# Patient Record
Sex: MALE | Race: WHITE | NOT HISPANIC OR LATINO | Employment: OTHER | URBAN - METROPOLITAN AREA
[De-identification: names, ages, dates, MRNs, and addresses within clinical notes are randomized per-mention and may not be internally consistent; named-entity substitution may affect disease eponyms.]

---

## 2017-02-06 PROBLEM — G89.4 CHRONIC PAIN SYNDROME: Status: ACTIVE | Noted: 2017-02-06

## 2017-05-02 ENCOUNTER — OFFICE VISIT (OUTPATIENT)
Dept: FAMILY MEDICINE | Facility: CLINIC | Age: 77
End: 2017-05-02
Payer: COMMERCIAL

## 2017-05-02 VITALS
HEIGHT: 63 IN | TEMPERATURE: 98.5 F | WEIGHT: 198 LBS | BODY MASS INDEX: 35.08 KG/M2 | SYSTOLIC BLOOD PRESSURE: 136 MMHG | DIASTOLIC BLOOD PRESSURE: 76 MMHG

## 2017-05-02 DIAGNOSIS — I10 BENIGN ESSENTIAL HYPERTENSION: ICD-10-CM

## 2017-05-02 DIAGNOSIS — G89.29 CHRONIC LEFT-SIDED LOW BACK PAIN WITH LEFT-SIDED SCIATICA: ICD-10-CM

## 2017-05-02 DIAGNOSIS — M54.42 CHRONIC LEFT-SIDED LOW BACK PAIN WITH LEFT-SIDED SCIATICA: ICD-10-CM

## 2017-05-02 DIAGNOSIS — N40.1 BENIGN NON-NODULAR PROSTATIC HYPERPLASIA WITH LOWER URINARY TRACT SYMPTOMS: ICD-10-CM

## 2017-05-02 PROCEDURE — 99214 OFFICE O/P EST MOD 30 MIN: CPT | Performed by: NURSE PRACTITIONER

## 2017-05-02 RX ORDER — OXYCODONE HYDROCHLORIDE 5 MG/1
5 TABLET ORAL 2 TIMES DAILY PRN
Qty: 60 TABLET | Refills: 0 | Status: SHIPPED | OUTPATIENT
Start: 2017-06-01 | End: 2017-05-02

## 2017-05-02 RX ORDER — OXYCODONE HYDROCHLORIDE 5 MG/1
5 TABLET ORAL 2 TIMES DAILY PRN
Qty: 60 TABLET | Refills: 0 | Status: SHIPPED | OUTPATIENT
Start: 2017-05-02 | End: 2017-05-02

## 2017-05-02 RX ORDER — TERAZOSIN 5 MG/1
5 CAPSULE ORAL AT BEDTIME
Qty: 90 CAPSULE | Refills: 3 | Status: SHIPPED | OUTPATIENT
Start: 2017-05-02 | End: 2018-02-20

## 2017-05-02 RX ORDER — OXYCODONE HYDROCHLORIDE 5 MG/1
5 TABLET ORAL 2 TIMES DAILY PRN
Qty: 60 TABLET | Refills: 0 | Status: SHIPPED | OUTPATIENT
Start: 2017-07-01 | End: 2017-07-20

## 2017-05-02 RX ORDER — TRIAMTERENE AND HYDROCHLOROTHIAZIDE 75; 50 MG/1; MG/1
0.5 TABLET ORAL DAILY
Qty: 45 TABLET | Refills: 3 | Status: SHIPPED | OUTPATIENT
Start: 2017-05-02 | End: 2018-02-20

## 2017-05-02 RX ORDER — OXYCODONE HYDROCHLORIDE 5 MG/1
5 TABLET ORAL 2 TIMES DAILY PRN
Qty: 60 TABLET | Refills: 0 | Status: CANCELLED | OUTPATIENT
Start: 2017-05-02

## 2017-05-02 ASSESSMENT — PAIN SCALES - GENERAL: PAINLEVEL: MILD PAIN (3)

## 2017-05-02 NOTE — PATIENT INSTRUCTIONS
Thank you for choosing [facility].  You may be receiving a survey in the mail from Edith Linares regarding your visit today.  Please take a few minutes to complete and return the survey to let us know how we are doing.      If you have questions or concerns, please contact us via [employer] or you can contact your care team at [phone].    Our Clinic hours are:  Monday 6:40 am  to 7:00 pm  Tuesday -Friday 6:40 am to 5:00 pm    The [place] outpatient lab hours are:  Monday - Friday 6:10 am to 4:45 pm  Saturdays 7:00 am to 11:00 am  Appointments are required, call [phone]    If you have clinical questions after hours or would like to schedule an appointment,  call the clinic at [phone].

## 2017-05-02 NOTE — PROGRESS NOTES
"  SUBJECTIVE:                                                    Mann Escobar is a 76 year old male who presents to clinic today for the following health issues:  Chief Complaint   Patient presents with     Pain     Pain Med Follow Up/ Renewal     Hypertension     follow up , renew meds           Hypertension Follow-up      Outpatient blood pressures are not being checked.    Low Salt Diet: not monitoring salt         Back Pain Follow Up       Description:   Location of pain:  bilateral  Character of pain: constant achiness  Pain radiation: into Left Leg and Foot   Since last visit, pain is:  unchanged  New numbness or weakness in legs, not attributed to pain:  no     Intensity: Currently 3/10    History:   Pain interferes with job: Not applicable  Therapies tried without relief: all treatments   Therapies tried with relief: pain meds  and cold        Accompanying Signs & Symptoms:  Risk of Fracture:  None  Risk of Cauda Equina:  None  Risk of Infection:  None  Risk of Cancer:  None           Amount of exercise or physical activity: None    Problems taking medications regularly: No    Medication side effects: none    Diet: regular (no restrictions)          Problem list and histories reviewed & adjusted, as indicated.  Additional history: as documented      ROS:  Constitutional, HEENT, cardiovascular, pulmonary, gi and gu systems are negative, except as otherwise noted.    OBJECTIVE:                                                    /76  Temp 98.5  F (36.9  C) (Tympanic)  Ht 5' 2.5\" (1.588 m)  Wt 198 lb (89.8 kg)  BMI 35.64 kg/m2  Body mass index is 35.64 kg/(m^2).  GENERAL: healthy, alert and no distress  NECK: no adenopathy, no asymmetry, masses, or scars and thyroid normal to palpation  RESP: lungs clear to auscultation - no rales, rhonchi or wheezes  CV: regular rate and rhythm, normal S1 S2, no S3 or S4, no murmur, click or rub, no peripheral edema and peripheral pulses strong     "     ASSESSMENT/PLAN:                                                        ICD-10-CM    1. Chronic left-sided low back pain with left-sided sciatica M54.42 oxyCODONE (ROXICODONE) 5 MG IR tablet    G89.29 DISCONTINUED: oxyCODONE (ROXICODONE) 5 MG IR tablet     DISCONTINUED: oxyCODONE (ROXICODONE) 5 MG IR tablet   2. Benign non-nodular prostatic hyperplasia with lower urinary tract symptoms N40.1 terazosin (HYTRIN) 5 MG capsule   3. Benign essential hypertension I10 triamterene-hydrochlorothiazide (MAXZIDE) 75-50 MG per tablet         The risks, benefits and treatment options of prescribed medications or other treatments have been discussed with the patient. The patient verbalized their understanding and should call or follow up if no improvement or if they develop further problems.    GUNJAN Vinson [facility]

## 2017-05-02 NOTE — LETTER
[facility]    05/02/17    Patient: Mann Escobar  YOB: 1940  Medical Record Number: [id number]                                                                  Controlled Substance Agreement  I understand that my care provider has prescribed controlled substances (narcotics, tranquilizers, and/or stimulants) to help manage my condition(s).  I am taking this medicine to help me function or work.  I know that this is strong medicine.  It could have serious side effects and even cause a dependency on the drug.  If I stop these medicines suddenly, I could have severe withdrawal symptoms.    The risks, benefits, and side effects of these medication(s) were explained to me.  I agree that:  1. I will take part in other treatments as advised by my provider.  This may be psychiatry or counseling, physical therapy, behavioral therapy, group treatment, or a referral to a pain clinic.  I will reduce or stop my medicine when my provider tells me to do so.   2. I will take my medicines as prescribed.  I will not change the dose or schedule unless my provider tells me to.  There will be no refills if I  run out early.   I may be contacted at any time without warning and asked to complete a drug test or pill count.   3. I will keep all my appointments at the clinic.  If I miss appointments or fail to follow instructions, my provider may stop my medicine.  4. I will not ask other providers to prescribe controlled substances. And I will not accept controlled substances from other people. If I need another prescribed controlled substance for a new reason, I will notify my provider within one business day.  5. If I enroll in the [place] Medical Marijuana program, I will tell my provider.  I will also sign an agreement to share my medical records with my provider.  6. I will use one pharmacy to fill all of my controlled substance prescriptions.  If my prescription is mailed to  my pharmacy, it may take 5 to 7 days for my medicine to be ready.  7. I understand that my provider, clinic care team, and pharmacy can track controlled substance prescriptions from other providers through a central database (prescription monitoring program).  8. I will bring in my list of medications (or my medicine bottles) each time I come to the clinic.  REV-  04/2016                                                                                                                                            Page 1 of 2      [facility]    05/02/17    Patient: Mann Escobar  YOB: 1940  Medical Record Number: [id number]    9. Refills of controlled substances will be made only during office hours.  It is up to me to make sure that I do not run out of my medicines on weekends or holidays.    10. I am responsible for my prescriptions.  If the medicine is lost or stolen, it will not be replaced.   I also agree not to share these medicines with anyone.  11. I agree to not use ANY illegal or recreational drugs.  This includes marijuana, cocaine, bath salts or other drugs.  I agree not to use alcohol unless my provider says I may.  I agree to give urine samples whenever asked.  If I fail to give a urine sample, the provider may stop my medicine.     12. I will tell my nurse or provider right away if I become pregnant or have a new medical problem treated outside of [facility].  13. I understand that this medicine can affect my thinking and judgment.  It may be unsafe for me to drive, use machinery and do dangerous tasks.  I will not do any of these things until I know how the medicine affects me.  If my dose changes, I will wait to see how it affects me.  I will contact my provider if I have concerns about medicine side effects.  I understand that if I do not follow any of the conditions above, my prescriptions or treatment may be stopped.    I agree that my  provider, clinic care team, and pharmacy may work with any city, state or federal law enforcement agency that investigates the misuse, sale, or other diversion of my controlled medicine. I will allow my provider to discuss my care with or share a copy of this agreement with any other treating provider, pharmacy or emergency room where I receive care.  I agree to give up (waive) any right of privacy or confidentiality with respect to these authorizations.   I have read this agreement and have asked questions about anything I did not understand.   ___________________________________    ___________________________  Patient Signature                                                           Date and Time  ___________________________________     ____________________________  Witness                                                                            Date and Time  ___________________________________  GUNJAN Vinson CNP  REV-  04/2016                                                                                                                                                                 Page 2 of 2  Opioid Pain Medicines (also known as Narcotics)  What You Need to Know      What are opioids?   Opioids are pain medicines that must be prescribed by a doctor. Examples are:     morphine (MS Contin, Liz)    oxycodone (Oxycontin)    oxycodone and acetaminophen (Percocet)    hydrocodone and acetaminophen (Vicodin, Norco)     fentanyl patch (Duragesic)     hydromorphone (Dilaudid)     methadone     What do opioids do well?   Opioids are best for short-term pain after a surgery or injury. They also work well for cancer pain. Unlike other pain medicines, they do not cause liver or kidney failure or ulcers. They may help some people with long-lasting (chronic) pain.     What do opioids NOT do well?   Opioids never get rid of pain entirely, and they do not work well for most patients with chronic pain.  Opioids do not reduce swelling, one of the causes of pain. They also don t work well for nerve pain.     Side effects  Talk to your doctor before you start or decide to keep taking one of these medicines. Side effects include:    Lowers your breathing rate enough that it could cause death    Death due to taking more than the prescribed dose    Serious lifelong opioid use      Dependence is not the same as addiction. Addiction is when people keep using a substance that harms their body, their mind or their relations with others. If you have a history of drug or alcohol abuse, taking opioids can cause a relapse.  Over time, opioids don t work as well. Most people will need higher and higher doses. The higher the dose, the more serious the side effects. We don t know the long-term effects of opioids.   People who have used opioids for a long time have a lower quality of life, worse depression, higher levels of pain and more visits to doctors.  Overdose from prescription drugs is the second leading cause of death in the [place] The risk of overdose rises when opioids are taken with other drugs such as:    Medicines used for anxiety and panic attacks (such as lorazepam, alprazolam, clonazepam    Other sedatives    Alcohol    Illegal drugs such as heroin  Never share your opioids with others. Be sure to store opioids in a secure place, locked if possible.Young children can easily swallow them and overdose.     Are there other ways to manage pain?  Ways to help reduce pain:    Exercise every day.    Treat health problems that may be causing pain.    Treat mental health problems like depression and anxiety.     Worse depression symptoms; Less pleasure in things you usually enjoy    Feeling tired or sluggish    Slower thoughts or cloudy thinking    Being more sensitive to pain over time; Pain is harder to control.    Trouble sleeping or restless sleep    Changes in hormone levels (for example, less testosterone).     Changes in  sex drive or ability to have sex    Long lasting nausea and constipation    Trouble breathing while asleep; This is worse with lung problems like COPD or sleep apnea.    Unsafe driving    Getting sick more often    Itching    Feeling dizzy    Dry mouth    Sweating    Trouble emptying the bladder (peeing). This is worse if you have an enlarged prostate or get urinary tract infections (UTIs).    What else should I know about opioids?  When someone takes opioids for too long or too often, they become dependent. This means that if you stop or reduce the medicine too quickly, you will have withdrawal symptoms.          Practice good sleep habits.  Try to go to bed and get up at the same time every day.    Stop smoking.  Tobacco use can make pain worse.    Do things that you enjoy.    Find a way to work through pain without drugs.  Try deep breathing, meditation, visual imagery and aromatherapy.    Ask your doctor to help you create a plan to manage your pain.

## 2017-05-02 NOTE — NURSING NOTE
"Chief Complaint   Patient presents with     Pain     Pain Med Follow Up/ Renewal     Hypertension     follow up , renew meds       Initial /78 (BP Location: Left arm, Patient Position: Chair, Cuff Size: Adult Large)  Temp 98.5  F (36.9  C) (Tympanic)  Ht 5' 2.5\" (1.588 m)  Wt 198 lb (89.8 kg)  BMI 35.64 kg/m2 Estimated body mass index is 35.64 kg/(m^2) as calculated from the following:    Height as of this encounter: 5' 2.5\" (1.588 m).    Weight as of this encounter: 198 lb (89.8 kg).  Medication Reconciliation: complete    "

## 2017-05-02 NOTE — MR AVS SNAPSHOT
After Visit Summary   5/2/2017    Mann Escobar    MRN: [id number]           Patient Information     Date Of Birth          1940        Visit Information        Provider Department      5/2/2017 9:00 AM Shayy Ramírez APRN [facility]        Today's Diagnoses     Chronic left-sided low back pain with left-sided sciatica        Benign non-nodular prostatic hyperplasia with lower urinary tract symptoms        Benign essential hypertension          Care Instructions          Thank you for choosing [facility].  You may be receiving a survey in the mail from Edith Linares regarding your visit today.  Please take a few minutes to complete and return the survey to let us know how we are doing.      If you have questions or concerns, please contact us via [employer] or you can contact your care team at [phone].    Our Clinic hours are:  Monday 6:40 am  to 7:00 pm  Tuesday -Friday 6:40 am to 5:00 pm    The [place] outpatient lab hours are:  Monday - Friday 6:10 am to 4:45 pm  Saturdays 7:00 am to 11:00 am  Appointments are required, call [phone]    If you have clinical questions after hours or would like to schedule an appointment,  call the clinic at [phone].          Follow-ups after your visit        Who to contact     If you have questions or need follow up information about today's clinic visit or your schedule please contact [facility] directly at [phone].  Normal or non-critical lab and imaging results will be communicated to you by [employer], letter or phone within 4 business days after the clinic has received the results. If you do not hear from us within 7 days, please contact the clinic through [employer] or phone. If you have a critical or abnormal lab result, we will notify you by phone as soon as possible.  Submit refill requests through [employer] or call your pharmacy and they will forward the refill request to us.  "Please allow 3 business days for your refill to be completed.          Additional Information About Your Visit        [employer] Information     [employer] lets you send messages to your doctor, view your test results, renew your prescriptions, schedule appointments and more. To sign up, go to [email or web address] . Click on \"Log in\" on the left side of the screen, which will take you to the Welcome page. Then click on \"Sign up Now\" on the right side of the page.     You will be asked to enter the access code listed below, as well as some personal information. Please follow the directions to create your username and password.     Your access code is: [email or web address]  Expires: 2017  9:34 AM     Your access code will  in 90 days. If you need help or a new code, please call your [place] clinic or [phone].        Care EveryWhere ID     This is your Care EveryWhere ID. This could be used by other organizations to access your [place] medical records  [id number]        Your Vitals Were     Temperature Height BMI (Body Mass Index)             98.5  F (36.9  C) (Tympanic) 5' 2.5\" (1.588 m) 35.64 kg/m2          Blood Pressure from Last 3 Encounters:   17 142/78   16 138/66   16 158/81    Weight from Last 3 Encounters:   17 198 lb (89.8 kg)   16 198 lb 4.8 oz (89.9 kg)   16 189 lb 6.4 oz (85.9 kg)              Today, you had the following     No orders found for display         Today's Medication Changes          These changes are accurate as of: 17  9:34 AM.  If you have any questions, ask your nurse or doctor.               Start taking these medicines.        Dose/Directions    oxyCODONE 5 MG IR tablet   Commonly known as:  ROXICODONE   Used for:  Chronic left-sided low back pain with left-sided sciatica        Dose:  5 mg   Start taking on:  2017   Take 1 tablet (5 mg) by mouth 2 times daily as needed for pain   Quantity:  60 tablet   Refills:  0         These " medicines have changed or have updated prescriptions.        Dose/Directions    senna-docusate 8.6-50 MG per tablet   Commonly known as:  SENOKOT-S;PERICOLACE   This may have changed:    - how much to take  - when to take this   Used for:  Spondylolisthesis of lumbar region        Dose:  1-2 tablet   Take 1-2 tablets by mouth 2 times daily as needed for constipation.   Quantity:  60 tablet   Refills:  0            Where to get your medicines      Some of these will need a paper prescription and others can be bought over the counter.  Ask your nurse if you have questions.     Bring a paper prescription for each of these medications     oxyCODONE 5 MG IR tablet    terazosin 5 MG capsule    triamterene-hydrochlorothiazide 75-50 MG per tablet                Primary Care Provider Office Phone # Fax #    Shayy GUNJAN Armendariz [phone]       [facility] [address] [address]        Thank you!     Thank you for choosing [facility]  for your care. Our goal is always to provide you with excellent care. Hearing back from our patients is one way we can continue to improve our services. Please take a few minutes to complete the written survey that you may receive in the mail after your visit with us. Thank you!             Your Updated Medication List - Protect others around you: Learn how to safely use, store and throw away your medicines at www.disposemymeds.org.          This list is accurate as of: 5/2/17  9:34 AM.  Always use your most recent med list.                   Brand Name Dispense Instructions for use    aspirin 81 MG EC tablet     90 tablet    Take 1 tablet (81 mg) by mouth daily       HCA VITAMIN B12 500 MCG Tabs   Generic drug:  cyanocobalamin      2 tablets daily       MULTIVITAMIN PO      one daily       oxyCODONE 5 MG IR tablet   Start taking on:  7/1/2017    ROXICODONE    60 tablet    Take 1 tablet (5 mg) by mouth 2 times daily as  needed for pain       senna-docusate 8.6-50 MG per tablet    SENOKOT-S;PERICOLACE    60 tablet    Take 1-2 tablets by mouth 2 times daily as needed for constipation.       terazosin 5 MG capsule    HYTRIN    90 capsule    Take 1 capsule (5 mg) by mouth At Bedtime       triamterene-hydrochlorothiazide 75-50 MG per tablet    MAXZIDE    45 tablet    Take 0.5 tablets by mouth daily       VIACTIV PO      With D 1000mg calcium

## 2017-07-20 ENCOUNTER — OFFICE VISIT (OUTPATIENT)
Dept: FAMILY MEDICINE | Facility: CLINIC | Age: 77
End: 2017-07-20
Payer: COMMERCIAL

## 2017-07-20 VITALS
HEART RATE: 73 BPM | SYSTOLIC BLOOD PRESSURE: 160 MMHG | TEMPERATURE: 99.1 F | WEIGHT: 200 LBS | HEIGHT: 63 IN | BODY MASS INDEX: 35.44 KG/M2 | DIASTOLIC BLOOD PRESSURE: 80 MMHG

## 2017-07-20 DIAGNOSIS — M54.42 CHRONIC LEFT-SIDED LOW BACK PAIN WITH LEFT-SIDED SCIATICA: Primary | ICD-10-CM

## 2017-07-20 DIAGNOSIS — R60.0 PERIPHERAL EDEMA: ICD-10-CM

## 2017-07-20 DIAGNOSIS — G89.29 CHRONIC LEFT-SIDED LOW BACK PAIN WITH LEFT-SIDED SCIATICA: Primary | ICD-10-CM

## 2017-07-20 PROCEDURE — 99213 OFFICE O/P EST LOW 20 MIN: CPT | Performed by: NURSE PRACTITIONER

## 2017-07-20 RX ORDER — OXYCODONE HYDROCHLORIDE 5 MG/1
5 TABLET ORAL 2 TIMES DAILY PRN
Qty: 60 TABLET | Refills: 0 | Status: SHIPPED | OUTPATIENT
Start: 2017-08-31 | End: 2017-07-20

## 2017-07-20 RX ORDER — TERAZOSIN 5 MG/1
5 CAPSULE ORAL AT BEDTIME
Qty: 90 CAPSULE | Refills: 3 | Status: CANCELLED | OUTPATIENT
Start: 2017-07-20

## 2017-07-20 RX ORDER — OXYCODONE HYDROCHLORIDE 5 MG/1
5 TABLET ORAL 2 TIMES DAILY PRN
Qty: 60 TABLET | Refills: 0 | Status: SHIPPED | OUTPATIENT
Start: 2017-08-01 | End: 2017-07-20

## 2017-07-20 RX ORDER — FUROSEMIDE 20 MG
20 TABLET ORAL DAILY
Qty: 30 TABLET | Refills: 1 | Status: SHIPPED | OUTPATIENT
Start: 2017-07-20 | End: 2017-09-22

## 2017-07-20 RX ORDER — OXYCODONE HYDROCHLORIDE 5 MG/1
5 TABLET ORAL 2 TIMES DAILY PRN
Qty: 60 TABLET | Refills: 0 | Status: SHIPPED | OUTPATIENT
Start: 2017-09-30 | End: 2017-09-22

## 2017-07-20 NOTE — PROGRESS NOTES
"  SUBJECTIVE:                                                    Mann Escobar is a 76 year old male who presents to clinic today for the following health issues:    Back Pain Follow Up      Description:   Location of pain:  Low Back Pain   Character of pain: sharp and dull ache  Pain radiation: lower left leg.   Since last visit, pain is:  unchanged  New numbness or weakness in legs, not attributed to pain:  YES    Intensity: moderate    History:   Pain interferes with job: No  Therapies tried without relief: cold, heat, massage  Therapies tried with relief: Oxycodone and sitting.           Accompanying Signs & Symptoms:  Risk of Fracture:  None  Risk of Cauda Equina:  None  Risk of Infection:  None  Risk of Cancer:  None        Amount of exercise or physical activity: None    Problems taking medications regularly: No    Medication side effects: none    Diet: regular (no restrictions)        Left leg - always has problems with swelling since knee replacement.  Worse in the summer  Doesn't think the HCTZ is helping at all.  SANDY hose don't help.          Problem list and histories reviewed & adjusted, as indicated.  Additional history: as documented      Reviewed and updated as needed this visit by clinical staffTobacco  Allergies  Med Hx  Surg Hx  Fam Hx  Soc Hx      Reviewed and updated as needed this visit by Provider         ROS:  Constitutional, HEENT, cardiovascular, pulmonary, gi and gu systems are negative, except as otherwise noted.      OBJECTIVE:   /80  Pulse 73  Temp 99.1  F (37.3  C) (Tympanic)  Ht 5' 2.5\" (1.588 m)  Wt 200 lb (90.7 kg)  BMI 36 kg/m2  Body mass index is 36 kg/(m^2).  GENERAL: healthy, alert and no distress  MS: +2 pitting edema ankle to knee, left leg.        ASSESSMENT/PLAN:       ICD-10-CM    1. Chronic left-sided low back pain with left-sided sciatica M54.42 oxyCODONE (ROXICODONE) 5 MG IR tablet    G89.29 DISCONTINUED: oxyCODONE (ROXICODONE) 5 MG IR " tablet     DISCONTINUED: oxyCODONE (ROXICODONE) 5 MG IR tablet   2. Peripheral edema R60.9 furosemide (LASIX) 20 MG tablet daily  Follow up in one month for recheck.       The risks, benefits and treatment options of prescribed medications or other treatments have been discussed with the patient. The patient verbalized their understanding and should call or follow up if no improvement or if they develop further problems.    GUNJAN Vinson [facility]

## 2017-07-20 NOTE — NURSING NOTE
"Initial /85  Pulse 73  Temp 99.1  F (37.3  C) (Tympanic)  Ht 5' 2.5\" (1.588 m)  Wt 200 lb (90.7 kg)  BMI 36 kg/m2 Estimated body mass index is 36 kg/(m^2) as calculated from the following:    Height as of this encounter: 5' 2.5\" (1.588 m).    Weight as of this encounter: 200 lb (90.7 kg). .    Melody Goodman CMA ([facility])  "

## 2017-07-20 NOTE — MR AVS SNAPSHOT
"              After Visit Summary   2017    Mann Escobar    MRN: [id number]           Patient Information     Date Of Birth          1940        Visit Information        Provider Department      2017 1:40 PM Shayy Ramírez APRN CNP [facility]        Today's Diagnoses     Chronic left-sided low back pain with left-sided sciatica    -  1    Peripheral edema           Follow-ups after your visit        Who to contact     If you have questions or need follow up information about today's clinic visit or your schedule please contact [facility] directly at [phone].  Normal or non-critical lab and imaging results will be communicated to you by Trly Uniqhart, letter or phone within 4 business days after the clinic has received the results. If you do not hear from us within 7 days, please contact the clinic through Trly Uniqhart or phone. If you have a critical or abnormal lab result, we will notify you by phone as soon as possible.  Submit refill requests through B2Brev or call your pharmacy and they will forward the refill request to us. Please allow 3 business days for your refill to be completed.          Additional Information About Your Visit        MyChart Information     B2Brev lets you send messages to your doctor, view your test results, renew your prescriptions, schedule appointments and more. To sign up, go to [email or web address] . Click on \"Log in\" on the left side of the screen, which will take you to the Welcome page. Then click on \"Sign up Now\" on the right side of the page.     You will be asked to enter the access code listed below, as well as some personal information. Please follow the directions to create your username and password.     Your access code is: [email or web address]  Expires: 2017  9:34 AM     Your access code will  in 90 days. If you need help or a new code, please call your [facility] or [phone].      " "  Care EveryWhere ID     This is your Care EveryWhere ID. This could be used by other organizations to access your [facility] medical records  [id number]        Your Vitals Were     Pulse Temperature Height BMI (Body Mass Index)          73 99.1  F (37.3  C) (Tympanic) 5' 2.5\" (1.588 m) 36 kg/m2         Blood Pressure from Last 3 Encounters:   07/20/17 160/80   05/02/17 136/76   09/27/16 138/66    Weight from Last 3 Encounters:   07/20/17 200 lb (90.7 kg)   05/02/17 198 lb (89.8 kg)   09/27/16 198 lb 4.8 oz (89.9 kg)              Today, you had the following     No orders found for display         Today's Medication Changes          These changes are accurate as of: 7/20/17  2:08 PM.  If you have any questions, ask your nurse or doctor.               Start taking these medicines.        Dose/Directions    furosemide 20 MG tablet   Commonly known as:  LASIX   Used for:  Peripheral edema   Started by:  Shayy Ramírez APRN CNP        Dose:  20 mg   Take 1 tablet (20 mg) by mouth daily   Quantity:  30 tablet   Refills:  1       oxyCODONE 5 MG IR tablet   Commonly known as:  ROXICODONE   Used for:  Chronic left-sided low back pain with left-sided sciatica   Started by:  Shayy Ramírez APRN CNP        Dose:  5 mg   Start taking on:  9/30/2017   Take 1 tablet (5 mg) by mouth 2 times daily as needed for pain   Quantity:  60 tablet   Refills:  0         These medicines have changed or have updated prescriptions.        Dose/Directions    senna-docusate 8.6-50 MG per tablet   Commonly known as:  SENOKOT-S;PERICOLACE   This may have changed:    - how much to take  - when to take this   Used for:  Spondylolisthesis of lumbar region        Dose:  1-2 tablet   Take 1-2 tablets by mouth 2 times daily as needed for constipation.   Quantity:  60 tablet   Refills:  0            Where to get your medicines      These medications were sent to [facility] [place], [place] - [address]  [address] [place] [address]     Phone:  [phone]     furosemide 20 MG tablet         Some of these will need a paper prescription and others can be bought over the counter.  Ask your nurse if you have questions.     Bring a paper prescription for each of these medications     oxyCODONE 5 MG IR tablet                Primary Care Provider Office Phone # Fax #    Shayy Thomasmar Ramírez, APRN [facility] [phone]       [facility] [address] [facility] [address]        Equal Access to Services     CATRACHO MCBRIDE : Hadii petar ku hadasho Soomaali, waaxda luqadaha, qaybta kaalmada adeegyada, waxyessy morris . So [facility] [phone].    ATENCIÓN: Si habla español, tiene a hewitt disposición servicios gratuitos de asistencia lingüística. Llame al [phone].    We comply with applicable federal civil rights laws and [place] laws. We do not discriminate on the basis of race, color, national origin, age, disability sex, sexual orientation or gender identity.            Thank you!     Thank you for choosing [facility]  for your care. Our goal is always to provide you with excellent care. Hearing back from our patients is one way we can continue to improve our services. Please take a few minutes to complete the written survey that you may receive in the mail after your visit with us. Thank you!             Your Updated Medication List - Protect others around you: Learn how to safely use, store and throw away your medicines at www.disposemymeds.org.          This list is accurate as of: 7/20/17  2:08 PM.  Always use your most recent med list.                   Brand Name Dispense Instructions for use Diagnosis    aspirin 81 MG EC tablet     90 tablet    Take 1 tablet (81 mg) by mouth daily        furosemide 20 MG tablet    LASIX    30 tablet    Take 1 tablet (20 mg) by mouth daily    Peripheral edema       HCA VITAMIN B12 500 MCG Tabs    Generic drug:  cyanocobalamin      2 tablets daily        MULTIVITAMIN PO      one daily        oxyCODONE 5 MG IR tablet   Start taking on:  9/30/2017    ROXICODONE    60 tablet    Take 1 tablet (5 mg) by mouth 2 times daily as needed for pain    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    Spondylolisthesis of lumbar region       terazosin 5 MG capsule    HYTRIN    90 capsule    Take 1 capsule (5 mg) by mouth At Bedtime    Benign non-nodular prostatic hyperplasia with lower urinary tract symptoms       triamterene-hydrochlorothiazide 75-50 MG per tablet    MAXZIDE    45 tablet    Take 0.5 tablets by mouth daily    Benign essential hypertension       VIACTIV PO      With D 1000mg calcium

## 2017-09-22 ENCOUNTER — OFFICE VISIT (OUTPATIENT)
Dept: FAMILY MEDICINE | Facility: CLINIC | Age: 77
End: 2017-09-22
Payer: COMMERCIAL

## 2017-09-22 VITALS
OXYGEN SATURATION: 97 % | SYSTOLIC BLOOD PRESSURE: 138 MMHG | HEART RATE: 67 BPM | RESPIRATION RATE: 20 BRPM | DIASTOLIC BLOOD PRESSURE: 82 MMHG | WEIGHT: 194 LBS | TEMPERATURE: 97.5 F | BODY MASS INDEX: 34.92 KG/M2

## 2017-09-22 DIAGNOSIS — G89.29 CHRONIC LEFT-SIDED LOW BACK PAIN WITH LEFT-SIDED SCIATICA: ICD-10-CM

## 2017-09-22 DIAGNOSIS — M54.42 CHRONIC LEFT-SIDED LOW BACK PAIN WITH LEFT-SIDED SCIATICA: ICD-10-CM

## 2017-09-22 DIAGNOSIS — Z23 NEED FOR PROPHYLACTIC VACCINATION AND INOCULATION AGAINST INFLUENZA: ICD-10-CM

## 2017-09-22 DIAGNOSIS — Z00.00 ROUTINE GENERAL MEDICAL EXAMINATION AT A HEALTH CARE FACILITY: Primary | ICD-10-CM

## 2017-09-22 DIAGNOSIS — Z12.5 SCREENING FOR PROSTATE CANCER: ICD-10-CM

## 2017-09-22 PROCEDURE — G0008 ADMIN INFLUENZA VIRUS VAC: HCPCS | Performed by: NURSE PRACTITIONER

## 2017-09-22 PROCEDURE — 99397 PER PM REEVAL EST PAT 65+ YR: CPT | Mod: 25 | Performed by: NURSE PRACTITIONER

## 2017-09-22 PROCEDURE — 90662 IIV NO PRSV INCREASED AG IM: CPT | Performed by: NURSE PRACTITIONER

## 2017-09-22 RX ORDER — OXYCODONE HYDROCHLORIDE 5 MG/1
5 TABLET ORAL 2 TIMES DAILY PRN
Qty: 60 TABLET | Refills: 0 | Status: SHIPPED | OUTPATIENT
Start: 2017-10-30 | End: 2017-09-22

## 2017-09-22 RX ORDER — OXYCODONE HYDROCHLORIDE 5 MG/1
5 TABLET ORAL 2 TIMES DAILY PRN
Qty: 60 TABLET | Refills: 0 | Status: SHIPPED | OUTPATIENT
Start: 2017-11-29 | End: 2017-09-22

## 2017-09-22 RX ORDER — OXYCODONE HYDROCHLORIDE 5 MG/1
5 TABLET ORAL 2 TIMES DAILY PRN
Qty: 60 TABLET | Refills: 0 | Status: SHIPPED | OUTPATIENT
Start: 2017-12-29 | End: 2017-12-05

## 2017-09-22 NOTE — MR AVS SNAPSHOT
After Visit Summary   9/22/2017    Mann Escobar    MRN: [id number]           Patient Information     Date Of Birth          1940        Visit Information        Provider Department      9/22/2017 11:00 AM Shayy Ramírez APRN [facility]        Today's Diagnoses     Routine general medical examination at a health care facility    -  1    Chronic left-sided low back pain with left-sided sciatica        Need for prophylactic vaccination and inoculation against influenza        Screening for prostate cancer          Care Instructions      Preventive Health Recommendations:   Male Ages 65 and over    Yearly exam:             See your health care provider every year in order to  o   Review health changes.   o   Discuss preventive care.    o   Review your medicines if your doctor has prescribed any.    Talk with your health care provider about whether you should have a test to screen for prostate cancer (PSA).    Every 3 years, have a diabetes test (fasting glucose). If you are at risk for diabetes, you should have this test more often.    Every 5 years, have a cholesterol test. Have this test more often if you are at risk for high cholesterol or heart disease.     Every 10 years, have a colonoscopy. Or, have a yearly FIT test (stool test). These exams will check for colon cancer.    Talk to with your health care provider about screening for Abdominal Aortic Aneurysm if you have a family history of AAA or have a history of smoking.    Shots:     Get a flu shot each year.     Get a tetanus shot every 10 years.     Talk to your doctor about your pneumonia vaccines. There are now two you should receive - Pneumovax (PPSV 23) and Prevnar (PCV 13).     Talk to your doctor about a shingles vaccine.     Talk to your doctor about the hepatitis B vaccine.  Nutrition:     Eat at least 5 servings of fruits and vegetables each day.     Eat whole-grain bread, whole-wheat  "pasta and brown rice instead of white grains and rice.     Talk to your provider about Calcium and Vitamin D.   Lifestyle    Exercise for at least 150 minutes a week (30 minutes a day, 5 days a week). This will help you control your weight and prevent disease.     Limit alcohol to one drink per day.     No smoking.     Wear sunscreen to prevent skin cancer.     See your dentist every six months for an exam and cleaning.     See your eye doctor every 1 to 2 years to screen for conditions such as glaucoma, macular degeneration, cataracts, etc           Follow-ups after your visit        Future tests that were ordered for you today     Open Future Orders        Priority Expected Expires Ordered    **Lipid panel reflex to direct LDL FUTURE anytime Routine 9/22/2017 9/22/2018 9/22/2017    **Basic metabolic panel FUTURE anytime Routine 9/22/2017 9/22/2018 9/22/2017    PSA, screen Routine  9/22/2018 9/22/2017            Who to contact     If you have questions or need follow up information about today's clinic visit or your schedule please contact [facility] directly at [phone].  Normal or non-critical lab and imaging results will be communicated to you by Envie de Fraiseshart, letter or phone within 4 business days after the clinic has received the results. If you do not hear from us within 7 days, please contact the clinic through [employer] or phone. If you have a critical or abnormal lab result, we will notify you by phone as soon as possible.  Submit refill requests through [employer] or call your pharmacy and they will forward the refill request to us. Please allow 3 business days for your refill to be completed.          Additional Information About Your Visit        Envie de FraisesharWebRadar Information     [employer] lets you send messages to your doctor, view your test results, renew your prescriptions, schedule appointments and more. To sign up, go to [email or web address] . Click on \"Log in\" on the left side of the screen, which " "will take you to the Welcome page. Then click on \"Sign up Now\" on the right side of the page.     You will be asked to enter the access code listed below, as well as some personal information. Please follow the directions to create your username and password.     Your access code is: [email or web address]  Expires: 2017 11:24 AM     Your access code will  in 90 days. If you need help or a new code, please call your [facility] clinic or [phone].        Care EveryWhere ID     This is your Care EveryWhere ID. This could be used by other organizations to access your [facility] medical records  [id number]        Your Vitals Were     Pulse Temperature Respirations Pulse Oximetry BMI (Body Mass Index)       67 97.5  F (36.4  C) (Tympanic) 20 97% 34.92 kg/m2        Blood Pressure from Last 3 Encounters:   17 162/83   17 160/80   17 136/76    Weight from Last 3 Encounters:   17 194 lb (88 kg)   17 200 lb (90.7 kg)   17 198 lb (89.8 kg)              We Performed the Following     ADMIN INFLUENZA (For MEDICARE Patients ONLY) []     FLU VACCINE, INCREASED ANTIGEN, PRESV FREE, AGE 65+ [85143]          Today's Medication Changes          These changes are accurate as of: 17 11:24 AM.  If you have any questions, ask your nurse or doctor.               Start taking these medicines.        Dose/Directions    oxyCODONE 5 MG IR tablet   Commonly known as:  ROXICODONE   Used for:  Chronic left-sided low back pain with left-sided sciatica   Started by:  Shayy Ramírez APRN CNP        Dose:  5 mg   Start taking on:  2017   Take 1 tablet (5 mg) by mouth 2 times daily as needed for pain   Quantity:  60 tablet   Refills:  0         These medicines have changed or have updated prescriptions.        Dose/Directions    senna-docusate 8.6-50 MG per tablet   Commonly known as:  SENOKOT-S;PERICOLACE   This may have changed:    - how much to take  - when to take this   Used " for:  Spondylolisthesis of lumbar region        Dose:  1-2 tablet   Take 1-2 tablets by mouth 2 times daily as needed for constipation.   Quantity:  60 tablet   Refills:  0         Stop taking these medicines if you haven't already. Please contact your care team if you have questions.     furosemide 20 MG tablet   Commonly known as:  LASIX   Stopped by:  Shayy Ramírez APRN CNP                Where to get your medicines      Some of these will need a paper prescription and others can be bought over the counter.  Ask your nurse if you have questions.     Bring a paper prescription for each of these medications     oxyCODONE 5 MG IR tablet                Primary Care Provider Office Phone # Fax #    GUNJAN Villarreal [phone] [address] [facility] [address]        Equal Access to Services     CATRACHO MCBRIDE : Hadii aad ku hadasho Soleesa, waaxda luqadaha, qaybta kaalmada adeegyada, deny morris . So [facility] [phone].    ATENCIÓN: Si habla español, tiene a hewitt disposición servicios gratuitos de asistencia lingüística. Ursula al [phone].    We comply with applicable federal civil rights laws and [place] laws. We do not discriminate on the basis of race, color, national origin, age, disability sex, sexual orientation or gender identity.            Thank you!     Thank you for choosing [facility]  for your care. Our goal is always to provide you with excellent care. Hearing back from our patients is one way we can continue to improve our services. Please take a few minutes to complete the written survey that you may receive in the mail after your visit with us. Thank you!             Your Updated Medication List - Protect others around you: Learn how to safely use, store and throw away your medicines at www.disposemymeds.org.          This list is accurate as of: 9/22/17 11:24 AM.  Always use your most recent med  list.                   Brand Name Dispense Instructions for use Diagnosis    aspirin 81 MG EC tablet     90 tablet    Take 1 tablet (81 mg) by mouth daily        HCA VITAMIN B12 500 MCG Tabs   Generic drug:  cyanocobalamin      2 tablets daily        MULTIVITAMIN PO      one daily        oxyCODONE 5 MG IR tablet   Start taking on:  12/29/2017    ROXICODONE    60 tablet    Take 1 tablet (5 mg) by mouth 2 times daily as needed for pain    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    Spondylolisthesis of lumbar region       terazosin 5 MG capsule    HYTRIN    90 capsule    Take 1 capsule (5 mg) by mouth At Bedtime    Benign non-nodular prostatic hyperplasia with lower urinary tract symptoms       triamterene-hydrochlorothiazide 75-50 MG per tablet    MAXZIDE    45 tablet    Take 0.5 tablets by mouth daily    Benign essential hypertension       VIACTIV PO      With D 1000mg calcium

## 2017-09-22 NOTE — PROGRESS NOTES
SUBJECTIVE:   Mann Escobar is a 76 year old male who presents for Preventive Visit.    Are you in the first 12 months of your Medicare Part B coverage?  No    Healthy Habits:    Do you get at least three servings of calcium containing foods daily (dairy, green leafy vegetables, etc.)? no, taking calcium and/or vitamin D supplement: yes -     Amount of exercise or daily activities, outside of work: 0 day(s) per week    Problems taking medications regularly No    Medication side effects: No    Have you had an eye exam in the past two years? no    Do you see a dentist twice per year? no    Do you have sleep apnea, excessive snoring or daytime drowsiness?no    COGNITIVE SCREEN  1) Repeat 3 items (Banana, Sunrise, Chair)    2) Clock draw: NORMAL  3) 3 item recall: Recalls 3 objects  Results: 3 items recalled: COGNITIVE IMPAIRMENT LESS LIKELY    Mini-CogTM Copyright S Ivonne. Licensed by the author for use in [facility]; reprinted with permission (zi@[email or web address]). All rights reserved.              Reviewed and updated as needed this visit by clinical staffTobacco  Allergies  Med Hx  Surg Hx  Fam Hx  Soc Hx        Reviewed and updated as needed this visit by Provider        Social History   Substance Use Topics     Smoking status: Former Smoker     Packs/day: 0.50     Years: 7.00     Types: Cigarettes     Quit date: 1/1/1962     Smokeless tobacco: Never Used     Alcohol use Yes      Comment: rarely       The patient does not drink >3 drinks per day nor >7 drinks per week.    Today's PHQ-2 Score:   PHQ-2 ( 1999 Pfizer) 9/22/2017 7/20/2017   Q1: Little interest or pleasure in doing things 0 0   Q2: Feeling down, depressed or hopeless 0 0   PHQ-2 Score 0 0       Do you feel safe in your environment - Yes    Do you have a Health Care Directive?: No: Advance care planning reviewed with patient; information given to patient to review.      Current providers sharing in care for this patient  "include: Patient Care Team:  Shayy Ramírez APRN CNP as PCP - General (Family Practice)      Hearing impairment: Yes, per wife    Ability to successfully perform activities of daily living: Yes, no assistance needed     Fall risk:  Fallen 2 or more times in the past year?: Yes  Any fall with injury in the past year?: No  Timed Up and Go Test (>13.5 is fall risk; contact physician) : 10      Home safety:  none identified      The following health maintenance items are reviewed in Epic and correct as of today:  Health Maintenance   Topic Date Due     URINE DRUG SCREEN Q1 YR  10/28/1955     SARAH QUESTIONNAIRE 1 YEAR  10/28/1958     PHQ-9 Q1YR  10/28/1958     INFLUENZA VACCINE (SYSTEM ASSIGNED)  09/01/2017     FALL RISK ASSESSMENT  09/27/2017     ADVANCE DIRECTIVE PLANNING Q5 YRS  10/08/2017     LIPID SCREEN Q5 YR MALE (SYSTEM ASSIGNED)  10/16/2018     COLONOSCOPY Q5 YR  08/21/2021     TETANUS IMMUNIZATION (SYSTEM ASSIGNED)  08/23/2021     PNEUMOCOCCAL  Completed           ROS:  Constitutional, HEENT, cardiovascular, pulmonary, gi and gu systems are negative, except as otherwise noted.      OBJECTIVE:   /82  Pulse 67  Temp 97.5  F (36.4  C) (Tympanic)  Resp 20  Wt 194 lb (88 kg)  SpO2 97%  BMI 34.92 kg/m2 Estimated body mass index is 34.92 kg/(m^2) as calculated from the following:    Height as of 7/20/17: 5' 2.5\" (1.588 m).    Weight as of this encounter: 194 lb (88 kg).  EXAM:   GENERAL: healthy, alert and no distress  EYES: Eyes grossly normal to inspection, PERRL and conjunctivae and sclerae normal  HENT: ear canals and TM's normal, nose and mouth without ulcers or lesions  NECK: no adenopathy, no asymmetry, masses, or scars and thyroid normal to palpation  RESP: lungs clear to auscultation - no rales, rhonchi or wheezes  CV: regular rate and rhythm, normal S1 S2, no S3 or S4, no murmur, click or rub, no peripheral edema and peripheral pulses strong  ABDOMEN: soft, nontender, no " "hepatosplenomegaly, no masses and bowel sounds normal  MS: no gross musculoskeletal defects noted, no edema  SKIN: no suspicious lesions or rashes  NEURO: Normal strength and tone, mentation intact and speech normal  PSYCH: mentation appears normal, affect normal/bright    ASSESSMENT / PLAN:       ICD-10-CM    1. Routine general medical examination at a health care facility Z00.00 **Lipid panel reflex to direct LDL FUTURE anytime     **Basic metabolic panel FUTURE anytime   2. Chronic left-sided low back pain with left-sided sciatica M54.42 oxyCODONE (ROXICODONE) 5 MG IR tablet    G89.29 DISCONTINUED: oxyCODONE (ROXICODONE) 5 MG IR tablet     DISCONTINUED: oxyCODONE (ROXICODONE) 5 MG IR tablet   3. Need for prophylactic vaccination and inoculation against influenza Z23 FLU VACCINE, INCREASED ANTIGEN, PRESV FREE, AGE 65+ [45001]     ADMIN INFLUENZA (For MEDICARE Patients ONLY) []   4. Screening for prostate cancer Z12.5 PSA, screen       End of Life Planning:  Patient currently has an advanced directive: No.  I have verified the patient's ablity to prepare an advanced directive/make health care decisions.  Literature was provided to assist patient in preparing an advanced directive.    COUNSELING:  Reviewed preventive health counseling, as reflected in patient instructions        Estimated body mass index is 34.92 kg/(m^2) as calculated from the following:    Height as of 7/20/17: 5' 2.5\" (1.588 m).    Weight as of this encounter: 194 lb (88 kg).  Weight management plan: Discussed healthy diet and exercise guidelines and patient will follow up in 12 months in clinic to re-evaluate.   reports that he quit smoking about 55 years ago. His smoking use included Cigarettes. He has a 3.50 pack-year smoking history. He has never used smokeless tobacco.        Appropriate preventive services were discussed with this patient, including applicable screening as appropriate for cardiovascular disease, diabetes, " osteopenia/osteoporosis, and glaucoma.  As appropriate for age/gender, discussed screening for colorectal cancer, prostate cancer, breast cancer, and cervical cancer. Checklist reviewing preventive services available has been given to the patient.    Reviewed patients plan of care and provided an AVS. The Basic Care Plan (routine screening as documented in Health Maintenance) for Mann meets the Care Plan requirement. This Care Plan has been established and reviewed with the Patient.  The risks, benefits and treatment options of prescribed medications or other treatments have been discussed with the patient. The patient verbalized their understanding and should call or follow up if no improvement or if they develop further problems.    GUNJAN [facility]      Injectable Influenza Immunization Documentation    1.  Is the person to be vaccinated sick today?   No    2. Does the person to be vaccinated have an allergy to a component   of the vaccine?   No    3. Has the person to be vaccinated ever had a serious reaction   to influenza vaccine in the past?   No    4. Has the person to be vaccinated ever had Guillain-Barré syndrome?   No    Form completed by aleks

## 2017-09-22 NOTE — NURSING NOTE
"Chief Complaint   Patient presents with     Physical     Refill Request     chronic pain medication.       Initial /83 (BP Location: Right arm)  Pulse 67  Temp 97.5  F (36.4  C) (Tympanic)  Resp 20  Wt 194 lb (88 kg)  SpO2 97%  BMI 34.92 kg/m2 Estimated body mass index is 34.92 kg/(m^2) as calculated from the following:    Height as of 7/20/17: 5' 2.5\" (1.588 m).    Weight as of this encounter: 194 lb (88 kg).  Medication Reconciliation: complete    Health Maintenance that is potentially due pending provider review:  NONE    n/a    Is there anyone who you would like to be able to receive your results? No  If yes have patient fill out COLE    "

## 2017-10-27 ENCOUNTER — TRANSFERRED RECORDS (OUTPATIENT)
Dept: HEALTH INFORMATION MANAGEMENT | Facility: CLINIC | Age: 77
End: 2017-10-27

## 2017-10-28 ENCOUNTER — APPOINTMENT (OUTPATIENT)
Dept: CT IMAGING | Facility: CLINIC | Age: 77
End: 2017-10-28
Attending: FAMILY MEDICINE
Payer: MEDICARE

## 2017-10-28 ENCOUNTER — HOSPITAL ENCOUNTER (EMERGENCY)
Facility: CLINIC | Age: 77
Discharge: HOME | End: 2017-10-28
Attending: FAMILY MEDICINE | Admitting: FAMILY MEDICINE
Payer: MEDICARE

## 2017-10-28 VITALS
SYSTOLIC BLOOD PRESSURE: 124 MMHG | RESPIRATION RATE: 17 BRPM | OXYGEN SATURATION: 99 % | DIASTOLIC BLOOD PRESSURE: 84 MMHG | TEMPERATURE: 98 F

## 2017-10-28 DIAGNOSIS — S22.41XA CLOSED FRACTURE OF MULTIPLE RIBS OF RIGHT SIDE, INITIAL ENCOUNTER: ICD-10-CM

## 2017-10-28 DIAGNOSIS — H81.10 BENIGN PAROXYSMAL POSITIONAL VERTIGO, UNSPECIFIED LATERALITY: ICD-10-CM

## 2017-10-28 DIAGNOSIS — D56.8 OTHER THALASSEMIA (H): ICD-10-CM

## 2017-10-28 DIAGNOSIS — D56.3 THALASSEMIA MINOR: ICD-10-CM
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
## 2017-10-28 LAB
ANION GAP SERPL CALCULATED.3IONS-SCNC: 6 MMOL/L (ref 3–14)
BASOPHILS # BLD AUTO: 0 10E9/L (ref 0–0.2)
BASOPHILS NFR BLD AUTO: 0.1 %
BUN SERPL-MCNC: 32 MG/DL (ref 7–30)
CALCIUM SERPL-MCNC: 8.9 MG/DL (ref 8.5–10.1)
CHLORIDE SERPL-SCNC: 106 MMOL/L (ref 94–109)
CO2 SERPL-SCNC: 27 MMOL/L (ref 20–32)
CREAT SERPL-MCNC: 1.25 MG/DL (ref 0.66–1.25)
DIFFERENTIAL METHOD BLD: ABNORMAL
EOSINOPHIL # BLD AUTO: 0 10E9/L (ref 0–0.7)
EOSINOPHIL NFR BLD AUTO: 0 %
ERYTHROCYTE [DISTWIDTH] IN BLOOD BY AUTOMATED COUNT: 21.7 % (ref 10–15)
GFR SERPL CREATININE-BSD FRML MDRD: 56 ML/MIN/1.7M2
GLUCOSE SERPL-MCNC: 92 MG/DL (ref 70–99)
HCT VFR BLD AUTO: 25.2 % (ref 40–53)
HGB BLD-MCNC: 7.9 G/DL (ref 13.3–17.7)
IMM GRANULOCYTES # BLD: 0 10E9/L (ref 0–0.4)
IMM GRANULOCYTES NFR BLD: 0.2 %
LYMPHOCYTES # BLD AUTO: 0.3 10E9/L (ref 0.8–5.3)
LYMPHOCYTES NFR BLD AUTO: 2.1 %
MCH RBC QN AUTO: 15.5 PG (ref 26.5–33)
MCHC RBC AUTO-ENTMCNC: 31.3 G/DL (ref 31.5–36.5)
MCV RBC AUTO: 50 FL (ref 78–100)
MONOCYTES # BLD AUTO: 1.3 10E9/L (ref 0–1.3)
MONOCYTES NFR BLD AUTO: 8.1 %
NEUTROPHILS # BLD AUTO: 14.5 10E9/L (ref 1.6–8.3)
NEUTROPHILS NFR BLD AUTO: 89.5 %
PLATELET # BLD AUTO: 276 10E9/L (ref 150–450)
POTASSIUM SERPL-SCNC: 3.4 MMOL/L (ref 3.4–5.3)
RBC # BLD AUTO: 5.09 10E12/L (ref 4.4–5.9)
SODIUM SERPL-SCNC: 139 MMOL/L (ref 133–144)
TROPONIN I SERPL-MCNC: 0.02 UG/L (ref 0–0.04)
WBC # BLD AUTO: 16.2 10E9/L (ref 4–11)

## 2017-10-28 PROCEDURE — 99285 EMERGENCY DEPT VISIT HI MDM: CPT | Mod: 25 | Performed by: FAMILY MEDICINE

## 2017-10-28 PROCEDURE — 80048 BASIC METABOLIC PNL TOTAL CA: CPT | Performed by: FAMILY MEDICINE

## 2017-10-28 PROCEDURE — 93005 ELECTROCARDIOGRAM TRACING: CPT | Performed by: FAMILY MEDICINE

## 2017-10-28 PROCEDURE — 99285 EMERGENCY DEPT VISIT HI MDM: CPT | Mod: Z6 | Performed by: FAMILY MEDICINE

## 2017-10-28 PROCEDURE — 71250 CT THORAX DX C-: CPT

## 2017-10-28 PROCEDURE — 84484 ASSAY OF TROPONIN QUANT: CPT | Performed by: FAMILY MEDICINE

## 2017-10-28 PROCEDURE — 85025 COMPLETE CBC W/AUTO DIFF WBC: CPT | Performed by: FAMILY MEDICINE

## 2017-10-28 RX ORDER — OXYCODONE HYDROCHLORIDE 5 MG/1
5 TABLET ORAL 4 TIMES DAILY PRN
Qty: 30 TABLET | Refills: 0 | Status: SHIPPED | OUTPATIENT
Start: 2017-10-28 | End: 2017-11-10

## 2017-10-28 ASSESSMENT — ENCOUNTER SYMPTOMS
ABDOMINAL PAIN: 0
CHILLS: 0
WEAKNESS: 0
PSYCHIATRIC NEGATIVE: 1
SHORTNESS OF BREATH: 0
DIZZINESS: 1
NECK PAIN: 0
SPEECH DIFFICULTY: 0
LIGHT-HEADEDNESS: 0
FEVER: 0

## 2017-10-28 NOTE — ED AVS SNAPSHOT
[facility] Emergency Department    [address] [address]    Phone:  [phone]    Fax:  [phone]                                       Mann Escobar   MRN: [id number]    Department:  [facility] Emergency Department   Date of Visit:  10/28/2017           Patient Information     Date Of Birth          1940        Your diagnoses for this visit were:     Closed fracture of multiple ribs of right side, initial encounter     Benign paroxysmal positional vertigo, unspecified laterality     Thalassemia minor        You were seen by Frankie Vigil MD.        Discharge Instructions       Take prescribed pain medication as directed.    Use your walker and use care getting around.    See your Provider in 2-3 days for follow up.    Consider: check iron level, possibly see Hematologist.    Discuss dizziness/vertigo spells.        24 Hour Appointment Hotline       To make an appointment at any [facility], call [phone] ([phone]). If you don't have a family doctor or clinic, we will help you find one. [facility] clinics are conveniently located to serve the needs of you and your family.             Review of your medicines      Our records show that you are taking the medicines listed below. If these are incorrect, please call your family doctor or clinic.        Dose / Directions Last dose taken    aspirin 81 MG EC tablet   Dose:  81 mg   Quantity:  90 tablet        Take 1 tablet (81 mg) by mouth daily   Refills:  3        HCA VITAMIN B12 500 MCG Tabs   Generic drug:  cyanocobalamin        2 tablets daily   Refills:  0        MULTIVITAMIN PO        one daily   Refills:  0        senna-docusate 8.6-50 MG per tablet   Commonly known as:  SENOKOT-S;PERICOLACE   Dose:  1-2 tablet   Quantity:  60 tablet        Take 1-2 tablets by mouth 2 times daily as needed for constipation.   Refills:  0        terazosin 5 MG capsule   Commonly known as:  HYTRIN   Dose:  5  mg   Quantity:  90 capsule        Take 1 capsule (5 mg) by mouth At Bedtime   Refills:  3        triamterene-hydrochlorothiazide 75-50 MG per tablet   Commonly known as:  MAXZIDE   Dose:  0.5 tablet   Quantity:  45 tablet        Take 0.5 tablets by mouth daily   Refills:  3        VIACTIV PO        With D 1000mg calcium   Refills:  0          ASK your doctor about these medications        Dose / Directions Last dose taken    * oxyCODONE 5 MG IR tablet   Commonly known as:  ROXICODONE   Dose:  5 mg   What changed:  You were already taking a medication with the same name, and this prescription was added. Make sure you understand how and when to take each.   Quantity:  30 tablet   Ask about: Which instructions should I use?        Take 1 tablet (5 mg) by mouth 4 times daily as needed for pain   Refills:  0        * oxyCODONE 5 MG IR tablet   Commonly known as:  ROXICODONE   Dose:  5 mg   What changed:  Another medication with the same name was added. Make sure you understand how and when to take each.   Quantity:  60 tablet   Start taking on:  12/29/2017   Ask about: Which instructions should I use?        Take 1 tablet (5 mg) by mouth 2 times daily as needed for pain   Refills:  0        * Notice:  This list has 2 medication(s) that are the same as other medications prescribed for you. Read the directions carefully, and ask your doctor or other care provider to review them with you.            Prescriptions were sent or printed at these locations (1 Prescription)                   Other Prescriptions                Printed at Department/Unit printer (1 of 1)         oxyCODONE (ROXICODONE) 5 MG IR tablet                Procedures and tests performed during your visit     Basic metabolic panel    CBC with platelets, differential    Chest CT w/o contrast    EKG 12-lead, tracing only    Troponin I      Orders Needing Specimen Collection     None      Pending Results     Date and Time Order Name Status Description     10/28/2017 1259 Chest CT w/o contrast Preliminary             Pending Culture Results     No orders found from 10/26/2017 to 10/29/2017.            Pending Results Instructions     If you had any lab results that were not finalized at the time of your Discharge, you can call the ED Lab Result RN at [phone]. You will be contacted by this team for any positive Lab results or changes in treatment. The nurses are available 7 days a week from 10A to 6:30P.  You can leave a message 24 hours per day and they will return your call.        Test Results From Your Hospital Stay        10/28/2017  1:28 PM      Component Results     Component Value Ref Range & Units Status    WBC 16.2 (H) 4.0 - 11.0 10e9/L Final    RBC Count 5.09 4.4 - 5.9 10e12/L Final    Hemoglobin 7.9 (L) 13.3 - 17.7 g/dL Final    Hematocrit 25.2 (L) 40.0 - 53.0 % Final    MCV 50 (L) 78 - 100 fl Final    MCH 15.5 (L) 26.5 - 33.0 pg Final    MCHC 31.3 (L) 31.5 - 36.5 g/dL Final    RDW 21.7 (H) 10.0 - 15.0 % Final    Platelet Count 276 150 - 450 10e9/L Final    Diff Method Automated Method  Final    % Neutrophils 89.5 % Final    % Lymphocytes 2.1 % Final    % Monocytes 8.1 % Final    % Eosinophils 0.0 % Final    % Basophils 0.1 % Final    % Immature Granulocytes 0.2 % Final    Absolute Neutrophil 14.5 (H) 1.6 - 8.3 10e9/L Final    Absolute Lymphocytes 0.3 (L) 0.8 - 5.3 10e9/L Final    Absolute Monocytes 1.3 0.0 - 1.3 10e9/L Final    Absolute Eosinophils 0.0 0.0 - 0.7 10e9/L Final    Absolute Basophils 0.0 0.0 - 0.2 10e9/L Final    Abs Immature Granulocytes 0.0 0 - 0.4 10e9/L Final         10/28/2017  1:41 PM      Component Results     Component Value Ref Range & Units Status    Sodium 139 133 - 144 mmol/L Final    Potassium 3.4 3.4 - 5.3 mmol/L Final    Chloride 106 94 - 109 mmol/L Final    Carbon Dioxide 27 20 - 32 mmol/L Final    Anion Gap 6 3 - 14 mmol/L Final    Glucose 92 70 - 99 mg/dL Final    Urea Nitrogen 32 (H) 7 - 30 mg/dL Final    Creatinine  1.25 0.66 - 1.25 mg/dL Final    GFR Estimate 56 (L) >60 mL/min/1.7m2 Final    Non  GFR Calc    GFR Estimate If Black 68 >60 mL/min/1.7m2 Final    African American GFR Calc    Calcium 8.9 8.5 - 10.1 mg/dL Final         10/28/2017  1:41 PM      Component Results     Component Value Ref Range & Units Status    Troponin I ES 0.017 0.000 - 0.045 ug/L Final    The 99th percentile for upper reference range is 0.045 ug/L.  Troponin values   in the range of 0.045 - 0.120 ug/L may be associated with risks of adverse   clinical events.           10/28/2017  2:36 PM      Narrative     CT CHEST WITHOUT CONTRAST 10/28/2017 2:09 PM     COMPARISON: None.    HISTORY: Trauma to ribs, anterior chest and right rib pain. Hard to  take in a breath, dizziness.    TECHNIQUE: Volumetric helical acquisition of CT images of the chest  from the clavicles to the kidneys were acquired without IV contrast.  Images were displayed in 5 mm reconstructions. Radiation dose for this  scan was reduced using automated exposure control, adjustment of the  mA and/or kV according to patient size, or iterative reconstruction  technique.    FINDINGS: No evidence of pneumothorax. There are no pulmonary  infiltrates. Several tiny pulmonary nodules bilaterally most of which  are calcified. None of these measures greater than 4 mm. There are  calcified hilar lymph nodes consistent with previous granulomatous  disease. There is a subcentimeter right thyroid nodule. There is no  pleural or pericardial effusion. There are minimally displaced  fractures of the right third through fifth ribs anterolaterally.  Imaging through the upper abdomen demonstrates multiple  low-attenuation lesions in the liver which are likely cysts. There are  postoperative changes in the stomach. Small hiatal hernia. There are  bilateral renal cysts. 1.7 cm low-attenuation right adrenal nodule.  This is likely an adenoma.        Impression     IMPRESSION:   1. Minimally  "displaced fractures of the right third through fifth ribs  anterolaterally.  2. No evidence of pneumothorax.  3. Evidence of old granulomatous disease.   4. Probable right adrenal adenoma.                 Thank you for choosing [facility]       Thank you for choosing [facility] for your care. Our goal is always to provide you with excellent care. Hearing back from our patients is one way we can continue to improve our services. Please take a few minutes to complete the written survey that you may receive in the mail after you visit with us. Thank you!        [employer] Information     [employer] lets you send messages to your doctor, view your test results, renew your prescriptions, schedule appointments and more. To sign up, go to [email or web address] . Click on \"Log in\" on the left side of the screen, which will take you to the Welcome page. Then click on \"Sign up Now\" on the right side of the page.     You will be asked to enter the access code listed below, as well as some personal information. Please follow the directions to create your username and password.     Your access code is: [email or web address]  Expires: 2017 11:24 AM     Your access code will  in 90 days. If you need help or a new code, please call your [facility] clinic or [phone].        Care EveryWhere ID     This is your Care EveryWhere ID. This could be used by other organizations to access your [facility] medical records  [id number]        Equal Access to Services     CATRACHO MCBRIDE : Hadii petar Samuels, waaxda luqadaha, qaybta kaalmada francis, deny morris . So [facility] [phone].    ATENCIÓN: Si habla español, tiene a hewitt disposición servicios gratuitos de asistencia lingüística. Ursula al [phone].    We comply with applicable federal civil rights laws and [place] laws. We do not discriminate on the basis of race, color, national origin, age, disability, sex, sexual orientation, or gender " identity.            After Visit Summary       This is your record. Keep this with you and show to your community pharmacist(s) and doctor(s) at your next visit.

## 2017-10-28 NOTE — DISCHARGE INSTRUCTIONS
Take prescribed pain medication as directed.    Use your walker and use care getting around.    See your Provider in 2-3 days for follow up.    Consider: check iron level, possibly see Hematologist.    Discuss dizziness/vertigo spells.

## 2017-10-28 NOTE — ED NOTES
"Pt fell at home this am, pt states \" I don't know I tripped and hit my chest on the vanity \" pain with deep breathing, no LOC, pt is alert and oriented, no bruising seen on chest area  "

## 2017-10-28 NOTE — ED NOTES
Pt here for fall, was walking into bathroom about 730 am, doesn't know what happened, fell into the vanity striking his chest. Complains of chest pain.

## 2017-10-28 NOTE — ED AVS SNAPSHOT
[facility] Emergency Department    [address] [address]    Phone:  [phone]    Fax:  [phone]                                       Mann Escobar   MRN: [id number]    Department:  [facility] Emergency Department   Date of Visit:  10/28/2017           After Visit Summary Signature Page     I have received my discharge instructions, and my questions have been answered. I have discussed any challenges I see with this plan with the nurse or doctor.    ..........................................................................................................................................  Patient/Patient Representative Signature      ..........................................................................................................................................  Patient Representative Print Name and Relationship to Patient    ..................................................               ................................................  Date                                            Time    ..........................................................................................................................................  Reviewed by Signature/Title    ...................................................              ..............................................  Date                                                            Time

## 2017-10-28 NOTE — ED PROVIDER NOTES
"  History     Chief Complaint   Patient presents with     Fall     this am at 0730     Chest Pain     possible syncopal episode     HPI  Mann Escobar is a 77 year old male who presents to the ED with chest pain form a fall. Patient reports he fell and hit the vanity this morning upon walking in the doorway of the bathroom at his house. 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 Patient reports he was walking with a cane in R hand. He reports after hitting the vanity he fell on the floor to his knees and when he got up felt dizzy for a few minutes. He reports everything got \"darker for a second\" after crashing into the vanity. Patient reports pain when taking a breath and tender over anterior ribs on both sides. Patient denies dizziness before fall and no vision changes. He denies palpitations. Patient denies injury to neck, head, legs, and knees. Patient is on maxzide 75-50 MG and Terazosin 5 MG.  He has h/o thalassemia  And runs a low Hgb.   He later in this ER stay mentions episodic vertigo which is sometimes positional.  Therefore some factors in balance would be: back and leg ortho problems, low Hgb, possible BPPV. He does have a walker and a scooter at home. Lives with wife who had recent knee surgery.      Patient Active Problem List   Diagnosis      HX NEPHROLITHIASIS [V13.01]     Thalassemia minor     Esophageal reflux     Family history of prostate cancer     Leg edema     CARDIOVASCULAR SCREENING; LDL GOAL LESS THAN 130     Internal hemorrhoids     Iron deficiency anemia     First degree AV block     Family history of diabetes mellitus     Scoliosis     Hypopotassemia     Advanced directives, counseling/discussion     Benign non-nodular prostatic hyperplasia with lower urinary tract symptoms     Chronic low back pain     S/P knee replacement     S/P ankle fusion     Abnormal antinuclear antibody titer     Osteoarthritis "     Hypertension     BPH (benign prostatic hyperplasia)     Pseudogout     Chronic pain syndrome     Current Outpatient Prescriptions   Medication Sig Dispense Refill     oxyCODONE (ROXICODONE) 5 MG IR tablet Take 1 tablet (5 mg) by mouth 4 times daily as needed for pain 30 tablet 0     [START ON 12/29/2017] oxyCODONE (ROXICODONE) 5 MG IR tablet Take 1 tablet (5 mg) by mouth 2 times daily as needed for pain 60 tablet 0     terazosin (HYTRIN) 5 MG capsule Take 1 capsule (5 mg) by mouth At Bedtime 90 capsule 3     triamterene-hydrochlorothiazide (MAXZIDE) 75-50 MG per tablet Take 0.5 tablets by mouth daily 45 tablet 3     aspirin 81 MG EC tablet Take 1 tablet (81 mg) by mouth daily 90 tablet 3     senna-docusate (SENOKOT-S;PERICOLACE) 8.6-50 MG per tablet Take 1-2 tablets by mouth 2 times daily as needed for constipation. (Patient taking differently: Take 1 tablet by mouth daily as needed for constipation ) 60 tablet 0     VIACTIV OR With D 1000mg calcium       HCA VITAMIN B12 500 MCG OR TABS 2 tablets daily       MULTIVITAMIN OR one daily       Allergies   Allergen Reactions     Nkda [No Known Drug Allergies]        Problem List:    Patient Active Problem List    Diagnosis Date Noted     Chronic low back pain 10/08/2012     Priority: High     Had back surgery in 5/2012         Leg edema 08/18/2010     Priority: High     Chronic pain syndrome 02/06/2017     Priority: Medium     Patient is followed by GUNJAN Vinson CNP for ongoing prescription of pain medication.  All refills should only be approved by this provider, or covering partner.    Medication(s): oxycodone.   Maximum quantity per month: 60  Clinic visit frequency required: Q 3 months     Controlled substance agreement:  Encounter-Level CSA:     There are no encounter-level csa.        Pain Clinic evaluation in the past:     Last [facility] website verification:            Pseudogout 09/26/2016     Priority: Medium     BPH (benign prostatic hyperplasia)  06/11/2015     Priority: Medium     Hypertension      Priority: Medium     Abnormal antinuclear antibody titer 05/09/2014     Priority: Medium     Osteoarthritis 05/09/2014     Priority: Medium     Advanced directives, counseling/discussion 10/08/2012     Priority: Medium     Discussed advance care planning with patient; however, patient declined at this time. 10/8/2012          Benign non-nodular prostatic hyperplasia with lower urinary tract symptoms 10/08/2012     Priority: Medium     S/P knee replacement 10/08/2012     Priority: Medium     Both sides         S/P ankle fusion 10/08/2012     Priority: Medium     Left side         First degree AV block 04/25/2012     Priority: Medium     Family history of diabetes mellitus 04/25/2012     Priority: Medium     Scoliosis 04/25/2012     Priority: Medium     Hypopotassemia 04/25/2012     Priority: Medium     Internal hemorrhoids 08/23/2011     Priority: Medium     Iron deficiency anemia 08/23/2011     Priority: Medium     CARDIOVASCULAR SCREENING; LDL GOAL LESS THAN 130 10/31/2010     Priority: Medium     Family history of prostate cancer 09/02/2009     Priority: Medium     FATHER       Esophageal reflux 05/25/2006     Priority: Medium     Thalassemia minor      Priority: Medium     thalassemia minor (chronic low hgb)        HX NEPHROLITHIASIS [V13.01] 09/12/2005     Priority: Medium        Past Medical History:    Past Medical History:   Diagnosis Date     Back pain      Gastro-oesophageal reflux disease      Hypertension      Hypopotassemia      Internal hemorrhoids      Iron deficiency anaemia      Leg edema      Morbid obesity 9/12/2005     Morbid obesity (H)      Osteoarthrosis      Scoliosis      Thalassemia minor        Past Surgical History:    Past Surgical History:   Procedure Laterality Date     ABDOMEN SURGERY  2005    gastric bypass     ANKLE SURGERY  2005 2005 fusion for arthritis      CATARACT IOL, RT/LT  2008/    Cataract IOL RT/LT     COLONOSCOPY   2009/07    polyps removed repeat 5 yrs, tubular adenoma     COLONOSCOPY  9/29/2011    Procedure:COLONOSCOPY; Colonoscopy with hemorrhoid banding; Surgeon:JEREMY GARCIA; Location:[facility]     DECOMPRESSION LUMBAR MINIMALLY INVASIVE TWO LEVELS  5/2/2012    Procedure:DECOMPRESSION LUMBAR MINIMALLY INVASIVE TWO LEVELS; Surgeon:LOTTIE MITCHELL; Location:[facility]     FUSION SPINE POSTERIOR MINIMALLY INVASIVE ONE LEVEL  5/2/2012    Procedure:FUSION SPINE POSTERIOR MINIMALLY INVASIVE ONE LEVEL; Minimal Access Spinal Technology Transformaninal Lumbar Interbody Fusion L4-5, Decompression L3-5; Surgeon:LOTTIE MITCHELL; Location:[facility]     HC REMOVAL OF HYDROCELE,TUNICA,UNILAT  2006    bilateral     ORTHOPEDIC SURGERY       ORTHOPEDIC SURGERY  2000    Lf knee replacement     ORTHOPEDIC SURGERY  2002, 2010    Rt replacement     ORTHOPEDIC SURGERY  2005    Left ankle fused     SURGICAL HISTORY OF -       Cysto     SURGICAL HISTORY OF -   2000    (L) Total knee     SURGICAL HISTORY OF -   2002    Percutaneous kidney stone removal     SURGICAL HISTORY OF -   2002    (R) Total knee     SURGICAL HISTORY OF -       left ankle fusion      SURGICAL HISTORY OF -       ureteral stent removal     SURGICAL HISTORY OF -   2005    bariatric surgery-Favian-en-Y     SURGICAL HISTORY OF -   2008    carpal tunnel surgery rt wrist       Family History:    Family History   Problem Relation Age of Onset     DIABETES Brother      Obesity Brother      CEREBROVASCULAR DISEASE Paternal Grandfather      Prostate Cancer Father      CANCER Father      bone     DIABETES Father      HEART DISEASE Mother      CHF     CEREBROVASCULAR DISEASE Mother      Hypertension Mother      CANCER Mother      tumor in stomach     Cancer - colorectal Mother      HEART DISEASE Maternal Grandmother      CHF     DIABETES Paternal Grandmother      Breast Cancer Sister      Genitourinary Problems Son      Kidney stones     CANCER Brother      Cancer - colorectal Brother       DIABETES Brother        Social History:  Marital Status:   [2]  Social History   Substance Use Topics     Smoking status: Former Smoker     Packs/day: 0.50     Years: 7.00     Types: Cigarettes     Quit date: 1/1/1962     Smokeless tobacco: Never Used     Alcohol use Yes      Comment: rarely        Medications:      oxyCODONE (ROXICODONE) 5 MG IR tablet   [START ON 12/29/2017] oxyCODONE (ROXICODONE) 5 MG IR tablet   terazosin (HYTRIN) 5 MG capsule   triamterene-hydrochlorothiazide (MAXZIDE) 75-50 MG per tablet   aspirin 81 MG EC tablet   senna-docusate (SENOKOT-S;PERICOLACE) 8.6-50 MG per tablet   VIACTIV OR   HCA VITAMIN B12 500 MCG OR TABS   MULTIVITAMIN OR         Review of Systems   Constitutional: Negative for chills and fever.   HENT: Negative for congestion.    Eyes: Negative for visual disturbance.   Respiratory: Negative for shortness of breath.         Pain in ribs with resp.   Cardiovascular: Positive for chest pain and leg swelling.   Gastrointestinal: Negative for abdominal pain.   Genitourinary: Negative.    Musculoskeletal: Negative for neck pain.   Skin: Negative.    Neurological: Positive for dizziness. Negative for speech difficulty, weakness and light-headedness.   Psychiatric/Behavioral: Negative.    Unremarkable except as noted above    Physical Exam   BP: 174/62  Heart Rate: 64  Temp: 98  F (36.7  C)  Resp: 30  SpO2: 99 %      Physical Exam   Constitutional: He is oriented to person, place, and time. He appears well-developed. No distress.   HENT:   Head: Normocephalic and atraumatic.   Mouth/Throat: Oropharynx is clear and moist.   Eyes: EOM are normal. Pupils are equal, round, and reactive to light. No scleral icterus.   Neck: Neck supple. No thyromegaly present.   Non tender.   Cardiovascular: Regular rhythm.  Exam reveals no friction rub.    No murmur heard.  Pulses:       Femoral pulses are 2+ on the right side, and 3+ on the left side.  Pulmonary/Chest: Breath sounds normal. No  respiratory distress. He exhibits tenderness (tender over anterior rib on both sides).   Abdominal: He exhibits no distension. There is no tenderness.   Musculoskeletal:        Right shoulder: He exhibits swelling (Leg swelling).   Edema: 3+ left, 2+ right.   Lymphadenopathy:     He has no cervical adenopathy.   Neurological: He is oriented to person, place, and time. No cranial nerve deficit. Coordination normal.   Skin:   No ecchymosis.   Psychiatric: He has a normal mood and affect.       ED Course     ED Course     Procedures               Critical Care time:  none     EKG: RSR, rate 65, axis normal, ST segments no acute changes, T waves WNL, no ectopy.            Labs Ordered and Resulted from Time of ED Arrival Up to the Time of Departure from the ED   CBC WITH PLATELETS DIFFERENTIAL - Abnormal; Notable for the following:        Result Value    WBC 16.2 (*)     Hemoglobin 7.9 (*)     Hematocrit 25.2 (*)     MCV 50 (*)     MCH 15.5 (*)     MCHC 31.3 (*)     RDW 21.7 (*)     Absolute Neutrophil 14.5 (*)     Absolute Lymphocytes 0.3 (*)     All other components within normal limits   BASIC METABOLIC PANEL - Abnormal; Notable for the following:     Urea Nitrogen 32 (*)     GFR Estimate 56 (*)     All other components within normal limits   TROPONIN I       Results for orders placed or performed during the hospital encounter of 10/28/17 (from the past 24 hour(s))   CBC with platelets, differential   Result Value Ref Range    WBC 16.2 (H) 4.0 - 11.0 10e9/L    RBC Count 5.09 4.4 - 5.9 10e12/L    Hemoglobin 7.9 (L) 13.3 - 17.7 g/dL    Hematocrit 25.2 (L) 40.0 - 53.0 %    MCV 50 (L) 78 - 100 fl    MCH 15.5 (L) 26.5 - 33.0 pg    MCHC 31.3 (L) 31.5 - 36.5 g/dL    RDW 21.7 (H) 10.0 - 15.0 %    Platelet Count 276 150 - 450 10e9/L    Diff Method Automated Method     % Neutrophils 89.5 %    % Lymphocytes 2.1 %    % Monocytes 8.1 %    % Eosinophils 0.0 %    % Basophils 0.1 %    % Immature Granulocytes 0.2 %    Absolute  Neutrophil 14.5 (H) 1.6 - 8.3 10e9/L    Absolute Lymphocytes 0.3 (L) 0.8 - 5.3 10e9/L    Absolute Monocytes 1.3 0.0 - 1.3 10e9/L    Absolute Eosinophils 0.0 0.0 - 0.7 10e9/L    Absolute Basophils 0.0 0.0 - 0.2 10e9/L    Abs Immature Granulocytes 0.0 0 - 0.4 10e9/L   Basic metabolic panel   Result Value Ref Range    Sodium 139 133 - 144 mmol/L    Potassium 3.4 3.4 - 5.3 mmol/L    Chloride 106 94 - 109 mmol/L    Carbon Dioxide 27 20 - 32 mmol/L    Anion Gap 6 3 - 14 mmol/L    Glucose 92 70 - 99 mg/dL    Urea Nitrogen 32 (H) 7 - 30 mg/dL    Creatinine 1.25 0.66 - 1.25 mg/dL    GFR Estimate 56 (L) >60 mL/min/1.7m2    GFR Estimate If Black 68 >60 mL/min/1.7m2    Calcium 8.9 8.5 - 10.1 mg/dL   Troponin I   Result Value Ref Range    Troponin I ES 0.017 0.000 - 0.045 ug/L   Chest CT w/o contrast    Narrative    CT CHEST WITHOUT CONTRAST 10/28/2017 2:09 PM     COMPARISON: None.    HISTORY: Trauma to ribs, anterior chest and right rib pain. Hard to  take in a breath, dizziness.    TECHNIQUE: Volumetric helical acquisition of CT images of the chest  from the clavicles to the kidneys were acquired without IV contrast.  Images were displayed in 5 mm reconstructions. Radiation dose for this  scan was reduced using automated exposure control, adjustment of the  mA and/or kV according to patient size, or iterative reconstruction  technique.    FINDINGS: No evidence of pneumothorax. There are no pulmonary  infiltrates. Several tiny pulmonary nodules bilaterally most of which  are calcified. None of these measures greater than 4 mm. There are  calcified hilar lymph nodes consistent with previous granulomatous  disease. There is a subcentimeter right thyroid nodule. There is no  pleural or pericardial effusion. There are minimally displaced  fractures of the right third through fifth ribs anterolaterally.  Imaging through the upper abdomen demonstrates multiple  low-attenuation lesions in the liver which are likely cysts. There  are  postoperative changes in the stomach. Small hiatal hernia. There are  bilateral renal cysts. 1.7 cm low-attenuation right adrenal nodule.  This is likely an adenoma.      Impression    IMPRESSION:   1. Minimally displaced fractures of the right third through fifth ribs  anterolaterally.  2. No evidence of pneumothorax.  3. Evidence of old granulomatous disease.   4. Probable right adrenal adenoma.     KASIA TRAN MD       Medications - No data to display    12:46 PM Patient assessed.     Assessments & Plan (with Medical Decision Making)     Mann had a fall onto his chest sustaining 3 FX ribs. He had stable VS and normal O2 sats on room air. Cardiac rhythm was stable. He usually takes Oxycodone 5 mg bid for musculoskeletal pain. I suggested he increase to 5 mg qid for his rib pain. He has low Hgb and h/o Thalassemia and I suggested F/U with primary and perhaps a Heme consult. He has some SX suggestive of BPPV. He is advised to use his walker. He and son voice understanding of recommendations.        I have reviewed the nursing notes.    I have reviewed the findings, diagnosis, plan and need for follow up with the patient.       Discharge Medication List as of 10/28/2017  3:38 PM      START taking these medications    Details   !! oxyCODONE (ROXICODONE) 5 MG IR tablet Take 1 tablet (5 mg) by mouth 4 times daily as needed for pain, Disp-30 tablet, R-0, Local Print       !! - Potential duplicate medications found. Please discuss with provider.          Final diagnoses:   Closed fracture of multiple ribs of right side, initial encounter   Benign paroxysmal positional vertigo, unspecified laterality   Thalassemia minor     This document serves as a record of the services and decisions personally performed and made by Frankie Vigil MD. It was created on HIS/HER behalf by   Vandana Lawson, a trained medical scribe. The creation of this document is based the provider's statements to the medical  erinn.  Vandana Lawson 12:46 PM 10/28/2017    Provider:   The information in this document, created by the medical scribe for me, accurately reflects the services I personally performed and the decisions made by me. I have reviewed and approved this document for accuracy prior to leaving the patient care area.  Frankie Vigil MD 12:46 PM 10/28/2017      10/28/2017   [facility] EMERGENCY DEPARTMENT     Frankie Vigil MD  10/28/17 2210

## 2017-11-06 ENCOUNTER — TELEPHONE (OUTPATIENT)
Dept: FAMILY MEDICINE | Facility: CLINIC | Age: 77
End: 2017-11-06

## 2017-11-06 DIAGNOSIS — Z12.5 SCREENING FOR PROSTATE CANCER: ICD-10-CM

## 2017-11-06 DIAGNOSIS — Z00.00 ROUTINE GENERAL MEDICAL EXAMINATION AT A HEALTH CARE FACILITY: ICD-10-CM

## 2017-11-06 DIAGNOSIS — D64.9 ANEMIA, UNSPECIFIED TYPE: ICD-10-CM

## 2017-11-06 DIAGNOSIS — D64.9 ANEMIA, UNSPECIFIED TYPE: Primary | ICD-10-CM

## 2017-11-06 LAB
ANION GAP SERPL CALCULATED.3IONS-SCNC: 6 MMOL/L (ref 3–14)
BUN SERPL-MCNC: 23 MG/DL (ref 7–30)
CALCIUM SERPL-MCNC: 8.7 MG/DL (ref 8.5–10.1)
CHLORIDE SERPL-SCNC: 103 MMOL/L (ref 94–109)
CHOLEST SERPL-MCNC: 134 MG/DL
CO2 SERPL-SCNC: 29 MMOL/L (ref 20–32)
CREAT SERPL-MCNC: 1.34 MG/DL (ref 0.66–1.25)
ERYTHROCYTE [DISTWIDTH] IN BLOOD BY AUTOMATED COUNT: 22.9 % (ref 10–15)
GFR SERPL CREATININE-BSD FRML MDRD: 52 ML/MIN/1.7M2
GLUCOSE SERPL-MCNC: 84 MG/DL (ref 70–99)
HCT VFR BLD AUTO: 26.4 % (ref 40–53)
HDLC SERPL-MCNC: 82 MG/DL
HGB BLD-MCNC: 8.2 G/DL (ref 13.3–17.7)
LDLC SERPL CALC-MCNC: 42 MG/DL
MCH RBC QN AUTO: 15.5 PG (ref 26.5–33)
MCHC RBC AUTO-ENTMCNC: 31.1 G/DL (ref 31.5–36.5)
MCV RBC AUTO: 50 FL (ref 78–100)
NONHDLC SERPL-MCNC: 52 MG/DL
PLATELET # BLD AUTO: 288 10E9/L (ref 150–450)
POTASSIUM SERPL-SCNC: 3.4 MMOL/L (ref 3.4–5.3)
PSA SERPL-ACNC: 2.49 UG/L (ref 0–4)
RBC # BLD AUTO: 5.29 10E12/L (ref 4.4–5.9)
SODIUM SERPL-SCNC: 138 MMOL/L (ref 133–144)
TRIGL SERPL-MCNC: 50 MG/DL
WBC # BLD AUTO: 6.9 10E9/L (ref 4–11)

## 2017-11-06 PROCEDURE — 80048 BASIC METABOLIC PNL TOTAL CA: CPT | Performed by: NURSE PRACTITIONER

## 2017-11-06 PROCEDURE — 85027 COMPLETE CBC AUTOMATED: CPT | Performed by: NURSE PRACTITIONER

## 2017-11-06 PROCEDURE — G0103 PSA SCREENING: HCPCS | Performed by: NURSE PRACTITIONER

## 2017-11-06 PROCEDURE — 80061 LIPID PANEL: CPT | Performed by: NURSE PRACTITIONER

## 2017-11-06 PROCEDURE — 36415 COLL VENOUS BLD VENIPUNCTURE: CPT | Performed by: NURSE PRACTITIONER

## 2017-11-06 NOTE — TELEPHONE ENCOUNTER
Mann is wondering if you could check his HGB today with his labwork. It has been running very low.  Thanks,  Lab

## 2017-11-07 DIAGNOSIS — D56.8 OTHER THALASSEMIA (H): Primary | ICD-10-CM

## 2017-11-07 DIAGNOSIS — D64.9 LOW HEMOGLOBIN: ICD-10-CM

## 2017-11-10 ENCOUNTER — ONCOLOGY VISIT (OUTPATIENT)
Dept: ONCOLOGY | Facility: CLINIC | Age: 77
End: 2017-11-10
Attending: INTERNAL MEDICINE
Payer: COMMERCIAL

## 2017-11-10 ENCOUNTER — HOSPITAL ENCOUNTER (OUTPATIENT)
Dept: LAB | Facility: CLINIC | Age: 77
Discharge: HOME | End: 2017-11-10
Attending: INTERNAL MEDICINE | Admitting: INTERNAL MEDICINE
Payer: MEDICARE

## 2017-11-10 VITALS
HEIGHT: 64 IN | OXYGEN SATURATION: 100 % | WEIGHT: 180 LBS | HEART RATE: 62 BPM | TEMPERATURE: 98.6 F | DIASTOLIC BLOOD PRESSURE: 74 MMHG | RESPIRATION RATE: 18 BRPM | BODY MASS INDEX: 30.73 KG/M2 | SYSTOLIC BLOOD PRESSURE: 164 MMHG

## 2017-11-10 DIAGNOSIS — Z13.29 SCREENING FOR HYPOTHYROIDISM: ICD-10-CM

## 2017-11-10 DIAGNOSIS — D50.9 MICROCYTIC ANEMIA: Primary | ICD-10-CM

## 2017-11-10 LAB
ALBUMIN SERPL-MCNC: 3.3 G/DL (ref 3.4–5)
ALP SERPL-CCNC: 110 U/L (ref 40–150)
ALT SERPL W P-5'-P-CCNC: 20 U/L (ref 0–70)
ANION GAP SERPL CALCULATED.3IONS-SCNC: 7 MMOL/L (ref 3–14)
AST SERPL W P-5'-P-CCNC: 13 U/L (ref 0–45)
BASOPHILS # BLD AUTO: 0 10E9/L (ref 0–0.2)
BASOPHILS NFR BLD AUTO: 0 %
BILIRUB SERPL-MCNC: 0.5 MG/DL (ref 0.2–1.3)
BUN SERPL-MCNC: 24 MG/DL (ref 7–30)
CALCIUM SERPL-MCNC: 8.6 MG/DL (ref 8.5–10.1)
CHLORIDE SERPL-SCNC: 108 MMOL/L (ref 94–109)
CO2 SERPL-SCNC: 29 MMOL/L (ref 20–32)
CREAT SERPL-MCNC: 1.35 MG/DL (ref 0.66–1.25)
DAT IGG-SP REAG RBC-IMP: NORMAL
DIFFERENTIAL METHOD BLD: ABNORMAL
EOSINOPHIL # BLD AUTO: 0 10E9/L (ref 0–0.7)
EOSINOPHIL NFR BLD AUTO: 0 %
ERYTHROCYTE [DISTWIDTH] IN BLOOD BY AUTOMATED COUNT: 22.6 % (ref 10–15)
FERRITIN SERPL-MCNC: 7 NG/ML (ref 26–388)
FOLATE SERPL-MCNC: 69.2 NG/ML
GFR SERPL CREATININE-BSD FRML MDRD: 51 ML/MIN/1.7M2
GLUCOSE SERPL-MCNC: 83 MG/DL (ref 70–99)
HCT VFR BLD AUTO: 26 % (ref 40–53)
HGB BLD-MCNC: 8 G/DL (ref 13.3–17.7)
IRON SATN MFR SERPL: 4 % (ref 15–46)
IRON SERPL-MCNC: 15 UG/DL (ref 35–180)
LDH SERPL L TO P-CCNC: 142 U/L (ref 85–227)
LYMPHOCYTES # BLD AUTO: 1.1 10E9/L (ref 0.8–5.3)
LYMPHOCYTES NFR BLD AUTO: 15 %
MCH RBC QN AUTO: 15.5 PG (ref 26.5–33)
MCHC RBC AUTO-ENTMCNC: 30.8 G/DL (ref 31.5–36.5)
MCV RBC AUTO: 51 FL (ref 78–100)
MONOCYTES # BLD AUTO: 0.2 10E9/L (ref 0–1.3)
MONOCYTES NFR BLD AUTO: 3 %
NEUTROPHILS # BLD AUTO: 5.7 10E9/L (ref 1.6–8.3)
NEUTROPHILS NFR BLD AUTO: 82 %
PLATELET # BLD AUTO: 264 10E9/L (ref 150–450)
POTASSIUM SERPL-SCNC: 3.5 MMOL/L (ref 3.4–5.3)
PROT SERPL-MCNC: 6.6 G/DL (ref 6.8–8.8)
RBC # BLD AUTO: 5.15 10E12/L (ref 4.4–5.9)
RETICS # AUTO: 20.1 10E9/L (ref 25–95)
RETICS/RBC NFR AUTO: 0.4 % (ref 0.5–2)
SODIUM SERPL-SCNC: 144 MMOL/L (ref 133–144)
TIBC SERPL-MCNC: 368 UG/DL (ref 240–430)
TRANSFERRIN SERPL-MCNC: 293 MG/DL (ref 210–360)
TSH SERPL DL<=0.005 MIU/L-ACNC: 0.47 MU/L (ref 0.4–4)
VIT B12 SERPL-MCNC: 1997 PG/ML (ref 193–986)
WBC # BLD AUTO: 7 10E9/L (ref 4–11)

## 2017-11-10 PROCEDURE — 36415 COLL VENOUS BLD VENIPUNCTURE: CPT | Performed by: INTERNAL MEDICINE

## 2017-11-10 PROCEDURE — 83540 ASSAY OF IRON: CPT | Performed by: INTERNAL MEDICINE

## 2017-11-10 PROCEDURE — 84165 PROTEIN E-PHORESIS SERUM: CPT | Performed by: INTERNAL MEDICINE

## 2017-11-10 PROCEDURE — 84466 ASSAY OF TRANSFERRIN: CPT | Performed by: INTERNAL MEDICINE

## 2017-11-10 PROCEDURE — 82746 ASSAY OF FOLIC ACID SERUM: CPT | Performed by: INTERNAL MEDICINE

## 2017-11-10 PROCEDURE — 80053 COMPREHEN METABOLIC PANEL: CPT | Performed by: INTERNAL MEDICINE

## 2017-11-10 PROCEDURE — 84443 ASSAY THYROID STIM HORMONE: CPT | Performed by: INTERNAL MEDICINE

## 2017-11-10 PROCEDURE — 84238 ASSAY NONENDOCRINE RECEPTOR: CPT | Performed by: INTERNAL MEDICINE

## 2017-11-10 PROCEDURE — 85025 COMPLETE CBC W/AUTO DIFF WBC: CPT | Performed by: INTERNAL MEDICINE

## 2017-11-10 PROCEDURE — 82607 VITAMIN B-12: CPT | Performed by: INTERNAL MEDICINE

## 2017-11-10 PROCEDURE — 99203 OFFICE O/P NEW LOW 30 MIN: CPT | Performed by: INTERNAL MEDICINE

## 2017-11-10 PROCEDURE — 85060 BLOOD SMEAR INTERPRETATION: CPT | Performed by: INTERNAL MEDICINE

## 2017-11-10 PROCEDURE — 83010 ASSAY OF HAPTOGLOBIN QUANT: CPT | Performed by: INTERNAL MEDICINE

## 2017-11-10 PROCEDURE — 83615 LACTATE (LD) (LDH) ENZYME: CPT | Performed by: INTERNAL MEDICINE

## 2017-11-10 PROCEDURE — 85045 AUTOMATED RETICULOCYTE COUNT: CPT | Performed by: INTERNAL MEDICINE

## 2017-11-10 PROCEDURE — 82668 ASSAY OF ERYTHROPOIETIN: CPT | Performed by: INTERNAL MEDICINE

## 2017-11-10 PROCEDURE — 82728 ASSAY OF FERRITIN: CPT | Performed by: INTERNAL MEDICINE

## 2017-11-10 PROCEDURE — 99211 OFF/OP EST MAY X REQ PHY/QHP: CPT

## 2017-11-10 PROCEDURE — 40000847 ZZHCL STATISTIC MORPHOLOGY W/INTERP HISTOLOGY TC 85060: Performed by: INTERNAL MEDICINE

## 2017-11-10 PROCEDURE — 86880 COOMBS TEST DIRECT: CPT | Performed by: INTERNAL MEDICINE

## 2017-11-10 PROCEDURE — 00000402 ZZHCL STATISTIC TOTAL PROTEIN: Performed by: INTERNAL MEDICINE

## 2017-11-10 PROCEDURE — 83021 HEMOGLOBIN CHROMOTOGRAPHY: CPT | Performed by: INTERNAL MEDICINE

## 2017-11-10 PROCEDURE — 83550 IRON BINDING TEST: CPT | Performed by: INTERNAL MEDICINE

## 2017-11-10 ASSESSMENT — PAIN SCALES - GENERAL: PAINLEVEL: EXTREME PAIN (8)

## 2017-11-10 NOTE — NURSING NOTE
"Oncology Rooming Note    November 10, 2017 1:09 PM   Mann Escobar is a 77 year old male who presents for:    Chief Complaint   Patient presents with     Hematology     New patient low HGB, Thalassemia.      Initial Vitals: /71 (BP Location: Right arm, Patient Position: Sitting, Cuff Size: Adult Large)  Pulse 62  Temp 98.6  F (37  C) (Tympanic)  Resp 18  Ht 1.626 m (5' 4\")  Wt 81.6 kg (180 lb)  SpO2 100%  BMI 30.9 kg/m2 Estimated body mass index is 30.9 kg/(m^2) as calculated from the following:    Height as of this encounter: 1.626 m (5' 4\").    Weight as of this encounter: 81.6 kg (180 lb). Body surface area is 1.92 meters squared.  Extreme Pain (8) Comment: also ribs left side.    No LMP for male patient.  Allergies reviewed: Yes  Medications reviewed: Yes    Medications: Medication refills not needed today.  Pharm[place] into [facility]:    [facility] [place], [place] - [address] [place] [address] MAIL ORDER  [facility]  [facility] - [place], [place] - [address]    Clinical concerns: New patient, Low HGB, Thalassemia.  10/28/17 was walking into his bathroom about 7:30am developed syncope and hit vanity, bruised and cracked three ribs per patient. History of vertigo explains of several episodes over this past year.     7 minutes for nursing intake (face to face time)     Mirella Armstrong CMA            "

## 2017-11-10 NOTE — PATIENT INSTRUCTIONS
We would like you to have labs today and to have a abdominal Ultrasound. We would like to see you back in 2 for a follow up appointment. When you are in need of a refill, please call your pharmacy and they will send us a request.  Copy of appointments, and after visit summary (AVS) given to patient.  If you have any questions please call Jud Cadena RN, BSN Oncology Hematology  [facility] [phone]. For questions after business hours, or on holidays/weekends, please call our after hours Nurse Triage line [phone]. Thank you.               1- Labs today  2- Schedule an US abdomen  3- RTC 2 week to see Dr. Andrew or Dr. Moreland

## 2017-11-10 NOTE — PROGRESS NOTES
[facility] PHYSICIANS  HEMATOLOGY ONCOLOGY    HEMATOLOGY CONSULTATION        DATE OF SERVICE:  11/10/2017      REASON FOR CONSULTATION:  Worsening anemia in a patient with thalassemia minor.      REFERRING PROVIDER:  Shayy Ramírez NP      HISTORY OF PRESENT ILLNESS:  Mann Escobar is a 77-year-old gentleman who has a history of thalassemia minor and is aware of being anemic since his younger years.  He states that since the last several months he has been feeling fatigued.  He recently had a fall and syncope and was evaluated in the Emergency Room.  He was found to have a hemoglobin of 7.9 and was noted to have significant microcytosis.  He also had leukocytosis at that time, normal platelet count at that time.  The patient also reports that he had an upper GI bleed in 2016 when an EGD and colonoscopy was performed, and there was nothing serious found.  He apparently was using lots of NSAIDs at that time.      Review of his labs indicates that his hemoglobin was close to 11 until 2010, and this was likely his baseline hemoglobin.  Since 2011, his hemoglobin has declined, and at this point his new baseline appears to be between 8-10 gm.  His last hemoglobin was 8.2 gm.      The patient does not have issues with any obvious bleeding, bruising.  He is not seeing any black-colored stools.      PAST MEDICAL HISTORY:  Chronic back pain.      MEDICATIONS:  Reviewed.       ALLERGIES:  Reviewed.      SOCIAL HISTORY:  Retired currently.  He used to work at [employer]. Nonsmoker, occasional alcohol intake.  He is .      FAMILY HISTORY:  Mother had colon cancer in her 80s.  Sister had breast cancer in her 60s.  Brother had colon cancer in his 40s.  Father had prostate and bone cancer in his 70s.  No other family members with breast cancer.      REVIEW OF SYSTEMS:  A complete review of systems was performed and found to be negative other than pertinent positives mentioned in the  history of present illness.      PHYSICAL EXAMINATION:   VITAL SIGNS:  Blood pressure was 187/71, pulse 52, respirations 18, temperature 98.6.  CONSTITUTIONAL: Sitting comfortably.   HEENT: Pupils are equal. Oropharynx is clear.   NECK: No cervical or supraclavicular lymphadenopathy.   RESPIRATORY: Clear bilaterally.   CARD/VASC: S1, S2, regular.   GI: Soft, nontender, nondistended, no hepatosplenomegaly.   MUSKULOSKELETAL: Warm, well perfused.   NEUROLOGIC: Alert, awake.   INTEGUMENT: No rash.   LYMPHATICS: No edema.   PSYCH: Mood and affect was normal.     LABORATORY DATA AND IMAGING REVIEWED DURING THIS VISIT:  Recent Labs   Lab Test  11/10/17   1401  11/06/17   0925   NA  144  138   POTASSIUM  3.5  3.4   CHLORIDE  108  103   CO2  29  29   ANIONGAP  7  6   BUN  24  23   CR  1.35*  1.34*   GLC  83  84   RUTH  8.6  8.7     Recent Labs   Lab Test  11/10/17   1401  11/06/17   0925  10/28/17   1305  09/07/16   0910   WBC  7.0  6.9  16.2*  8.3   HGB  8.0*  8.2*  7.9*  8.2*   PLT  264  288  276  202   MCV  51*  50*  50*  60*   NEUTROPHIL  82.0   --   89.5  84.7     Recent Labs   Lab Test  11/10/17   1401  06/11/15   1306  06/14/13   1226   BILITOTAL  0.5  0.6  0.9   ALKPHOS  110  100  86   ALT  20  25  23   AST  13  14  19   ALBUMIN  3.3*  3.9  3.9   LDH  142   --    --          Results for orders placed or performed during the hospital encounter of 10/28/17   Chest CT w/o contrast    Narrative    CT CHEST WITHOUT CONTRAST 10/28/2017 2:09 PM     COMPARISON: None.    HISTORY: Trauma to ribs, anterior chest and right rib pain. Hard to  take in a breath, dizziness.    TECHNIQUE: Volumetric helical acquisition of CT images of the chest  from the clavicles to the kidneys were acquired without IV contrast.  Images were displayed in 5 mm reconstructions. Radiation dose for this  scan was reduced using automated exposure control, adjustment of the  mA and/or kV according to patient size, or iterative  reconstruction  technique.    FINDINGS: No evidence of pneumothorax. There are no pulmonary  infiltrates. Several tiny pulmonary nodules bilaterally most of which  are calcified. None of these measures greater than 4 mm. There are  calcified hilar lymph nodes consistent with previous granulomatous  disease. There is a subcentimeter right thyroid nodule. There is no  pleural or pericardial effusion. There are minimally displaced  fractures of the right third through fifth ribs anterolaterally.  Imaging through the upper abdomen demonstrates multiple  low-attenuation lesions in the liver which are likely cysts. There are  postoperative changes in the stomach. Small hiatal hernia. There are  bilateral renal cysts. 1.7 cm low-attenuation right adrenal nodule.  This is likely an adenoma.      Impression    IMPRESSION:   1. Minimally displaced fractures of the right third through fifth ribs  anterolaterally.  2. No evidence of pneumothorax.  3. Evidence of old granulomatous disease.   4. Probable right adrenal adenoma.     KASIA TRAN MD          ASSESSMENT AND RECOMMENDATIONS:  This is a 77-year-old gentleman with thalassemia minor who has moderate microcytic anemia.  It appears that over the course of the last few years he has a consistent decline in his hemoglobin.  He has a history of GI bleeding and a colonoscopy and EGD performed in 2016 was unremarkable.      I discussed that it would be reasonable to assess for any additional etiologies for his anemia as well.  I will proceed with detailed testing today which will include iron studies, serum protein electrophoresis, LDH, TSH, peripheral blood morphology, hemoglobin electrophoresis, testing for hemolysis, erythropoietin level, B12, folate level.  I will also have him get an abdominal ultrasound to assess for hepatosplenomegaly.      I will have him see one of my colleague hematologists, Dr. Andrew of Dr. Atkins in the next 2 weeks after the results of the workup  are available.      I appreciate the opportunity to participate in this patient's care.         MIGUEL HODGE MD             D: 11/10/2017 14:24   T: 11/10/2017 16:59   MT:       Name:     MERE VELASQUEZ   MRN:      [id number]        Account:      [id number]   :      1940           Visit Date:   11/10/2017      Document: [id number]       cc: Shayy Ramírez NP

## 2017-11-10 NOTE — MR AVS SNAPSHOT
After Visit Summary   11/10/2017    Mann Escobar    MRN: [id number]           Patient Information     Date Of Birth          1940        Visit Information        Provider Department      11/10/2017 1:00 PM Roshni Ortega MD [facility]        Today's Diagnoses     Microcytic anemia    -  1    Screening for hypothyroidism          Care Instructions    We would like you to have labs today and to have a abdominal Ultrasound. We would like to see you back in 2 for a follow up appointment. When you are in need of a refill, please call your pharmacy and they will send us a request.  Copy of appointments, and after visit summary (AVS) given to patient.  If you have any questions please call Jud Cadena RN, BSN Oncology Hematology  [facility] [phone]. For questions after business hours, or on holidays/weekends, please call our after hours Nurse Triage line [phone]. Thank you.               1- Labs today  2- Schedule an US abdomen  3- RTC 2 week to see Dr. Andrew or Dr. Moreland          Follow-ups after your visit        Your next 10 appointments already scheduled     Nov 17, 2017  8:00 AM CST   US ABDOMEN COMPLETE with [id number]   [facility] ([facility])    [address] [address] [phone]           Please bring a list of your medicines (including vitamins, minerals and over-the-counter drugs). Also, tell your doctor about any allergies you may have. Wear comfortable clothes and leave your valuables at home.  Adults: No eating or drinking for 8 hours before the exam. You may take medicine with a small sip of water.  Children: - Children 6+ years: No food or drink for 6 hours before exam. - Children 1-5 years: No food or drink for 4 hours before exam. - Infants, breast-fed: may have breast milk up to 2 hours before exam. - Infants, formula: may have bottle until 4 hours before exam.  " Please call the Imaging Department at your exam site with any questions.            2017  3:00 PM CST   Return Visit with Anne Marie Andrew MD   [facility] ([facility])    [facility]  [address]  [place] [address]   [phone]              Future tests that were ordered for you today     Open Future Orders        Priority Expected Expires Ordered    US Abdomen Complete Routine  11/10/2018 11/10/2017            Who to contact     If you have questions or need follow up information about today's clinic visit or your schedule please contact [facility] directly at [phone].  Normal or non-critical lab and imaging results will be communicated to you by MyChart, letter or phone within 4 business days after the clinic has received the results. If you do not hear from us within 7 days, please contact the clinic through ENOVIXhart or phone. If you have a critical or abnormal lab result, we will notify you by phone as soon as possible.  Submit refill requests through Ch[employer]Mon[employer] or call your pharmacy and they will forward the refill request to us. Please allow 3 business days for your refill to be completed.          Additional Information About Your Visit        ENOVIXharFusion-io Information     [employer] lets you send messages to your doctor, view your test results, renew your prescriptions, schedule appointments and more. To sign up, go to [email or web address] . Click on \"Log in\" on the left side of the screen, which will take you to the Welcome page. Then click on \"Sign up Now\" on the right side of the page.     You will be asked to enter the access code listed below, as well as some personal information. Please follow the directions to create your username and password.     Your access code is: [email or web address]  Expires: 2017 10:24 AM     Your access code will  in 90 days. If you need help or a new code, please call your [place] clinic or " "[phone].        Care EveryWhere ID     This is your Care EveryWhere ID. This could be used by other organizations to access your [facility] medical records  [id number]        Your Vitals Were     Pulse Temperature Respirations Height Pulse Oximetry BMI (Body Mass Index)    62 98.6  F (37  C) (Tympanic) 18 1.626 m (5' 4\") 100% 30.9 kg/m2       Blood Pressure from Last 3 Encounters:   11/10/17 164/74   10/28/17 124/84   09/22/17 138/82    Weight from Last 3 Encounters:   11/10/17 81.6 kg (180 lb)   09/22/17 88 kg (194 lb)   07/20/17 90.7 kg (200 lb)              We Performed the Following     Blood Morphology Pathologist Review     CBC with platelets differential     Comprehensive metabolic panel     Direct antiglobulin test     Erythropoietin     Ferritin     Folate     Haptoglobin     HGB Eval Reflex to ELP or RBC Solubility     Iron and iron binding capacity     Lactate Dehydrogenase     Protein electrophoresis     Reticulocyte Count     Soluble transferrin receptor     Transferrin     TSH     Vitamin B12        Primary Care Provider Office Phone # Fax #    Shayy Wei Ramírez, APRN [facility] [phone] [address] [address]        Equal Access to Services     CATRACHO MCBRIDE : Hadii petar armendarizo Somellali, waaxda luqadaha, qaybta kaalmada adeegyada, deny benjamin. So [facility] [phone].    ATENCIÓN: Si habla español, tiene a hewitt disposición servicios gratuitos de asistencia lingüística. Ursula al [phone].    We comply with applicable federal civil rights laws and [place] laws. We do not discriminate on the basis of race, color, national origin, age, disability, sex, sexual orientation, or gender identity.            Thank you!     Thank you for choosing [facility]  for your care. Our goal is always to provide you with excellent care. Hearing back from our patients is one way we can continue to improve our services. Please take a few " minutes to complete the written survey that you may receive in the mail after your visit with us. Thank you!             Your Updated Medication List - Protect others around you: Learn how to safely use, store and throw away your medicines at www.disposemymeds.org.          This list is accurate as of: 11/10/17  1:54 PM.  Always use your most recent med list.                   Brand Name Dispense Instructions for use Diagnosis    aspirin 81 MG EC tablet     90 tablet    Take 1 tablet (81 mg) by mouth daily        HCA VITAMIN B12 500 MCG Tabs   Generic drug:  cyanocobalamin      2 tablets daily        MULTIVITAMIN PO      one daily        oxyCODONE IR 5 MG tablet   Start taking on:  12/29/2017    ROXICODONE    60 tablet    Take 1 tablet (5 mg) by mouth 2 times daily as needed for pain    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    Spondylolisthesis of lumbar region       terazosin 5 MG capsule    HYTRIN    90 capsule    Take 1 capsule (5 mg) by mouth At Bedtime    Benign non-nodular prostatic hyperplasia with lower urinary tract symptoms       triamterene-hydrochlorothiazide 75-50 MG per tablet    MAXZIDE    45 tablet    Take 0.5 tablets by mouth daily    Benign essential hypertension       VIACTIV PO      With D 1000mg calcium

## 2017-11-10 NOTE — LETTER
11/10/2017         RE: Mann Escobar  [address]  [place] [address]        Dear Colleague,    Thank you for referring your patient, Mann Escobar, to the [facility]. Please see a copy of my visit note below.    This clinic visit note has been dictated.  [id number]              [facility] PHYSICIANS  HEMATOLOGY ONCOLOGY    HEMATOLOGY CONSULTATION        DATE OF SERVICE:  11/10/2017      REASON FOR CONSULTATION:  Worsening anemia in a patient with thalassemia minor.      REFERRING PROVIDER:  Shayy Ramírez NP      HISTORY OF PRESENT ILLNESS:  Mann Escobar is a 77-year-old gentleman who has a history of thalassemia minor and is aware of being anemic since his younger years.  He states that since the last several months he has been feeling fatigued.  He recently had a fall and syncope and was evaluated in the Emergency Room.  He was found to have a hemoglobin of 7.9 and was noted to have significant microcytosis.  He also had leukocytosis at that time, normal platelet count at that time.  The patient also reports that he had an upper GI bleed in 2016 when an EGD and colonoscopy was performed, and there was nothing serious found.  He apparently was using lots of NSAIDs at that time.      Review of his labs indicates that his hemoglobin was close to 11 until 2010, and this was likely his baseline hemoglobin.  Since 2011, his hemoglobin has declined, and at this point his new baseline appears to be between 8-10 gm.  His last hemoglobin was 8.2 gm.      The patient does not have issues with any obvious bleeding, bruising.  He is not seeing any black-colored stools.      PAST MEDICAL HISTORY:  Chronic back pain.      MEDICATIONS:  Reviewed.       ALLERGIES:  Reviewed.      SOCIAL HISTORY:  Retired currently.  He used to work at [employer] at [email or web address]. Nonsmoker, occasional alcohol intake.  He is .      FAMILY HISTORY:  Mother had colon  cancer in her 80s.  Sister had breast cancer in her 60s.  Brother had colon cancer in his 40s.  Father had prostate and bone cancer in his 70s.  No other family members with breast cancer.      REVIEW OF SYSTEMS:  A complete review of systems was performed and found to be negative other than pertinent positives mentioned in the history of present illness.      PHYSICAL EXAMINATION:   VITAL SIGNS:  Blood pressure was 187/71, pulse 52, respirations 18, temperature 98.6.  CONSTITUTIONAL: Sitting comfortably.   HEENT: Pupils are equal. Oropharynx is clear.   NECK: No cervical or supraclavicular lymphadenopathy.   RESPIRATORY: Clear bilaterally.   CARD/VASC: S1, S2, regular.   GI: Soft, nontender, nondistended, no hepatosplenomegaly.   MUSKULOSKELETAL: Warm, well perfused.   NEUROLOGIC: Alert, awake.   INTEGUMENT: No rash.   LYMPHATICS: No edema.   PSYCH: Mood and affect was normal.     LABORATORY DATA AND IMAGING REVIEWED DURING THIS VISIT:  Recent Labs   Lab Test  11/10/17   1401  11/06/17   0925   NA  144  138   POTASSIUM  3.5  3.4   CHLORIDE  108  103   CO2  29  29   ANIONGAP  7  6   BUN  24  23   CR  1.35*  1.34*   GLC  83  84   RUTH  8.6  8.7     Recent Labs   Lab Test  11/10/17   1401  11/06/17   0925  10/28/17   1305  09/07/16   0910   WBC  7.0  6.9  16.2*  8.3   HGB  8.0*  8.2*  7.9*  8.2*   PLT  264  288  276  202   MCV  51*  50*  50*  60*   NEUTROPHIL  82.0   --   89.5  84.7     Recent Labs   Lab Test  11/10/17   1401  06/11/15   1306  06/14/13   1226   BILITOTAL  0.5  0.6  0.9   ALKPHOS  110  100  86   ALT  20  25  23   AST  13  14  19   ALBUMIN  3.3*  3.9  3.9   LDH  142   --    --          Results for orders placed or performed during the hospital encounter of 10/28/17   Chest CT w/o contrast    Narrative    CT CHEST WITHOUT CONTRAST 10/28/2017 2:09 PM     COMPARISON: None.    HISTORY: Trauma to ribs, anterior chest and right rib pain. Hard to  take in a breath, dizziness.    TECHNIQUE: Volumetric helical  acquisition of CT images of the chest  from the clavicles to the kidneys were acquired without IV contrast.  Images were displayed in 5 mm reconstructions. Radiation dose for this  scan was reduced using automated exposure control, adjustment of the  mA and/or kV according to patient size, or iterative reconstruction  technique.    FINDINGS: No evidence of pneumothorax. There are no pulmonary  infiltrates. Several tiny pulmonary nodules bilaterally most of which  are calcified. None of these measures greater than 4 mm. There are  calcified hilar lymph nodes consistent with previous granulomatous  disease. There is a subcentimeter right thyroid nodule. There is no  pleural or pericardial effusion. There are minimally displaced  fractures of the right third through fifth ribs anterolaterally.  Imaging through the upper abdomen demonstrates multiple  low-attenuation lesions in the liver which are likely cysts. There are  postoperative changes in the stomach. Small hiatal hernia. There are  bilateral renal cysts. 1.7 cm low-attenuation right adrenal nodule.  This is likely an adenoma.      Impression    IMPRESSION:   1. Minimally displaced fractures of the right third through fifth ribs  anterolaterally.  2. No evidence of pneumothorax.  3. Evidence of old granulomatous disease.   4. Probable right adrenal adenoma.     KASIA TRAN MD          ASSESSMENT AND RECOMMENDATIONS:  This is a 77-year-old gentleman with thalassemia minor who has moderate microcytic anemia.  It appears that over the course of the last few years he has a consistent decline in his hemoglobin.  He has a history of GI bleeding and a colonoscopy and EGD performed in 2016 was unremarkable.      I discussed that it would be reasonable to assess for any additional etiologies for his anemia as well.  I will proceed with detailed testing today which will include iron studies, serum protein electrophoresis, LDH, TSH, peripheral blood morphology,  hemoglobin electrophoresis, testing for hemolysis, erythropoietin level, B12, folate level.  I will also have him get an abdominal ultrasound to assess for hepatosplenomegaly.      I will have him see one of my colleague hematologists, Dr. Andrew of Dr. Atkins in the next 2 weeks after the results of the workup are available.      I appreciate the opportunity to participate in this patient's care.         ROSHNI ORTEGA MD             D: 11/10/2017 14:24   T: 11/10/2017 16:59   MT:       Name:     MERE VELASQUEZ   MRN:      [id number]        Account:      [id number]   :      1940           Visit Date:   11/10/2017      Document: [id number]       cc: Shayy Ramírez NP       Again, thank you for allowing me to participate in the care of your patient.        Sincerely,        Roshni Ortega MD

## 2017-11-11 LAB
EPO SERPL-ACNC: 50 MU/ML (ref 4–27)
HGB A1 MFR BLD: 94.9 % (ref 95–97.9)
HGB A2 MFR BLD: 4.7 % (ref 2–3.5)
HGB C MFR BLD: 0 % (ref 0–0)
HGB E MFR BLD: 0 % (ref 0–0)
HGB F MFR BLD: 0.4 % (ref 0–2.1)
HGB FRACT BLD ELPH-IMP: ABNORMAL
HGB OTHER MFR BLD: 0 % (ref 0–0)
HGB S BLD QL SOLY: ABNORMAL
HGB S MFR BLD: 0 % (ref 0–0)
PATH INTERP BLD-IMP: ABNORMAL
STFR SERPL-SCNC: 16.2 MG/L (ref 2.2–5)

## 2017-11-13 LAB
ALBUMIN SERPL ELPH-MCNC: 3.7 G/DL (ref 3.7–5.1)
ALPHA1 GLOB SERPL ELPH-MCNC: 0.3 G/DL (ref 0.2–0.4)
ALPHA2 GLOB SERPL ELPH-MCNC: 0.8 G/DL (ref 0.5–0.9)
B-GLOBULIN SERPL ELPH-MCNC: 0.7 G/DL (ref 0.6–1)
GAMMA GLOB SERPL ELPH-MCNC: 0.6 G/DL (ref 0.7–1.6)
HAPTOGLOB SERPL-MCNC: 144 MG/DL (ref 35–175)
M PROTEIN SERPL ELPH-MCNC: 0 G/DL
PROT PATTERN SERPL ELPH-IMP: ABNORMAL

## 2017-11-14 LAB — COPATH REPORT: NORMAL

## 2017-11-17 ENCOUNTER — HOSPITAL ENCOUNTER (OUTPATIENT)
Dept: ULTRASOUND IMAGING | Facility: CLINIC | Age: 77
Discharge: HOME | End: 2017-11-17
Attending: INTERNAL MEDICINE | Admitting: INTERNAL MEDICINE
Payer: MEDICARE

## 2017-11-17 DIAGNOSIS — D50.9 MICROCYTIC ANEMIA: ICD-10-CM

## 2017-11-17 PROCEDURE — 76700 US EXAM ABDOM COMPLETE: CPT

## 2017-11-19 NOTE — PROGRESS NOTES
Hematology follow up visit    CC: anemia, thalassemia minor saw us first Nov 2017.    HISTORY OF PRESENT ILLNESS:  Mann Escobar is a 77-year-old gentleman who has a history of thalassemia minor and is aware of being anemic since his younger years.    He states he has been feeling fatigued for months in 2017.  He had a fall and syncope and was evaluated in the Emergency Room end of Oct.      He was found to have a hemoglobin of 7.9 10/28/2017 and was noted to have significant microcytosis.  He also had leukocytosis at that time, normal platelet count at that time.    The patient also reports that he had an upper GI bleed in 2016 when an EGD and colonoscopy summer 2016 was performed, and there was nothing serious found.  He apparently was using lots of NSAIDs at that time. He s/p had 2 units PRBC in [place].       Review of his labs indicates that his hemoglobin was close to 11 until 2010, and that was likely his baseline hemoglobin.    Since 2011, his hemoglobin has declined, his lisa hb was 7.3 in 2012. His last hemoglobin was 8.2 gm in early Nov 2017.       The patient does not have issues with any obvious bleeding, bruising.  He is not seeing any black-colored stools.     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 He became severely more anemia since 2010 with hb down to less than 9 in 2017.       PAST MEDICAL HISTORY:  Chronic back pain. OA, chronic pain syndrome from back, s/p L spine fusion, s/p bilateral knee replacement, BPH, gout.       MEDICATIONS:  Reviewed.        ALLERGIES:  Reviewed.       SOCIAL HISTORY:  Retired currently.  He used to work at [employer] at [employer]. Nonsmoker, occasional alcohol intake.  He is .       FAMILY HISTORY:  Mother had colon cancer in her 80s.  Sister had breast cancer in her 60s and diseased from it.  Brother had colon cancer in his 40s. Father had prostate and bone cancer in his 70s.  No other family members with breast cancer.  "      REVIEW OF SYSTEMS  The patient does not have issues with any obvious bleeding, bruising.  He is not seeing any black-colored stools.   More fatigue, like to chew ice.     PHYSICAL EXAMINATION:   VITAL SIGNS: Blood pressure 143/56, pulse 63, temperature 98.7  F (37.1  C), temperature source Tympanic, resp. rate 20, height 1.626 m (5' 4.02\"), weight 81.6 kg (180 lb), SpO2 98 %.    GENERAL APPEARANCE:  looks like her stated age, very pleasant, not in acute distress.   HEENT: The patient is normocephalic, atraumatic. Pupils are equally reactive to light.  Sclerae are anicteric.  Moist oral mucosa.  Negative pharynx.  No oral thrush.   NECK:  Supple.  No jugular venous distention.  Thyroid is not palpable.   LYMPH NODES:  Superficial lymphadenopathy is not appreciable in the bilateral cervical, supraclavicular, axillary or inguinal areas.   CARDIOVASCULAR:  S1, S2 regular with no murmurs or gallops.  No carotid or abdominal bruits.   PULMONARY:  Lungs are clear to auscultation and percussion bilaterally.  There is no wheezing or rhonchi.   GASTROINTESTINAL:  Abdomen is soft, nontender.  No hepatosplenomegaly.  No signs of ascites.  No mass appreciable.   MUSCULOSKELETAL/EXTREMITIES:  No edema.  No cyanotic changes.  No signs of joint deformity.  No lymphedema.   NEUROLOGIC:  Cranial nerves II-XII are grossly intact.  Sensation intact.  Muscle strength and muscle tone symmetrical, 5/5 throughout.   BACK:  No spinal or paraspinal tenderness.  No CVA tenderness.   SKIN:  No petechiae.  No rash.  No signs of cellulitis.   BREASTS:  Bilateral breast exam revealed no mass or skin changes or nipple discharges.        CURRENT LAB DATA REVIEWED  11/2017 hb 8.2, MCV 50, wbc/PL nl. reti 0.4 low  Smear - The red cells are microcytic, hypochromic There is marked anisocytosis. There are occasional target cells and irregularly shaped fragmented red blood cells.  Wbc/PL are nl.    11/2017 hem work up -  MCKAYLA/hapto/TSH/LDH/spep  nl, " folate B12 not low,  nl. Iron 15, iron sat 4%.   ferritin 7, epo 50, stfR 16, TfR index (sTFR/ferritin) is > 2 is consistent with ANAMIKA.     Hb electro: Hb A1 94.5% low, Hb A2 4.7% high, Hb Fnl.   An elevated Hb A2 level can be seen in beta thalassemia trait and rarely in unstable hemoglobin variants.      CURRENT IMAGING REVIEWED  US 11/2017: No evidence for hepatomegaly, spleen is borderline prominent 13.7 cm in length.       OLD DATA REVIEWED TODAY WITH SUMMARY:   Hemoglobin was close to 11 until 2010, and this was likely his baseline hemoglobin.  Since 2011, his hemoglobin has declined, his lisa hb was 7.3 in 2012.      ASSESSMENT AND PLAN:    1. Chronic microcytic anemia with baseline hb of 11 till 2010.   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     2. Newly developed severe anemia in 2017 overseveral years. Hematology work up 11/2017 is most suggestive of ANAMIKA.  He has hx of GIB and FH of colon cancer.     Recommend stool testing, then start oral iron vitron C po bid on relative empty stomach.     Recommend GI work up.     Will f/u the work up and oral iron response.     Total time is 25 minutes, more than 15 minutes spent in counseling regarding his test results, working dx and further plan of actions.

## 2017-11-20 ENCOUNTER — ONCOLOGY VISIT (OUTPATIENT)
Dept: ONCOLOGY | Facility: CLINIC | Age: 77
End: 2017-11-20
Attending: INTERNAL MEDICINE
Payer: COMMERCIAL

## 2017-11-20 VITALS
HEIGHT: 64 IN | TEMPERATURE: 98.7 F | OXYGEN SATURATION: 98 % | DIASTOLIC BLOOD PRESSURE: 56 MMHG | WEIGHT: 180 LBS | SYSTOLIC BLOOD PRESSURE: 143 MMHG | BODY MASS INDEX: 30.73 KG/M2 | HEART RATE: 63 BPM | RESPIRATION RATE: 20 BRPM

## 2017-11-20 DIAGNOSIS — D50.9 IRON DEFICIENCY ANEMIA, UNSPECIFIED IRON DEFICIENCY ANEMIA TYPE: Primary | ICD-10-CM

## 2017-11-20 PROCEDURE — 99214 OFFICE O/P EST MOD 30 MIN: CPT | Performed by: INTERNAL MEDICINE

## 2017-11-20 PROCEDURE — 99211 OFF/OP EST MAY X REQ PHY/QHP: CPT

## 2017-11-20 ASSESSMENT — PAIN SCALES - GENERAL: PAINLEVEL: MILD PAIN (3)

## 2017-11-20 NOTE — MR AVS SNAPSHOT
"              After Visit Summary   11/20/2017    Mann Escobar    MRN: [id number]           Patient Information     Date Of Birth          1940        Visit Information        Provider Department      11/20/2017 3:00 PM Anne Marie Andrew MD [facility]        Today's Diagnoses     Iron deficiency anemia, unspecified iron deficiency anemia type    -  1      Care Instructions    FIT test first,then start oral iron.   Upper endo and coloscopy for ANAMIKA and FH of colon cancer.  F/u post the scopes with labs.           Follow-ups after your visit        Future tests that were ordered for you today     Open Future Orders        Priority Expected Expires Ordered    CBC with platelets differential Routine 12/1/2017 12/29/2017 11/20/2017    Ferritin Routine 12/1/2017 12/29/2017 11/20/2017    Fecal colorectal cancer screen (FIT) Routine 12/11/2017 2/12/2018 11/20/2017            Who to contact     If you have questions or need follow up information about today's clinic visit or your schedule please contact [facility] directly at [phone].  Normal or non-critical lab and imaging results will be communicated to you by [employer], letter or phone within 4 business days after the clinic has received the results. If you do not hear from us within 7 days, please contact the clinic through [employer] or phone. If you have a critical or abnormal lab result, we will notify you by phone as soon as possible.  Submit refill requests through [employer] or call your pharmacy and they will forward the refill request to us. Please allow 3 business days for your refill to be completed.          Additional Information About Your Visit        MyChart Information     [employer] lets you send messages to your doctor, view your test results, renew your prescriptions, schedule appointments and more. To sign up, go to [email or web address] . Click on \"Log in\" on the left side of the screen, which will take you to the " "Welcome page. Then click on \"Sign up Now\" on the right side of the page.     You will be asked to enter the access code listed below, as well as some personal information. Please follow the directions to create your username and password.     Your access code is: [email or web address]  Expires: 2017 10:24 AM     Your access code will  in 90 days. If you need help or a new code, please call your [facility] clinic or [phone].        Care EveryWhere ID     This is your Care EveryWhere ID. This could be used by other organizations to access your [facility] medical records  [id number]        Your Vitals Were     Pulse Temperature Respirations Height Pulse Oximetry BMI (Body Mass Index)    63 98.7  F (37.1  C) (Tympanic) 20 1.626 m (5' 4.02\") 98% 30.88 kg/m2       Blood Pressure from Last 3 Encounters:   17 143/56   11/10/17 164/74   10/28/17 124/84    Weight from Last 3 Encounters:   17 81.6 kg (180 lb)   11/10/17 81.6 kg (180 lb)   17 88 kg (194 lb)                 Today's Medication Changes          These changes are accurate as of: 17  4:19 PM.  If you have any questions, ask your nurse or doctor.               Start taking these medicines.        Dose/Directions    ferrous fumarate 65 mg ([place]. FE)-Vitamin C 125 mg  MG Tabs tablet   Commonly known as:  VITRON C   Used for:  Iron deficiency anemia, unspecified iron deficiency anemia type   Started by:  Anne Marie Andrew MD        Dose:  1 tablet   Take 1 tablet by mouth daily   Quantity:  60 tablet   Refills:  1            Where to get your medicines      These medications were sent to [facility] [address] [address]     Phone:  [phone]     ferrous fumarate 65 mg ([place]. FE)-Vitamin C 125 mg  MG Tabs tablet                Primary Care Provider Office Phone # Fax #    Shayy GUNJAN [facility] [place] [phone] [address] [facility] [place] [facility] [place] [address]        Equal Access to Services     CATRACHO MCBRIDE : Hadii petar nair maria victoria Samuels, wahusamda luqadaha, qawilfredta kaconchisryan blanco, deny woodsitzelnaomy benjamin. So [facility] [phone].    ATENCIÓN: Si habla español, tiene a hewitt disposición servicios gratuitos de asistencia lingüística. Ursula al [phone].    We comply with applicable federal civil rights laws and [place] laws. We do not discriminate on the basis of race, color, national origin, age, disability, sex, sexual orientation, or gender identity.            Thank you!     Thank you for choosing [facility]  for your care. Our goal is always to provide you with excellent care. Hearing back from our patients is one way we can continue to improve our services. Please take a few minutes to complete the written survey that you may receive in the mail after your visit with us. Thank you!             Your Updated Medication List - Protect others around you: Learn how to safely use, store and throw away your medicines at www.disposemymeds.org.          This list is accurate as of: 11/20/17  4:19 PM.  Always use your most recent med list.                   Brand Name Dispense Instructions for use Diagnosis    aspirin 81 MG EC tablet     90 tablet    Take 1 tablet (81 mg) by mouth daily        ferrous fumarate 65 mg ([place]. FE)-Vitamin C 125 mg  MG Tabs tablet    VITRON C    60 tablet    Take 1 tablet by mouth daily    Iron deficiency anemia, unspecified iron deficiency anemia type       [facility]CA VITAMIN B12 500 MCG Tabs   Generic drug:  cyanocobalamin      2 tablets daily        MULTIVITAMIN PO      one daily        oxyCODONE IR 5 MG tablet   Start taking on:  12/29/2017    ROXICODONE    60 tablet    Take 1 tablet (5 mg) by mouth 2 times daily as needed for pain    Chronic left-sided low back pain with left-sided sciatica       senna-docusate 8.6-50 MG per tablet    SENOKOT-S;PERICOLACE    60 tablet    Take 1-2 tablets by  mouth 2 times daily as needed for constipation.    Spondylolisthesis of lumbar region       terazosin 5 MG capsule    HYTRIN    90 capsule    Take 1 capsule (5 mg) by mouth At Bedtime    Benign non-nodular prostatic hyperplasia with lower urinary tract symptoms       triamterene-hydrochlorothiazide 75-50 MG per tablet    MAXZIDE    45 tablet    Take 0.5 tablets by mouth daily    Benign essential hypertension       VIACTIV PO      With D 1000mg calcium

## 2017-11-20 NOTE — NURSING NOTE
"Oncology Rooming Note    November 20, 2017 3:26 PM   Mann Escobar is a 77 year old male who presents for:    Chief Complaint   Patient presents with     Hematology     2 week recheck Microcytic anemia, review Labs & US     Initial Vitals: /56 (BP Location: Right arm, Patient Position: Sitting, Cuff Size: Adult Regular)  Pulse 63  Temp 98.7  F (37.1  C) (Tympanic)  Resp 20  Ht 1.626 m (5' 4.02\")  Wt 81.6 kg (180 lb)  SpO2 98%  BMI 30.88 kg/m2 Estimated body mass index is 30.88 kg/(m^2) as calculated from the following:    Height as of this encounter: 1.626 m (5' 4.02\").    Weight as of this encounter: 81.6 kg (180 lb). Body surface area is 1.92 meters squared.  Mild Pain (3) Comment: Left shoulder  9/10   No LMP for male patient.  Allergies reviewed: Yes  Medications reviewed: Yes    Medications: Medication refills not needed today.  Pharmacy name entered into [facility]:      [facility] [place], [place] - [address] [place] [address]      Clinical concerns: 2 week recheck Microcytic anemia, review Labs & US    1. Patient reports he likes to chew ice.     7  minutes for nursing intake (face to face time)     Margie Bustillo CMA              "

## 2017-11-20 NOTE — PATIENT INSTRUCTIONS
Dr. Andrew would like you to complete a FIT test and then start oral Iron. We would also like you to have a endoscopy and colonoscopy. We would like to see you back in after the procedures for a follow up appointment with labs prior. When you are in need of a refill, please call your pharmacy and they will send us a request.  Copy of appointments, and after visit summary (AVS) given to patient.  If you have any questions please call Jud Cadena RN, BSN Oncology Hematology  [facility] [phone]. For questions after business hours, or on holidays/weekends, please call our after hours Nurse Triage line [phone]. Thank you.           FIT test first,then start oral iron.   Upper endo and coloscopy for ANAMIKA and FH of colon cancer.  F/u post the scopes with labs.

## 2017-11-20 NOTE — LETTER
11/20/2017         RE: Mann Escobar  [address]  [place] [address]        Dear Colleague,    Thank you for referring your patient, Mann Escobar, to the [facility]. Please see a copy of my visit note below.    Hematology follow up visit    CC: anemia, thalassemia minor saw us first Nov 2017.    HISTORY OF PRESENT ILLNESS:  Mann Escobar is a 77-year-old gentleman who has a history of thalassemia minor and is aware of being anemic since his younger years.    He states he has been feeling fatigued for months in 2017.  He had a fall and syncope and was evaluated in the Emergency Room end of Oct.      He was found to have a hemoglobin of 7.9 10/28/2017 and was noted to have significant microcytosis.  He also had leukocytosis at that time, normal platelet count at that time.    The patient also reports that he had an upper GI bleed in 2016 when an EGD and colonoscopy summer 2016 was performed, and there was nothing serious found.  He apparently was using lots of NSAIDs at that time. He s[place] 2 [place] PRBC [place].       Review of his labs indicates that his hemoglobin was close to 11 until 2010, and that was likely his baseline hemoglobin.    Since 2011, his hemoglobin has declined, his lisa hb was 7.3 in 2012. His last hemoglobin was 8.2 gm in early Nov 2017.       The patient does not have issues with any obvious bleeding, bruising.  He is not seeing any black-colored stools.     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 He became severely more anemia since 2010 with hb down to less than 9 in 2017.       PAST MEDICAL HISTORY:  Chronic back pain. OA, chronic pain syndrome from back, s/p L spine fusion, s/p bilateral knee replacement, BPH, gout.       MEDICATIONS:  Reviewed.        ALLERGIES:  Reviewed.       SOCIAL HISTORY:  Retired currently.  He used to work at [employer] at [employer]. Nonsmoker, occasional alcohol intake.   "He is .       FAMILY HISTORY:  Mother had colon cancer in her 80s.  Sister had breast cancer in her 60s and diseased from it.  Brother had colon cancer in his 40s. Father had prostate and bone cancer in his 70s.  No other family members with breast cancer.       REVIEW OF SYSTEMS  The patient does not have issues with any obvious bleeding, bruising.  He is not seeing any black-colored stools.   More fatigue, like to chew ice.     PHYSICAL EXAMINATION:   VITAL SIGNS: Blood pressure 143/56, pulse 63, temperature 98.7  F (37.1  C), temperature source Tympanic, resp. rate 20, height 1.626 m (5' 4.02\"), weight 81.6 kg (180 lb), SpO2 98 %.    GENERAL APPEARANCE:  looks like her stated age, very pleasant, not in acute distress.   HEENT: The patient is normocephalic, atraumatic. Pupils are equally reactive to light.  Sclerae are anicteric.  Moist oral mucosa.  Negative pharynx.  No oral thrush.   NECK:  Supple.  No jugular venous distention.  Thyroid is not palpable.   LYMPH NODES:  Superficial lymphadenopathy is not appreciable in the bilateral cervical, supraclavicular, axillary or inguinal areas.   CARDIOVASCULAR:  S1, S2 regular with no murmurs or gallops.  No carotid or abdominal bruits.   PULMONARY:  Lungs are clear to auscultation and percussion bilaterally.  There is no wheezing or rhonchi.   GASTROINTESTINAL:  Abdomen is soft, nontender.  No hepatosplenomegaly.  No signs of ascites.  No mass appreciable.   MUSCULOSKELETAL/EXTREMITIES:  No edema.  No cyanotic changes.  No signs of joint deformity.  No lymphedema.   NEUROLOGIC:  Cranial nerves II-XII are grossly intact.  Sensation intact.  Muscle strength and muscle tone symmetrical, 5/5 throughout.   BACK:  No spinal or paraspinal tenderness.  No CVA tenderness.   SKIN:  No petechiae.  No rash.  No signs of cellulitis.   BREASTS:  Bilateral breast exam revealed no mass or skin changes or nipple discharges.        CURRENT LAB DATA REVIEWED  11/2017 hb 8.2, MCV " 50, wbc/PL nl. reti 0.4 low  Smear - The red cells are microcytic, hypochromic There is marked anisocytosis. There are occasional target cells and irregularly shaped fragmented red blood cells.  Wbc/PL are nl.    11/2017 hem work up -  MCKAYLA/hapto/TSH/LDH/spep  nl, folate B12 not low,  nl. Iron 15, iron sat 4%.   ferritin 7, epo 50, stfR 16, TfR index (sTFR/ferritin) is > 2 is consistent with ANAMIKA.     Hb electro: Hb A1 94.5% low, Hb A2 4.7% high, Hb Fnl.   An elevated Hb A2 level can be seen in beta thalassemia trait and rarely in unstable hemoglobin variants.      CURRENT IMAGING REVIEWED  US 11/2017: No evidence for hepatomegaly, spleen is borderline prominent 13.7 cm in length.       OLD DATA REVIEWED TODAY WITH SUMMARY:   Hemoglobin was close to 11 until 2010, and this was likely his baseline hemoglobin.  Since 2011, his hemoglobin has declined, his lisa hb was 7.3 in 2012.      ASSESSMENT AND PLAN:    1. Chronic microcytic anemia with baseline hb of 11 till 2010.   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     2. Newly developed severe anemia in 2017 overseveral years. Hematology work up 11/2017 is most suggestive of ANAMIKA.  He has hx of GIB and FH of colon cancer.     Recommend stool testing, then start oral iron vitron C po bid on relative empty stomach.     Recommend GI work up.     Will f/u the work up and oral iron response.     Total time is 25 minutes, more than 15 minutes spent in counseling regarding his test results, working dx and further plan of actions.     Again, thank you for allowing me to participate in the care of your patient.        Sincerely,        Anne Marie Andrew MD, MD

## 2017-11-21 ENCOUNTER — TELEPHONE (OUTPATIENT)
Dept: GASTROENTEROLOGY | Facility: CLINIC | Age: 77
End: 2017-11-21

## 2017-11-21 PROCEDURE — 82274 ASSAY TEST FOR BLOOD FECAL: CPT | Performed by: INTERNAL MEDICINE

## 2017-11-22 DIAGNOSIS — D50.9 IRON DEFICIENCY ANEMIA, UNSPECIFIED IRON DEFICIENCY ANEMIA TYPE: ICD-10-CM

## 2017-11-23 LAB — HEMOCCULT STL QL IA: NEGATIVE

## 2017-12-05 ENCOUNTER — OFFICE VISIT (OUTPATIENT)
Dept: FAMILY MEDICINE | Facility: CLINIC | Age: 77
End: 2017-12-05
Payer: COMMERCIAL

## 2017-12-05 VITALS
TEMPERATURE: 97.6 F | HEART RATE: 72 BPM | DIASTOLIC BLOOD PRESSURE: 88 MMHG | HEIGHT: 64 IN | SYSTOLIC BLOOD PRESSURE: 165 MMHG

## 2017-12-05 DIAGNOSIS — M54.42 CHRONIC LEFT-SIDED LOW BACK PAIN WITH LEFT-SIDED SCIATICA: ICD-10-CM

## 2017-12-05 DIAGNOSIS — G89.29 CHRONIC LEFT-SIDED LOW BACK PAIN WITH LEFT-SIDED SCIATICA: ICD-10-CM

## 2017-12-05 PROCEDURE — 99213 OFFICE O/P EST LOW 20 MIN: CPT | Performed by: NURSE PRACTITIONER

## 2017-12-05 RX ORDER — OXYCODONE HYDROCHLORIDE 5 MG/1
5 TABLET ORAL 2 TIMES DAILY PRN
Qty: 60 TABLET | Refills: 0 | Status: SHIPPED | OUTPATIENT
Start: 2018-01-04 | End: 2017-12-05

## 2017-12-05 RX ORDER — OXYCODONE HYDROCHLORIDE 5 MG/1
5 TABLET ORAL 2 TIMES DAILY PRN
Qty: 60 TABLET | Refills: 0 | Status: SHIPPED | OUTPATIENT
Start: 2018-02-04 | End: 2018-03-02

## 2017-12-05 RX ORDER — OXYCODONE HYDROCHLORIDE 5 MG/1
5 TABLET ORAL 2 TIMES DAILY PRN
Qty: 60 TABLET | Refills: 0 | Status: SHIPPED | OUTPATIENT
Start: 2017-12-29 | End: 2017-12-05

## 2017-12-05 RX ORDER — OXYCODONE HYDROCHLORIDE 5 MG/1
5 TABLET ORAL 2 TIMES DAILY PRN
Qty: 60 TABLET | Refills: 0 | Status: SHIPPED | OUTPATIENT
Start: 2017-12-05 | End: 2017-12-05

## 2017-12-05 NOTE — PROGRESS NOTES
"  SUBJECTIVE:   Mann Escobar is a 77 year old male who presents to clinic today for the following health issues:      Back Pain Follow Up  Lost the prescription dated 12/29/2017 - has never lost a script before      Description:   Location of pain:  bilateral  Character of pain: sharp, constant and waxing and waning  Pain radiation: radiates into the left buttocks and leg-   Since last visit, pain is:  unchanged  New numbness or weakness in legs, not attributed to pain:  YES- left leg is numb- not a new symptom    Intensity: Currently 2/10, At its worst 8-10/10    History:   Pain interferes with job: Not applicable  Therapies tried without relief: cold, heat, Physical Therapy and steroid injection- has had this in the past  Therapies tried with relief: opioids and sitting         Accompanying Signs & Symptoms:  Risk of Fracture:  None  Risk of Cauda Equina:  None  Risk of Infection:  None  Risk of Cancer:  None        Amount of exercise or physical activity: None    Problems taking medications regularly: No    Medication side effects: none    Diet: regular (no restrictions)          Problem list and histories reviewed & adjusted, as indicated.  Additional history: as documented      Reviewed and updated as needed this visit by clinical staffTobacco  Allergies  Meds       Reviewed and updated as needed this visit by Provider         ROS:  Constitutional, HEENT, cardiovascular, pulmonary, gi and gu systems are negative, except as otherwise noted.      OBJECTIVE:   /88  Pulse 72  Temp 97.6  F (36.4  C) (Oral)  Ht 5' 4\" (1.626 m)  There is no height or weight on file to calculate BMI.  GENERAL: healthy, alert and no distress    ASSESSMENT/PLAN:       ICD-10-CM    1. Chronic left-sided low back pain with left-sided sciatica M54.42 oxyCODONE IR (ROXICODONE) 5 MG tablet    G89.29 DISCONTINUED: oxyCODONE IR (ROXICODONE) 5 MG tablet     DISCONTINUED: oxyCODONE IR (ROXICODONE) 5 MG tablet     " DISCONTINUED: oxyCODONE IR (ROXICODONE) 5 MG tablet     Will replace lost prescription this time as it is his first time - reminded patient of contract he signed and I will not replace lost scripts again.  3 months of refills given today.    The risks, benefits and treatment options of prescribed medications or other treatments have been discussed with the patient. The patient verbalized their understanding and should call or follow up if no improvement or if they develop further problems.    GUNJAN Vinson [facility]

## 2017-12-05 NOTE — NURSING NOTE
"Chief Complaint   Patient presents with     Refill Request     chronic back pain / refill- patient states he is out- doesn't have the RX for 12/29       Initial /88 (BP Location: Right arm)  Pulse 72  Temp 97.6  F (36.4  C) (Oral)  Ht 5' 4\" (1.626 m) Estimated body mass index is 30.88 kg/(m^2) as calculated from the following:    Height as of 11/20/17: 5' 4.02\" (1.626 m).    Weight as of 11/20/17: 180 lb (81.6 kg).  Medication Reconciliation: complete  "

## 2017-12-05 NOTE — MR AVS SNAPSHOT
After Visit Summary   12/5/2017    Mann Escobar    MRN: [id number]           Patient Information     Date Of Birth          1940        Visit Information        Provider Department      12/5/2017 1:20 PM Shayy Ramírez APRN CNP [facility]        Today's Diagnoses     Chronic left-sided low back pain with left-sided sciatica           Follow-ups after your visit        Your next 10 appointments already scheduled     Dec 19, 2017   Procedure with Edmar Isaacs MD   [facility] ([facility])    [address] [id number] [phone]           The medical center is located at [address] (between [address] in [place], [place] miles north of [place]).            Dec 29, 2017 10:30 AM CST   LAB with [facility] ([facility])    [address] [id number] [phone]           Please do not eat 10-12 hours before your appointment if you are coming in fasting for labs on lipids, cholesterol, or glucose (sugar). This does not apply to pregnant women. Water, hot tea and black coffee (with nothing added) are okay. Do not drink other fluids, diet soda or chew gum.            Jan 02, 2018 10:45 AM CST   Return Visit with Anne Marie Andrew MD   [facility] ([facility])    [facility]  [address]  [facility] [id number] [phone]              Who to contact     If you have questions or need follow up information about today's clinic visit or your schedule please contact [facility] directly at [phone].  Normal or non-critical lab and imaging results will be communicated to you by MyChart, letter or phone within 4 business days after the clinic has received the results. If you do not hear from us within 7 days, please contact the clinic through MyChart or phone. If you  "have a critical or abnormal lab result, we will notify you by phone as soon as possible.  Submit refill requests through mytrax or call your pharmacy and they will forward the refill request to us. Please allow 3 business days for your refill to be completed.          Additional Information About Your Visit        [email or web address] Information     mytrax lets you send messages to your doctor, view your test results, renew your prescriptions, schedule appointments and more. To sign up, go to [email or web address] . Click on \"Log in\" on the left side of the screen, which will take you to the Welcome page. Then click on \"Sign up Now\" on the right side of the page.     You will be asked to enter the access code listed below, as well as some personal information. Please follow the directions to create your username and password.     Your access code is: [email or web address]  Expires: 2017 10:24 AM     Your access code will  in 90 days. If you need help or a new code, please call your [facility] clinic or [phone].        Care EveryWhere ID     This is your Care EveryWhere ID. This could be used by other organizations to access your [facility] medical records  [id number]        Your Vitals Were     Pulse Temperature Height             72 97.6  F (36.4  C) (Oral) 5' 4\" (1.626 m)          Blood Pressure from Last 3 Encounters:   17 165/88   17 143/56   11/10/17 164/74    Weight from Last 3 Encounters:   17 180 lb (81.6 kg)   11/10/17 180 lb (81.6 kg)   17 194 lb (88 kg)              Today, you had the following     No orders found for display         Today's Medication Changes          These changes are accurate as of: 17  2:33 PM.  If you have any questions, ask your nurse or doctor.               Start taking these medicines.        Dose/Directions    oxyCODONE IR 5 MG tablet   Commonly known as:  ROXICODONE   Used for:  Chronic left-sided low back pain with left-sided sciatica   Started by:  " Shayy Ramírez APRN CNP        Dose:  5 mg   Start taking on:  2/4/2018   Take 1 tablet (5 mg) by mouth 2 times daily as needed for pain   Quantity:  60 tablet   Refills:  0            Where to get your medicines      Some of these will need a paper prescription and others can be bought over the counter.  Ask your nurse if you have questions.     Bring a paper prescription for each of these medications     oxyCODONE IR 5 MG tablet                Primary Care Provider Office Phone # Fax #    GUNJAN Villarreal [phone] [address] [facility] [address]        Equal Access to Services     CATRACHO MCBRIDE : Hadii petar armendarizo Soleesa, waaxda luqadaha, qaybta kaalmada francis, deny morris . So [facility] [phone].    ATENCIÓN: Si habla español, tiene a hewitt disposición servicios gratuitos de asistencia lingüística. Llame al [phone].    We comply with applicable federal civil rights laws and [place] laws. We do not discriminate on the basis of race, color, national origin, age, disability, sex, sexual orientation, or gender identity.            Thank you!     Thank you for choosing [facility]  for your care. Our goal is always to provide you with excellent care. Hearing back from our patients is one way we can continue to improve our services. Please take a few minutes to complete the written survey that you may receive in the mail after your visit with us. Thank you!             Your Updated Medication List - Protect others around you: Learn how to safely use, store and throw away your medicines at www.disposemymeds.org.          This list is accurate as of: 12/5/17  2:33 PM.  Always use your most recent med list.                   Brand Name Dispense Instructions for use Diagnosis    aspirin 81 MG EC tablet     90 tablet    Take 1 tablet (81 mg) by mouth daily        ferrous fumarate 65 mg ([place]. FE)-Vitamin C  125 mg  MG Tabs tablet    VITRON C    60 tablet    Take 1 tablet by mouth daily    Iron deficiency anemia, unspecified iron deficiency anemia type       [facility]CA VITAMIN B12 500 MCG Tabs   Generic drug:  cyanocobalamin      2 tablets daily        MULTIVITAMIN PO      one daily        oxyCODONE IR 5 MG tablet   Start taking on:  2/4/2018    ROXICODONE    60 tablet    Take 1 tablet (5 mg) by mouth 2 times daily as needed for pain    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    Spondylolisthesis of lumbar region       terazosin 5 MG capsule    HYTRIN    90 capsule    Take 1 capsule (5 mg) by mouth At Bedtime    Benign non-nodular prostatic hyperplasia with lower urinary tract symptoms       triamterene-hydrochlorothiazide 75-50 MG per tablet    MAXZIDE    45 tablet    Take 0.5 tablets by mouth daily    Benign essential hypertension       VIACTIV PO      With D 1000mg calcium

## 2017-12-17 ENCOUNTER — ANESTHESIA EVENT (OUTPATIENT)
Dept: GASTROENTEROLOGY | Facility: CLINIC | Age: 77
End: 2017-12-17
Payer: MEDICARE

## 2017-12-17 ASSESSMENT — LIFESTYLE VARIABLES: TOBACCO_USE: 1

## 2017-12-17 NOTE — ANESTHESIA PREPROCEDURE EVALUATION
Anesthesia Evaluation     . Pt has had prior anesthetic.     No history of anesthetic complications          ROS/MED HX    ENT/Pulmonary:     (+)tobacco use, Past use , . .    Neurologic:     (+)neuropathy     Cardiovascular:     (+) hypertension----. : . . . :. . Previous cardiac testing date:results:date: results:ECG reviewed date:10/2017 results:SR 1st degree AV block date: results:          METS/Exercise Tolerance:  >4 METS   Hematologic:     (+) Anemia, -      Musculoskeletal:   (+) arthritis, , , other musculoskeletal- Pseudogout, left shoulder pain and bilat. leg numbness, s/p back fusion       GI/Hepatic:     (+) GERD       Renal/Genitourinary:     (+) Nephrolithiasis , BPH,       Endo:     (+) Obesity, .      Psychiatric:         Infectious Disease:  - neg infectious disease ROS       Malignancy:      - no malignancy   Other: Comment: Thalassemia minor   (+) H/O Chronic Pain,H/O chronic opiod use ,                    Physical Exam  Normal systems: cardiovascular and pulmonary    Airway   Mallampati: II  TM distance: >3 FB  Neck ROM: full    Dental   Comment: Poor dentition    Cardiovascular       Pulmonary                     Anesthesia Plan      History & Physical Review  History and physical reviewed and following examination; no interval change.    ASA Status:  3 .    NPO Status:  > 8 hours    Plan for General and MAC with Propofol and Intravenous induction. Reason for MAC:  Difficulty with conscious sedation (QS)         Postoperative Care  Postoperative pain management:  IV analgesics and Oral pain medications.      Consents  Anesthetic plan, risks, benefits and alternatives discussed with:  Patient..                          .

## 2017-12-19 ENCOUNTER — SURGERY (OUTPATIENT)
Age: 77
End: 2017-12-19

## 2017-12-19 ENCOUNTER — HOSPITAL ENCOUNTER (OUTPATIENT)
Facility: CLINIC | Age: 77
Discharge: HOME | End: 2017-12-19
Attending: SURGERY | Admitting: SURGERY
Payer: MEDICARE

## 2017-12-19 ENCOUNTER — ANESTHESIA (OUTPATIENT)
Dept: GASTROENTEROLOGY | Facility: CLINIC | Age: 77
End: 2017-12-19
Payer: MEDICARE

## 2017-12-19 VITALS
HEART RATE: 63 BPM | SYSTOLIC BLOOD PRESSURE: 166 MMHG | BODY MASS INDEX: 34.04 KG/M2 | DIASTOLIC BLOOD PRESSURE: 78 MMHG | WEIGHT: 185 LBS | TEMPERATURE: 97.6 F | OXYGEN SATURATION: 98 % | HEIGHT: 62 IN | RESPIRATION RATE: 16 BRPM

## 2017-12-19 LAB
COLONOSCOPY: NORMAL
UPPER GI ENDOSCOPY: NORMAL

## 2017-12-19 PROCEDURE — 88305 TISSUE EXAM BY PATHOLOGIST: CPT | Performed by: SURGERY

## 2017-12-19 PROCEDURE — 88305 TISSUE EXAM BY PATHOLOGIST: CPT | Mod: 26 | Performed by: SURGERY

## 2017-12-19 PROCEDURE — 25000125 ZZHC RX 250: Performed by: NURSE ANESTHETIST, CERTIFIED REGISTERED

## 2017-12-19 PROCEDURE — 45378 DIAGNOSTIC COLONOSCOPY: CPT | Performed by: SURGERY

## 2017-12-19 PROCEDURE — 25000125 ZZHC RX 250: Performed by: SURGERY

## 2017-12-19 PROCEDURE — 43250 EGD CAUTERY TUMOR POLYP: CPT | Performed by: SURGERY

## 2017-12-19 PROCEDURE — 37000008 ZZH ANESTHESIA TECHNICAL FEE, 1ST 30 MIN: Performed by: SURGERY

## 2017-12-19 PROCEDURE — 37000009 ZZH ANESTHESIA TECHNICAL FEE, EACH ADDTL 15 MIN: Performed by: SURGERY

## 2017-12-19 PROCEDURE — 25000128 H RX IP 250 OP 636: Performed by: NURSE ANESTHETIST, CERTIFIED REGISTERED

## 2017-12-19 PROCEDURE — 43239 EGD BIOPSY SINGLE/MULTIPLE: CPT | Performed by: SURGERY

## 2017-12-19 PROCEDURE — 25000128 H RX IP 250 OP 636: Performed by: SURGERY

## 2017-12-19 RX ORDER — SODIUM CHLORIDE, SODIUM LACTATE, POTASSIUM CHLORIDE, CALCIUM CHLORIDE 600; 310; 30; 20 MG/100ML; MG/100ML; MG/100ML; MG/100ML
INJECTION, SOLUTION INTRAVENOUS CONTINUOUS
Status: DISCONTINUED | OUTPATIENT
Start: 2017-12-19 | End: 2017-12-19 | Stop reason: HOSPADM

## 2017-12-19 RX ORDER — PROPOFOL 10 MG/ML
INJECTION, EMULSION INTRAVENOUS CONTINUOUS PRN
Status: DISCONTINUED | OUTPATIENT
Start: 2017-12-19 | End: 2017-12-19

## 2017-12-19 RX ORDER — LIDOCAINE 40 MG/G
CREAM TOPICAL
Status: DISCONTINUED | OUTPATIENT
Start: 2017-12-19 | End: 2017-12-19 | Stop reason: HOSPADM

## 2017-12-19 RX ORDER — ONDANSETRON 2 MG/ML
4 INJECTION INTRAMUSCULAR; INTRAVENOUS
Status: DISCONTINUED | OUTPATIENT
Start: 2017-12-19 | End: 2017-12-19 | Stop reason: HOSPADM

## 2017-12-19 RX ORDER — LIDOCAINE HYDROCHLORIDE 10 MG/ML
INJECTION, SOLUTION INFILTRATION; PERINEURAL PRN
Status: DISCONTINUED | OUTPATIENT
Start: 2017-12-19 | End: 2017-12-19

## 2017-12-19 RX ORDER — GLYCOPYRROLATE 0.2 MG/ML
INJECTION, SOLUTION INTRAMUSCULAR; INTRAVENOUS PRN
Status: DISCONTINUED | OUTPATIENT
Start: 2017-12-19 | End: 2017-12-19

## 2017-12-19 RX ORDER — PROPOFOL 10 MG/ML
INJECTION, EMULSION INTRAVENOUS PRN
Status: DISCONTINUED | OUTPATIENT
Start: 2017-12-19 | End: 2017-12-19

## 2017-12-19 RX ADMIN — LIDOCAINE HYDROCHLORIDE 1 ML: 10 INJECTION, SOLUTION EPIDURAL; INFILTRATION; INTRACAUDAL; PERINEURAL at 10:46

## 2017-12-19 RX ADMIN — GLYCOPYRROLATE 0.2 MG: 0.2 INJECTION, SOLUTION INTRAMUSCULAR; INTRAVENOUS at 11:55

## 2017-12-19 RX ADMIN — SODIUM CHLORIDE, POTASSIUM CHLORIDE, SODIUM LACTATE AND CALCIUM CHLORIDE: 600; 310; 30; 20 INJECTION, SOLUTION INTRAVENOUS at 10:46

## 2017-12-19 RX ADMIN — PROPOFOL 50 MG: 10 INJECTION, EMULSION INTRAVENOUS at 11:55

## 2017-12-19 RX ADMIN — PROPOFOL 30 MG: 10 INJECTION, EMULSION INTRAVENOUS at 11:58

## 2017-12-19 RX ADMIN — BENZOCAINE 3 SPRAY: 220 SPRAY, METERED PERIODONTAL at 11:55

## 2017-12-19 RX ADMIN — LIDOCAINE HYDROCHLORIDE 50 MG: 10 INJECTION, SOLUTION INFILTRATION; PERINEURAL at 11:55

## 2017-12-19 RX ADMIN — PROPOFOL 150 MCG/KG/MIN: 10 INJECTION, EMULSION INTRAVENOUS at 11:55

## 2017-12-19 NOTE — BRIEF OP NOTE
[facility]   Brief Operative Note    Pre-operative diagnosis: ANAMIKA and FH colon cancer  screening   Post-operative diagnosis marginal ulcer of gastric pouch.   Procedure: Procedure(s):  colonoscopy, gastroscopy - Wound Class: II-Clean Contaminated  gastroscopy - Wound Class: II-Clean Contaminated   Surgeon(s): Surgeon(s) and Role:  Panel 1:     * Edmar Isaacs MD - Primary    Panel 2:     * Edmar Isaacs MD - Primary   Estimated blood loss: * No values recorded between 12/19/2017 12:00 AM and 12/19/2017 12:31 PM *    Specimens:   ID Type Source Tests Collected by Time Destination   A :  Tissue Stomach, Fundus SURGICAL PATHOLOGY EXAM Edmar Isaacs MD 12/19/2017 12:11 PM       Findings: 1. Esophagus normal  2. 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 Jejunal limbs normal  4. Diverticulosis of colon (very mild)  5. Colon otherwise normal

## 2017-12-19 NOTE — ANESTHESIA CARE TRANSFER NOTE
Patient: Mann Escobar    Procedure(s):  colonoscopy, gastroscopy - Wound Class: II-Clean Contaminated  gastroscopy - Wound Class: II-Clean Contaminated    Diagnosis: ANAMIKA and FH colon cancer  screening  Diagnosis Additional Information: No value filed.    Anesthesia Type:   General, MAC     Note:  Airway :Nasal Cannula  Patient transferred to:Phase II  Handoff Report: Identifed the Patient, Identified the Reponsible Provider, Reviewed the pertinent medical history, Discussed the surgical course, Reviewed Intra-OP anesthesia mangement and issues during anesthesia, Set expectations for post-procedure period and Allowed opportunity for questions and acknowledgement of understanding      Vitals: (Last set prior to Anesthesia Care Transfer)    CRNA VITALS  12/19/2017 1159 - 12/19/2017 1229      12/19/2017             Pulse: 74    SpO2: 97 %                Electronically Signed By: GUNJAN Velazquez CRNA  December 19, 2017  12:29 PM

## 2017-12-19 NOTE — ANESTHESIA POSTPROCEDURE EVALUATION
Patient: Mann Escobar    Procedure(s):  colonoscopy, gastroscopy - Wound Class: II-Clean Contaminated  gastroscopy - Wound Class: II-Clean Contaminated    Diagnosis:ANAMIKA and FH colon cancer  screening  Diagnosis Additional Information: No value filed.    Anesthesia Type:  General, MAC    Note:  Anesthesia Post Evaluation    Patient location during evaluation: Bedside  Patient participation: Able to fully participate in evaluation  Level of consciousness: sleepy but conscious  Pain management: adequate  Airway patency: patent  Cardiovascular status: stable  Respiratory status: nasal cannula  PONV: none     Anesthetic complications: None          Last vitals:  Vitals:    12/19/17 1300 12/19/17 1330 12/19/17 1341   BP: (!) 199/98 168/78 166/78   Pulse:      Resp: 16     Temp:      SpO2: 98%           Electronically Signed By: GUNJAN Velazquez CRNA  December 19, 2017  2:31 PM

## 2017-12-21 LAB — COPATH REPORT: NORMAL

## 2017-12-29 ENCOUNTER — HOSPITAL ENCOUNTER (OUTPATIENT)
Dept: LAB | Facility: CLINIC | Age: 77
Discharge: HOME | End: 2017-12-29
Attending: INTERNAL MEDICINE | Admitting: INTERNAL MEDICINE
Payer: MEDICARE

## 2017-12-29 DIAGNOSIS — D50.9 IRON DEFICIENCY ANEMIA, UNSPECIFIED IRON DEFICIENCY ANEMIA TYPE: ICD-10-CM

## 2017-12-29 LAB
ANISOCYTOSIS BLD QL SMEAR: SLIGHT
BASOPHILS # BLD AUTO: 0.1 10E9/L (ref 0–0.2)
BASOPHILS NFR BLD AUTO: 1.6 %
DIFFERENTIAL METHOD BLD: ABNORMAL
EOSINOPHIL # BLD AUTO: 0.3 10E9/L (ref 0–0.7)
EOSINOPHIL NFR BLD AUTO: 5.1 %
ERYTHROCYTE [DISTWIDTH] IN BLOOD BY AUTOMATED COUNT: 22.6 % (ref 10–15)
FERRITIN SERPL-MCNC: 8 NG/ML (ref 26–388)
HCT VFR BLD AUTO: 25.5 % (ref 40–53)
HGB BLD-MCNC: 7.8 G/DL (ref 13.3–17.7)
HYPOCHROMIA BLD QL: PRESENT
IMM GRANULOCYTES # BLD: 0 10E9/L (ref 0–0.4)
IMM GRANULOCYTES NFR BLD: 0.2 %
LYMPHOCYTES # BLD AUTO: 1 10E9/L (ref 0.8–5.3)
LYMPHOCYTES NFR BLD AUTO: 20 %
MCH RBC QN AUTO: 15.9 PG (ref 26.5–33)
MCHC RBC AUTO-ENTMCNC: 30.6 G/DL (ref 31.5–36.5)
MCV RBC AUTO: 52 FL (ref 78–100)
MONOCYTES # BLD AUTO: 0.5 10E9/L (ref 0–1.3)
MONOCYTES NFR BLD AUTO: 9.8 %
NEUTROPHILS # BLD AUTO: 3.1 10E9/L (ref 1.6–8.3)
NEUTROPHILS NFR BLD AUTO: 63.3 %
PLATELET # BLD AUTO: 275 10E9/L (ref 150–450)
PLATELET # BLD EST: ABNORMAL 10*3/UL
POIKILOCYTOSIS BLD QL SMEAR: SLIGHT
RBC # BLD AUTO: 4.91 10E12/L (ref 4.4–5.9)
RBC INCLUSIONS BLD: SLIGHT
WBC # BLD AUTO: 4.9 10E9/L (ref 4–11)

## 2017-12-29 PROCEDURE — 85025 COMPLETE CBC W/AUTO DIFF WBC: CPT | Performed by: INTERNAL MEDICINE

## 2017-12-29 PROCEDURE — 82728 ASSAY OF FERRITIN: CPT | Performed by: INTERNAL MEDICINE

## 2017-12-29 PROCEDURE — 36415 COLL VENOUS BLD VENIPUNCTURE: CPT | Performed by: INTERNAL MEDICINE

## 2018-01-01 NOTE — PROGRESS NOTES
Hematology follow up visit    CC: ANAMIKA on top of thalassemia minor     HISTORY OF PRESENT ILLNESS:  Mann Escobar is a 77-year-old gentleman who has a history of thalassemia minor and is aware of being anemic since his younger years.    He states he has been feeling fatigued for months in 2017.  He had a fall and syncope and was evaluated in the Emergency Room end of Oct 2017.      He was found to have a hemoglobin of 7.9 10/28/2017 and was noted to have significant microcytosis.  He also had leukocytosis at that time, normal platelet count at that time.    The patient also reports that he had an upper GI bleed in 2016 when an EGD and colonoscopy summer 2016 was performed, and there was nothing serious found.  He apparently was using lots of NSAIDs at that time. He s/p had 2 units PRBC.      Review of his labs indicates that his hemoglobin was close to 11 until 2010, and that was likely his baseline hemoglobin.      Since 2011, his hemoglobin has declined, his lisa hb was 7.3 in 2012. Hemoglobin was 8.2 gm in early Nov 2017.       The patient does not have issues with any obvious bleeding, bruising.  He is not seeing any black-colored stools.     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 He became severely anemia since 2010 with hb down to less than 9 in 2017.   Upper endo and colonoscopy were negative in 12/2017.    He did not response well to oral vitron C, IV iron is offered in 1/2018.          PAST MEDICAL HISTORY:  Chronic back pain. OA, chronic pain syndrome from back, s/p L spine fusion, s/p bilateral knee replacement, BPH, gout.       MEDICATIONS:  Reviewed.        ALLERGIES:  Reviewed.       SOCIAL HISTORY:  Retired currently.  He used to work at [employer] at [employer]. Nonsmoker, occasional alcohol intake.  He is .       FAMILY HISTORY:  Mother had colon cancer in her 80s.  Sister had breast cancer in her 60s and diseased from it.  Brother had colon cancer in  "his 40s. Father had prostate and bone cancer in his 70s.  No other family members with breast cancer.       REVIEW OF SYSTEMS  The patient does not have issues with any obvious bleeding, bruising.  He is not seeing any black-colored stools.   More fatigue, like to chew ice, which is less now since oral Vitron C intake.      PHYSICAL EXAMINATION:   VITAL SIGNS: Blood pressure 166/79, pulse 70, temperature 98.6  F (37  C), temperature source Tympanic, resp. rate 16, height 1.575 m (5' 2.01\"), weight 83.9 kg (185 lb), SpO2 97 %.    GENERAL APPEARANCE:  looks like her stated age, very pleasant, not in acute distress.   HEENT: The patient is normocephalic, atraumatic. Pupils are equally reactive to light.  Sclerae are anicteric.  Moist oral mucosa.  Negative pharynx.  No oral thrush.   NECK:  Supple.  No jugular venous distention.  Thyroid is not palpable.   LYMPH NODES:  Superficial lymphadenopathy is not appreciable in the bilateral cervical, supraclavicular, axillary or inguinal areas.   CARDIOVASCULAR:  S1, S2 regular with no murmurs or gallops.  No carotid or abdominal bruits.   PULMONARY:  Lungs are clear to auscultation and percussion bilaterally.  There is no wheezing or rhonchi.   GASTROINTESTINAL:  Abdomen is soft, nontender.  No hepatosplenomegaly.  No signs of ascites.  No mass appreciable.   MUSCULOSKELETAL/EXTREMITIES:  No edema.  No cyanotic changes.  No signs of joint deformity.  No lymphedema.   NEUROLOGIC:  Cranial nerves II-XII are grossly intact.  Sensation intact.  Muscle strength and muscle tone symmetrical, 5/5 throughout.   BACK:  No spinal or paraspinal tenderness.  No CVA tenderness.   SKIN:  No petechiae.  No rash.  No signs of cellulitis.   BREASTS:  Bilateral breast exam revealed no mass or skin changes or nipple discharges.        CURRENT LAB DATA REVIEWED  12/2017 ferritin 8 from 7  12/2017 hb 7.8 from 8.2    EGD no bleeding found, gastric biopsy 12/2017 - no ca or H pylori.   Colonoscopy " 12/2017 - nl.     11/2017 hb 8.2, MCV 50, wbc/PL nl. reti 0.4 low  Smear - The red cells are microcytic, hypochromic There is marked anisocytosis. There are occasional target cells and irregularly shaped fragmented red blood cells.  Wbc/PL are nl.  FIT: negative.    11/2017 hem work up -  MCKAYLA/hapto/TSH/LDH/spep  nl, folate B12 not low,  nl. Iron 15, iron sat 4%.   ferritin 7, epo 50, stfR 16, TfR index (sTFR/ferritin) is > 2 is consistent with ANAMIKA.     Hb electro: Hb A1 94.5% low, Hb A2 4.7% high, Hb Fnl.   An elevated Hb A2 level can be seen in beta thalassemia trait and rarely in unstable hemoglobin variants.      CURRENT IMAGING REVIEWED  US 11/2017: No evidence for hepatomegaly, spleen is borderline prominent 13.7 cm in length.       OLD DATA REVIEWED TODAY WITH SUMMARY:   Hemoglobin was close to 11 until 2010, and this was likely his baseline hemoglobin.  Since 2011, his hemoglobin has declined, his lisa hb was 7.3 in 2012.      ASSESSMENT AND PLAN:    1. Chronic microcytic anemia with baseline hb of 11 till 2010.   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     2. Newly developed severe anemia in 2017 overseveral years. Hematology work up 11/2017 is most suggestive of ANAMIKA.  He has hx of GIB and FH of colon cancer.     Recommend oral iron vitron C po bid on relative empty stomach. He has not not having good response. Recommend IV iron Injectafer 750 mg wkly x2.     He has negative upper endo and colonoscopy 12/2017.  Recommend small bowel capsule study.     Will f/u the work up and oral iron response.     Total time is 15 minutes, more than 10 minutes spent in counseling regarding his test results, working dx and further plan of actions.

## 2018-01-02 ENCOUNTER — ONCOLOGY VISIT (OUTPATIENT)
Dept: ONCOLOGY | Facility: CLINIC | Age: 78
End: 2018-01-02
Attending: INTERNAL MEDICINE
Payer: MEDICARE

## 2018-01-02 VITALS
BODY MASS INDEX: 34.04 KG/M2 | HEART RATE: 70 BPM | DIASTOLIC BLOOD PRESSURE: 79 MMHG | OXYGEN SATURATION: 97 % | SYSTOLIC BLOOD PRESSURE: 166 MMHG | RESPIRATION RATE: 16 BRPM | WEIGHT: 185 LBS | TEMPERATURE: 98.6 F | HEIGHT: 62 IN

## 2018-01-02 DIAGNOSIS — D50.9 IRON DEFICIENCY ANEMIA, UNSPECIFIED IRON DEFICIENCY ANEMIA TYPE: Primary | ICD-10-CM

## 2018-01-02 PROCEDURE — 99213 OFFICE O/P EST LOW 20 MIN: CPT | Performed by: INTERNAL MEDICINE

## 2018-01-02 PROCEDURE — G0463 HOSPITAL OUTPT CLINIC VISIT: HCPCS

## 2018-01-02 ASSESSMENT — PAIN SCALES - GENERAL: PAINLEVEL: SEVERE PAIN (7)

## 2018-01-02 NOTE — PATIENT INSTRUCTIONS
Dr. Andrew would like you to have a small bowel capsule study done and schedule IV iron Injectafer weekly x2. We would like to see you back in 4-6 for a follow up appointment with labs prior. When you are in need of a refill, please call your pharmacy and they will send us a request.  Copy of appointments, and after visit summary (AVS) given to patient.  If you have any questions please call Jud Cadena RN, BSN Oncology Hematology  [facility] [phone]. For questions after business hours, or on holidays/weekends, please call our after hours Nurse Triage line [phone]. Thank you.         mall bowel capsule study.  IV iron Injectafer 750 mg wkly x2. F/u in 4-6 wks with labs.

## 2018-01-02 NOTE — NURSING NOTE
"Oncology Rooming Note    January 2, 2018 11:07 AM   Mann Escobar is a 77 year old male who presents for:    Chief Complaint   Patient presents with     Hematology     Recheck ANAMIKA, review Colonosopy, upper endo & FIT testing.      Initial Vitals: /79  Pulse 70  Temp 98.6  F (37  C) (Tympanic)  Resp 16  Ht 1.575 m (5' 2.01\")  Wt 83.9 kg (185 lb)  SpO2 97%  BMI 33.83 kg/m2 Estimated body mass index is 33.83 kg/(m^2) as calculated from the following:    Height as of this encounter: 1.575 m (5' 2.01\").    Weight as of this encounter: 83.9 kg (185 lb). Body surface area is 1.92 meters squared.  Severe Pain (7) Comment: left    No LMP for male patient.  Allergies reviewed: Yes  Medications reviewed: Yes    Medications: Medication refills not needed today.  Pharmacy name entered into EPIC:    [facility]    Clinical concerns:  Recheck ANAMIKA, review Colonosopy, upper endo & FIT testing.     7 minutes for nursing intake (face to face time)     Margie Bustillo CMA              "

## 2018-01-02 NOTE — MR AVS SNAPSHOT
After Visit Summary   1/2/2018    Mann Escobar    MRN: [id number]           Patient Information     Date Of Birth          1940        Visit Information        Provider Department      1/2/2018 10:45 AM Anne Marie Andrew MD [facility]        Today's Diagnoses     Iron deficiency anemia, unspecified iron deficiency anemia type    -  1      Care Instructions    Dr. Andrew would like you to have a small bowel capsule study done and schedule IV iron Injectafer weekly x2. We would like to see you back in 4-6 for a follow up appointment with labs prior. When you are in need of a refill, please call your pharmacy and they will send us a request.  Copy of appointments, and after visit summary (AVS) given to patient.  If you have any questions please call Jud Cadena RN, BSN Oncology Hematology  [facility] [phone]. For questions after business hours, or on holidays/weekends, please call our after hours Nurse Triage line [phone]. Thank you.         mall bowel capsule study.  IV iron Injectafer 750 mg wkly x2. F/u in 4-6 wks with labs.             Follow-ups after your visit        Your next 10 appointments already scheduled     Jan 08, 2018  1:30 PM CST   Level 1 with ROOM 9 [facility] ([facility])    [facility]  [address]  [facility] [address]   [phone]            Jonathan 15, 2018  1:30 PM CST   Level 1 with ROOM 3 [facility] ([facility])    [facility]  [address]  [facility] [address]   [phone]            Feb 02, 2018 11:00 AM CST   LAB with NB LAB   [facility] ([facility]    [address] [place] [id number] [phone]           Please do not eat 10-12 hours before your appointment if you are coming in fasting for labs on lipids,  "cholesterol, or glucose (sugar). This does not apply to pregnant women. Water, hot tea and black coffee (with nothing added) are okay. Do not drink other fluids, diet soda or chew gum.            Feb 06, 2018 11:00 AM CST   Return Visit with Anne Marie Andrew MD   [facility] ([facility])    [facility]  [address] [facility]  [facility] [address] [phone]              Future tests that were ordered for you today     Open Future Orders        Priority Expected Expires Ordered    VIDEO CAPSULE ENDOSCOPY Routine 1/2/2018 1/2/2019 1/2/2018    CBC with platelets differential Routine 2/1/2018 3/30/2018 1/2/2018    Ferritin Routine 2/1/2018 3/30/2018 1/2/2018            Who to contact     If you have questions or need follow up information about today's clinic visit or your schedule please contact [facility] directly at [phone].  Normal or non-critical lab and imaging results will be communicated to you by BeOnDeskhart, letter or phone within 4 business days after the clinic has received the results. If you do not hear from us within 7 days, please contact the clinic through Twisted Pair Solution[employer] or phone. If you have a critical or abnormal lab result, we will notify you by phone as soon as possible.  Submit refill requests through UpTap or call your pharmacy and they will forward the refill request to us. Please allow 3 business days for your refill to be completed.          Additional Information About Your Visit        UpTap Information     UpTap lets you send messages to your doctor, view your test results, renew your prescriptions, schedule appointments and more. To sign up, go to [email or web address] . Click on \"Log in\" on the left side of the screen, which will take you to the Welcome page. Then click on \"Sign up Now\" on the right side of the page.     You will be asked to enter the access code listed below, as well as some personal information. Please follow " "the directions to create your username and password.     Your access code is: [email or web address]  Expires: 2018 11:46 AM     Your access code will  in 90 days. If you need help or a new code, please call your [place] clinic or [phone].        Care EveryWhere ID     This is your Care EveryWhere ID. This could be used by other organizations to access your [place] medical records  [id number]        Your Vitals Were     Pulse Temperature Respirations Height Pulse Oximetry BMI (Body Mass Index)    70 98.6  F (37  C) (Tympanic) 16 1.575 m (5' 2.01\") 97% 33.83 kg/m2       Blood Pressure from Last 3 Encounters:   18 166/79   17 166/78   17 165/88    Weight from Last 3 Encounters:   18 83.9 kg (185 lb)   17 83.9 kg (185 lb)   17 81.6 kg (180 lb)               Primary Care Provider Office Phone # Fax #    Shayy Carvajal Adriel Ramírez, APRN [facility] [phone] [address] [address]        Equal Access to Services     CATRACHO MCBRIDE AH: Hadii petar ku hadasho Soomaali, waaxda luqadaha, qaybta kaalmada adeegyada, deny garcia hayroberton david morris . So [facility] [phone].    ATENCIÓN: Si habla español, tiene a hewitt disposición servicios gratuitos de asistencia lingüística. Llame al [phone].    We comply with applicable federal civil rights laws and [place] laws. We do not discriminate on the basis of race, color, national origin, age, disability, sex, sexual orientation, or gender identity.            Thank you!     Thank you for choosing [facility]  for your care. Our goal is always to provide you with excellent care. Hearing back from our patients is one way we can continue to improve our services. Please take a few minutes to complete the written survey that you may receive in the mail after your visit with us. Thank you!             Your Updated Medication List - Protect others around you: Learn how to safely use, store and throw away " your medicines at www.disposemymeds.org.          This list is accurate as of: 1/2/18 11:46 AM.  Always use your most recent med list.                   Brand Name Dispense Instructions for use Diagnosis    aspirin 81 MG EC tablet     90 tablet    Take 1 tablet (81 mg) by mouth daily        ferrous fumarate 65 mg ([place]. FE)-Vitamin C 125 mg  MG Tabs tablet    VITRON C    60 tablet    Take 1 tablet by mouth daily    Iron deficiency anemia, unspecified iron deficiency anemia type       HCA VITAMIN B12 500 MCG Tabs   Generic drug:  cyanocobalamin      2 tablets daily        MULTIVITAMIN PO      one daily        oxyCODONE IR 5 MG tablet   Start taking on:  2/4/2018    ROXICODONE    60 tablet    Take 1 tablet (5 mg) by mouth 2 times daily as needed for pain    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    Spondylolisthesis of lumbar region       terazosin 5 MG capsule    HYTRIN    90 capsule    Take 1 capsule (5 mg) by mouth At Bedtime    Benign non-nodular prostatic hyperplasia with lower urinary tract symptoms       triamterene-hydrochlorothiazide 75-50 MG per tablet    MAXZIDE    45 tablet    Take 0.5 tablets by mouth daily    Benign essential hypertension       VIACTIV PO      With D 1000mg calcium

## 2018-01-02 NOTE — LETTER
1/2/2018         RE: Mann Escobar  [address]  [facility] [address]        Dear Colleague,    Thank you for referring your patient, Mann Escobar, to the [facility]. Please see a copy of my visit note below.    Hematology follow up visit    CC: ANAMIKA on top of thalassemia minor     HISTORY OF PRESENT ILLNESS:  Mann Escobar is a 77-year-old gentleman who has a history of thalassemia minor and is aware of being anemic since his younger years.    He states he has been feeling fatigued for months in 2017.  He had a fall and syncope and was evaluated in the Emergency Room end of Oct 2017.      He was found to have a hemoglobin of 7.9 10/28/2017 and was noted to have significant microcytosis.  He also had leukocytosis at that time, normal platelet count at that time.    The patient also reports that he had an upper GI bleed in 2016 when an EGD and colonoscopy summer 2016 was performed, and there was nothing serious found.  He apparently was using lots of NSAIDs at that time. He s/p had 2 units PRBC.      Review of his labs indicates that his hemoglobin was close to 11 until 2010, and that was likely his baseline hemoglobin.      Since 2011, his hemoglobin has declined, his lisa hb was 7.3 in 2012.  Hemoglobin was 8.2 gm in early Nov 2017.       The patient does not have issues with any obvious bleeding, bruising.  He is not seeing any black-colored stools.     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 He became severely anemia since 2010 with hb down to less than 9 in 2017.   Upper endo and colonoscopy were negative in 12/2017.    He did not response well to oral vitron C, IV iron is offered in 1/2018.          PAST MEDICAL HISTORY:  Chronic back pain. OA, chronic pain syndrome from back, s/p L spine fusion, s/p bilateral knee replacement, BPH, gout.       MEDICATIONS:  Reviewed.        ALLERGIES:  Reviewed.       SOCIAL HISTORY:   "Retired currently.  He used to work at [employer] at [employer]. Nonsmoker, occasional alcohol intake.  He is .       FAMILY HISTORY:  Mother had colon cancer in her 80s.  Sister had breast cancer in her 60s and diseased from it.  Brother had colon cancer in his 40s. Father had prostate and bone cancer in his 70s.  No other family members with breast cancer.       REVIEW OF SYSTEMS  The patient does not have issues with any obvious bleeding, bruising.  He is not seeing any black-colored stools.   More fatigue, like to chew ice, which is less now since oral Vitron C intake.      PHYSICAL EXAMINATION:   VITAL SIGNS: Blood pressure 166/79, pulse 70, temperature 98.6  F (37  C), temperature source Tympanic, resp. rate 16, height 1.575 m (5' 2.01\"), weight 83.9 kg (185 lb), SpO2 97 %.    GENERAL APPEARANCE:  looks like her stated age, very pleasant, not in acute distress.   HEENT: The patient is normocephalic, atraumatic. Pupils are equally reactive to light.  Sclerae are anicteric.  Moist oral mucosa.  Negative pharynx.  No oral thrush.   NECK:  Supple.  No jugular venous distention.  Thyroid is not palpable.   LYMPH NODES:  Superficial lymphadenopathy is not appreciable in the bilateral cervical, supraclavicular, axillary or inguinal areas.   CARDIOVASCULAR:  S1, S2 regular with no murmurs or gallops.  No carotid or abdominal bruits.   PULMONARY:  Lungs are clear to auscultation and percussion bilaterally.  There is no wheezing or rhonchi.   GASTROINTESTINAL:  Abdomen is soft, nontender.  No hepatosplenomegaly.  No signs of ascites.  No mass appreciable.   MUSCULOSKELETAL/EXTREMITIES:  No edema.  No cyanotic changes.  No signs of joint deformity.  No lymphedema.   NEUROLOGIC:  Cranial nerves II-XII are grossly intact.  Sensation intact.  Muscle strength and muscle tone symmetrical, 5/5 throughout.   BACK:  No spinal or paraspinal tenderness.  No CVA tenderness.   SKIN:  No petechiae.  No rash.  No signs of " cellulitis.   BREASTS:  Bilateral breast exam revealed no mass or skin changes or nipple discharges.        CURRENT LAB DATA REVIEWED  12/2017 ferritin 8 from 7  12/2017 hb 7.8 from 8.2    EGD no bleeding found, gastric biopsy 12/2017 - no ca or H pylori.   Colonoscopy 12/2017 - nl.     11/2017 hb 8.2, MCV 50, wbc/PL nl. reti 0.4 low  Smear - The red cells are microcytic, hypochromic There is marked anisocytosis. There are occasional target cells and irregularly shaped fragmented red blood cells.  Wbc/PL are nl.  FIT: negative.    11/2017 hem work up -  MCKAYLA/hapto/TSH/LDH/spep  nl, folate B12 not low,  nl. Iron 15, iron sat 4%.   ferritin 7, epo 50, stfR 16, TfR index (sTFR/ferritin) is > 2 is consistent with ANAMIKA.     Hb electro: Hb A1 94.5% low, Hb A2 4.7% high, Hb Fnl.   An elevated Hb A2 level can be seen in beta thalassemia trait and rarely in unstable hemoglobin variants.      CURRENT IMAGING REVIEWED  US 11/2017: No evidence for hepatomegaly, spleen is borderline prominent 13.7 cm in length.       OLD DATA REVIEWED TODAY WITH SUMMARY:   Hemoglobin was close to 11 until 2010, and this was likely his baseline hemoglobin.  Since 2011, his hemoglobin has declined, his lisa hb was 7.3 in 2012.      ASSESSMENT AND PLAN:    1. Chronic microcytic anemia with baseline hb of 11 till 2010.   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     2. Newly developed severe anemia in 2017 overseveral years. Hematology work up 11/2017 is most suggestive of ANAMIKA.  He has hx of GIB and FH of colon cancer.     Recommend oral iron vitron C po bid on relative empty stomach. He has not not having good response. Recommend IV iron Injectafer 750 mg wkly x2.     He has negative upper endo and colonoscopy 12/2017.  Recommend small bowel capsule study.     Will f/u the work up and oral iron response.     Total time is 15 minutes, more than 10 minutes spent in counseling regarding his test results, working dx and further plan of  actions.     Again, thank you for allowing me to participate in the care of your patient.        Sincerely,        Anne Marie Andrew MD, MD

## 2018-01-05 ENCOUNTER — TELEPHONE (OUTPATIENT)
Dept: FAMILY MEDICINE | Facility: CLINIC | Age: 78
End: 2018-01-05

## 2018-01-05 DIAGNOSIS — K25.9 GASTRIC ULCER, UNSPECIFIED CHRONICITY, UNSPECIFIED WHETHER GASTRIC ULCER HEMORRHAGE OR PERFORATION PRESENT: Primary | ICD-10-CM

## 2018-01-05 NOTE — TELEPHONE ENCOUNTER
----- Message from Edmar Isaacs MD sent at 12/19/2017 12:33 PM CST -----  Regarding: endoscopy results  EGD and colonoscopy today revealed:    1. Esophagus normal  2. 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 Jejunal limbs normal  4. Diverticulosis of colon (very mild)  5. Colon otherwise normal    I would assume that the source of his anemia is the ulcer.  I don't see that he is on a PPI.  I'd try that first.    Thanks    SHAISTA Isaacs

## 2018-01-05 NOTE — TELEPHONE ENCOUNTER
Call patient.  Dr Isaacs sent me a note regarding patient's EGD  Ulcer in the stomach was found.    Recommend treating with a PPI (omeprazole) once daily for 3 months.    I signed a script for [employer] mail order - will need to be faxed.  Shayy Raímrez, CNP

## 2018-01-05 NOTE — TELEPHONE ENCOUNTER
Patient was called and is refusing this medication as he states he has many Prilosec and is taking it daily.   This Rx is shredded. He purchased many, many Prilosec in [place].

## 2018-01-08 ENCOUNTER — INFUSION THERAPY VISIT (OUTPATIENT)
Dept: INFUSION THERAPY | Facility: CLINIC | Age: 78
End: 2018-01-08
Attending: INTERNAL MEDICINE
Payer: MEDICARE

## 2018-01-08 VITALS — SYSTOLIC BLOOD PRESSURE: 201 MMHG | TEMPERATURE: 97.7 F | HEART RATE: 78 BPM | DIASTOLIC BLOOD PRESSURE: 71 MMHG

## 2018-01-08 DIAGNOSIS — D50.9 IRON DEFICIENCY ANEMIA, UNSPECIFIED IRON DEFICIENCY ANEMIA TYPE: Primary | ICD-10-CM

## 2018-01-08 PROCEDURE — 96365 THER/PROPH/DIAG IV INF INIT: CPT

## 2018-01-08 PROCEDURE — 25000128 H RX IP 250 OP 636: Performed by: INTERNAL MEDICINE

## 2018-01-08 RX ADMIN — FERRIC CARBOXYMALTOSE INJECTION 750 MG: 50 INJECTION, SOLUTION INTRAVENOUS at 14:17

## 2018-01-08 RX ADMIN — SODIUM CHLORIDE 250 ML: 9 INJECTION, SOLUTION INTRAVENOUS at 14:08

## 2018-01-08 NOTE — PROGRESS NOTES
Infusion Nursing Note:  Mann Escobar presents today for Ferrlicit.    Patient seen by provider today: No   present during visit today: Not Applicable.    Note: N/A.    Intravenous Access:  Peripheral IV placed.    Treatment Conditions:  Not Applicable.      Post Infusion Assessment:  Patient tolerated infusion without incident.  Pt observed for 30 minutes following the iron infusion.    Discharge Plan:   Pt is advised to consult his primary care provider as soon as possible about elevated BP.  Patient discharged in stable condition accompanied by: self.  Departure Mode: Ambulatory.    Eri Moran RN

## 2018-01-08 NOTE — MR AVS SNAPSHOT
After Visit Summary   1/8/2018    Mann Escobar    MRN: [id number]           Patient Information     Date Of Birth          1940        Visit Information        Provider Department      1/8/2018 1:30 PM ROOM 9 [facility]        Today's Diagnoses     Iron deficiency anemia, unspecified iron deficiency anemia type    -  1       Follow-ups after your visit        Your next 10 appointments already scheduled     Jonathan 15, 2018  1:30 PM CST   Level 1 with ROOM 3 [facility] ([facility])    [address] [address]   [phone]            Feb 02, 2018 11:00 AM CST   LAB with NB LAB   [facility]    [address] [place] [address] [phone]           Please do not eat 10-12 hours before your appointment if you are coming in fasting for labs on lipids, cholesterol, or glucose (sugar). This does not apply to pregnant women. Water, hot tea and black coffee (with nothing added) are okay. Do not drink other fluids, diet soda or chew gum.            Feb 06, 2018 11:00 AM CST   Return Visit with Anne Marie Andrew MD   [facility] ([facility])    [address] [address]   [phone]              Who to contact     If you have questions or need follow up information about today's clinic visit or your schedule please contact [facility] directly at [phone].  Normal or non-critical lab and imaging results will be communicated to you by MyChart, letter or phone within 4 business days after the clinic has received the results. If you do not hear from us within 7 days, please contact the clinic through MyChart or phone. If you have a critical or abnormal lab result, we will notify you by phone as soon as possible.  Submit refill requests through  "MyChart or call your pharmacy and they will forward the refill request to us. Please allow 3 business days for your refill to be completed.          Additional Information About Your Visit        MyChart Information     BigRept lets you send messages to your doctor, view your test results, renew your prescriptions, schedule appointments and more. To sign up, go to [email or web address] . Click on \"Log in\" on the left side of the screen, which will take you to the Welcome page. Then click on \"Sign up Now\" on the right side of the page.     You will be asked to enter the access code listed below, as well as some personal information. Please follow the directions to create your username and password.     Your access code is: [email or web address]  Expires: 2018 11:46 AM     Your access code will  in 90 days. If you need help or a new code, please call your [place] clinic or [phone].        Care EveryWhere ID     This is your Care EveryWhere ID. This could be used by other organizations to access your [place] medical records  [id number]        Your Vitals Were     Temperature                   97.7  F (36.5  C) (Oral)            Blood Pressure from Last 3 Encounters:   18 166/79   17 166/78   17 165/88    Weight from Last 3 Encounters:   18 83.9 kg (185 lb)   17 83.9 kg (185 lb)   17 81.6 kg (180 lb)              Today, you had the following     No orders found for display       Primary Care Provider Office Phone # Fax #    Shayy Wei Ramírez, APRN [facility] [phone] [address] [facility] [address]        Equal Access to Services     CATRACHO MCBRIDE : Roya Samuels, mario zamarripa, larissa blanco, deny benjamin. So [facility] [phone].    ATENCIÓN: Si habla español, tiene a hewtit disposición servicios gratuitos de asistencia lingüística. Llame al [phone].    We comply with applicable " federal civil rights laws and [place] laws. We do not discriminate on the basis of race, color, national origin, age, disability, sex, sexual orientation, or gender identity.            Thank you!     Thank you for choosing [facility]  for your care. Our goal is always to provide you with excellent care. Hearing back from our patients is one way we can continue to improve our services. Please take a few minutes to complete the written survey that you may receive in the mail after your visit with us. Thank you!             Your Updated Medication List - Protect others around you: Learn how to safely use, store and throw away your medicines at www.disposemymeds.org.          This list is accurate as of: 1/8/18  4:16 PM.  Always use your most recent med list.                   Brand Name Dispense Instructions for use Diagnosis    aspirin 81 MG EC tablet     90 tablet    Take 1 tablet (81 mg) by mouth daily        ferrous fumarate 65 mg ([place]. FE)-Vitamin C 125 mg  MG Tabs tablet    VITRON C    60 tablet    Take 1 tablet by mouth daily    Iron deficiency anemia, unspecified iron deficiency anemia type       HCA VITAMIN B12 500 MCG Tabs   Generic drug:  cyanocobalamin      2 tablets daily        MULTIVITAMIN PO      one daily        omeprazole 20 MG CR capsule    priLOSEC    90 capsule    Take 1 capsule (20 mg) by mouth daily    Gastric ulcer, unspecified chronicity, unspecified whether gastric ulcer hemorrhage or perforation present       oxyCODONE IR 5 MG tablet   Start taking on:  2/4/2018    ROXICODONE    60 tablet    Take 1 tablet (5 mg) by mouth 2 times daily as needed for pain    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    Spondylolisthesis of lumbar region       terazosin 5 MG capsule    HYTRIN    90 capsule    Take 1 capsule (5  mg) by mouth At Bedtime    Benign non-nodular prostatic hyperplasia with lower urinary tract symptoms       triamterene-hydrochlorothiazide 75-50 MG per tablet    MAXZIDE    45 tablet    Take 0.5 tablets by mouth daily    Benign essential hypertension       VIACTIV PO      With D 1000mg calcium

## 2018-01-09 ENCOUNTER — TELEPHONE (OUTPATIENT)
Dept: FAMILY MEDICINE | Facility: CLINIC | Age: 78
End: 2018-01-09

## 2018-01-09 ENCOUNTER — ALLIED HEALTH/NURSE VISIT (OUTPATIENT)
Dept: FAMILY MEDICINE | Facility: CLINIC | Age: 78
End: 2018-01-09
Payer: COMMERCIAL

## 2018-01-09 VITALS — DIASTOLIC BLOOD PRESSURE: 85 MMHG | OXYGEN SATURATION: 99 % | SYSTOLIC BLOOD PRESSURE: 173 MMHG | HEART RATE: 69 BPM

## 2018-01-09 DIAGNOSIS — Z01.30 BLOOD PRESSURE CHECK: Primary | ICD-10-CM

## 2018-01-09 PROCEDURE — 99207 ZZC NO CHARGE NURSE ONLY: CPT

## 2018-01-09 NOTE — TELEPHONE ENCOUNTER
Pt in clinic today with concerns about BP readings yesterday during iron infusion:    Vital Signs 1/8/2018 1/8/2018   Systolic 196 201   Diastolic 74 71   Pulse 77 78   Temperature 97.7      Vitals today:    Vitals:    01/09/18 0950 01/09/18 0953   BP: 174/78 173/85   BP Location: Right arm Right arm   Patient Position: Sitting Sitting   Cuff Size: Adult Large Adult Regular   Pulse: 69    SpO2: 99%      Pt denies symptoms of hypertension including HA, blurry vision, numbness/tingling or chest pain.  Jerica RAZO RN

## 2018-01-09 NOTE — MR AVS SNAPSHOT
After Visit Summary   1/9/2018    Mann Escobar    MRN: [id number]           Patient Information     Date Of Birth          1940        Visit Information        Provider Department      1/9/2018 9:45 AM JV VENTURA/HALEY RESENDIZ [facility]        Today's Diagnoses     Blood pressure check    -  1       Follow-ups after your visit        Your next 10 appointments already scheduled     Jonathan 15, 2018  1:30 PM CST   Level 1 with ROOM 3 [facility] ([facility])    [facility]  [address]  [facility] [address]   [phone]            Feb 02, 2018 11:00 AM CST   LAB with NB LAB   [facility] ([facility])    [address] [place] [address]   [phone]           Please do not eat 10-12 hours before your appointment if you are coming in fasting for labs on lipids, cholesterol, or glucose (sugar). This does not apply to pregnant women. Water, hot tea and black coffee (with nothing added) are okay. Do not drink other fluids, diet soda or chew gum.            Feb 06, 2018 11:00 AM CST   Return Visit with Anne Marie Andrew MD   [facility] ([facility])    [facility]  [address] [address]   [phone]              Who to contact     If you have questions or need follow up information about today's clinic visit or your schedule please contact [facility] directly at [phone].  Normal or non-critical lab and imaging results will be communicated to you by MyChart, letter or phone within 4 business days after the clinic has received the results. If you do not hear from us within 7 days, please contact the clinic through MyChart or phone. If you have a critical or abnormal lab result, we will notify you by phone as soon as possible.  Submit refill requests through [employer] or call your pharmacy  "and they will forward the refill request to us. Please allow 3 business days for your refill to be completed.          Additional Information About Your Visit        MyChart Information     [employer] lets you send messages to your doctor, view your test results, renew your prescriptions, schedule appointments and more. To sign up, go to [email or web address] . Click on \"Log in\" on the left side of the screen, which will take you to the Welcome page. Then click on \"Sign up Now\" on the right side of the page.     You will be asked to enter the access code listed below, as well as some personal information. Please follow the directions to create your username and password.     Your access code is: [email or web address]  Expires: 2018 11:46 AM     Your access code will  in 90 days. If you need help or a new code, please call your [place] clinic or [phone].        Care EveryWhere ID     This is your Care EveryWhere ID. This could be used by other organizations to access your [place] medical records  [id number]        Your Vitals Were     Pulse Pulse Oximetry                69 99%           Blood Pressure from Last 3 Encounters:   18 173/85   18 (!) 201/71   18 166/79    Weight from Last 3 Encounters:   18 185 lb (83.9 kg)   17 185 lb (83.9 kg)   17 180 lb (81.6 kg)              Today, you had the following     No orders found for display       Primary Care Provider Office Phone # Fax #    Shayy GUNJAN Armendariz [facility] [phone] [address] [facility] [address]        Equal Access to Services     MITRA MCBRIDE : Hadii petar Samuels, wahusamda lumanuelitoadaha, qaybta kaalmada francis, deny benjamin. So [facility] [phone].    ATENCIÓN: Si habla español, tiene a hewitt disposición servicios gratuitos de asistencia lingüística. Llame al [phone].    We comply with applicable federal civil rights laws and [place] laws. " We do not discriminate on the basis of race, color, national origin, age, disability, sex, sexual orientation, or gender identity.            Thank you!     Thank you for choosing [facility]  for your care. Our goal is always to provide you with excellent care. Hearing back from our patients is one way we can continue to improve our services. Please take a few minutes to complete the written survey that you may receive in the mail after your visit with us. Thank you!             Your Updated Medication List - Protect others around you: Learn how to safely use, store and throw away your medicines at www.disposemymeds.org.          This list is accurate as of: 1/9/18 10:15 AM.  Always use your most recent med list.                   Brand Name Dispense Instructions for use Diagnosis    aspirin 81 MG EC tablet     90 tablet    Take 1 tablet (81 mg) by mouth daily        ferrous fumarate 65 mg ([place]. FE)-Vitamin C 125 mg  MG Tabs tablet    VITRON C    60 tablet    Take 1 tablet by mouth daily    Iron deficiency anemia, unspecified iron deficiency anemia type       HCA VITAMIN B12 500 MCG Tabs   Generic drug:  cyanocobalamin      2 tablets daily        MULTIVITAMIN PO      one daily        omeprazole 20 MG CR capsule    priLOSEC    90 capsule    Take 1 capsule (20 mg) by mouth daily    Gastric ulcer, unspecified chronicity, unspecified whether gastric ulcer hemorrhage or perforation present       oxyCODONE IR 5 MG tablet   Start taking on:  2/4/2018    ROXICODONE    60 tablet    Take 1 tablet (5 mg) by mouth 2 times daily as needed for pain    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    Spondylolisthesis of lumbar region       terazosin 5 MG capsule    HYTRIN    90 capsule    Take 1 capsule (5 mg) by mouth At Bedtime    Benign  non-nodular prostatic hyperplasia with lower urinary tract symptoms       triamterene-hydrochlorothiazide 75-50 MG per tablet    MAXZIDE    45 tablet    Take 0.5 tablets by mouth daily    Benign essential hypertension       VIACTIV PO      With D 1000mg calcium

## 2018-01-12 ENCOUNTER — DOCUMENTATION ONLY (OUTPATIENT)
Dept: ONCOLOGY | Facility: CLINIC | Age: 78
End: 2018-01-12

## 2018-01-12 DIAGNOSIS — D50.9 IRON DEFICIENCY ANEMIA, UNSPECIFIED IRON DEFICIENCY ANEMIA TYPE: Primary | ICD-10-CM

## 2018-01-12 NOTE — PROGRESS NOTES
"Pt called to report he went to have the video swallow that was advised per Dr. Andrew at [facility] and the MD there is requesting the pt to have another EGD done prior to video swallow due to findings on the previous EGD in December 2017. Pt called his insurance to see if this would be covered so close and they stated he needs to have the provider send in an \"Organizational Determination Form\". Pt would like to have the EGD done here at [facility]. Form has been filled out and sent in for review with recommendations from Dr. Boogie at [facility]. Will await there review decision.   "

## 2018-01-15 ENCOUNTER — INFUSION THERAPY VISIT (OUTPATIENT)
Dept: INFUSION THERAPY | Facility: CLINIC | Age: 78
End: 2018-01-15
Attending: INTERNAL MEDICINE
Payer: MEDICARE

## 2018-01-15 VITALS — HEART RATE: 64 BPM | SYSTOLIC BLOOD PRESSURE: 172 MMHG | DIASTOLIC BLOOD PRESSURE: 82 MMHG

## 2018-01-15 DIAGNOSIS — D50.9 IRON DEFICIENCY ANEMIA, UNSPECIFIED IRON DEFICIENCY ANEMIA TYPE: Primary | ICD-10-CM

## 2018-01-15 PROCEDURE — 25000128 H RX IP 250 OP 636: Performed by: INTERNAL MEDICINE

## 2018-01-15 PROCEDURE — 96374 THER/PROPH/DIAG INJ IV PUSH: CPT

## 2018-01-15 RX ADMIN — SODIUM CHLORIDE 250 ML: 9 INJECTION, SOLUTION INTRAVENOUS at 13:54

## 2018-01-15 RX ADMIN — FERRIC CARBOXYMALTOSE INJECTION 750 MG: 50 INJECTION, SOLUTION INTRAVENOUS at 13:55

## 2018-01-15 NOTE — MR AVS SNAPSHOT
After Visit Summary   1/15/2018    Mann Escobar    MRN: [id number]           Patient Information     Date Of Birth          1940        Visit Information        Provider Department      1/15/2018 1:30 PM ROOM 3 [facility]        Today's Diagnoses     Iron deficiency anemia, unspecified iron deficiency anemia type    -  1       Follow-ups after your visit        Your next 10 appointments already scheduled     Jan 16, 2018 11:00 AM CST   SHORT with GUNJAN Villarreal CNP   [facility] ([facility])    [address]  [facility] [address]   [phone]            Feb 02, 2018 11:00 AM CST   LAB with NB LAB   [facility] ([facility]    [address] [place] [address] [phone]           Please do not eat 10-12 hours before your appointment if you are coming in fasting for labs on lipids, cholesterol, or glucose (sugar). This does not apply to pregnant women. Water, hot tea and black coffee (with nothing added) are okay. Do not drink other fluids, diet soda or chew gum.            Feb 06, 2018 11:00 AM CST   Return Visit with Anne Marie Andrew MD   [facility] ([facility])    [facility]  [address] [facility] [address]  [facility] [address]   [phone]              Who to contact     If you have questions or need follow up information about today's clinic visit or your schedule please contact [facility] directly at [phone].  Normal or non-critical lab and imaging results will be communicated to you by MyChart, letter or phone within 4 business days after the clinic has received the results. If you do not hear from us within 7 days, please contact the clinic through MyChart or phone. If you have a critical or abnormal lab result, we will notify you by phone as soon as possible.  Submit refill requests through  "MyChart or call your pharmacy and they will forward the refill request to us. Please allow 3 business days for your refill to be completed.          Additional Information About Your Visit        MyChart Information     [employer] lets you send messages to your doctor, view your test results, renew your prescriptions, schedule appointments and more. To sign up, go to [email or web address] . Click on \"Log in\" on the left side of the screen, which will take you to the Welcome page. Then click on \"Sign up Now\" on the right side of the page.     You will be asked to enter the access code listed below, as well as some personal information. Please follow the directions to create your username and password.     Your access code is: [email or web address]  Expires: 2018 11:46 AM     Your access code will  in 90 days. If you need help or a new code, please call your [place] clinic or [phone].        Care EveryWhere ID     This is your Care EveryWhere ID. This could be used by other organizations to access your [place] medical records  [id number]        Your Vitals Were     Pulse                   64            Blood Pressure from Last 3 Encounters:   01/15/18 172/82   18 173/85   18 (!) 201/71    Weight from Last 3 Encounters:   18 83.9 kg (185 lb)   17 83.9 kg (185 lb)   17 81.6 kg (180 lb)              Today, you had the following     No orders found for display       Primary Care Provider Office Phone # Fax #    Shayy GUNJAN Armendariz [facility] [phone] [address] [facility] [address]        Equal Access to Services     MITRA MCBRIDE : Hadii petar Samuels, wahusamda luqadaha, qaybta kaalmada francis, deny benjamin. So [facility] [phone].    ATENCIÓN: Si habla español, tiene a hewitt disposición servicios gratuitos de asistencia lingüística. Llame al [phone].    We comply with applicable federal civil rights laws and " [place] laws. We do not discriminate on the basis of race, color, national origin, age, disability, sex, sexual orientation, or gender identity.            Thank you!     Thank you for choosing [facility]  for your care. Our goal is always to provide you with excellent care. Hearing back from our patients is one way we can continue to improve our services. Please take a few minutes to complete the written survey that you may receive in the mail after your visit with us. Thank you!             Your Updated Medication List - Protect others around you: Learn how to safely use, store and throw away your medicines at www.disposemymeds.org.          This list is accurate as of: 1/15/18  3:14 PM.  Always use your most recent med list.                   Brand Name Dispense Instructions for use Diagnosis    aspirin 81 MG EC tablet     90 tablet    Take 1 tablet (81 mg) by mouth daily        ferrous fumarate 65 mg ([place]. FE)-Vitamin C 125 mg  MG Tabs tablet    VITRON C    60 tablet    Take 1 tablet by mouth daily    Iron deficiency anemia, unspecified iron deficiency anemia type       HCA VITAMIN B12 500 MCG Tabs   Generic drug:  cyanocobalamin      2 tablets daily        MULTIVITAMIN PO      one daily        omeprazole 20 MG CR capsule    priLOSEC    90 capsule    Take 1 capsule (20 mg) by mouth daily    Gastric ulcer, unspecified chronicity, unspecified whether gastric ulcer hemorrhage or perforation present       oxyCODONE IR 5 MG tablet   Start taking on:  2/4/2018    ROXICODONE    60 tablet    Take 1 tablet (5 mg) by mouth 2 times daily as needed for pain    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    Spondylolisthesis of lumbar region       terazosin 5 MG capsule    HYTRIN    90 capsule    Take 1 capsule (5 mg) by mouth At Bedtime     Benign non-nodular prostatic hyperplasia with lower urinary tract symptoms       triamterene-hydrochlorothiazide 75-50 MG per tablet    MAXZIDE    45 tablet    Take 0.5 tablets by mouth daily    Benign essential hypertension       VIACTIV PO      With D 1000mg calcium

## 2018-01-15 NOTE — PROGRESS NOTES
Infusion Nursing Note:  Mann Escobar presents today for IV Injectafer.    Patient seen by provider today: No   present during visit today: Not Applicable.    Note: IV Injectafer infused over 15 minutes without complications. Pt. Observed for 30 minutes after the infusion was finished. No reaction noted. Pt. To return on 2/6/18 for an MD appt. With Dr. Andrew.    Intravenous Access:  No Intravenous access/labs at this visit.  Peripheral IV placed.    Treatment Conditions:  Not Applicable.      Post Infusion Assessment:  Patient tolerated infusion without incident.  Patient observed for 30 minutes post injectafer per protocol.  Blood return noted pre and post infusion.  Site patent and intact, free from redness, edema or discomfort.  No evidence of extravasations.  Access discontinued per protocol.    Discharge Plan:   Patient and/or family verbalized understanding of discharge instructions and all questions answered.  Patient discharged in stable condition accompanied by: self.  Departure Mode: Ambulatory.    Laverne Medina RN

## 2018-01-16 ENCOUNTER — OFFICE VISIT (OUTPATIENT)
Dept: FAMILY MEDICINE | Facility: CLINIC | Age: 78
End: 2018-01-16
Payer: COMMERCIAL

## 2018-01-16 VITALS
SYSTOLIC BLOOD PRESSURE: 146 MMHG | DIASTOLIC BLOOD PRESSURE: 78 MMHG | WEIGHT: 185 LBS | RESPIRATION RATE: 16 BRPM | HEART RATE: 68 BPM | HEIGHT: 62 IN | BODY MASS INDEX: 34.04 KG/M2 | TEMPERATURE: 98.1 F

## 2018-01-16 DIAGNOSIS — I10 BENIGN ESSENTIAL HYPERTENSION: Primary | ICD-10-CM

## 2018-01-16 PROCEDURE — 99214 OFFICE O/P EST MOD 30 MIN: CPT | Performed by: NURSE PRACTITIONER

## 2018-01-16 RX ORDER — LISINOPRIL 10 MG/1
10 TABLET ORAL DAILY
Qty: 30 TABLET | Refills: 1 | Status: SHIPPED | OUTPATIENT
Start: 2018-01-16 | End: 2018-03-02

## 2018-01-16 NOTE — NURSING NOTE
"Chief Complaint   Patient presents with     Hypertension     Recheck.  Patient did bring in home readings that range from 148-192 / 65-88.       Initial /78  Pulse 68  Temp 98.1  F (36.7  C) (Tympanic)  Resp 16  Ht 5' 2\" (1.575 m)  Wt 185 lb (83.9 kg)  BMI 33.84 kg/m2 Estimated body mass index is 33.84 kg/(m^2) as calculated from the following:    Height as of this encounter: 5' 2\" (1.575 m).    Weight as of this encounter: 185 lb (83.9 kg).    Medication Reconciliation:  complete    Jill Jacob CMA (AAMA)  "

## 2018-01-16 NOTE — MR AVS SNAPSHOT
After Visit Summary   1/16/2018    Mann Escobar    MRN: [id number]           Patient Information     Date Of Birth          1940        Visit Information        Provider Department      1/16/2018 11:00 AM Shayy Ramírez APRN CNP [facility]        Today's Diagnoses     Benign essential hypertension    -  1       Follow-ups after your visit        Your next 10 appointments already scheduled     Feb 02, 2018 11:00 AM CST   LAB with NB LAB   [facility] ([facility])    [address] [place] [address]   [phone]           Please do not eat 10-12 hours before your appointment if you are coming in fasting for labs on lipids, cholesterol, or glucose (sugar). This does not apply to pregnant women. Water, hot tea and black coffee (with nothing added) are okay. Do not drink other fluids, diet soda or chew gum.            Feb 06, 2018 11:00 AM CST   Return Visit with Anne Marie Andrew MD   [facility] ([facility])    [facility]  [address] [facility] [address]  [facility] [address]   [phone]              Who to contact     If you have questions or need follow up information about today's clinic visit or your schedule please contact [facility] directly at [phone].  Normal or non-critical lab and imaging results will be communicated to you by MyChart, letter or phone within 4 business days after the clinic has received the results. If you do not hear from us within 7 days, please contact the clinic through MyChart or phone. If you have a critical or abnormal lab result, we will notify you by phone as soon as possible.  Submit refill requests through [employer] or call your pharmacy and they will forward the refill request to us. Please allow 3 business days for your refill to be completed.          Additional Information About Your Visit        MyChart  "Information     [employer] lets you send messages to your doctor, view your test results, renew your prescriptions, schedule appointments and more. To sign up, go to [email or web address] . Click on \"Log in\" on the left side of the screen, which will take you to the Welcome page. Then click on \"Sign up Now\" on the right side of the page.     You will be asked to enter the access code listed below, as well as some personal information. Please follow the directions to create your username and password.     Your access code is: [email or web address]  Expires: 2018 11:46 AM     Your access code will  in 90 days. If you need help or a new code, please call your [place] clinic or [phone].        Care EveryWhere ID     This is your Care EveryWhere ID. This could be used by other organizations to access your [place] medical records  [id number]        Your Vitals Were     Pulse Temperature Respirations Height BMI (Body Mass Index)       68 98.1  F (36.7  C) (Tympanic) 16 5' 2\" (1.575 m) 33.84 kg/m2        Blood Pressure from Last 3 Encounters:   18 146/78   01/15/18 172/82   18 173/85    Weight from Last 3 Encounters:   18 185 lb (83.9 kg)   18 185 lb (83.9 kg)   17 185 lb (83.9 kg)              Today, you had the following     No orders found for display         Today's Medication Changes          These changes are accurate as of: 18 11:59 PM.  If you have any questions, ask your nurse or doctor.               Start taking these medicines.        Dose/Directions    lisinopril 10 MG tablet   Commonly known as:  PRINIVIL/ZESTRIL   Used for:  Benign essential hypertension        Dose:  10 mg   Take 1 tablet (10 mg) by mouth daily   Quantity:  30 tablet   Refills:  1            Where to get your medicines      These medications were sent to [facility] [address] [place] [address] [address] [facility]  [address] [place] [address]     Phone:  [phone]     lisinopril " 10 MG tablet                Primary Care Provider Office Phone # Fax #    Shayy Ramírez, GUNJAN [facility] [phone] [address] [facility] [address]        Equal Access to Services     CATRACHO MCBRIDE : Hadii aad ku hadrenitao Somellali, waaxda luqadaha, qaybta kaalmada adeegyada, deny maurern daynatara maldonado alexandra benjamin. So [facility] [phone].    ATENCIÓN: Si habla español, tiene a hewitt disposición servicios gratuitos de asistencia lingüística. Llame al [phone].    We comply with applicable federal civil rights laws and [place] laws. We do not discriminate on the basis of race, color, national origin, age, disability, sex, sexual orientation, or gender identity.            Thank you!     Thank you for choosing [facility]  for your care. Our goal is always to provide you with excellent care. Hearing back from our patients is one way we can continue to improve our services. Please take a few minutes to complete the written survey that you may receive in the mail after your visit with us. Thank you!             Your Updated Medication List - Protect others around you: Learn how to safely use, store and throw away your medicines at www.disposemymeds.org.          This list is accurate as of: 1/16/18 11:59 PM.  Always use your most recent med list.                   Brand Name Dispense Instructions for use Diagnosis    aspirin 81 MG EC tablet     90 tablet    Take 1 tablet (81 mg) by mouth daily        ferrous fumarate 65 mg ([place]. FE)-Vitamin C 125 mg  MG Tabs tablet    VITRON C    60 tablet    Take 1 tablet by mouth daily    Iron deficiency anemia, unspecified iron deficiency anemia type       HCA VITAMIN B12 500 MCG Tabs   Generic drug:  cyanocobalamin      2 tablets daily        lisinopril 10 MG tablet    PRINIVIL/ZESTRIL    30 tablet    Take 1 tablet (10 mg) by mouth daily    Benign essential hypertension       MULTIVITAMIN PO      one daily        omeprazole 20 MG  CR capsule    priLOSEC    90 capsule    Take 1 capsule (20 mg) by mouth daily    Gastric ulcer, unspecified chronicity, unspecified whether gastric ulcer hemorrhage or perforation present       oxyCODONE IR 5 MG tablet   Start taking on:  2/4/2018    ROXICODONE    60 tablet    Take 1 tablet (5 mg) by mouth 2 times daily as needed for pain    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    Spondylolisthesis of lumbar region       terazosin 5 MG capsule    HYTRIN    90 capsule    Take 1 capsule (5 mg) by mouth At Bedtime    Benign non-nodular prostatic hyperplasia with lower urinary tract symptoms       triamterene-hydrochlorothiazide 75-50 MG per tablet    MAXZIDE    45 tablet    Take 0.5 tablets by mouth daily    Benign essential hypertension       VIACTIV PO      With D 1000mg calcium

## 2018-01-16 NOTE — PROGRESS NOTES
"    SUBJECTIVE:   Mann Escobar is a 77 year old male who presents to clinic today for the following health issues:    Hypertension Follow-up       Outpatient blood pressures are being checked at home.  Results are 148-192 / 65-88.    Low Salt Diet: not monitoring salt    Amount of exercise or physical activity: None    Problems taking medications regularly: No    Medication side effects: none    Diet: regular (no restrictions)     -Currently taking 1/2 pill of Maxide, pt unsure if it is 75 mg total. Has not taken this medication for the past 2 days.   -ASA 81 mg taken each day   -[facility], unsure which medication it was.   -Recently starting OTC pill for BPH      Reviewed and updated as needed this visit by clinical staffAllergies       Reviewed and updated as needed this visit by Provider           ROS:  C: NEGATIVE for fever, chills, change in weight  INTEGUMENTARY/SKIN: NEGATIVE for worrisome rashes, moles or lesions  HENT: No vision changes   R: NEGATIVE for significant cough or SOB  CV: NEGATIVE for chest pain, palpitations or peripheral edema  GI: NEGATIVE for nausea, abdominal pain, heartburn, or change in bowel habits  : negative for dysuria, hematuria, decreased urinary stream, erectile dysfunction  ROS otherwise negative      Reviewed and updated as needed this visit by clinical staff  Allergies       Reviewed and updated as needed this visit by Provider           OBJECTIVE:     /78  Pulse 68  Temp 98.1  F (36.7  C) (Tympanic)  Resp 16  Ht 5' 2\" (1.575 m)  Wt 185 lb (83.9 kg)  BMI 33.84 kg/m2  Body mass index is 33.84 kg/(m^2).     GENERAL: healthy, alert and no distress  RESP: lungs clear to auscultation - no rales, rhonchi or wheezes  CV: regular rate and rhythm, normal S1 S2, no S3 or S4, no murmur, click or rub. 1+ non pitting peripheral edema of the left lower extremity. Peripheral radial pulses 2+ bilaterally and PT pulses " 1+.   PSYCH: mentation appears normal, affect normal/bright    Diagnostic Test Results:  none     Reviewed last CMP from 11/10/17 of GFR 51 (low) and Cr 1.35 mg/dL. Calculated CrCl today 48.     ASSESSMENT/PLAN:     Benign essential hypertension  (primary encounter diagnosis)  -Pt BP today 146/78 with prior elevated readings in clinic of 173/85 on 1/9 /18 and 165/88 on 12/5/17. Multiple in clinic and at home elevated BP meets diagnosis of HTN. Pt goal BP <140/90. Pt currently denies vision changes, chest pain, migraines or SOB.   -Informed of diagnosis and pt reports he was [facility] that significantly improved his at home readings.   -Continue ASA 81 mg daily and Maxzide 75 mg daily. Recommended pt to return for free nurse BP re check and to bring in his BP cuff to compare values obtained. Low salt, low fat diet, increased exercise. Continue at home BP checks and wearing compression socks.   Plan: lisinopril (PRINIVIL/ZESTRIL) 10 MG tablet daily.       Follow up: In 1 month for medication refills and to assess how BP is going. Sooner if side effects are noticed with the medication or there is acute chest pain or increased edema. Will repeat BMP with follow up visit in 4 weeks.     Pt understands and is in agreement to plan.     BELLO JansenS

## 2018-01-16 NOTE — PROGRESS NOTES
"  SUBJECTIVE:   Mann Escobar is a 77 year old male who presents to clinic today for the following health issues:      Hypertension Follow-up      Outpatient blood pressures are being checked at home.  Results are 148-192 / 65-88.    Low Salt Diet: not monitoring salt        Amount of exercise or physical activity: None    Problems taking medications regularly: No    Medication side effects: none    Diet: regular (no restrictions)    -Currently taking 1/2 pill of Maxide, pt unsure if it is 75 mg total. Has not taken this medication for the past 2 days.   -ASA 81 mg taken each day   -[facility], unsure which medication it was.   -Recently starting OTC pill for BPH     Reviewed and updated as needed this visit by clinical staffAllergies       Reviewed and updated as needed this visit by Provider         ROS:  C: NEGATIVE for fever, chills, change in weight  INTEGUMENTARY/SKIN: NEGATIVE for worrisome rashes, moles or lesions  R: NEGATIVE for significant cough or SOB  CV: NEGATIVE for chest pain, palpitations or peripheral edema  GI: NEGATIVE for nausea, abdominal pain, heartburn, or change in bowel habits  : negative for dysuria, hematuria, decreased urinary stream, erectile dysfunction  ROS otherwise negative    OBJECTIVE:     /78  Pulse 68  Temp 98.1  F (36.7  C) (Tympanic)  Resp 16  Ht 5' 2\" (1.575 m)  Wt 185 lb (83.9 kg)  BMI 33.84 kg/m2  Body mass index is 33.84 kg/(m^2).  GENERAL: healthy, alert and no distress  NECK: no adenopathy, no asymmetry, masses, or scars and thyroid normal to palpation  RESP: lungs clear to auscultation - no rales, rhonchi or wheezes  CV: regular rate and rhythm, normal S1 S2, no S3 or S4, no murmur, click or rub, no peripheral edema and peripheral pulses strong      ASSESSMENT/PLAN:       ICD-10-CM    1. Benign essential hypertension I10 lisinopril (PRINIVIL/ZESTRIL) 10 MG tablet     Poorly controlled.  Add " lisinopril.  Follow up in one month.    The risks, benefits and treatment options of prescribed medications or other treatments have been discussed with the patient. The patient verbalized their understanding and should call or follow up if no improvement or if they develop further problems.    GUNJAN Vinson [facility]

## 2018-01-17 NOTE — PROGRESS NOTES
Called [employer] to see what the status of the Organization Determination was. They report the EGD has been approved as of 1/12/18 from 1/5/2018-7/5/2018.     Called to update pt, left message for a return call.

## 2018-02-01 DIAGNOSIS — D50.9 IRON DEFICIENCY ANEMIA, UNSPECIFIED IRON DEFICIENCY ANEMIA TYPE: ICD-10-CM

## 2018-02-01 LAB
ACANTHOCYTES BLD QL SMEAR: SLIGHT
ANISOCYTOSIS BLD QL SMEAR: ABNORMAL
BASOPHILS # BLD AUTO: 0.1 10E9/L (ref 0–0.2)
BASOPHILS NFR BLD AUTO: 0.7 %
DACRYOCYTES BLD QL SMEAR: SLIGHT
DIFFERENTIAL METHOD BLD: ABNORMAL
EOSINOPHIL # BLD AUTO: 0.1 10E9/L (ref 0–0.7)
EOSINOPHIL NFR BLD AUTO: 1.7 %
ERYTHROCYTE [DISTWIDTH] IN BLOOD BY AUTOMATED COUNT: 25.3 % (ref 10–15)
FERRITIN SERPL-MCNC: 222 NG/ML (ref 26–388)
HCT VFR BLD AUTO: 31.3 % (ref 40–53)
HGB BLD-MCNC: 9.9 G/DL (ref 13.3–17.7)
HYPOCHROMIA BLD QL: PRESENT
IMM GRANULOCYTES # BLD: 0 10E9/L (ref 0–0.4)
IMM GRANULOCYTES NFR BLD: 0.1 %
LYMPHOCYTES # BLD AUTO: 1 10E9/L (ref 0.8–5.3)
LYMPHOCYTES NFR BLD AUTO: 14.6 %
MCH RBC QN AUTO: 17 PG (ref 26.5–33)
MCHC RBC AUTO-ENTMCNC: 31.6 G/DL (ref 31.5–36.5)
MCV RBC AUTO: 54 FL (ref 78–100)
MONOCYTES # BLD AUTO: 0.8 10E9/L (ref 0–1.3)
MONOCYTES NFR BLD AUTO: 11 %
NEUTROPHILS # BLD AUTO: 5 10E9/L (ref 1.6–8.3)
NEUTROPHILS NFR BLD AUTO: 71.9 %
OVALOCYTES BLD QL SMEAR: SLIGHT
PLATELET # BLD AUTO: 233 10E9/L (ref 150–450)
PLATELET # BLD EST: ABNORMAL 10*3/UL
POIKILOCYTOSIS BLD QL SMEAR: ABNORMAL
RBC # BLD AUTO: 5.82 10E12/L (ref 4.4–5.9)
RBC INCLUSIONS BLD: SLIGHT
WBC # BLD AUTO: 6.9 10E9/L (ref 4–11)

## 2018-02-01 PROCEDURE — 85025 COMPLETE CBC W/AUTO DIFF WBC: CPT | Performed by: INTERNAL MEDICINE

## 2018-02-01 PROCEDURE — 36415 COLL VENOUS BLD VENIPUNCTURE: CPT | Performed by: INTERNAL MEDICINE

## 2018-02-01 PROCEDURE — 82728 ASSAY OF FERRITIN: CPT | Performed by: INTERNAL MEDICINE

## 2018-02-02 ENCOUNTER — TRANSFERRED RECORDS (OUTPATIENT)
Dept: HEALTH INFORMATION MANAGEMENT | Facility: CLINIC | Age: 78
End: 2018-02-02

## 2018-02-05 ENCOUNTER — ANESTHESIA EVENT (OUTPATIENT)
Dept: GASTROENTEROLOGY | Facility: CLINIC | Age: 78
End: 2018-02-05
Payer: MEDICARE

## 2018-02-05 ASSESSMENT — LIFESTYLE VARIABLES: TOBACCO_USE: 1

## 2018-02-05 NOTE — ANESTHESIA PREPROCEDURE EVALUATION
Anesthesia Evaluation     . Pt has had prior anesthetic. Type: General and MAC    No history of anesthetic complications          ROS/MED HX    ENT/Pulmonary:     (+)tobacco use, Past use , . .    Neurologic: Comment: scoliosis      Cardiovascular: Comment: Leg edema    (+) hypertension----. : . . . :. . Previous cardiac testing date:results:date: results:ECG reviewed date:10/2017 results:SR 1st degree AV block date: results:          METS/Exercise Tolerance:  >4 METS   Hematologic:     (+) Anemia, -      Musculoskeletal:   (+) arthritis, , , other musculoskeletal- Pseudogout, left shoulder pain and bilat. leg numbness, s/p back fusion       GI/Hepatic: Comment: hemorrhoids    (+) GERD       Renal/Genitourinary: Comment: LUTS    (+) Nephrolithiasis , BPH,       Endo:     (+) Obesity, .      Psychiatric:  - neg psychiatric ROS       Infectious Disease:  - neg infectious disease ROS       Malignancy:      - no malignancy   Other: Comment: Thalassemia minor  hypopotassemia   (+) H/O Chronic Pain,H/O chronic opiod use ,                    Physical Exam  Normal systems: cardiovascular, pulmonary and dental    Airway   Mallampati: II  TM distance: >3 FB  Neck ROM: full    Dental   Comment: Poor dentition    Cardiovascular       Pulmonary                         Anesthesia Plan      History & Physical Review  History and physical reviewed and following examination; no interval change.    ASA Status:  3 .    NPO Status:  > 8 hours    Plan for General and MAC with Propofol and Intravenous induction. Reason for MAC:  Difficulty with conscious sedation (QS)         Postoperative Care  Postoperative pain management:  IV analgesics and Oral pain medications.      Consents  Anesthetic plan, risks, benefits and alternatives discussed with:  Patient..                          .

## 2018-02-06 ENCOUNTER — SURGERY (OUTPATIENT)
Age: 78
End: 2018-02-06

## 2018-02-06 ENCOUNTER — ALLIED HEALTH/NURSE VISIT (OUTPATIENT)
Dept: FAMILY MEDICINE | Facility: CLINIC | Age: 78
End: 2018-02-06
Payer: COMMERCIAL

## 2018-02-06 ENCOUNTER — TELEPHONE (OUTPATIENT)
Dept: FAMILY MEDICINE | Facility: CLINIC | Age: 78
End: 2018-02-06

## 2018-02-06 ENCOUNTER — ANESTHESIA (OUTPATIENT)
Dept: GASTROENTEROLOGY | Facility: CLINIC | Age: 78
End: 2018-02-06
Payer: MEDICARE

## 2018-02-06 ENCOUNTER — HOSPITAL ENCOUNTER (OUTPATIENT)
Facility: CLINIC | Age: 78
Discharge: HOME | End: 2018-02-06
Attending: SURGERY | Admitting: SURGERY
Payer: MEDICARE

## 2018-02-06 VITALS
BODY MASS INDEX: 34.04 KG/M2 | RESPIRATION RATE: 16 BRPM | TEMPERATURE: 98.3 F | OXYGEN SATURATION: 93 % | WEIGHT: 185 LBS | HEIGHT: 62 IN | SYSTOLIC BLOOD PRESSURE: 200 MMHG | DIASTOLIC BLOOD PRESSURE: 93 MMHG

## 2018-02-06 VITALS — HEART RATE: 77 BPM | SYSTOLIC BLOOD PRESSURE: 166 MMHG | DIASTOLIC BLOOD PRESSURE: 81 MMHG

## 2018-02-06 DIAGNOSIS — I10 BENIGN ESSENTIAL HYPERTENSION: Primary | ICD-10-CM

## 2018-02-06 LAB
HGB BLD-MCNC: 10.2 G/DL (ref 13.3–17.7)
UPPER GI ENDOSCOPY: NORMAL

## 2018-02-06 PROCEDURE — 25000125 ZZHC RX 250: Performed by: NURSE ANESTHETIST, CERTIFIED REGISTERED

## 2018-02-06 PROCEDURE — 43235 EGD DIAGNOSTIC BRUSH WASH: CPT | Performed by: SURGERY

## 2018-02-06 PROCEDURE — 99207 ZZC NO CHARGE NURSE ONLY: CPT

## 2018-02-06 PROCEDURE — 36415 COLL VENOUS BLD VENIPUNCTURE: CPT | Performed by: NURSE ANESTHETIST, CERTIFIED REGISTERED

## 2018-02-06 PROCEDURE — 25000125 ZZHC RX 250: Performed by: SURGERY

## 2018-02-06 PROCEDURE — 25000128 H RX IP 250 OP 636: Performed by: NURSE ANESTHETIST, CERTIFIED REGISTERED

## 2018-02-06 PROCEDURE — 37000008 ZZH ANESTHESIA TECHNICAL FEE, 1ST 30 MIN: Performed by: SURGERY

## 2018-02-06 PROCEDURE — 85018 HEMOGLOBIN: CPT | Performed by: NURSE ANESTHETIST, CERTIFIED REGISTERED

## 2018-02-06 RX ORDER — SODIUM CHLORIDE, SODIUM LACTATE, POTASSIUM CHLORIDE, CALCIUM CHLORIDE 600; 310; 30; 20 MG/100ML; MG/100ML; MG/100ML; MG/100ML
INJECTION, SOLUTION INTRAVENOUS CONTINUOUS
Status: DISCONTINUED | OUTPATIENT
Start: 2018-02-06 | End: 2018-02-06 | Stop reason: HOSPADM

## 2018-02-06 RX ORDER — LIDOCAINE 40 MG/G
CREAM TOPICAL
Status: DISCONTINUED | OUTPATIENT
Start: 2018-02-06 | End: 2018-02-06 | Stop reason: HOSPADM

## 2018-02-06 RX ORDER — ONDANSETRON 2 MG/ML
4 INJECTION INTRAMUSCULAR; INTRAVENOUS
Status: DISCONTINUED | OUTPATIENT
Start: 2018-02-06 | End: 2018-02-06 | Stop reason: HOSPADM

## 2018-02-06 RX ORDER — PROPOFOL 10 MG/ML
INJECTION, EMULSION INTRAVENOUS PRN
Status: DISCONTINUED | OUTPATIENT
Start: 2018-02-06 | End: 2018-02-06

## 2018-02-06 RX ORDER — GLYCOPYRROLATE 0.2 MG/ML
INJECTION, SOLUTION INTRAMUSCULAR; INTRAVENOUS PRN
Status: DISCONTINUED | OUTPATIENT
Start: 2018-02-06 | End: 2018-02-06

## 2018-02-06 RX ORDER — PROPOFOL 10 MG/ML
INJECTION, EMULSION INTRAVENOUS CONTINUOUS PRN
Status: DISCONTINUED | OUTPATIENT
Start: 2018-02-06 | End: 2018-02-06

## 2018-02-06 RX ADMIN — PROPOFOL 40 MG: 10 INJECTION, EMULSION INTRAVENOUS at 07:58

## 2018-02-06 RX ADMIN — GLYCOPYRROLATE 0.2 MG: 0.2 INJECTION, SOLUTION INTRAMUSCULAR; INTRAVENOUS at 07:59

## 2018-02-06 RX ADMIN — PROPOFOL 180 MCG/KG/MIN: 10 INJECTION, EMULSION INTRAVENOUS at 07:59

## 2018-02-06 RX ADMIN — PROPOFOL 10 MG: 10 INJECTION, EMULSION INTRAVENOUS at 08:00

## 2018-02-06 RX ADMIN — SODIUM CHLORIDE, POTASSIUM CHLORIDE, SODIUM LACTATE AND CALCIUM CHLORIDE: 600; 310; 30; 20 INJECTION, SOLUTION INTRAVENOUS at 07:44

## 2018-02-06 RX ADMIN — TOPICAL ANESTHETIC 1 SPRAY: 200 SPRAY DENTAL; PERIODONTAL at 07:59

## 2018-02-06 RX ADMIN — LIDOCAINE HYDROCHLORIDE 1 ML: 10 INJECTION, SOLUTION EPIDURAL; INFILTRATION; INTRACAUDAL; PERINEURAL at 07:44

## 2018-02-06 RX ADMIN — PROPOFOL 40 MG: 10 INJECTION, EMULSION INTRAVENOUS at 07:59

## 2018-02-06 NOTE — TELEPHONE ENCOUNTER
See RN visit note today,   Advised per Shayy to go home, take meds, drink water, check BP and come back in tomorrow for BP recheck after taking his meds.   He decided he wants to come back Thursday, as he has a clinic appt in oncology that day and so will do RN BP recheck Thursday.   Ashlee Lewis RNC

## 2018-02-06 NOTE — ANESTHESIA CARE TRANSFER NOTE
Patient: Mann Escobar    Procedure(s):  gastroscopy - Wound Class: II-Clean Contaminated    Diagnosis: iron defiency anemia  Diagnosis Additional Information: No value filed.    Anesthesia Type:   General, MAC     Note:  Airway :Room Air  Patient transferred to:Phase II  Handoff Report: Identifed the Patient, Identified the Reponsible Provider, Reviewed the pertinent medical history, Discussed the surgical course, Reviewed Intra-OP anesthesia mangement and issues during anesthesia, Set expectations for post-procedure period and Allowed opportunity for questions and acknowledgement of understanding      Vitals: (Last set prior to Anesthesia Care Transfer)    CRNA VITALS  2/6/2018 0745 - 2/6/2018 0815      2/6/2018             Pulse: 64    Ht Rate: 58    SpO2: 100 %                Electronically Signed By: Robert Cooper CRNA, APRN MIMI  February 6, 2018  8:15 AM

## 2018-02-06 NOTE — NURSING NOTE
"Patient had EGD this am, \"they said my BP was high\" so I thought I would see if I could see Shayy today  No chest pain,   No headache, no blurred or double vision,   No nausea or vomiting,   \"I always have numbness in my left leg and foot. \"- no other numbness or tingling.   No difficulty breathing.   Not drowsy or confused.   No nosebleeds recently.   No weakness.   Not dizzy or lightheaded.   Has not been taking OTC meds for colds. \"I did buy an over the counter med for prostate, so I don't get up a lot at night \"   BP here in clinic 166/81 p 77  He says he has been taking BP at home and it has been \"all over the place, but not as high as it was today \" (at EGD. )     Suggested a clinic appt with Shayy soon and he says \"I would prefer to wait until the end of Feb because I will need refills at that time. \"    Will huddle with Shayy.   Ashlee Lewis RNC          "

## 2018-02-06 NOTE — BRIEF OP NOTE
[facility]   Brief Operative Note    Pre-operative diagnosis: iron defiency anemia   Post-operative diagnosis Normal anatomy - ulcer healed   Procedure: Procedure(s):  gastroscopy - Wound Class: II-Clean Contaminated   Surgeon(s): Surgeon(s) and Role:     * Edmar Isaacs MD - Primary   Estimated blood loss: * No values recorded between 2/6/2018 12:00 AM and 2/6/2018  8:09 AM *    Specimens: * No specimens in log *   Findings: Normal anatomy post-op GBP  Previously noted ulcer has healed

## 2018-02-06 NOTE — TELEPHONE ENCOUNTER
"    Patient had EGD this am, \"they said my BP was high so I thought I would see if I could see Shayy today \"    No chest pain,   No headache, no blurred or double vision,   No nausea or vomiting,   \"I always have numbness in my left leg and foot. \"- no other numbness or tingling.   No difficulty breathing.   Not drowsy or confused.   No nosebleeds recently.   No weakness.   Not dizzy or lightheaded.   Has not been taking OTC meds for colds. \"I did buy an over the counter med for prostate, so I don't get up a lot at night \"   BP here in clinic 166/81 p 77  He says he has been taking BP at home and it has been \"all over the place, but not as high as it was today \" (at EGD. )      Suggested a clinic appt with Shayy soon and he says \"I would prefer to wait until the end of Feb because I will need refills at that time. \"    Shayy, there is a \"simple\" this am, I put on hold, do you want to see him, or ok to schedule when you have an opening ?   And how soon? (see above, he prefers to wait until end of Feb. Even though your last note requested recheck in one month. )     Ashlee Lewis RNC    "

## 2018-02-06 NOTE — ANESTHESIA POSTPROCEDURE EVALUATION
Patient: Mann Escobar    Procedure(s):  gastroscopy - Wound Class: II-Clean Contaminated    Diagnosis:iron defiency anemia  Diagnosis Additional Information: No value filed.    Anesthesia Type:  General, MAC    Note:  Anesthesia Post Evaluation    Patient location during evaluation: Phase 2 and Bedside  Patient participation: Able to fully participate in evaluation  Level of consciousness: awake  Pain management: adequate  Airway patency: patent  Cardiovascular status: acceptable  Respiratory status: acceptable  Hydration status: acceptable  PONV: none     Anesthetic complications: None          Last vitals:  Vitals:    02/06/18 0716 02/06/18 0746 02/06/18 0748   BP: 191/89 176/85 188/90   Resp: 18     Temp: 36.8  C (98.3  F)     SpO2: 100%           Electronically Signed By: Robert Cooper CRNA, GUNJAN LE  February 6, 2018  8:15 AM

## 2018-02-06 NOTE — H&P
77 year old year old male here for upper endoscopy for surveillance of recently diagnosed marginal ulceration of his gastro-jejunostomy.        Patient Active Problem List   Diagnosis      HX NEPHROLITHIASIS [V13.01]     Thalassemia minor     Esophageal reflux     Family history of prostate cancer     Leg edema     CARDIOVASCULAR SCREENING; LDL GOAL LESS THAN 130     Internal hemorrhoids     Iron deficiency anemia     First degree AV block     Family history of diabetes mellitus     Scoliosis     Hypopotassemia     Advanced directives, counseling/discussion     Benign non-nodular prostatic hyperplasia with lower urinary tract symptoms     Chronic low back pain     S/P knee replacement     S/P ankle fusion     Abnormal antinuclear antibody titer     Osteoarthritis     Hypertension     BPH (benign prostatic hyperplasia)     Pseudogout     Chronic pain syndrome       Past Medical History:   Diagnosis Date     Back pain     WITH BILATERAL LEG PAIN AND NUMBNESS OF BOTH FEET     Gastro-oesophageal reflux disease     REFLUX     Hypertension      Hypopotassemia      Internal hemorrhoids      Iron deficiency anaemia      Leg edema      Morbid obesity 9/12/2005     Morbid obesity (H)      Osteoarthrosis      Scoliosis      Thalassemia minor        Past Surgical History:   Procedure Laterality Date     ABDOMEN SURGERY  2005    gastric bypass     ANKLE SURGERY  2005 2005 fusion for arthritis      CATARACT IOL, RT/LT  2008/    Cataract IOL RT/LT     COLONOSCOPY  2009/07    polyps removed repeat 5 yrs, tubular adenoma     COLONOSCOPY  9/29/2011    Procedure:COLONOSCOPY; Colonoscopy with hemorrhoid banding; Surgeon:JEREMY GARCIA; Location:[facility]     COLONOSCOPY N/A 12/19/2017    Procedure: COLONOSCOPY;  colonoscopy, gastroscopy;  Surgeon: Edmar Isaacs MD;  Location: [facility]     DECOMPRESSION LUMBAR MINIMALLY INVASIVE TWO LEVELS  5/2/2012    Procedure:DECOMPRESSION LUMBAR MINIMALLY INVASIVE TWO LEVELS; Surgeon:STEPHEN  LOTTIE NOONAN; Location:[facility]     FUSION SPINE POSTERIOR MINIMALLY INVASIVE ONE LEVEL  5/2/2012    Procedure:FUSION SPINE POSTERIOR MINIMALLY INVASIVE ONE LEVEL; Minimal Access Spinal Technology Transformaninal Lumbar Interbody Fusion L4-5, Decompression L3-5; Surgeon:LOTTIE MITCHELL; Location:[facility]     HC REMOVAL OF HYDROCELE,TUNICA,UNILAT  2006    bilateral     ORTHOPEDIC SURGERY       ORTHOPEDIC SURGERY  2000    Lf knee replacement     ORTHOPEDIC SURGERY  2002, 2010    Rt replacement     ORTHOPEDIC SURGERY  2005    Left ankle fused     SURGICAL HISTORY OF -       Cysto     SURGICAL HISTORY OF -   2000    (L) Total knee     SURGICAL HISTORY OF -   2002    Percutaneous kidney stone removal     SURGICAL HISTORY OF -   2002    (R) Total knee     SURGICAL HISTORY OF -       left ankle fusion      SURGICAL HISTORY OF -       ureteral stent removal     SURGICAL HISTORY OF -   2005    bariatric surgery-Favian-en-Y     SURGICAL HISTORY OF -   2008    carpal tunnel surgery rt wrist       Family History   Problem Relation Age of Onset     DIABETES Brother      Obesity Brother      CEREBROVASCULAR DISEASE Paternal Grandfather      Prostate Cancer Father      CANCER Father      bone     DIABETES Father      HEART DISEASE Mother      CHF     CEREBROVASCULAR DISEASE Mother      Hypertension Mother      CANCER Mother      tumor in stomach     Cancer - colorectal Mother      HEART DISEASE Maternal Grandmother      CHF     DIABETES Paternal Grandmother      Breast Cancer Sister      Genitourinary Problems Son      Kidney stones     CANCER Brother      Cancer - colorectal Brother      DIABETES Brother        No current outpatient prescriptions on file.       Allergies   Allergen Reactions     Nkda [No Known Drug Allergies]        Pt reports that he quit smoking about 56 years ago. His smoking use included Cigarettes. He has a 3.50 pack-year smoking history. He has never used smokeless tobacco. He reports that he drinks alcohol. He  reports that he does not use illicit drugs.    Exam:    Awake, Alert OX3  Lungs - CTA bilaterally  CV - RRR, no murmurs, distal pulses intact  Abd - soft, non-distended, non-tender, +BS  Extr - No cyanosis or edema    A/P 77 year old year old male in need of upper endoscopy for surveillance of recently diagnosed marginal ulceration of his gastro-jejunostomy.  Risks, benefits, alternatives, and complications were discussed including the possibility of perforation and the patient agreed to proceed.    Edmar Isaacs MD

## 2018-02-06 NOTE — NURSING NOTE
"Huddled with Shayy.   He has not taken any of his meds this am due to NPO for procedure.   Advised him, per Shayy to go home, take meds, drink water today, check his BP at home and come in tomorrow for BP / recheck with Shayy or she said OK to see RN.   \"Ok, but can I do that Thursday, cause I already have an appt here, with Dr Andrew on Thursday and I will come over to do the BP recheck after that. \"    Scheduled him to come in for BP recheck with RN, per his preference on Thursday after Dr Andrew appt.   Invited him to call back/ come in sooner if sx / problems.   Ashlee Lewis RNC      "

## 2018-02-08 ENCOUNTER — ONCOLOGY VISIT (OUTPATIENT)
Dept: ONCOLOGY | Facility: CLINIC | Age: 78
End: 2018-02-08
Attending: INTERNAL MEDICINE
Payer: MEDICARE

## 2018-02-08 VITALS
BODY MASS INDEX: 33.68 KG/M2 | TEMPERATURE: 98.7 F | DIASTOLIC BLOOD PRESSURE: 75 MMHG | RESPIRATION RATE: 16 BRPM | OXYGEN SATURATION: 99 % | HEIGHT: 62 IN | WEIGHT: 183 LBS | SYSTOLIC BLOOD PRESSURE: 147 MMHG | HEART RATE: 69 BPM

## 2018-02-08 DIAGNOSIS — D50.9 IRON DEFICIENCY ANEMIA, UNSPECIFIED IRON DEFICIENCY ANEMIA TYPE: Primary | ICD-10-CM

## 2018-02-08 PROCEDURE — 99213 OFFICE O/P EST LOW 20 MIN: CPT | Performed by: INTERNAL MEDICINE

## 2018-02-08 PROCEDURE — G0463 HOSPITAL OUTPT CLINIC VISIT: HCPCS

## 2018-02-08 ASSESSMENT — PAIN SCALES - GENERAL: PAINLEVEL: MODERATE PAIN (5)

## 2018-02-08 NOTE — LETTER
2/8/2018         RE: Mann Escobar  [address]  [place] [address]        Dear Colleague,    Thank you for referring your patient, Mann Escobar, to the [facility]. Please see a copy of my visit note below.    Hematology follow up visit    CC: ANAMIKA on top of thalassemia minor, gastric bypass    HISTORY OF PRESENT ILLNESS:  Mann Escobar is a 77-year-old gentleman who has a history of thalassemia minor and is aware of being anemic since his younger age.  He states he has been feeling fatigued for months in 2017.  He had a fall and syncope and found to have a hemoglobin of 7.9 on 10/28/2017 and was noted to have significant microcytosis.  He also had leukocytosis at that time, normal platelet count at that time.    He became severely anemia since 2010 with hb down to less than 9 in 2017.   Upper endo and colonoscopy were negative in 12/2017.    Hematology work up 11/2017 is most suggestive of ANAMIKA.    He did not response well to oral vitron C due to gastric bypass, IV iron is offered in 1/2018.  Ferritin up to 200 in 2/2018 from 8 in 12/2017. He then got on maintenance.            PAST MEDICAL HISTORY:  Chronic back pain. OA, chronic pain syndrome from back, s/p L spine fusion, s/p bilateral knee replacement, BPH, gout. Gastric bypass.   The patient also reports that he had an upper GI bleed in 2016 when an EGD and colonoscopy summer 2016 was performed, and there was nothing serious found.  He apparently was using lots of NSAIDs at that time. He s/p had 2 units PRBC.      MEDICATIONS:  Reviewed.        ALLERGIES:  Reviewed.       SOCIAL HISTORY:  Retired currently.  He used to work at [employer] at [employer]. Nonsmoker, occasional alcohol intake.  He is .       FAMILY HISTORY:  Mother had colon cancer in her 80s.  Sister had breast cancer in her 60s and diseased from it.  Brother had colon cancer in his 40s. Father had prostate and bone cancer in his  "70s.  No other family members with breast cancer.       REVIEW OF SYSTEMS  The patient does not have issues with any obvious bleeding, bruising. He is not seeing any black-colored stools.   No more urge to chew ice.     PHYSICAL EXAMINATION:   VITAL SIGNS: Blood pressure 147/75, pulse 69, temperature 98.7  F (37.1  C), temperature source Tympanic, resp. rate 16, height 1.575 m (5' 2.01\"), weight 83 kg (183 lb), SpO2 99 %.    GENERAL APPEARANCE:  looks like her stated age, very pleasant, not in acute distress.   HEENT: The patient is normocephalic, atraumatic. Pupils are equally reactive to light.  Sclerae are anicteric.  Moist oral mucosa.  Negative pharynx.  No oral thrush.   NECK:  Supple.  No jugular venous distention.  Thyroid is not palpable.   LYMPH NODES:  Superficial lymphadenopathy is not appreciable in the bilateral cervical, supraclavicular, axillary or inguinal areas.   CARDIOVASCULAR:  S1, S2 regular with no murmurs or gallops.  No carotid or abdominal bruits.   PULMONARY:  Lungs are clear to auscultation and percussion bilaterally.  There is no wheezing or rhonchi.   GASTROINTESTINAL:  Abdomen is soft, nontender.  No hepatosplenomegaly.  No signs of ascites.  No mass appreciable.   MUSCULOSKELETAL/EXTREMITIES:  No edema.  No cyanotic changes.  No signs of joint deformity.  No lymphedema.   NEUROLOGIC:  Cranial nerves II-XII are grossly intact.  Sensation intact.  Muscle strength and muscle tone symmetrical, 5/5 throughout.   BACK:  No spinal or paraspinal tenderness.  No CVA tenderness.   SKIN:  No petechiae.  No rash.  No signs of cellulitis.   BREASTS:  Bilateral breast exam revealed no mass or skin changes or nipple discharges.        CURRENT LAB DATA REVIEWED  Ferritin 222 in 2/2018, 12/2017 at 8 from 7 in 11/2017  Hb 10.2 in 2/2018, 12/2017 at 7.8 from 8.2    Upper endo 2/2018 - negative      OLD DATA REVIEWED TODAY WITH SUMMARY:   EGD no bleeding found, gastric biopsy 12/2017 - no ca or H pylori. "   Colonoscopy 12/2017 - nl.     11/2017 hb 8.2, MCV 50, wbc/PL nl. reti 0.4 low  Smear - The red cells are microcytic, hypochromic There is marked anisocytosis. There are occasional target cells and irregularly shaped fragmented red blood cells.  Wbc/PL are nl.  FIT: negative.    11/2017 hem work up -  MCKAYLA/hapto/TSH/LDH/spep  nl, folate B12 not low,  nl. Iron 15, iron sat 4%.   ferritin 7, epo 50, stfR 16, TfR index (sTFR/ferritin) is > 2 is consistent with ANAMIKA.     Hb electro: Hb A1 94.5% low, Hb A2 4.7% high, Hb Fnl.   An elevated Hb A2 level can be seen in beta thalassemia trait and rarely in unstable hemoglobin variants.        US 11/2017: No evidence for hepatomegaly, spleen is borderline prominent 13.7 cm in length.      Hemoglobin was close to 11 until 2010, and this was likely his baseline hemoglobin.  Since 2011, his hemoglobin has declined, his lisa hb was 7.3 in 2012.      ASSESSMENT AND PLAN:    1. Chronic microcytic anemia with baseline hb of 11 till 2010.   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     2. Newly developed severe anemia in 2017 overseveral years. Hematology work up 11/2017 is most suggestive of ANAMIKA.  He is advised on oral iron vitron C po bid on relative empty stomach.     S/p IV iron Injectafer 750 mg wkly x2 which improved ferritin > 200 from 8, hb 10 from 7.   Recommend maintenance IV iron.      He is not interested in small bowel capsule study now his hb and ferritin are up.     Total time is 15 minutes, more than 10 minutes spent in counseling regarding his test results, further plan of actions.     Again, thank you for allowing me to participate in the care of your patient.        Sincerely,        Anne Marie Andrew MD, MD

## 2018-02-08 NOTE — MR AVS SNAPSHOT
After Visit Summary   2/8/2018    Mann Escobar    MRN: [id number]           Patient Information     Date Of Birth          1940        Visit Information        Provider Department      2/8/2018 2:45 PM Anne Marie Andrew MD [facility]        Today's Diagnoses     Iron deficiency anemia, unspecified iron deficiency anemia type    -  1      Care Instructions    Dr. Andrew would like you to start IV Iron maintenance quarterly. We would like to see you back in 9 months for a follow up appointment with labs prior. When you are in need of a refill, please call your pharmacy and they will send us a request.  Copy of appointments, and after visit summary (AVS) given to patient.  If you have any questions please call Jud Cadena RN, BSN Oncology Hematology  [facility] [phone]. For questions after business hours, or on holidays/weekends, please call our after hours Nurse Triage line [phone]. Thank you.             Start IV iron maintenance quarterly.   F/u in 9 months with labs           Follow-ups after your visit        Your next 10 appointments already scheduled     Feb 08, 2018  3:15 PM CST   Nurse Only with [place] FP/IM RN   [facility]    [address] [address] [phone]            Apr 05, 2018  1:30 PM CDT   LAB with NB LAB   [facility]    [address] [place] [address] [phone]           Please do not eat 10-12 hours before your appointment if you are coming in fasting for labs on lipids, cholesterol, or glucose (sugar). This does not apply to pregnant women. Water, hot tea and black coffee (with nothing added) are okay. Do not drink other fluids, diet soda or chew gum.            Apr 09, 2018  1:30 PM CDT   Level 1 with ROOM 3 [facility] ([facility])    [facility]  "[facility]  [address]  [facility] [address]   [phone]              Future tests that were ordered for you today     Open Standing Orders        Priority Remaining Interval Expires Ordered    Ferritin Routine  quarterly 2019    Hemoglobin Routine  quarterly 2019            Who to contact     If you have questions or need follow up information about today's clinic visit or your schedule please contact [facility] directly at [phone].  Normal or non-critical lab and imaging results will be communicated to you by Timeethart, letter or phone within 4 business days after the clinic has received the results. If you do not hear from us within 7 days, please contact the clinic through [employer] or phone. If you have a critical or abnormal lab result, we will notify you by phone as soon as possible.  Submit refill requests through [employer] or call your pharmacy and they will forward the refill request to us. Please allow 3 business days for your refill to be completed.          Additional Information About Your Visit        [employer] Information     [employer] lets you send messages to your doctor, view your test results, renew your prescriptions, schedule appointments and more. To sign up, go to [email or web address] . Click on \"Log in\" on the left side of the screen, which will take you to the Welcome page. Then click on \"Sign up Now\" on the right side of the page.     You will be asked to enter the access code listed below, as well as some personal information. Please follow the directions to create your username and password.     Your access code is: [email or web address]  Expires: 2018 11:46 AM     Your access code will  in 90 days. If you need help or a new code, please call your [facility] clinic or [phone].        Care EveryWhere ID     This is your Care EveryWhere ID. This could be used by other organizations to access your [facility] medical " "records  [id number]        Your Vitals Were     Pulse Temperature Respirations Height Pulse Oximetry BMI (Body Mass Index)    69 98.7  F (37.1  C) (Tympanic) 16 1.575 m (5' 2.01\") 99% 33.46 kg/m2       Blood Pressure from Last 3 Encounters:   02/08/18 147/75   02/06/18 166/81   02/06/18 (!) 200/93    Weight from Last 3 Encounters:   02/08/18 83 kg (183 lb)   02/06/18 83.9 kg (185 lb)   01/16/18 83.9 kg (185 lb)               Primary Care Provider Office Phone # Fax #    Shayy Carvajal Adriel Ramírez, APRN [facility] [phone] [address] [facility] [address]        Equal Access to Services     CATRACHO MCBRIDE : Roya Samuels, waaxda luqadaha, qaybta kaalmada francis, deny morris . So [facility] [phone].    ATENCIÓN: Si habla español, tiene a hewitt disposición servicios gratuitos de asistencia lingüística. Ursula al [phone].    We comply with applicable federal civil rights laws and [place] laws. We do not discriminate on the basis of race, color, national origin, age, disability, sex, sexual orientation, or gender identity.            Thank you!     Thank you for choosing [facility]  for your care. Our goal is always to provide you with excellent care. Hearing back from our patients is one way we can continue to improve our services. Please take a few minutes to complete the written survey that you may receive in the mail after your visit with us. Thank you!             Your Updated Medication List - Protect others around you: Learn how to safely use, store and throw away your medicines at www.disposemymeds.org.          This list is accurate as of 2/8/18  3:05 PM.  Always use your most recent med list.                   Brand Name Dispense Instructions for use Diagnosis    aspirin 81 MG EC tablet     90 tablet    Take 1 tablet (81 mg) by mouth daily        ferrous fumarate 65 mg ([place]. FE)-Vitamin C 125 mg  MG Tabs tablet    VITRON C    " 60 tablet    Take 1 tablet by mouth daily    Iron deficiency anemia, unspecified iron deficiency anemia type       HCA VITAMIN B12 500 MCG Tabs   Generic drug:  cyanocobalamin      2 tablets daily        lisinopril 10 MG tablet    PRINIVIL/ZESTRIL    30 tablet    Take 1 tablet (10 mg) by mouth daily    Benign essential hypertension       MULTIVITAMIN PO      one daily        omeprazole 20 MG CR capsule    priLOSEC    90 capsule    Take 1 capsule (20 mg) by mouth daily    Gastric ulcer, unspecified chronicity, unspecified whether gastric ulcer hemorrhage or perforation present       oxyCODONE IR 5 MG tablet    ROXICODONE    60 tablet    Take 1 tablet (5 mg) by mouth 2 times daily as needed for pain    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    Spondylolisthesis of lumbar region       terazosin 5 MG capsule    HYTRIN    90 capsule    Take 1 capsule (5 mg) by mouth At Bedtime    Benign non-nodular prostatic hyperplasia with lower urinary tract symptoms       triamterene-hydrochlorothiazide 75-50 MG per tablet    MAXZIDE    45 tablet    Take 0.5 tablets by mouth daily    Benign essential hypertension       VIACTIV PO      With D 1000mg calcium

## 2018-02-08 NOTE — PROGRESS NOTES
Hematology follow up visit    CC: ANAMIKA on top of thalassemia minor, gastric bypass    HISTORY OF PRESENT ILLNESS:  Mann Escobar is a 77-year-old gentleman who has a history of thalassemia minor and is aware of being anemic since his younger age.  He states he has been feeling fatigued for months in 2017.  He had a fall and syncope and found to have a hemoglobin of 7.9 on 10/28/2017 and was noted to have significant microcytosis.  He also had leukocytosis at that time, normal platelet count at that time.    He became severely anemia since 2010 with hb down to less than 9 in 2017.   Upper endo and colonoscopy were negative in 12/2017.    Hematology work up 11/2017 is most suggestive of ANAMIKA.    He did not response well to oral vitron C due to gastric bypass, IV iron is offered in 1/2018.  Ferritin up to 200 in 2/2018 from 8 in 12/2017. He then got on maintenance.            PAST MEDICAL HISTORY:  Chronic back pain. OA, chronic pain syndrome from back, s/p L spine fusion, s/p bilateral knee replacement, BPH, gout. Gastric bypass.   The patient also reports that he had an upper GI bleed in 2016 when an EGD and colonoscopy summer 2016 was performed, and there was nothing serious found.  He apparently was using lots of NSAIDs at that time. He s/p had 2 units PRBC.      MEDICATIONS:  Reviewed.        ALLERGIES:  Reviewed.       SOCIAL HISTORY:  Retired currently.  He used to work at [employer]. Nonsmoker, occasional alcohol intake.  He is .       FAMILY HISTORY:  Mother had colon cancer in her 80s.  Sister had breast cancer in her 60s and diseased from it.  Brother had colon cancer in his 40s. Father had prostate and bone cancer in his 70s.  No other family members with breast cancer.       REVIEW OF SYSTEMS  The patient does not have issues with any obvious bleeding, bruising. He is not seeing any black-colored stools.   No more urge to chew ice.     PHYSICAL EXAMINATION:   VITAL SIGNS: Blood  "pressure 147/75, pulse 69, temperature 98.7  F (37.1  C), temperature source Tympanic, resp. rate 16, height 1.575 m (5' 2.01\"), weight 83 kg (183 lb), SpO2 99 %.    GENERAL APPEARANCE:  looks like her stated age, very pleasant, not in acute distress.   HEENT: The patient is normocephalic, atraumatic. Pupils are equally reactive to light.  Sclerae are anicteric.  Moist oral mucosa.  Negative pharynx.  No oral thrush.   NECK:  Supple.  No jugular venous distention.  Thyroid is not palpable.   LYMPH NODES:  Superficial lymphadenopathy is not appreciable in the bilateral cervical, supraclavicular, axillary or inguinal areas.   CARDIOVASCULAR:  S1, S2 regular with no murmurs or gallops.  No carotid or abdominal bruits.   PULMONARY:  Lungs are clear to auscultation and percussion bilaterally.  There is no wheezing or rhonchi.   GASTROINTESTINAL:  Abdomen is soft, nontender.  No hepatosplenomegaly.  No signs of ascites.  No mass appreciable.   MUSCULOSKELETAL/EXTREMITIES:  No edema.  No cyanotic changes.  No signs of joint deformity.  No lymphedema.   NEUROLOGIC:  Cranial nerves II-XII are grossly intact.  Sensation intact.  Muscle strength and muscle tone symmetrical, 5/5 throughout.   BACK:  No spinal or paraspinal tenderness.  No CVA tenderness.   SKIN:  No petechiae.  No rash.  No signs of cellulitis.   BREASTS:  Bilateral breast exam revealed no mass or skin changes or nipple discharges.        CURRENT LAB DATA REVIEWED  Ferritin 222 in 2/2018, 12/2017 at 8 from 7 in 11/2017  Hb 10.2 in 2/2018, 12/2017 at 7.8 from 8.2    Upper endo 2/2018 - negative      OLD DATA REVIEWED TODAY WITH SUMMARY:   EGD no bleeding found, gastric biopsy 12/2017 - no ca or H pylori.   Colonoscopy 12/2017 - nl.     11/2017 hb 8.2, MCV 50, wbc/PL nl. reti 0.4 low  Smear - The red cells are microcytic, hypochromic There is marked anisocytosis. There are occasional target cells and irregularly shaped fragmented red blood cells.  Wbc/PL are " nl.  FIT: negative.    11/2017 hem work up -  MCKAYLA/hapto/TSH/LDH/spep  nl, folate B12 not low,  nl. Iron 15, iron sat 4%.   ferritin 7, epo 50, stfR 16, TfR index (sTFR/ferritin) is > 2 is consistent with ANAMIKA.     Hb electro: Hb A1 94.5% low, Hb A2 4.7% high, Hb Fnl.   An elevated Hb A2 level can be seen in beta thalassemia trait and rarely in unstable hemoglobin variants.        US 11/2017: No evidence for hepatomegaly, spleen is borderline prominent 13.7 cm in length.      Hemoglobin was close to 11 until 2010, and this was likely his baseline hemoglobin.  Since 2011, his hemoglobin has declined, his lisa hb was 7.3 in 2012.      ASSESSMENT AND PLAN:    1. Chronic microcytic anemia with baseline hb of 11 till 2010.   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     2. Newly developed severe anemia in 2017 overseveral years. Hematology work up 11/2017 is most suggestive of ANAMIKA.  He is advised on oral iron vitron C po bid on relative empty stomach.     S/p IV iron Injectafer 750 mg wkly x2 which improved ferritin > 200 from 8, hb 10 from 7.   Recommend maintenance IV iron.      He is not interested in small bowel capsule study now his hb and ferritin are up.     Total time is 15 minutes, more than 10 minutes spent in counseling regarding his test results, further plan of actions.

## 2018-02-08 NOTE — NURSING NOTE
"Oncology Rooming Note    February 8, 2018 2:33 PM   Mann Escobar is a 77 year old male who presents for:    Chief Complaint   Patient presents with     Hematology     Recheck ANAMIKA, review Labs & Upper GI     Initial Vitals: /75 (BP Location: Right arm, Patient Position: Sitting, Cuff Size: Adult Regular)  Pulse 69  Temp 98.7  F (37.1  C) (Tympanic)  Resp 16  Ht 1.575 m (5' 2.01\")  Wt 83 kg (183 lb)  SpO2 99%  BMI 33.46 kg/m2 Estimated body mass index is 33.46 kg/(m^2) as calculated from the following:    Height as of this encounter: 1.575 m (5' 2.01\").    Weight as of this encounter: 83 kg (183 lb). Body surface area is 1.91 meters squared.  Moderate Pain (5) Comment:  LBP 2/10   No LMP for male patient.  Allergies reviewed: Yes  Medications reviewed: Yes    Medications: Medication refills not needed today.  Pharmacy name entered into EPIC:    [facility]  [facility] [address] [place], [place] - [address]    Clinical concerns:  Recheck ANAMIKA, review Labs & Upper GI    7 minutes for nursing intake (face to face time)     Margie Bustillo CMA              "

## 2018-02-08 NOTE — PATIENT INSTRUCTIONS
Dr. Andrew would like you to start IV Iron maintenance quarterly. We would like to see you back in 9 months for a follow up appointment with labs prior. When you are in need of a refill, please call your pharmacy and they will send us a request.  Copy of appointments, and after visit summary (AVS) given to patient.  If you have any questions please call Jud Cadena RN, BSN Oncology Hematology  [facility] [phone]. For questions after business hours, or on holidays/weekends, please call our after hours Nurse Triage line [phone]. Thank you.             Start IV iron maintenance quarterly.   F/u in 9 months with labs

## 2018-02-20 DIAGNOSIS — I10 BENIGN ESSENTIAL HYPERTENSION: ICD-10-CM

## 2018-02-20 DIAGNOSIS — N40.1 BENIGN NON-NODULAR PROSTATIC HYPERPLASIA WITH LOWER URINARY TRACT SYMPTOMS: ICD-10-CM

## 2018-02-21 NOTE — TELEPHONE ENCOUNTER
"Requested Prescriptions   Pending Prescriptions Disp Refills     terazosin (HYTRIN) 5 MG capsule [Pharmacy Med Name: TERAZOSIN HCL 5 MG Capsule]  Last Written Prescription Date:  05/02/2017  Last Fill Quantity: 90,  # refills: 3   Last office visit: 2/6/2018 with prescribing provider:  Desiree   Future Office Visit:       90 capsule 3     Sig: TAKE 1 CAPSULE AT BEDTIME    Alpha Blockers Passed    2/20/2018  9:30 PM       Passed - Blood pressure under 140/90 in past 12 months    BP Readings from Last 3 Encounters:   02/08/18 147/75   02/06/18 166/81   02/06/18 (!) 200/93                Passed - Recent or future visit with authorizing provider's specialty    Patient had office visit in the last year or has a visit in the next 30 days with authorizing provider.  See \"Patient Info\" tab in inbasket, or \"Choose Columns\" in Meds & Orders section of the refill encounter.            Passed - Patient does not have Tadalafil, Vardenafil, or Sildenafil on their medication list       Passed - Patient is 18 years of age or older        triamterene-hydrochlorothiazide (MAXZIDE) 75-50 MG per tablet [Pharmacy Med Name: TRIAMTERENE/HYDROCHLOROTHIAZIDE 75-50 MG Tablet]  Last Written Prescription Date:  05/02/2017  Last Fill Quantity: 45,  # refills: 3   Last office visit: 2/6/2018 with prescribing provider:  Desiree   Future Office Visit:     45 tablet 3     Sig: TAKE 1/2 TABLET EVERY DAY    Diuretics (Including Combos) Protocol Failed    2/20/2018  9:30 PM       Failed - Normal serum creatinine on file in past 12 months    Recent Labs   Lab Test  11/10/17   1401   CR  1.35*             Passed - Blood pressure under 140/90 in past 12 months    BP Readings from Last 3 Encounters:   02/08/18 147/75   02/06/18 166/81   02/06/18 (!) 200/93                Passed - Recent or future visit with authorizing provider's specialty    Patient had office visit in the last year or has a visit in the next 30 days with authorizing provider.  See " "\"Patient Info\" tab in inbasket, or \"Choose Columns\" in Meds & Orders section of the refill encounter.            Passed - Patient is age 18 or older       Passed - Normal serum potassium on file in past 12 months    Recent Labs   Lab Test  11/10/17   1401   POTASSIUM  3.5                   Passed - Normal serum sodium on file in past 12 months    Recent Labs   Lab Test  11/10/17   1401   NA  144              Gianluca Hoffman RT (R)    "

## 2018-02-22 ENCOUNTER — APPOINTMENT (OUTPATIENT)
Dept: GENERAL RADIOLOGY | Facility: CLINIC | Age: 78
End: 2018-02-22
Attending: PHYSICIAN ASSISTANT
Payer: MEDICARE

## 2018-02-22 ENCOUNTER — HOSPITAL ENCOUNTER (OUTPATIENT)
Dept: BONE DENSITY | Facility: CLINIC | Age: 78
Discharge: HOME | End: 2018-02-22
Attending: ORTHOPAEDIC SURGERY | Admitting: ORTHOPAEDIC SURGERY
Payer: MEDICARE

## 2018-02-22 ENCOUNTER — HOSPITAL ENCOUNTER (EMERGENCY)
Facility: CLINIC | Age: 78
Discharge: HOME | End: 2018-02-22
Attending: PHYSICIAN ASSISTANT | Admitting: PHYSICIAN ASSISTANT
Payer: MEDICARE

## 2018-02-22 VITALS
DIASTOLIC BLOOD PRESSURE: 80 MMHG | RESPIRATION RATE: 16 BRPM | OXYGEN SATURATION: 98 % | WEIGHT: 182.98 LBS | TEMPERATURE: 98 F | BODY MASS INDEX: 33.46 KG/M2 | SYSTOLIC BLOOD PRESSURE: 159 MMHG

## 2018-02-22 DIAGNOSIS — S22.000A THORACIC COMPRESSION FRACTURE (H): ICD-10-CM

## 2018-02-22 DIAGNOSIS — S63.501A WRIST SPRAIN, RIGHT, INITIAL ENCOUNTER: ICD-10-CM

## 2018-02-22 DIAGNOSIS — M81.0 OSTEOPOROSIS: ICD-10-CM

## 2018-02-22 PROCEDURE — 99213 OFFICE O/P EST LOW 20 MIN: CPT | Performed by: PHYSICIAN ASSISTANT

## 2018-02-22 PROCEDURE — 77080 DXA BONE DENSITY AXIAL: CPT

## 2018-02-22 PROCEDURE — G0463 HOSPITAL OUTPT CLINIC VISIT: HCPCS | Mod: 25

## 2018-02-22 PROCEDURE — 73110 X-RAY EXAM OF WRIST: CPT | Mod: RT

## 2018-02-22 NOTE — ED PROVIDER NOTES
Chief Complaint:    Chief Complaint   Patient presents with     Wrist Pain     fell 2 days ago, right wrist pain       HPI:    Mann Escobar is a 77 year old male who sustained a right wrist injury 2 days ago. Mechanism of injury: Patient fell and landed on outstretched arm. Immediate symptoms: immediate pain, immediate swelling. Symptoms have been improving since that time. Prior history of related problems: no prior problems with this area in the past.    ROS: Further problem focused system review was otherwise negative.     Physical Exam:     Vital signs were reviewed and noted by me.  /80  Temp 98  F (36.7  C) (Oral)  Resp 16  Wt 83 kg (182 lb 15.7 oz)  SpO2 98%  BMI 33.46 kg/m2    General appearance: healthy, alert and no distress  Ears: R TM - normal: no effusions, no erythema, and normal landmarks, L TM - normal: no effusions, no erythema, and normal landmarks  Eyes: R normal, L normal  Nose: normal  Oropharynx: normal  Neck: supple and no adenopathy  Lungs: normal and clear to auscultation  Heart: S1, S2 normal, no murmur, click, rub or gallop, regular rate and rhythm  Hand and wrist exam: no deformity, swelling, or ecchymosis.  No instability; ligaments intact, FROM all hand, wrist, finger joints.  Full range of motion of wrist.  Digits are neurologically intact.   strength 5/5    X-ray per radiology read:     Results for orders placed or performed during the hospital encounter of 02/22/18   Wrist XR, G/E 3 views, right    Narrative    XR WRIST RT G/E 3 VW 2/22/2018 3:36 PM    COMPARISON: None.    HISTORY: Wrist pain after fall 2 days ago.      Impression    IMPRESSION:   1. Osteopenia about the right wrist. No fractures are seen.  2. Degenerative changes at the first carpometacarpal joint, first  metacarpophalangeal joint triscaphe joint and radiocarpal joint.  3. Widening of the scapholunate interval, indicating scapholunate  ligament disruption.    VINH WALTERS MD           ASSESSMENT:    (X72.152O) Wrist sprain, right, initial encounter       PLAN:     Discussed imaging with patient.  XR was negative for any acute fracture.  Patient has wrist brace at home that he will start using.  Advised ice, ad elevation.  Patient will follow up with his PCP in 2 weeks if symptoms are not improving.     Boogie Cameron  2/22/2018, 3:17 PM       Boogie Cameron PA-C  02/22/18 1556

## 2018-02-22 NOTE — ED AVS SNAPSHOT
[facility] Emergency Department    [address] [address]    Phone:  [phone]    Fax:  [phone]                                       Mann Escobar   MRN: [id number]    Department:  [facility] Emergency Department   Date of Visit:  2/22/2018           After Visit Summary Signature Page     I have received my discharge instructions, and my questions have been answered. I have discussed any challenges I see with this plan with the nurse or doctor.    ..........................................................................................................................................  Patient/Patient Representative Signature      ..........................................................................................................................................  Patient Representative Print Name and Relationship to Patient    ..................................................               ................................................  Date                                            Time    ..........................................................................................................................................  Reviewed by Signature/Title    ...................................................              ..............................................  Date                                                            Time

## 2018-02-22 NOTE — ED AVS SNAPSHOT
[facility] Emergency Department    [address] [facility] [address]    Phone:  [phone]    Fax:  [phone]                                       Mann Escobar   MRN: [id number]    Department:  [facility] Emergency Department   Date of Visit:  2/22/2018           Patient Information     Date Of Birth          1940        Your diagnoses for this visit were:     Wrist sprain, right, initial encounter        You were seen by Boogie Cameron PA-C.        Discharge Instructions         Self-Care for Strains and Sprains  Most minor strains and sprains can be treated with self-care. Recovering from a strain or sprain may take 6 to 8 weeks. Your self-care goal is to reduce pain and immobilize the injury to speed healing.     A sprain injures ligaments (tissue that connects bones to bones).        A strain injures muscles or tendons (tissue that connects muscles to bones).   Support the injured area  Wrapping the injured area provides support for short, necessary activities. Be careful not to wrap the area too tightly. This could cut off the blood supply.    Support a wrist, elbow, or shoulder with a sling.    Wrap an ankle or knee with an elastic bandage.    Tape a finger or toe to the one next to it.  Use cold and heat  Cold reduces swelling. Both cold and heat reduce pain. Heat should not be used in the initial treatment of the injury. When using cold or heat, always place a towel between the pack and your skin.    Apply ice or a cold pack 10 to 15 minutes every hour you re awake for the first 2 days.    After the swelling goes down, use cold or heat to control pain. Don t use heat late in the day, since it can cause swelling when you re not active.  Rest and elevate  Rest and elevation help your injury heal faster.    Raise the injured area above your heart level.    Keep the injured area from moving.    Limit the use of the joint or limb.  Use medicine    Aspirin reduces  pain and swelling. (Note: Don t give aspirin to a child 18 or younger unless prescribed by the doctor.)    Aspirin substitutes, such as ibuprofen, can reduce pain. Some substitutes reduce swelling, too. Ask your pharmacist which substitutes you can use.  Call your doctor if:    The injured joint won t move, or bones make a grating sound when they move.    You can t put weight on the injured area, even after 24 hours.    The injured body part is cold, blue, or numb.    The joint or limb appears bent or crooked.    Pain increases or doesn t improve in 4 days.    When pressing along the injured area, you notice a spot that is especially painful.   Date Last Reviewed: 9/29/2015 2000-2017 [employer]. [address] [place] [address], [place] [address]. All rights reserved. This information is not intended as a substitute for professional medical care. Always follow your healthcare professional's instructions.          Future Appointments        Provider Department Dept Phone Center    4/5/2018 1:30 PM NB Lab [facility] [phone] [facility]    4/9/2018 1:30 PM [place] chemo therapy [facility] [phone] [facility]      24 Hour Appointment Hotline       To make an appointment at any [facility], call [phone] ([phone]). If you don't have a family doctor or clinic, we will help you find one. [facility] are conveniently located to serve the needs of you and your family.          ED Discharge Orders     Titan W[employer]                    Review of your medicines      Our records show that you are taking the medicines listed below. If these are incorrect, please call your family doctor or clinic.        Dose / Directions Last dose taken    aspirin 81 MG EC tablet   Dose:  81 mg   Quantity:  90 tablet        Take 1 tablet (81 mg) by mouth daily   Refills:  3        ferrous fumarate 65 mg ([place]. FE)-Vitamin C 125 mg  MG Tabs tablet   Commonly known as:   VITRON C   Dose:  1 tablet   Quantity:  60 tablet        Take 1 tablet by mouth daily   Refills:  1        HCA VITAMIN B12 500 MCG Tabs   Generic drug:  cyanocobalamin        2 tablets daily   Refills:  0        lisinopril 10 MG tablet   Commonly known as:  PRINIVIL/ZESTRIL   Dose:  10 mg   Quantity:  30 tablet        Take 1 tablet (10 mg) by mouth daily   Refills:  1        MULTIVITAMIN PO        one daily   Refills:  0        omeprazole 20 MG CR capsule   Commonly known as:  priLOSEC   Dose:  20 mg   Quantity:  90 capsule        Take 1 capsule (20 mg) by mouth daily   Refills:  0        oxyCODONE IR 5 MG tablet   Commonly known as:  ROXICODONE   Dose:  5 mg   Quantity:  60 tablet        Take 1 tablet (5 mg) by mouth 2 times daily as needed for pain   Refills:  0        senna-docusate 8.6-50 MG per tablet   Commonly known as:  SENOKOT-S;PERICOLACE   Dose:  1-2 tablet   Quantity:  60 tablet        Take 1-2 tablets by mouth 2 times daily as needed for constipation.   Refills:  0        terazosin 5 MG capsule   Commonly known as:  HYTRIN   Dose:  5 mg   Quantity:  90 capsule        Take 1 capsule (5 mg) by mouth At Bedtime   Refills:  3        triamterene-hydrochlorothiazide 75-50 MG per tablet   Commonly known as:  MAXZIDE   Dose:  0.5 tablet   Quantity:  45 tablet        Take 0.5 tablets by mouth daily   Refills:  3        VIACTIV PO        With D 1000mg calcium   Refills:  0                Procedures and tests performed during your visit     Wrist XR, G/E 3 views, right      Orders Needing Specimen Collection     None      Pending Results     No orders found from 2/20/2018 to 2/23/2018.            Pending Culture Results     No orders found from 2/20/2018 to 2/23/2018.            Pending Results Instructions     If you had any lab results that were not finalized at the time of your Discharge, you can call the ED Lab Result RN at [phone]. You will be contacted by this team for any positive Lab results or  "changes in treatment. The nurses are available 7 days a week from 10A to 6:30P.  You can leave a message 24 hours per day and they will return your call.        Test Results From Your Hospital Stay        2018  3:41 PM      Narrative     XR WRIST RT G/E 3 VW 2018 3:36 PM    COMPARISON: None.    HISTORY: Wrist pain after fall 2 days ago.        Impression     IMPRESSION:   1. Osteopenia about the right wrist. No fractures are seen.  2. Degenerative changes at the first carpometacarpal joint, first  metacarpophalangeal joint triscaphe joint and radiocarpal joint.  3. Widening of the scapholunate interval, indicating scapholunate  ligament disruption.    VINH WALTERS MD                Thank you for choosing [facility]       Thank you for choosing [facility] for your care. Our goal is always to provide you with excellent care. Hearing back from our patients is one way we can continue to improve our services. Please take a few minutes to complete the written survey that you may receive in the mail after you visit with us. Thank you!        [employer] Information     [employer] lets you send messages to your doctor, view your test results, renew your prescriptions, schedule appointments and more. To sign up, go to [email or web address] . Click on \"Log in\" on the left side of the screen, which will take you to the Welcome page. Then click on \"Sign up Now\" on the right side of the page.     You will be asked to enter the access code listed below, as well as some personal information. Please follow the directions to create your username and password.     Your access code is: [email or web address]  Expires: 2018 11:46 AM     Your access code will  in 90 days. If you need help or a new code, please call your [facility] clinic or [phone].        Care EveryWhere ID     This is your Care EveryWhere ID. This could be used by other organizations to access your [facility] medical records  [id number]        Equal Access to " Services     [facility]: Roya Samuels, wahusamda luqadaha, qaybta kadeny galeas. So [facility] [phone].    ATENCIÓN: Si habla español, tiene a hewitt disposición servicios gratuitos de asistencia lingüística. Llame al [phone].    We comply with applicable federal civil rights laws and [place] laws. We do not discriminate on the basis of race, color, national origin, age, disability, sex, sexual orientation, or gender identity.            After Visit Summary       This is your record. Keep this with you and show to your community pharmacist(s) and doctor(s) at your next visit.

## 2018-02-22 NOTE — DISCHARGE INSTRUCTIONS
Self-Care for Strains and Sprains  Most minor strains and sprains can be treated with self-care. Recovering from a strain or sprain may take 6 to 8 weeks. Your self-care goal is to reduce pain and immobilize the injury to speed healing.     A sprain injures ligaments (tissue that connects bones to bones).        A strain injures muscles or tendons (tissue that connects muscles to bones).   Support the injured area  Wrapping the injured area provides support for short, necessary activities. Be careful not to wrap the area too tightly. This could cut off the blood supply.    Support a wrist, elbow, or shoulder with a sling.    Wrap an ankle or knee with an elastic bandage.    Tape a finger or toe to the one next to it.  Use cold and heat  Cold reduces swelling. Both cold and heat reduce pain. Heat should not be used in the initial treatment of the injury. When using cold or heat, always place a towel between the pack and your skin.    Apply ice or a cold pack 10 to 15 minutes every hour you re awake for the first 2 days.    After the swelling goes down, use cold or heat to control pain. Don t use heat late in the day, since it can cause swelling when you re not active.  Rest and elevate  Rest and elevation help your injury heal faster.    Raise the injured area above your heart level.    Keep the injured area from moving.    Limit the use of the joint or limb.  Use medicine    Aspirin reduces pain and swelling. (Note: Don t give aspirin to a child 18 or younger unless prescribed by the doctor.)    Aspirin substitutes, such as ibuprofen, can reduce pain. Some substitutes reduce swelling, too. Ask your pharmacist which substitutes you can use.  Call your doctor if:    The injured joint won t move, or bones make a grating sound when they move.    You can t put weight on the injured area, even after 24 hours.    The injured body part is cold, blue, or numb.    The joint or limb appears bent or crooked.    Pain increases  or doesn t improve in 4 days.    When pressing along the injured area, you notice a spot that is especially painful.   Date Last Reviewed: 9/29/2015 2000-2017 [employer]. [address] [place] [address], [place] [address]. All rights reserved. This information is not intended as a substitute for professional medical care. Always follow your healthcare professional's instructions.

## 2018-02-23 RX ORDER — TERAZOSIN 5 MG/1
CAPSULE ORAL
Qty: 90 CAPSULE | Refills: 0 | Status: SHIPPED | OUTPATIENT
Start: 2018-02-23 | End: 2018-05-01

## 2018-02-23 RX ORDER — TRIAMTERENE AND HYDROCHLOROTHIAZIDE 75; 50 MG/1; MG/1
TABLET ORAL
Qty: 45 TABLET | Refills: 0 | Status: SHIPPED | OUTPATIENT
Start: 2018-02-23 | End: 2018-05-03

## 2018-03-02 ENCOUNTER — OFFICE VISIT (OUTPATIENT)
Dept: FAMILY MEDICINE | Facility: CLINIC | Age: 78
End: 2018-03-02
Payer: COMMERCIAL

## 2018-03-02 ENCOUNTER — TRANSFERRED RECORDS (OUTPATIENT)
Dept: HEALTH INFORMATION MANAGEMENT | Facility: CLINIC | Age: 78
End: 2018-03-02

## 2018-03-02 VITALS
WEIGHT: 180 LBS | HEART RATE: 64 BPM | TEMPERATURE: 98.2 F | DIASTOLIC BLOOD PRESSURE: 79 MMHG | SYSTOLIC BLOOD PRESSURE: 134 MMHG | BODY MASS INDEX: 33.13 KG/M2 | HEIGHT: 62 IN

## 2018-03-02 DIAGNOSIS — G89.29 CHRONIC LEFT-SIDED LOW BACK PAIN WITH LEFT-SIDED SCIATICA: ICD-10-CM

## 2018-03-02 DIAGNOSIS — M54.42 CHRONIC LEFT-SIDED LOW BACK PAIN WITH LEFT-SIDED SCIATICA: ICD-10-CM

## 2018-03-02 DIAGNOSIS — I10 BENIGN ESSENTIAL HYPERTENSION: ICD-10-CM

## 2018-03-02 PROCEDURE — 99214 OFFICE O/P EST MOD 30 MIN: CPT | Performed by: NURSE PRACTITIONER

## 2018-03-02 RX ORDER — OXYCODONE HYDROCHLORIDE 5 MG/1
5 TABLET ORAL 2 TIMES DAILY PRN
Qty: 60 TABLET | Refills: 0 | Status: SHIPPED | OUTPATIENT
Start: 2018-05-04 | End: 2018-05-22

## 2018-03-02 RX ORDER — OXYCODONE HYDROCHLORIDE 5 MG/1
5 TABLET ORAL 2 TIMES DAILY PRN
Qty: 60 TABLET | Refills: 0 | Status: SHIPPED | OUTPATIENT
Start: 2018-03-05 | End: 2018-03-02

## 2018-03-02 RX ORDER — LISINOPRIL 10 MG/1
10 TABLET ORAL DAILY
Qty: 90 TABLET | Refills: 3 | Status: SHIPPED | OUTPATIENT
Start: 2018-03-02 | End: 2018-05-22

## 2018-03-02 RX ORDER — OXYCODONE HYDROCHLORIDE 5 MG/1
5 TABLET ORAL 2 TIMES DAILY PRN
Qty: 60 TABLET | Refills: 0 | Status: SHIPPED | OUTPATIENT
Start: 2018-04-04 | End: 2018-03-02

## 2018-03-02 NOTE — MR AVS SNAPSHOT
After Visit Summary   3/2/2018    Mann Escobar    MRN: [id number]           Patient Information     Date Of Birth          1940        Visit Information        Provider Department      3/2/2018 10:40 AM Shayy Ramírez APRN CNP [facility]        Today's Diagnoses     Chronic left-sided low back pain with left-sided sciatica        Benign essential hypertension          Care Instructions          Thank you for choosing [facility].  You may be receiving a survey in the mail from Edith Linares regarding your visit today.  Please take a few minutes to complete and return the survey to let us know how we are doing.      If you have questions or concerns, please contact us via [employer] or you can contact your care team at [phone].    Our Clinic hours are:  Monday 6:40 am  to 7:00 pm  Tuesday -Friday 6:40 am to 5:00 pm    The [place] outpatient lab hours are:  Monday - Friday 6:10 am to 4:45 pm  Saturdays 7:00 am to 11:00 am  Appointments are required, call [phone]    If you have clinical questions after hours or would like to schedule an appointment,  call the clinic at [phone].            Follow-ups after your visit        Your next 10 appointments already scheduled     Apr 05, 2018  1:30 PM CDT   LAB with NB LAB   [facility]    [address] [place] [address] [phone]           Please do not eat 10-12 hours before your appointment if you are coming in fasting for labs on lipids, cholesterol, or glucose (sugar). This does not apply to pregnant women. Water, hot tea and black coffee (with nothing added) are okay. Do not drink other fluids, diet soda or chew gum.            Apr 09, 2018  1:30 PM CDT   Level 1 with ROOM 3 [facility] ([facility])    [facility]  [address]  [facility] [id number] [phone]  "             Who to contact     If you have questions or need follow up information about today's clinic visit or your schedule please contact [facility] directly at [phone].  Normal or non-critical lab and imaging results will be communicated to you by MyChart, letter or phone within 4 business days after the clinic has received the results. If you do not hear from us within 7 days, please contact the clinic through MyChart or phone. If you have a critical or abnormal lab result, we will notify you by phone as soon as possible.  Submit refill requests through [employer] or call your pharmacy and they will forward the refill request to us. Please allow 3 business days for your refill to be completed.          Additional Information About Your Visit        [employer] Information     [employer] lets you send messages to your doctor, view your test results, renew your prescriptions, schedule appointments and more. To sign up, go to [email or web address] . Click on \"Log in\" on the left side of the screen, which will take you to the Welcome page. Then click on \"Sign up Now\" on the right side of the page.     You will be asked to enter the access code listed below, as well as some personal information. Please follow the directions to create your username and password.     Your access code is: [email or web address]  Expires: 2018 11:46 AM     Your access code will  in 90 days. If you need help or a new code, please call your [place] clinic or [phone].        Care EveryWhere ID     This is your Care EveryWhere ID. This could be used by other organizations to access your [facility] medical records  [id number]        Your Vitals Were     Pulse Temperature Height BMI (Body Mass Index)          64 98.2  F (36.8  C) (Tympanic) 5' 2\" (1.575 m) 32.92 kg/m2         Blood Pressure from Last 3 Encounters:   18 134/79   18 159/80   18 147/75    Weight from Last 3 Encounters:   18 180 lb (81.6 " kg)   02/22/18 182 lb 15.7 oz (83 kg)   02/08/18 183 lb (83 kg)              Today, you had the following     No orders found for display         Today's Medication Changes          These changes are accurate as of 3/2/18  1:57 PM.  If you have any questions, ask your nurse or doctor.               Start taking these medicines.        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  Ask your nurse if you have questions.     Bring a paper prescription for each of these medications     oxyCODONE IR 5 MG tablet                Primary Care Provider Office Phone # Fax #    GUNJAN Villarreal [phone] [address] [facility] [address]        Equal Access to Services     [facility] MC[facility] AH: Hadii petar ku hadasho Soomaali, waaxda luqadaha, qaybta kaalmada adeegyada, waxyessy benjamin. So [facility] [phone].    ATENCIÓN: Si habla español, tiene a hewitt disposición servicios gratuitos de asistencia lingüística. Llame al [phone].    We comply with applicable federal civil rights laws and [place] laws. We do not discriminate on the basis of race, color, national origin, age, disability, sex, sexual orientation, or gender identity.            Thank you!     Thank you for choosing [facility]  for your care. Our goal is always to  provide you with excellent care. Hearing back from our patients is one way we can continue to improve our services. Please take a few minutes to complete the written survey that you may receive in the mail after your visit with us. Thank you!             Your Updated Medication List - Protect others around you: Learn how to safely use, store and throw away your medicines at www.disposemymeds.org.          This list is accurate as of 3/2/18  1:57 PM.  Always use your most recent med list.                   Brand Name Dispense Instructions for use Diagnosis    aspirin 81 MG EC tablet     90 tablet    Take 1 tablet (81 mg) by mouth daily        ferrous fumarate 65 mg ([place]. FE)-Vitamin C 125 mg  MG Tabs tablet    VITRON C    60 tablet    Take 1 tablet by mouth daily    Iron deficiency anemia, unspecified iron deficiency anemia type       HCA VITAMIN B12 500 MCG Tabs   Generic drug:  cyanocobalamin      2 tablets daily        lisinopril 10 MG tablet    PRINIVIL/ZESTRIL    90 tablet    Take 1 tablet (10 mg) by mouth daily    Benign essential hypertension       MULTIVITAMIN PO      one daily        omeprazole 20 MG CR capsule    priLOSEC    90 capsule    Take 1 capsule (20 mg) by mouth daily    Gastric ulcer, unspecified chronicity, unspecified whether gastric ulcer hemorrhage or perforation present       oxyCODONE IR 5 MG tablet   Start taking on:  5/4/2018    ROXICODONE    60 tablet    Take 1 tablet (5 mg) by mouth 2 times daily as needed for pain    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    Spondylolisthesis of lumbar region       terazosin 5 MG capsule    HYTRIN    90 capsule    TAKE 1 CAPSULE AT BEDTIME    Benign non-nodular prostatic hyperplasia with lower urinary tract symptoms       triamterene-hydrochlorothiazide 75-50 MG per tablet     MAXZIDE    45 tablet    TAKE 1/2 TABLET EVERY DAY    Benign essential hypertension       VIACTIV PO      With D 1000mg calcium

## 2018-03-02 NOTE — PROGRESS NOTES
"  SUBJECTIVE:   Mann Escobar is a 77 year old male who presents to clinic today for the following health issues:      Hypertension Follow-up      Outpatient blood pressures are being checked at home.  Results are 156/ 81 average;    162 79 this morning    133 80; 160 86;  148 78; 137 74.    Low Salt Diet: not monitoring salt    Back Pain Follow Up  Refill medication    Description:   Location of pain:  bilateral  Character of pain: sharp and dull ache  Pain radiation: radiates into the left buttocks  Since last visit, pain is:  unchanged  New numbness or weakness in legs, not attributed to pain:  YES    Intensity: Currently 4-5/10    History:   Pain interferes with job: Not applicable  Therapies tried without relief: cold, heat, Physical Therapy and steroid injection  fusion  Therapies tried with relief: rest and opioids         Accompanying Signs & Symptoms:  Risk of Fracture:  None  Risk of Cauda Equina:  None  Risk of Infection:  None  Risk of Cancer:  None        Amount of exercise or physical activity: None    Problems taking medications regularly: No    Medication side effects: none    Diet: regular (no restrictions)          Problem list and histories reviewed & adjusted, as indicated.  Additional history: as documented      Reviewed and updated as needed this visit by clinical staff  Tobacco  Allergies  Meds       Reviewed and updated as needed this visit by Provider         ROS:  Constitutional, HEENT, cardiovascular, pulmonary, gi and gu systems are negative, except as otherwise noted.    OBJECTIVE:     /79 (BP Location: Right arm)  Pulse 64  Temp 98.2  F (36.8  C) (Tympanic)  Ht 5' 2\" (1.575 m)  Wt 180 lb (81.6 kg)  BMI 32.92 kg/m2  Body mass index is 32.92 kg/(m^2).  GENERAL: healthy, alert and no distress  RESP: lungs clear to auscultation - no rales, rhonchi or wheezes  CV: regular rate and rhythm, normal S1 S2, no S3 or S4, no murmur, click or rub, no peripheral edema and " peripheral pulses strong      ASSESSMENT/PLAN:       ICD-10-CM    1. Chronic left-sided low back pain with left-sided sciatica M54.42 Refilled x3 months.  Compliant with contract.  oxyCODONE IR (ROXICODONE) 5 MG tablet    G89.29 DISCONTINUED: oxyCODONE IR (ROXICODONE) 5 MG tablet     DISCONTINUED: oxyCODONE IR (ROXICODONE) 5 MG tablet     2. Benign essential hypertension I10 Much improved.  Continue lisinopril (PRINIVIL/ZESTRIL) 10 MG tablet         The risks, benefits and treatment options of prescribed medications or other treatments have been discussed with the patient. The patient verbalized their understanding and should call or follow up if no improvement or if they develop further problems.    GUNJAN Vinson [facility]

## 2018-03-02 NOTE — NURSING NOTE
"Chief Complaint   Patient presents with     Refill Request     chronic pain     Hypertension     bp check       Initial /79 (BP Location: Right arm)  Pulse 64  Temp 98.2  F (36.8  C) (Tympanic)  Ht 5' 2\" (1.575 m)  Wt 180 lb (81.6 kg)  BMI 32.92 kg/m2 Estimated body mass index is 32.92 kg/(m^2) as calculated from the following:    Height as of this encounter: 5' 2\" (1.575 m).    Weight as of this encounter: 180 lb (81.6 kg).  Medication Reconciliation: complete  "

## 2018-03-02 NOTE — PATIENT INSTRUCTIONS
Thank you for choosing [facility].  You may be receiving a survey in the mail from Edith Linares regarding your visit today.  Please take a few minutes to complete and return the survey to let us know how we are doing.      If you have questions or concerns, please contact us via [employer] or you can contact your care team at [phone].    Our Clinic hours are:  Monday 6:40 am  to 7:00 pm  Tuesday -Friday 6:40 am to 5:00 pm    The [place] outpatient lab hours are:  Monday - Friday 6:10 am to 4:45 pm  Saturdays 7:00 am to 11:00 am  Appointments are required, call [phone]    If you have clinical questions after hours or would like to schedule an appointment,  call the clinic at [phone].

## 2018-04-05 DIAGNOSIS — D50.9 IRON DEFICIENCY ANEMIA, UNSPECIFIED IRON DEFICIENCY ANEMIA TYPE: ICD-10-CM

## 2018-04-05 LAB — HGB BLD-MCNC: 10 G/DL (ref 13.3–17.7)

## 2018-04-05 PROCEDURE — 85018 HEMOGLOBIN: CPT | Performed by: FAMILY MEDICINE

## 2018-04-05 PROCEDURE — 82728 ASSAY OF FERRITIN: CPT | Performed by: FAMILY MEDICINE

## 2018-04-05 PROCEDURE — 36415 COLL VENOUS BLD VENIPUNCTURE: CPT | Performed by: FAMILY MEDICINE

## 2018-04-06 LAB — FERRITIN SERPL-MCNC: 148 NG/ML (ref 26–388)

## 2018-05-01 DIAGNOSIS — N40.1 BENIGN NON-NODULAR PROSTATIC HYPERPLASIA WITH LOWER URINARY TRACT SYMPTOMS: ICD-10-CM

## 2018-05-02 RX ORDER — TERAZOSIN 5 MG/1
CAPSULE ORAL
Qty: 90 CAPSULE | Refills: 0 | Status: SHIPPED | OUTPATIENT
Start: 2018-05-02 | End: 2018-11-21

## 2018-05-02 NOTE — TELEPHONE ENCOUNTER
"Requested Prescriptions   Signed Prescriptions Disp Refills     terazosin (HYTRIN) 5 MG capsule 90 capsule 0     Sig: TAKE 1 CAPSULE AT BEDTIME    Alpha Blockers Passed    5/2/2018 11:15 AM       Passed - Blood pressure under 140/90 in past 12 months    BP Readings from Last 3 Encounters:   03/02/18 134/79   02/22/18 159/80   02/08/18 147/75                Passed - Recent (12 mo) or future (30 days) visit within the authorizing provider's specialty    Patient had office visit in the last 12 months or has a visit in the next 30 days with authorizing provider or within the authorizing provider's specialty.  See \"Patient Info\" tab in inbasket, or \"Choose Columns\" in Meds & Orders section of the refill encounter.           Passed - Patient does not have Tadalafil, Vardenafil, or Sildenafil on their medication list       Passed - Patient is 18 years of age or older        Prescription approved per [facility] Refill Protocol.    Jo Ann MORALES Rn    "

## 2018-05-03 DIAGNOSIS — I10 BENIGN ESSENTIAL HYPERTENSION: ICD-10-CM

## 2018-05-03 RX ORDER — TRIAMTERENE AND HYDROCHLOROTHIAZIDE 75; 50 MG/1; MG/1
0.5 TABLET ORAL DAILY
Qty: 45 TABLET | Refills: 1 | Status: SHIPPED | OUTPATIENT
Start: 2018-05-03 | End: 2019-05-31

## 2018-05-03 NOTE — TELEPHONE ENCOUNTER
Routing refill request to provider for review/approval because:  Labs out of range    Alba Weinberg RN

## 2018-05-03 NOTE — TELEPHONE ENCOUNTER
TRIAMTERENE/HYDROCHLOROTHIAZIDE 75-50 MG Tablet  Last Written Prescription Date:  2/23/2018  Last Fill Quantity: 45,  # refills: 0   Last office visit: 3/2/2018 with prescribing provider:  ORLY   Future Office Visit:

## 2018-05-22 ENCOUNTER — OFFICE VISIT (OUTPATIENT)
Dept: FAMILY MEDICINE | Facility: CLINIC | Age: 78
End: 2018-05-22
Payer: COMMERCIAL

## 2018-05-22 VITALS
HEIGHT: 62 IN | DIASTOLIC BLOOD PRESSURE: 75 MMHG | SYSTOLIC BLOOD PRESSURE: 161 MMHG | TEMPERATURE: 98.3 F | HEART RATE: 77 BPM | RESPIRATION RATE: 16 BRPM

## 2018-05-22 DIAGNOSIS — G89.29 CHRONIC LEFT-SIDED LOW BACK PAIN WITH LEFT-SIDED SCIATICA: ICD-10-CM

## 2018-05-22 DIAGNOSIS — M54.42 CHRONIC LEFT-SIDED LOW BACK PAIN WITH LEFT-SIDED SCIATICA: ICD-10-CM

## 2018-05-22 DIAGNOSIS — I10 BENIGN ESSENTIAL HYPERTENSION: ICD-10-CM

## 2018-05-22 PROCEDURE — 99214 OFFICE O/P EST MOD 30 MIN: CPT | Performed by: NURSE PRACTITIONER

## 2018-05-22 RX ORDER — OXYCODONE HYDROCHLORIDE 5 MG/1
5 TABLET ORAL 2 TIMES DAILY PRN
Qty: 60 TABLET | Refills: 0 | Status: SHIPPED | OUTPATIENT
Start: 2018-07-04 | End: 2018-05-22

## 2018-05-22 RX ORDER — LISINOPRIL 20 MG/1
20 TABLET ORAL DAILY
Qty: 90 TABLET | Refills: 3 | Status: SHIPPED | OUTPATIENT
Start: 2018-05-22 | End: 2018-08-30

## 2018-05-22 RX ORDER — OXYCODONE HYDROCHLORIDE 5 MG/1
5 TABLET ORAL 2 TIMES DAILY PRN
Qty: 60 TABLET | Refills: 0 | Status: SHIPPED | OUTPATIENT
Start: 2018-06-04 | End: 2018-05-22

## 2018-05-22 RX ORDER — OXYCODONE HYDROCHLORIDE 5 MG/1
5 TABLET ORAL 2 TIMES DAILY PRN
Qty: 60 TABLET | Refills: 0 | Status: SHIPPED | OUTPATIENT
Start: 2018-08-04 | End: 2018-08-30

## 2018-05-22 ASSESSMENT — ANXIETY QUESTIONNAIRES
2. NOT BEING ABLE TO STOP OR CONTROL WORRYING: NOT AT ALL
5. BEING SO RESTLESS THAT IT IS HARD TO SIT STILL: NOT AT ALL
3. WORRYING TOO MUCH ABOUT DIFFERENT THINGS: NOT AT ALL
GAD7 TOTAL SCORE: 0
1. FEELING NERVOUS, ANXIOUS, OR ON EDGE: NOT AT ALL
7. FEELING AFRAID AS IF SOMETHING AWFUL MIGHT HAPPEN: NOT AT ALL
6. BECOMING EASILY ANNOYED OR IRRITABLE: NOT AT ALL

## 2018-05-22 ASSESSMENT — PATIENT HEALTH QUESTIONNAIRE - PHQ9: 5. POOR APPETITE OR OVEREATING: NOT AT ALL

## 2018-05-22 ASSESSMENT — PAIN SCALES - GENERAL: PAINLEVEL: MODERATE PAIN (5)

## 2018-05-22 NOTE — PATIENT INSTRUCTIONS
Increase lisinopril to 20 mg daily.        Thank you for choosing [facility].  You may be receiving a survey in the mail from [facility] regarding your visit today.  Please take a few minutes to complete and return the survey to let us know how we are doing.      If you have questions or concerns, please contact us via [email or web address] or you can contact your care team at [phone].    Our Clinic hours are:  Monday 6:40 am  to 7:00 pm  Tuesday -Friday 6:40 am to 5:00 pm    The [place] outpatient lab hours are:  Monday - Friday 6:10 am to 4:45 pm  Saturdays 7:00 am to 11:00 am  Appointments are required, call [phone]    If you have clinical questions after hours or would like to schedule an appointment,  call the clinic at [phone].

## 2018-05-22 NOTE — MR AVS SNAPSHOT
After Visit Summary   5/22/2018    Mann Escobar    MRN: [id number]           Patient Information     Date Of Birth          1940        Visit Information        Provider Department      5/22/2018 10:40 AM Shayy Ramírez APRN [facility]        Today's Diagnoses     Chronic left-sided low back pain with left-sided sciatica        Benign essential hypertension          Care Instructions    Increase lisinopril to 20 mg daily.        Thank you for choosing [facility].  You may be receiving a survey in the mail from Edith Linares regarding your visit today.  Please take a few minutes to complete and return the survey to let us know how we are doing.      If you have questions or concerns, please contact us via Channel IQ or you can contact your care team at [phone].    Our Clinic hours are:  Monday 6:40 am  to 7:00 pm  Tuesday -Friday 6:40 am to 5:00 pm    The [place] outpatient lab hours are:  Monday - Friday 6:10 am to 4:45 pm  Saturdays 7:00 am to 11:00 am  Appointments are required, call [phone]    If you have clinical questions after hours or would like to schedule an appointment,  call the clinic at [phone].            Follow-ups after your visit        Your next 10 appointments already scheduled     Jul 06, 2018  1:30 PM CDT   LAB with NB LAB   [facility] ([facility]    [address] [place] [id number] [phone]           Please do not eat 10-12 hours before your appointment if you are coming in fasting for labs on lipids, cholesterol, or glucose (sugar). This does not apply to pregnant women. Water, hot tea and black coffee (with nothing added) are okay. Do not drink other fluids, diet soda or chew gum.            Jul 09, 2018  1:00 PM CDT   Level 1 with ROOM 10 [facility] ([facility])    [facility]  [address] [facility]  [address]  [facility] [address]   [phone]              Who to contact     If you have questions or need follow up information about today's clinic visit or your schedule please contact [facility] directly at [phone].  Normal or non-critical lab and imaging results will be communicated to you by MyChart, letter or phone within 4 business days after the clinic has received the results. If you do not hear from us within 7 days, please contact the clinic through MyChart or phone. If you have a critical or abnormal lab result, we will notify you by phone as soon as possible.  Submit refill requests through Ulmart or call your pharmacy and they will forward the refill request to us. Please allow 3 business days for your refill to be completed.          Additional Information About Your Visit        MyCharLancope Information     Ulmart lets you send messages to your doctor, view your test results, renew your prescriptions, schedule appointments and more. To sign up, go to [email or web address] . Click on \"Log in\" on the left side of the screen, which will take you to the Welcome page. Then click on \"Sign up Now\" on the right side of the page.     You will be asked to enter the access code listed below, as well as some personal information. Please follow the directions to create your username and password.     Your access code is: [email or web address]  Expires: 7/10/2018  9:44 AM     Your access code will  in 90 days. If you need help or a new code, please call your [facility] or [phone].        Care EveryWhere ID     This is your Care EveryWhere ID. This could be used by other organizations to access your [place] medical records  [id number]        Your Vitals Were     Pulse Temperature Respirations Height          77 98.3  F (36.8  C) (Tympanic) 16 5' 2\" (1.575 m)         Blood Pressure from Last 3 Encounters:   18 161/75   18 134/79   18 159/80    Weight from Last 3 " Encounters:   03/02/18 180 lb (81.6 kg)   02/22/18 182 lb 15.7 oz (83 kg)   02/08/18 183 lb (83 kg)              Today, you had the following     No orders found for display         Today's Medication Changes          These changes are accurate as of 5/22/18 10:51 AM.  If you have any questions, ask your nurse or doctor.               Start taking these medicines.        Dose/Directions    oxyCODONE IR 5 MG tablet   Commonly known as:  ROXICODONE   Used for:  Chronic left-sided low back pain with left-sided sciatica   Started by:  Shayy Ramírez APRN CNP        Dose:  5 mg   Start taking on:  8/4/2018   Take 1 tablet (5 mg) by mouth 2 times daily as needed for pain   Quantity:  60 tablet   Refills:  0         These medicines have changed or have updated prescriptions.        Dose/Directions    lisinopril 20 MG tablet   Commonly known as:  PRINIVIL/ZESTRIL   This may have changed:    - medication strength  - how much to take   Used for:  Benign essential hypertension   Changed by:  Shayy Ramírez APRN CNP        Dose:  20 mg   Take 1 tablet (20 mg) by mouth daily   Quantity:  90 tablet   Refills:  3            Where to get your medicines      These medications were sent to [facility] [address] [place] [address]     Phone:  [phone]     lisinopril 20 MG tablet         Some of these will need a paper prescription and others can be bought over the counter.  Ask your nurse if you have questions.     Bring a paper prescription for each of these medications     oxyCODONE IR 5 MG tablet               Information about OPIOIDS     PRESCRIPTION OPIOIDS: WHAT YOU NEED TO KNOW   You have a prescription for an opioid (narcotic) pain medicine. Opioids can cause addiction. If you have a history of chemical dependency of any type, you are at a higher risk of becoming addicted to opioids. Only take this medicine after all other  options have been tried. Take it for as short a time and as few doses as possible.     Do not:    Drive. If you drive while taking these medicines, you could be arrested for driving under the influence (DUI).    Operate heavy machinery    Do any other dangerous activities while taking these medicines.     Drink any alcohol while taking these medicines.      Take with any other medicines that contain acetaminophen. Read all labels carefully. Look for the word  acetaminophen  or  Tylenol.  Ask your pharmacist if you have questions or are unsure.    Store your pills in a secure place, locked if possible. We will not replace any lost or stolen medicine. If you don t finish your medicine, please throw away (dispose) as directed by your pharmacist. The [place] Pollution Control Agency has more information about safe disposal: https://www.pca.[facility].mn.us/living-green/managing-unwanted-medications    All opioids tend to cause constipation. Drink plenty of water and eat foods that have a lot of fiber, such as fruits, vegetables, prune juice, apple juice and high-fiber cereal. Take a laxative (Miralax, milk of magnesia, Colace, Senna) if you don t move your bowels at least every other day.          Primary Care Provider Office Phone # Fax #    Shayy GUNJAN [facility] [phone] [address] [facility] [address]        Equal Access to Services     CATRACHO MCBRIDE AH: Hadii aad ku hadasho Soleesa, waaxda luqadaha, qaybta kaalmada adeegyada, deny morris . So [facility] [phone].    ATENCIÓN: Si habla español, tiene a hewitt disposición servicios gratuitos de asistencia lingüística. Llame al [phone].    We comply with applicable federal civil rights laws and [place] laws. We do not discriminate on the basis of race, color, national origin, age, disability, sex, sexual orientation, or gender identity.            Thank you!     Thank you for choosing [facility]  [place]  for your care. Our goal is always to provide you with excellent care. Hearing back from our patients is one way we can continue to improve our services. Please take a few minutes to complete the written survey that you may receive in the mail after your visit with us. Thank you!             Your Updated Medication List - Protect others around you: Learn how to safely use, store and throw away your medicines at www.disposemymeds.org.          This list is accurate as of 5/22/18 10:51 AM.  Always use your most recent med list.                   Brand Name Dispense Instructions for use Diagnosis    aspirin 81 MG EC tablet     90 tablet    Take 1 tablet (81 mg) by mouth daily        ferrous fumarate 65 mg ([place]. FE)-Vitamin C 125 mg  MG Tabs tablet    VITRON C    60 tablet    Take 1 tablet by mouth daily    Iron deficiency anemia, unspecified iron deficiency anemia type       HCA VITAMIN B12 500 MCG Tabs   Generic drug:  cyanocobalamin      2 tablets daily        lisinopril 20 MG tablet    PRINIVIL/ZESTRIL    90 tablet    Take 1 tablet (20 mg) by mouth daily    Benign essential hypertension       MULTIVITAMIN PO      one daily        omeprazole 20 MG CR capsule    priLOSEC    90 capsule    Take 1 capsule (20 mg) by mouth daily    Gastric ulcer, unspecified chronicity, unspecified whether gastric ulcer hemorrhage or perforation present       oxyCODONE IR 5 MG tablet   Start taking on:  8/4/2018    ROXICODONE    60 tablet    Take 1 tablet (5 mg) by mouth 2 times daily as needed for pain    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    Spondylolisthesis of lumbar region       terazosin 5 MG capsule    HYTRIN    90 capsule    TAKE 1 CAPSULE AT BEDTIME    Benign non-nodular prostatic hyperplasia with lower urinary tract symptoms        triamterene-hydrochlorothiazide 75-50 MG per tablet    MAXZIDE    45 tablet    Take 0.5 tablets by mouth daily    Benign essential hypertension       VIACTIV PO      With D 1000mg calcium

## 2018-05-22 NOTE — PROGRESS NOTES
"  SUBJECTIVE:   Mann Escobar is a 77 year old male who presents to clinic today for the following health issues:      Back Pain Follow Up      Description:   Location of pain:  bilateral  Character of pain: stabbing, burning and intense  Pain radiation: Does not radiate  Since last visit, pain is:  unchanged  New numbness or weakness in legs, not attributed to pain:  no     Intensity: Currently 4-5/10, At its worst 10/10    History:   Pain interferes with job: Not applicable, does interfere with regular daily activities  Therapies tried without relief: many things  Therapies tried with relief: inactivity and opioids           Accompanying Signs & Symptoms:  Risk of Fracture:  None  Risk of Cauda Equina:  None  Risk of Infection:  None  Risk of Cancer:  None          Problem list and histories reviewed & adjusted, as indicated.  Additional history: as documented    Reviewed and updated as needed this visit by clinical staff  Tobacco  Allergies  Meds  Med Hx  Surg Hx  Fam Hx  Soc Hx      Reviewed and updated as needed this visit by Provider         ROS:  Constitutional, HEENT, cardiovascular, pulmonary, gi and gu systems are negative, except as otherwise noted.    OBJECTIVE:     /75 (BP Location: Right arm, Patient Position: Chair, Cuff Size: Adult Large)  Pulse 77  Temp 98.3  F (36.8  C) (Tympanic)  Resp 16  Ht 5' 2\" (1.575 m)  There is no height or weight on file to calculate BMI.  GENERAL: healthy, alert and no distress      ASSESSMENT/PLAN:       ICD-10-CM    1. Chronic left-sided low back pain with left-sided sciatica M54.42 Discussed with risks of ongoing opioid use. Patient refusing to lower dose; states that he will take the risk.  Discussed specialty referral, injections or use of other medications such as gabapentin or topicals - he declines.    oxyCODONE IR (ROXICODONE) 5 MG tablet    G89.29 DISCONTINUED: oxyCODONE IR (ROXICODONE) 5 MG tablet     DISCONTINUED: oxyCODONE IR " (ROXICODONE) 5 MG tablet     2. Benign essential hypertension I10 Poorly controlled.  Increase lisinopril to 20 mg daily  Recheck at follow up appointment.    lisinopril (PRINIVIL/ZESTRIL) 20 MG tablet       Patient Instructions   Increase lisinopril to 20 mg daily.        The risks, benefits and treatment options of prescribed medications or other treatments have been discussed with the patient. The patient verbalized their understanding and should call or follow up if no improvement or if they develop further problems.    GUNJAN Vinson [facility]

## 2018-05-23 ASSESSMENT — PATIENT HEALTH QUESTIONNAIRE - PHQ9: SUM OF ALL RESPONSES TO PHQ QUESTIONS 1-9: 1

## 2018-05-23 ASSESSMENT — ANXIETY QUESTIONNAIRES: GAD7 TOTAL SCORE: 0

## 2018-07-06 DIAGNOSIS — D50.9 IRON DEFICIENCY ANEMIA, UNSPECIFIED IRON DEFICIENCY ANEMIA TYPE: ICD-10-CM

## 2018-07-06 LAB
FERRITIN SERPL-MCNC: 88 NG/ML (ref 26–388)
HGB BLD-MCNC: 10.2 G/DL (ref 13.3–17.7)

## 2018-07-06 PROCEDURE — 36415 COLL VENOUS BLD VENIPUNCTURE: CPT | Performed by: INTERNAL MEDICINE

## 2018-07-06 PROCEDURE — 82728 ASSAY OF FERRITIN: CPT | Performed by: INTERNAL MEDICINE

## 2018-07-06 PROCEDURE — 85018 HEMOGLOBIN: CPT | Performed by: INTERNAL MEDICINE

## 2018-07-11 ENCOUNTER — INFUSION THERAPY VISIT (OUTPATIENT)
Dept: INFUSION THERAPY | Facility: CLINIC | Age: 78
End: 2018-07-11
Attending: INTERNAL MEDICINE
Payer: MEDICARE

## 2018-07-11 VITALS — HEART RATE: 76 BPM | SYSTOLIC BLOOD PRESSURE: 154 MMHG | DIASTOLIC BLOOD PRESSURE: 78 MMHG

## 2018-07-11 DIAGNOSIS — D50.9 IRON DEFICIENCY ANEMIA, UNSPECIFIED IRON DEFICIENCY ANEMIA TYPE: Primary | ICD-10-CM

## 2018-07-11 PROCEDURE — 25000128 H RX IP 250 OP 636: Performed by: INTERNAL MEDICINE

## 2018-07-11 PROCEDURE — 96365 THER/PROPH/DIAG IV INF INIT: CPT

## 2018-07-11 RX ADMIN — FERRIC CARBOXYMALTOSE INJECTION 750 MG: 50 INJECTION, SOLUTION INTRAVENOUS at 14:57

## 2018-07-11 RX ADMIN — SODIUM CHLORIDE 250 ML: 9 INJECTION, SOLUTION INTRAVENOUS at 14:56

## 2018-07-11 NOTE — MR AVS SNAPSHOT
After Visit Summary   7/11/2018    Mann Escobar    MRN: [id number]           Patient Information     Date Of Birth          1940        Visit Information        Provider Department      7/11/2018 2:30 PM ROOM 10 [facility]        Today's Diagnoses     Iron deficiency anemia, unspecified iron deficiency anemia type    -  1       Follow-ups after your visit        Your next 10 appointments already scheduled     Oct 08, 2018 11:00 AM CDT   LAB with NB LAB   [facility] ([facility]    [address] [place] [address]   [phone]           Please do not eat 10-12 hours before your appointment if you are coming in fasting for labs on lipids, cholesterol, or glucose (sugar). This does not apply to pregnant women. Water, hot tea and black coffee (with nothing added) are okay. Do not drink other fluids, diet soda or chew gum.            Oct 10, 2018  2:30 PM CDT   Level 1 with ROOM 10 [facility] ([facility])    [facility]  [address] [address]   [phone]              Who to contact     If you have questions or need follow up information about today's clinic visit or your schedule please contact [facility] directly at [phone].  Normal or non-critical lab and imaging results will be communicated to you by MyChart, letter or phone within 4 business days after the clinic has received the results. If you do not hear from us within 7 days, please contact the clinic through MyChart or phone. If you have a critical or abnormal lab result, we will notify you by phone as soon as possible.  Submit refill requests through [email or web address] or call your pharmacy and they will forward the refill request to us. Please allow 3 business days for your refill to be completed.          Additional Information About Your Visit        Care EveryWhere ID      This is your Care EveryWhere ID. This could be used by other organizations to access your [place] medical records  [id number]        Your Vitals Were     Pulse                   76            Blood Pressure from Last 3 Encounters:   07/11/18 154/78   05/22/18 161/75   03/02/18 134/79    Weight from Last 3 Encounters:   03/02/18 81.6 kg (180 lb)   02/22/18 83 kg (182 lb 15.7 oz)   02/08/18 83 kg (183 lb)              Today, you had the following     No orders found for display       Primary Care Provider Office Phone # Fax #    Shayy Carvajal Adriel Ramírez, APRN [phone] [address] [place] [address]        Equal Access to Services     CATRACHO MCBRIDE : Hadii aad ku hadasho Soomaali, waaxda luqadaha, qaybta kaalmada adeegyada, deny morris . So [facility] [phone].    ATENCIÓN: Si habla español, tiene a hewitt disposición servicios gratuitos de asistencia lingüística. LlPremier Health [phone].    We comply with applicable federal civil rights laws and [place] laws. We do not discriminate on the basis of race, color, national origin, age, disability, sex, sexual orientation, or gender identity.            Thank you!     Thank you for choosing [facility]  for your care. Our goal is always to provide you with excellent care. Hearing back from our patients is one way we can continue to improve our services. Please take a few minutes to complete the written survey that you may receive in the mail after your visit with us. Thank you!             Your Updated Medication List - Protect others around you: Learn how to safely use, store and throw away your medicines at www.disposemymeds.org.          This list is accurate as of 7/11/18  4:54 PM.  Always use your most recent med list.                   Brand Name Dispense Instructions for use Diagnosis    aspirin 81 MG EC tablet     90 tablet    Take 1 tablet (81 mg) by mouth daily        ferrous fumarate 65 mg  ([place]. FE)-Vitamin C 125 mg  MG Tabs tablet    VITRON C    60 tablet    Take 1 tablet by mouth daily    Iron deficiency anemia, unspecified iron deficiency anemia type       HCA VITAMIN B12 500 MCG Tabs   Generic drug:  cyanocobalamin      2 tablets daily        lisinopril 20 MG tablet    PRINIVIL/ZESTRIL    90 tablet    Take 1 tablet (20 mg) by mouth daily    Benign essential hypertension       MULTIVITAMIN PO      one daily        omeprazole 20 MG CR capsule    priLOSEC    90 capsule    Take 1 capsule (20 mg) by mouth daily    Gastric ulcer, unspecified chronicity, unspecified whether gastric ulcer hemorrhage or perforation present       oxyCODONE IR 5 MG tablet   Start taking on:  8/4/2018    ROXICODONE    60 tablet    Take 1 tablet (5 mg) by mouth 2 times daily as needed for pain    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    Spondylolisthesis of lumbar region       terazosin 5 MG capsule    HYTRIN    90 capsule    TAKE 1 CAPSULE AT BEDTIME    Benign non-nodular prostatic hyperplasia with lower urinary tract symptoms       triamterene-hydrochlorothiazide 75-50 MG per tablet    MAXZIDE    45 tablet    Take 0.5 tablets by mouth daily    Benign essential hypertension       VIACTIV PO      With D 1000mg calcium

## 2018-07-11 NOTE — PROGRESS NOTES
Infusion Nursing Note:  Mann Escobar presents today for Injectafer.    Patient seen by provider today: No   present during visit today: Not Applicable.    Note: N/A.    Intravenous Access:  Peripheral IV placed.    Treatment Conditions:  Not Applicable.      Post Infusion Assessment:  Patient tolerated infusion without incident.  Patient observed for 15 minutes post injectafer per pt request.  Blood return noted pre and post infusion.  Site patent and intact, free from redness, edema or discomfort.  No evidence of extravasations.  Access discontinued per protocol.    Discharge Plan:   Patient discharged in stable condition accompanied by: self.  Departure Mode: Ambulatory.    Priscilla Boone RN

## 2018-08-30 ENCOUNTER — OFFICE VISIT (OUTPATIENT)
Dept: FAMILY MEDICINE | Facility: CLINIC | Age: 78
End: 2018-08-30
Payer: COMMERCIAL

## 2018-08-30 VITALS
TEMPERATURE: 98.8 F | OXYGEN SATURATION: 98 % | WEIGHT: 185 LBS | SYSTOLIC BLOOD PRESSURE: 154 MMHG | HEIGHT: 62 IN | HEART RATE: 72 BPM | DIASTOLIC BLOOD PRESSURE: 74 MMHG | BODY MASS INDEX: 34.04 KG/M2

## 2018-08-30 DIAGNOSIS — M54.42 CHRONIC LEFT-SIDED LOW BACK PAIN WITH LEFT-SIDED SCIATICA: ICD-10-CM

## 2018-08-30 DIAGNOSIS — G89.29 CHRONIC LEFT-SIDED LOW BACK PAIN WITH LEFT-SIDED SCIATICA: ICD-10-CM

## 2018-08-30 DIAGNOSIS — I10 BENIGN ESSENTIAL HYPERTENSION: Primary | ICD-10-CM

## 2018-08-30 PROCEDURE — 99214 OFFICE O/P EST MOD 30 MIN: CPT | Performed by: NURSE PRACTITIONER

## 2018-08-30 RX ORDER — LISINOPRIL 40 MG/1
40 TABLET ORAL DAILY
Qty: 90 TABLET | Refills: 3 | Status: SHIPPED | OUTPATIENT
Start: 2018-08-30 | End: 2019-08-20

## 2018-08-30 RX ORDER — OXYCODONE HYDROCHLORIDE 5 MG/1
5 TABLET ORAL 2 TIMES DAILY PRN
Qty: 60 TABLET | Refills: 0 | Status: SHIPPED | OUTPATIENT
Start: 2018-11-04 | End: 2018-11-30

## 2018-08-30 RX ORDER — OXYCODONE HYDROCHLORIDE 5 MG/1
5 TABLET ORAL 2 TIMES DAILY PRN
Qty: 60 TABLET | Refills: 0 | Status: SHIPPED | OUTPATIENT
Start: 2018-10-04 | End: 2018-08-30

## 2018-08-30 RX ORDER — OXYCODONE HYDROCHLORIDE 5 MG/1
5 TABLET ORAL 2 TIMES DAILY PRN
Qty: 60 TABLET | Refills: 0 | Status: SHIPPED | OUTPATIENT
Start: 2018-09-04 | End: 2018-08-30

## 2018-08-30 NOTE — PATIENT INSTRUCTIONS
Increase lisinopril to 40 mg daily.    Follow up in 3 months.        Thank you for choosing [facility].  You may be receiving a survey in the mail from Edith Linares regarding your visit today.  Please take a few minutes to complete and return the survey to let us know how we are doing.      If you have questions or concerns, please contact us via Emotient or you can contact your care team at [phone].    Our Clinic hours are:  Monday 6:40 am  to 7:00 pm  Tuesday -Friday 6:40 am to 5:00 pm    The [place] outpatient lab hours are:  Monday - Friday 6:10 am to 4:45 pm  Saturdays 7:00 am to 11:00 am  Appointments are required, call [phone]    If you have clinical questions after hours or would like to schedule an appointment,  call the clinic at [phone].

## 2018-08-30 NOTE — LETTER
[facility]  [address] [facility] [address]  [phone]          August 30, 2018    RE:  Mann Escobar                                                                                                                                                       [id number] [facility] [address]            To whom it may concern:      Mann Escobar is a patient of mine. For medical reasons, his mailbox needs to be moved to his yard.        Sincerely,          Shayy Ramírez, CNP

## 2018-08-30 NOTE — PROGRESS NOTES
"  SUBJECTIVE:   Mann Escobar is a 77 year old male who presents to clinic today for the following health issues:      Hypertension Follow-up      Outpatient blood pressures are being checked at home.  Results are high for one week: 190's/95-as he has not been feeling right.  187/85 this morning when checking.    Low Salt Diet: not monitoring salt      LETTER:  He would like to have a letter for the [employer], stating that he would like to have the mailbox moved to his yard.  Currently having to cross a busy street and a long driveway.      Back Pain Follow Up      Description:   Location of pain:  bilateral  Character of pain: stabbing, burning and intense  Pain radiation: Does not radiate  Since last visit, pain is:  unchanged  New numbness or weakness in legs, not attributed to pain:  no     Intensity: Currently 5/10, At its worst 10/10    History:   Pain interferes with job: Not applicable, does interfere with his daily life.  Therapies tried without relief: many things  Therapies tried with relief: inactivity and opioids           Accompanying Signs & Symptoms:  Risk of Fracture:  None  Risk of Cauda Equina:  None  Risk of Infection:  None  Risk of Cancer:  None          Amount of exercise or physical activity: None, due to the back pain.    Problems taking medications regularly: No    Medication side effects: none    Diet: regular (no restrictions)          Problem list and histories reviewed & adjusted, as indicated.  Additional history: as documented    Reviewed and updated as needed this visit by clinical staff       Reviewed and updated as needed this visit by Provider         ROS:  Constitutional, HEENT, cardiovascular, pulmonary, gi and gu systems are negative, except as otherwise noted.    OBJECTIVE:     /74  Pulse 72  Temp 98.8  F (37.1  C) (Tympanic)  Ht 5' 2\" (1.575 m)  Wt 185 lb (83.9 kg)  SpO2 98%  BMI 33.84 kg/m2  Body mass index is 33.84 kg/(m^2).  GENERAL: healthy, " alert and no distress  NECK: no adenopathy, no asymmetry, masses, or scars and thyroid normal to palpation  RESP: lungs clear to auscultation - no rales, rhonchi or wheezes  CV: regular rate and rhythm, normal S1 S2, no S3 or S4, no murmur, click or rub,        ASSESSMENT/PLAN:       ICD-10-CM    1. Benign essential hypertension I10 Poorly controlled.  Increase lisinopril to 40 mg daily  lisinopril (PRINIVIL/ZESTRIL) 40 MG tablet     2. Chronic left-sided low back pain with left-sided sciatica M54.42 Refilled x3 months.  oxyCODONE IR (ROXICODONE) 5 MG tablet    G89.29 DISCONTINUED: oxyCODONE IR (ROXICODONE) 5 MG tablet     DISCONTINUED: oxyCODONE IR (ROXICODONE) 5 MG tablet     Letter written for post office.    Patient Instructions   Increase lisinopril to 40 mg daily.    Follow up in 3 months.      The risks, benefits and treatment options of prescribed medications or other treatments have been discussed with the patient. The patient verbalized their understanding and should call or follow up if no improvement or if they develop further problems.    GUNJAN Vinson [facility]

## 2018-08-30 NOTE — MR AVS SNAPSHOT
After Visit Summary   8/30/2018    Mann Escobar    MRN: [id number]           Patient Information     Date Of Birth          1940        Visit Information        Provider Department      8/30/2018 1:20 PM Shayy Ramírez APRN [facility]        Today's Diagnoses     Benign essential hypertension    -  1    Chronic left-sided low back pain with left-sided sciatica          Care Instructions    Increase lisinopril to 40 mg daily.    Follow up in 3 months.        Thank you for choosing [facility].  You may be receiving a survey in the mail from [employer] regarding your visit today.  Please take a few minutes to complete and return the survey to let us know how we are doing.      If you have questions or concerns, please contact us via [employer] or you can contact your care team at [phone].    Our Clinic hours are:  Monday 6:40 am  to 7:00 pm  Tuesday -Friday 6:40 am to 5:00 pm    The [place] outpatient lab hours are:  Monday - Friday 6:10 am to 4:45 pm  Saturdays 7:00 am to 11:00 am  Appointments are required, call [phone]    If you have clinical questions after hours or would like to schedule an appointment,  call the clinic at [phone].            Follow-ups after your visit        Your next 10 appointments already scheduled     Oct 08, 2018 11:00 AM CDT   LAB with [facility] LAB   [facility]    [address] [place] [id number] [phone]           Please do not eat 10-12 hours before your appointment if you are coming in fasting for labs on lipids, cholesterol, or glucose (sugar). This does not apply to pregnant women. Water, hot tea and black coffee (with nothing added) are okay. Do not drink other fluids, diet soda or chew gum.            Oct 10, 2018  2:30 PM CDT   Level 1 with ROOM 10 [facility] ([facility])    [facility]  "[place]  [address]  [facility] [address]   [phone]              Who to contact     If you have questions or need follow up information about today's clinic visit or your schedule please contact [facility] directly at [phone].  Normal or non-critical lab and imaging results will be communicated to you by MyChart, letter or phone within 4 business days after the clinic has received the results. If you do not hear from us within 7 days, please contact the clinic through MyChart or phone. If you have a critical or abnormal lab result, we will notify you by phone as soon as possible.  Submit refill requests through "[employer] or call your pharmacy and they will forward the refill request to us. Please allow 3 business days for your refill to be completed.          Additional Information About Your Visit        Care EveryWhere ID     This is your Care EveryWhere ID. This could be used by other organizations to access your [place] medical records  [id number]        Your Vitals Were     Pulse Temperature Height Pulse Oximetry BMI (Body Mass Index)       72 98.8  F (37.1  C) (Tympanic) 5' 2\" (1.575 m) 98% 33.84 kg/m2        Blood Pressure from Last 3 Encounters:   08/30/18 154/74   07/11/18 154/78   05/22/18 161/75    Weight from Last 3 Encounters:   08/30/18 185 lb (83.9 kg)   03/02/18 180 lb (81.6 kg)   02/22/18 182 lb 15.7 oz (83 kg)              Today, you had the following     No orders found for display         Today's Medication Changes          These changes are accurate as of 8/30/18  1:54 PM.  If you have any questions, ask your nurse or doctor.               Start taking these medicines.        Dose/Directions    oxyCODONE IR 5 MG tablet   Commonly known as:  ROXICODONE   Used for:  Chronic left-sided low back pain with left-sided sciatica   Started by:  Shayy Ramírez APRN CNP        Dose:  5 mg   Start taking on:  11/4/2018   Take 1 tablet (5 mg) by mouth " 2 times daily as needed for pain   Quantity:  60 tablet   Refills:  0         These medicines have changed or have updated prescriptions.        Dose/Directions    lisinopril 40 MG tablet   Commonly known as:  PRINIVIL/ZESTRIL   This may have changed:    - medication strength  - how much to take   Used for:  Benign essential hypertension   Changed by:  Shayy Ramírez APRN CNP        Dose:  40 mg   Take 1 tablet (40 mg) by mouth daily   Quantity:  90 tablet   Refills:  3            Where to get your medicines      These medications were sent to [facility] [place] [id number] [place] [address]     Phone:  [phone]     lisinopril 40 MG tablet         Some of these will need a paper prescription and others can be bought over the counter.  Ask your nurse if you have questions.     Bring a paper prescription for each of these medications     oxyCODONE IR 5 MG tablet               Information about OPIOIDS     PRESCRIPTION OPIOIDS: WHAT YOU NEED TO KNOW   We gave you an opioid (narcotic) pain medicine. It is important to manage your pain, but opioids are not always the best choice. You should first try all the other options your care team gave you. Take this medicine for as short a time (and as few doses) as possible.    Some activities can increase your pain, such as bandage changes or therapy sessions. It may help to take your pain medicine 30 to 60 minutes before these activities. Reduce your stress by getting enough sleep, working on hobbies you enjoy and practicing relaxation or meditation. Talk to your care team about ways to manage your pain beyond prescription opioids.    These medicines have risks:    DO NOT drive when on new or higher doses of pain medicine. These medicines can affect your alertness and reaction times, and you could be arrested for driving under the influence (DUI). If you need to use opioids long-term, talk to your  care team about driving.    DO NOT operate heavy machinery    DO NOT do any other dangerous activities while taking these medicines.    DO NOT drink any alcohol while taking these medicines.     If the opioid prescribed includes acetaminophen, DO NOT take with any other medicines that contain acetaminophen. Read all labels carefully. Look for the word  acetaminophen  or  Tylenol.  Ask your pharmacist if you have questions or are unsure.    You can get addicted to pain medicines, especially if you have a history of addiction (chemical, alcohol or substance dependence). Talk to your care team about ways to reduce this risk.    All opioids tend to cause constipation. Drink plenty of water and eat foods that have a lot of fiber, such as fruits, vegetables, prune juice, apple juice and high-fiber cereal. Take a laxative (Miralax, milk of magnesia, Colace, Senna) if you don t move your bowels at least every other day. Other side effects include upset stomach, sleepiness, dizziness, throwing up, tolerance (needing more of the medicine to have the same effect), physical dependence and slowed breathing.    Store your pills in a secure place, locked if possible. We will not replace any lost or stolen medicine. If you don t finish your medicine, please throw away (dispose) as directed by your pharmacist. The [place] Pollution [employer] Agency has more information about safe disposal: https://www.pca.[facility].mn.us/living-green/managing-unwanted-medications         Primary Care Provider Office Phone # Fax #    Shayy GUNJNA Armendariz [facility] [phone] [address] [facility] [address]        Equal Access to Services     MITRA MCBRIDE AH: Hadii aad ku hadasho Soomaali, waaxda luqadaha, qaybta kaalmada adeegyada, deny benjamin. So [facility] [phone].    ATENCIÓN: Si habla español, tiene a hewitt disposición servicios gratuitos de asistencia lingüística. Llame al [phone].    We  comply with applicable federal civil rights laws and [place] laws. We do not discriminate on the basis of race, color, national origin, age, disability, sex, sexual orientation, or gender identity.            Thank you!     Thank you for choosing [facility]  for your care. Our goal is always to provide you with excellent care. Hearing back from our patients is one way we can continue to improve our services. Please take a few minutes to complete the written survey that you may receive in the mail after your visit with us. Thank you!             Your Updated Medication List - Protect others around you: Learn how to safely use, store and throw away your medicines at www.disposemymeds.org.          This list is accurate as of 8/30/18  1:54 PM.  Always use your most recent med list.                   Brand Name Dispense Instructions for use Diagnosis    aspirin 81 MG EC tablet     90 tablet    Take 1 tablet (81 mg) by mouth daily        ferrous fumarate 65 mg ([place]. FE)-Vitamin C 125 mg  MG Tabs tablet    VITRON C    60 tablet    Take 1 tablet by mouth daily    Iron deficiency anemia, unspecified iron deficiency anemia type       HCA VITAMIN B12 500 MCG Tabs   Generic drug:  cyanocobalamin      2 tablets daily        lisinopril 40 MG tablet    PRINIVIL/ZESTRIL    90 tablet    Take 1 tablet (40 mg) by mouth daily    Benign essential hypertension       MULTIVITAMIN PO      one daily        omeprazole 20 MG CR capsule    priLOSEC    90 capsule    Take 1 capsule (20 mg) by mouth daily    Gastric ulcer, unspecified chronicity, unspecified whether gastric ulcer hemorrhage or perforation present       oxyCODONE IR 5 MG tablet   Start taking on:  11/4/2018    ROXICODONE    60 tablet    Take 1 tablet (5 mg) by mouth 2 times daily as needed for pain    Chronic left-sided low back pain with left-sided sciatica       senna-docusate 8.6-50 MG per tablet    SENOKOT-S;PERICOLACE    60 tablet    Take 1-2 tablets  by mouth 2 times daily as needed for constipation.    Spondylolisthesis of lumbar region       terazosin 5 MG capsule    HYTRIN    90 capsule    TAKE 1 CAPSULE AT BEDTIME    Benign non-nodular prostatic hyperplasia with lower urinary tract symptoms       triamterene-hydrochlorothiazide 75-50 MG per tablet    MAXZIDE    45 tablet    Take 0.5 tablets by mouth daily    Benign essential hypertension       VIACTIV PO      With D 1000mg calcium

## 2018-09-14 ENCOUNTER — TELEPHONE (OUTPATIENT)
Dept: FAMILY MEDICINE | Facility: CLINIC | Age: 78
End: 2018-09-14

## 2018-09-14 NOTE — TELEPHONE ENCOUNTER
Reason for Call:  Other call back    Detailed comments: Pt was in a couple of weeks ago and Shayy upped his lisinipril dose from 20mg to 40 mg due to his B/P. It is still running high. Today 173/95. Some previous 174/91, 178/84, 168/87, and has been as high as 192/97. He still has some 20mg tabs and is wondering if he should up his dose to 60mg.    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 Yes    Call taken on 9/14/2018 at 12:58 PM by Alba Wheeler

## 2018-09-14 NOTE — TELEPHONE ENCOUNTER
Left non-detailed message for patient directing pt to call [facility] with these elevated readings.  Pt may need to be evaluated in the ED.    Pt was seen on 8/30/18.  See visit notes for details.  Phoebe Ly RN

## 2018-09-17 ENCOUNTER — OFFICE VISIT (OUTPATIENT)
Dept: FAMILY MEDICINE | Facility: CLINIC | Age: 78
End: 2018-09-17
Payer: COMMERCIAL

## 2018-09-17 ENCOUNTER — TELEPHONE (OUTPATIENT)
Dept: FAMILY MEDICINE | Facility: CLINIC | Age: 78
End: 2018-09-17

## 2018-09-17 VITALS — RESPIRATION RATE: 18 BRPM | HEART RATE: 72 BPM | DIASTOLIC BLOOD PRESSURE: 78 MMHG | SYSTOLIC BLOOD PRESSURE: 158 MMHG

## 2018-09-17 DIAGNOSIS — Z01.30 BP CHECK: Primary | ICD-10-CM

## 2018-09-17 DIAGNOSIS — I10 BENIGN ESSENTIAL HYPERTENSION: Primary | ICD-10-CM

## 2018-09-17 PROCEDURE — 99207 ZZC NO CHARGE NURSE ONLY: CPT

## 2018-09-17 RX ORDER — AMLODIPINE BESYLATE 5 MG/1
5 TABLET ORAL DAILY
Qty: 30 TABLET | Refills: 1 | Status: SHIPPED | OUTPATIENT
Start: 2018-09-17 | End: 2018-10-15

## 2018-09-17 NOTE — MR AVS SNAPSHOT
After Visit Summary   9/17/2018    Mann Escobar    MRN: [id number]           Patient Information     Date Of Birth          1940        Visit Information        Provider Department      9/17/2018 10:15 AM FL NB RN [facility]        Today's Diagnoses     BP check    -  1       Follow-ups after your visit        Your next 10 appointments already scheduled     Oct 08, 2018 11:00 AM CDT   LAB with NB LAB   [facility]    [address] [place] [id number]   [phone]           Please do not eat 10-12 hours before your appointment if you are coming in fasting for labs on lipids, cholesterol, or glucose (sugar). This does not apply to pregnant women. Water, hot tea and black coffee (with nothing added) are okay. Do not drink other fluids, diet soda or chew gum.            Oct 10, 2018  2:30 PM CDT   Level 1 with ROOM 10 [place] ([facility])    [facility]  [address] [facility] [address]  [facility] [address]   [phone]              Who to contact     If you have questions or need follow up information about today's clinic visit or your schedule please contact [facility] directly at [phone].  Normal or non-critical lab and imaging results will be communicated to you by MyChart, letter or phone within 4 business days after the clinic has received the results. If you do not hear from us within 7 days, please contact the clinic through MyChart or phone. If you have a critical or abnormal lab result, we will notify you by phone as soon as possible.  Submit refill requests through [employer] or call your pharmacy and they will forward the refill request to us. Please allow 3 business days for your refill to be completed.          Additional Information About Your Visit        Care EveryWhere ID     This is your Care EveryWhere ID. This  could be used by other organizations to access your [place] medical records  [id number]        Your Vitals Were     Pulse Respirations                72 18           Blood Pressure from Last 3 Encounters:   09/17/18 158/78   08/30/18 154/74   07/11/18 154/78    Weight from Last 3 Encounters:   08/30/18 185 lb (83.9 kg)   03/02/18 180 lb (81.6 kg)   02/22/18 182 lb 15.7 oz (83 kg)              Today, you had the following     No orders found for display       Primary Care Provider Office Phone # Fax #    Shayy Morel Chaceaye, APRN [phone] [address] [facility] [address]        Equal Access to Services     CATRACHO MCBRIDE : Hadii petar armendarizo Soleesa, waaxda luqadaha, qaybta kaalmada adeegyada, deny morris . So [facility] [phone].    ATENCIÓN: Si habla español, tiene a hewitt disposición servicios gratuitos de asistencia lingüística. Coleame al [phone].    We comply with applicable federal civil rights laws and [place] laws. We do not discriminate on the basis of race, color, national origin, age, disability, sex, sexual orientation, or gender identity.            Thank you!     Thank you for choosing [facility]  for your care. Our goal is always to provide you with excellent care. Hearing back from our patients is one way we can continue to improve our services. Please take a few minutes to complete the written survey that you may receive in the mail after your visit with us. Thank you!             Your Updated Medication List - Protect others around you: Learn how to safely use, store and throw away your medicines at www.disposemymeds.org.          This list is accurate as of 9/17/18 10:44 AM.  Always use your most recent med list.                   Brand Name Dispense Instructions for use Diagnosis    aspirin 81 MG EC tablet     90 tablet    Take 1 tablet (81 mg) by mouth daily        ferrous fumarate 65 mg ([place]. FE)-Vitamin  C 125 mg  MG Tabs tablet    VITRON C    60 tablet    Take 1 tablet by mouth daily    Iron deficiency anemia, unspecified iron deficiency anemia type       HCA VITAMIN B12 500 MCG Tabs   Generic drug:  cyanocobalamin      2 tablets daily        lisinopril 40 MG tablet    PRINIVIL/ZESTRIL    90 tablet    Take 1 tablet (40 mg) by mouth daily    Benign essential hypertension       MULTIVITAMIN PO      one daily        omeprazole 20 MG CR capsule    priLOSEC    90 capsule    Take 1 capsule (20 mg) by mouth daily    Gastric ulcer, unspecified chronicity, unspecified whether gastric ulcer hemorrhage or perforation present       oxyCODONE IR 5 MG tablet   Start taking on:  11/4/2018    ROXICODONE    60 tablet    Take 1 tablet (5 mg) by mouth 2 times daily as needed for pain    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    Spondylolisthesis of lumbar region       terazosin 5 MG capsule    HYTRIN    90 capsule    TAKE 1 CAPSULE AT BEDTIME    Benign non-nodular prostatic hyperplasia with lower urinary tract symptoms       triamterene-hydrochlorothiazide 75-50 MG per tablet    MAXZIDE    45 tablet    Take 0.5 tablets by mouth daily    Benign essential hypertension       VIACTIV PO      With D 1000mg calcium

## 2018-09-17 NOTE — TELEPHONE ENCOUNTER
Patient reports:  He has been taking Lisinopril 40 mg daily since 8/30/18  He takes his BP at home at various times of the day, usually drinks coffee  Patient reports his -200's/80-90's  Patient denies symptoms of headache, visual changes, epigastric pain, nausea, feeling or looking flushed, diaphoresis  Advised patient to make appointment with Rn to have BP checked, bring home monitor in and have it compared to clinics bp monitor today and to continue taking 40 mg Lisinopril daily unless advised by a doctor to change this dose.  Patient verbalized understanding and agreed with this plan  Scheduled appt with Rn at [facility] clinic today, per patient request    Jo Ann MORALES Rn

## 2018-09-17 NOTE — TELEPHONE ENCOUNTER
Mann Escobar is a 77 year old year old patient who comes in today for a Blood Pressure check because of ongoing blood pressure monitoring.  Vital Signs as repeated by /78 pulse 72    Patient is taking medication as prescribed.  Takes lisinopril at HS.  Takes Maxzide 75-50 in the AM.    Checked BP after he checked it with his home monitor.  His reading is 180/98.  Changed batteries and /90.    Patient is tolerating medications well.    Patient is monitoring Blood Pressure at home.  Average readings if yes are 150-180/.    Current complaints: occasionally feels lightheaded.  Feels good for the most part.    Disposition:  Will continue to monitor BP at home.  Will send this chart to his provider for review  Niya Ramirez RN

## 2018-10-11 ENCOUNTER — TELEPHONE (OUTPATIENT)
Dept: FAMILY MEDICINE | Facility: CLINIC | Age: 78
End: 2018-10-11

## 2018-10-11 NOTE — TELEPHONE ENCOUNTER
Panel Management Review       see provider note below  Composite cancer screening  Chart review shows that this patient is due/due soon for the following None  Summary:    Patient is due/failing the following:   BP CHECK    Action needed:   Patient needs nurse only appointment.    Type of outreach:    Phone, spoke to patient.  schedule for tomorrow morning in [place]    Questions for provider review:    None                                                                                                                                    GRANT HOLLINS

## 2018-10-12 ENCOUNTER — ALLIED HEALTH/NURSE VISIT (OUTPATIENT)
Dept: FAMILY MEDICINE | Facility: CLINIC | Age: 78
End: 2018-10-12
Payer: COMMERCIAL

## 2018-10-12 VITALS — HEART RATE: 80 BPM | RESPIRATION RATE: 16 BRPM | SYSTOLIC BLOOD PRESSURE: 160 MMHG | DIASTOLIC BLOOD PRESSURE: 78 MMHG

## 2018-10-12 DIAGNOSIS — I10 BENIGN ESSENTIAL HYPERTENSION: Primary | ICD-10-CM

## 2018-10-12 PROCEDURE — 99207 ZZC NO CHARGE NURSE ONLY: CPT

## 2018-10-12 NOTE — MR AVS SNAPSHOT
After Visit Summary   10/12/2018    Mann Escobar    MRN: [id number]           Patient Information     Date Of Birth          1940        Visit Information        Provider Department      10/12/2018 10:00 AM FL NB RN [facility]        Today's Diagnoses     Benign essential hypertension    -  1       Follow-ups after your visit        Your next 10 appointments already scheduled     Oct 30, 2018 10:30 AM CDT   LAB with NB LAB   [facility] ([facility])    [address] [place] [id number] [phone]           Please do not eat 10-12 hours before your appointment if you are coming in fasting for labs on lipids, cholesterol, or glucose (sugar). This does not apply to pregnant women. Water, hot tea and black coffee (with nothing added) are okay. Do not drink other fluids, diet soda or chew gum.            Oct 31, 2018 11:30 AM CDT   Level 1 with ROOM 1 [facility] ([facility])    [facility]  [address] [facility] [address] [facility] [address]  [facility] [place] [address] [phone]              Who to contact     If you have questions or need follow up information about today's clinic visit or your schedule please contact [facility] directly at [phone].  Normal or non-critical lab and imaging results will be communicated to you by MyChart, letter or phone within 4 business days after the clinic has received the results. If you do not hear from us within 7 days, please contact the clinic through MyChart or phone. If you have a critical or abnormal lab result, we will notify you by phone as soon as possible.  Submit refill requests through [employer] or call your pharmacy and they will forward the refill request to us. Please allow 3 business days for your refill to be completed.          Additional Information About Your Visit        Care EveryWhere ID     This is your Care  EveryWhere ID. This could be used by other organizations to access your [place] medical records  [id number]        Your Vitals Were     Pulse Respirations                80 16           Blood Pressure from Last 3 Encounters:   10/12/18 160/78   09/17/18 158/78   08/30/18 154/74    Weight from Last 3 Encounters:   08/30/18 185 lb (83.9 kg)   03/02/18 180 lb (81.6 kg)   02/22/18 182 lb 15.7 oz (83 kg)              Today, you had the following     No orders found for display       Primary Care Provider Office Phone # Fax #    Shayy Morel Chaceaye, APRN [facility] [phone] [address] [facility] [address]        Equal Access to Services     CATRACHO MCBRIDE : Hadii aad ku hadasho Soomaali, waaxda luqadaha, qaybta kaalmada adeegyada, waxyessy morris . So [facility] [phone].    ATENCIÓN: Si habla español, tiene a hewitt disposición servicios gratuitos de asistencia lingüística. Llame al [phone].    We comply with applicable federal civil rights laws and [place] laws. We do not discriminate on the basis of race, color, national origin, age, disability, sex, sexual orientation, or gender identity.            Thank you!     Thank you for choosing [facility]  for your care. Our goal is always to provide you with excellent care. Hearing back from our patients is one way we can continue to improve our services. Please take a few minutes to complete the written survey that you may receive in the mail after your visit with us. Thank you!             Your Updated Medication List - Protect others around you: Learn how to safely use, store and throw away your medicines at www.disposemymeds.org.          This list is accurate as of 10/12/18 11:59 PM.  Always use your most recent med list.                   Brand Name Dispense Instructions for use Diagnosis    amLODIPine 5 MG tablet    NORVASC    30 tablet    Take 1 tablet (5 mg) by mouth daily    Benign essential  hypertension       aspirin 81 MG EC tablet     90 tablet    Take 1 tablet (81 mg) by mouth daily        ferrous fumarate 65 mg ([place]. FE)-Vitamin C 125 mg  MG Tabs tablet    VITRON C    60 tablet    Take 1 tablet by mouth daily    Iron deficiency anemia, unspecified iron deficiency anemia type       HCA VITAMIN B12 500 MCG Tabs   Generic drug:  cyanocobalamin      2 tablets daily        lisinopril 40 MG tablet    PRINIVIL/ZESTRIL    90 tablet    Take 1 tablet (40 mg) by mouth daily    Benign essential hypertension       MULTIVITAMIN PO      one daily        omeprazole 20 MG CR capsule    priLOSEC    90 capsule    Take 1 capsule (20 mg) by mouth daily    Gastric ulcer, unspecified chronicity, unspecified whether gastric ulcer hemorrhage or perforation present       oxyCODONE IR 5 MG tablet   Start taking on:  11/4/2018    ROXICODONE    60 tablet    Take 1 tablet (5 mg) by mouth 2 times daily as needed for pain    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    Spondylolisthesis of lumbar region       terazosin 5 MG capsule    HYTRIN    90 capsule    TAKE 1 CAPSULE AT BEDTIME    Benign non-nodular prostatic hyperplasia with lower urinary tract symptoms       triamterene-hydrochlorothiazide 75-50 MG per tablet    MAXZIDE    45 tablet    Take 0.5 tablets by mouth daily    Benign essential hypertension       VIACTIV PO      With D 1000mg calcium

## 2018-10-15 ENCOUNTER — TELEPHONE (OUTPATIENT)
Dept: FAMILY MEDICINE | Facility: CLINIC | Age: 78
End: 2018-10-15

## 2018-10-15 DIAGNOSIS — I10 BENIGN ESSENTIAL HYPERTENSION: Primary | ICD-10-CM

## 2018-10-15 RX ORDER — AMLODIPINE BESYLATE 10 MG/1
10 TABLET ORAL DAILY
Qty: 90 TABLET | Refills: 3 | Status: SHIPPED | OUTPATIENT
Start: 2018-10-15 | End: 2019-11-20

## 2018-10-15 NOTE — TELEPHONE ENCOUNTER
Pt here for BP check. BP remind elevated. Please advise. Lisa Reese RN  BP Readings from Last 3 Encounters:   10/12/18 160/78   09/17/18 158/78   08/30/18 154/74

## 2018-10-15 NOTE — TELEPHONE ENCOUNTER
Increase amlodipine to 10 mg daily.  Script sent to [facility].  Shayy Ramírez, CNP

## 2018-10-30 DIAGNOSIS — D50.9 IRON DEFICIENCY ANEMIA, UNSPECIFIED IRON DEFICIENCY ANEMIA TYPE: ICD-10-CM

## 2018-10-30 LAB
FERRITIN SERPL-MCNC: 251 NG/ML (ref 26–388)
HGB BLD-MCNC: 9.5 G/DL (ref 13.3–17.7)

## 2018-10-30 PROCEDURE — 85018 HEMOGLOBIN: CPT | Performed by: INTERNAL MEDICINE

## 2018-10-30 PROCEDURE — 82728 ASSAY OF FERRITIN: CPT | Performed by: INTERNAL MEDICINE

## 2018-10-30 PROCEDURE — 36415 COLL VENOUS BLD VENIPUNCTURE: CPT | Performed by: INTERNAL MEDICINE

## 2018-11-23 ENCOUNTER — TRANSFERRED RECORDS (OUTPATIENT)
Dept: HEALTH INFORMATION MANAGEMENT | Facility: CLINIC | Age: 78
End: 2018-11-23

## 2018-11-23 DIAGNOSIS — N40.1 BENIGN NON-NODULAR PROSTATIC HYPERPLASIA WITH LOWER URINARY TRACT SYMPTOMS: ICD-10-CM

## 2018-11-23 NOTE — TELEPHONE ENCOUNTER
"Requested Prescriptions   Pending Prescriptions Disp Refills     terazosin (HYTRIN) 5 MG capsule 90 capsule 3    Alpha Blockers Failed    11/23/2018  4:14 PM       Failed - Blood pressure under 140/90 in past 12 months    BP Readings from Last 3 Encounters:   10/12/18 160/78   09/17/18 158/78   08/30/18 154/74                Passed - Recent (12 mo) or future (30 days) visit within the authorizing provider's specialty    Patient had office visit in the last 12 months or has a visit in the next 30 days with authorizing provider or within the authorizing provider's specialty.  See \"Patient Info\" tab in inbasket, or \"Choose Columns\" in Meds & Orders section of the refill encounter.             Passed - Patient does not have Tadalafil, Vardenafil, or Sildenafil on their medication list       Passed - Patient is 18 years of age or older        Last Written Prescription Date:  11/23/2018  Last Fill Quantity: 90,  # refills: 3   Last office visit: 10/12/2018 with prescribing provider:  Desiree    Pt is completely out of this medication and would like 30 days at [facility].  The other was ordered thru [employer].       Future Office Visit:   Next 5 appointments (look out 90 days)     Nov 30, 2018 10:20 AM CST   SHORT with GUNJAN Villarreal CNP   [facility] ([facility])    [address] [facility] [address]   [phone]                   "

## 2018-11-23 NOTE — TELEPHONE ENCOUNTER
Routing refill request to provider for review/approval because:  BP above goal for RN to refill.   Ashlee Lewis RNC

## 2018-11-26 RX ORDER — TERAZOSIN 5 MG/1
CAPSULE ORAL
Qty: 90 CAPSULE | Refills: 3 | Status: SHIPPED | OUTPATIENT
Start: 2018-11-26 | End: 2019-11-20

## 2018-11-30 ENCOUNTER — OFFICE VISIT (OUTPATIENT)
Dept: FAMILY MEDICINE | Facility: CLINIC | Age: 78
End: 2018-11-30
Payer: COMMERCIAL

## 2018-11-30 VITALS
HEART RATE: 65 BPM | OXYGEN SATURATION: 95 % | HEIGHT: 62 IN | WEIGHT: 183 LBS | TEMPERATURE: 98.9 F | BODY MASS INDEX: 33.68 KG/M2 | SYSTOLIC BLOOD PRESSURE: 145 MMHG | DIASTOLIC BLOOD PRESSURE: 88 MMHG

## 2018-11-30 DIAGNOSIS — D17.30 LIPOMA OF SKIN AND SUBCUTANEOUS TISSUE: ICD-10-CM

## 2018-11-30 DIAGNOSIS — R51.9 SCALP PAIN: ICD-10-CM

## 2018-11-30 DIAGNOSIS — I10 BENIGN ESSENTIAL HYPERTENSION: ICD-10-CM

## 2018-11-30 DIAGNOSIS — R39.9 SYMPTOMS INVOLVING URINARY SYSTEM: ICD-10-CM

## 2018-11-30 DIAGNOSIS — G89.29 CHRONIC LEFT-SIDED LOW BACK PAIN WITH LEFT-SIDED SCIATICA: Primary | ICD-10-CM

## 2018-11-30 DIAGNOSIS — M54.42 CHRONIC LEFT-SIDED LOW BACK PAIN WITH LEFT-SIDED SCIATICA: Primary | ICD-10-CM

## 2018-11-30 LAB
ALBUMIN UR-MCNC: >=300 MG/DL
APPEARANCE UR: CLEAR
BILIRUB UR QL STRIP: NEGATIVE
COLOR UR AUTO: YELLOW
GLUCOSE UR STRIP-MCNC: NEGATIVE MG/DL
HGB UR QL STRIP: ABNORMAL
KETONES UR STRIP-MCNC: NEGATIVE MG/DL
LEUKOCYTE ESTERASE UR QL STRIP: NEGATIVE
NITRATE UR QL: NEGATIVE
PH UR STRIP: 6 PH (ref 5–7)
RBC #/AREA URNS AUTO: NORMAL /HPF
SOURCE: ABNORMAL
SP GR UR STRIP: 1.02 (ref 1–1.03)
UROBILINOGEN UR STRIP-ACNC: 1 EU/DL (ref 0.2–1)
WBC #/AREA URNS AUTO: NORMAL /HPF

## 2018-11-30 PROCEDURE — 99214 OFFICE O/P EST MOD 30 MIN: CPT | Performed by: NURSE PRACTITIONER

## 2018-11-30 PROCEDURE — 81001 URINALYSIS AUTO W/SCOPE: CPT | Performed by: NURSE PRACTITIONER

## 2018-11-30 RX ORDER — OXYCODONE HYDROCHLORIDE 5 MG/1
5 TABLET ORAL 2 TIMES DAILY PRN
Qty: 60 TABLET | Refills: 0 | Status: SHIPPED | OUTPATIENT
Start: 2019-02-04 | End: 2019-02-25

## 2018-11-30 RX ORDER — OXYCODONE HYDROCHLORIDE 5 MG/1
5 TABLET ORAL 2 TIMES DAILY PRN
Qty: 60 TABLET | Refills: 0 | Status: SHIPPED | OUTPATIENT
Start: 2018-12-04 | End: 2018-11-30

## 2018-11-30 RX ORDER — OXYCODONE HYDROCHLORIDE 5 MG/1
5 TABLET ORAL 2 TIMES DAILY PRN
Qty: 60 TABLET | Refills: 0 | Status: SHIPPED | OUTPATIENT
Start: 2019-01-04 | End: 2018-11-30

## 2018-11-30 NOTE — PATIENT INSTRUCTIONS
Thank you for choosing [facility].  You may be receiving a survey in the mail from Edith Linares regarding your visit today.  Please take a few minutes to complete and return the survey to let us know how we are doing.      If you have questions or concerns, please contact us via [employer] or you can contact your care team at [phone].    Our Clinic hours are:  Monday 6:40 am  to 7:00 pm  Tuesday -Friday 6:40 am to 5:00 pm    The [place] outpatient lab hours are:  Monday - Friday 6:10 am to 4:45 pm  Saturdays 7:00 am to 11:00 am  Appointments are required, call [phone]    If you have clinical questions after hours or would like to schedule an appointment,  call the clinic at [phone].

## 2018-11-30 NOTE — MR AVS SNAPSHOT
After Visit Summary   11/30/2018    Mann Escobar    MRN: [id number]           Patient Information     Date Of Birth          1940        Visit Information        Provider Department      11/30/2018 10:20 AM Shayy Ramírez APRN [facility]        Today's Diagnoses     Chronic left-sided low back pain with left-sided sciatica    -  1    Lipoma of skin and subcutaneous tissue        Symptoms involving urinary system          Care Instructions          Thank you for choosing [facility].  You may be receiving a survey in the mail from Edith Linares regarding your visit today.  Please take a few minutes to complete and return the survey to let us know how we are doing.      If you have questions or concerns, please contact us via [employer] or you can contact your care team at [phone].    Our Clinic hours are:  Monday 6:40 am  to 7:00 pm  Tuesday -Friday 6:40 am to 5:00 pm    The [place] outpatient lab hours are:  Monday - Friday 6:10 am to 4:45 pm  Saturdays 7:00 am to 11:00 am  Appointments are required, call [phone]    If you have clinical questions after hours or would like to schedule an appointment,  call the clinic at [phone].          Follow-ups after your visit        Additional Services     GENERAL SURG ADULT REFERRAL       Your provider has referred you to: FM[facility]: [facility] [phone]   [email or web address]/    Please be aware that coverage of these services is subject to the terms and limitations of your health insurance plan.  Call member services at your health plan with any benefit or coverage questions.      Please bring the following with you to your appointment:    (1) Any X-Rays, CTs or MRIs which have been performed.  Contact the facility where they were done to arrange for  prior to your scheduled appointment.   (2) List of current medications   (3) This referral  "request   (4) Any documents/labs given to you for this referral                  Who to contact     If you have questions or need follow up information about today's clinic visit or your schedule please contact [facility] directly at [phone].  Normal or non-critical lab and imaging results will be communicated to you by MyChart, letter or phone within 4 business days after the clinic has received the results. If you do not hear from us within 7 days, please contact the clinic through MyChart or phone. If you have a critical or abnormal lab result, we will notify you by phone as soon as possible.  Submit refill requests through [employer]. or call your pharmacy and they will forward the refill request to us. Please allow 3 business days for your refill to be completed.          Additional Information About Your Visit        Care EveryWhere ID     This is your Care EveryWhere ID. This could be used by other organizations to access your [place] medical records  [id number]        Your Vitals Were     Pulse Temperature Height Pulse Oximetry BMI (Body Mass Index)       65 98.9  F (37.2  C) (Tympanic) 5' 2\" (1.575 m) 95% 33.47 kg/m2        Blood Pressure from Last 3 Encounters:   11/30/18 168/88   10/12/18 160/78   09/17/18 158/78    Weight from Last 3 Encounters:   11/30/18 183 lb (83 kg)   08/30/18 185 lb (83.9 kg)   03/02/18 180 lb (81.6 kg)              We Performed the Following     *UA reflex to Microscopic and Culture ([place] and [facility] (except [place] and [place])     GENERAL SURG ADULT REFERRAL          Today's Medication Changes          These changes are accurate as of 11/30/18 11:10 AM.  If you have any questions, ask your nurse or doctor.               Start taking these medicines.        Dose/Directions    oxyCODONE 5 MG tablet   Commonly known as:  ROXICODONE   Used for:  Chronic left-sided low back pain with left-sided sciatica        Dose:  5 mg   Start taking on:  2/4/2019 "   Take 1 tablet (5 mg) by mouth 2 times daily as needed for pain   Quantity:  60 tablet   Refills:  0            Where to get your medicines      Some of these will need a paper prescription and others can be bought over the counter.  Ask your nurse if you have questions.     Bring a paper prescription for each of these medications     oxyCODONE 5 MG tablet               Information about OPIOIDS     PRESCRIPTION OPIOIDS: WHAT YOU NEED TO KNOW   We gave you an opioid (narcotic) pain medicine. It is important to manage your pain, but opioids are not always the best choice. You should first try all the other options your care team gave you. Take this medicine for as short a time (and as few doses) as possible.    Some activities can increase your pain, such as bandage changes or therapy sessions. It may help to take your pain medicine 30 to 60 minutes before these activities. Reduce your stress by getting enough sleep, working on hobbies you enjoy and practicing relaxation or meditation. Talk to your care team about ways to manage your pain beyond prescription opioids.    These medicines have risks:    DO NOT drive when on new or higher doses of pain medicine. These medicines can affect your alertness and reaction times, and you could be arrested for driving under the influence (DUI). If you need to use opioids long-term, talk to your care team about driving.    DO NOT operate heavy machinery    DO NOT do any other dangerous activities while taking these medicines.    DO NOT drink any alcohol while taking these medicines.     If the opioid prescribed includes acetaminophen, DO NOT take with any other medicines that contain acetaminophen. Read all labels carefully. Look for the word  acetaminophen  or  Tylenol.  Ask your pharmacist if you have questions or are unsure.    You can get addicted to pain medicines, especially if you have a history of addiction (chemical, alcohol or substance dependence). Talk to your care  team about ways to reduce this risk.    All opioids tend to cause constipation. Drink plenty of water and eat foods that have a lot of fiber, such as fruits, vegetables, prune juice, apple juice and high-fiber cereal. Take a laxative (Miralax, milk of magnesia, Colace, Senna) if you don t move your bowels at least every other day. Other side effects include upset stomach, sleepiness, dizziness, throwing up, tolerance (needing more of the medicine to have the same effect), physical dependence and slowed breathing.    Store your pills in a secure place, locked if possible. We will not replace any lost or stolen medicine. If you don t finish your medicine, please throw away (dispose) as directed by your pharmacist. The [place] Pollution Control Agency has more information about safe disposal: https://www.pca.[facility].mn.us/living-green/managing-unwanted-medications         Primary Care Provider Office Phone # Fax #    Shayy GUNJAN Armendariz [facility] [phone]       [address] [facility] [address]        Equal Access to Services     CATRACHO MCBRIDE : Hadii petar ku hadasho Soomaali, waaxda luqadaha, qaybta kaalmada adespencer, deny morris . So [facility] [phone].    ATENCIÓN: Si habla español, tiene a hewitt disposición servicios gratuitos de asistencia lingüística. Ursula al [phone].    We comply with applicable federal civil rights laws and [place] laws. We do not discriminate on the basis of race, color, national origin, age, disability, sex, sexual orientation, or gender identity.            Thank you!     Thank you for choosing [facility]  for your care. Our goal is always to provide you with excellent care. Hearing back from our patients is one way we can continue to improve our services. Please take a few minutes to complete the written survey that you may receive in the mail after your visit with us. Thank you!             Your Updated Medication  List - Protect others around you: Learn how to safely use, store and throw away your medicines at www.disposemymeds.org.          This list is accurate as of 11/30/18 11:10 AM.  Always use your most recent med list.                   Brand Name Dispense Instructions for use Diagnosis    amLODIPine 10 MG tablet    NORVASC    90 tablet    Take 1 tablet (10 mg) by mouth daily    Benign essential hypertension       aspirin 81 MG EC tablet    ASA    90 tablet    Take 1 tablet (81 mg) by mouth daily        ferrous fumarate 65 mg ([place]. FE)-Vitamin C 125 mg  MG Tabs tablet    VITRON C    60 tablet    Take 1 tablet by mouth daily    Iron deficiency anemia, unspecified iron deficiency anemia type       HCA VITAMIN B12 500 MCG tablet   Generic drug:  vitamin B-12      2 tablets daily        lisinopril 40 MG tablet    PRINIVIL/ZESTRIL    90 tablet    Take 1 tablet (40 mg) by mouth daily    Benign essential hypertension       MULTIVITAMIN PO      one daily        omeprazole 20 MG DR capsule    priLOSEC    90 capsule    Take 1 capsule (20 mg) by mouth daily    Gastric ulcer, unspecified chronicity, unspecified whether gastric ulcer hemorrhage or perforation present       oxyCODONE 5 MG tablet   Start taking on:  2/4/2019    ROXICODONE    60 tablet    Take 1 tablet (5 mg) by mouth 2 times daily as needed for pain    Chronic left-sided low back pain with left-sided sciatica       senna-docusate 8.6-50 MG tablet    SENOKOT-S/PERICOLACE    60 tablet    Take 1-2 tablets by mouth 2 times daily as needed for constipation.    Spondylolisthesis of lumbar region       terazosin 5 MG capsule    HYTRIN    90 capsule    TAKE 1 CAPSULE AT BEDTIME    Benign non-nodular prostatic hyperplasia with lower urinary tract symptoms       triamterene-HCTZ 75-50 MG tablet    MAXZIDE    45 tablet    Take 0.5 tablets by mouth daily    Benign essential hypertension       VIACTIV PO      With D 1000mg calcium

## 2018-11-30 NOTE — PROGRESS NOTES
SUBJECTIVE:   Mann Escobar is a 78 year old male who presents to clinic today for the following health issues:      Medication Followup of oxycodone     Taking Medication as prescribed: yes    Side Effects:  None    Medication Helping Symptoms:  yes       Concern - pain in right side of scalp  Onset: 3-5 days    Description:   Intermittent, soreness    Intensity: mild - gone today    Progression of Symptoms:  intermittent and waxing and waning    Accompanying Signs & Symptoms:  Left eye is red with soreness to the touch - doesn't think it is related, happens on and off  No skin rash or sores.    Previous history of similar problem:   yes    Precipitating factors:   Worsened by: touching the are    Alleviating factors:  Improved by: NA    Therapies Tried and outcome: NA      Genitourinary - Male  Onset: 2 weeks  Out of terazosin for 2 weeks and mail-order hasn't sent him new script yet.  He is unsure if this is because he is out of medications or if there is an infection.    Description:   Dysuria (painful urination): YES  Hematuria (blood in urine): no   Frequency: YES  Are you urinating at night : YES  Hesitancy (delay in urine): YES  Retention (unable to empty): YES  Decrease in urinary flow: YES  Incontinence: YES    Progression of Symptoms:  worsening    Accompanying Signs & Symptoms:  Fever: no   Back/Flank pain: YES- always has pain  Urethral discharge: no   Testicle lumps/masses/pain: no   Nausea and/or vomiting: no   Abdominal pain: no     History:   History of frequent UTI's: no   History of kidney stones: YES  History of hernias: no   Personal or Family history of Prostate problems: YES- father  Sexually active: no     Precipitating factors:   none    Alleviating factors:  None  Took some amoxicillin he got from [place] this morning.       Concern - mass on right forearm  Onset: ongoing for years    Description:   Raised, soft, minimal pain    Intensity: mild    Progression of Symptoms:   "worsening    Accompanying Signs & Symptoms:  NA    Previous history of similar problem:   NA    Precipitating factors:   Worsened by: NA    Alleviating factors:  Improved by: NA    Therapies Tried and outcome: NA   He wants it removed.        HTN:  BPs better but still high.  Patient states today that he doesn't always take his Maxzide      Problem list and histories reviewed & adjusted, as indicated.  Additional history: as documented    Reviewed and updated as needed this visit by clinical staff  Allergies  Meds       Reviewed and updated as needed this visit by Provider         ROS:  Constitutional, HEENT, cardiovascular, pulmonary, gi and gu systems are negative, except as otherwise noted.    OBJECTIVE:     /88  Pulse 65  Temp 98.9  F (37.2  C) (Tympanic)  Ht 5' 2\" (1.575 m)  Wt 183 lb (83 kg)  SpO2 95%  BMI 33.47 kg/m2  Body mass index is 33.47 kg/(m^2).  GENERAL: healthy, alert and no distress  NECK: no adenopathy, no asymmetry, masses, or scars and thyroid normal to palpation  RESP: lungs clear to auscultation - no rales, rhonchi or wheezes  CV: regular rate and rhythm, normal S1 S2, no S3 or S4, no murmur, click or rub, no peripheral edema and peripheral pulses strong  MS: no gross musculoskeletal defects noted, no edema  SKIN: 1.5 inch soft raised subcutaneous nodule c/w lipoma.    Results for orders placed or performed in visit on 11/30/18   *UA reflex to Microscopic and Culture ([place] and [facility] (except [place] and [place])   Result Value Ref Range    Color Urine Yellow     Appearance Urine Clear     Glucose Urine Negative NEG^Negative mg/dL    Bilirubin Urine Negative NEG^Negative    Ketones Urine Negative NEG^Negative mg/dL    Specific Gravity Urine 1.025 1.003 - 1.035    Blood Urine Trace (A) NEG^Negative    pH Urine 6.0 5.0 - 7.0 pH    Protein Albumin Urine >=300 (A) NEG^Negative mg/dL    Urobilinogen Urine 1.0 0.2 - 1.0 EU/dL    Nitrite Urine Negative NEG^Negative    " Leukocyte Esterase Urine Negative NEG^Negative    Source Midstream Urine    Urine Microscopic   Result Value Ref Range    WBC Urine 0 - 5 OTO5^0 - 5 /HPF    RBC Urine O - 2 OTO2^O - 2 /HPF         ASSESSMENT/PLAN:       ICD-10-CM    1. Chronic left-sided low back pain with left-sided sciatica M54.42 oxyCODONE (ROXICODONE) 5 MG tablet    G89.29 DISCONTINUED: oxyCODONE (ROXICODONE) 5 MG tablet     DISCONTINUED: oxyCODONE (ROXICODONE) 5 MG tablet     2. Lipoma of skin and subcutaneous tissue D17.30 Patient desires removal.  GENERAL SURG ADULT REFERRAL     3. Scalp pain R51 No pain today.  No tenderness to touch on exam - no lesions.  Advised patient to monitor - should pain return or he develop lesions/rash - call due to concerns about possible shingles.     4. Benign essential hypertension I10 Better, but still elevated.  Encouraged patient to be compliant with medications.     5. Symptoms involving urinary system R39.9 No infection on UA today.  Symptoms likely related to being out of the terazosin.    *UA reflex to Microscopic and Culture ([place] and [facility] (except [place] and [place])         The risks, benefits and treatment options of prescribed medications or other treatments have been discussed with the patient. The patient verbalized their understanding and should call or follow up if no improvement or if they develop further problems.    GUNJAN Vinson [facility]

## 2018-12-05 ENCOUNTER — OFFICE VISIT (OUTPATIENT)
Dept: SURGERY | Facility: CLINIC | Age: 78
End: 2018-12-05
Payer: COMMERCIAL

## 2018-12-05 VITALS
SYSTOLIC BLOOD PRESSURE: 130 MMHG | HEIGHT: 62 IN | DIASTOLIC BLOOD PRESSURE: 74 MMHG | WEIGHT: 183 LBS | HEART RATE: 75 BPM | TEMPERATURE: 98.1 F | BODY MASS INDEX: 33.68 KG/M2

## 2018-12-05 DIAGNOSIS — D17.21 LIPOMA OF RIGHT UPPER EXTREMITY: Primary | ICD-10-CM

## 2018-12-05 PROCEDURE — 25071 EXC FOREARM LES SC 3 CM/>: CPT | Mod: RT | Performed by: SURGERY

## 2018-12-05 NOTE — NURSING NOTE
"Initial /74 (BP Location: Right arm, Patient Position: Sitting, Cuff Size: Adult Regular)  Pulse 75  Temp 98.1  F (36.7  C) (Oral)  Ht 1.575 m (5' 2\")  Wt 83 kg (183 lb)  BMI 33.47 kg/m2 Estimated body mass index is 33.47 kg/(m^2) as calculated from the following:    Height as of this encounter: 1.575 m (5' 2\").    Weight as of this encounter: 83 kg (183 lb). .    Anabelle Danielle MA    "

## 2018-12-05 NOTE — PROGRESS NOTES
78-year-old male here for evaluation of her right forearm lipoma.  Patient reports this is been growing steadily over the past 20 years and is now starting to interfere with his activities of daily living.  It is otherwise asymptomatic.      Procedure: Area anesthetized with 1% lidocaine with epinephrine after first cleaning and draping in a sterile manner.  4 cm longitudinal incision made and 5 cm lipoma excised sharply.  Bleeding controlled with rectal pressure.  Skin closed using 4-0 Vicryl running subcuticular stitch and dressed with Dermabond.  Wound then covered with gauze and a Coban dressing for compression.      Assessment and plan: Status post removal of lipoma.  Patient advised to keep compression dressing on as much as possible for the next week and then remove.  Wash daily with soap and water.  Return to clinic as necessary.  Patient already has a prescription for oxycodone at home which he can take as needed.    Andreas Orozco MD

## 2018-12-05 NOTE — MR AVS SNAPSHOT
"              After Visit Summary   12/5/2018    Mann Escobar    MRN: [id number]           Patient Information     Date Of Birth          1940        Visit Information        Provider Department      12/5/2018 10:00 AM Andreas Orozco MD [facility]        Today's Diagnoses     Lipoma of right upper extremity    -  1      Care Instructions    Per Dr. Orozco's instructions          Follow-ups after your visit        Who to contact     If you have questions or need follow up information about today's clinic visit or your schedule please contact [facility] directly at [phone].  Normal or non-critical lab and imaging results will be communicated to you by MyChart, letter or phone within 4 business days after the clinic has received the results. If you do not hear from us within 7 days, please contact the clinic through MyChart or phone. If you have a critical or abnormal lab result, we will notify you by phone as soon as possible.  Submit refill requests through [employer] or call your pharmacy and they will forward the refill request to us. Please allow 3 business days for your refill to be completed.          Additional Information About Your Visit        Care EveryWhere ID     This is your Care EveryWhere ID. This could be used by other organizations to access your [facility] medical records  [id number]        Your Vitals Were     Pulse Temperature Height BMI (Body Mass Index)          75 98.1  F (36.7  C) (Oral) 1.575 m (5' 2\") 33.47 kg/m2         Blood Pressure from Last 3 Encounters:   12/05/18 130/74   11/30/18 145/88   10/12/18 160/78    Weight from Last 3 Encounters:   12/05/18 83 kg (183 lb)   11/30/18 83 kg (183 lb)   08/30/18 83.9 kg (185 lb)              Today, you had the following     No orders found for display       Primary Care Provider Office Phone # Fax #    GUNJAN Villarreal [facility] [phone] [address] " [place] [address]        Equal Access to Services     [facility] RAE : Hadii petar nair maria victoria Samuels, waaxda luqadaha, qaybta kaalmada daynafabiánryan, waxyessy radha woodsitzelnaomy benjamin. So [facility] [phone].    ATENCIÓN: Si habla español, tiene a hewitt disposición servicios gratuitos de asistencia lingüística. Coleame al [phone].    We comply with applicable federal civil rights laws and [place] laws. We do not discriminate on the basis of race, color, national origin, age, disability, sex, sexual orientation, or gender identity.            Thank you!     Thank you for choosing [facility]  for your care. Our goal is always to provide you with excellent care. Hearing back from our patients is one way we can continue to improve our services. Please take a few minutes to complete the written survey that you may receive in the mail after your visit with us. Thank you!             Your Updated Medication List - Protect others around you: Learn how to safely use, store and throw away your medicines at www.disposemymeds.org.          This list is accurate as of 12/5/18 10:30 AM.  Always use your most recent med list.                   Brand Name Dispense Instructions for use Diagnosis    amLODIPine 10 MG tablet    NORVASC    90 tablet    Take 1 tablet (10 mg) by mouth daily    Benign essential hypertension       aspirin 81 MG EC tablet    ASA    90 tablet    Take 1 tablet (81 mg) by mouth daily        ferrous fumarate 65 mg ([place]. FE)-Vitamin C 125 mg  MG Tabs tablet    VITRON C    60 tablet    Take 1 tablet by mouth daily    Iron deficiency anemia, unspecified iron deficiency anemia type       HCA VITAMIN B12 500 MCG tablet   Generic drug:  vitamin B-12      2 tablets daily        lisinopril 40 MG tablet    PRINIVIL/ZESTRIL    90 tablet    Take 1 tablet (40 mg) by mouth daily    Benign essential hypertension       MULTIVITAMIN PO      one daily        omeprazole 20 MG DR capsule    priLOSEC    90 capsule     Take 1 capsule (20 mg) by mouth daily    Gastric ulcer, unspecified chronicity, unspecified whether gastric ulcer hemorrhage or perforation present       oxyCODONE 5 MG tablet   Start taking on:  2/4/2019    ROXICODONE    60 tablet    Take 1 tablet (5 mg) by mouth 2 times daily as needed for pain    Chronic left-sided low back pain with left-sided sciatica       senna-docusate 8.6-50 MG tablet    SENOKOT-S/PERICOLACE    60 tablet    Take 1-2 tablets by mouth 2 times daily as needed for constipation.    Spondylolisthesis of lumbar region       terazosin 5 MG capsule    HYTRIN    90 capsule    TAKE 1 CAPSULE AT BEDTIME    Benign non-nodular prostatic hyperplasia with lower urinary tract symptoms       triamterene-HCTZ 75-50 MG tablet    MAXZIDE    45 tablet    Take 0.5 tablets by mouth daily    Benign essential hypertension       VIACTIV PO      With D 1000mg calcium

## 2018-12-05 NOTE — LETTER
12/5/2018         RE: Mann Escobar  [address]  [place] [address]        Dear Colleague,    Thank you for referring your patient, Mann Escobar, to the [facility]. Please see a copy of my visit note below.    78-year-old male here for evaluation of her right forearm lipoma.  Patient reports this is been growing steadily over the past 20 years and is now starting to interfere with his activities of daily living.  It is otherwise asymptomatic.      Procedure: Area anesthetized with 1% lidocaine with epinephrine after first cleaning and draping in a sterile manner.  4 cm longitudinal incision made and 5 cm lipoma excised sharply.  Bleeding controlled with rectal pressure.  Skin closed using 4-0 Vicryl running subcuticular stitch and dressed with Dermabond.  Wound then covered with gauze and a Coban dressing for compression.      Assessment and plan: Status post removal of lipoma.  Patient advised to keep compression dressing on as much as possible for the next week and then remove.  Wash daily with soap and water.  Return to clinic as necessary.  Patient already has a prescription for oxycodone at home which he can take as needed.    Andreas Orozco MD     Again, thank you for allowing me to participate in the care of your patient.        Sincerely,        Andreas Orozco MD

## 2018-12-06 ENCOUNTER — OFFICE VISIT (OUTPATIENT)
Dept: FAMILY MEDICINE | Facility: CLINIC | Age: 78
End: 2018-12-06
Payer: COMMERCIAL

## 2018-12-06 VITALS
HEIGHT: 62 IN | BODY MASS INDEX: 33.68 KG/M2 | DIASTOLIC BLOOD PRESSURE: 84 MMHG | HEART RATE: 76 BPM | RESPIRATION RATE: 20 BRPM | OXYGEN SATURATION: 99 % | WEIGHT: 183 LBS | SYSTOLIC BLOOD PRESSURE: 160 MMHG | TEMPERATURE: 97.7 F

## 2018-12-06 DIAGNOSIS — B02.9 HERPES ZOSTER WITHOUT COMPLICATION: Primary | ICD-10-CM

## 2018-12-06 PROCEDURE — 99213 OFFICE O/P EST LOW 20 MIN: CPT | Performed by: PHYSICIAN ASSISTANT

## 2018-12-06 RX ORDER — ACYCLOVIR 800 MG/1
800 TABLET ORAL
Qty: 35 TABLET | Refills: 0 | Status: SHIPPED | OUTPATIENT
Start: 2018-12-06 | End: 2020-05-18

## 2018-12-06 ASSESSMENT — ENCOUNTER SYMPTOMS
DIARRHEA: 0
COUGH: 0
SORE THROAT: 0
PALPITATIONS: 0
CHILLS: 0
HEADACHES: 0
EYE REDNESS: 0
SHORTNESS OF BREATH: 0
VOMITING: 0
BLURRED VISION: 0
NAUSEA: 0
WHEEZING: 0
FEVER: 0
MYALGIAS: 0
EYE DISCHARGE: 0
ABDOMINAL PAIN: 0

## 2018-12-06 NOTE — MR AVS SNAPSHOT
"              After Visit Summary   12/6/2018    Mann Escobar    MRN: [id number]           Patient Information     Date Of Birth          1940        Visit Information        Provider Department      12/6/2018 11:20 AM Loren Baer PA-C [facility]        Today's Diagnoses     Herpes zoster without complication    -  1       Follow-ups after your visit        Follow-up notes from your care team     Return if symptoms worsen or fail to improve.      Who to contact     If you have questions or need follow up information about today's clinic visit or your schedule please contact [facility] directly at [phone].  Normal or non-critical lab and imaging results will be communicated to you by MyChart, letter or phone within 4 business days after the clinic has received the results. If you do not hear from us within 7 days, please contact the clinic through MyChart or phone. If you have a critical or abnormal lab result, we will notify you by phone as soon as possible.  Submit refill requests through [employer] or call your pharmacy and they will forward the refill request to us. Please allow 3 business days for your refill to be completed.          Additional Information About Your Visit        Care EveryWhere ID     This is your Care EveryWhere ID. This could be used by other organizations to access your [facility] medical records  [id number]        Your Vitals Were     Pulse Temperature Respirations Height Pulse Oximetry BMI (Body Mass Index)    76 97.7  F (36.5  C) (Tympanic) 20 5' 2\" (1.575 m) 99% 33.47 kg/m2       Blood Pressure from Last 3 Encounters:   12/06/18 160/84   12/05/18 130/74   11/30/18 145/88    Weight from Last 3 Encounters:   12/06/18 183 lb (83 kg)   12/05/18 183 lb (83 kg)   11/30/18 183 lb (83 kg)              Today, you had the following     No orders found for display         Today's Medication Changes          These changes are " accurate as of 12/6/18 12:12 PM.  If you have any questions, ask your nurse or doctor.               Start taking these medicines.        Dose/Directions    acyclovir 800 MG tablet   Commonly known as:  ZOVIRAX   Used for:  Herpes zoster without complication   Started by:  Loren Baer PA-C        Dose:  800 mg   Take 1 tablet (800 mg) by mouth 5 times daily   Quantity:  35 tablet   Refills:  0            Where to get your medicines      These medications were sent to [facility] [address] [address]     Phone:  [phone]     acyclovir 800 MG tablet                Primary Care Provider Office Phone # Fax #    Shayy Carvajal Adriel Ramírez, APRN [facility] [phone] [address] [facility] [address]        Equal Access to Services     [facility] : Hadii petar nair hadasho Soomaali, waaxda luqadaha, qaybta kaalmada adetarayada, deny morris . So [facility] [phone].    ATENCIÓN: Si habla español, tiene a hewitt disposición servicios gratuitos de asistencia lingüística. Llame al [phone].    We comply with applicable federal civil rights laws and [place] laws. We do not discriminate on the basis of race, color, national origin, age, disability, sex, sexual orientation, or gender identity.            Thank you!     Thank you for choosing [facility]  for your care. Our goal is always to provide you with excellent care. Hearing back from our patients is one way we can continue to improve our services. Please take a few minutes to complete the written survey that you may receive in the mail after your visit with us. Thank you!             Your Updated Medication List - Protect others around you: Learn how to safely use, store and throw away your medicines at www.disposemymeds.org.          This list is accurate as of 12/6/18 12:12 PM.  Always use your most recent med list.                    Brand Name Dispense Instructions for use Diagnosis    acyclovir 800 MG tablet    ZOVIRAX    35 tablet    Take 1 tablet (800 mg) by mouth 5 times daily    Herpes zoster without complication       amLODIPine 10 MG tablet    NORVASC    90 tablet    Take 1 tablet (10 mg) by mouth daily    Benign essential hypertension       aspirin 81 MG EC tablet    ASA    90 tablet    Take 1 tablet (81 mg) by mouth daily        ferrous fumarate 65 mg ([place]. FE)-Vitamin C 125 mg  MG Tabs tablet    VITRON C    60 tablet    Take 1 tablet by mouth daily    Iron deficiency anemia, unspecified iron deficiency anemia type       HCA VITAMIN B12 500 MCG tablet   Generic drug:  vitamin B-12      2 tablets daily        lisinopril 40 MG tablet    PRINIVIL/ZESTRIL    90 tablet    Take 1 tablet (40 mg) by mouth daily    Benign essential hypertension       MULTIVITAMIN PO      one daily        omeprazole 20 MG DR capsule    priLOSEC    90 capsule    Take 1 capsule (20 mg) by mouth daily    Gastric ulcer, unspecified chronicity, unspecified whether gastric ulcer hemorrhage or perforation present       oxyCODONE 5 MG tablet   Start taking on:  2/4/2019    ROXICODONE    60 tablet    Take 1 tablet (5 mg) by mouth 2 times daily as needed for pain    Chronic left-sided low back pain with left-sided sciatica       senna-docusate 8.6-50 MG tablet    SENOKOT-S/PERICOLACE    60 tablet    Take 1-2 tablets by mouth 2 times daily as needed for constipation.    Spondylolisthesis of lumbar region       terazosin 5 MG capsule    HYTRIN    90 capsule    TAKE 1 CAPSULE AT BEDTIME    Benign non-nodular prostatic hyperplasia with lower urinary tract symptoms       triamterene-HCTZ 75-50 MG tablet    MAXZIDE    45 tablet    Take 0.5 tablets by mouth daily    Benign essential hypertension       VIACTIV PO      With D 1000mg calcium

## 2018-12-06 NOTE — NURSING NOTE
"Chief Complaint   Patient presents with     Shingles       Initial /84  Pulse 76  Temp 97.7  F (36.5  C) (Tympanic)  Resp 20  Ht 5' 2\" (1.575 m)  Wt 183 lb (83 kg)  SpO2 99%  BMI 33.47 kg/m2 Estimated body mass index is 33.47 kg/(m^2) as calculated from the following:    Height as of this encounter: 5' 2\" (1.575 m).    Weight as of this encounter: 183 lb (83 kg).    Patient presents to the clinic using No DME    Health Maintenance that is potentially due pending provider review:  NONE    n/a    Is there anyone who you would like to be able to receive your results? No  If yes have patient fill out COLE      "

## 2018-12-06 NOTE — PROGRESS NOTES
SUBJECTIVE:   Mann Escobar is a 78 year old male who presents to clinic today for the following health issues:      Shingles       Duration: few weeks- Day after Thanksgiving     Description (location/character/radiation): Shingles     Intensity:  Moderate, 7/10 pain     Accompanying signs and symptoms: hard to sleep, painful when touching, located on the back of head/neck area and on top of head, redness. Last Thursday the left eye was red and painful- gotten better over time. No discharge or itching with the eye but was a little out of focus when it first flared up.     History (similar episodes/previous evaluation): None    Precipitating or alleviating factors: None    Therapies tried and outcome: None         Problem list and histories reviewed & adjusted, as indicated.  Additional history: as documented    Patient Active Problem List   Diagnosis      HX NEPHROLITHIASIS [V13.01]     Thalassemia minor     Esophageal reflux     Family history of prostate cancer     Leg edema     CARDIOVASCULAR SCREENING; LDL GOAL LESS THAN 130     Internal hemorrhoids     Iron deficiency anemia     First degree AV block     Family history of diabetes mellitus     Scoliosis     Hypopotassemia     Advanced directives, counseling/discussion     Benign non-nodular prostatic hyperplasia with lower urinary tract symptoms     Chronic low back pain     S/P knee replacement     S/P ankle fusion     Abnormal antinuclear antibody titer     Osteoarthritis     Hypertension     BPH (benign prostatic hyperplasia)     Pseudogout     Chronic pain syndrome     Past Surgical History:   Procedure Laterality Date     ABDOMEN SURGERY  2005    gastric bypass     ANKLE SURGERY  2005 2005 fusion for arthritis      CATARACT IOL, RT/LT  2008/    Cataract IOL RT/LT     COLONOSCOPY  2009/07    polyps removed repeat 5 yrs, tubular adenoma     COLONOSCOPY  9/29/2011    Procedure:COLONOSCOPY; Colonoscopy with hemorrhoid banding;  Surgeon:JEREMY GARCIA; Location:[facility]     COLONOSCOPY N/A 12/19/2017    Procedure: COLONOSCOPY;  colonoscopy, gastroscopy;  Surgeon: Edmar Isaacs MD;  Location: [facility]     DECOMPRESSION LUMBAR MINIMALLY INVASIVE TWO LEVELS  5/2/2012    Procedure:DECOMPRESSION LUMBAR MINIMALLY INVASIVE TWO LEVELS; Surgeon:LOTTIE MITCHELL; Location:[facility]     ESOPHAGOSCOPY, GASTROSCOPY, DUODENOSCOPY (EGD), COMBINED N/A 2/6/2018    Procedure: COMBINED ESOPHAGOSCOPY, GASTROSCOPY, DUODENOSCOPY (EGD);  gastroscopy;  Surgeon: Edmar Isaacs MD;  Location: [facility]     FUSION SPINE POSTERIOR MINIMALLY INVASIVE ONE LEVEL  5/2/2012    Procedure:FUSION SPINE POSTERIOR MINIMALLY INVASIVE ONE LEVEL; Minimal Access Spinal Technology Transformaninal Lumbar Interbody Fusion L4-5, Decompression L3-5; Surgeon:LOTTIE MITCHELL; Location:[facility]     HC REMOVAL OF HYDROCELE,TUNICA,UNILAT  2006    bilateral     ORTHOPEDIC SURGERY       ORTHOPEDIC SURGERY  2000    Lf knee replacement     ORTHOPEDIC SURGERY  2002, 2010    Rt replacement     ORTHOPEDIC SURGERY  2005    Left ankle fused     SURGICAL HISTORY OF -       Cysto     SURGICAL HISTORY OF -   2000    (L) Total knee     SURGICAL HISTORY OF -   2002    Percutaneous kidney stone removal     SURGICAL HISTORY OF -   2002    (R) Total knee     SURGICAL HISTORY OF -       left ankle fusion      SURGICAL HISTORY OF -       ureteral stent removal     SURGICAL HISTORY OF -   2005    bariatric surgery-Favian-en-Y     SURGICAL HISTORY OF -   2008    carpal tunnel surgery rt wrist       Social History   Substance Use Topics     Smoking status: Former Smoker     Packs/day: 0.50     Years: 7.00     Types: Cigarettes     Quit date: 1/1/1962     Smokeless tobacco: Never Used     Alcohol use Yes      Comment: rarely     Family History   Problem Relation Age of Onset     Diabetes Brother      Obesity Brother      Cerebrovascular Disease Paternal Grandfather      Prostate Cancer Father      Cancer Father       bone     Diabetes Father      Heart Disease Mother      CHF     Cerebrovascular Disease Mother      Hypertension Mother      Cancer Mother      tumor in stomach     Cancer - colorectal Mother      Heart Disease Maternal Grandmother      CHF     Diabetes Paternal Grandmother      Breast Cancer Sister      Genitourinary Problems Son      Kidney stones     Cancer Brother      Cancer - colorectal Brother      Diabetes Brother          Current Outpatient Prescriptions   Medication Sig Dispense Refill     acyclovir (ZOVIRAX) 800 MG tablet Take 1 tablet (800 mg) by mouth 5 times daily 35 tablet 0     amLODIPine (NORVASC) 10 MG tablet Take 1 tablet (10 mg) by mouth daily 90 tablet 3     aspirin 81 MG EC tablet Take 1 tablet (81 mg) by mouth daily 90 tablet 3     ferrous fumarate 65 mg, [place]. FE,-Vitamin C 125 mg (VITRON C)  MG TABS tablet Take 1 tablet by mouth daily 60 tablet 1     HCA VITAMIN B12 500 MCG OR TABS 2 tablets daily       lisinopril (PRINIVIL/ZESTRIL) 40 MG tablet Take 1 tablet (40 mg) by mouth daily 90 tablet 3     MULTIVITAMIN OR one daily       omeprazole (PRILOSEC) 20 MG CR capsule Take 1 capsule (20 mg) by mouth daily 90 capsule 0     [START ON 2/4/2019] oxyCODONE (ROXICODONE) 5 MG tablet Take 1 tablet (5 mg) by mouth 2 times daily as needed for pain 60 tablet 0     senna-docusate (SENOKOT-S;PERICOLACE) 8.6-50 MG per tablet Take 1-2 tablets by mouth 2 times daily as needed for constipation. 60 tablet 0     terazosin (HYTRIN) 5 MG capsule TAKE 1 CAPSULE AT BEDTIME 90 capsule 3     triamterene-hydrochlorothiazide (MAXZIDE) 75-50 MG per tablet Take 0.5 tablets by mouth daily 45 tablet 1     VIACTIV OR With D 1000mg calcium       Allergies   Allergen Reactions     Nkda [No Known Drug Allergies]      Labs reviewed in EPIC    Reviewed and updated as needed this visit by clinical staff  Tobacco  Allergies  Meds  Problems  Med Hx  Surg Hx  Fam Hx  Soc Hx        Reviewed and updated as needed this  "visit by Provider  Allergies  Meds  Problems         ROS:  Review of Systems   Constitutional: Negative for chills, fever and malaise/fatigue.   HENT: Negative for congestion, ear pain and sore throat.    Eyes: Negative for blurred vision, discharge and redness.   Respiratory: Negative for cough, shortness of breath and wheezing.    Cardiovascular: Negative for chest pain and palpitations.   Gastrointestinal: Negative for abdominal pain, diarrhea, nausea and vomiting.   Musculoskeletal: Negative for joint pain and myalgias.   Skin: Positive for rash.   Neurological: Negative for headaches.         OBJECTIVE:     /84  Pulse 76  Temp 97.7  F (36.5  C) (Tympanic)  Resp 20  Ht 5' 2\" (1.575 m)  Wt 183 lb (83 kg)  SpO2 99%  BMI 33.47 kg/m2  Body mass index is 33.47 kg/(m^2).    Physical Exam   Constitutional: He is well-developed, well-nourished, and in no distress.   HENT:   Head: Normocephalic.       Right Ear: Tympanic membrane and ear canal normal.   Left Ear: Tympanic membrane and ear canal normal.   Mouth/Throat: Oropharynx is clear and moist.   Blistered rash on the right side of head, no signs of infection   Eyes: Conjunctivae are normal. Pupils are equal, round, and reactive to light.   Cardiovascular: Normal rate, regular rhythm and normal heart sounds.    Pulmonary/Chest: Effort normal and breath sounds normal.   Skin: No rash noted.   Psychiatric:   Alert and cooperative       Diagnostic Test Results:  none     ASSESSMENT/PLAN:     1. Herpes zoster without complication  He feels new vesicles started yesterday. Will try treatment with acyclovir. Avoid scratching and watch for signs of infection. Avoid exposure to high risk individuals. Return to clinic if symptoms worsen or do not improve; otherwise follow up as needed    - acyclovir (ZOVIRAX) 800 MG tablet; Take 1 tablet (800 mg) by mouth 5 times daily  Dispense: 35 tablet; Refill: 0         Loren Baer PA-C  [facility]" [place]

## 2018-12-10 NOTE — H&P
77 year old year old male here for upper endoscopy and colonoscopy for evaluation of iron deficiency anemia.  He has known thalassemia, but has recently dropped his hemoglobin from 11 to around 8.        Patient Active Problem List   Diagnosis      HX NEPHROLITHIASIS [V13.01]     Thalassemia minor     Esophageal reflux     Family history of prostate cancer     Leg edema     CARDIOVASCULAR SCREENING; LDL GOAL LESS THAN 130     Internal hemorrhoids     Iron deficiency anemia     First degree AV block     Family history of diabetes mellitus     Scoliosis     Hypopotassemia     Advanced directives, counseling/discussion     Benign non-nodular prostatic hyperplasia with lower urinary tract symptoms     Chronic low back pain     S/P knee replacement     S/P ankle fusion     Abnormal antinuclear antibody titer     Osteoarthritis     Hypertension     BPH (benign prostatic hyperplasia)     Pseudogout     Chronic pain syndrome       Past Medical History:   Diagnosis Date     Back pain     WITH BILATERAL LEG PAIN AND NUMBNESS OF BOTH FEET     Gastro-oesophageal reflux disease     REFLUX     Hypertension      Hypopotassemia      Internal hemorrhoids      Iron deficiency anaemia      Leg edema      Morbid obesity 9/12/2005     Morbid obesity (H)      Osteoarthrosis      Scoliosis      Thalassemia minor        Past Surgical History:   Procedure Laterality Date     ABDOMEN SURGERY  2005    gastric bypass     ANKLE SURGERY  2005 2005 fusion for arthritis      CATARACT IOL, RT/LT  2008/    Cataract IOL RT/LT     COLONOSCOPY  2009/07    polyps removed repeat 5 yrs, tubular adenoma     COLONOSCOPY  9/29/2011    Procedure:COLONOSCOPY; Colonoscopy with hemorrhoid banding; Surgeon:JEREMY GARCIA; Location:[facility]     DECOMPRESSION LUMBAR MINIMALLY INVASIVE TWO LEVELS  5/2/2012    Procedure:DECOMPRESSION LUMBAR MINIMALLY INVASIVE TWO LEVELS; Surgeon:LOTTIE MITCHELL; [facility]     FUSION SPINE POSTERIOR MINIMALLY INVASIVE  ONE LEVEL  5/2/2012    Procedure:FUSION SPINE POSTERIOR MINIMALLY INVASIVE ONE LEVEL; Minimal Access Spinal Technology Transformaninal Lumbar Interbody Fusion L4-5, Decompression L3-5; Surgeon:LOTTIE MITCHELL; Location:[facility]     HC REMOVAL OF HYDROCELE,TUNICA,UNILAT  2006    bilateral     ORTHOPEDIC SURGERY       ORTHOPEDIC SURGERY  2000    Lf knee replacement     ORTHOPEDIC SURGERY  2002, 2010    Rt replacement     ORTHOPEDIC SURGERY  2005    Left ankle fused     SURGICAL HISTORY OF -       Cysto     SURGICAL HISTORY OF -   2000    (L) Total knee     SURGICAL HISTORY OF -   2002    Percutaneous kidney stone removal     SURGICAL HISTORY OF -   2002    (R) Total knee     SURGICAL HISTORY OF -       left ankle fusion      SURGICAL HISTORY OF -       ureteral stent removal     SURGICAL HISTORY OF -   2005    bariatric surgery-Favian-en-Y     SURGICAL HISTORY OF -   2008    carpal tunnel surgery rt wrist       Family History   Problem Relation Age of Onset     DIABETES Brother      Obesity Brother      CEREBROVASCULAR DISEASE Paternal Grandfather      Prostate Cancer Father      CANCER Father      bone     DIABETES Father      HEART DISEASE Mother      CHF     CEREBROVASCULAR DISEASE Mother      Hypertension Mother      CANCER Mother      tumor in stomach     Cancer - colorectal Mother      HEART DISEASE Maternal Grandmother      CHF     DIABETES Paternal Grandmother      Breast Cancer Sister      Genitourinary Problems Son      Kidney stones     CANCER Brother      Cancer - colorectal Brother      DIABETES Brother        No current outpatient prescriptions on file.       Allergies   Allergen Reactions     Nkda [No Known Drug Allergies]        Pt reports that he quit smoking about 56 years ago. His smoking use included Cigarettes. He has a 3.50 pack-year smoking history. He has never used smokeless tobacco. He reports that he drinks alcohol. He reports that he does not use illicit drugs.    Exam:    Awake, Alert  OX3  Lungs - CTA bilaterally  CV - RRR, no murmurs, distal pulses intact  Abd - soft, non-distended, non-tender, +BS  Extr - No cyanosis or edema    A/P 77 year old year old male in need of upper endoscopy and colonoscopy for evaluation of iron deficiency anemia.  Risks, benefits, alternatives, and complications were discussed including the possibility of perforation and the patient agreed to proceed.    Edmar Isaacs MD   rapid-acting insulin to take before you eat. If you use an insulin pump, you get a constant rate of insulin during the day. So the pump must be programmed at meals to give you extra insulin to cover the rise in blood sugar after meals. When you know how much carbohydrate you will eat, you can take the right amount of insulin. Or, if you always use the same amount of insulin, you need to make sure that you eat the same amount of carbohydrate at meals. If you need more help to understand carbohydrate counting and food labels, ask your doctor, dietitian, or diabetes educator. How do you get started with meal planning? Here are some tips to get started:  · Plan your meals a week at a time. Don't forget to include snacks too. · Use cookbooks or online recipes to plan several main meals. Plan some quick meals for busy nights. You also can double some recipes that freeze well. Then you can save half for other busy nights when you don't have time to cook. · Make sure you have the ingredients you need for your recipes. If you're running low on basic items, put these items on your shopping list too. · List foods that you use to make breakfasts, lunches, and snacks. List plenty of fruits and vegetables. · Post this list on the refrigerator. Add to it as you think of more things you need. · Take the list to the store to do your weekly shopping. Follow-up care is a key part of your treatment and safety. Be sure to make and go to all appointments, and call your doctor if you are having problems. It's also a good idea to know your test results and keep a list of the medicines you take. Where can you learn more? Go to https://maik.Yippy. org and sign in to your [employer] account. Enter U352 in the [employer] box to learn more about \"Learning About Meal Planning for Diabetes. \"     If you do not have an account, please click on the \"Sign Up Now\" link.   Current as of: December 7, 2017  Content

## 2019-02-25 ENCOUNTER — OFFICE VISIT (OUTPATIENT)
Dept: FAMILY MEDICINE | Facility: CLINIC | Age: 79
End: 2019-02-25
Payer: COMMERCIAL

## 2019-02-25 VITALS
SYSTOLIC BLOOD PRESSURE: 166 MMHG | HEART RATE: 67 BPM | TEMPERATURE: 98 F | WEIGHT: 188 LBS | HEIGHT: 62 IN | BODY MASS INDEX: 34.6 KG/M2 | DIASTOLIC BLOOD PRESSURE: 80 MMHG | OXYGEN SATURATION: 98 %

## 2019-02-25 DIAGNOSIS — G89.29 CHRONIC LEFT-SIDED LOW BACK PAIN WITH LEFT-SIDED SCIATICA: ICD-10-CM

## 2019-02-25 DIAGNOSIS — M54.42 CHRONIC LEFT-SIDED LOW BACK PAIN WITH LEFT-SIDED SCIATICA: ICD-10-CM

## 2019-02-25 DIAGNOSIS — K25.9 GASTRIC ULCER, UNSPECIFIED CHRONICITY, UNSPECIFIED WHETHER GASTRIC ULCER HEMORRHAGE OR PERFORATION PRESENT: ICD-10-CM

## 2019-02-25 PROCEDURE — 99214 OFFICE O/P EST MOD 30 MIN: CPT | Performed by: NURSE PRACTITIONER

## 2019-02-25 RX ORDER — OXYCODONE HYDROCHLORIDE 5 MG/1
5 TABLET ORAL 2 TIMES DAILY PRN
Qty: 60 TABLET | Refills: 0 | Status: SHIPPED | OUTPATIENT
Start: 2019-05-03 | End: 2019-05-31

## 2019-02-25 RX ORDER — OXYCODONE HYDROCHLORIDE 5 MG/1
5 TABLET ORAL 2 TIMES DAILY PRN
Qty: 60 TABLET | Refills: 0 | Status: SHIPPED | OUTPATIENT
Start: 2019-04-03 | End: 2019-02-25

## 2019-02-25 RX ORDER — OXYCODONE HYDROCHLORIDE 5 MG/1
5 TABLET ORAL 2 TIMES DAILY PRN
Qty: 60 TABLET | Refills: 0 | Status: SHIPPED | OUTPATIENT
Start: 2019-03-04 | End: 2019-02-25

## 2019-02-25 ASSESSMENT — MIFFLIN-ST. JEOR: SCORE: 1444.07

## 2019-02-25 NOTE — PATIENT INSTRUCTIONS
May add Tylenol 1000 mg every 8 hours as needed.        Thank you for choosing [facility].  You may be receiving a survey in the mail from [employer] regarding your visit today.  Please take a few minutes to complete and return the survey to let us know how we are doing.      If you have questions or concerns, please contact us via [employer] or you can contact your care team at [phone].    Our Clinic hours are:  Monday 6:40 am  to 7:00 pm  Tuesday -Friday 6:40 am to 5:00 pm    The [place] outpatient lab hours are:  Monday - Friday 6:10 am to 4:45 pm  Saturdays 7:00 am to 11:00 am  Appointments are required, call [phone]    If you have clinical questions after hours or would like to schedule an appointment,  call the clinic at [phone].

## 2019-05-22 ENCOUNTER — TRANSFERRED RECORDS (OUTPATIENT)
Dept: HEALTH INFORMATION MANAGEMENT | Facility: CLINIC | Age: 79
End: 2019-05-22

## 2019-05-31 ENCOUNTER — OFFICE VISIT (OUTPATIENT)
Dept: FAMILY MEDICINE | Facility: CLINIC | Age: 79
End: 2019-05-31
Payer: COMMERCIAL

## 2019-05-31 VITALS
DIASTOLIC BLOOD PRESSURE: 70 MMHG | BODY MASS INDEX: 33.13 KG/M2 | TEMPERATURE: 98.1 F | OXYGEN SATURATION: 97 % | HEART RATE: 61 BPM | WEIGHT: 180 LBS | SYSTOLIC BLOOD PRESSURE: 128 MMHG | HEIGHT: 62 IN

## 2019-05-31 DIAGNOSIS — G89.29 CHRONIC LEFT-SIDED LOW BACK PAIN WITH LEFT-SIDED SCIATICA: ICD-10-CM

## 2019-05-31 DIAGNOSIS — I10 BENIGN ESSENTIAL HYPERTENSION: ICD-10-CM

## 2019-05-31 DIAGNOSIS — M54.42 CHRONIC LEFT-SIDED LOW BACK PAIN WITH LEFT-SIDED SCIATICA: ICD-10-CM

## 2019-05-31 LAB
ANION GAP SERPL CALCULATED.3IONS-SCNC: 3 MMOL/L (ref 3–14)
BUN SERPL-MCNC: 31 MG/DL (ref 7–30)
CALCIUM SERPL-MCNC: 8.6 MG/DL (ref 8.5–10.1)
CHLORIDE SERPL-SCNC: 108 MMOL/L (ref 94–109)
CO2 SERPL-SCNC: 28 MMOL/L (ref 20–32)
CREAT SERPL-MCNC: 1.52 MG/DL (ref 0.66–1.25)
GFR SERPL CREATININE-BSD FRML MDRD: 43 ML/MIN/{1.73_M2}
GLUCOSE SERPL-MCNC: 105 MG/DL (ref 70–99)
POTASSIUM SERPL-SCNC: 3.9 MMOL/L (ref 3.4–5.3)
SODIUM SERPL-SCNC: 139 MMOL/L (ref 133–144)

## 2019-05-31 PROCEDURE — 36415 COLL VENOUS BLD VENIPUNCTURE: CPT | Performed by: NURSE PRACTITIONER

## 2019-05-31 PROCEDURE — 99214 OFFICE O/P EST MOD 30 MIN: CPT | Performed by: NURSE PRACTITIONER

## 2019-05-31 PROCEDURE — 80048 BASIC METABOLIC PNL TOTAL CA: CPT | Performed by: NURSE PRACTITIONER

## 2019-05-31 RX ORDER — OXYCODONE HYDROCHLORIDE 5 MG/1
5 TABLET ORAL 2 TIMES DAILY PRN
Qty: 60 TABLET | Refills: 0 | Status: SHIPPED | OUTPATIENT
Start: 2019-08-02 | End: 2019-07-03

## 2019-05-31 RX ORDER — TRIAMTERENE AND HYDROCHLOROTHIAZIDE 75; 50 MG/1; MG/1
0.5 TABLET ORAL DAILY
Qty: 45 TABLET | Refills: 3 | Status: SHIPPED | OUTPATIENT
Start: 2019-05-31 | End: 2020-03-16

## 2019-05-31 RX ORDER — OXYCODONE HYDROCHLORIDE 5 MG/1
5 TABLET ORAL 2 TIMES DAILY PRN
Qty: 60 TABLET | Refills: 0 | Status: SHIPPED | OUTPATIENT
Start: 2019-07-03 | End: 2019-05-31

## 2019-05-31 RX ORDER — OXYCODONE HYDROCHLORIDE 5 MG/1
5 TABLET ORAL 2 TIMES DAILY PRN
Qty: 60 TABLET | Refills: 0 | Status: SHIPPED | OUTPATIENT
Start: 2019-06-03 | End: 2019-05-31

## 2019-05-31 ASSESSMENT — ANXIETY QUESTIONNAIRES
5. BEING SO RESTLESS THAT IT IS HARD TO SIT STILL: NOT AT ALL
3. WORRYING TOO MUCH ABOUT DIFFERENT THINGS: NOT AT ALL
2. NOT BEING ABLE TO STOP OR CONTROL WORRYING: NOT AT ALL
1. FEELING NERVOUS, ANXIOUS, OR ON EDGE: NOT AT ALL
GAD7 TOTAL SCORE: 0
7. FEELING AFRAID AS IF SOMETHING AWFUL MIGHT HAPPEN: NOT AT ALL
6. BECOMING EASILY ANNOYED OR IRRITABLE: NOT AT ALL

## 2019-05-31 ASSESSMENT — MIFFLIN-ST. JEOR: SCORE: 1407.78

## 2019-05-31 ASSESSMENT — PATIENT HEALTH QUESTIONNAIRE - PHQ9
SUM OF ALL RESPONSES TO PHQ QUESTIONS 1-9: 3
5. POOR APPETITE OR OVEREATING: NOT AT ALL

## 2019-05-31 NOTE — PROGRESS NOTES
Subjective     Mann Escobar is a 78 year old male who presents to clinic today for the following health issues:    HPI   Chronic Pain Follow-Up       Type / Location of Pain: shoulder   Leg  Back pain  Ankle pain  Analgesia/pain control:       Recent changes:  same      Overall control: Tolerable with discomfort  Activity level/function:      Daily activities:  Unable to perform most daily activities - chores, hobbies, social activities, driving    Work:  not applicable  Adverse effects:  No  Adherance    Taking medication as directed?  Yes    Participating in other treatments: no - nothing ordered  Risk Factors:    Sleep:  Good    Mood/anxiety:  controlled    Recent family or social stressors:  none noted    Other aggravating factors: sedentary lifestyle  PHQ-9 SCORE 5/22/2018 5/31/2019   PHQ-9 Total Score 1 3     SARAH-7 SCORE 5/22/2018 5/31/2019   Total Score 0 0     Encounter-Level CSA - 05/02/2017:    Controlled Substance Agreement - Scan on 5/8/2017  2:07 PM: CONTROLLED SUBSTANCE AGREEMENT (below)       Patient-Level CSA:    There are no patient-level csa.         Amount of exercise or physical activity: None    Problems taking medications regularly: No    Medication side effects: none    Diet: regular (no restrictions)        Hypertension Follow-up      Do you check your blood pressure regularly outside of the clinic? No - not regularly    Are you following a low salt diet? No    Are your blood pressures ever more than 140 on the top number (systolic) OR more   than 90 on the bottom number (diastolic), for example 140/90? Yes- maybe ?  Taking Maxide regularly now.                Reviewed and updated as needed this visit by Provider  Tobacco  Allergies  Meds  Problems  Med Hx  Surg Hx  Fam Hx         Review of Systems   ROS COMP: Constitutional, HEENT, cardiovascular, pulmonary, gi and gu systems are negative, except as otherwise noted.      Objective    /70 (BP Location: Right arm,  "Cuff Size: Adult Regular)   Pulse 61   Temp 98.1  F (36.7  C) (Tympanic)   Ht 1.562 m (5' 1.5\")   Wt 81.6 kg (180 lb)   SpO2 97%   BMI 33.46 kg/m    Body mass index is 33.46 kg/m .  Physical Exam   GENERAL: healthy, alert and no distress  RESP: lungs clear to auscultation - no rales, rhonchi or wheezes  CV: regular rate and rhythm, normal S1 S2, no S3 or S4, no murmur, click or rub, no peripheral edema and peripheral pulses strong            Assessment & Plan       ICD-10-CM    1. Benign essential hypertension I10 Much improved.  triamterene-HCTZ (MAXZIDE) 75-50 MG tablet     Basic metabolic panel     2. Chronic left-sided low back pain with left-sided sciatica M54.42 Patient again asking to increase opioid dose.  I declined, discussed safety concerns, tolerance, dependence, addiction. Discussed hyperalgesic concerns with chronic opioid use.  Offered pain clinic referral - he declined.    oxyCODONE (ROXICODONE) 5 MG tablet    G89.29 DISCONTINUED: oxyCODONE (ROXICODONE) 5 MG tablet     DISCONTINUED: oxyCODONE (ROXICODONE) 5 MG tablet            Return in about 3 months (around 8/31/2019) for Medication Check.    GUNJAN Werner [facility]      "

## 2019-06-01 ASSESSMENT — ANXIETY QUESTIONNAIRES: GAD7 TOTAL SCORE: 0

## 2019-06-03 NOTE — MR AVS SNAPSHOT
"              After Visit Summary   2/6/2018    Mann Escobar    MRN: [id number]           Patient Information     Date Of Birth          1940        Visit Information        Provider Department      2/6/2018 9:30 AM JV FLORENCE FP/IM RN [facility]        Today's Diagnoses     Benign essential hypertension    -  1       Follow-ups after your visit        Your next 10 appointments already scheduled     Feb 08, 2018  2:45 PM CST   Return Visit with Anne Marie Andrew MD   [facility] ([facility])    [facility]  [address]  [facility] [address]   [phone]            Feb 08, 2018  3:15 PM CST   Nurse Only with JV FLORENCE FP/IM RN   [facility]    [address]  [facility] [address] [phone]              Who to contact     If you have questions or need follow up information about today's clinic visit or your schedule please contact [facility] directly at [phone].  Normal or non-critical lab and imaging results will be communicated to you by [employer], letter or phone within 4 business days after the clinic has received the results. If you do not hear from us within 7 days, please contact the clinic through [employer] or phone. If you have a critical or abnormal lab result, we will notify you by phone as soon as possible.  Submit refill requests through [employer] or call your pharmacy and they will forward the refill request to us. Please allow 3 business days for your refill to be completed.          Additional Information About Your Visit        MyChart Information     [employer] lets you send messages to your doctor, view your test results, renew your prescriptions, schedule appointments and more. To sign up, go to [email or web address] . Click on \"Log in\" on the left side of the screen, which will take you to the Welcome page. Then click on \"Sign up Now\" on the right " side of the page.     You will be asked to enter the access code listed below, as well as some personal information. Please follow the directions to create your username and password.     Your access code is: [email or web address]  Expires: 2018 11:46 AM     Your access code will  in 90 days. If you need help or a new code, please call your [place] clinic or [phone].        Care EveryWhere ID     This is your Care EveryWhere ID. This could be used by other organizations to access your [place] medical records  [id number]        Your Vitals Were     Pulse                   77            Blood Pressure from Last 3 Encounters:   18 166/81   18 (!) 200/93   18 146/78    Weight from Last 3 Encounters:   18 185 lb (83.9 kg)   18 185 lb (83.9 kg)   18 185 lb (83.9 kg)              Today, you had the following     No orders found for display       Primary Care Provider Office Phone # Fax #    Shayy Wei Ramírez, APRN [facility] [phone] [address] [facility] [address]        Equal Access to Services     CATRACHO MCBRIDE AH: Hadii aad ku hadasho Soomaali, waaxda luqadaha, qaybta kaalmada adeegyada, deny morris . So [facility] [phone].    ATENCIÓN: Si habla español, tiene a hewitt disposición servicios gratuitos de asistencia lingüística. Ursula al [phone].    We comply with applicable federal civil rights laws and [place] laws. We do not discriminate on the basis of race, color, national origin, age, disability, sex, sexual orientation, or gender identity.            Thank you!     Thank you for choosing [facility]  for your care. Our goal is always to provide you with excellent care. Hearing back from our patients is one way we can continue to improve our services. Please take a few minutes to complete the written survey that you may receive in the mail after your visit with us. Thank you!             Your Updated  Medication List - Protect others around you: Learn how to safely use, store and throw away your medicines at www.disposemymeds.org.          This list is accurate as of 2/6/18  9:48 AM.  Always use your most recent med list.                   Brand Name Dispense Instructions for use Diagnosis    aspirin 81 MG EC tablet     90 tablet    Take 1 tablet (81 mg) by mouth daily        ferrous fumarate 65 mg (Habematolel. FE)-Vitamin C 125 mg  MG Tabs tablet    VITRON C    60 tablet    Take 1 tablet by mouth daily    Iron deficiency anemia, unspecified iron deficiency anemia type       HCA VITAMIN B12 500 MCG Tabs   Generic drug:  cyanocobalamin      2 tablets daily        lisinopril 10 MG tablet    PRINIVIL/ZESTRIL    30 tablet    Take 1 tablet (10 mg) by mouth daily    Benign essential hypertension       MULTIVITAMIN PO      one daily        omeprazole 20 MG CR capsule    priLOSEC    90 capsule    Take 1 capsule (20 mg) by mouth daily    Gastric ulcer, unspecified chronicity, unspecified whether gastric ulcer hemorrhage or perforation present       oxyCODONE IR 5 MG tablet    ROXICODONE    60 tablet    Take 1 tablet (5 mg) by mouth 2 times daily as needed for pain    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    Spondylolisthesis of lumbar region       terazosin 5 MG capsule    HYTRIN    90 capsule    Take 1 capsule (5 mg) by mouth At Bedtime    Benign non-nodular prostatic hyperplasia with lower urinary tract symptoms       triamterene-hydrochlorothiazide 75-50 MG per tablet    MAXZIDE    45 tablet    Take 0.5 tablets by mouth daily    Benign essential hypertension       VIACTIV PO      With D 1000mg calcium           22.9

## 2019-07-03 DIAGNOSIS — M54.42 CHRONIC LEFT-SIDED LOW BACK PAIN WITH LEFT-SIDED SCIATICA: ICD-10-CM

## 2019-07-03 DIAGNOSIS — G89.29 CHRONIC LEFT-SIDED LOW BACK PAIN WITH LEFT-SIDED SCIATICA: ICD-10-CM

## 2019-07-03 RX ORDER — OXYCODONE HYDROCHLORIDE 5 MG/1
5 TABLET ORAL 2 TIMES DAILY PRN
Qty: 60 TABLET | Refills: 0 | Status: SHIPPED | OUTPATIENT
Start: 2019-08-02 | End: 2019-08-01

## 2019-07-03 NOTE — TELEPHONE ENCOUNTER
Reason for Call:  Medication or medication refill:    Do you use a [facility]?  Name of the pharmacy and phone number for the current request:  [facility] [phone]    Name of the medication requested: Oxycodone - Pt wants hard copy TODAY.  Please call patient and advise.      Other request:     Can we leave a detailed message on this number? YES    Phone number patient can be reached at: Home number on file [phone] (home)    Best Time: any    Call taken on 7/3/2019 at 9:34 AM by Vandana Mendez

## 2019-07-03 NOTE — TELEPHONE ENCOUNTER
Covering for Shayy:     I did review [facility] medication monitoring program and there were no significant red flags. Last refill on Michelle 3 [address].        Refilled for 1 months, he needs to schedule follow up appointment with PCP for future refills.     GUNJAN Jhaveri CNP

## 2019-07-03 NOTE — TELEPHONE ENCOUNTER
[facility] pharmacy reports:  New law started July 1st, we can no longer take rx's for opiods dated more than 30 days in advance. Pt brought in rx for oxycodone 5 mg dated 5/31/19, he was notified and will need to  new rx.     Thank You,  Jaki Thompson, [facility], [place]        Patient is asking for new Rx for Oxycodone 5 mg    Please advise in provider's absence    Routing to provider and out of office provider marie MORALES Rn

## 2019-07-03 NOTE — TELEPHONE ENCOUNTER
New law started July 1st, we can no longer take rx's for opiods dated more than 30 days in advance. Pt brought in rx for oxycodone 5 mg dated 5/31/19, he was notified and will need to  new rx.    Thank You,  Jaki Thompson, [facility]

## 2019-07-10 ENCOUNTER — TRANSFERRED RECORDS (OUTPATIENT)
Dept: HEALTH INFORMATION MANAGEMENT | Facility: CLINIC | Age: 79
End: 2019-07-10

## 2019-08-01 ENCOUNTER — OFFICE VISIT (OUTPATIENT)
Dept: FAMILY MEDICINE | Facility: CLINIC | Age: 79
End: 2019-08-01
Payer: COMMERCIAL

## 2019-08-01 VITALS
HEART RATE: 65 BPM | DIASTOLIC BLOOD PRESSURE: 64 MMHG | BODY MASS INDEX: 32.2 KG/M2 | WEIGHT: 175 LBS | OXYGEN SATURATION: 96 % | SYSTOLIC BLOOD PRESSURE: 120 MMHG | TEMPERATURE: 98 F | HEIGHT: 62 IN

## 2019-08-01 DIAGNOSIS — M54.42 CHRONIC LEFT-SIDED LOW BACK PAIN WITH LEFT-SIDED SCIATICA: ICD-10-CM

## 2019-08-01 DIAGNOSIS — G89.29 CHRONIC LEFT-SIDED LOW BACK PAIN WITH LEFT-SIDED SCIATICA: ICD-10-CM

## 2019-08-01 PROCEDURE — 99213 OFFICE O/P EST LOW 20 MIN: CPT | Performed by: NURSE PRACTITIONER

## 2019-08-01 RX ORDER — OXYCODONE HYDROCHLORIDE 5 MG/1
5 TABLET ORAL 2 TIMES DAILY PRN
Qty: 56 TABLET | Refills: 0 | Status: SHIPPED | OUTPATIENT
Start: 2019-08-02 | End: 2019-08-01

## 2019-08-01 RX ORDER — OXYCODONE HYDROCHLORIDE 5 MG/1
5 TABLET ORAL 2 TIMES DAILY PRN
Qty: 60 TABLET | Refills: 0 | Status: SHIPPED | OUTPATIENT
Start: 2019-08-30 | End: 2019-09-27

## 2019-08-01 ASSESSMENT — MIFFLIN-ST. JEOR: SCORE: 1385.1

## 2019-08-01 NOTE — PROGRESS NOTES
"Subjective     Mann Escobar is a 78 year old male who presents to clinic today for the following health issues:    HPI   Chronic Pain Follow-Up       Type / Location of Pain: leg, back, ankle and shoulder  Analgesia/pain control:       Recent changes:  worse      Overall control: Tolerable with discomfort  Activity level/function:      Daily activities:  Can do most things most days, with some rest-tires easily    Work:  not applicable  Adverse effects:  No  Adherance    Taking medication as directed?  Yes    Participating in other treatments: no - refuses  Risk Factors:    Sleep:  Fair    Mood/anxiety:  Controlled-  Crabby , but no change    Recent family or social stressors:  none noted    Other aggravating factors: sedentary lifestyle  PHQ-9 SCORE 5/22/2018 5/31/2019   PHQ-9 Total Score 1 3     SARAH-7 SCORE 5/22/2018 5/31/2019   Total Score 0 0     Encounter-Level CSA - 05/02/2017:    Controlled Substance Agreement - Scan on 5/8/2017  2:07 PM: CONTROLLED SUBSTANCE AGREEMENT (below)       Patient-Level CSA:    There are no patient-level csa.         Amount of exercise or physical activity: None    Problems taking medications regularly: No    Medication side effects: none    Diet: regular (no restrictions)                Reviewed and updated as needed this visit by Provider  Tobacco  Allergies  Meds  Problems  Med Hx  Surg Hx  Fam Hx         Review of Systems   ROS COMP: Constitutional, HEENT, cardiovascular, pulmonary, gi and gu systems are negative, except as otherwise noted.      Objective    /64 (BP Location: Right arm)   Pulse 65   Temp 98  F (36.7  C) (Tympanic)   Ht 1.562 m (5' 1.5\")   Wt 79.4 kg (175 lb)   SpO2 96%   BMI 32.53 kg/m    Body mass index is 32.53 kg/m .  Physical Exam   GENERAL: healthy, alert and no distress            Assessment & Plan       ICD-10-CM    1. Chronic left-sided low back pain with left-sided sciatica M54.42 oxyCODONE (ROXICODONE) 5 MG tablet    " G89.29 DISCONTINUED: oxyCODONE (ROXICODONE) 5 MG tablet     New [place] law regarding opioid prescriptions was explained.  Follow up every 2 months.        Return in about 2 months (around 10/1/2019) for Medication Check.    GUNJAN Werner [facility]

## 2019-08-01 NOTE — PATIENT INSTRUCTIONS
Thank you for choosing [facility].  You may be receiving an email and/or telephone survey request from [facility] [employer] regarding your visit today.  Please take a few minutes to respond to the survey to let us know how we are doing.      If you have questions or concerns, please contact us via [employer] or you can contact your care team at [phone].    Our Clinic hours are:  Monday 6:40 am  to 7:00 pm  Tuesday -Friday 6:40 am to 5:00 pm    The [place] outpatient lab hours are:  Monday - Friday 6:10 am to 4:45 pm  Saturdays 7:00 am to 11:00 am  Appointments are required, call [phone]    If you have clinical questions after hours or would like to schedule an appointment,  call the clinic at [phone].

## 2019-08-20 DIAGNOSIS — I10 BENIGN ESSENTIAL HYPERTENSION: Primary | ICD-10-CM

## 2019-08-20 RX ORDER — LISINOPRIL 10 MG/1
10 TABLET ORAL DAILY
Qty: 90 TABLET | Refills: 2 | Status: CANCELLED | OUTPATIENT
Start: 2019-08-20

## 2019-08-20 NOTE — TELEPHONE ENCOUNTER
Reason for Call:  Medication or medication refill:    Do you use a [facility]?  Name of the pharmacy and phone number for the current request:  [facility] - [phone]    Name of the medication requested:   Lisinopril  Last Written Prescription Date:  8/30/18  Last Fill Quantity: 90,  # refills: 3   Last office visit: 8/1/2019 with prescribing provider:  asia   Future Office Visit:        Can we leave a detailed message on this number? YES    Phone number patient can be reached at: Home number on file [phone] (home)    Best Time: any    Call taken on 8/20/2019 at 9:53 AM by Marisa Davis

## 2019-08-20 NOTE — TELEPHONE ENCOUNTER
Pt called back.  He confirms yes, he is taking both 10 and 40 mg lisinopril for total dose of 50 mg daily.    Pt says that he needs both refilled.    Phoebe Ly RN

## 2019-08-20 NOTE — TELEPHONE ENCOUNTER
"Requested Prescriptions   Pending Prescriptions Disp Refills     lisinopril (PRINIVIL/ZESTRIL) 40 MG tablet 90 tablet 3     Sig: Take 1 tablet (40 mg) by mouth daily       ACE Inhibitors (Including Combos) Protocol Failed - 8/20/2019 12:02 PM        Failed - Normal serum creatinine on file in past 12 months     Recent Labs   Lab Test 05/31/19  0909  08/03/15  2006   CR 1.52*   < >  --    CREAT  --   --  1.6*    < > = values in this interval not displayed.             Passed - Blood pressure under 140/90 in past 12 months     BP Readings from Last 3 Encounters:   08/01/19 120/64   05/31/19 128/70   02/25/19 166/80                 Passed - Recent (12 mo) or future (30 days) visit within the authorizing provider's specialty     Patient had office visit in the last 12 months or has a visit in the next 30 days with authorizing provider or within the authorizing provider's specialty.  See \"Patient Info\" tab in inbasket, or \"Choose Columns\" in Meds & Orders section of the refill encounter.              Passed - Medication is active on med list        Passed - Patient is age 18 or older        Passed - Normal serum potassium on file in past 12 months     Recent Labs   Lab Test 05/31/19  0909   POTASSIUM 3.9               "

## 2019-08-20 NOTE — TELEPHONE ENCOUNTER
"Requested Prescriptions   Pending Prescriptions Disp Refills     lisinopril (PRINIVIL/ZESTRIL) 40 MG tablet 90 tablet 2     Sig: Take 1 tablet (40 mg) by mouth daily   Last Written Prescription Date:  8/30/18  Last Fill Quantity: 90 tab,  # refills: 3   Last office visit: 8/1/2019 with prescribing provider:  Shayy Ramírez     Future Office Visit:        ACE Inhibitors (Including Combos) Protocol Failed - 8/20/2019 12:40 PM        Failed - Normal serum creatinine on file in past 12 months     Recent Labs   Lab Test 05/31/19  0909  08/03/15  2006   CR 1.52*   < >  --    CREAT  --   --  1.6*    < > = values in this interval not displayed.             Passed - Blood pressure under 140/90 in past 12 months     BP Readings from Last 3 Encounters:   08/01/19 120/64   05/31/19 128/70   02/25/19 166/80                 Passed - Recent (12 mo) or future (30 days) visit within the authorizing provider's specialty     Patient had office visit in the last 12 months or has a visit in the next 30 days with authorizing provider or within the authorizing provider's specialty.  See \"Patient Info\" tab in inbasket, or \"Choose Columns\" in Meds & Orders section of the refill encounter.              Passed - Medication is active on med list        Passed - Patient is age 18 or older        Passed - Normal serum potassium on file in past 12 months     Recent Labs   Lab Test 05/31/19  0909   POTASSIUM 3.9             lisinopril (PRINIVIL/ZESTRIL) 10 MG tablet 90 tablet 2     Sig: Take 1 tablet (10 mg) by mouth daily       lisinopril (PRINIVIL/ZESTRIL) 10 mg tab  Last Written Prescription Date:  5/22/18 ended  Last Fill Quantity: 90 tab,   # refills: 3  Last Office Visit: 8/1/19   Shayy Ramírez    Future Office visit:       Routing refill request to provider for review/approval because:  Drug not active on patient's medication list      ACE Inhibitors (Including Combos) Protocol Failed - 8/20/2019 12:40 PM        Failed - Normal serum " "creatinine on file in past 12 months     Recent Labs   Lab Test 05/31/19  0909  08/03/15  2006   CR 1.52*   < >  --    CREAT  --   --  1.6*    < > = values in this interval not displayed.             Passed - Blood pressure under 140/90 in past 12 months     BP Readings from Last 3 Encounters:   08/01/19 120/64   05/31/19 128/70   02/25/19 166/80                 Passed - Recent (12 mo) or future (30 days) visit within the authorizing provider's specialty     Patient had office visit in the last 12 months or has a visit in the next 30 days with authorizing provider or within the authorizing provider's specialty.  See \"Patient Info\" tab in inbasket, or \"Choose Columns\" in Meds & Orders section of the refill encounter.              Passed - Medication is active on med list        Passed - Patient is age 18 or older        Passed - Normal serum potassium on file in past 12 months     Recent Labs   Lab Test 05/31/19  0909   POTASSIUM 3.9               "

## 2019-08-20 NOTE — TELEPHONE ENCOUNTER
Routing refill request to provider for review/approval because:  1.  Labs out of range:  Fails refill protocol due to elevated serum creatinine.    Last seen for hypertension 5/31/19 with instructions to follow up in 3 months for med check.  Pt returned on 8/1/19 for sciatica and BP remains in range.    2.  Pt reported at last visits that he is taking lisinopril in both a 40 mg tablet AND a 10 mg tablet to total 50 mg daily.  Is this still the what he is doing?    Left non-detailed message for patient to return a call to the clinic RN.      Phoebe Ly RN

## 2019-08-21 RX ORDER — LISINOPRIL 40 MG/1
40 TABLET ORAL DAILY
Qty: 90 TABLET | Refills: 3 | Status: ON HOLD | OUTPATIENT
Start: 2019-08-21 | End: 2020-07-27

## 2019-08-21 NOTE — TELEPHONE ENCOUNTER
Left non-detailed message for patient to return a call to the clinic RN.   MARIA DEL ROSARIO Ly RN

## 2019-08-21 NOTE — TELEPHONE ENCOUNTER
Patient should only be taking 40 mg daily of the lisinopril - that is the max dose.  Med refilled.  Shayy Ramírez, CNP

## 2019-08-23 ENCOUNTER — TRANSFERRED RECORDS (OUTPATIENT)
Dept: HEALTH INFORMATION MANAGEMENT | Facility: CLINIC | Age: 79
End: 2019-08-23

## 2019-09-27 ENCOUNTER — OFFICE VISIT (OUTPATIENT)
Dept: FAMILY MEDICINE | Facility: CLINIC | Age: 79
End: 2019-09-27
Payer: COMMERCIAL

## 2019-09-27 VITALS
SYSTOLIC BLOOD PRESSURE: 150 MMHG | BODY MASS INDEX: 33.07 KG/M2 | TEMPERATURE: 98.2 F | HEART RATE: 76 BPM | DIASTOLIC BLOOD PRESSURE: 68 MMHG | HEIGHT: 61 IN | OXYGEN SATURATION: 96 %

## 2019-09-27 DIAGNOSIS — M54.42 CHRONIC LEFT-SIDED LOW BACK PAIN WITH LEFT-SIDED SCIATICA: Primary | ICD-10-CM

## 2019-09-27 DIAGNOSIS — Z23 NEED FOR PROPHYLACTIC VACCINATION AND INOCULATION AGAINST INFLUENZA: ICD-10-CM

## 2019-09-27 DIAGNOSIS — G89.29 CHRONIC LEFT-SIDED LOW BACK PAIN WITH LEFT-SIDED SCIATICA: Primary | ICD-10-CM

## 2019-09-27 PROCEDURE — 99213 OFFICE O/P EST LOW 20 MIN: CPT | Mod: 25 | Performed by: NURSE PRACTITIONER

## 2019-09-27 PROCEDURE — 90662 IIV NO PRSV INCREASED AG IM: CPT | Performed by: NURSE PRACTITIONER

## 2019-09-27 PROCEDURE — G0008 ADMIN INFLUENZA VIRUS VAC: HCPCS | Performed by: NURSE PRACTITIONER

## 2019-09-27 RX ORDER — OXYCODONE HYDROCHLORIDE 5 MG/1
5 TABLET ORAL 2 TIMES DAILY PRN
Qty: 60 TABLET | Refills: 0 | Status: SHIPPED | OUTPATIENT
Start: 2019-10-24 | End: 2019-11-20

## 2019-09-27 RX ORDER — OXYCODONE HYDROCHLORIDE 5 MG/1
5 TABLET ORAL 2 TIMES DAILY PRN
Qty: 60 TABLET | Refills: 0 | Status: SHIPPED | OUTPATIENT
Start: 2019-09-27 | End: 2019-09-27

## 2019-09-27 NOTE — PATIENT INSTRUCTIONS
Thank you for choosing [facility].  You may be receiving an email and/or telephone survey request from [facility] Customer Experience regarding your visit today.  Please take a few minutes to respond to the survey to let us know how we are doing.      If you have questions or concerns, please contact us via [employer] or you can contact your care team at [phone].    Our Clinic hours are:  Monday 6:40 am  to 7:00 pm  Tuesday -Friday 6:40 am to 5:00 pm    The [place] outpatient lab hours are:  Monday - Friday 6:10 am to 4:45 pm  Saturdays 7:00 am to 11:00 am  Appointments are required, call [phone]    If you have clinical questions after hours or would like to schedule an appointment,  call the clinic at [phone].

## 2019-09-27 NOTE — PROGRESS NOTES
"Subjective     Mann Escobar is a 78 year old male who presents to clinic today for the following health issues:    HPI   Chronic Pain Follow-Up       Type / Location of Pain: leg, back, ankle and shoulder  Analgesia/pain control:       Recent changes:  worse      Overall control: Tolerable with discomfort  Activity level/function:      Daily activities:  Does household chores; is unable to golf anymore    Work:  not applicable  Adverse effects:  No  Adherance    Taking medication as directed?  Yes    Participating in other treatments: no -   Risk Factors:    Sleep:  Good    Mood/anxiety:  \"Crabby still\"    Recent family or social stressors:  none noted    Other aggravating factors: sedentary lifestyle  PHQ-9 SCORE 5/22/2018 5/31/2019   PHQ-9 Total Score 1 3     SARAH-7 SCORE 5/22/2018 5/31/2019   Total Score 0 0     Encounter-Level CSA - 05/02/2017:    Controlled Substance Agreement - Scan on 5/8/2017  2:07 PM: CONTROLLED SUBSTANCE AGREEMENT     Patient-Level CSA:    There are no patient-level csa.           How many servings of fruits and vegetables do you eat daily?  4 or more    On average, how many sweetened beverages do you drink each day (soda, juice, sweet tea, etc)?   1    How many days per week do you miss taking your medication? 0                  Reviewed and updated as needed this visit by Provider  Tobacco  Allergies  Meds  Problems  Med Hx  Surg Hx  Fam Hx         Review of Systems   ROS COMP: Constitutional, HEENT, cardiovascular, pulmonary, gi and gu systems are negative, except as otherwise noted.      Objective    BP (!) 150/68 (BP Location: Left arm)   Pulse 76   Temp 98.2  F (36.8  C) (Tympanic)   Ht 1.549 m (5' 1\")   SpO2 96%   BMI 33.07 kg/m    Body mass index is 33.07 kg/m .  Physical Exam   GENERAL: healthy, alert and no distress  PSYCH: mentation appears normal and argumentative             Assessment & Plan       ICD-10-CM    1. Chronic left-sided low back pain with " left-sided sciatica M54.42 oxyCODONE (ROXICODONE) 5 MG tablet    G89.29 DISCONTINUED: oxyCODONE (ROXICODONE) 5 MG tablet   2. Need for prophylactic vaccination and inoculation against influenza Z23 INFLUENZA (HIGH DOSE) 3 VALENT VACCINE [32115]     ADMIN INFLUENZA (For MEDICARE Patients ONLY) []            Return in about 2 months (around 11/27/2019) for Medication Check.    The risks, benefits and treatment options of prescribed medications or other treatments have been discussed with the patient. The patient verbalized their understanding and should call or follow up if no improvement or if they develop further problems.    GUNJAN Werner [facility]

## 2019-10-03 ENCOUNTER — TRANSFERRED RECORDS (OUTPATIENT)
Dept: HEALTH INFORMATION MANAGEMENT | Facility: CLINIC | Age: 79
End: 2019-10-03

## 2019-10-24 DIAGNOSIS — M54.42 CHRONIC LEFT-SIDED LOW BACK PAIN WITH LEFT-SIDED SCIATICA: ICD-10-CM

## 2019-10-24 DIAGNOSIS — G89.29 CHRONIC LEFT-SIDED LOW BACK PAIN WITH LEFT-SIDED SCIATICA: ICD-10-CM

## 2019-10-24 RX ORDER — OXYCODONE HYDROCHLORIDE 5 MG/1
TABLET ORAL
Qty: 60 TABLET | Refills: 0 | OUTPATIENT
Start: 2019-10-24

## 2019-11-20 ENCOUNTER — OFFICE VISIT (OUTPATIENT)
Dept: FAMILY MEDICINE | Facility: CLINIC | Age: 79
End: 2019-11-20
Payer: COMMERCIAL

## 2019-11-20 VITALS
SYSTOLIC BLOOD PRESSURE: 144 MMHG | TEMPERATURE: 98.3 F | WEIGHT: 179 LBS | BODY MASS INDEX: 33.79 KG/M2 | HEART RATE: 75 BPM | HEIGHT: 61 IN | OXYGEN SATURATION: 99 % | DIASTOLIC BLOOD PRESSURE: 78 MMHG

## 2019-11-20 DIAGNOSIS — M54.42 CHRONIC LEFT-SIDED LOW BACK PAIN WITH LEFT-SIDED SCIATICA: ICD-10-CM

## 2019-11-20 DIAGNOSIS — I10 BENIGN ESSENTIAL HYPERTENSION: ICD-10-CM

## 2019-11-20 DIAGNOSIS — G89.29 CHRONIC LEFT-SIDED LOW BACK PAIN WITH LEFT-SIDED SCIATICA: ICD-10-CM

## 2019-11-20 DIAGNOSIS — N40.1 BENIGN NON-NODULAR PROSTATIC HYPERPLASIA WITH LOWER URINARY TRACT SYMPTOMS: ICD-10-CM

## 2019-11-20 PROCEDURE — 99214 OFFICE O/P EST MOD 30 MIN: CPT | Performed by: NURSE PRACTITIONER

## 2019-11-20 RX ORDER — AMLODIPINE BESYLATE 10 MG/1
10 TABLET ORAL DAILY
Qty: 90 TABLET | Refills: 3 | Status: SHIPPED | OUTPATIENT
Start: 2019-11-20 | End: 2020-11-24

## 2019-11-20 RX ORDER — OXYCODONE HYDROCHLORIDE 5 MG/1
5 TABLET ORAL 2 TIMES DAILY PRN
Qty: 60 TABLET | Refills: 0 | Status: SHIPPED | OUTPATIENT
Start: 2019-12-18 | End: 2020-01-24

## 2019-11-20 RX ORDER — OXYCODONE HYDROCHLORIDE 5 MG/1
5 TABLET ORAL 2 TIMES DAILY PRN
Qty: 56 TABLET | Refills: 0 | Status: SHIPPED | OUTPATIENT
Start: 2019-11-22 | End: 2019-11-20

## 2019-11-20 RX ORDER — TERAZOSIN 5 MG/1
CAPSULE ORAL
Qty: 90 CAPSULE | Refills: 3 | Status: SHIPPED | OUTPATIENT
Start: 2019-11-20 | End: 2020-09-17

## 2019-11-20 ASSESSMENT — MIFFLIN-ST. JEOR: SCORE: 1390.32

## 2019-11-20 NOTE — PROGRESS NOTES
Subjective     Mann Escobar is a 79 year old male who presents to clinic today for the following health issues:    HPI     Chief Complaint   Patient presents with     Refill Request     chronic pain medication     Medication Reconciliation     patient states he was told to stop amlodipine 10mg- is this correct?       Chronic Pain Follow-Up       Type / Location of Pain: leg, back, ankle and shoulder  Analgesia/pain control:       Recent changes:  worse      Overall control: Tolerable with discomfort  Activity level/function:      Daily activities:  Unable to perform most daily activities - chores, hobbies, social activities, driving    Work:  not applicable  Adverse effects:  No  Adherance    Taking medication as directed?  Yes    Participating in other treatments: no -   Risk Factors:    Sleep:  Good    Mood/anxiety:  slightly worsened; patient states controlled    Recent family or social stressors:  Patient states none    Other aggravating factors: sedentary lifestyle  PHQ-9 SCORE 5/22/2018 5/31/2019   PHQ-9 Total Score 1 3     SARAH-7 SCORE 5/22/2018 5/31/2019   Total Score 0 0     Encounter-Level CSA - 05/02/2017:    Controlled Substance Agreement - Scan on 5/8/2017  2:07 PM: CONTROLLED SUBSTANCE AGREEMENT     Patient-Level CSA:    There are no patient-level csa.         How many servings of fruits and vegetables do you eat daily?  2-3    On average, how many sweetened beverages do you drink each day (soda, juice, sweet tea, etc)?   1- diet pop    How many days per week do you miss taking your medication? 0        Hypertension Follow-up      Do you check your blood pressure regularly outside of the clinic? No     Are you following a low salt diet? No    Are your blood pressures ever more than 140 on the top number (systolic) OR more   than 90 on the bottom number (diastolic), for example 140/90? Yes   Patient states that he thought he got a phone call from our office to stop the amlodipine.  He is  "taking all of his other medications.            Reviewed and updated as needed this visit by Provider  Tobacco  Allergies  Meds  Problems  Med Hx  Surg Hx  Fam Hx         Review of Systems   ROS COMP: Constitutional, HEENT, cardiovascular, pulmonary, gi and gu systems are negative, except as otherwise noted.      Objective    BP (!) 144/78 (BP Location: Right arm, Cuff Size: Adult Regular)   Pulse 75   Temp 98.3  F (36.8  C) (Tympanic)   Ht 1.549 m (5' 1\")   Wt 81.2 kg (179 lb)   SpO2 99%   BMI 33.82 kg/m    Body mass index is 33.82 kg/m .  Physical Exam   GENERAL: healthy, alert and no distress  RESP: lungs clear to auscultation - no rales, rhonchi or wheezes  CV: regular rate and rhythm, normal S1 S2, no S3 or S4, no murmur, click or rub, no peripheral edema and peripheral pulses strong            Assessment & Plan       ICD-10-CM    1. Chronic left-sided low back pain with left-sided sciatica M54.42 Pain medication refilled x2 months.  oxyCODONE (ROXICODONE) 5 MG tablet    G89.29 DISCONTINUED: oxyCODONE (ROXICODONE) 5 MG tablet     2. Benign essential hypertension I10 Poorly controlled.  I couldn't find any documentation in the chart about discontinuing the amlodipine.  Restart amLODIPine (NORVASC) 10 MG tablet daily.     3. Benign non-nodular prostatic hyperplasia with lower urinary tract symptoms N40.1 terazosin (HYTRIN) 5 MG capsule - refilled.              Return in about 2 months (around 1/20/2020) for Medication Check.    The risks, benefits and treatment options of prescribed medications or other treatments have been discussed with the patient. The patient verbalized their understanding and should call or follow up if no improvement or if they develop further problems.    GUNJAN Werner [facility]      "

## 2019-11-20 NOTE — PATIENT INSTRUCTIONS
Thank you for choosing [facility].  You may be receiving an email and/or telephone survey request from [facility] Customer Experience regarding your visit today.  Please take a few minutes to respond to the survey to let us know how we are doing.      If you have questions or concerns, please contact us via [employer] or you can contact your care team at [phone].    Our Clinic hours are:  Monday 6:40 am  to 7:00 pm  Tuesday -Friday 6:40 am to 5:00 pm    The [place] outpatient lab hours are:  Monday - Friday 6:10 am to 4:45 pm  Saturdays 7:00 am to 11:00 am  Appointments are required, call [phone]    If you have clinical questions after hours or would like to schedule an appointment,  call the clinic at [phone].

## 2020-01-06 ENCOUNTER — TRANSFERRED RECORDS (OUTPATIENT)
Dept: HEALTH INFORMATION MANAGEMENT | Facility: CLINIC | Age: 80
End: 2020-01-06

## 2020-01-15 ENCOUNTER — TRANSFERRED RECORDS (OUTPATIENT)
Dept: HEALTH INFORMATION MANAGEMENT | Facility: CLINIC | Age: 80
End: 2020-01-15

## 2020-01-23 NOTE — PROGRESS NOTES
Subjective     Mann Escobar is a 79 year old male who presents to clinic today for the following health issues:    HPI   Chronic Pain Follow-Up       Type / Location of Pain: leg , back , ankle and shoulder  Analgesia/pain control:       Recent changes:  Worse-  Went to urgent care to get a shot in his shoulder  Ankles and shoulders have been really bad  Needs ankle and shoulder replacements      Overall control: Tolerable with discomfort  Activity level/function:      Daily activities:  Unable to perform most daily activities - chores, hobbies, social activities, driving    Work:  not applicable  Adverse effects:  No  Adherance    Taking medication as directed?  Yes    Participating in other treatments: no  Risk Factors:    Sleep:  Poor- gets up to go to the bathroom    Mood/anxiety:  controlled    Recent family or social stressors:  none noted    Other aggravating factors: sedentary lifestyle  PHQ-9 SCORE 5/22/2018 5/31/2019   PHQ-9 Total Score 1 3     SARAH-7 SCORE 5/22/2018 5/31/2019   Total Score 0 0     Encounter-Level CSA - 05/02/2017:    Controlled Substance Agreement - Scan on 5/8/2017  2:07 PM: CONTROLLED SUBSTANCE AGREEMENT     Patient-Level CSA:    There are no patient-level csa.         How many servings of fruits and vegetables do you eat daily?  2-3    On average, how many sweetened beverages do you drink each day (Examples: soda, juice, sweet tea, etc.  Do NOT count diet or artificially sweetened beverages)?   1-2    How many days per week do you exercise enough to make your heart beat faster? 3 or less-none    How many minutes a day do you exercise enough to make your heart beat faster? 9 or less-none    How many days per week do you miss taking your medication? 0      Reviewed and updated as needed this visit by Provider         Review of Systems   ROS COMP: Constitutional, HEENT, cardiovascular, pulmonary, gi and gu systems are negative, except as otherwise noted.      Objective    BP  "130/60 (BP Location: Right arm)   Pulse 75   Temp 98.2  F (36.8  C) (Tympanic)   Resp 20   Ht 1.549 m (5' 1\")   Wt 79.8 kg (176 lb)   SpO2 97%   BMI 33.25 kg/m    Body mass index is 33.25 kg/m .  Physical Exam   GENERAL: healthy, alert and no distress  PSYCH: mentation appears normal, affect normal/bright            Assessment & Plan       ICD-10-CM    1. Chronic left-sided low back pain with left-sided sciatica M54.42 oxyCODONE (ROXICODONE) 5 MG tablet    G89.29 DISCONTINUED: oxyCODONE (ROXICODONE) 5 MG tablet     Compliant with contract.  Also seeing ortho.  Refilled x2 months.      Return in about 2 months (around 3/24/2020).    The risks, benefits and treatment options of prescribed medications or other treatments have been discussed with the patient. The patient verbalized their understanding and should call or follow up if no improvement or if they develop further problems.    GUNJAN Werner [facility]      "

## 2020-01-24 ENCOUNTER — OFFICE VISIT (OUTPATIENT)
Dept: FAMILY MEDICINE | Facility: CLINIC | Age: 80
End: 2020-01-24
Payer: COMMERCIAL

## 2020-01-24 VITALS
OXYGEN SATURATION: 97 % | HEART RATE: 75 BPM | BODY MASS INDEX: 33.23 KG/M2 | RESPIRATION RATE: 20 BRPM | HEIGHT: 61 IN | SYSTOLIC BLOOD PRESSURE: 130 MMHG | TEMPERATURE: 98.2 F | DIASTOLIC BLOOD PRESSURE: 60 MMHG | WEIGHT: 176 LBS

## 2020-01-24 DIAGNOSIS — M54.42 CHRONIC LEFT-SIDED LOW BACK PAIN WITH LEFT-SIDED SCIATICA: ICD-10-CM

## 2020-01-24 DIAGNOSIS — G89.29 CHRONIC LEFT-SIDED LOW BACK PAIN WITH LEFT-SIDED SCIATICA: ICD-10-CM

## 2020-01-24 PROCEDURE — 99213 OFFICE O/P EST LOW 20 MIN: CPT | Performed by: NURSE PRACTITIONER

## 2020-01-24 RX ORDER — OXYCODONE HYDROCHLORIDE 5 MG/1
5 TABLET ORAL 2 TIMES DAILY PRN
Qty: 56 TABLET | Refills: 0 | Status: SHIPPED | OUTPATIENT
Start: 2020-02-21 | End: 2020-03-16

## 2020-01-24 RX ORDER — OXYCODONE HYDROCHLORIDE 5 MG/1
5 TABLET ORAL 2 TIMES DAILY PRN
Qty: 56 TABLET | Refills: 0 | Status: SHIPPED | OUTPATIENT
Start: 2020-01-24 | End: 2020-01-24

## 2020-01-24 ASSESSMENT — MIFFLIN-ST. JEOR: SCORE: 1376.71

## 2020-03-11 ENCOUNTER — TELEPHONE (OUTPATIENT)
Dept: FAMILY MEDICINE | Facility: CLINIC | Age: 80
End: 2020-03-11

## 2020-03-11 NOTE — TELEPHONE ENCOUNTER
Reason for Call:  Other appointment    Detailed comments: Patient and wife are compromised and both are on opioids. Do they have to come in to see provider in the clinic or can they do telephone visit?    Phone Number Patient can be reached at: Home number on file [phone] (home)    Best Time: Any    Can we leave a detailed message on this number? YES    Call taken on 3/11/2020 at 9:06 AM by Juanita Hylton

## 2020-03-11 NOTE — TELEPHONE ENCOUNTER
Patient reports:  His health is compromised and is on opioids. Does patient  have to come in to see provider in the clinic or can he do telephone visit for refills?  LOV 1/24/20    Routing to provider.    Jo Ann MORALES Rn

## 2020-03-11 NOTE — TELEPHONE ENCOUNTER
Patient confirms that he is feeling well, he is at the age of risk for increased risk of infection.  Patient and his wife scheduled telephone visit with provider 3/16/20.    Jo Ann MORALES Rn

## 2020-03-11 NOTE — TELEPHONE ENCOUNTER
"What does that mean \"health is compromised\"? Are they acutely ill? Does someone need to triage?    If the issue is that they don't want to come into clinic right now, I agree.    They can schedule phone visits - one for each of them, double book them at the same time slot.    Shayy Ramírez, CNP    "

## 2020-03-16 ENCOUNTER — VIRTUAL VISIT (OUTPATIENT)
Dept: FAMILY MEDICINE | Facility: CLINIC | Age: 80
End: 2020-03-16
Payer: COMMERCIAL

## 2020-03-16 DIAGNOSIS — G89.29 CHRONIC LEFT-SIDED LOW BACK PAIN WITH LEFT-SIDED SCIATICA: ICD-10-CM

## 2020-03-16 DIAGNOSIS — I10 BENIGN ESSENTIAL HYPERTENSION: ICD-10-CM

## 2020-03-16 DIAGNOSIS — M54.42 CHRONIC LEFT-SIDED LOW BACK PAIN WITH LEFT-SIDED SCIATICA: ICD-10-CM

## 2020-03-16 PROCEDURE — 98966 PH1 ASSMT&MGMT NQHP 5-10: CPT | Performed by: NURSE PRACTITIONER

## 2020-03-16 RX ORDER — TRIAMTERENE AND HYDROCHLOROTHIAZIDE 75; 50 MG/1; MG/1
0.5 TABLET ORAL DAILY
Qty: 45 TABLET | Refills: 3 | Status: SHIPPED | OUTPATIENT
Start: 2020-03-16 | End: 2020-07-21

## 2020-03-16 RX ORDER — OXYCODONE HYDROCHLORIDE 5 MG/1
5 TABLET ORAL 2 TIMES DAILY PRN
Qty: 60 TABLET | Refills: 0 | Status: SHIPPED | OUTPATIENT
Start: 2020-03-20 | End: 2020-04-08

## 2020-03-16 NOTE — PROGRESS NOTES
"Mann Escobar is a 79 year old male who is being evaluated via a billable telephone visit.      The patient has been notified of following:     \"This telephone visit will be conducted via a call between you and your physician/provider. We have found that certain health care needs can be provided without the need for a physical exam.  This service lets us provide the care you need with a short phone conversation.  If a prescription is necessary we can send it directly to your pharmacy.  If lab work is needed we can place an order for that and you can then stop by our lab to have the test done at a later time.    If during the course of the call the physician/provider feels a telephone visit is not appropriate, you will not be charged for this service.\"       Mann Escobar complains of    Chief Complaint   Patient presents with     Refill Request     chronic pain medication refill       I have reviewed and updated the patient's Past Medical History, Social History, Family History and Medication List.    ALLERGIES  Nkda [no known drug allergies]    GRANT HOLLINS   (MA signature)    Additional provider notes: {ACUTE SUPERBRIEF LIST:436439}  Chronic Pain Follow-Up    Where in your body do you have pain? Leg, back , ankle and shoulder  How has your pain affected your ability to work? Not applicable  Which of these pain treatments have you tried since your last clinic visit? none  How well are you sleeping?  OK  How has your mood been since your last visit? About the same  Have you had a significant life event? No  Other aggravating factors: sedentary lifestyle  Taking medication as directed? Yes    PHQ-9 SCORE 5/22/2018 5/31/2019   PHQ-9 Total Score 1 3     SARAH-7 SCORE 5/22/2018 5/31/2019   Total Score 0 0     No flowsheet data found.  Encounter-Level CSA - 05/02/2017:    Controlled Substance Agreement - Scan on 5/8/2017  2:07 PM: CONTROLLED SUBSTANCE AGREEMENT     Patient-Level CSA:    There " are no patient-level csa.         Assessment/Plan:  {visit diagnosis:990180}    I have reviewed the note as documented above.  This accurately captures the substance of my conversation with the patient.  ***    Phone call contact time  Call Started at ***  Call Ended at ***    {signature options:[id number]}

## 2020-03-16 NOTE — PROGRESS NOTES
"Mann Escobar is a 79 year old male who is being evaluated via a billable telephone visit.      The patient has been notified of following:     \"This telephone visit will be conducted via a call between you and your physician/provider. We have found that certain health care needs can be provided without the need for a physical exam.  This service lets us provide the care you need with a short phone conversation.  If a prescription is necessary we can send it directly to your pharmacy.  If lab work is needed we can place an order for that and you can then stop by our lab to have the test done at a later time.    If during the course of the call the physician/provider feels a telephone visit is not appropriate, you will not be charged for this service.\"       Mann Escobar complains of    Chief Complaint   Patient presents with     Refill Request     chronic pain medication refill       I have reviewed and updated the patient's Past Medical History, Social History, Family History and Medication List.    ALLERGIES  Nkda [no known drug allergies]    GRANT HOLLINS   (MA signature)      Chronic Pain Follow-Up    Where in your body do you have pain? Leg, back , ankle and shoulder  How has your pain affected your ability to work? Not applicable  Which of these pain treatments have you tried since your last clinic visit? none  How well are you sleeping?  OK  How has your mood been since your last visit? About the same  Have you had a significant life event? No  Other aggravating factors: sedentary lifestyle  Taking medication as directed? Yes    PHQ-9 SCORE 5/22/2018 5/31/2019   PHQ-9 Total Score 1 3     SARAH-7 SCORE 5/22/2018 5/31/2019   Total Score 0 0     No flowsheet data found.  Encounter-Level CSA - 05/02/2017:    Controlled Substance Agreement - Scan on 5/8/2017  2:07 PM: CONTROLLED SUBSTANCE AGREEMENT     Patient-Level CSA:    There are no patient-level csa.       HTN: refill medications " that are due.  BP Readings from Last 3 Encounters:   01/24/20 130/60   11/20/19 (!) 144/78   09/27/19 (!) 150/68       Assessment/Plan:  (M54.42,  G89.29) Chronic left-sided low back pain with left-sided sciatica  Comment: Refill date 3/20.  Plan: oxyCODONE (ROXICODONE) 5 MG tablet        Patient may call in for April refill.    (I10) Benign essential hypertension  Comment: well controlled at last visit.  Plan: triamterene-HCTZ (MAXZIDE) 75-50 MG tablet        Refills sent in.      I have reviewed the note as documented above.  This accurately captures the substance of my conversation with the patient.      Phone call contact time  Call Started at 10:20  Call Ended at 10:30    The risks, benefits and treatment options of prescribed medications or other treatments have been discussed with the patient. The patient verbalized their understanding and should call or follow up if no improvement or if they develop further problems.    GUNJAN Werner CNP

## 2020-04-07 DIAGNOSIS — G89.29 CHRONIC LEFT-SIDED LOW BACK PAIN WITH LEFT-SIDED SCIATICA: ICD-10-CM

## 2020-04-07 DIAGNOSIS — M54.42 CHRONIC LEFT-SIDED LOW BACK PAIN WITH LEFT-SIDED SCIATICA: ICD-10-CM

## 2020-04-07 NOTE — TELEPHONE ENCOUNTER
Routing refill request to provider for review/approval because:  Drug not on the [facility] refill protocol   Last sent 03/20/20     BATOOL VirkN, RN

## 2020-04-08 RX ORDER — OXYCODONE HYDROCHLORIDE 5 MG/1
TABLET ORAL
Qty: 60 TABLET | Refills: 0 | Status: SHIPPED | OUTPATIENT
Start: 2020-04-20 | End: 2020-05-18

## 2020-04-08 NOTE — TELEPHONE ENCOUNTER
Signed    Please let Pat know that when I talked to him, I told him that I was only able to send in the March script because he was too early. I told him to call for the April script - fill date is April 20.    Shayy Ramírez, CNP

## 2020-05-18 ENCOUNTER — VIRTUAL VISIT (OUTPATIENT)
Dept: FAMILY MEDICINE | Facility: CLINIC | Age: 80
End: 2020-05-18
Payer: COMMERCIAL

## 2020-05-18 DIAGNOSIS — M54.42 CHRONIC LEFT-SIDED LOW BACK PAIN WITH LEFT-SIDED SCIATICA: ICD-10-CM

## 2020-05-18 DIAGNOSIS — G89.29 CHRONIC LEFT-SIDED LOW BACK PAIN WITH LEFT-SIDED SCIATICA: ICD-10-CM

## 2020-05-18 PROCEDURE — 99213 OFFICE O/P EST LOW 20 MIN: CPT | Mod: 95 | Performed by: NURSE PRACTITIONER

## 2020-05-18 RX ORDER — OXYCODONE HYDROCHLORIDE 5 MG/1
TABLET ORAL
Qty: 60 TABLET | Refills: 0 | Status: SHIPPED | OUTPATIENT
Start: 2020-05-19 | End: 2020-05-18

## 2020-05-18 RX ORDER — OXYCODONE HYDROCHLORIDE 5 MG/1
TABLET ORAL
Qty: 60 TABLET | Refills: 0 | Status: SHIPPED | OUTPATIENT
Start: 2020-06-17 | End: 2020-05-18

## 2020-05-18 RX ORDER — OXYCODONE HYDROCHLORIDE 5 MG/1
TABLET ORAL
Qty: 60 TABLET | Refills: 0 | Status: ON HOLD | OUTPATIENT
Start: 2020-07-16 | End: 2020-07-27

## 2020-05-18 NOTE — PROGRESS NOTES
"Mann Escobar is a 79 year old male who is being evaluated via a billable video visit.      The patient has been notified of following:     \"This video visit will be conducted via a call between you and your physician/provider. We have found that certain health care needs can be provided without the need for an in-person physical exam.  This service lets us provide the care you need with a video conversation.  If a prescription is necessary we can send it directly to your pharmacy.  If lab work is needed we can place an order for that and you can then stop by our lab to have the test done at a later time.    Video visits are billed at different rates depending on your insurance coverage.  Please reach out to your insurance provider with any questions.    If during the course of the call the physician/provider feels a video visit is not appropriate, you will not be charged for this service.\"    Patient has given verbal consent for Video visit? Yes    How would you like to obtain your AVS? Mail a copy    Patient would like the video invitation sent by: Text to cell phone: [phone]    Will anyone else be joining your video visit? No      Subjective     Mann Escobar is a 79 year old male who presents today via video visit for the following health issues:    HPI  Chronic Pain Follow-Up    Where in your body do you have pain? Left sided, low back pain  How has your pain affected your ability to work? Not applicable  Which of these pain treatments have you tried since your last clinic visit? Other: None  How well are you sleeping? Fair  How has your mood been since your last visit? About the same  Have you had a significant life event? No  Other aggravating factors: prolonged sitting, prolonged standing and repetitive activities - .  Taking medication as directed? Yes    PHQ-9 SCORE 5/22/2018 5/31/2019   PHQ-9 Total Score 1 3     SARAH-7 SCORE 5/22/2018 5/31/2019   Total Score 0 0     No " "flowsheet data found.  Encounter-Level CSA - 05/02/2017:    Controlled Substance Agreement - Scan on 5/8/2017  2:07 PM: CONTROLLED SUBSTANCE AGREEMENT     Patient-Level CSA:    There are no patient-level csa.         How many servings of fruits and vegetables do you eat daily?  2-3    On average, how many sweetened beverages do you drink each day (Examples: soda, juice, sweet tea, etc.  Do NOT count diet or artificially sweetened beverages)?   1    How many days per week do you exercise enough to make your heart beat faster? 3 or less    How many minutes a day do you exercise enough to make your heart beat faster? 9 or less    How many days per week do you miss taking your medication? 0        Video Start Time: 10:50 AM            Reviewed and updated as needed this visit by Provider  Tobacco  Allergies  Meds  Problems  Med Hx  Surg Hx  Fam Hx         Review of Systems   Constitutional, HEENT, cardiovascular, pulmonary, gi and gu systems are negative, except as otherwise noted.      Objective    There were no vitals taken for this visit.  Estimated body mass index is 33.25 kg/m  as calculated from the following:    Height as of 1/24/20: 1.549 m (5' 1\").    Weight as of 1/24/20: 79.8 kg (176 lb).  Physical Exam     GENERAL: Healthy, alert and no distress  EYES: Eyes grossly normal to inspection.  No discharge or erythema, or obvious scleral/conjunctival abnormalities.  RESP: No audible wheeze, cough, or visible cyanosis.  No visible retractions or increased work of breathing.    SKIN: Visible skin clear. No significant rash, abnormal pigmentation or lesions.  NEURO: Cranial nerves grossly intact.  Mentation and speech appropriate for age.  PSYCH: Mentation appears normal, affect normal/bright, judgement and insight intact, normal speech and appearance well-groomed.              Assessment & Plan       ICD-10-CM    1. Chronic left-sided low back pain with left-sided sciatica  M54.42 oxyCODONE (ROXICODONE) 5 MG " tablet    G89.29 DISCONTINUED: oxyCODONE (ROXICODONE) 5 MG tablet     DISCONTINUED: oxyCODONE (ROXICODONE) 5 MG tablet     Refilled x3 months.           Return in about 3 months (around 8/18/2020).    The risks, benefits and treatment options of prescribed medications or other treatments have been discussed with the patient. The patient verbalized their understanding and should call or follow up if no improvement or if they develop further problems.    GUNJAN Werner CNP  [facility]      Video-Visit Details    Type of service:  Video Visit    Video End Time:11:00    Originating Location (pt. Location): Home    Distant Location (provider location):  [facility]     Platform used for Video Visit: Doximity    Return in about 3 months (around 8/18/2020).       GUNJAN Werner CNP

## 2020-07-07 DIAGNOSIS — Z11.59 ENCOUNTER FOR SCREENING FOR OTHER VIRAL DISEASES: Primary | ICD-10-CM

## 2020-07-13 ENCOUNTER — NURSE TRIAGE (OUTPATIENT)
Dept: NURSING | Facility: CLINIC | Age: 80
End: 2020-07-13

## 2020-07-13 NOTE — TELEPHONE ENCOUNTER
Has orders for CVD19 test for pre-op testing         I transferred him to the scheduling line for this  - I do see the orders in his chart              Kang Harrell RN     Additional Information    Nursing judgment or information in reference    Protocols used: NO GUIDELINE [id number]

## 2020-07-13 NOTE — TELEPHONE ENCOUNTER
He has an order in his chart. He was on the line and hung up after twenty minutes. I told him they are busy and it could take a while to reach them. [phone].  Areli Torres RN  [facility]

## 2020-07-20 ENCOUNTER — OFFICE VISIT (OUTPATIENT)
Dept: FAMILY MEDICINE | Facility: CLINIC | Age: 80
End: 2020-07-20
Payer: COMMERCIAL

## 2020-07-20 VITALS
RESPIRATION RATE: 20 BRPM | HEIGHT: 61 IN | OXYGEN SATURATION: 96 % | DIASTOLIC BLOOD PRESSURE: 62 MMHG | SYSTOLIC BLOOD PRESSURE: 102 MMHG | BODY MASS INDEX: 34.25 KG/M2 | WEIGHT: 181.4 LBS | HEART RATE: 74 BPM | TEMPERATURE: 98.5 F

## 2020-07-20 DIAGNOSIS — D50.9 IRON DEFICIENCY ANEMIA, UNSPECIFIED IRON DEFICIENCY ANEMIA TYPE: ICD-10-CM

## 2020-07-20 DIAGNOSIS — M19.071 ARTHRITIS OF ANKLE, RIGHT: ICD-10-CM

## 2020-07-20 DIAGNOSIS — G89.4 CHRONIC PAIN SYNDROME: ICD-10-CM

## 2020-07-20 DIAGNOSIS — Z01.818 PREOP GENERAL PHYSICAL EXAM: Primary | ICD-10-CM

## 2020-07-20 DIAGNOSIS — D56.8 OTHER THALASSEMIA (H): ICD-10-CM

## 2020-07-20 DIAGNOSIS — I10 ESSENTIAL HYPERTENSION: ICD-10-CM

## 2020-07-20 LAB
ANION GAP SERPL CALCULATED.3IONS-SCNC: 5 MMOL/L (ref 3–14)
ANISOCYTOSIS BLD QL SMEAR: NORMAL
BASO STIPL BLD QL SMEAR: NORMAL
BASO STIPL BLD QL SMEAR: PRESENT
BASOPHILS # BLD AUTO: NORMAL 10E9/L (ref 0–0.2)
BASOPHILS NFR BLD AUTO: NORMAL %
BUN SERPL-MCNC: 38 MG/DL (ref 7–30)
CALCIUM SERPL-MCNC: 8 MG/DL (ref 8.5–10.1)
CHLORIDE SERPL-SCNC: 111 MMOL/L (ref 94–109)
CO2 SERPL-SCNC: 24 MMOL/L (ref 20–32)
CREAT SERPL-MCNC: 2.14 MG/DL (ref 0.66–1.25)
DACRYOCYTES BLD QL SMEAR: NORMAL
DACRYOCYTES BLD QL SMEAR: SLIGHT
DIFFERENTIAL METHOD BLD: NORMAL
DIFFERENTIAL METHOD BLD: NORMAL
EOSINOPHIL # BLD AUTO: NORMAL 10E9/L (ref 0–0.7)
EOSINOPHIL NFR BLD AUTO: NORMAL %
ERYTHROCYTE [DISTWIDTH] IN BLOOD BY AUTOMATED COUNT: 18.2 % (ref 10–15)
ERYTHROCYTE [DISTWIDTH] IN BLOOD BY AUTOMATED COUNT: NORMAL % (ref 10–15)
ERYTHROCYTE [DISTWIDTH] IN BLOOD BY AUTOMATED COUNT: NORMAL % (ref 10–15)
GFR SERPL CREATININE-BSD FRML MDRD: 28 ML/MIN/{1.73_M2}
GLUCOSE SERPL-MCNC: 75 MG/DL (ref 70–99)
HCT VFR BLD AUTO: 27 % (ref 40–53)
HCT VFR BLD AUTO: NORMAL % (ref 40–53)
HCT VFR BLD AUTO: NORMAL % (ref 40–53)
HGB BLD-MCNC: 8.4 G/DL (ref 13.3–17.7)
HGB BLD-MCNC: NORMAL G/DL (ref 13.3–17.7)
HGB BLD-MCNC: NORMAL G/DL (ref 13.3–17.7)
LYMPHOCYTES # BLD AUTO: NORMAL 10E9/L (ref 0.8–5.3)
LYMPHOCYTES NFR BLD AUTO: NORMAL %
MCH RBC QN AUTO: 18.5 PG (ref 26.5–33)
MCH RBC QN AUTO: NORMAL PG (ref 26.5–33)
MCH RBC QN AUTO: NORMAL PG (ref 26.5–33)
MCHC RBC AUTO-ENTMCNC: 31.1 G/DL (ref 31.5–36.5)
MCHC RBC AUTO-ENTMCNC: NORMAL G/DL (ref 31.5–36.5)
MCHC RBC AUTO-ENTMCNC: NORMAL G/DL (ref 31.5–36.5)
MCV RBC AUTO: 60 FL (ref 78–100)
MCV RBC AUTO: NORMAL FL (ref 78–100)
MCV RBC AUTO: NORMAL FL (ref 78–100)
MONOCYTES # BLD AUTO: NORMAL 10E9/L (ref 0–1.3)
MONOCYTES NFR BLD AUTO: NORMAL %
NEUTROPHILS # BLD AUTO: NORMAL 10E9/L (ref 1.6–8.3)
NEUTROPHILS NFR BLD AUTO: NORMAL %
PLATELET # BLD AUTO: 205 10E9/L (ref 150–450)
PLATELET # BLD AUTO: NORMAL 10E9/L (ref 150–450)
PLATELET # BLD AUTO: NORMAL 10E9/L (ref 150–450)
PLATELET # BLD EST: NORMAL 10*3/UL
PLATELET # BLD EST: NORMAL 10*3/UL
POTASSIUM SERPL-SCNC: 3.7 MMOL/L (ref 3.4–5.3)
RBC # BLD AUTO: 4.53 10E12/L (ref 4.4–5.9)
RBC # BLD AUTO: NORMAL 10E12/L (ref 4.4–5.9)
RBC # BLD AUTO: NORMAL 10E12/L (ref 4.4–5.9)
RBC INCLUSIONS BLD: NORMAL
RBC INCLUSIONS BLD: SLIGHT
SODIUM SERPL-SCNC: 140 MMOL/L (ref 133–144)
TARGETS BLD QL SMEAR: NORMAL
TARGETS BLD QL SMEAR: SLIGHT
WBC # BLD AUTO: 7.1 10E9/L (ref 4–11)
WBC # BLD AUTO: NORMAL 10E9/L (ref 4–11)
WBC # BLD AUTO: NORMAL 10E9/L (ref 4–11)

## 2020-07-20 PROCEDURE — 36415 COLL VENOUS BLD VENIPUNCTURE: CPT | Performed by: NURSE PRACTITIONER

## 2020-07-20 PROCEDURE — 80048 BASIC METABOLIC PNL TOTAL CA: CPT | Performed by: NURSE PRACTITIONER

## 2020-07-20 PROCEDURE — 85027 COMPLETE CBC AUTOMATED: CPT | Performed by: NURSE PRACTITIONER

## 2020-07-20 PROCEDURE — 99215 OFFICE O/P EST HI 40 MIN: CPT | Performed by: NURSE PRACTITIONER

## 2020-07-20 PROCEDURE — 93000 ELECTROCARDIOGRAM COMPLETE: CPT | Performed by: NURSE PRACTITIONER

## 2020-07-20 RX ORDER — ZINC GLUCONATE 50 MG
50 TABLET ORAL DAILY
COMMUNITY
End: 2023-01-01

## 2020-07-20 ASSESSMENT — MIFFLIN-ST. JEOR: SCORE: 1401.21

## 2020-07-20 NOTE — PATIENT INSTRUCTIONS
Hold lisinopril for 24 hours prior to surgery        Before Your Surgery      Call your surgeon if there is any change in your health. This includes signs of a cold or flu (such as a sore throat, runny nose, cough, rash or fever).    Do not smoke, drink alcohol or take over the counter medicine (unless your surgeon or primary care doctor tells you to) for the 24 hours before and after surgery.    If you take prescribed drugs: Follow your doctor s orders about which medicines to take and which to stop until after surgery.    Eating and drinking prior to surgery: follow the instructions from your surgeon    Take a shower or bath the night before surgery. Use the soap your surgeon gave you to gently clean your skin. If you do not have soap from your surgeon, use your regular soap. Do not shave or scrub the surgery site.  Wear clean pajamas and have clean sheets on your bed.

## 2020-07-20 NOTE — PROGRESS NOTES
[facility]  [address] [address]  [phone]  Dept: [phone]    PRE-OP EVALUATION:  Today's date: 2020    Mann Escobar (: 1940) presents for pre-operative evaluation assessment as requested by Dr. Powers.  He requires evaluation and anesthesia risk assessment prior to undergoing surgery/procedure for treatment of right ankle arthritis.    Proposed Surgery/ Procedure: Rt ankle reconstructive arthroplasty  Date of Surgery/ Procedure: 20  Time of Surgery/ Procedure: 2:30  Hospital/Surgical Facility: [facility]  Surgery Fax Number:   Primary Physician: Shayy Ramírez  Type of Anesthesia Anticipated: Combined general with popliteal block    Preoperative Questionnaire:   No - Have you ever had a heart attack or stroke?  No - Have you ever had surgery on your heart or blood vessels, such as a stent, coronary (heart) bypass, or surgery on an artery in the head, neck, heart, or legs?  No - Do you have chest pain when you are physically active?  No - Do you have a history of heart failure?  No - Do you currently have a cold, bronchitis, or symptoms of other respiratory (head and chest) infections?  No - Do you have a cough, shortness of breath, or wheezing?  No - Do you or anyone in your family have a history of blood clots?  No - Do you or anyone in your family have a serious bleeding problem, such as long-lasting bleeding after surgeries or cuts?  YES - HAVE YOU EVERY HAD ANEMIA OR BEEN TOLD TO TAKE IRON PILLS? H/o iron def anemia  No - Have you had any abnormal blood loss such as black, tarry or bloody stools, or abnormal vaginal bleeding?  YES - HAVE YOU EVER HAD A BLOOD TRANSFUSION?   Yes - Are you willing to have a blood transfusion if it is medically needed before, during, or after your surgery?  No - Have you or anyone in your family ever had problems with anesthesia (sedation for surgery)?  No - Do you have sleep apnea,  excessive snoring, or daytime drowsiness?   No - Do you have any artifical heart valves or other implanted medical devices, such as a pacemaker, defibrillator, or continuous glucose monitor?  YES - DO YOU HAVE ANY ARTIFICIAL JOINTS? Knee, bilateral  No - Are you allergic to latex?  No - Is there any chance that you may be pregnant?    Patient has a Health Care Directive or Living Will:  NO    HPI:     HPI related to upcoming procedure:   Right ankle arthritis - pain and dysfunction for years.  Previous treatments included cortisone shots - no longer effective.      HYPERTENSION - Patient has longstanding history of HTN , currently denies any symptoms referable to elevated blood pressure. Specifically denies chest pain, palpitations, dyspnea, orthopnea, PND or peripheral edema. Blood pressure readings have been in normal range. Current medication regimen is as listed below. Patient denies any side effects of medication.     H/o iron def anemia - last hemoglobin in 2018 was 9.5    Chronic pain syndrome - multiple areas of chronic pain. On chronic narcotics.    MEDICAL HISTORY:     Patient Active Problem List    Diagnosis Date Noted     Chronic low back pain 10/08/2012     Priority: High     Had back surgery in 5/2012         Leg edema 08/18/2010     Priority: High     Chronic pain syndrome 02/06/2017     Priority: Medium     Patient is followed by GUNJAN Vinson CNP for ongoing prescription of pain medication.  All refills should only be approved by this provider, or covering partner.    Medication(s): oxycodone.   Maximum quantity per month: 60  Clinic visit frequency required: Q 3 months     Controlled substance agreement:  Encounter-Level CSA:     There are no encounter-level csa.        Pain Clinic evaluation in the past:     Last [facility] website verification:            Pseudogout 09/26/2016     Priority: Medium     BPH (benign prostatic hyperplasia) 06/11/2015     Priority: Medium     Hypertension       Priority: Medium     Abnormal antinuclear antibody titer 05/09/2014     Priority: Medium     Osteoarthritis 05/09/2014     Priority: Medium     Advanced directives, counseling/discussion 10/08/2012     Priority: Medium     Discussed advance care planning with patient; however, patient declined at this time. 10/8/2012          Benign non-nodular prostatic hyperplasia with lower urinary tract symptoms 10/08/2012     Priority: Medium     S/P knee replacement 10/08/2012     Priority: Medium     Both sides         S/P ankle fusion 10/08/2012     Priority: Medium     Left side         First degree AV block 04/25/2012     Priority: Medium     Family history of diabetes mellitus 04/25/2012     Priority: Medium     Scoliosis 04/25/2012     Priority: Medium     Hypopotassemia 04/25/2012     Priority: Medium     Internal hemorrhoids 08/23/2011     Priority: Medium     Iron deficiency anemia 08/23/2011     Priority: Medium     CARDIOVASCULAR SCREENING; LDL GOAL LESS THAN 130 10/31/2010     Priority: Medium     Family history of prostate cancer 09/02/2009     Priority: Medium     FATHER       Esophageal reflux 05/25/2006     Priority: Medium     Thalassemia minor      Priority: Medium     thalassemia minor (chronic low hgb)        HX NEPHROLITHIASIS [V13.01] 09/12/2005     Priority: Medium      Past Medical History:   Diagnosis Date     Back pain     WITH BILATERAL LEG PAIN AND NUMBNESS OF BOTH FEET     Gastro-oesophageal reflux disease     REFLUX     Hypertension      Hypopotassemia      Internal hemorrhoids      Iron deficiency anaemia      Leg edema      Morbid obesity 9/12/2005     Morbid obesity (H)      Osteoarthrosis      Scoliosis      Thalassemia minor      Past Surgical History:   Procedure Laterality Date     CATARACT IOL, RT/LT  2008/    Cataract IOL RT/LT     COLONOSCOPY  2009/07    polyps removed repeat 5 yrs, tubular adenoma     COLONOSCOPY  9/29/2011    Procedure:COLONOSCOPY; Colonoscopy with hemorrhoid banding;  Surgeon:JEREMY GARCIA; Location:[facility]     COLONOSCOPY N/A 12/19/2017    Procedure: COLONOSCOPY;  colonoscopy, gastroscopy;  Surgeon: Edmar Isaacs MD;  Location: [facility]     DECOMPRESSION LUMBAR MINIMALLY INVASIVE TWO LEVELS  5/2/2012    Procedure:DECOMPRESSION LUMBAR MINIMALLY INVASIVE TWO LEVELS; Surgeon:LOTTIE MITCHELL; Location:[facility]     ESOPHAGOSCOPY, GASTROSCOPY, DUODENOSCOPY (EGD), COMBINED N/A 2/6/2018    Procedure: COMBINED ESOPHAGOSCOPY, GASTROSCOPY, DUODENOSCOPY (EGD);  gastroscopy;  Surgeon: Edmar Isaacs MD;  Location: [facility]     FUSION SPINE POSTERIOR MINIMALLY INVASIVE ONE LEVEL  5/2/2012    Procedure:FUSION SPINE POSTERIOR MINIMALLY INVASIVE ONE LEVEL; Minimal Access Spinal Technology Transformaninal Lumbar Interbody Fusion L4-5, Decompression L3-5; Surgeon:LOTTIE MITCHELL; Location:[facility]     HC REMOVAL OF HYDROCELE,TUNICA,UNILAT  2006    bilateral     ORTHOPEDIC SURGERY  2000    Lf knee replacement     ORTHOPEDIC SURGERY  2002, 2010    Rt replacement     ORTHOPEDIC SURGERY  2005    Left ankle fused     SURGICAL HISTORY OF -       Cysto     SURGICAL HISTORY OF -   2002    Percutaneous kidney stone removal     SURGICAL HISTORY OF -       ureteral stent removal     SURGICAL HISTORY OF -   2005    bariatric surgery-Favian-en-Y     SURGICAL HISTORY OF -   2008    carpal tunnel surgery rt wrist     Current Outpatient Medications   Medication Sig Dispense Refill     amLODIPine (NORVASC) 10 MG tablet Take 1 tablet (10 mg) by mouth daily 90 tablet 3     ferrous fumarate 65 mg, [place]. FE,-Vitamin C 125 mg (VITRON C)  MG TABS tablet Take 1 tablet by mouth daily 60 tablet 1     HCA VITAMIN B12 500 MCG OR TABS 2 tablets daily       lisinopril (PRINIVIL/ZESTRIL) 40 MG tablet Take 1 tablet (40 mg) by mouth daily 90 tablet 3     MULTIVITAMIN OR one daily       OVER-THE-COUNTER Elderberry 50mg supplement, one daily       oxyCODONE (ROXICODONE) 5 MG tablet TAKE 1 TABLET BY MOUTH 2 TIMES DAILY AS  "NEEDED FOR PAIN 60 tablet 0     terazosin (HYTRIN) 5 MG capsule TAKE 1 CAPSULE AT BEDTIME 90 capsule 3     triamterene-HCTZ (MAXZIDE) 75-50 MG tablet Take 0.5 tablets by mouth daily 45 tablet 3     VIACTIV OR With D 1000mg calcium       zinc gluconate 50 MG tablet Take 50 mg by mouth daily       senna-docusate (SENOKOT-S;PERICOLACE) 8.6-50 MG per tablet Take 1-2 tablets by mouth 2 times daily as needed for constipation. (Patient not taking: Reported on 2020) 60 tablet 0     OTC products: None, except as noted above    Allergies   Allergen Reactions     Nkda [No Known Drug Allergies]       Latex Allergy: NO    Social History     Tobacco Use     Smoking status: Former Smoker     Packs/day: 0.50     Years: 7.00     Pack years: 3.50     Types: Cigarettes     Last attempt to quit: 1962     Years since quittin.5     Smokeless tobacco: Never Used   Substance Use Topics     Alcohol use: Yes     Comment: one every other day     History   Drug Use No       REVIEW OF SYSTEMS:   Constitutional, neuro, ENT, endocrine, pulmonary, cardiac, gastrointestinal, genitourinary, musculoskeletal, integument and psychiatric systems are negative, except as otherwise noted.    EXAM:   /62 (BP Location: Right arm, Patient Position: Chair, Cuff Size: Adult Regular)   Pulse 74   Temp 98.5  F (36.9  C) (Tympanic)   Resp 20   Ht 1.549 m (5' 1\")   Wt 82.3 kg (181 lb 6.4 oz)   SpO2 96%   BMI 34.28 kg/m      GENERAL APPEARANCE: healthy, alert and no distress     EYES: EOMI,  PERRL     HENT: ear canals and TM's normal and nose and mouth without ulcers or lesions     NECK: no adenopathy, no asymmetry, masses, or scars and thyroid normal to palpation     RESP: lungs clear to auscultation - no rales, rhonchi or wheezes     CV: regular rates and rhythm, normal S1 S2, no S3 or S4 and no murmur, click or rub     ABDOMEN:  soft, nontender, no HSM or masses and bowel sounds normal     MS: trace edema bilateral ankles     SKIN: no " suspicious lesions or rashes     NEURO: Normal strength and tone, sensory exam grossly normal, mentation intact and speech normal     PSYCH: mentation appears normal. and affect normal/bright     LYMPHATICS: No cervical adenopathy    DIAGNOSTICS:   EKG: appears normal, NSR, normal axis, normal intervals, no acute ST/T changes c/w ischemia, no LVH by voltage criteria, Right Bundle Branch Block, unchanged from previous tracings    Results for orders placed or performed in visit on 07/20/20   CBC with platelets     Status: Abnormal   Result Value Ref Range    WBC 7.2 4.0 - 11.0 10e9/L    RBC Count 4.48 4.4 - 5.9 10e12/L    Hemoglobin 8.5 (L) 13.3 - 17.7 g/dL    Hematocrit 26.8 (L) 40.0 - 53.0 %    MCV 60 (L) 78 - 100 fl    MCH 19.0 (L) 26.5 - 33.0 pg    MCHC 31.7 31.5 - 36.5 g/dL    RDW 18.3 (H) 10.0 - 15.0 %    Platelet Count 206 150 - 450 10e9/L       Recent Labs   Lab Test 05/31/19  0909 10/30/18  1029 07/06/18  1333  02/01/18  1432 12/29/17  1045 11/10/17  1401  10/25/13  1137   HGB  --  9.5* 10.2*   < > 9.9* 7.8* 8.0*   < >  --    PLT  --   --   --   --  233 275 264   < >  --      --   --   --   --   --  144   < >  --    POTASSIUM 3.9  --   --   --   --   --  3.5   < > 3.5   CR 1.52*  --   --   --   --   --  1.35*   < >  --    A1C  --   --   --   --   --   --   --   --  5.2    < > = values in this interval not displayed.        IMPRESSION:   Reason for surgery/procedure: ankle arthritis, right  Diagnosis/reason for consult: pre-op evaluation    The proposed surgical procedure is considered INTERMEDIATE risk.    REVISED CARDIAC RISK INDEX  The patient has the following serious cardiovascular risks for perioperative complications such as (MI, PE, VFib and 3  AV Block):  No serious cardiac risks  INTERPRETATION: 0 risks: Class I (very low risk - 0.4% complication rate)    The patient has the following additional risks for perioperative complications:  High tolerance to opioid analgesics due to chronic  use  HTN  H/o anemia      ICD-10-CM    1. Preop general physical exam  Z01.818 EKG 12-lead complete w/read - Clinics     Basic metabolic panel  (Ca, Cl, CO2, Creat, Gluc, K, Na, BUN)     CBC with platelets   2. Arthritis of ankle, right  M19.071 EKG 12-lead complete w/read - Clinics     Basic metabolic panel  (Ca, Cl, CO2, Creat, Gluc, K, Na, BUN)     CBC with platelets   3. Essential hypertension  I10 EKG 12-lead complete w/read - Clinics     Basic metabolic panel  (Ca, Cl, CO2, Creat, Gluc, K, Na, BUN)     CBC with platelets   4. Chronic pain syndrome  G89.4 EKG 12-lead complete w/read - Clinics     Basic metabolic panel  (Ca, Cl, CO2, Creat, Gluc, K, Na, BUN)     CBC with platelets   5. Iron deficiency anemia, unspecified iron deficiency anemia type  D50.9 EKG 12-lead complete w/read - Clinics     Basic metabolic panel  (Ca, Cl, CO2, Creat, Gluc, K, Na, BUN)     CBC with platelets   6. Other thalassemia (H)  D56.8        RECOMMENDATIONS:       Cardiovascular Risk  Performs 4 METs exercise without symptoms (Light housework (dusting, washing dishes), Climb a flight of stairs, Walk on level ground at 15 minutes per mile (4 miles/hour) and Shoveling) .       --Patient is to take all scheduled medications on the day of surgery EXCEPT for modifications listed below.    ACE Inhibitor or Angiotensin Receptor Blocker (ARB) Use  Ace inhibitor or Angiotensin Receptor Blocker (ARB) and should HOLD this medication for the 24 hours prior to surgery.      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  Recommend increasing oral iron supplements to one tablet twice daily  Follow up with Dr Andrew for potential restart of IV iron infusions.      APPROVAL GIVEN to proceed with proposed procedure, without further diagnostic evaluation       Signed Electronically by: GUNJAN Werner CNP    Copy of this evaluation report is provided  to requesting physician.    [facility] Preop Guidelines    Revised Cardiac Risk Index

## 2020-07-21 DIAGNOSIS — Z11.59 ENCOUNTER FOR SCREENING FOR OTHER VIRAL DISEASES: ICD-10-CM

## 2020-07-21 PROCEDURE — U0003 INFECTIOUS AGENT DETECTION BY NUCLEIC ACID (DNA OR RNA); SEVERE ACUTE RESPIRATORY SYNDROME CORONAVIRUS 2 (SARS-COV-2) (CORONAVIRUS DISEASE [COVID-19]), AMPLIFIED PROBE TECHNIQUE, MAKING USE OF HIGH THROUGHPUT TECHNOLOGIES AS DESCRIBED BY CMS-2020-01-R: HCPCS | Performed by: PODIATRIST

## 2020-07-22 ENCOUNTER — OFFICE VISIT (OUTPATIENT)
Dept: FAMILY MEDICINE | Facility: CLINIC | Age: 80
End: 2020-07-22
Payer: COMMERCIAL

## 2020-07-22 ENCOUNTER — ANESTHESIA EVENT (OUTPATIENT)
Dept: SURGERY | Facility: CLINIC | Age: 80
DRG: 469 | End: 2020-07-22
Payer: COMMERCIAL

## 2020-07-22 VITALS
RESPIRATION RATE: 14 BRPM | HEIGHT: 61 IN | BODY MASS INDEX: 34.28 KG/M2 | TEMPERATURE: 98.1 F | OXYGEN SATURATION: 97 % | DIASTOLIC BLOOD PRESSURE: 72 MMHG | SYSTOLIC BLOOD PRESSURE: 106 MMHG | HEART RATE: 74 BPM

## 2020-07-22 DIAGNOSIS — R79.89 ELEVATED SERUM CREATININE: ICD-10-CM

## 2020-07-22 DIAGNOSIS — D50.9 IRON DEFICIENCY ANEMIA, UNSPECIFIED IRON DEFICIENCY ANEMIA TYPE: Primary | ICD-10-CM

## 2020-07-22 LAB
ANION GAP SERPL CALCULATED.3IONS-SCNC: 4 MMOL/L (ref 3–14)
BUN SERPL-MCNC: 34 MG/DL (ref 7–30)
CALCIUM SERPL-MCNC: 8.1 MG/DL (ref 8.5–10.1)
CHLORIDE SERPL-SCNC: 113 MMOL/L (ref 94–109)
CO2 SERPL-SCNC: 26 MMOL/L (ref 20–32)
CREAT SERPL-MCNC: 1.91 MG/DL (ref 0.66–1.25)
ERYTHROCYTE [DISTWIDTH] IN BLOOD BY AUTOMATED COUNT: 18 % (ref 10–15)
GFR SERPL CREATININE-BSD FRML MDRD: 32 ML/MIN/{1.73_M2}
GLUCOSE SERPL-MCNC: 82 MG/DL (ref 70–99)
HCT VFR BLD AUTO: 26.5 % (ref 40–53)
HGB BLD-MCNC: 8.3 G/DL (ref 13.3–17.7)
MCH RBC QN AUTO: 18.8 PG (ref 26.5–33)
MCHC RBC AUTO-ENTMCNC: 31.3 G/DL (ref 31.5–36.5)
MCV RBC AUTO: 60 FL (ref 78–100)
PLATELET # BLD AUTO: 207 10E9/L (ref 150–450)
POTASSIUM SERPL-SCNC: 4.1 MMOL/L (ref 3.4–5.3)
RBC # BLD AUTO: 4.41 10E12/L (ref 4.4–5.9)
SARS-COV-2 RNA SPEC QL NAA+PROBE: NOT DETECTED
SODIUM SERPL-SCNC: 143 MMOL/L (ref 133–144)
SPECIMEN SOURCE: NORMAL
WBC # BLD AUTO: 6.5 10E9/L (ref 4–11)

## 2020-07-22 PROCEDURE — 80048 BASIC METABOLIC PNL TOTAL CA: CPT | Performed by: NURSE PRACTITIONER

## 2020-07-22 PROCEDURE — 85027 COMPLETE CBC AUTOMATED: CPT | Performed by: NURSE PRACTITIONER

## 2020-07-22 PROCEDURE — 99214 OFFICE O/P EST MOD 30 MIN: CPT | Performed by: NURSE PRACTITIONER

## 2020-07-22 PROCEDURE — 36415 COLL VENOUS BLD VENIPUNCTURE: CPT | Performed by: NURSE PRACTITIONER

## 2020-07-22 ASSESSMENT — LIFESTYLE VARIABLES: TOBACCO_USE: 1

## 2020-07-22 NOTE — PROGRESS NOTES
Mann Escobar is a 79 year old male who presents to clinic today for the following health issues:    HPI       Chief Complaint   Patient presents with     Blood Draw     follow up on recent lab work      Hypertension     follow up     Pt was seen 07/20/2020 for a Pre-op. Lab work was abnormal. Was advised to hydrate, stop the Maxzide and follow up in 2-3 days for a recheck prior to surgery.     Surgery scheduled for tomorrow with Dr. Powers - ankle reconstructive arthroplasty.     Hypertension Follow-up      Do you check your blood pressure regularly outside of the clinic? No     Are you following a low salt diet? No    Are your blood pressures ever more than 140 on the top number (systolic) OR more   than 90 on the bottom number (diastolic), for example 140/90? Yes      How many servings of fruits and vegetables do you eat daily?  0-1    On average, how many sweetened beverages do you drink each day (Examples: soda, juice, sweet tea, etc.  Do NOT count diet or artificially sweetened beverages)?   0    How many days per week do you exercise enough to make your heart beat faster? 3 or less    How many minutes a day do you exercise enough to make your heart beat faster? 9 or less    How many days per week do you miss taking your medication? 0      Anemia:  Hemoglobin low at pre-op as well.  Patient has a h/o thalassemia minor - this has been the cause in the past.  He has followed with Dr Andrew before.  He denies any blood loss - no hematuria, no black or bloody stools.  He hasn't been taking his iron pills - just stopped them on his own.              Reviewed and updated as needed this visit by Provider         Review of Systems   Constitutional, HEENT, cardiovascular, pulmonary, gi and gu systems are negative, except as otherwise noted.      Objective    /72 (BP Location: Right arm, Patient Position: Sitting, Cuff Size: Adult Regular)   Pulse 74   Temp 98.1  F (36.7  C) (Tympanic)   Resp 14   Ht  "1.549 m (5' 1\")   SpO2 97%   BMI 34.28 kg/m    Body mass index is 34.28 kg/m .  Physical Exam   GENERAL: healthy, alert and no distress  RESP: lungs clear to auscultation - no rales, rhonchi or wheezes  CV: regular rate and rhythm, normal S1 S2, no S3 or S4, no murmur, click or rub, no peripheral edema and peripheral pulses strong    Diagnostic Test Results:  Results for orders placed or performed in visit on 07/22/20 (from the past 24 hour(s))   CBC with platelets   Result Value Ref Range    WBC 6.5 4.0 - 11.0 10e9/L    RBC Count 4.41 4.4 - 5.9 10e12/L    Hemoglobin 8.3 (L) 13.3 - 17.7 g/dL    Hematocrit 26.5 (L) 40.0 - 53.0 %    MCV 60 (L) 78 - 100 fl    MCH 18.8 (L) 26.5 - 33.0 pg    MCHC 31.3 (L) 31.5 - 36.5 g/dL    RDW 18.0 (H) 10.0 - 15.0 %    Platelet Count 207 150 - 450 10e9/L   Basic metabolic panel  (Ca, Cl, CO2, Creat, Gluc, K, Na, BUN)   Result Value Ref Range    Sodium 143 133 - 144 mmol/L    Potassium 4.1 3.4 - 5.3 mmol/L    Chloride 113 (H) 94 - 109 mmol/L    Carbon Dioxide 26 20 - 32 mmol/L    Anion Gap 4 3 - 14 mmol/L    Glucose 82 70 - 99 mg/dL    Urea Nitrogen 34 (H) 7 - 30 mg/dL    Creatinine 1.91 (H) 0.66 - 1.25 mg/dL    GFR Estimate 32 (L) >60 mL/min/[1.73_m2]    GFR Estimate If Black 38 (L) >60 mL/min/[1.73_m2]    Calcium 8.1 (L) 8.5 - 10.1 mg/dL           Assessment & Plan       ICD-10-CM    1. Iron deficiency anemia, unspecified iron deficiency anemia type  D50.9 CBC with platelets   2. Elevated serum creatinine  R79.89 Basic metabolic panel  (Ca, Cl, CO2, Creat, Gluc, K, Na, BUN)     Hemoglobin unchanged  Patient will restart iron supplements.  Recheck in 1 month.    Creatinine improving.  Do not restart the Maxzide.  Start hydrated.  Follow up in one month for lab recheck.    I also talked to Dr Powers's assistant, who notified Dr Powers of the anemia - Dr Powers is fine with proceeding with surgery tomorrow.    Patient Instructions   Increase iron supplements to one tablet twice " daily.    Do not restart the Maxzide.    Follow up with me again for a recheck in one month.            Thank you for choosing [facility].  You may be receiving an email and/or telephone survey request from [facility] Customer Experience regarding your visit today.  Please take a few minutes to respond to the survey to let us know how we are doing.      If you have questions or concerns, please contact us via [employer] or you can contact your care team at [phone].    Our Clinic hours are:  Monday 6:40 am  to 7:00 pm  Tuesday -Friday 6:40 am to 5:00 pm    The [place] outpatient lab hours are:  Monday - Friday 6:10 am to 4:45 pm  Saturdays 7:00 am to 11:00 am  Appointments are required, call [phone]    If you have clinical questions after hours or would like to schedule an appointment,  call the clinic at [phone].        Return in about 4 weeks (around 8/19/2020) for BP Recheck, Lab Work.    The risks, benefits and treatment options of prescribed medications or other treatments have been discussed with the patient. The patient verbalized their understanding and should call or follow up if no improvement or if they develop further problems.    GUNJAN Werner CNP  [facility]

## 2020-07-22 NOTE — PATIENT INSTRUCTIONS
Increase iron supplements to one tablet twice daily.    Do not restart the Maxzide.    Follow up with me again for a recheck in one month.            Thank you for choosing [facility].  You may be receiving an email and/or telephone survey request from [facility] Customer Experience regarding your visit today.  Please take a few minutes to respond to the survey to let us know how we are doing.      If you have questions or concerns, please contact us via [employer] or you can contact your care team at [phone].    Our Clinic hours are:  Monday 6:40 am  to 7:00 pm  Tuesday -Friday 6:40 am to 5:00 pm    The [place] outpatient lab hours are:  Monday - Friday 6:10 am to 4:45 pm  Saturdays 7:00 am to 11:00 am  Appointments are required, call [phone]    If you have clinical questions after hours or would like to schedule an appointment,  call the clinic at [phone].

## 2020-07-23 ENCOUNTER — ANESTHESIA (OUTPATIENT)
Dept: SURGERY | Facility: CLINIC | Age: 80
DRG: 469 | End: 2020-07-23
Payer: COMMERCIAL

## 2020-07-23 ENCOUNTER — APPOINTMENT (OUTPATIENT)
Dept: GENERAL RADIOLOGY | Facility: CLINIC | Age: 80
DRG: 469 | End: 2020-07-23
Attending: PODIATRIST
Payer: COMMERCIAL

## 2020-07-23 ENCOUNTER — HOSPITAL ENCOUNTER (INPATIENT)
Facility: CLINIC | Age: 80
LOS: 4 days | Discharge: HOME | DRG: 469 | End: 2020-07-27
Attending: PODIATRIST | Admitting: PODIATRIST
Payer: COMMERCIAL

## 2020-07-23 DIAGNOSIS — M54.42 CHRONIC LEFT-SIDED LOW BACK PAIN WITH LEFT-SIDED SCIATICA: ICD-10-CM

## 2020-07-23 DIAGNOSIS — G89.29 CHRONIC LEFT-SIDED LOW BACK PAIN WITH LEFT-SIDED SCIATICA: ICD-10-CM

## 2020-07-23 DIAGNOSIS — Z96.661 STATUS POST RIGHT ANKLE JOINT REPLACEMENT: ICD-10-CM

## 2020-07-23 DIAGNOSIS — Z96.661 S/P ANKLE JOINT REPLACEMENT, RIGHT: Primary | ICD-10-CM

## 2020-07-23 PROBLEM — Z96.669 S/P ANKLE JOINT REPLACEMENT: Status: ACTIVE | Noted: 2020-07-23

## 2020-07-23 LAB — GLUCOSE BLDC GLUCOMTR-MCNC: 91 MG/DL (ref 70–99)

## 2020-07-23 PROCEDURE — 27210794 ZZH OR GENERAL SUPPLY STERILE: Performed by: PODIATRIST

## 2020-07-23 PROCEDURE — 25000125 ZZHC RX 250: Performed by: NURSE ANESTHETIST, CERTIFIED REGISTERED

## 2020-07-23 PROCEDURE — 71000012 ZZH RECOVERY PHASE 1 LEVEL 1 FIRST HR: Performed by: PODIATRIST

## 2020-07-23 PROCEDURE — 71000013 ZZH RECOVERY PHASE 1 LEVEL 1 EA ADDTL HR: Performed by: PODIATRIST

## 2020-07-23 PROCEDURE — 37000009 ZZH ANESTHESIA TECHNICAL FEE, EACH ADDTL 15 MIN: Performed by: PODIATRIST

## 2020-07-23 PROCEDURE — 40000306 ZZH STATISTIC PRE PROC ASSESS II: Performed by: PODIATRIST

## 2020-07-23 PROCEDURE — 40000277 XR SURGERY CARM FLUORO LESS THAN 5 MIN W STILLS: Mod: TC

## 2020-07-23 PROCEDURE — 25800030 ZZH RX IP 258 OP 636: Performed by: NURSE ANESTHETIST, CERTIFIED REGISTERED

## 2020-07-23 PROCEDURE — C1776 JOINT DEVICE (IMPLANTABLE): HCPCS | Performed by: PODIATRIST

## 2020-07-23 PROCEDURE — 25000132 ZZH RX MED GY IP 250 OP 250 PS 637: Performed by: NURSE ANESTHETIST, CERTIFIED REGISTERED

## 2020-07-23 PROCEDURE — 37000008 ZZH ANESTHESIA TECHNICAL FEE, 1ST 30 MIN: Performed by: PODIATRIST

## 2020-07-23 PROCEDURE — 25000566 ZZH SEVOFLURANE, EA 15 MIN: Performed by: PODIATRIST

## 2020-07-23 PROCEDURE — 25800030 ZZH RX IP 258 OP 636: Performed by: PODIATRIST

## 2020-07-23 PROCEDURE — 12000000 ZZH R&B MED SURG/OB

## 2020-07-23 PROCEDURE — 25000128 H RX IP 250 OP 636: Performed by: NURSE ANESTHETIST, CERTIFIED REGISTERED

## 2020-07-23 PROCEDURE — 36000058 ZZH SURGERY LEVEL 3 EA 15 ADDTL MIN: Performed by: PODIATRIST

## 2020-07-23 PROCEDURE — 27110028 ZZH OR GENERAL SUPPLY NON-STERILE: Performed by: PODIATRIST

## 2020-07-23 PROCEDURE — 0QBL0ZZ EXCISION OF RIGHT TARSAL, OPEN APPROACH: ICD-10-PCS | Performed by: PODIATRIST

## 2020-07-23 PROCEDURE — 25000132 ZZH RX MED GY IP 250 OP 250 PS 637: Performed by: PODIATRIST

## 2020-07-23 PROCEDURE — 36000060 ZZH SURGERY LEVEL 3 W FLUORO 1ST 30 MIN: Performed by: PODIATRIST

## 2020-07-23 PROCEDURE — 25000128 H RX IP 250 OP 636

## 2020-07-23 PROCEDURE — 25000128 H RX IP 250 OP 636: Performed by: PODIATRIST

## 2020-07-23 PROCEDURE — 00000146 ZZHCL STATISTIC GLUCOSE BY METER IP

## 2020-07-23 PROCEDURE — 0SRF0JA REPLACEMENT OF RIGHT ANKLE JOINT WITH SYNTHETIC SUBSTITUTE, UNCEMENTED, OPEN APPROACH: ICD-10-PCS | Performed by: PODIATRIST

## 2020-07-23 DEVICE — IMPLANTABLE DEVICE: Type: IMPLANTABLE DEVICE | Site: ANKLE | Status: FUNCTIONAL

## 2020-07-23 RX ORDER — FENTANYL CITRATE 50 UG/ML
INJECTION, SOLUTION INTRAMUSCULAR; INTRAVENOUS PRN
Status: DISCONTINUED | OUTPATIENT
Start: 2020-07-23 | End: 2020-07-23

## 2020-07-23 RX ORDER — ONDANSETRON 2 MG/ML
INJECTION INTRAMUSCULAR; INTRAVENOUS PRN
Status: DISCONTINUED | OUTPATIENT
Start: 2020-07-23 | End: 2020-07-23

## 2020-07-23 RX ORDER — ACETAMINOPHEN 325 MG/1
650 TABLET ORAL EVERY 4 HOURS PRN
Status: DISCONTINUED | OUTPATIENT
Start: 2020-07-26 | End: 2020-07-27

## 2020-07-23 RX ORDER — DEXAMETHASONE SODIUM PHOSPHATE 4 MG/ML
INJECTION, SOLUTION INTRA-ARTICULAR; INTRALESIONAL; INTRAMUSCULAR; INTRAVENOUS; SOFT TISSUE PRN
Status: DISCONTINUED | OUTPATIENT
Start: 2020-07-23 | End: 2020-07-23

## 2020-07-23 RX ORDER — PHENYLEPHRINE HYDROCHLORIDE 10 MG/ML
INJECTION INTRAVENOUS PRN
Status: DISCONTINUED | OUTPATIENT
Start: 2020-07-23 | End: 2020-07-23

## 2020-07-23 RX ORDER — KETOROLAC TROMETHAMINE 30 MG/ML
INJECTION, SOLUTION INTRAMUSCULAR; INTRAVENOUS PRN
Status: DISCONTINUED | OUTPATIENT
Start: 2020-07-23 | End: 2020-07-23

## 2020-07-23 RX ORDER — GABAPENTIN 100 MG/1
100 CAPSULE ORAL DAILY
Status: COMPLETED | OUTPATIENT
Start: 2020-07-24 | End: 2020-07-26

## 2020-07-23 RX ORDER — NALOXONE HYDROCHLORIDE 0.4 MG/ML
.1-.4 INJECTION, SOLUTION INTRAMUSCULAR; INTRAVENOUS; SUBCUTANEOUS
Status: DISCONTINUED | OUTPATIENT
Start: 2020-07-23 | End: 2020-07-27 | Stop reason: HOSPADM

## 2020-07-23 RX ORDER — UREA 10 %
1000 LOTION (ML) TOPICAL DAILY
Status: DISCONTINUED | OUTPATIENT
Start: 2020-07-24 | End: 2020-07-27 | Stop reason: HOSPADM

## 2020-07-23 RX ORDER — LIDOCAINE 40 MG/G
CREAM TOPICAL
Status: DISCONTINUED | OUTPATIENT
Start: 2020-07-23 | End: 2020-07-23 | Stop reason: HOSPADM

## 2020-07-23 RX ORDER — CEFAZOLIN SODIUM 2 G/100ML
2 INJECTION, SOLUTION INTRAVENOUS EVERY 12 HOURS
Status: COMPLETED | OUTPATIENT
Start: 2020-07-23 | End: 2020-07-24

## 2020-07-23 RX ORDER — TEMAZEPAM 7.5 MG/1
7.5 CAPSULE ORAL
Status: DISCONTINUED | OUTPATIENT
Start: 2020-07-24 | End: 2020-07-27 | Stop reason: HOSPADM

## 2020-07-23 RX ORDER — ROPIVACAINE HYDROCHLORIDE 5 MG/ML
INJECTION, SOLUTION EPIDURAL; INFILTRATION; PERINEURAL PRN
Status: DISCONTINUED | OUTPATIENT
Start: 2020-07-23 | End: 2020-07-23

## 2020-07-23 RX ORDER — METHOCARBAMOL 500 MG/1
500 TABLET, FILM COATED ORAL 4 TIMES DAILY PRN
Status: DISCONTINUED | OUTPATIENT
Start: 2020-07-23 | End: 2020-07-27 | Stop reason: HOSPADM

## 2020-07-23 RX ORDER — CEFAZOLIN SODIUM 1 G/50ML
1 INJECTION, SOLUTION INTRAVENOUS
Status: DISCONTINUED | OUTPATIENT
Start: 2020-07-23 | End: 2020-07-23 | Stop reason: HOSPADM

## 2020-07-23 RX ORDER — PROPOFOL 10 MG/ML
INJECTION, EMULSION INTRAVENOUS CONTINUOUS PRN
Status: DISCONTINUED | OUTPATIENT
Start: 2020-07-23 | End: 2020-07-23

## 2020-07-23 RX ORDER — PROPOFOL 10 MG/ML
INJECTION, EMULSION INTRAVENOUS PRN
Status: DISCONTINUED | OUTPATIENT
Start: 2020-07-23 | End: 2020-07-23

## 2020-07-23 RX ORDER — AMLODIPINE BESYLATE 10 MG/1
10 TABLET ORAL DAILY
Status: DISCONTINUED | OUTPATIENT
Start: 2020-07-24 | End: 2020-07-27 | Stop reason: HOSPADM

## 2020-07-23 RX ORDER — ACETAMINOPHEN 325 MG/1
975 TABLET ORAL ONCE
Status: COMPLETED | OUTPATIENT
Start: 2020-07-23 | End: 2020-07-23

## 2020-07-23 RX ORDER — LIDOCAINE HYDROCHLORIDE 10 MG/ML
INJECTION, SOLUTION INFILTRATION; PERINEURAL PRN
Status: DISCONTINUED | OUTPATIENT
Start: 2020-07-23 | End: 2020-07-23

## 2020-07-23 RX ORDER — HYDROXYZINE HYDROCHLORIDE 10 MG/1
10 TABLET, FILM COATED ORAL EVERY 6 HOURS PRN
Status: DISCONTINUED | OUTPATIENT
Start: 2020-07-23 | End: 2020-07-27 | Stop reason: HOSPADM

## 2020-07-23 RX ORDER — CEFAZOLIN SODIUM 1 G/50ML
1 INJECTION, SOLUTION INTRAVENOUS SEE ADMIN INSTRUCTIONS
Status: DISCONTINUED | OUTPATIENT
Start: 2020-07-23 | End: 2020-07-23 | Stop reason: HOSPADM

## 2020-07-23 RX ORDER — SODIUM CHLORIDE, SODIUM LACTATE, POTASSIUM CHLORIDE, CALCIUM CHLORIDE 600; 310; 30; 20 MG/100ML; MG/100ML; MG/100ML; MG/100ML
INJECTION, SOLUTION INTRAVENOUS CONTINUOUS
Status: DISCONTINUED | OUTPATIENT
Start: 2020-07-23 | End: 2020-07-26

## 2020-07-23 RX ORDER — NALOXONE HYDROCHLORIDE 0.4 MG/ML
.1-.4 INJECTION, SOLUTION INTRAMUSCULAR; INTRAVENOUS; SUBCUTANEOUS
Status: DISCONTINUED | OUTPATIENT
Start: 2020-07-23 | End: 2020-07-23

## 2020-07-23 RX ORDER — ONDANSETRON 2 MG/ML
4 INJECTION INTRAMUSCULAR; INTRAVENOUS EVERY 30 MIN PRN
Status: DISCONTINUED | OUTPATIENT
Start: 2020-07-23 | End: 2020-07-23 | Stop reason: HOSPADM

## 2020-07-23 RX ORDER — FENTANYL CITRATE 50 UG/ML
25-50 INJECTION, SOLUTION INTRAMUSCULAR; INTRAVENOUS EVERY 5 MIN PRN
Status: DISCONTINUED | OUTPATIENT
Start: 2020-07-23 | End: 2020-07-23 | Stop reason: HOSPADM

## 2020-07-23 RX ORDER — ONDANSETRON 4 MG/1
4 TABLET, ORALLY DISINTEGRATING ORAL EVERY 6 HOURS PRN
Status: DISCONTINUED | OUTPATIENT
Start: 2020-07-23 | End: 2020-07-27 | Stop reason: HOSPADM

## 2020-07-23 RX ORDER — ONDANSETRON 4 MG/1
4 TABLET, ORALLY DISINTEGRATING ORAL EVERY 30 MIN PRN
Status: DISCONTINUED | OUTPATIENT
Start: 2020-07-23 | End: 2020-07-23 | Stop reason: HOSPADM

## 2020-07-23 RX ORDER — LIDOCAINE 40 MG/G
CREAM TOPICAL
Status: DISCONTINUED | OUTPATIENT
Start: 2020-07-23 | End: 2020-07-27 | Stop reason: HOSPADM

## 2020-07-23 RX ORDER — SODIUM CHLORIDE, SODIUM LACTATE, POTASSIUM CHLORIDE, CALCIUM CHLORIDE 600; 310; 30; 20 MG/100ML; MG/100ML; MG/100ML; MG/100ML
INJECTION, SOLUTION INTRAVENOUS CONTINUOUS
Status: DISCONTINUED | OUTPATIENT
Start: 2020-07-23 | End: 2020-07-23 | Stop reason: HOSPADM

## 2020-07-23 RX ORDER — LIDOCAINE HYDROCHLORIDE AND EPINEPHRINE BITARTRATE 20; .01 MG/ML; MG/ML
INJECTION, SOLUTION SUBCUTANEOUS PRN
Status: DISCONTINUED | OUTPATIENT
Start: 2020-07-23 | End: 2020-07-23

## 2020-07-23 RX ORDER — OXYCODONE HYDROCHLORIDE 5 MG/1
5 TABLET ORAL
Status: DISCONTINUED | OUTPATIENT
Start: 2020-07-23 | End: 2020-07-24

## 2020-07-23 RX ORDER — ROPIVACAINE HYDROCHLORIDE 7.5 MG/ML
INJECTION, SOLUTION EPIDURAL; PERINEURAL PRN
Status: DISCONTINUED | OUTPATIENT
Start: 2020-07-23 | End: 2020-07-23

## 2020-07-23 RX ORDER — ONDANSETRON 2 MG/ML
4 INJECTION INTRAMUSCULAR; INTRAVENOUS EVERY 6 HOURS PRN
Status: DISCONTINUED | OUTPATIENT
Start: 2020-07-23 | End: 2020-07-27 | Stop reason: HOSPADM

## 2020-07-23 RX ORDER — DOCUSATE SODIUM 100 MG/1
100 CAPSULE, LIQUID FILLED ORAL 2 TIMES DAILY
Status: DISCONTINUED | OUTPATIENT
Start: 2020-07-23 | End: 2020-07-27 | Stop reason: HOSPADM

## 2020-07-23 RX ORDER — LISINOPRIL 40 MG/1
40 TABLET ORAL DAILY
Status: DISCONTINUED | OUTPATIENT
Start: 2020-07-24 | End: 2020-07-25

## 2020-07-23 RX ORDER — KETAMINE HYDROCHLORIDE 10 MG/ML
INJECTION, SOLUTION INTRAMUSCULAR; INTRAVENOUS PRN
Status: DISCONTINUED | OUTPATIENT
Start: 2020-07-23 | End: 2020-07-23

## 2020-07-23 RX ORDER — ACETAMINOPHEN 325 MG/1
975 TABLET ORAL EVERY 8 HOURS
Status: COMPLETED | OUTPATIENT
Start: 2020-07-23 | End: 2020-07-26

## 2020-07-23 RX ORDER — GLYCOPYRROLATE 0.2 MG/ML
INJECTION, SOLUTION INTRAMUSCULAR; INTRAVENOUS PRN
Status: DISCONTINUED | OUTPATIENT
Start: 2020-07-23 | End: 2020-07-23

## 2020-07-23 RX ORDER — SODIUM CHLORIDE 9 MG/ML
INJECTION, SOLUTION INTRAVENOUS CONTINUOUS
Status: DISCONTINUED | OUTPATIENT
Start: 2020-07-23 | End: 2020-07-26

## 2020-07-23 RX ORDER — CEFAZOLIN SODIUM 2 G/100ML
2 INJECTION, SOLUTION INTRAVENOUS
Status: DISCONTINUED | OUTPATIENT
Start: 2020-07-23 | End: 2020-07-23

## 2020-07-23 RX ADMIN — ONDANSETRON 4 MG: 2 INJECTION INTRAMUSCULAR; INTRAVENOUS at 18:50

## 2020-07-23 RX ADMIN — KETAMINE HYDROCHLORIDE 50 MG: 10 INJECTION INTRAMUSCULAR; INTRAVENOUS at 17:26

## 2020-07-23 RX ADMIN — LIDOCAINE HYDROCHLORIDE 50 MG: 10 INJECTION, SOLUTION INFILTRATION; PERINEURAL at 17:02

## 2020-07-23 RX ADMIN — ROPIVACAINE HYDROCHLORIDE 15 ML: 5 INJECTION, SOLUTION EPIDURAL; INFILTRATION; PERINEURAL at 16:15

## 2020-07-23 RX ADMIN — DOCUSATE SODIUM 100 MG: 100 CAPSULE, LIQUID FILLED ORAL at 21:44

## 2020-07-23 RX ADMIN — PHENYLEPHRINE HYDROCHLORIDE 100 MCG: 10 INJECTION INTRAVENOUS at 18:02

## 2020-07-23 RX ADMIN — FENTANYL CITRATE 50 MCG: 50 INJECTION INTRAMUSCULAR; INTRAVENOUS at 19:27

## 2020-07-23 RX ADMIN — MIDAZOLAM 1 MG: 1 INJECTION INTRAMUSCULAR; INTRAVENOUS at 15:46

## 2020-07-23 RX ADMIN — Medication 3 ML: at 16:14

## 2020-07-23 RX ADMIN — FENTANYL CITRATE 50 MCG: 50 INJECTION INTRAMUSCULAR; INTRAVENOUS at 15:40

## 2020-07-23 RX ADMIN — ACETAMINOPHEN 975 MG: 325 TABLET, FILM COATED ORAL at 21:44

## 2020-07-23 RX ADMIN — LIDOCAINE HYDROCHLORIDE 0.1 ML: 10 INJECTION, SOLUTION EPIDURAL; INFILTRATION; INTRACAUDAL; PERINEURAL at 14:46

## 2020-07-23 RX ADMIN — SODIUM CHLORIDE, POTASSIUM CHLORIDE, SODIUM LACTATE AND CALCIUM CHLORIDE: 600; 310; 30; 20 INJECTION, SOLUTION INTRAVENOUS at 21:44

## 2020-07-23 RX ADMIN — ROPIVACAINE HYDROCHLORIDE 5 ML: 7.5 INJECTION, SOLUTION EPIDURAL; PERINEURAL at 16:22

## 2020-07-23 RX ADMIN — PHENYLEPHRINE HYDROCHLORIDE 100 MCG: 10 INJECTION INTRAVENOUS at 18:08

## 2020-07-23 RX ADMIN — SODIUM CHLORIDE, POTASSIUM CHLORIDE, SODIUM LACTATE AND CALCIUM CHLORIDE 1000 ML: 600; 310; 30; 20 INJECTION, SOLUTION INTRAVENOUS at 14:45

## 2020-07-23 RX ADMIN — FENTANYL CITRATE 50 MCG: 50 INJECTION INTRAMUSCULAR; INTRAVENOUS at 19:19

## 2020-07-23 RX ADMIN — KETOROLAC TROMETHAMINE 15 MG: 30 INJECTION, SOLUTION INTRAMUSCULAR at 18:50

## 2020-07-23 RX ADMIN — SODIUM CHLORIDE, POTASSIUM CHLORIDE, SODIUM LACTATE AND CALCIUM CHLORIDE: 600; 310; 30; 20 INJECTION, SOLUTION INTRAVENOUS at 17:50

## 2020-07-23 RX ADMIN — GLYCOPYRROLATE 0.2 MG: 0.2 INJECTION, SOLUTION INTRAMUSCULAR; INTRAVENOUS at 17:56

## 2020-07-23 RX ADMIN — PROPOFOL 40 MCG/KG/MIN: 10 INJECTION, EMULSION INTRAVENOUS at 17:26

## 2020-07-23 RX ADMIN — MIDAZOLAM 1 MG: 1 INJECTION INTRAMUSCULAR; INTRAVENOUS at 15:40

## 2020-07-23 RX ADMIN — ACETAMINOPHEN 975 MG: 325 TABLET, FILM COATED ORAL at 14:44

## 2020-07-23 RX ADMIN — Medication 60 MG: at 17:02

## 2020-07-23 RX ADMIN — DEXAMETHASONE SODIUM PHOSPHATE 8 MG: 4 INJECTION, SOLUTION INTRA-ARTICULAR; INTRALESIONAL; INTRAMUSCULAR; INTRAVENOUS; SOFT TISSUE at 17:10

## 2020-07-23 RX ADMIN — FENTANYL CITRATE 50 MCG: 50 INJECTION INTRAMUSCULAR; INTRAVENOUS at 19:30

## 2020-07-23 RX ADMIN — ROPIVACAINE HYDROCHLORIDE 15 ML: 7.5 INJECTION, SOLUTION EPIDURAL; PERINEURAL at 16:15

## 2020-07-23 RX ADMIN — PROPOFOL 120 MG: 10 INJECTION, EMULSION INTRAVENOUS at 17:02

## 2020-07-23 RX ADMIN — FENTANYL CITRATE 50 MCG: 50 INJECTION INTRAMUSCULAR; INTRAVENOUS at 15:57

## 2020-07-23 RX ADMIN — ROPIVACAINE HYDROCHLORIDE 5 ML: 5 INJECTION, SOLUTION EPIDURAL; INFILTRATION; PERINEURAL at 16:22

## 2020-07-23 RX ADMIN — HYDROMORPHONE HYDROCHLORIDE 0.5 MG: 1 INJECTION, SOLUTION INTRAMUSCULAR; INTRAVENOUS; SUBCUTANEOUS at 19:48

## 2020-07-23 RX ADMIN — CEFAZOLIN SODIUM 2 G: 2 INJECTION, SOLUTION INTRAVENOUS at 23:49

## 2020-07-23 RX ADMIN — PHENYLEPHRINE HYDROCHLORIDE 100 MCG: 10 INJECTION INTRAVENOUS at 17:57

## 2020-07-23 RX ADMIN — FENTANYL CITRATE 50 MCG: 50 INJECTION INTRAMUSCULAR; INTRAVENOUS at 19:39

## 2020-07-23 SDOH — HEALTH STABILITY: MENTAL HEALTH: CURRENT SMOKER: 0

## 2020-07-23 ASSESSMENT — ACTIVITIES OF DAILY LIVING (ADL)
SWALLOWING: 0-->SWALLOWS FOODS/LIQUIDS WITHOUT DIFFICULTY
WHICH_OF_THE_ABOVE_FUNCTIONAL_RISKS_HAD_A_RECENT_ONSET_OR_CHANGE?: AMBULATION
RETIRED_EATING: 0-->INDEPENDENT
RETIRED_COMMUNICATION: 0-->UNDERSTANDS/COMMUNICATES WITHOUT DIFFICULTY
COGNITION: 0 - NO COGNITION ISSUES REPORTED
FALL_HISTORY_WITHIN_LAST_SIX_MONTHS: YES
TRANSFERRING: 0-->INDEPENDENT
AMBULATION: 1-->ASSISTIVE EQUIPMENT
TOILETING: 0-->INDEPENDENT
BATHING: 0-->INDEPENDENT
DRESS: 0-->INDEPENDENT
NUMBER_OF_TIMES_PATIENT_HAS_FALLEN_WITHIN_LAST_SIX_MONTHS: 1

## 2020-07-23 ASSESSMENT — MIFFLIN-ST. JEOR
SCORE: 1453.13
SCORE: 1426.6

## 2020-07-23 NOTE — BRIEF OP NOTE
[facility] Operative Note    Pre-operative diagnosis: Arthritis of right ankle [M19.071]   Post-operative diagnosis * No post-op diagnosis entered *   Procedure: Procedure(s):  Ankle RECONSTRUCTIve Arthroplasty   Surgeon: Luke Powers DPM   Anesthesia: Combined General with Popliteal Block    Estimated blood loss: 300 mL   Blood transfusion: No transfusion was given during surgery   Drains: None   Specimens: None   Findings: End stage right ankle OA with instability and evidence of medial talar wear due to rotational ankle instability condition with evidence of prior lateral ankle compromise and medial/deltoid tightness.  Adequate bone stock.  Adequate extrinsic tendons.  Midfoot OA noted also at the TN & NC joints.   Complications: No direct complications encountered throughout procedures.   Condition: Stable  Transferred to post-anesthesia recovery   Comments: See dictated operative report for full details.           Luke Powers DPM, FACFAS  Foot & Ankle Surgeon/Specialist  [facility]

## 2020-07-23 NOTE — LETTER
Transition Communication Hand-off for Care Transitions to Next Level of Care Provider    Name: Mann Escobar  : 1940  MRN #: [id number]  Primary Care Provider: Shayy Ramírez  Primary Care MD Name: Shayy Ramírez  Primary Clinic: [address] [facility] [address]  Primary Care Clinic Name: [facility]  Reason for Hospitalization:  Arthritis of right ankle [M19.071]  Admit Date/Time: 2020  1:47 PM  Discharge Date: 20  Payor Source: Payor: HUMANA / Plan: HUMANA MEDICARE ADVANTAGE / Product Type: Medicare /     Readmission Assessment Measure (CHUY) Risk Score/category: low         Reason for Communication Hand-off Referral: Fragility    Discharge Plan:  Discharge Plan:      Most Recent Value   Disposition Comments  daughter will be staying after DC           Concern for non-adherence with plan of care:   Y/N no  Discharge Needs Assessment:  Needs      Most Recent Value   Equipment Currently Used at Home  lift device, wheelchair, manual            Key Recommendations:  Pt is going home today with [facility] ([phone] Fax: [phone]). Pt has no other identified needs.     [facility] [phone]        AVS/Discharge Summary is the source of truth; this is a helpful guide for improved communication of patient story

## 2020-07-23 NOTE — ANESTHESIA PROCEDURE NOTES
Procedure note : Popliteal (sciatic/saphenous)  Staff -       CRNA: Lucila Yu APRN CRNA  Other Anesthesia Staff: Stormy Toure  Performed By: CRNA and ARNOLD        Pre-Procedure    Location: pre-op    Procedure Times:7/23/2020 3:40 PM and 7/23/2020 4:23 PM  Pre-Anesthestic Checklist: patient identified, IV checked, site marked, risks and benefits discussed, informed consent, monitors and equipment checked, pre-op evaluation, at physician/surgeon's request and post-op pain management    Timeout  Correct Patient: Yes   Correct Procedure: Yes   Correct Site: Yes   Correct Laterality: Yes   Correct Position: Yes   Site Marked: Yes   .   Procedure Documentation    Diagnosis:POST OP PAIN.    Procedure: Popliteal (sciatic/saphenous), left.   Patient Position:prone Local skin infiltrated with mL of 1% lidocaine.    Ultrasound used to identify targeted nerve, plexus, or vascular marker and placed a needle adjacent to it., Ultrasound was used to visualize the spread of the anesthetic in close proximity to the above stated nerve. A permanent image is entered into the patient's record.  Patient Prep/Sterile Barriers; mask, sterile gloves, chlorhexidine gluconate and isopropyl alcohol, patient draped.  .  Needle: insulated   Needle Gauge: 21.    Needle Length (Inches) 4   Insertion Method: Single Shot.        Assessment/Narrative  Paresthesias: No.  Injection made incrementally with aspirations every 5 mL..  The placement was negative for: blood aspirated, painful injection and site bleeding.  Bolus given via needle. No blood aspirated via catheter.   Secured via.   Complications: none. Test dose of 3 mL lidocaine 2% w/ 1:200,000 epinephrine at 16:12.  Test dose negative for signs of intravascular, subdural or intrathecal injection.

## 2020-07-23 NOTE — LETTER
Transition Communication Hand-off for Care Transitions to Next Level of Care Provider    Name: Mann Escobar  : 1940  MRN #: [id number]  Primary Care Provider: Shayy Ramírez  Primary Care MD Name: Shayy Ramírez  Primary Clinic: [address] [facility] [address]  Primary Care Clinic Name: [facility]  Reason for Hospitalization:  Arthritis of right ankle [M19.071]  Admit Date/Time: 2020  1:47 PM  Discharge Date: 2020  Payor Source: Payor: HUMANA / Plan: HUMANA MEDICARE ADVANTAGE / Product Type: Medicare /     Readmission Assessment Measure (CHUY) Risk Score/category: low    Plan of Care Goals/Milestone Events:   Patient Concerns: pt. Wants to remain independent with assistive devices as apporpriate.  He did have some questions re: a scooter and if this would be something his insurance would pay for.    Patient Goals:   Short-term   Long-term    Medical Goals   Short-term    Long-term          Reason for Communication Hand-off Referral: Other Pt. is looking for information on additional DME, scooter and how to obtain.  He     Discharge Plan:  Discharge Plan:  Home with help of family     Most Recent Value   Disposition Comments  daughter will be staying after DC           Concern for non-adherence with plan of care:   Y/N no  Discharge Needs Assessment:  Needs      Most Recent Value   Equipment Currently Used at Home  lift device, wheelchair, manual          Already enrolled in Tele-monitoring program and name of program:  Follow-up specialty is recommended: No    Follow-up plan:  No future appointments.    Any outstanding tests or procedures:              Key Recommendations:  Just an FYI for future he did want information on a scooter for mobility.      CARLOS Diaz    AVS/Discharge Summary is the source of truth; this is a helpful guide for improved communication of patient story

## 2020-07-23 NOTE — OP NOTE
[facility] ORTHOPEDICS OPERATIVE REPORT  Operative Report - Orthopedics  Mann Escobar,  1940, MRN [id number]    Surgery Date: 20    PCP: Shayy Ramírez, [phone]   Code status:  Full Code       OPERATION SITE:  [facility] Operating Room       OPERATIVE REPORT  DR. LUKE MAYS  FOOT & ANKLE SURGEON  [facility] ORTHOPEDICS    DATE OF PROCEDURE: 20    SITE: [facility] Operating Room    SURGEON: Dr. Luke Mays - [facility]    ASSISTANT: Sanjeev Hernández -  Surgeon - [facility]      Pre-Operative Diagnosis:  1.   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   Right midfoot OA of midfoot joints     Post-Operative Diagnosis:  1.   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   Right midfoot OA of midfoot joints     Procedures Performed:  1.  Right ankle reconstructive arthroplasty/replacement  2.  Right talonavicular and navicular cuneiform open debridement of dorsal prominent osteophytes  3.  Intra op fluoroscopy with dynamic ankle stress examinations  4.  Post op splint applications - below the knee ankle neutral, fiberglass materials    Anesthesia: General Anesthesia Care with Local/Regional  Hemostasis: thigh tourniquet at 300 mmHg  EBL: < 10 mL  Findings:  End stage right ankle OA with instability and evidence of medial talar wear due to rotational ankle instability condition with evidence of prior lateral ankle compromise and medial/deltoid tightness.  Adequate bone stock.  Adequate extrinsic tendons.  Midfoot OA noted also at the TN & NC joints.  Implants:  Jose Juan STAR Ankle system - Tibial size Large, Size 10 mm Poly mobile bearing, size XS talar implant  Specimens: None    Indications for the Operation:  The patient has been seen and evaluated in clinic for the above-mentioned diagnoses.   They have failed to respond to nonoperative care measures and/or surgical care for condition was indicated.  They have elected to proceed as recommended/indicated with surgical care after a thorough discussion of the associated pros, cons, risks and benefits of the operations as well as the postoperative course and details.  All associated questions were answered.  Verbal and written form informed consent was obtained.  Please see additional information within the clinical notes.    Description of the Procedure:  Patient was seen and evaluated in the preoperative holding area.  The surgical site was marked.  The consent was signed.  The H&P was updated/reviewed.  Patient was transported from the preoperative holding area to the surgical suite.  The patient was placed on the operative table.  Anesthesia was obtained.  Antibiotics were administered via IV.  Tourniquet was applied.  The operative extremity was prepped and draped sterilely.  Then, a timeout was performed to identify the proper patient, surgical site and the procedures to be performed.  Local anesthetic was infiltrated about the operative margins for regional blockade utilizing a one-to-one mixture of 2% lidocaine plain and 0.5% Marcaine plain approximately 30 cc of the mixture was utilized.  The foot/ankle was exsanguinated, and the tourniquet was inflated.    Attention was then directed to the right anterior ankle.  A medialized anterior ankle incision was made with #15 blade.  This was then carefully dissected out in layers with neurovascular notification retraction.  The interval between the extensor hallucis longus and tibialis anterior was identified as the entry court order for this carefully dissected down and electrocautery utilized to make incision into the joint capsular space and for electrocautery of the collaterals.  The neurovascular elements were reflected lateralward carefully with the EHL.  Care was taken to protect all the tissues of  skin in layers throughout the course the operation as it is well-known that the anterior incision can be a healing issue.  The joint was opened up found to be with collective fluid and gross, end-stage osteoarthritis of the ankle joint with evidence of prior lateral ankle insufficiency with rotational wear of the ankle.  Additionally talonavicular joint and navicular cuneiform joint were found to be with developing osteoarthritis in both dorsal osteophytes.  These were debrided with a dorsal rongeur and rasp.  Then, the anterior tibial cut jig was applied with a guidepin within the distal tibial tuberosity.  The tibial cut was then planned with the jig as well as intraoperative fluoroscopy in both varus valgus, medial lateral and slope of the cut were all planned.  It was made certain that no internal or external rotation was provided with a plafond view as well of the tibia.  The tibial cut was made with a sagittal saw and reciprocal saw.  The distal tibial component was removed to allow for the placement.  Then the tibial cut guide was removed and the talar cut guide was applied.  The talus was then prepared for talar implant was sized to be extra small.  A dorsal cut, and anterior chamfer cut posterior chamfer cut as well as medial lateral cuts were made in the talus.  Care was taken to protect all the neurovascular elements.  The keel cut was made the site thoroughly irrigated trial fit was confirmed.  The extra small talar implant was then impacted with intraoperative fluoroscopy confirmation.  The distal tibia was then sized found to be a large.  The barrel holes both medial and lateral were drilled.  The tibial large implant was applied with adequate fit and bone to implant contact.  Then trial polys were inserted from 8 mm to 9 mm to 10 mm.  It was determined that a 10 mm poly-allowed for best fit, dorsiflexion plantarflexion for stabilization of the medial lateral collateral ligaments deferred with  intraoperative fluoroscopy, lift off examination inversion eversion and mechanical dorsiflexion plantarflexion stresses.  A 10 mm poly-was opened up and implanted with finger pressure.  Final fluoroscopic images were obtained in AP oblique and lateral view points.  No additional adjunctive procedures were deemed necessary with intraoperative application of a mobile-bearing total ankle prosthesis.  Final fluoroscopic images were obtained saved and sent to [facility] imaging services and to be forwarded as well to [facility] imaging software.    Following this, thorough irrigation of the surgical sites was conducted.  Layered, anatomic closure completed with 2-0 Vicryl, 3-0 Vicryl and 3-0 nylon with careful apposition of the skin and surfaces for primary healing.  A compressive sterile splint/dressing was applied.  Vascular status was intact after deflation of the tourniquet.    Splint Application: A postoperative ankle neutral reinforced fiberglass below the knee ankle neutral splint was applied.  COMPLICATIONS: No direct complications encountered throughout the case.    The patient tolerated the procedure & anesthesia well.  They were transported from the operative suite to the postoperative holding area.  The patient was given postoperative orders as well as specific postoperative instructions which were reviewed by the nursing staff.  Orders were placed for weightbearing status/activity, postoperative oral pain management, DVT prophylaxis measures both with mechanical and medicinal measures reviewed.  Splint/dressing care measures were reviewed as well as appropriate cryotherapy measures and nutrition.  Postoperative follow-up to be conducted in the next 10-14 days for outpatient clinical follow-up in the Orthopedic clinic at [facility].  If concerns or questions arise or develop they will contact our clinic and postoperative contact numbers provided.  Case details and post-operative care  requirements reviewed with family/support present today.  Additionally, a detailed postoperative instruction sheet was provided to the patient and family.  All additional questions were answered postoperatively.    Post Operative Plan:  1. Activity: NWB RLE  2. Assistive Devices: Wheelchair, rolling seated walker, knee scooter all recommended as well as home adaptive equipment  3. Pain Management: post op IV & PO in addition to the peripheral blockade provided preop  4. Dressing Care: post op right lower leg fiberglass splint to remain in place, clean and dry until post op unless disturbed  5. DVT prevention:  mg PO daily beginning PO day #1 x 1 month  6. Infection prevention: pre-op IV antibiotics completed.  Post op IV Ancef x 2 doses  7. Disposition: admitted for postop medical management, PT, hospital consult, nursing, nutrition and for planning safe home going vs. TCU/rehab stay - SW consulted to review options.  8. Clinical Follow-up: as arranged from clinic in 10-14 days post op.    Please call or page with questions.    Please note that this report was completed with the assistance of voice recognition and transcription services.  Although every effort has been made to correct and avoid errors, errors may remain.    Dr. Luke Powers, DPSTACEY, FACFAS  Foot & Ankle Surgeon/Specialist  [facility]          CC: [facility], Dr. Powers's Clinical Team

## 2020-07-24 ENCOUNTER — APPOINTMENT (OUTPATIENT)
Dept: PHYSICAL THERAPY | Facility: CLINIC | Age: 80
DRG: 469 | End: 2020-07-24
Attending: PODIATRIST
Payer: COMMERCIAL

## 2020-07-24 ENCOUNTER — APPOINTMENT (OUTPATIENT)
Dept: OCCUPATIONAL THERAPY | Facility: CLINIC | Age: 80
DRG: 469 | End: 2020-07-24
Attending: PODIATRIST
Payer: COMMERCIAL

## 2020-07-24 PROBLEM — M19.072 ARTHRITIS OF ANKLE, LEFT: Status: RESOLVED | Noted: 2020-07-24 | Resolved: 2020-07-24

## 2020-07-24 PROBLEM — D64.9 CHRONIC ANEMIA: Status: ACTIVE | Noted: 2020-07-24

## 2020-07-24 PROBLEM — M19.071 ARTHRITIS OF ANKLE, RIGHT: Status: ACTIVE | Noted: 2020-07-24

## 2020-07-24 PROBLEM — M19.072 ARTHRITIS OF ANKLE, LEFT: Status: ACTIVE | Noted: 2020-07-24

## 2020-07-24 LAB
ERYTHROCYTE [DISTWIDTH] IN BLOOD BY AUTOMATED COUNT: 17.7 % (ref 10–15)
GLUCOSE SERPL-MCNC: 152 MG/DL (ref 70–99)
HCT VFR BLD AUTO: 24.6 % (ref 40–53)
HGB BLD-MCNC: 7.6 G/DL (ref 13.3–17.7)
MCH RBC QN AUTO: 18.5 PG (ref 26.5–33)
MCHC RBC AUTO-ENTMCNC: 30.9 G/DL (ref 31.5–36.5)
MCV RBC AUTO: 60 FL (ref 78–100)
PLATELET # BLD AUTO: 346 10E9/L (ref 150–450)
RBC # BLD AUTO: 4.1 10E12/L (ref 4.4–5.9)
WBC # BLD AUTO: 9.4 10E9/L (ref 4–11)

## 2020-07-24 PROCEDURE — 25000132 ZZH RX MED GY IP 250 OP 250 PS 637: Performed by: FAMILY MEDICINE

## 2020-07-24 PROCEDURE — 25000132 ZZH RX MED GY IP 250 OP 250 PS 637: Performed by: PHYSICIAN ASSISTANT

## 2020-07-24 PROCEDURE — 36415 COLL VENOUS BLD VENIPUNCTURE: CPT | Performed by: PODIATRIST

## 2020-07-24 PROCEDURE — 25800030 ZZH RX IP 258 OP 636: Performed by: PODIATRIST

## 2020-07-24 PROCEDURE — 82947 ASSAY GLUCOSE BLOOD QUANT: CPT | Performed by: NURSE ANESTHETIST, CERTIFIED REGISTERED

## 2020-07-24 PROCEDURE — 25000132 ZZH RX MED GY IP 250 OP 250 PS 637: Performed by: PODIATRIST

## 2020-07-24 PROCEDURE — 97161 PT EVAL LOW COMPLEX 20 MIN: CPT | Mod: GP | Performed by: PHYSICAL THERAPIST

## 2020-07-24 PROCEDURE — 85027 COMPLETE CBC AUTOMATED: CPT | Performed by: PODIATRIST

## 2020-07-24 PROCEDURE — 25000128 H RX IP 250 OP 636: Performed by: PODIATRIST

## 2020-07-24 PROCEDURE — 12000000 ZZH R&B MED SURG/OB

## 2020-07-24 PROCEDURE — 97530 THERAPEUTIC ACTIVITIES: CPT | Mod: GP | Performed by: PHYSICAL THERAPIST

## 2020-07-24 PROCEDURE — 97165 OT EVAL LOW COMPLEX 30 MIN: CPT | Mod: GO

## 2020-07-24 PROCEDURE — 99233 SBSQ HOSP IP/OBS HIGH 50: CPT | Performed by: PHYSICIAN ASSISTANT

## 2020-07-24 PROCEDURE — 97535 SELF CARE MNGMENT TRAINING: CPT | Mod: GO

## 2020-07-24 RX ORDER — OXYCODONE HYDROCHLORIDE 5 MG/1
5-10 TABLET ORAL EVERY 4 HOURS PRN
Status: DISCONTINUED | OUTPATIENT
Start: 2020-07-24 | End: 2020-07-27 | Stop reason: HOSPADM

## 2020-07-24 RX ORDER — OXYCODONE HYDROCHLORIDE 5 MG/1
5-10 TABLET ORAL
Status: DISCONTINUED | OUTPATIENT
Start: 2020-07-24 | End: 2020-07-24

## 2020-07-24 RX ORDER — FERROUS SULFATE 325(65) MG
325 TABLET ORAL DAILY
Status: DISCONTINUED | OUTPATIENT
Start: 2020-07-24 | End: 2020-07-27 | Stop reason: HOSPADM

## 2020-07-24 RX ADMIN — HYDROXYZINE HYDROCHLORIDE 10 MG: 10 TABLET, FILM COATED ORAL at 11:50

## 2020-07-24 RX ADMIN — OXYCODONE HYDROCHLORIDE 5 MG: 5 TABLET ORAL at 18:40

## 2020-07-24 RX ADMIN — SODIUM CHLORIDE, POTASSIUM CHLORIDE, SODIUM LACTATE AND CALCIUM CHLORIDE: 600; 310; 30; 20 INJECTION, SOLUTION INTRAVENOUS at 01:10

## 2020-07-24 RX ADMIN — ASCORBIC ACID TAB 250 MG 250 MG: 250 TAB at 14:44

## 2020-07-24 RX ADMIN — CYANOCOBALAMIN TAB 500 MCG 1000 MCG: 500 TAB at 08:32

## 2020-07-24 RX ADMIN — CEFAZOLIN SODIUM 2 G: 2 INJECTION, SOLUTION INTRAVENOUS at 10:48

## 2020-07-24 RX ADMIN — AMLODIPINE BESYLATE 10 MG: 10 TABLET ORAL at 08:32

## 2020-07-24 RX ADMIN — OXYCODONE HYDROCHLORIDE 5 MG: 5 TABLET ORAL at 17:44

## 2020-07-24 RX ADMIN — ACETAMINOPHEN 975 MG: 325 TABLET, FILM COATED ORAL at 20:09

## 2020-07-24 RX ADMIN — ACETAMINOPHEN 975 MG: 325 TABLET, FILM COATED ORAL at 11:50

## 2020-07-24 RX ADMIN — FERROUS SULFATE TAB 325 MG (65 MG ELEMENTAL FE) 325 MG: 325 (65 FE) TAB at 14:44

## 2020-07-24 RX ADMIN — OXYCODONE HYDROCHLORIDE 5 MG: 5 TABLET ORAL at 10:45

## 2020-07-24 RX ADMIN — OXYCODONE HYDROCHLORIDE 10 MG: 5 TABLET ORAL at 21:16

## 2020-07-24 RX ADMIN — SODIUM CHLORIDE, POTASSIUM CHLORIDE, SODIUM LACTATE AND CALCIUM CHLORIDE: 600; 310; 30; 20 INJECTION, SOLUTION INTRAVENOUS at 01:11

## 2020-07-24 RX ADMIN — GABAPENTIN 100 MG: 100 CAPSULE ORAL at 08:32

## 2020-07-24 RX ADMIN — TERAZOSIN HYDROCHLORIDE 5 MG: 1 CAPSULE ORAL at 21:16

## 2020-07-24 RX ADMIN — ACETAMINOPHEN 975 MG: 325 TABLET, FILM COATED ORAL at 05:09

## 2020-07-24 RX ADMIN — OXYCODONE HYDROCHLORIDE 5 MG: 5 TABLET ORAL at 14:46

## 2020-07-24 RX ADMIN — ASPIRIN 325 MG: 325 TABLET, COATED ORAL at 08:32

## 2020-07-24 ASSESSMENT — ACTIVITIES OF DAILY LIVING (ADL)
ADLS_ACUITY_SCORE: 13
ADLS_ACUITY_SCORE: 15
ADLS_ACUITY_SCORE: 12
ADLS_ACUITY_SCORE: 17
ADLS_ACUITY_SCORE: 15
ADLS_ACUITY_SCORE: 14
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
## 2020-07-24 NOTE — PLAN OF CARE
Discharge Planner OT   Patient plan for discharge: Home with family     Current status: Eval complete. Pt has all recommended AE including: commode, ramp to enter and w/c. Pt completes pivot transfer 2 times with CGA and FWW. Maintains precautions well- feels at times he is putting too much weight through R LE, however difficult to tell d/t neuropathy. Does not appear to be breaking precautions.     Barriers to return to prior living situation: None- has all recommended AE and good family support     Recommendations for discharge: Home with family     Rationale for recommendations: Pt has met all IP OT goals. Family can assist as needed.     Occupational Therapy Discharge Summary    Reason for therapy discharge:    All goals and outcomes met, no further needs identified.    Progress towards therapy goal(s). See goals on Care Plan in [facility] electronic health record for goal details.  Goals met    Therapy recommendation(s):    No further OT needs identified.            Entered by: Love Campos 07/24/2020 11:11 AM

## 2020-07-24 NOTE — ANESTHESIA POSTPROCEDURE EVALUATION
Patient: Mann Escobar    Procedure(s):  Ankle RECONSTRUCTIve Arthroplasty    Diagnosis:Arthritis of right ankle [M19.071]  Diagnosis Additional Information: No value filed.    Anesthesia Type:  General    Note:  Anesthesia Post Evaluation    Patient location during evaluation: Phase 2 and Bedside  Patient participation: Able to participate in evaluation but full recovery from regional anesthesia has not yet ocurrred but is anticipated to occur within 48 hours  Level of consciousness: awake and alert  Pain management: adequate  Airway patency: patent  Cardiovascular status: acceptable  Respiratory status: acceptable  Hydration status: acceptable  PONV: none     Anesthetic complications: None          Last vitals:  Vitals:    07/23/20 1620 07/23/20 1900 07/23/20 1915   BP: 135/66 (!) 145/81 (!) 163/94   Pulse: 62 74    Resp: 8  10   Temp:  36.6  C (97.9  F)    SpO2: 99% 98% 100%         Electronically Signed By: GUNJAN Huynh CRNA  July 23, 2020  7:26 PM

## 2020-07-24 NOTE — PROGRESS NOTES
Pt continues to c/o moderate-severe amount of ankle pain. Pt only ordered 5mg of oxy which he states he takes at home for chronic back pain. Pt would like anther 5 mg ordered. Provider informed.

## 2020-07-24 NOTE — ANESTHESIA CARE TRANSFER NOTE
Patient: Mann Escobar    Procedure(s):  Ankle RECONSTRUCTIve Arthroplasty    Diagnosis: Arthritis of right ankle [M19.071]  Diagnosis Additional Information: No value filed.    Anesthesia Type:   General     Note:  Airway :Nasal Cannula  Patient transferred to:PACU  Comments: Patient's VSS. Spontaneous respirations. Patient awake and oriented. IV patent. Report to RN.Handoff Report: Identifed the Patient, Identified the Reponsible Provider, Reviewed the pertinent medical history, Discussed the surgical course, Reviewed Intra-OP anesthesia mangement and issues during anesthesia, Set expectations for post-procedure period and Allowed opportunity for questions and acknowledgement of understanding      Vitals: (Last set prior to Anesthesia Care Transfer)    CRNA VITALS  7/23/2020 1826 - 7/23/2020 1923      7/23/2020             Resp Rate (observed):  (!) 4                Electronically Signed By: GUNJAN Huynh CRNA  July 23, 2020  7:23 PM

## 2020-07-24 NOTE — CONSULTS
CARE TRANSITION SOCIAL WORK INITIAL ASSESSMENT:    Admit date/time:  7-23-20        Met with: DALE met with Patient .  SW introduced self, title & role.    DATA  Principal Problem:    Arthritis of ankle, right  Active Problems:    HTN (hypertension)    Thalassemia minor    Esophageal reflux    Benign non-nodular prostatic hyperplasia with lower urinary tract symptoms    Chronic pain syndrome    S/P ankle joint replacement    Chronic anemia       Primary Care Clinic Name: [facility]  Primary Care MD Name: Shayy Ramírez  Contact information and PCP information verified: Yes    ASSESSMENT  Cognitive Status: awake, alert and oriented    Prior to hospitalization, pt pt. Was living independently with his wife and doing all his own ADL's.  His son Manan ran errands for him and helped as needed.  He has 4 adult children and manna is the most involved with day to day tasks.  He sees his PCP at the [facility] clinic.         Description of Support System: Supportive, Involved   Who is your support system?: Wife, Children   Support Assessment: Adequate family and caregiver support   Insurance Concerns: No Insurance issues identified  Living Arrangements: house    Sw discussed discharge planning and medicare guidelines in regards to home care and SNF benefits. Pt wants to go home and report he has gotten DME. Including wheelchair, gait belt, lift chair, grab bars.   Transportation was discussed and will be provided by family, son or daughter    PLAN:  To go home with his daughter to stay with him the first week and then son Manan helping with errands.  Pt. Is independent at this time with his cares. Handoff to his clinic [facility] team    Monet MORE  [facility] [phone]   [facility]  [phone]

## 2020-07-24 NOTE — PROGRESS NOTES
Skin affirmation note    Admitting nurse completed full skin assessment, Inder score and Inder interventions. This writer agrees with the initial skin assessment findings.

## 2020-07-24 NOTE — PLAN OF CARE
Discharge Planner PT   Patient plan for discharge: home with spouse   Current status: able to complete stand pivot transfer towards the left with CGA and FWW. Plans to use wheel chair at home for mobility and FWW for transfers.   Barriers to return to prior living situation: none, ramp to enter  Recommendations for discharge: home   Rationale for recommendations: patient able to complete stand pivot transfer with CGA        Entered by: Kely Dawson 07/24/2020 11:39 AM

## 2020-07-24 NOTE — PROGRESS NOTES
07/24/20 1015   Quick Adds   Type of Visit Initial PT Evaluation   Living Environment   Lives With spouse   Living Arrangements house   Home Accessibility wheelchair accessible   Transportation Anticipated family or friend will provide   Living Environment Comment has a wheel chair at home, ramp to enter, everything on one level, son is picking up a bedside commode today. has a gait belt. , daughter will be staying with him and his wife for the first few days   Self-Care   Equipment Currently Used at Home walker, standard   Activity/Exercise/Self-Care Comment has a walker, standard;wheelchair, manual;grab bar, tub/shower;commode   Functional Level Prior   Ambulation 1-->assistive equipment   Transferring 0-->independent   Toileting 0-->independent   Bathing 0-->independent   Communication 0-->understands/communicates without difficulty   Swallowing 0-->swallows foods/liquids without difficulty   Cognition 0 - no cognition issues reported   Fall history within last six months yes   Number of times patient has fallen within last six months 1  (last winter)   Which of the above functional risks had a recent onset or change? transferring   General Information   Onset of Illness/Injury or Date of Surgery - Date 07/23/20   Referring Physician Luke Powers   Patient/Family Goals Statement to go home    Pertinent History of Current Problem (include personal factors and/or comorbidities that impact the POC) R Ankle replacement    Precautions/Limitations fall precautions   Weight-Bearing Status - RLE nonweight-bearing   General Info Comments NWB R    Cognitive Status Examination   Orientation orientation to person, place and time   Level of Consciousness alert   Follows Commands and Answers Questions 100% of the time   Personal Safety and Judgment intact   Memory intact   Integumentary/Edema   Integumentary/Edema Comments bandage and cast in place to Right ankle   Range of Motion (ROM)   ROM Comment right LE WNL, Left  "knee flex/ext WNL, ankle not tested due to cast on R    Strength   Strength Comments Left LE: hip flexion 4/5, knee flex/ext 4+/5    Bed Mobility   Bed Mobility Comments not assessed    Transfer Skills   Transfer Comments sit <> stand SBA   Gait   Gait Comments unable to walk, plans to use wheel chair at home    Balance   Balance Comments needs UE support on walker while standing to stay balanced   Sensory Examination   Sensory Perception Comments numbness and lack of sensation in right foot    General Therapy Interventions   Planned Therapy Interventions transfer training   Clinical Impression   Criteria for Skilled Therapeutic Intervention yes, treatment indicated   PT Diagnosis decreased strength    Influenced by the following impairments NWB on L LE, decreased strength, pain   Functional limitations due to impairments transfers, mobility    Clinical Presentation Stable/Uncomplicated   Clinical Presentation Rationale clinical decision making and chart review   Clinical Decision Making (Complexity) Low complexity   Therapy Frequency Other (see comments)  (1 time treat)   Predicted Duration of Therapy Intervention (days/wks) 1 day   Anticipated Discharge Disposition Home   Risk & Benefits of therapy have been explained Yes   Patient, Family & other staff in agreement with plan of care Yes   [facility] [employer] TM \"6 Clicks\"   2016, Trustees of [facility], under license to [employer].  All rights reserved.   6 Clicks Short Forms Basic Mobility Inpatient Short Form   [facility] [employer]  \"6 Clicks\" V.2 Basic Mobility Inpatient Short Form   1. Turning from your back to your side while in a flat bed without using bedrails? 4 - None   2. Moving from lying on your back to sitting on the side of a flat bed without using bedrails? 4 - None   3. Moving to and from a bed to a chair (including a wheelchair)? 4 - None   4. Standing up from a chair using your arms (e.g., wheelchair, or bedside chair)? 4 - " None   5. To walk in hospital room? 1 - Total   6. Climbing 3-5 steps with a railing? 1 - Total   Basic Mobility Raw Score (Score out of 24.Lower scores equate to lower levels of function) 18   Total Evaluation Time   Total Evaluation Time (Minutes) 8       Kely Dawson  PT, DPT       7/24/2020   [address] [address]  [email or web address]  Voicemail: [phone]

## 2020-07-24 NOTE — PROGRESS NOTES
Patient with minimal pain.  Controlled with PRN oxycodone and Atarax.  Voiding small amounts.  Reports he usually does not go to the bathroom a lot.  Bladder scan for 177 ml.

## 2020-07-24 NOTE — PROGRESS NOTES
Mann Escobar   1940     Nursing Assessment:  Pt a/o x4, able to make needs known. No c/o pain, ankle block still effective. Pt having some difficulty voiding. Pt only able to void scant to small amounts at a time, causing him to go frequently. No concern related to amount out. Will continue to monitor.     Vitals:  B/P: 132/70,   T: 98.2,   P: 80,   R: 17       Zeny Munoz RN

## 2020-07-24 NOTE — PROGRESS NOTES
07/24/20 1100   Quick Adds   Type of Visit Initial Occupational Therapy Evaluation   Living Environment   Lives With spouse   Living Arrangements house   Home Accessibility wheelchair accessible   Transportation Anticipated family or friend will provide   Living Environment Comment has a wheel chair at home, ramp to enter, everything on one level, son is picking up a bedside commode today. has a gait belt. , daughter will be staying with him and his wife for the first few days   Self-Care   Equipment Currently Used at Home walker, standard   Activity/Exercise/Self-Care Comment has manual w/c to use at home, son is planning on getting commode today as w/c does not fit in bathroom, suction cup grab bars in tub/shower combo   Functional Level   Ambulation 1-->assistive equipment   Transferring 0-->independent   Toileting 0-->independent   Bathing 0-->independent   Dressing 0-->independent   Eating 0-->independent   Communication 0-->understands/communicates without difficulty   Swallowing 0-->swallows foods/liquids without difficulty   Cognition 0 - no cognition issues reported   Fall history within last six months yes   Number of times patient has fallen within last six months 1  (last winter )   Which of the above functional risks had a recent onset or change? fall history;transferring   General Information   Onset of Illness/Injury or Date of Surgery - Date 07/23/20   Referring Physician Luke Powers DPM    Patient/Family Goals Statement to return home.    Additional Occupational Profile Info/Pertinent History of Current Problem Ankle RECONSTRUCTIve Arthroplasty (right)   Precautions/Limitations fall precautions   Weight-Bearing Status - RLE nonweight-bearing   Cognitive Status Examination   Orientation orientation to person, place and time   Level of Consciousness alert   Pain Assessment   Patient Currently in Pain Yes, see Vital Sign flowsheet   Range of Motion (ROM)   ROM Comment Pt states his L  shoulder needs a replacement. WNL for ADLs.    Transfer Skill: Bed to Chair/Chair to Bed   Level of [place]: Bed to Chair contact guard   Physical Assist/Nonphysical Assist: Bed to Chair 1 person assist   Weight-Bearing Restrictions nonweight-bearing   Assistive Device - Transfer Skill Bed to Chair Chair to Bed Rehab Eval standard walker   Transfer Skill: Sit to Stand   Level of [place]: Sit/Stand contact guard   Physical Assist/Nonphysical Assist: Sit/Stand 1 person assist   Transfer Skill: Sit to Stand nonweight-bearing   Assistive Device for Transfer: Sit/Stand standard walker   Transfer Skill: Toilet Transfer   Level of [place]: Toilet contact guard   Physical Assist/Nonphysical Assist: Toilet 1 person assist   Weight-Bearing Restrictions: Toilet nonweight-bearing   Assistive Device standard walker   Toilet Transfer Skill Comments pivot transfer to commode    Upper Body Dressing   Level of [place]: Dress Upper Body independent   Lower Body Dressing   Level of [place]: Dress Lower Body minimum assist (75% patients effort)  (pt states wife can assist )   Physical Assist/Nonphysical Assist: Dress Lower Body 1 person assist   Instrumental Activities of Daily Living (IADL)   IADL Comments Pt states wife can assist with IADLs. Daughter plans on staying for a couple days upon discharge.    Activities of Daily Living Analysis   Impairments Contributing to Impaired Activities of Daily Living pain;post surgical precautions   General Therapy Interventions   Planned Therapy Interventions ADL retraining   Clinical Impression   Criteria for Skilled Therapeutic Interventions Met yes, treatment indicated   OT Diagnosis decreased independence with ADLs   Influenced by the following impairments R LE NWB    Assessment of Occupational Performance 1-3 Performance Deficits   Identified Performance Deficits showering, toilet transfer    Clinical Decision Making (Complexity) Low complexity   Therapy Frequency  "Daily   Predicted Duration of Therapy Intervention (days/wks) 1x treat   Anticipated Equipment Needs at Discharge   (has all recommended DME/AE. son is to get commode today )   Anticipated Discharge Disposition Home with Assist   Risks and Benefits of Treatment have been explained. Yes   Patient, Family & other staff in agreement with plan of care Yes   [facility] AM-PAC  \"6 Clicks\" Daily Activity Inpatient Short Form   1. Putting on and taking off regular lower body clothing? 3 - A Little   2. Bathing (including washing, rinsing, drying)? 3 - A Little   3. Toileting, which includes using toilet, bedpan or urinal? 3 - A Little   4. Putting on and taking off regular upper body clothing? 4 - None   5. Taking care of personal grooming such as brushing teeth? 4 - None   6. Eating meals? 4 - None   Daily Activity Raw Score (Score out of 24.Lower scores equate to lower levels of function) 21   Total Evaluation Time   Total Evaluation Time (Minutes) 8     "

## 2020-07-24 NOTE — PROGRESS NOTES
[facility] Medicine Progress Note  Date of Service: 07/24/2020    Assessment & Plan   Mann Escobar is a 79 year old male who presented on 7/23/2020 for scheduled Procedure(s):  Ankle RECONSTRUCTIve Arthroplasty by Luke Powers DPM and is being followed by the hospital medicine service for co-management of acute and/or chronic perioperative medical problems.    Arthritis of ankle, right   S/p Procedure(s):  Right Ankle RECONSTRUCTIve Arthroplasty on 7/23/2020  1 Day Post-Op    - pain control, wound cares, physical therapy, occupational therapy and DVT prophylaxis per orthopedic surgery service    Thalassemia minor  Chronic anemia  Pre-op hemoglobin 8.3 -> 7.6 POD #1. Baseline between 7.8 - 10.2. Has followed with Dr. Andrew in the past, last clinic visit in 2018. Formerly was on iron supplements and receiving iron infusions, but had stopped those a while ago. Just restarted iron 2 tabs daily prior to surgery. Patient has excessive fatigue that was present pre-operatively, but otherwise is asymptomatic from his anemia.  - CBC in am (if he does not discharge home today)  - Continue iron supplements  - Patient plans to follow-up with Dr. Andrew in the near future    Hypertension   Managed prior to admission with amlodipine 10 mg daily and lisinopril 40 mg daily, continue.    Benign non-nodular prostatic hyperplasia with lower urinary tract symptoms  Managed prior to admission with terazosin 5 mg q hs; this was not continued from prior to admission list by Dr. Powers, but will restart it given patient's complaint of urinary hesitancy and dribbling.  - Restart terazosin   - Bladder scan prn, straight cath prn for residual > 350 ml     Chronic pain syndrome  Patient with arthritis and degeneration of multiple joints. Managed prior to admission with prn oxycodone.   - Hold home oxycodone  - Pain control per podiatry    Esophageal reflux  Noted per problem list, does not  appear to be taking PPI or H2 blocker prior to admission.  -  Defer to outpatient management    DVT Prophylaxis: as per orthopedic surgery service - aspirin 325 mg daily, and PCDs while in the hospital  Code Status: Full Code    Lines: Peripheral   Chen catheter: No    Discussion: Medically, the patient appears to be making adequate post-op progress, making progress with ambulation but may require further hospitalization before safe to discharge.    Disposition: Anticipate discharge possibly today but more likely tomorrow. Other than ambulation issues, there are no anticipated barriers to discharge from internal medicine standpoint.    Attestation:  I have reviewed today's vital signs, notes, medications, labs and imaging.  Discussed with Dr. Thomas Obrien.    Porsche Denny PA-C  [facility]  Pager: [phone]       Interval History   Patient feeling well after surgery. Pain in ankle rated 1-2/10, although has other chronic pains that are bothersome, most specifically in the left shoulder. Denies numbness or tingling.     Tolerating oral intake, denies abdominal pain, nausea, vomiting. No bowel movement since surgery, but passing flatus. Has urinary hesitancy due to BPH, which was worse than usual overnight.    Has known anemia from thalassemia minor, has felt worsening fatigue for the past few months. Had stopped taking his iron pills, but was told during pre-op to resume 2 pills/day.    Denies chest pain, palpitations, cough, wheeze, shortness of breath, headache, vision changes, dizziness, or other complaints.    Physical Exam   Temp:  [97.7  F (36.5  C)-99.5  F (37.5  C)] 98.7  F (37.1  C)  Pulse:  [62-81] 74  Heart Rate:  [61-74] 70  Resp:  [4-19] 17  BP: (116-163)/(55-94) 145/67  SpO2:  [95 %-100 %] 98 %    Weights:   Vitals:    07/23/20 1402 07/23/20 2002   Weight: 81.6 kg (180 lb) 84.3 kg (185 lb 13.6 oz)    Body mass index is 32.92 kg/m .    General appearance: Awake, alert,  and in no apparent distress. Pleasant and conversational, speaking in full sentences.  CV: Regular rhythm & rate, no murmurs. Pitting +1-2 edema present on left lower extremity (right lower extremity not examined due to presence of splint). Peripheral pulses intact.  Respiratory: Moving air well bilaterally, no wheezing, crackles, or rhonchi.  GI: Non-distended, soft, nontender to palpation. No rebound or guarding. Normoactive bowel sounds.  Skin: Warm, dry, no rashes or ecchymoses. No mottling of skin.  Musculoskeletal / extremities: Moves all extremities equally, no obvious abnormalities. Distal CMS intact.  Neurologic: No focal deficits.    Data   Recent Labs   Lab 07/24/20  0436 07/22/20  1311 07/20/20  1330   WBC 9.4 6.5 7.1  Canceled, Test credited  Canceled, Test credited   HGB 7.6* 8.3* 8.4*  Canceled, Test credited  Canceled, Test credited   MCV 60* 60* 60*  Canceled, Test credited  Canceled, Test credited    207 205  Canceled, Test credited  Canceled, Test credited   NA  --  143 140   POTASSIUM  --  4.1 3.7   CHLORIDE  --  113* 111*   CO2  --  26 24   BUN  --  34* 38*   CR  --  1.91* 2.14*   ANIONGAP  --  4 5   RUTH  --  8.1* 8.0*   * 82 75       Recent Labs   Lab 07/24/20  0436 07/23/20  1903 07/22/20  1311 07/20/20  1330   *  --  82 75   BGM  --  91  --   --         Unresulted Labs Ordered in the Past 30 Days of this Admission     No orders found for last 31 day(s).           Imaging  Recent Results (from the past 24 hour(s))   XR Surgery SHANNA Fluoro L/T 5 Min w Stills    Narrative    This exam was marked as non-reportable because it will not be read by a   radiologist or a [facility] non-radiologist provider.              I reviewed all new labs and imaging results over the last 24 hours. I personally reviewed no images or EKG's today.    Medications     lactated ringers 50 mL/hr at 07/24/20 0111     sodium chloride         acetaminophen  975 mg Oral Q8H     amLODIPine  10 mg  Oral Daily     aspirin  325 mg Oral Daily     ceFAZolin  2 g Intravenous Q12H     cyanocobalamin  1,000 mcg Oral Daily     docusate sodium  100 mg Oral BID     gabapentin  100 mg Oral Daily     lisinopril  40 mg Oral Daily     sodium chloride (PF)  3 mL Intracatheter Q8H       Porsche Denny PA-C  [facility] Hospitalist Service  Pager: [phone]

## 2020-07-24 NOTE — PROGRESS NOTES
[facility]  Daily Post-Op Note/Progress Note  Dr. Powers - [facility]           Assessment and Plan:    Assessment:   1.  Postop Day #1 post Right Total Ankle Reconstructive Arthroplasty      Plan:   Reviewed condition and continued post op plans.  Activity: continued NWB on the RLE  Pain Management: IV + PO while inpatient converting to PO outpatient  DVT prevention:  mg PO Daily for 1 month post op  Infection Prevention: IV antibiotics preop and x 2 doses Post op completed, ongoing monitoring.  Splint/Dressing: post op splint in place, to remain clean and dry.  Disposition: to home with home help and home PT vs. TCU/rehab stay.  Clinical Follow up Outpatient: with Dr. Powers post op day 12-14 as scheduled preoperatively.            Interval History:   Stable.  Doing well.  Improving slowly.  Pain is reasonably controlled.  No fevers.  Would like to proceed to home with help tomorrow as reviewed with family and care providers.           Review of Systems:    The Review of Systems is negative other than noted in the HPI             Medications:     Current Facility-Administered Medications Ordered in Epic   Medication Dose Route Frequency Last Rate Last Dose     [START ON 7/26/2020] acetaminophen (TYLENOL) tablet 650 mg  650 mg Oral Q4H PRN         acetaminophen (TYLENOL) tablet 975 mg  975 mg Oral Q8H   975 mg at 07/24/20 1150     amLODIPine (NORVASC) tablet 10 mg  10 mg Oral Daily   10 mg at 07/24/20 0832     aspirin (ASA) EC tablet 325 mg  325 mg Oral Daily   325 mg at 07/24/20 0832     cyanocobalamin (VITAMIN B-12) tablet 1,000 mcg  1,000 mcg Oral Daily   1,000 mcg at 07/24/20 0832     docusate sodium (COLACE) capsule 100 mg  100 mg Oral BID   100 mg at 07/23/20 2144     ferrous sulfate (FEROSUL) tablet 325 mg  325 mg Oral Daily   325 mg at 07/24/20 1444    And     vitamin C (ASCORBIC ACID) tablet 250 mg  250 mg Oral Daily   250 mg at 07/24/20 1444     gabapentin (NEURONTIN)  capsule 100 mg  100 mg Oral Daily   100 mg at 07/24/20 0832     HYDROmorphone (DILAUDID) injection 0.2 mg  0.2 mg Intravenous Q2H PRN         hydrOXYzine (ATARAX) tablet 10 mg  10 mg Oral Q6H PRN   10 mg at 07/24/20 1150     lactated ringers infusion   Intravenous Continuous   Stopped at 07/24/20 1150     lidocaine (LMX4) kit   Topical Q1H PRN         lidocaine 1 % 0.1-1 mL  0.1-1 mL Other Q1H PRN         lisinopril (ZESTRIL) tablet 40 mg  40 mg Oral Daily         methocarbamol (ROBAXIN) tablet 500 mg  500 mg Oral 4x Daily PRN         naloxone (NARCAN) injection 0.1-0.4 mg  0.1-0.4 mg Intravenous Q2 Min PRN         ondansetron (ZOFRAN-ODT) ODT tab 4 mg  4 mg Oral Q6H PRN        Or     ondansetron (ZOFRAN) injection 4 mg  4 mg Intravenous Q6H PRN         oxyCODONE (ROXICODONE) tablet 5-10 mg  5-10 mg Oral Q3H PRN         sodium chloride (PF) 0.9% PF flush 3 mL  3 mL Intracatheter q1 min prn         sodium chloride (PF) 0.9% PF flush 3 mL  3 mL Intracatheter Q8H   3 mL at 07/24/20 1147     sodium chloride 0.9% infusion   Intravenous Continuous         temazepam (RESTORIL) capsule 7.5 mg  7.5 mg Oral At Bedtime PRN         terazosin (HYTRIN) capsule 5 mg  5 mg Oral At Bedtime         No current [facility]-ordered outpatient medications on file.             Physical Exam:     All vitals stable  Patient Vitals for the past 12 hrs:   BP Temp Temp src Pulse Resp SpO2   07/24/20 1638 122/52 98.2  F (36.8  C) Oral 72 18 95 %   07/24/20 1140 (!) 148/76 98.6  F (37  C) Oral 78 18 98 %     Wound clean and dry with minimal or no drainage.  Surrounding skin with minimal erythema.  Dressing dry and intact.           Data:   All laboratory data related to this surgery reviewed  Lab Results   Component Value Date    WBC 9.4 07/24/2020    HGB 7.6 (L) 07/24/2020    HCT 24.6 (L) 07/24/2020     07/24/2020     07/22/2020    POTASSIUM 4.1 07/22/2020    CHLORIDE 113 (H) 07/22/2020    CO2 26 07/22/2020    BUN 34 (H) 07/22/2020    CR  1.91 (H) 07/22/2020     (H) 07/24/2020    SED 20 09/07/2016    TROPI 0.017 10/28/2017    AST 13 11/10/2017    ALT 20 11/10/2017    ALKPHOS 110 11/10/2017    BILITOTAL 0.5 11/10/2017    INR 3.11 (H) 09/07/2010       Luke Powers DPM, FACFAS  Foot & Ankle Surgeon/Specialist  [facility]

## 2020-07-24 NOTE — PLAN OF CARE
Mann Escobar   1940     Nursing Assessment:  Pt arrived to unit at 2010. A/o x4, able to make needs known. No c/o pain or discomfort at this time. Pt received ankle block during surgery and is still in effect. Skin assessment complete with Gilma Campos RN. Skin intact, no issues. Right pedal pulse +2, left pedal pulse detected with doppler d/t 4+edema. Pt tolerating sips of clear liquids. Pt voiding independently using urinal.       Vitals:  B/P: 142/67,   T: 97.7,   P: 72,   R: 16       Zeny Munoz RN

## 2020-07-25 ENCOUNTER — APPOINTMENT (OUTPATIENT)
Dept: PHYSICAL THERAPY | Facility: CLINIC | Age: 80
DRG: 469 | End: 2020-07-25
Attending: PODIATRIST
Payer: COMMERCIAL

## 2020-07-25 LAB
ABO + RH BLD: NORMAL
ABO + RH BLD: NORMAL
ANION GAP SERPL CALCULATED.3IONS-SCNC: 5 MMOL/L (ref 3–14)
BLD GP AB SCN SERPL QL: NORMAL
BLD PROD TYP BPU: NORMAL
BLD UNIT ID BPU: 0
BLD UNIT ID BPU: 0
BLOOD BANK CMNT PATIENT-IMP: NORMAL
BLOOD PRODUCT CODE: NORMAL
BLOOD PRODUCT CODE: NORMAL
BPU ID: NORMAL
BPU ID: NORMAL
BUN SERPL-MCNC: 35 MG/DL (ref 7–30)
CALCIUM SERPL-MCNC: 8 MG/DL (ref 8.5–10.1)
CHLORIDE SERPL-SCNC: 114 MMOL/L (ref 94–109)
CO2 SERPL-SCNC: 24 MMOL/L (ref 20–32)
CREAT SERPL-MCNC: 2.18 MG/DL (ref 0.66–1.25)
ERYTHROCYTE [DISTWIDTH] IN BLOOD BY AUTOMATED COUNT: 17.9 % (ref 10–15)
GFR SERPL CREATININE-BSD FRML MDRD: 28 ML/MIN/{1.73_M2}
GLUCOSE SERPL-MCNC: 101 MG/DL (ref 70–99)
HCT VFR BLD AUTO: 22.8 % (ref 40–53)
HGB BLD-MCNC: 7 G/DL (ref 13.3–17.7)
MCH RBC QN AUTO: 18.5 PG (ref 26.5–33)
MCHC RBC AUTO-ENTMCNC: 30.7 G/DL (ref 31.5–36.5)
MCV RBC AUTO: 60 FL (ref 78–100)
NUM BPU REQUESTED: 2
PLATELET # BLD AUTO: 166 10E9/L (ref 150–450)
POTASSIUM SERPL-SCNC: 3.8 MMOL/L (ref 3.4–5.3)
RBC # BLD AUTO: 3.79 10E12/L (ref 4.4–5.9)
SODIUM SERPL-SCNC: 143 MMOL/L (ref 133–144)
SPECIMEN EXP DATE BLD: NORMAL
TRANSFUSION STATUS PATIENT QL: NORMAL
WBC # BLD AUTO: 7.5 10E9/L (ref 4–11)

## 2020-07-25 PROCEDURE — 25000132 ZZH RX MED GY IP 250 OP 250 PS 637: Performed by: PHYSICIAN ASSISTANT

## 2020-07-25 PROCEDURE — 12000000 ZZH R&B MED SURG/OB

## 2020-07-25 PROCEDURE — 80048 BASIC METABOLIC PNL TOTAL CA: CPT | Performed by: INTERNAL MEDICINE

## 2020-07-25 PROCEDURE — 86923 COMPATIBILITY TEST ELECTRIC: CPT | Performed by: INTERNAL MEDICINE

## 2020-07-25 PROCEDURE — 99232 SBSQ HOSP IP/OBS MODERATE 35: CPT | Performed by: INTERNAL MEDICINE

## 2020-07-25 PROCEDURE — 25000132 ZZH RX MED GY IP 250 OP 250 PS 637: Performed by: PODIATRIST

## 2020-07-25 PROCEDURE — 85027 COMPLETE CBC AUTOMATED: CPT | Performed by: PHYSICIAN ASSISTANT

## 2020-07-25 PROCEDURE — 86901 BLOOD TYPING SEROLOGIC RH(D): CPT | Performed by: INTERNAL MEDICINE

## 2020-07-25 PROCEDURE — 25000128 H RX IP 250 OP 636: Performed by: INTERNAL MEDICINE

## 2020-07-25 PROCEDURE — 86900 BLOOD TYPING SEROLOGIC ABO: CPT | Performed by: INTERNAL MEDICINE

## 2020-07-25 PROCEDURE — 86850 RBC ANTIBODY SCREEN: CPT | Performed by: INTERNAL MEDICINE

## 2020-07-25 PROCEDURE — P9016 RBC LEUKOCYTES REDUCED: HCPCS | Performed by: INTERNAL MEDICINE

## 2020-07-25 PROCEDURE — 36415 COLL VENOUS BLD VENIPUNCTURE: CPT | Performed by: PHYSICIAN ASSISTANT

## 2020-07-25 PROCEDURE — 25800030 ZZH RX IP 258 OP 636: Performed by: INTERNAL MEDICINE

## 2020-07-25 PROCEDURE — 36415 COLL VENOUS BLD VENIPUNCTURE: CPT | Performed by: INTERNAL MEDICINE

## 2020-07-25 PROCEDURE — 97110 THERAPEUTIC EXERCISES: CPT | Mod: GP | Performed by: PHYSICAL THERAPIST

## 2020-07-25 RX ADMIN — OXYCODONE HYDROCHLORIDE 10 MG: 5 TABLET ORAL at 14:38

## 2020-07-25 RX ADMIN — GABAPENTIN 100 MG: 100 CAPSULE ORAL at 08:04

## 2020-07-25 RX ADMIN — OXYCODONE HYDROCHLORIDE 10 MG: 5 TABLET ORAL at 01:42

## 2020-07-25 RX ADMIN — OXYCODONE HYDROCHLORIDE 10 MG: 5 TABLET ORAL at 05:06

## 2020-07-25 RX ADMIN — IRON SUCROSE 300 MG: 20 INJECTION, SOLUTION INTRAVENOUS at 11:50

## 2020-07-25 RX ADMIN — TERAZOSIN HYDROCHLORIDE 5 MG: 1 CAPSULE ORAL at 21:03

## 2020-07-25 RX ADMIN — CYANOCOBALAMIN TAB 500 MCG 1000 MCG: 500 TAB at 08:04

## 2020-07-25 RX ADMIN — AMLODIPINE BESYLATE 10 MG: 10 TABLET ORAL at 08:06

## 2020-07-25 RX ADMIN — ASPIRIN 325 MG: 325 TABLET, COATED ORAL at 08:04

## 2020-07-25 RX ADMIN — ACETAMINOPHEN 975 MG: 325 TABLET, FILM COATED ORAL at 21:03

## 2020-07-25 RX ADMIN — ACETAMINOPHEN 975 MG: 325 TABLET, FILM COATED ORAL at 04:59

## 2020-07-25 RX ADMIN — FERROUS SULFATE TAB 325 MG (65 MG ELEMENTAL FE) 325 MG: 325 (65 FE) TAB at 08:04

## 2020-07-25 RX ADMIN — ASCORBIC ACID TAB 250 MG 250 MG: 250 TAB at 08:12

## 2020-07-25 RX ADMIN — ACETAMINOPHEN 975 MG: 325 TABLET, FILM COATED ORAL at 12:56

## 2020-07-25 RX ADMIN — OXYCODONE HYDROCHLORIDE 10 MG: 5 TABLET ORAL at 21:03

## 2020-07-25 ASSESSMENT — ACTIVITIES OF DAILY LIVING (ADL)
ADLS_ACUITY_SCORE: 14
ADLS_ACUITY_SCORE: 17
ADLS_ACUITY_SCORE: 14
ADLS_ACUITY_SCORE: 14
ADLS_ACUITY_SCORE: 17
ADLS_ACUITY_SCORE: 17

## 2020-07-25 NOTE — PLAN OF CARE
Discharge Planner PT   Patient plan for discharge: home w/ spouse  Current status:able to complete stand pivot transfer towards the left with CGA and FWW. Plans to use wheel chair at home for mobility and FWW for transfers.  Barriers to return to prior living situation: none, ramp to enter  Recommendations for discharge: home  Rationale for recommendations: patient able to complete stand pivot transfer with CGA        Entered by: Lucila Pete 07/25/2020 11:39 AM

## 2020-07-25 NOTE — PLAN OF CARE
"Pt has been up with PT with pivot transfers. Is POD #2 with NWB on right foot. Pt has tingling and numbness in the left foot, which makes it hard for him to transfer. C/o minimal pain this afternoon.   Has been fatigued all day. HGB was 7.0 this am. MD ordered IRON infusion and 2 units of blood. IV started to [facility] group home through iron infusion, new IV started by charge RN and restart (this is still currently infusing).  VSS. Lungs clear.   /54 (BP Location: Right arm)   Pulse 72   Temp 98.6  F (37  C) (Oral)   Resp 16   Ht 1.6 m (5' 3\")   Wt 84.3 kg (185 lb 13.6 oz)   SpO2 95%   BMI 32.92 kg/m    Rosalba Frias RN BSN    "

## 2020-07-25 NOTE — PROGRESS NOTES
IV Iron infusion completed and tolerated well. Will have next shift release blood product orders to start the next transfusion.  Rosalba Frias RN BSN

## 2020-07-25 NOTE — PROGRESS NOTES
Patient has returned from MRI.  Blood glucose 229 at bedtime, one unit Novolog given.  Neuro check within defined limits.  Continuous pulse oximetry in place, O2 sat > 90% on room air.   used via iPad.  Denies pain.

## 2020-07-25 NOTE — PROGRESS NOTES
[facility] Medicine Progress Note  Date of Service: 07/25/2020    Assessment & Plan   Mann Escobar is a 79 year old male who presented on 7/23/2020 for scheduled Procedure(s):  Ankle RECONSTRUCTIve Arthroplasty by Luke Powers DPM and is being followed by the hospital medicine service for co-management of acute and/or chronic perioperative medical problems.    Arthritis of ankle, right   S/p Procedure(s):  Right Ankle RECONSTRUCTIve Arthroplasty on 7/23/2020  2 Days Post-Op    - pain control, wound cares, physical therapy, occupational therapy and DVT prophylaxis per orthopedic surgery service    Thalassemia minor  Chronic anemia  Pre-op hemoglobin 8.3 -> 7.6 POD #1. Baseline between 7.8 - 10.2. Has followed with Dr. Andrew in the past, last clinic visit in 2018. Formerly was on iron supplements and receiving iron infusions, but had stopped those a while ago. Just restarted iron 2 tabs daily prior to surgery. Patient has excessive fatigue that was present pre-operatively, but otherwise is asymptomatic from his anemia.  Hg 7 today. Pt appears fatigued/ somnolant.  - will tx 2 units blood and iv iron.  - Patient plans to follow-up with Dr. Andrew in the near future    Acute vs chronic renal failure  Last cr in 5/19 was 1.5. This year in 7/20-is 2.1. GFR 28. Stopped lisinopril/ lasix last year per patient.  ? Etiology. Severe anemia vs HTN vs other causes.   Will continue iv fluids -increase rate a bit. F/u labs in AM .Needs labs re checked in 1-2 weeks in clinic and needs renal consult if abnl.     Heart murmur  [place] on exam. No ECHO in records. Needs ECHO OP    Hypertension   Managed prior to admission with amlodipine 10 mg daily . Pt NOT on lisinopril.    Benign non-nodular prostatic hyperplasia with lower urinary tract symptoms  Managed prior to admission with terazosin 5 mg q hs; this was not continued from prior to admission list by Dr. Powers, but will  restart it given patient's complaint of urinary hesitancy and dribbling.  - Restart terazosin     Chronic pain syndrome  Patient with arthritis and degeneration of multiple joints. Managed prior to admission with prn oxycodone.   - Hold home oxycodone  - Pain control per podiatry    Esophageal reflux  Noted per problem list, does not appear to be taking PPI or H2 blocker prior to admission.  -  Defer to outpatient management    DVT Prophylaxis: as per orthopedic surgery service - aspirin 325 mg daily, and PCDs while in the hospital  Code Status: Full Code    DISPO  Pt somnolant/ fatigued-needs blood. Has neuropathy/ weakness in non surgical leg-high risk of falls. PT to re-assess.      Interval History   Feels very tired/ fatigued.     Physical Exam   Temp:  [98.2  F (36.8  C)-99  F (37.2  C)] 98.6  F (37  C)  Pulse:  [72-78] 72  Heart Rate:  [66-71] 71  Resp:  [16-18] 16  BP: (117-148)/(52-76) 117/54  SpO2:  [94 %-98 %] 95 %    Weights:   Vitals:    07/23/20 1402 07/23/20 2002   Weight: 81.6 kg (180 lb) 84.3 kg (185 lb 13.6 oz)    Body mass index is 32.92 kg/m .    General appearance: somnolant-arousable., and in no apparent distress. Pleasant and conversational, speaking in full sentences.  CV: Regular rhythm & rate, 2 + systolic  murmur. Pitting +1-2 edema present on left lower extremity (right lower extremity not examined due to presence of splint). Peripheral pulses intact.  Respiratory: Moving air well bilaterally, no wheezing, crackles, or rhonchi.  GI: Non-distended, soft, nontender to palpation. No rebound or guarding. Normoactive bowel sounds.  Skin: Warm, dry, no rashes or ecchymoses. No mottling of skin.  Musculoskeletal / extremities: Moves all extremities equally, no obvious abnormalities. Distal CMS intact.  Neurologic: No focal deficits.    Data   Recent Labs   Lab 07/25/20  0836 07/25/20  0416 07/24/20  0436 07/22/20  1311 07/20/20  1330   WBC  --  7.5 9.4 6.5 7.1  Canceled, Test credited   Canceled, Test credited   HGB  --  7.0* 7.6* 8.3* 8.4*  Canceled, Test credited  Canceled, Test credited   MCV  --  60* 60* 60* 60*  Canceled, Test credited  Canceled, Test credited   PLT  --  166 346 207 205  Canceled, Test credited  Canceled, Test credited     --   --  143 140   POTASSIUM 3.8  --   --  4.1 3.7   CHLORIDE 114*  --   --  113* 111*   CO2 24  --   --  26 24   BUN 35*  --   --  34* 38*   CR 2.18*  --   --  1.91* 2.14*   ANIONGAP 5  --   --  4 5   RUTH 8.0*  --   --  8.1* 8.0*   *  --  152* 82 75       Recent Labs   Lab 07/25/20  0836 07/24/20  0436 07/23/20  1903 07/22/20  1311 07/20/20  1330   * 152*  --  82 75   BGM  --   --  91  --   --         Unresulted Labs Ordered in the Past 30 Days of this Admission     No orders found from 6/23/2020 to 7/24/2020.           Imaging  No results found for this or any previous visit (from the past 24 hour(s)).     I reviewed all new labs and imaging results over the last 24 hours. I personally reviewed no images or EKG's today.    Medications     lactated ringers Stopped (07/24/20 1150)     sodium chloride         acetaminophen  975 mg Oral Q8H     amLODIPine  10 mg Oral Daily     aspirin  325 mg Oral Daily     cyanocobalamin  1,000 mcg Oral Daily     docusate sodium  100 mg Oral BID     ferrous sulfate  325 mg Oral Daily    And     vitamin C  250 mg Oral Daily     gabapentin  100 mg Oral Daily     lisinopril  40 mg Oral Daily     sodium chloride (PF)  3 mL Intracatheter Q8H     terazosin  5 mg Oral At Bedtime

## 2020-07-25 NOTE — PLAN OF CARE
Problem: Pain (Surgery Nonspecified)  Goal: Acceptable Pain Control  7/25/2020 0604 by Christina Wright, RN  Outcome: No Change  10 mg PRN oxycodone given x2 as well as scheduled tylenol for ankle pain consistently rated 10/10.  Appeared to be sleeping at time of pain reassessments.  Preferred to sleep in chair throughout the night pneumatic compression device on LLE.  Education provided on risk of pressure injury, reinforcement may be needed.  Using urinal independently.  Moderate edema of LLE.  CMS intact in RLE; elevation declined.

## 2020-07-26 ENCOUNTER — APPOINTMENT (OUTPATIENT)
Dept: PHYSICAL THERAPY | Facility: CLINIC | Age: 80
DRG: 469 | End: 2020-07-26
Attending: PODIATRIST
Payer: COMMERCIAL

## 2020-07-26 LAB
ANION GAP SERPL CALCULATED.3IONS-SCNC: 6 MMOL/L (ref 3–14)
BASOPHILS # BLD AUTO: 0 10E9/L (ref 0–0.2)
BASOPHILS NFR BLD AUTO: 0.6 %
BUN SERPL-MCNC: 31 MG/DL (ref 7–30)
CALCIUM SERPL-MCNC: 7.9 MG/DL (ref 8.5–10.1)
CHLORIDE SERPL-SCNC: 114 MMOL/L (ref 94–109)
CO2 SERPL-SCNC: 24 MMOL/L (ref 20–32)
CREAT SERPL-MCNC: 2 MG/DL (ref 0.66–1.25)
DIFFERENTIAL METHOD BLD: ABNORMAL
EOSINOPHIL # BLD AUTO: 0.2 10E9/L (ref 0–0.7)
EOSINOPHIL NFR BLD AUTO: 2.6 %
ERYTHROCYTE [DISTWIDTH] IN BLOOD BY AUTOMATED COUNT: 21.5 % (ref 10–15)
GFR SERPL CREATININE-BSD FRML MDRD: 31 ML/MIN/{1.73_M2}
GLUCOSE SERPL-MCNC: 78 MG/DL (ref 70–99)
HCT VFR BLD AUTO: 26.5 % (ref 40–53)
HGB BLD-MCNC: 8.5 G/DL (ref 13.3–17.7)
IMM GRANULOCYTES # BLD: 0 10E9/L (ref 0–0.4)
IMM GRANULOCYTES NFR BLD: 0.4 %
LYMPHOCYTES # BLD AUTO: 0.8 10E9/L (ref 0.8–5.3)
LYMPHOCYTES NFR BLD AUTO: 11.9 %
MCH RBC QN AUTO: 20.4 PG (ref 26.5–33)
MCHC RBC AUTO-ENTMCNC: 32.1 G/DL (ref 31.5–36.5)
MCV RBC AUTO: 64 FL (ref 78–100)
MONOCYTES # BLD AUTO: 0.9 10E9/L (ref 0–1.3)
MONOCYTES NFR BLD AUTO: 13 %
NEUTROPHILS # BLD AUTO: 4.9 10E9/L (ref 1.6–8.3)
NEUTROPHILS NFR BLD AUTO: 71.5 %
NRBC # BLD AUTO: 0 10*3/UL
NRBC BLD AUTO-RTO: 0 /100
PLATELET # BLD AUTO: 278 10E9/L (ref 150–450)
POTASSIUM SERPL-SCNC: 3.8 MMOL/L (ref 3.4–5.3)
RBC # BLD AUTO: 4.17 10E12/L (ref 4.4–5.9)
SODIUM SERPL-SCNC: 144 MMOL/L (ref 133–144)
WBC # BLD AUTO: 6.8 10E9/L (ref 4–11)

## 2020-07-26 PROCEDURE — 99232 SBSQ HOSP IP/OBS MODERATE 35: CPT | Performed by: INTERNAL MEDICINE

## 2020-07-26 PROCEDURE — 25000128 H RX IP 250 OP 636: Performed by: INTERNAL MEDICINE

## 2020-07-26 PROCEDURE — 85025 COMPLETE CBC W/AUTO DIFF WBC: CPT | Performed by: INTERNAL MEDICINE

## 2020-07-26 PROCEDURE — 25000128 H RX IP 250 OP 636: Performed by: PODIATRIST

## 2020-07-26 PROCEDURE — 12000000 ZZH R&B MED SURG/OB

## 2020-07-26 PROCEDURE — 25000132 ZZH RX MED GY IP 250 OP 250 PS 637: Performed by: PODIATRIST

## 2020-07-26 PROCEDURE — 80048 BASIC METABOLIC PNL TOTAL CA: CPT | Performed by: INTERNAL MEDICINE

## 2020-07-26 PROCEDURE — 36415 COLL VENOUS BLD VENIPUNCTURE: CPT | Performed by: INTERNAL MEDICINE

## 2020-07-26 PROCEDURE — 25000132 ZZH RX MED GY IP 250 OP 250 PS 637: Performed by: PHYSICIAN ASSISTANT

## 2020-07-26 PROCEDURE — 97530 THERAPEUTIC ACTIVITIES: CPT | Mod: GP | Performed by: PHYSICAL THERAPIST

## 2020-07-26 PROCEDURE — 25800030 ZZH RX IP 258 OP 636: Performed by: INTERNAL MEDICINE

## 2020-07-26 RX ORDER — POLYETHYLENE GLYCOL 3350 17 G/17G
17 POWDER, FOR SOLUTION ORAL DAILY PRN
Status: DISCONTINUED | OUTPATIENT
Start: 2020-07-26 | End: 2020-07-27 | Stop reason: HOSPADM

## 2020-07-26 RX ADMIN — OXYCODONE HYDROCHLORIDE 10 MG: 5 TABLET ORAL at 16:06

## 2020-07-26 RX ADMIN — TEMAZEPAM 7.5 MG: 7.5 CAPSULE ORAL at 20:58

## 2020-07-26 RX ADMIN — ACETAMINOPHEN 975 MG: 325 TABLET, FILM COATED ORAL at 12:03

## 2020-07-26 RX ADMIN — TERAZOSIN HYDROCHLORIDE 5 MG: 1 CAPSULE ORAL at 22:42

## 2020-07-26 RX ADMIN — OXYCODONE HYDROCHLORIDE 10 MG: 5 TABLET ORAL at 12:03

## 2020-07-26 RX ADMIN — OXYCODONE HYDROCHLORIDE 10 MG: 5 TABLET ORAL at 06:41

## 2020-07-26 RX ADMIN — CYANOCOBALAMIN TAB 500 MCG 1000 MCG: 500 TAB at 08:32

## 2020-07-26 RX ADMIN — OXYCODONE HYDROCHLORIDE 10 MG: 5 TABLET ORAL at 20:50

## 2020-07-26 RX ADMIN — DOCUSATE SODIUM 100 MG: 100 CAPSULE, LIQUID FILLED ORAL at 20:47

## 2020-07-26 RX ADMIN — ASPIRIN 325 MG: 325 TABLET, COATED ORAL at 08:33

## 2020-07-26 RX ADMIN — ACETAMINOPHEN 975 MG: 325 TABLET, FILM COATED ORAL at 05:07

## 2020-07-26 RX ADMIN — HYDROMORPHONE HYDROCHLORIDE 0.2 MG: 1 INJECTION, SOLUTION INTRAMUSCULAR; INTRAVENOUS; SUBCUTANEOUS at 05:22

## 2020-07-26 RX ADMIN — GABAPENTIN 100 MG: 100 CAPSULE ORAL at 08:33

## 2020-07-26 RX ADMIN — OXYCODONE HYDROCHLORIDE 10 MG: 5 TABLET ORAL at 02:04

## 2020-07-26 RX ADMIN — DOCUSATE SODIUM 100 MG: 100 CAPSULE, LIQUID FILLED ORAL at 08:32

## 2020-07-26 RX ADMIN — IRON SUCROSE 300 MG: 20 INJECTION, SOLUTION INTRAVENOUS at 08:34

## 2020-07-26 RX ADMIN — AMLODIPINE BESYLATE 10 MG: 10 TABLET ORAL at 08:33

## 2020-07-26 RX ADMIN — ASCORBIC ACID TAB 250 MG 250 MG: 250 TAB at 08:51

## 2020-07-26 RX ADMIN — FERROUS SULFATE TAB 325 MG (65 MG ELEMENTAL FE) 325 MG: 325 (65 FE) TAB at 08:33

## 2020-07-26 ASSESSMENT — ACTIVITIES OF DAILY LIVING (ADL)
ADLS_ACUITY_SCORE: 14

## 2020-07-26 NOTE — PROGRESS NOTES
[facility] Medicine Progress Note  Date of Service: 07/26/2020    Assessment & Plan   Mann Escobar is a 79 year old male who presented on 7/23/2020 for scheduled Procedure(s):  Ankle RECONSTRUCTIve Arthroplasty by Luke Powers DPM and is being followed by the hospital medicine service for co-management of acute and/or chronic perioperative medical problems.    Arthritis of ankle, right   S/p Procedure(s):  Right Ankle RECONSTRUCTIve Arthroplasty on 7/23/2020  3 Days Post-Op    - pain control, wound cares, physical therapy, occupational therapy and DVT prophylaxis per orthopedic surgery service    Thalassemia minor  Chronic anemia  Pre-op hemoglobin 8.3 -> 7.6 POD #1. Baseline between 7.8 - 10.2. Has followed with Dr. Andrew in the past, last clinic visit in 2018. Formerly was on iron supplements and receiving iron infusions, but had stopped those a while ago. Just restarted iron 2 tabs daily prior to surgery. Patient has excessive fatigue that was present pre-operatively. Hg 7 on 7/25- Pt appeared fatigued/ somnolant. S/p  tx 2 units blood and iv iron on 7/25. Hg stable today   Patient plans to follow-up with Dr. Andrew in the near future    Acute vs chronic renal failure  Last cr in 5/19 was 1.5. This year in 7/20-is 2.1. GFR 28. Stopped lisinopril/ lasix last year per patient.  ? Etiology. Severe anemia vs HTN vs other causes. Labs unchanged with iv fluids.   .Needs labs re checked in 1-2 weeks in clinic and needs renal consult if abnl.     Heart murmur  [place] on exam. No ECHO in records. ECHO ordered for 7/26    Hypertension   Managed prior to admission with amlodipine 10 mg daily . Pt NOT on lisinopril-though it says in med -rec.    Benign non-nodular prostatic hyperplasia with lower urinary tract symptoms  Managed prior to admission with terazosin 5 mg q hs; this was not continued from prior to admission list by Dr. Powers, but will restart it given  patient's complaint of urinary hesitancy and dribbling.  Restarted  terazosin     Chronic pain syndrome  Patient with arthritis and degeneration of multiple joints. Managed prior to admission with prn oxycodone.   -continue to  Hold home oxycodone  - Pain control per podiatry    Esophageal reflux  Noted per problem list, does not appear to be taking PPI or H2 blocker prior to admission.  -  Defer to outpatient management    DVT Prophylaxis: as per orthopedic surgery service - aspirin 325 mg daily, and PCDs while in the hospital  Code Status: Full Code    DISPO   Has neuropathy/ weakness in non surgical leg-high risk of falls. PT / OT/SW to re-assess.      Interval History   Feels very tired/ fatigued.     Physical Exam   Temp:  [98.2  F (36.8  C)-98.8  F (37.1  C)] 98.7  F (37.1  C)  Pulse:  [63-78] 63  Heart Rate:  [70-79] 79  Resp:  [14-18] 16  BP: (112-141)/(48-67) 139/52  SpO2:  [93 %-97 %] 93 %    Weights:   Vitals:    07/23/20 1402 07/23/20 2002   Weight: 81.6 kg (180 lb) 84.3 kg (185 lb 13.6 oz)    Body mass index is 32.92 kg/m .    General appearance: awake/ alert , and in no apparent distress. Pleasant and conversational, speaking in full sentences.  CV: Regular rhythm & rate, 2 + systolic  murmur. Pitting +1-2 edema present on left lower extremity (right lower extremity not examined due to presence of splint). Peripheral pulses intact.  Respiratory: Moving air well bilaterally, no wheezing, crackles, or rhonchi.  GI: Non-distended, soft, nontender to palpation. No rebound or guarding. Normoactive bowel sounds.  Skin: Warm, dry, no rashes or ecchymoses. No mottling of skin.  Musculoskeletal / extremities: Moves all extremities equally, no obvious abnormalities. Distal CMS intact.  Neurologic: No focal deficits.    Data   Recent Labs   Lab 07/26/20  0555 07/25/20  0836 07/25/20  0416 07/24/20  0436 07/22/20  1311   WBC 6.8  --  7.5 9.4 6.5   HGB 8.5*  --  7.0* 7.6* 8.3*   MCV 64*  --  60* 60* 60*      --  166 346 207    143  --   --  143   POTASSIUM 3.8 3.8  --   --  4.1   CHLORIDE 114* 114*  --   --  113*   CO2 24 24  --   --  26   BUN 31* 35*  --   --  34*   CR 2.00* 2.18*  --   --  1.91*   ANIONGAP 6 5  --   --  4   RUTH 7.9* 8.0*  --   --  8.1*   GLC 78 101*  --  152* 82       Recent Labs   Lab 07/26/20  0555 07/25/20  0836 07/24/20  0436 07/23/20  1903 07/22/20  1311 07/20/20  1330   GLC 78 101* 152*  --  82 75   BGM  --   --   --  91  --   --         Unresulted Labs Ordered in the Past 30 Days of this Admission     No orders found from 6/23/2020 to 7/24/2020.           Imaging  No results found for this or any previous visit (from the past 24 hour(s)).     I reviewed all new labs and imaging results over the last 24 hours. I personally reviewed no images or EKG's today.    Medications       acetaminophen  975 mg Oral Q8H     amLODIPine  10 mg Oral Daily     aspirin  325 mg Oral Daily     cyanocobalamin  1,000 mcg Oral Daily     docusate sodium  100 mg Oral BID     ferrous sulfate  325 mg Oral Daily    And     vitamin C  250 mg Oral Daily     sodium chloride (PF)  3 mL Intracatheter Q8H     terazosin  5 mg Oral At Bedtime

## 2020-07-26 NOTE — PROGRESS NOTES
Blood transfusion complete - blood stopped at 2311. No s/s of transfusion reaction.  Bob Arango RN on 7/26/2020 at 12:52 AM

## 2020-07-26 NOTE — PLAN OF CARE
Discharge Planner PT   Patient plan for discharge: wants to return home, does not want to leave wife alone at home, does not want to go to TCU  Current status: SBA for bed mobility, needs bed height raised to stand (family did get him a lift recliner for home), unable to maintain NWB on R LE to turn to sit in chair using walker, falls back to sitting, will have a FWW and w/c at home, ramp to enter home, has a commode and shower chair  Barriers to return to prior living situation: mobility with NWB  Recommendations for discharge: would benefit from TCU but is refusing, recommend home PT/OT  Rationale for recommendations: unable to maintain NWB, falls into chair when sitting, has tub/shower and will need practice and education on how to get into shower NWB using shower chair. If is not discharging today recommend OT evaluation.       Entered by: Lisa Emery 07/26/2020 10:26 AM

## 2020-07-26 NOTE — PROGRESS NOTES
"Reason for Follow up: discharge planning    Anticipated discharge needs: Spoke with patient at bedside.  Discussed therapy recommendations of TCU.  Patient declines TCU.  He feels he needs to get home to be with his wife.  Patient reports his wife has had some mild memory issues recently and he doesn't want to be away from her.  Patient reports his wife is very capable of assisting when needed, can cook, clean, move independently.  His daughter is planning to stay for \"a few days\".  His son lives in [place], his brother lives in [place].  He also reports he has friends that live locally and are supportive. He plans to have friends and [place] [employer] help with transportation needs following discharge.       Patient reports he has a walker, wheelchair, bedside commode, lift chair and mobility scooter.  His family built a ramp, so he does not have any steps to enter home.  He feels he will be safe to discharge home.  He is agreeable to home care services.  Pt/family was provided with the Medicare Compare list for Home Care.  Discussed associated Medicare star ratings to assist with choice for referrals/discharge planning Yes    Education was given to pt/family that star ratings are updated/maintained by Medicare and can be reviewed by visiting www.medicare.gov Yes.      Patient chooses [facility] ([phone] Fax: [phone]).  Order/referral placed for PT and OT.  Patient specifically declined nursing.      Patient states his daughter and son will provide transportation home upon discharge.      Next steps: CTS to follow    Discharge Planner   Discharge Plans in progress: L-PT, OT  Barriers to discharge plan: medical stability  Follow up plan: CTS to follow       Entered by: Bia Chowdhury 2020 11:26 AM       HOME CARE HAND OFF  Patient Name: Mann Escobar    MRN: [id number]    : 1940    Patient Zip Code: [address]    Admit Diagnosis: Arthritis of right ankle " [M19.071]      Services Pt Needs at Home: PT and OT    Discharge Support: Family/Friend Support    Living Arrangements: With spouse     or Address Other Than Pt: No    Wound Care: No    Anticipate DC Date: 7/26-27/2020         BATOOL MarshallN RN  Inpatient RN care coordinator  [facility] [phone]  [facility] [phone]

## 2020-07-26 NOTE — PLAN OF CARE
Problem: Pain (Surgery Nonspecified)  Goal: Acceptable Pain Control  Outcome: Improving   Pain better controlled than previous night.  Ambulated to bed with assist of two, slept between frequent use of urinal, 700 ml urine out this shift.  10 mg PRN oxycodone given x2 as well as 0.2 mg IV Dilaudid and scheduled tylenol; pain control partially effective.  CMS intact in RLE, ace wrap in place.

## 2020-07-26 NOTE — PLAN OF CARE
New OT order received  for discharge planning. Per chart, Pt is declining TCU and will discharge home with his wife. ADLs and AE needs were addressed in OT eval completed 7/24. Pt does not need another OT eval at this time, will discharge order.     Mala Coffman, OTR/L

## 2020-07-26 NOTE — PROGRESS NOTES
Patient up to chair with assistance. Pain being controlled by oxycodone. Stool softener given this morning and obtained as needed order for miralax. Leg elevated while up in chair. Plan on discharging home tomorrow after echo.

## 2020-07-27 ENCOUNTER — APPOINTMENT (OUTPATIENT)
Dept: GENERAL RADIOLOGY | Facility: CLINIC | Age: 80
DRG: 469 | End: 2020-07-27
Attending: PHYSICIAN ASSISTANT
Payer: COMMERCIAL

## 2020-07-27 ENCOUNTER — APPOINTMENT (OUTPATIENT)
Dept: CARDIOLOGY | Facility: CLINIC | Age: 80
DRG: 469 | End: 2020-07-27
Attending: INTERNAL MEDICINE
Payer: COMMERCIAL

## 2020-07-27 ENCOUNTER — APPOINTMENT (OUTPATIENT)
Dept: PHYSICAL THERAPY | Facility: CLINIC | Age: 80
DRG: 469 | End: 2020-07-27
Attending: PODIATRIST
Payer: COMMERCIAL

## 2020-07-27 VITALS
TEMPERATURE: 99.6 F | HEART RATE: 81 BPM | HEIGHT: 63 IN | WEIGHT: 185.85 LBS | DIASTOLIC BLOOD PRESSURE: 63 MMHG | OXYGEN SATURATION: 96 % | BODY MASS INDEX: 32.93 KG/M2 | RESPIRATION RATE: 18 BRPM | SYSTOLIC BLOOD PRESSURE: 156 MMHG

## 2020-07-27 LAB
ANION GAP SERPL CALCULATED.3IONS-SCNC: 6 MMOL/L (ref 3–14)
BUN SERPL-MCNC: 27 MG/DL (ref 7–30)
CALCIUM SERPL-MCNC: 8 MG/DL (ref 8.5–10.1)
CHLORIDE SERPL-SCNC: 114 MMOL/L (ref 94–109)
CO2 SERPL-SCNC: 24 MMOL/L (ref 20–32)
CREAT SERPL-MCNC: 1.92 MG/DL (ref 0.66–1.25)
ERYTHROCYTE [DISTWIDTH] IN BLOOD BY AUTOMATED COUNT: 21.3 % (ref 10–15)
GFR SERPL CREATININE-BSD FRML MDRD: 32 ML/MIN/{1.73_M2}
GLUCOSE SERPL-MCNC: 85 MG/DL (ref 70–99)
HCT VFR BLD AUTO: 27.4 % (ref 40–53)
HGB BLD-MCNC: 8.7 G/DL (ref 13.3–17.7)
MCH RBC QN AUTO: 20.2 PG (ref 26.5–33)
MCHC RBC AUTO-ENTMCNC: 31.8 G/DL (ref 31.5–36.5)
MCV RBC AUTO: 64 FL (ref 78–100)
PLATELET # BLD AUTO: 175 10E9/L (ref 150–450)
POTASSIUM SERPL-SCNC: 3.9 MMOL/L (ref 3.4–5.3)
RBC # BLD AUTO: 4.31 10E12/L (ref 4.4–5.9)
SODIUM SERPL-SCNC: 144 MMOL/L (ref 133–144)
URATE SERPL-MCNC: 6.5 MG/DL (ref 3.5–7.2)
WBC # BLD AUTO: 7.7 10E9/L (ref 4–11)

## 2020-07-27 PROCEDURE — 99239 HOSP IP/OBS DSCHRG MGMT >30: CPT | Performed by: INTERNAL MEDICINE

## 2020-07-27 PROCEDURE — 25500064 ZZH RX 255 OP 636: Performed by: PODIATRIST

## 2020-07-27 PROCEDURE — 85027 COMPLETE CBC AUTOMATED: CPT | Performed by: PHYSICIAN ASSISTANT

## 2020-07-27 PROCEDURE — 36415 COLL VENOUS BLD VENIPUNCTURE: CPT | Performed by: PHYSICIAN ASSISTANT

## 2020-07-27 PROCEDURE — 97530 THERAPEUTIC ACTIVITIES: CPT | Mod: GP | Performed by: PHYSICAL THERAPIST

## 2020-07-27 PROCEDURE — 73130 X-RAY EXAM OF HAND: CPT | Mod: 50

## 2020-07-27 PROCEDURE — 93306 TTE W/DOPPLER COMPLETE: CPT | Mod: 26 | Performed by: INTERNAL MEDICINE

## 2020-07-27 PROCEDURE — 25000132 ZZH RX MED GY IP 250 OP 250 PS 637: Performed by: PODIATRIST

## 2020-07-27 PROCEDURE — 25000132 ZZH RX MED GY IP 250 OP 250 PS 637: Performed by: PHYSICIAN ASSISTANT

## 2020-07-27 PROCEDURE — 80048 BASIC METABOLIC PNL TOTAL CA: CPT | Performed by: PHYSICIAN ASSISTANT

## 2020-07-27 PROCEDURE — 84550 ASSAY OF BLOOD/URIC ACID: CPT | Performed by: PHYSICIAN ASSISTANT

## 2020-07-27 RX ORDER — OXYCODONE HYDROCHLORIDE 5 MG/1
10 TABLET ORAL 3 TIMES DAILY PRN
Qty: 20 TABLET | Refills: 0 | Status: SHIPPED | OUTPATIENT
Start: 2020-07-27 | End: 2020-08-19

## 2020-07-27 RX ORDER — ACETAMINOPHEN 325 MG/1
975 TABLET ORAL 3 TIMES DAILY
Qty: 120 TABLET | Refills: 0 | Status: SHIPPED | OUTPATIENT
Start: 2020-07-27 | End: 2022-02-02

## 2020-07-27 RX ORDER — ASPIRIN 325 MG
325 TABLET, DELAYED RELEASE (ENTERIC COATED) ORAL DAILY
Qty: 26 TABLET | Refills: 0 | Status: SHIPPED | OUTPATIENT
Start: 2020-07-28 | End: 2021-03-02

## 2020-07-27 RX ORDER — ACETAMINOPHEN 325 MG/1
975 TABLET ORAL 3 TIMES DAILY
Status: DISCONTINUED | OUTPATIENT
Start: 2020-07-27 | End: 2020-07-27 | Stop reason: HOSPADM

## 2020-07-27 RX ADMIN — OXYCODONE HYDROCHLORIDE 10 MG: 5 TABLET ORAL at 13:42

## 2020-07-27 RX ADMIN — DICLOFENAC 1 G: 10 GEL TOPICAL at 13:43

## 2020-07-27 RX ADMIN — DICLOFENAC 1 G: 10 GEL TOPICAL at 09:13

## 2020-07-27 RX ADMIN — OXYCODONE HYDROCHLORIDE 10 MG: 5 TABLET ORAL at 01:56

## 2020-07-27 RX ADMIN — OXYCODONE HYDROCHLORIDE 10 MG: 5 TABLET ORAL at 05:39

## 2020-07-27 RX ADMIN — ASPIRIN 325 MG: 325 TABLET, COATED ORAL at 09:08

## 2020-07-27 RX ADMIN — OXYCODONE HYDROCHLORIDE 10 MG: 5 TABLET ORAL at 09:11

## 2020-07-27 RX ADMIN — FERROUS SULFATE TAB 325 MG (65 MG ELEMENTAL FE) 325 MG: 325 (65 FE) TAB at 09:08

## 2020-07-27 RX ADMIN — ACETAMINOPHEN 975 MG: 325 TABLET, FILM COATED ORAL at 09:08

## 2020-07-27 RX ADMIN — CYANOCOBALAMIN TAB 500 MCG 1000 MCG: 500 TAB at 09:08

## 2020-07-27 RX ADMIN — AMLODIPINE BESYLATE 10 MG: 10 TABLET ORAL at 09:08

## 2020-07-27 RX ADMIN — HUMAN ALBUMIN MICROSPHERES AND PERFLUTREN 2 ML: 10; .22 INJECTION, SOLUTION INTRAVENOUS at 09:02

## 2020-07-27 RX ADMIN — ASCORBIC ACID TAB 250 MG 250 MG: 250 TAB at 09:05

## 2020-07-27 RX ADMIN — ACETAMINOPHEN 975 MG: 325 TABLET, FILM COATED ORAL at 13:42

## 2020-07-27 ASSESSMENT — ACTIVITIES OF DAILY LIVING (ADL)
ADLS_ACUITY_SCORE: 15
ADLS_ACUITY_SCORE: 14
ADLS_ACUITY_SCORE: 15
ADLS_ACUITY_SCORE: 15

## 2020-07-27 NOTE — PLAN OF CARE
Discharge Planner PT   Patient plan for discharge: home with family  Current status: sit <> stand with maxA x 1, once initated stand, needed Klaus to come to full upright position. Once standing, completed stand pivot transfer to bed to the left with CGA maintaining WB precautions on the right. sit > supine Klaus at legs to get them into bed. needed Klaus to hold L foot in position to scoot up in bed once supine.   Barriers to return to prior living situation: wrist pain  Recommendations for discharge: would benefit from TCU stay but continues to refuse, next best option is home with family with home care PT  Rationale for recommendations: patient needing heavy A for sit <> stand but stating his family will be able to provide this assist at home.        Entered by: Kely Dawson 07/27/2020 1:46 PM

## 2020-07-27 NOTE — PLAN OF CARE
PT: patient declining PT this morning stating his wrist hurts too bad right now. Hoping to get a cortisone shot prior to discharging. Refusing to go to a TCU and only wants to go home. Will attempt to see later today.       Kely Dawson  PT, DPT       7/27/2020   [address]  [place] [address]  [email or web address]  Voicemail: [phone]

## 2020-07-27 NOTE — PROGRESS NOTES
[facility] Medicine Progress Note  Date of Service: 07/27/2020    Assessment & Plan   Mann Escobar is a 79 year old male who presented on 7/23/2020 for scheduled Procedure(s):  Ankle RECONSTRUCTIve Arthroplasty by Luke Powers DPM and is being followed by the hospital medicine service for co-management of acute and/or chronic perioperative medical problems.    S/p Procedure(s):  Ankle RECONSTRUCTIve Arthroplasty   4 Days Post-Op   Hg 8.7.  Pain has been difficult for him.    - pain control, wound cares, physical therapy, occupational therapy and DVT prophylaxis per orthopedic surgery service    Acute on Chronic anemia  Thalassemia minor  Pre-op hemoglobin 8.3, baseline of 7.8  - 10.2. Followed with Dr. Andrew in the past, last clinic visit in 2018. Formerly was on iron supplements and receiving iron infusions, but had stopped those a while ago. Restarted iron 2 tabs daily just prior to surgery. Patient has excessive fatigue that was present pre-operatively, but otherwise is asymptomatic from his anemia.  Hemoglobin trend post-operatively 7.6 --> 7.0 (received 2 units of pRBC) --> 8.5 --> 8.7. Stable post-operatively following 2 units of pRBC on 7/25/20, Received IV iron post-operatively.  - Continue iron supplements  - Outpatient follow up needed, re-check Hgb in 1-2 in clinic, patient plans to follow-up with Dr. Andrew in the near future     Acute vs Chronic Renal Failure  Pre-operative hemoglobin 2.14, stopped hydrochlorothiazide at that time, previously on lisinopril. Rechecked at 1.91 prior to surgery. Creatinine prior to this was 1.5 on 5/2019. Post-operative creatinine 2.18 --> 2.0 --> 1.92, overall stable and unchanged with IVF. Suspect chronic renal failure. Etiology unclear, may be related to anemia vs hypertension vs other.  - Outpatient follow up needed, needs labs re-checked in 1-2 weeks in clinic and likely outpatient renal consult if  abnormal    Heart murmur  [place] on exam. No ECHO in records.   - ECHO pending    Acute on Chronic Bilateral Hand Pain, likely osteoarthritis flare  Pain of MCP joint of thumbs bilaterally, right >left. No swelling or erythema noted. History of arthritis with notable swelling previously, managed with diclofenac gel and previously injections. No known history of gout. May be osteoarthritis flare in the setting of walker use.  - trial of diclofenac gel, avoiding oral due to renal disease  - XR of bilateral hands  - add on uric acid level  - further pain management as previously ordered by ortho    Hypertension   Blood pressures reviewed, intermittently mildly elevated. Managed prior to admission with amlodipine. Previously on hydrochlorothiazide and lisinopril but as above have been stopped.  - continue home amlodipine      Benign non-nodular prostatic hyperplasia with lower urinary tract symptoms  Managed prior to admission with terazosin 5 mg q hs; this was not continued from prior to admission list by Dr. Powers, but will restart it given patient's complaint of urinary hesitancy and dribbling.  - resume terazosin   - Bladder scan prn, straight cath prn for residual > 350 ml      Chronic pain syndrome  Patient with arthritis and degeneration of multiple joints. Managed prior to admission with prn oxycodone.   - Hold home oxycodone, pain control per podiatry     Esophageal reflux  Noted per problem list, does not appear to be taking PPI or H2 blocker prior to admission.  -  Defer to outpatient management    DVT Prophylaxis: as per orthopedic surgery service - aspirin 325 mg daily  Code Status: Full Code    Lines: peripheral   Chen catheter: none    Discussion: Medically, the patient appears to be recovering appropriately though slowly from surgery. Vital signs stable. Echo is pending and will need to manage hand pain adequately for walker use. Will monitor today to see if safe discharge possible.     Disposition:  Anticipate discharge today or tomorrow, pending pain management and mobility in the setting of new hand pain. Patient requesting discharge to home though TCU has been recommended, discussed again today, continues to refuse TCU    Attestation:  I have reviewed today's vital signs, notes, medications, labs and imaging.  Total time: 15 minutes    This patient was discussed with Dr. Paulo Guerrero. Plan as above.    Mirtha Dejesus PA-C   [facility]    Interval History   Patient was seen this morning. He states he developed pain in both of his hands last night, right worse than left. Similar to previous arthritis flares which have been treated with diclofenac gel or steroid injections. His ankle pain has also been bothering him. He has not tolerated much activity, mostly just transferring to and from bed. A TCU has been recommended due to this but he is adamant that he returns home. States he has a lot of support from his kids and they have made a lot of accomodations at home (ramp, wheelchair, lift chair, shower/tub accomodations, etc). Fair appetite. Slept poorly overnight. passing Gas. no BM. Voiding regularly.    Denies headache, lightheadedness, dizziness, fever, chills, chest pain, palpitations, shortness of breath, cough, abdominal pain, nausea, vomiting.       Physical Exam   Temp:  [98.5  F (36.9  C)-99.6  F (37.6  C)] 99.6  F (37.6  C)  Pulse:  [71-81] 81  Heart Rate:  [63-79] 63  Resp:  [16-18] 18  BP: (125-156)/(52-76) 156/63  SpO2:  [93 %-97 %] 96 %    Weights:   Vitals:    07/23/20 1402 07/23/20 2002   Weight: 81.6 kg (180 lb) 84.3 kg (185 lb 13.6 oz)    Body mass index is 32.92 kg/m .    General: Appears well, sitting up in bed. Alert and oriented. Pleasant and cooperative. No distress. Non-toxic. Appears stated age.   CV: Regular rate, normal rhythm. 2-3/6 murmur noted. Radial pulses are 2+ bilaterally. Toes warm.  Respiratory: No accessory muscle usage. Clear to auscultation bilaterally. No  wheezes, crackles or rhonchi.   GI: Bowel sounds normoactive in all quadrants. Soft, non-tender, non-distended.  Skin: Warm, dry, intact. Did not assess incision, splint in place.  Musculoskeletal: Muscle tone is appropriate. Moves all extremities freely with limited range of motion right lower extremity due to pain and bandage. Both hands with tenderness of the MCP joints of the thumbs with reduced active ROM of the hand. No noted swelling or tenderness of this area.  Neuro: Sensation to light touch of bilateral lower extremities is grossly intact.     Data   Recent Labs   Lab 07/27/20  0643 07/26/20  0555 07/25/20  0836 07/25/20  0416 07/24/20  0436   WBC  --  6.8  --  7.5 9.4   HGB  --  8.5*  --  7.0* 7.6*   MCV  --  64*  --  60* 60*   PLT  --  278  --  166 346    144 143  --   --    POTASSIUM 3.9 3.8 3.8  --   --    CHLORIDE 114* 114* 114*  --   --    CO2 24 24 24  --   --    BUN 27 31* 35*  --   --    CR 1.92* 2.00* 2.18*  --   --    ANIONGAP 6 6 5  --   --    RUTH 8.0* 7.9* 8.0*  --   --    GLC 85 78 101*  --  152*     Recent Labs   Lab 07/27/20  0643 07/26/20  0555 07/25/20  0836 07/24/20  0436 07/23/20  1903 07/22/20  1311 07/20/20  1330   GLC 85 78 101* 152*  --  82 75   BGM  --   --   --   --  91  --   --       Unresulted Labs Ordered in the Past 30 Days of this Admission     Date and Time Order Name Status Description    7/27/2020 0614 CBC with platelets In process          Imaging  No results found for this or any previous visit (from the past 24 hour(s)).     I reviewed all new labs and imaging results over the last 24 hours. I personally reviewed no images or EKG's today.    Medications       amLODIPine  10 mg Oral Daily     aspirin  325 mg Oral Daily     cyanocobalamin  1,000 mcg Oral Daily     docusate sodium  100 mg Oral BID     ferrous sulfate  325 mg Oral Daily    And     vitamin C  250 mg Oral Daily     sodium chloride (PF)  3 mL Intracatheter Q8H     terazosin  5 mg Oral At Bedtime

## 2020-07-27 NOTE — PLAN OF CARE
Problem: Pain (Surgery Nonspecified)  Goal: Acceptable Pain Control  Outcome: Improving   Awake with occasional pain, given 10 mg PRN oxycodone x 3, sleeping at time of all pain reassessments.  Colace given, reports no BM since Thursday.  Requested wheelchair ambulating to bathroom.  Pneumatic compression device on LLE overnight.

## 2020-07-27 NOTE — DISCHARGE SUMMARY
[facility]  Hospitalist Discharge Summary       Date of Admission:  7/23/2020  Date of Discharge:  7/27/2020  Discharging Provider: Paulo Guerrero MD      Discharge Diagnoses      S/p right ankle reconstructive arthroplasty  Acute on chronic anemia  Thalassemia minor  Acute kidney injury on chronic kidney disease stage 3b  Heart murmur, likely flow murmur  Acute on chronic bilateral hand pain, likely osteoarthritis flare  Hypertension   Benign non-nodular prostatic hyperplasia with lower urinary tract symptoms  Chronic pain syndrome  Esophageal reflux    Follow-ups Needed After Discharge   Follow-up Appointments     Follow-up and recommended labs and tests      Follow up with primary care provider, Shayy Ramírez, within 7 days for   hospital follow- up.  The following labs/tests are recommended: BMP and   CBC.           Discharge Disposition   Discharged to home  Condition at discharge: Fair    Hospital Course   Mann Escobar is a 79 year old male who presented on 7/23/2020 for scheduled ankle reconstructive arthroplasty by Luke Powers DPM and is being followed by the hospital medicine service for co-management of acute and/or chronic perioperative medical problems.     S/p right ankle reconstructive arthroplasty  Pain has been difficult for him.    - Pain control, wound cares, physical therapy, occupational therapy and DVT prophylaxis per orthopedic surgery service  - Discharge on 3 days of oxycodone 10 mg tid prn     Acute on chronic anemia  Thalassemia minor  Pre-op hemoglobin 8.3, baseline of 7.8  - 10.2. Followed with Dr. Andrew in the past, last clinic visit in 2018. Formerly was on iron supplements and receiving iron infusions, but had stopped those a while ago. Restarted iron 2 tabs daily just prior to surgery. Patient has excessive fatigue that was present pre-operatively, but otherwise is asymptomatic from his anemia.  - Hemoglobin trend  post-operatively 7.6 --> 7.0 (received 2 units of pRBC) --> 8.5 --> 8.7. Stable post-operatively following 2 units of pRBC on 7/25/20, Received IV iron post-operatively.  - Continue iron supplements  - Outpatient follow up needed, re-check Hgb in 1-2 in clinic, patient plans to follow-up with Dr. Andrew in the near future     Acute kidney injury on chronic kidney disease stage 3b  Pre-operative hemoglobin 2.14, stopped hydrochlorothiazide at that time, previously on lisinopril. Rechecked at 1.91 prior to surgery. Creatinine prior to this was 1.5 on 5/2019. Post-operative creatinine 2.18 --> 2.0 --> 1.92, overall stable and unchanged with IVF. Suspect chronic renal failure. Etiology unclear, may be related to anemia vs hypertension vs other.  - Outpatient follow up needed, needs labs re-checked in 1-2 weeks in clinic and likely outpatient renal consult if abnormal     Heart murmur, likely flow murmur  [place] on exam. No echo in records.   - Echo unremarkable, mild concentric LVH with EF 60-65%, no RWMA or valvulopathies     Acute on chronic bilateral hand pain, likely osteoarthritis flare  Pain of MCP joint of thumbs bilaterally, right >left. No swelling or erythema noted. History of arthritis with notable swelling previously, managed with diclofenac gel and previously injections. No known history of gout. May be osteoarthritis flare in the setting of walker use.  - trial of diclofenac gel, avoiding oral due to renal disease  - XR of bilateral hands consistent with osteoarthritis  - add on uric acid level normal  - Follow up with orthopedic surgeon for further management     Hypertension   Blood pressures reviewed, intermittently mildly elevated. Managed prior to admission with amlodipine. Previously on hydrochlorothiazide and lisinopril but as above have been stopped.  - continue home amlodipine      Benign non-nodular prostatic hyperplasia with lower urinary tract symptoms  Managed prior to admission with terazosin 5 mg  q hs; this was not continued from prior to admission list by Dr. Powers, but will restart it given patient's complaint of urinary hesitancy and dribbling.  - resume terazosin      Chronic pain syndrome  Patient with arthritis and degeneration of multiple joints. Managed prior to admission with prn oxycodone.   - Hold home oxycodone, pain control as directed     Esophageal reflux  Noted per problem list, does not appear to be taking PPI or H2 blocker prior to admission.  -  Defer to outpatient management     Consultations This Hospital Stay   PHYSICAL THERAPY ADULT IP CONSULT  SOCIAL WORK IP CONSULT  OCCUPATIONAL THERAPY ADULT IP CONSULT  PHYSICAL THERAPY ADULT IP CONSULT  HOSPITALIST IP CONSULT  SPIRITUAL HEALTH SERVICES IP CONSULT  PHYSICAL THERAPY ADULT IP CONSULT  OCCUPATIONAL THERAPY ADULT IP CONSULT    Code Status   Full Code    Time Spent on this Encounter   I, Paulo Guerrero MD, personally saw the patient today and spent greater than 30 minutes discharging this patient.       Paulo Guerrero MD  [facility]  ______________________________________________________________________    Physical Exam   Vital Signs: Temp: 99.6  F (37.6  C)(notified nurse) Temp src: Axillary BP: (!) 156/63 Pulse: 81 Heart Rate: 63 Resp: 18 SpO2: 96 % O2 Device: None (Room air)    Weight: 185 lbs 13.56 oz       Gen: Well nourished, well developed, alert and oriented x 3, no acute distressed  HEENT: Atraumatic, normocephalic; sclera non-injected, anicterric; oral mucosa moist, no lesion, no exudate  Lungs: Clear to ausculation, no wheezes, no rhonchi, no rales  Heart: Regular rate, regular rhythm, no gallops, no rubs, no murmurs  GI: Bowel sound normal, no hepatosplenomegaly, no masses, non-tender, non-distended, no guarding, no rebound tenderness  Lymph: No lymphadenopathy, 1 + B hand edema  Skin: No rashes, no chronic venous stasis     Primary Care Physician   Shayy Ramírez    Discharge Orders       Home Care Referral      Reason for your hospital stay    This is a 79 year old male admitted for an elective right total ankle reconstructive arthroplasty.     Follow-up and recommended labs and tests    Follow up with primary care provider, Shayy Ramírez, within 7 days for hospital follow- up.  The following labs/tests are recommended: BMP and CBC.     Activity    Your activity upon discharge: Activity: continued NWB on the RLE     Full Code     Rolling Knee Walker Order    I, the undersigned, certify that the above prescribed supplies are medically necessary for this patient and is both reasonable and necessary in reference to accepted standards of medical and necessary in reference to accepted standards of medical practice in the treatment of this patient's condition and is not prescribed as a convenience.      Diet    Follow this diet upon discharge: Orders Placed This Encounter      Advance Diet as Tolerated: Regular Diet Adult       Significant Results and Procedures   Most Recent 3 CBC's:  Recent Labs   Lab Test 07/27/20  0643 07/26/20  0555 07/25/20  0416   WBC 7.7 6.8 7.5   HGB 8.7* 8.5* 7.0*   MCV 64* 64* 60*    278 166     Most Recent 3 BMP's:  Recent Labs   Lab Test 07/27/20  0643 07/26/20  0555 07/25/20  0836    144 143   POTASSIUM 3.9 3.8 3.8   CHLORIDE 114* 114* 114*   CO2 24 24 24   BUN 27 31* 35*   CR 1.92* 2.00* 2.18*   ANIONGAP 6 6 5   RUTH 8.0* 7.9* 8.0*   GLC 85 78 101*   ,   Results for orders placed or performed during the hospital encounter of 07/23/20   XR Surgery SHANNA Fluoro L/T 5 Min w St[place]    Narrative    This exam was marked as non-reportable because it will not be read by a   radiologist or a [facility] non-radiologist provider.         XR Hand Port Left G/E 3 Views    Narrative    XR HAND PORT RT G/E 3 VW, XR HAND PORT LT G/E 3 VW 7/27/2020 11:14 AM    HISTORY: Pain.    COMPARISON: None.    THREE-VIEW RIGHT HAND: No acute osseous finding is demonstrated.  Widening of  the space between the lunate bone and the scaphoid bone  suggests scapholunate ligament disruption. Osteoarthritic change is  present in the carpus and first carpal-metacarpal joint. Some  calcification of the triangular fibrocartilage complex is seen and can  be degenerative. Deposition diseases, including CPPD, are also in the  differential.    THREE-VIEW LEFT HAND: No acute osseous finding is demonstrated.  Osteoarthritic change is present in the carpus as well as the first  carpal-metacarpal joint. Calcification of the triangular  fibrocartilage complex is noted.    VIVIANA ORTEGA MD   XR Hand Port Right G/E 3 Views    Narrative    XR HAND PORT RT G/E 3 VW, XR HAND PORT LT G/E 3 VW 2020 11:14 AM    HISTORY: Pain.    COMPARISON: None.    THREE-VIEW RIGHT HAND: No acute osseous finding is demonstrated.  Widening of the space between the lunate bone and the scaphoid bone  suggests scapholunate ligament disruption. Osteoarthritic change is  present in the carpus and first carpal-metacarpal joint. Some  calcification of the triangular fibrocartilage complex is seen and can  be degenerative. Deposition diseases, including CPPD, are also in the  differential.    THREE-VIEW LEFT HAND: No acute osseous finding is demonstrated.  Osteoarthritic change is present in the carpus as well as the first  carpal-metacarpal joint. Calcification of the triangular  fibrocartilage complex is noted.    VIVIANA ORTEGA MD   Echocardiogram Complete    Narrative    [id number]  [id number]  [id number]  [id number]^EDITH^ERICA           [facility]  Echocardiography Laboratory  [address] [place] [address]        Name: MERE VELASQUEZ  MRN: [id number]  : 1940  Study Date: 2020 08:37 AM  Age: 79 yrs  Gender: Male  Patient [facility]: [facility]  Reason For Study: Murmur  Ordering Physician: ERICA SHARMA  Referring Physician: Shayy Ramírez  Performed By: Vivienne Cantu RDCS     BSA: 1.9 m2  Height: 63  in  Weight: 185 lb  HR: 88  BP: 156/63 mmHg  _____________________________________________________________________________  __        Procedure  Complete Portable Echo Adult. Optison (NDC #[id number]) given intravenously.  _____________________________________________________________________________  __        Interpretation Summary     The left ventricle is normal in size.  There is mild to moderate concentric left ventricular hypertrophy.  The visual ejection fraction is estimated at 60-65%.  Diastolic Doppler findings (E/E' ratio and/or other parameters) suggest left  ventricular filling pressures are indeterminate.  No regional wall motion abnormalities noted.  No significant valvular heart disease.  _____________________________________________________________________________  __        Left Ventricle  The left ventricle is normal in size. There is mild to moderate concentric  left ventricular hypertrophy. The visual ejection fraction is estimated at 60-  65%. Diastolic Doppler findings (E/E' ratio and/or other parameters) suggest  left ventricular filling pressures are indeterminate. No regional wall motion  abnormalities noted.     Right Ventricle  The right ventricle is normal in size and function.     Atria  The left atrium is mildly dilated. Right atrial size is normal. There is no  color Doppler evidence of an atrial shunt.     Mitral Valve  The mitral valve leaflets are mildly thickened. There is trace mitral  regurgitation.        Tricuspid Valve  There is trace tricuspid regurgitation.     Aortic Valve  There is trivial trileaflet aortic sclerosis. No aortic regurgitation is  present.     Pulmonic Valve  There is trace pulmonic valvular regurgitation.     Vessels  Mild aortic root dilatation. The ascending aorta is Mildly dilated.     Pericardium  There is no pericardial effusion.        Rhythm  Sinus rhythm was  noted.  _____________________________________________________________________________  __  MMode/2D Measurements & Calculations  IVSd: 1.6 cm     LVIDd: 4.4 cm  LVIDs: 3.1 cm  LVPWd: 1.3 cm  FS: 29.9 %  LV mass(C)d: 243.6 grams  LV mass(C)dI: 130.2 grams/m2  Ao root diam: 4.1 cm  LA dimension: 4.3 cm  asc Aorta Diam: 4.2 cm  LA/Ao: 1.0  LA Volume (BP): 85.0 ml  LA Volume Index (BP): 45.5 ml/m2  RWT: 0.59           Doppler Measurements & Calculations  MV E max omar: 85.1 cm/sec  MV A max omar: 118.5 cm/sec  MV E/A: 0.72  PA acc time: 0.08 sec  E/E' av.2  Lateral E/e': 7.5  Medial E/e': 8.9           _____________________________________________________________________________  __           Report approved by: Marina Rondon 2020 10:33 AM            Discharge Medications   Current Discharge Medication List      START taking these medications    Details   acetaminophen (TYLENOL) 325 MG tablet Take 3 tablets (975 mg) by mouth 3 times daily  Qty: 120 tablet, Refills: 0    Associated Diagnoses: Status post right ankle joint replacement      aspirin (ASA) 325 MG EC tablet Take 1 tablet (325 mg) by mouth daily  Qty: 26 tablet, Refills: 0    Associated Diagnoses: Status post right ankle joint replacement         CONTINUE these medications which have CHANGED    Details   oxyCODONE (ROXICODONE) 5 MG tablet Take 2 tablets (10 mg) by mouth 3 times daily as needed for severe pain  Qty: 20 tablet, Refills: 0    Associated Diagnoses: Chronic left-sided low back pain with left-sided sciatica         CONTINUE these medications which have NOT CHANGED    Details   amLODIPine (NORVASC) 10 MG tablet Take 1 tablet (10 mg) by mouth daily  Qty: 90 tablet, Refills: 3    Associated Diagnoses: Benign essential hypertension      ELDERBERRY PO       ferrous fumarate 65 mg, [place]. FE,-Vitamin C 125 mg (VITRON C)  MG TABS tablet Take 1 tablet by mouth daily  Qty: 60 tablet, Refills: 1    Comments: Take it on empty stomach,  bid.  Associated Diagnoses: Iron deficiency anemia, unspecified iron deficiency anemia type      HCA VITAMIN B12 500 MCG OR TABS 2 tablets daily      MULTIVITAMIN OR one daily      OVER-THE-COUNTER Elderberry 50mg supplement, one daily      terazosin (HYTRIN) 5 MG capsule TAKE 1 CAPSULE AT BEDTIME  Qty: 90 capsule, Refills: 3    Associated Diagnoses: Benign non-nodular prostatic hyperplasia with lower urinary tract symptoms      VIACTIV OR With D 1000mg calcium      zinc gluconate 50 MG tablet Take 50 mg by mouth daily         STOP taking these medications       lisinopril (PRINIVIL/ZESTRIL) 40 MG tablet Comments:   Reason for Stopping:             Allergies   Allergies   Allergen Reactions     Nkda [No Known Drug Allergies]

## 2020-07-27 NOTE — PROGRESS NOTES
[facility] DISCHARGE NOTE    Patient discharged to home at 2:47 PM via wheel chair. Accompanied by staff. Discharge instructions reviewed with patient, opportunity offered to ask questions. Prescriptions sent to patients preferred pharmacy. All belongings sent with patient.    Dee Polanco RN

## 2020-07-27 NOTE — PROGRESS NOTES
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 He is not agreeable to TCU, he cont to be agreeable to [facility] ([phone] Fax: [phone]). This writer did update home care.     Pts family will provide transport home.     Other Providers (Care Coordinator, County Services, PCA services etc): No    CTS Hand Off Completed: Yes: completed    PAS #: not needed    CHUY Score: low    Future Appointments: No future appointments.    Discharge Disposition: home    Discharge Planner   Discharge Plans in progress: home  Barriers to discharge plan: none  Follow up plan: home care       Entered by: Leola Beard 07/27/2020 11:33 AM           Leola OCHOA, LICSW, [facility] [phone]

## 2020-07-27 NOTE — DISCHARGE INSTRUCTIONS
Splint/Dressing: post op splint in place, to remain clean and dry.  Disposition: planned to home self care once medically cleared  Clinical Follow up Outpatient: with Dr. Powers post op day 12-14 as scheduled preoperatively

## 2020-07-28 ENCOUNTER — PATIENT OUTREACH (OUTPATIENT)
Dept: CARE COORDINATION | Facility: CLINIC | Age: 80
End: 2020-07-28

## 2020-07-28 NOTE — PROGRESS NOTES
Clinic Care Coordination Contact  Community Health Worker Initial Outreach    CHW Initial Information Gathering:  Informal Support system:: Spouse  No PCP office visit in Past Year: Yes  CHW Additional Questions  If ED/Hospital discharge, follow-up appointment scheduled as recommended?: Yes  MyChart active?: No  Patient agreeable to assistance with activating MyChart?: Other, not discussed    Patient accepts CC: Yes. Patient scheduled for assessment with RN CC on 7/30 at 1pm. Patient noted desire to discuss CCC and set up future goals and outreaches.     The Clinic Community Health Worker spoke with the patient today to discuss possible Clinic Care Coordination enrollment. The service was described to the patient and immediate needs were discussed. The patient agreed to enrollment and an assessment was scheduled. The patient was provided with contact information for the clinic CHW.             Assessment date: 7/30 @1pm    Referral reason:  hosp d/c after planned ankle surgery; d/c'd home with [facility] referral. Appropriate to explore if pt desires CC assessment; schedule ximena/ASTRID Storey.    Loren LAMAR Community Health Worker  [facility] Care Coordination  [facility] [place]  Phone: [phone]

## 2020-07-28 NOTE — PLAN OF CARE
Physical Therapy Discharge Summary    Reason for therapy discharge:    Discharged to home with home therapy.    Progress towards therapy goal(s). See goals on Care Plan in [facility] electronic health record for goal details.  Goals met    Therapy recommendation(s):    Continued therapy is recommended.  Rationale/Recommendations:  home care PT to improve strength/ mobility w/ NWB status.

## 2020-07-30 ENCOUNTER — PATIENT OUTREACH (OUTPATIENT)
Dept: NURSING | Facility: CLINIC | Age: 80
End: 2020-07-30
Attending: NURSE PRACTITIONER
Payer: COMMERCIAL

## 2020-07-30 NOTE — LETTER
[facility]  [address] [place] [address]    July 30, 2020    Mann Escobar  [address]  [place] [address]      Dear Mann,    I am a clinic care coordinator who works with GUNJAN Werner CNP at [facility]. I want to thank you for taking the time to speak with me today.  Below is a description of clinic care coordination and how we can further assist you.      The clinic care coordinator team is made up of a registered nurse,  and community health worker who understand the health care system. The goal of clinic care coordination is to help you manage your health by establishing patient-centered goals. The team can assist you in meeting your health care goals by providing education, strengthening the communication among your providers, and supporting you with any resource needs.    Please feel free to contact me at [phone] with any questions or if you are interested in enrollment. We are focused on providing you with the highest-quality healthcare experience possible and that all starts with you.     Sincerely,     JIAN Gallardo, RN   [facility]  - [facility] Care Coordinator  Due to the current pandemic, I am working remotely. My direct number will ring to [employer]. Please leave a brief message and I will return your call as quickly as possible.

## 2020-07-30 NOTE — LETTER
[facility]  Complex Care Plan  About Me:    Patient Name:  Mann Escobar    YOB: 1940  Age:         79 year old   Wendy MRN:    [id number] Telephone Information:  Home Phone [phone]   Mobile [phone]       Address:  [address]  [place] [facility] [place] [facility] [address] Email address:  [email or web address]      Emergency Contact(s)    Name Relationship Lgl Grd Work Phone Home Phone Mobile Phone   1. WOLF ESCOBAR* Spouse   [phone]   2. FE ESCOBAR Son   [phone]    3. ARTHUR LAWSON* Daughter   [phone]           Primary language:  English     needed? No   [employer]:  [phone] op. 1  Other communication barriers: None  Preferred Method of Communication:  Mail  Current living arrangement: I live in a private home with spouse  Mobility Status/ Medical Equipment: Independent w/Device        My Access Plan  Medical Emergency 911   Primary Clinic Line [facility] - [phone]   24 Hour Appointment Line [phone] or  [phone]) (toll-free)   24 Hour Nurse Line [phone] (toll-free)   Preferred Urgent Care [facility], [phone]   Preferred Hospital [place], [place]  [phone]   Preferred Pharmacy [facility]     Behavioral Health Crisis Line The National Suicide Prevention Lifeline at 1-162.356.1295 or 911             My Care Team Members  Patient Care Team       Relationship Specialty Notifications Start End    Shayy Ramírez APRN CNP PCP - General Family Practice  4/29/13     Phone: [phone] Fax: [phone] [address] [address]    Shayy Ramírez APRN CNP Assigned PCP   5/26/13     Phone: [phone] Fax: [phone] [address] [address]    [facility]  HOME HEALTH AGENCY ([facility] ([place])  7/26/20     Phone:  [phone]         Cordelia Londono, RN Lead Care Coordinator Primary Care - CC Admissions 7/30/20     Phone: [phone]         Nancy Loren Community Health Worker Primary Care - CC  7/30/20     Phone: [phone]                 My Care Plans  Self Management and Treatment Plan  Goals and (Comments)  Goals        General    #1 Monitoring (pt-stated)     Notes - Note created  7/30/2020  1:37 PM by Cordelia Londono, RN    Goal Statement: I will have suitable pain relief to perform activities of daily living.  Date Goal set: 7/30/20  Barriers: medical history, recent ankle surgery  Strengths: engaged in plan of care  Date to Achieve By: 12/1/20  Patient expressed understanding of goal: Yes  Action steps to achieve this goal:  1. I will follow up with my PCP or specialist(s) as recommended.   2. I will take medications as prescribed to maintain adequate pain control/relief.  3. I will try to remain as physically active as I can tolerate to maintain a healthy lifestyle        #2 Functional (pt-stated)     Notes - Note created  7/30/2020  1:38 PM by Cordelia Londono, RN    Goal Statement: I will recover from recent ankle surgery  Date Goal set: 7/30/20  Barriers: weight bearing restrictions  Strengths: engaged in plan of care; home PT  Date to Achieve By: 12/1/20  Patient expressed understanding of goal: Yes  Action steps to achieve this goal:  1. I will participate in home PT sessions to support initial recovery after surgery.  2. I will maintain clinic follow up appointments with my surgeon as well as PCP.  3. I will call my care team as needed with questions or concerns.               Action Plans on File:                       Advance Care Plans/Directives Type:        My Medical and Care Information  Problem List   Patient Active Problem List   Diagnosis      HX NEPHROLITHIASIS [V13.01]     Thalassemia minor     Esophageal reflux     Family history of prostate cancer     Leg edema      CARDIOVASCULAR SCREENING; LDL GOAL LESS THAN 130     Internal hemorrhoids     Iron deficiency anemia     First degree AV block     Family history of diabetes mellitus     Scoliosis     Hypopotassemia     Advanced directives, counseling/discussion     HTN (hypertension)     Benign non-nodular prostatic hyperplasia with lower urinary tract symptoms     Chronic low back pain     S/P knee replacement     S/P ankle fusion     Abnormal antinuclear antibody titer     Osteoarthritis     Hypertension     BPH (benign prostatic hyperplasia)     Pseudogout     Chronic pain syndrome     S/P ankle joint replacement     Arthritis of ankle, right     Chronic anemia      Current Medications and Allergies:  See printed Medication Report.    Care Coordination Start Date: 7/30/2020   Frequency of Care Coordination: monthly   Form Last Updated: 07/30/2020

## 2020-07-30 NOTE — PROGRESS NOTES
"Clinic Care Coordination Contact    Clinic Care Coordination Contact  OUTREACH    Referral Information:  Referral Source: IP Handoff    Primary Diagnosis: Orthopedic    Chief Complaint   Patient presents with     Clinic Care Coordination - Initial     RN        Universal Utilization: Patient was admitted at [facility] from 7/23 to 7/27/20 for planned right ankle reconstructive arthroplasty.  Clinic Utilization  Difficulty keeping appointments:: No  Compliance Concerns: No  No-Show Concerns: No  No PCP office visit in Past Year: No  Utilization    Last refreshed: 7/30/2020  1:33 PM:  Hospital Admissions 1           Last refreshed: 7/30/2020  1:33 PM:  ED Visits 0           Last refreshed: 7/30/2020  1:33 PM:  No Show Count (past year) 0              Current as of: 7/30/2020  1:33 PM              Clinical Concerns:  Current Medical Concerns:  Patient reports he is doing \"okay\" today. He is feeling a little frustrated with activity limitations and the amount of dependence he needs with ADLs but understands this is temporary during recovery from ankle surgery. He had initial home PT visit and felt that was very helpful in going through transfers and having initial plan established.  Patient reports his pain is tolerable with Oxycodone for breakthrough.   Patient has chronic back pain and takes oxycodone on a chronic basis under PCP.  He verbalized good understanding of maintaining routine follow up for chronic pain management.     Current Behavioral Concerns: none      Education Provided to patient: reviewed clinic care coordination and established patient centered goals     Pain  Pain (GOAL):: Yes  Type: Acute on Chronic  Location of chronic pain:: ankle  Radiating: No  Progression: Improving  Description of pain: Aching, Sharp  Chronic pain severity:: 6  Limitation of routine activities due to chronic pain:: Yes  Description: Unable to perform most daily activities (chores, hobbies, social activities, driving)  Alleviating " Factors: Rest, Pain Medication  Aggravating Factors: Positioning, Activity  Health Maintenance Reviewed: Due/Overdue   Health Maintenance Due   Topic Date Due     URINE DRUG SCREEN  1940     MEDICARE ANNUAL WELLNESS VISIT  2018     ZOSTER IMMUNIZATION (3 of 3) 2020       Clinical Pathway: None    Medication Management:  Medication reconciliation status: Medications reviewed and reconciled.  Continue medications without change.       Functional Status:  Dependent ADLs:: Wheelchair-independent, Bathing, Dressing  Dependent IADLs:: Transportation, Cooking, Laundry, Shopping  Bed or wheelchair confined:: No  Mobility Status: Independent w/Device  Fallen 2 or more times in the past year?: No  Any fall with injury in the past year?: No    Living Situation:  Current living arrangement:: I live in a private home with spouse  Type of residence:: Private home - no stairs    Lifestyle & Psychosocial Needs:        Diet:: Regular  Inadequate nutrition (GOAL):: No  Tube Feeding: No  Inadequate activity/exercise (GOAL):: No  Significant changes in sleep pattern (GOAL): No  Transportation means:: Regular car, Family     Church or spiritual beliefs that impact treatment:: No  Mental health DX:: No  Mental health management concern (GOAL):: No  Informal Support system:: Family   Socioeconomic History     Marital status:      Spouse name: Not on file     Number of children: Not on file     Years of education: Not on file     Highest education level: Not on file     Tobacco Use     Smoking status: Former Smoker     Packs/day: 0.50     Years: 7.00     Pack years: 3.50     Types: Cigarettes     Last attempt to quit: 1962     Years since quittin.6     Smokeless tobacco: Never Used   Substance and Sexual Activity     Alcohol use: Yes     Comment: one every other day     Drug use: No     Sexual activity: Yes     Partners: Female            Resources and Interventions:  Current Resources:   List of home  care services:: Physicial Therapy  Community Resources: Home Care  Supplies used at home:: None  Equipment Currently Used at Home: wheelchair, manual, walker, rolling, grab bar, tub/shower, shower chair, raised toilet    Advance Care Plan/Directive  Advanced Care Plans/Directives on file:: No  Advanced Care Plan/Directive Status: Declined Further Information    Referrals Placed: None     Goals:   Goals        General    #1 Monitoring (pt-stated)     Notes - Note created  7/30/2020  1:37 PM by Cordelia Londono RN    Goal Statement: I will have suitable pain relief to perform activities of daily living.  Date Goal set: 7/30/20  Barriers: medical history, recent ankle surgery  Strengths: engaged in plan of care  Date to Achieve By: 12/1/20  Patient expressed understanding of goal: Yes  Action steps to achieve this goal:  1. I will follow up with my PCP or specialist(s) as recommended.   2. I will take medications as prescribed to maintain adequate pain control/relief.  3. I will try to remain as physically active as I can tolerate to maintain a healthy lifestyle        #2 Functional (pt-stated)     Notes - Note created  7/30/2020  1:38 PM by Cordelia Londono RN    Goal Statement: I will recover from recent ankle surgery  Date Goal set: 7/30/20  Barriers: weight bearing restrictions  Strengths: engaged in plan of care; home PT  Date to Achieve By: 12/1/20  Patient expressed understanding of goal: Yes  Action steps to achieve this goal:  1. I will participate in home PT sessions to support initial recovery after surgery.  2. I will maintain clinic follow up appointments with my surgeon as well as PCP.  3. I will call my care team as needed with questions or concerns.              Patient/Caregiver understanding: Yes       Future Appointments              In 4 days Shayy Ramírez APRN [facility]  Arrive at: Clinic A          Plan: 1) Patient will maintain PCP follow up as scheduled on  8/3/20. His son has taken off work and will be helping him to appointment.  2) Patient will follow up with his Ortho surgeon as directed (not seen in Epic as it's through [facility])  3) Patient will take pain medication as prescribed for management of both post-op acute pain and his chronic pain.    RN Care Coordinator will follow up in 2-4 weeks and then anticipate transition to CHW outreach thereafter for monthly outreaches.    JIAN Gallardo, RN   [facility]  - Clinic Care Coordinator  Phone: [phone]

## 2020-08-03 ENCOUNTER — OFFICE VISIT (OUTPATIENT)
Dept: FAMILY MEDICINE | Facility: CLINIC | Age: 80
End: 2020-08-03
Payer: COMMERCIAL

## 2020-08-03 VITALS
OXYGEN SATURATION: 97 % | TEMPERATURE: 97.8 F | BODY MASS INDEX: 32.92 KG/M2 | DIASTOLIC BLOOD PRESSURE: 70 MMHG | SYSTOLIC BLOOD PRESSURE: 134 MMHG | HEART RATE: 68 BPM | HEIGHT: 63 IN

## 2020-08-03 DIAGNOSIS — D56.8 OTHER THALASSEMIA (H): Primary | ICD-10-CM

## 2020-08-03 DIAGNOSIS — Z09 HOSPITAL DISCHARGE FOLLOW-UP: ICD-10-CM

## 2020-08-03 DIAGNOSIS — N18.30 CKD (CHRONIC KIDNEY DISEASE) STAGE 3, GFR 30-59 ML/MIN (H): ICD-10-CM

## 2020-08-03 DIAGNOSIS — Z96.661 STATUS POST RIGHT ANKLE JOINT REPLACEMENT: ICD-10-CM

## 2020-08-03 LAB
ANION GAP SERPL CALCULATED.3IONS-SCNC: 3 MMOL/L (ref 3–14)
BUN SERPL-MCNC: 23 MG/DL (ref 7–30)
CALCIUM SERPL-MCNC: 8.2 MG/DL (ref 8.5–10.1)
CHLORIDE SERPL-SCNC: 112 MMOL/L (ref 94–109)
CO2 SERPL-SCNC: 27 MMOL/L (ref 20–32)
CREAT SERPL-MCNC: 1.76 MG/DL (ref 0.66–1.25)
ERYTHROCYTE [DISTWIDTH] IN BLOOD BY AUTOMATED COUNT: 22.5 % (ref 10–15)
GFR SERPL CREATININE-BSD FRML MDRD: 36 ML/MIN/{1.73_M2}
GLUCOSE SERPL-MCNC: 108 MG/DL (ref 70–99)
HCT VFR BLD AUTO: 29 % (ref 40–53)
HGB BLD-MCNC: 9 G/DL (ref 13.3–17.7)
MCH RBC QN AUTO: 20.2 PG (ref 26.5–33)
MCHC RBC AUTO-ENTMCNC: 31 G/DL (ref 31.5–36.5)
MCV RBC AUTO: 65 FL (ref 78–100)
PLATELET # BLD AUTO: 271 10E9/L (ref 150–450)
POTASSIUM SERPL-SCNC: 4.1 MMOL/L (ref 3.4–5.3)
RBC # BLD AUTO: 4.46 10E12/L (ref 4.4–5.9)
SODIUM SERPL-SCNC: 142 MMOL/L (ref 133–144)
WBC # BLD AUTO: 4.7 10E9/L (ref 4–11)

## 2020-08-03 PROCEDURE — 85027 COMPLETE CBC AUTOMATED: CPT | Performed by: NURSE PRACTITIONER

## 2020-08-03 PROCEDURE — 36415 COLL VENOUS BLD VENIPUNCTURE: CPT | Performed by: NURSE PRACTITIONER

## 2020-08-03 PROCEDURE — 99214 OFFICE O/P EST MOD 30 MIN: CPT | Performed by: NURSE PRACTITIONER

## 2020-08-03 PROCEDURE — 80048 BASIC METABOLIC PNL TOTAL CA: CPT | Performed by: NURSE PRACTITIONER

## 2020-08-03 NOTE — PATIENT INSTRUCTIONS
Thank you for choosing [facility].  You may be receiving an email and/or telephone survey request from [facility] [employer] regarding your visit today.  Please take a few minutes to respond to the survey to let us know how we are doing.      If you have questions or concerns, please contact us via [employer] or you can contact your care team at [phone].    Our Clinic hours are:  Monday 6:40 am  to 7:00 pm  Tuesday -Friday 6:40 am to 5:00 pm    The [place] outpatient lab hours are:  Monday - Friday 6:10 am to 4:45 pm  Saturdays 7:00 am to 11:00 am  Appointments are required, call [phone]    If you have clinical questions after hours or would like to schedule an appointment,  call the clinic at [phone].

## 2020-08-03 NOTE — PROGRESS NOTES
Subjective     Mann Escobar is a 79 year old male who presents to clinic today for the following health issues:    HPI         Hospital Follow-up Visit:    Hospital/Nursing Home/IP Rehab Facility: [facility]  Date of Admission: 7/23/2020  Date of Discharge: 7/27/2020  Reason(s) for Admission: s/p right ankle reconstructive arthroplasty  Acute on chronic anemia      Was your hospitalization related to COVID-19? No   Problems taking medications regularly:  None  Medication changes since discharge: None  Does he need to keep taking aspirin 325mg  Problems adhering to non-medication therapy:  None    Summary of hospitalization:  [facility] discharge summary reviewed  Diagnostic Tests/Treatments reviewed.  Follow up needed: none  Other Healthcare Providers Involved in Patient s Care:         None  Update since discharge: improved.       Post Discharge Medication Reconciliation: discharge medications reconciled, continue medications without change.  Plan of care communicated with patient              HPI: 79-year-old male admitted to the hospital on July 23 for a scheduled right ankle reconstructive arthroplasty by Dr. Powers.  Surgery was successful however patient has had difficulty with pain control.  He is on chronic narcotics for other pain issues.  He was discharged with 3 days of oxycodone 10 mg 3 times daily in addition to his usual opioid prescription.  Today he reports that his pain is somewhat improved.  The most difficult thing is remaining nonweightbearing on his surgical ankle.  He was brought to clinic in a wheelchair by his son.    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  In preop physical patient was noted to be anemic.  He had stopped taking his iron supplements some time ago.  He previously had followed with Dr. Andrew in hematology and was receiving iron infusions.  Postop he received 2 units of packed red blood cells.  Hemoglobin then stabilized and patient  "was continued on oral iron.    Acute on chronic kidney disease.  In preop physical patient's creatinine was noted to be 2.14.  He was instructed to stop his Maxide at that time.  Just prior to surgery, the creatinine was rechecked and had improved to 1.9.  Creatinine was stable postop.  On discharge it was recommended that patient have nephrology consultation.    Other chronic medical conditions were stable while hospitalized.              Reviewed and updated as needed this visit by Provider  Tobacco  Allergies  Meds  Problems  Med Hx  Surg Hx  Fam Hx         Review of Systems   Constitutional, HEENT, cardiovascular, pulmonary, gi and gu systems are negative, except as otherwise noted.      Objective    /70 (BP Location: Right arm)   Pulse 68   Temp 97.8  F (36.6  C) (Tympanic)   Ht 1.6 m (5' 3\")   SpO2 97%   BMI 32.92 kg/m    Body mass index is 32.92 kg/m .  Physical Exam   GENERAL: healthy, alert and no distress  NECK: no adenopathy, no asymmetry, masses, or scars and thyroid normal to palpation  RESP: lungs clear to auscultation - no rales, rhonchi or wheezes  CV: regular rate and rhythm, normal S1 S2, no S3 or S4, no murmur, click or rub, no peripheral edema and peripheral pulses strong  MS: no gross musculoskeletal defects noted, no edema            Assessment & Plan       ICD-10-CM    1. Thalassemia minor  D56.8 CBC with platelets     Oncology/Hematology Adult Referral   2. CKD (chronic kidney disease) stage 3, GFR 30-59 ml/min (H)  N18.3 Basic metabolic panel  (Ca, Cl, CO2, Creat, Gluc, K, Na, BUN)     NEPHROLOGY ADULT REFERRAL   3. Status post right ankle joint replacement  Z96.661    4. Hospital discharge follow-up  Z09      79-year-old male status post right ankle reconstructive arthroplasty on July 23.  Patient is doing well postoperatively.  Reinforced importance of remaining nonweightbearing until podiatry follow-up.  Recheck CBC today.  Patient should continue on oral iron " supplements and should schedule follow-up appointment in hematology.  Recheck BMP today.  Patient has had a worsening of his kidney disease over the last year.  Nephrology referral given.          Return in about 4 weeks (around 8/31/2020).    The risks, benefits and treatment options of prescribed medications or other treatments have been discussed with the patient. The patient verbalized their understanding and should call or follow up if no improvement or if they develop further problems.    GUNJAN Werner [facility]

## 2020-08-05 ENCOUNTER — TELEPHONE (OUTPATIENT)
Dept: HEALTH INFORMATION MANAGEMENT | Facility: CLINIC | Age: 80
End: 2020-08-05

## 2020-08-05 ENCOUNTER — TELEPHONE (OUTPATIENT)
Dept: FAMILY MEDICINE | Facility: CLINIC | Age: 80
End: 2020-08-05

## 2020-08-06 ENCOUNTER — TRANSFERRED RECORDS (OUTPATIENT)
Dept: HEALTH INFORMATION MANAGEMENT | Facility: CLINIC | Age: 80
End: 2020-08-06

## 2020-08-18 NOTE — PROGRESS NOTES
"Mann Escobar is a 79 year old male who is being evaluated via a billable telephone visit.      The patient has been notified of following:     \"This telephone visit will be conducted via a call between you and your physician/provider. We have found that certain health care needs can be provided without the need for a physical exam.  This service lets us provide the care you need with a short phone conversation.  If a prescription is necessary we can send it directly to your pharmacy.  If lab work is needed we can place an order for that and you can then stop by our lab to have the test done at a later time.    Telephone visits are billed at different rates depending on your insurance coverage. During this emergency period, for some insurers they may be billed the same as an in-person visit.  Please reach out to your insurance provider with any questions.    If during the course of the call the physician/provider feels a telephone visit is not appropriate, you will not be charged for this service.\"    Patient has given verbal consent for Telephone visit?  Yes    What phone number would you like to be contacted at? [phone]    How would you like to obtain your AVS? Mail a copy    Subjective     Mann Escobar is a 79 year old male who presents via phone visit today for the following health issues:    HPI    Chronic Pain Follow-Up    Where in your body do you have pain? Low back, ankle and shoulder  How has your pain affected your ability to work? Not applicable  Which of these pain treatments have you tried since your last clinic visit? Surgery- on ankle  How well are you sleeping? Good/ fair  How has your mood been since your last visit? About the same  \"fine\"  Have you had a significant life event? Other: surgery  Other aggravating factors: sedentary lifestyle  Taking medication as directed? Yes    PHQ-9 SCORE 5/22/2018 5/31/2019   PHQ-9 Total Score 1 3     SARAH-7 SCORE 5/22/2018 " 5/31/2019   Total Score 0 0     No flowsheet data found.  Encounter-Level CSA - 05/02/2017:    Controlled Substance Agreement - Scan on 5/8/2017  2:07 PM: CONTROLLED SUBSTANCE AGREEMENT     Patient-Level CSA:    There are no patient-level csa.         How many servings of fruits and vegetables do you eat daily?  2-3    On average, how many sweetened beverages do you drink each day (Examples: soda, juice, sweet tea, etc.  Do NOT count diet or artificially sweetened beverages)?   0    How many days per week do you exercise enough to make your heart beat faster? none    How many minutes a day do you exercise enough to make your heart beat faster?     How many days per week do you miss taking your medication? 0                Reviewed and updated as needed this visit by Provider  Tobacco  Allergies  Meds  Problems  Med Hx  Surg Hx  Fam Hx         Review of Systems   Constitutional, HEENT, cardiovascular, pulmonary, gi and gu systems are negative, except as otherwise noted.       Objective          Vitals:  No vitals were obtained today due to virtual visit.  PSYCH: Alert and oriented times 3; coherent speech, normal   rate and volume, able to articulate logical thoughts, able   to abstract reason, no tangential thoughts, no hallucinations   or delusions   RESP: No cough, no audible wheezing, able to talk in full sentences  Remainder of exam unable to be completed due to telephone visits            Assessment/Plan:    Assessment & Plan     Chronic left-sided low back pain with left-sided sciatica  Refilled chronic pain medications x3 months.  Compliant with contract.  - oxyCODONE (ROXICODONE) 5 MG tablet; Take 1 tablet (5 mg) by mouth 2 times daily as needed for severe pain           Return in about 3 months (around 11/19/2020) for Medication Check.    The risks, benefits and treatment options of prescribed medications or other treatments have been discussed with the patient. The patient verbalized their  understanding and should call or follow up if no improvement or if they develop further problems.    GUNJAN Werner [facility]    Phone call duration:  10 minutes

## 2020-08-19 ENCOUNTER — VIRTUAL VISIT (OUTPATIENT)
Dept: FAMILY MEDICINE | Facility: CLINIC | Age: 80
End: 2020-08-19
Payer: COMMERCIAL

## 2020-08-19 DIAGNOSIS — M54.42 CHRONIC LEFT-SIDED LOW BACK PAIN WITH LEFT-SIDED SCIATICA: ICD-10-CM

## 2020-08-19 DIAGNOSIS — G89.29 CHRONIC LEFT-SIDED LOW BACK PAIN WITH LEFT-SIDED SCIATICA: ICD-10-CM

## 2020-08-19 PROCEDURE — 99213 OFFICE O/P EST LOW 20 MIN: CPT | Mod: 95 | Performed by: NURSE PRACTITIONER

## 2020-08-19 RX ORDER — OXYCODONE HYDROCHLORIDE 5 MG/1
5 TABLET ORAL 2 TIMES DAILY PRN
Qty: 60 TABLET | Refills: 0 | Status: SHIPPED | OUTPATIENT
Start: 2020-08-19 | End: 2020-08-19

## 2020-08-19 RX ORDER — OXYCODONE HYDROCHLORIDE 5 MG/1
5 TABLET ORAL 2 TIMES DAILY PRN
Qty: 60 TABLET | Refills: 0 | Status: SHIPPED | OUTPATIENT
Start: 2020-10-16 | End: 2020-11-24

## 2020-08-19 RX ORDER — OXYCODONE HYDROCHLORIDE 5 MG/1
5 TABLET ORAL 2 TIMES DAILY PRN
Qty: 60 TABLET | Refills: 0 | Status: SHIPPED | OUTPATIENT
Start: 2020-09-17 | End: 2020-08-19

## 2020-08-21 ENCOUNTER — PATIENT OUTREACH (OUTPATIENT)
Dept: CARE COORDINATION | Facility: CLINIC | Age: 80
End: 2020-08-21

## 2020-08-21 NOTE — PROGRESS NOTES
Clinic Care Coordination Contact  [facility]/Voicemail       Clinical Data: Care Coordinator Outreach  Proactive follow up outreach    Outreach attempted x 1.  Left message on patient's voicemail with call back information and requested return call.    Plan:  Care Coordinator will try to reach patient again in 2 weeks.    BATOOL GallardoN, RN   [facility]  - Clinic Care Coordinator  Phone: [phone]

## 2020-08-27 ENCOUNTER — OFFICE VISIT (OUTPATIENT)
Dept: FAMILY MEDICINE | Facility: CLINIC | Age: 80
End: 2020-08-27
Payer: COMMERCIAL

## 2020-08-27 ENCOUNTER — TRANSFERRED RECORDS (OUTPATIENT)
Dept: HEALTH INFORMATION MANAGEMENT | Facility: CLINIC | Age: 80
End: 2020-08-27

## 2020-08-27 DIAGNOSIS — Z23 ENCOUNTER FOR IMMUNIZATION: Primary | ICD-10-CM

## 2020-08-27 PROCEDURE — 99207 ZZC NO CHARGE NURSE ONLY: CPT

## 2020-08-30 NOTE — PROGRESS NOTES
Type of Visit: Video Visit  Patient has given verbal consent for Video visit.     Video Start Time: 2:05 pm  Video End Time: 2:30 pm    Originating Location (pt. Location): Home     Distant Location (provider location):  [facility]      Platform used for Video Visit: [facility]        Hematology follow up visit    CC: ANAMIKA on top of thalassemia minor, gastric bypass    HISTORY OF PRESENT ILLNESS:   He has a history of thalassemia minor and is aware of being anemic since his younger age.  He states he has been feeling fatigued for months in 2017.  He had a fall and syncope and found to have a hemoglobin of 7.9 on 10/28/2017 and was noted to have significant microcytosis.  He also had leukocytosis at that time, normal platelet count at that time.    He became severely anemia 2010 with hb down to less than 9 in 2017.   His baseline hb was 11.4 in 2013, mcv 56.    Upper endo and colonoscopy were negative in 12/2017.    Hematology work up 11/2017 was most suggestive of ANAMIKA.    He did not response well to oral vitron C due to gastric bypass, IV iron is offered in 1/2018.  Ferritin up to 200 in 2/2018 from 8 in 12/2017. Hb up to around 10.   He then got on maintenance. He then lost follow up.         INTERVAL HISTORY:  Hb dip down to less than 8 in 7/2020. Had blood transfusion with his surgery.       PAST MEDICAL HISTORY:  Chronic back pain. OA, chronic pain syndrome from back, s/p L spine fusion, s/p bilateral knee replacement, BPH, gout. Gastric bypass.   The patient also reports that he had an upper GI bleed in 2016 when an EGD and colonoscopy summer 2016 was performed, and there was nothing serious found.  He apparently was using lots of NSAIDs at that time. He s/p had 2 units PRBC.      MEDICATIONS:  Reviewed.        ALLERGIES:  Reviewed.       SOCIAL HISTORY:  Retired currently.  He used to work at [employer] at [employer]. Nonsmoker, occasional alcohol intake.  He is .       FAMILY HISTORY:  Mother  had colon cancer in her 80s.  Sister had breast cancer in her 60s and diseased from it.  Brother had colon cancer in his 40s. Father had prostate and bone cancer in his 70s.  No other family members with breast cancer.         REVIEW OF SYSTEMS  The patient does not have issues with any obvious bleeding, bruising. He is not seeing any black-colored stools.   He can not drive, due to recent ankle surgery.       PHYSICAL EXAMINATION ATTAINABLE DURING VIDEO VISIT:  CONSTITUTIONAL - Pt looks like stated age, pleasant, not in acute distress. Not obese.  NEURO: Oriented to time, person, and places. No tremor. Normal gait.   ENT, MOUTH: Pupils are equal.  Sclerae are anicteric.  Moist oral mucosa. No oral thrush.   NECK:  No jugular venous distention.  No thyroid enlargement.   RESPIRATORY: talk nl, no sob during conversation, no cough.   MUSCULOSKELETAL/EXTREMITIES:  No edema.  No joint deformity. Normal range of motion.  SKIN:  No petechiae.  No rash.  No signs of cellulitis.   PSYCHIATRIC: Normal mood and affect. Good memory. Proper insight and judgement.   THE REST OF A COMPREHENSIVE PHYSICIAL EXAM IS DEFERRED DUE TO COVID-19 PUBLIC HEALTH EMERGENCY RELATED VIDEO VISIT RESCTRICTION.      CURRENT LAB DATA REVIEWED TODAY WITH PATIENT DURING VISIT:  HB 9, mcv 65       OLD DATA REVIEWED TODAY WITH SUMMARY:   Ferritin 222 in 2/2018, 12/2017 at 8 from 7 in 11/2017  Hb 10.2 in 2/2018, 12/2017 at 7.8 from 8.2    Upper endo 2/2018 - negative    EGD no bleeding found, gastric biopsy 12/2017 - no ca or H pylori.   Colonoscopy 12/2017 - nl.     11/2017 hb 8.2, MCV 50, wbc/PL nl. reti 0.4 low  Smear - The red cells are microcytic, hypochromic There is marked anisocytosis. There are occasional target cells and irregularly shaped fragmented red blood cells.  Wbc/PL are nl.  FIT: negative.    11/2017 hem work up -  MCKAYLA/hapto/TSH/LDH/spep  nl, folate B12 not low,  nl. Iron 15, iron sat 4%.   ferritin 7, epo 50, stfR 16, TfR index  (sTFR/ferritin) is > 2 is consistent with ANAMIKA.     Hb electro: Hb A1 94.5% low, Hb A2 4.7% high, HbF nl.   An elevated Hb A2 level can be seen in beta thalassemia trait and rarely in unstable hemoglobin variants.        US 11/2017: No evidence for hepatomegaly, spleen is borderline prominent 13.7 cm in length.    His baseline hb was 11.4 in 2013, mcv 56.        ASSESSMENT AND PLAN DISCUSSED WITH PATIENT TODAY DURING THE VISIT:   1. Chronic microcytic anemia with baseline hb of 11.  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       2. developed severe anemia in 2017 overseveral years. Hematology work up 11/2017 was most suggestive of ANAMIKA.  He is advised on oral iron vitron C po bid on relative empty stomach.     S/p IV iron Injectafer 750 mg wkly x2 which improved ferritin > 200 from 8, hb 10 from 7.   Recommend maintenance IV iron.   He then lost follow up. Hb dip down to less than 8 in 7/2020.     He is not interested in small bowel capsule study now his hb and ferritin are up.     Will reconnect him to IV iron maintenance tx.     Virtual VISIT time is 25 minutes, spent in dicussion and review patient's anemia and treatment history, labs results, symptom managment, further treatment recommendations, follow up plan.

## 2020-09-03 ENCOUNTER — VIRTUAL VISIT (OUTPATIENT)
Dept: ONCOLOGY | Facility: CLINIC | Age: 80
End: 2020-09-03
Attending: NURSE PRACTITIONER
Payer: COMMERCIAL

## 2020-09-03 VITALS — HEIGHT: 63 IN | WEIGHT: 173 LBS | BODY MASS INDEX: 30.65 KG/M2

## 2020-09-03 DIAGNOSIS — D56.8 OTHER THALASSEMIA (H): ICD-10-CM

## 2020-09-03 DIAGNOSIS — D50.9 IRON DEFICIENCY ANEMIA, UNSPECIFIED IRON DEFICIENCY ANEMIA TYPE: Primary | ICD-10-CM

## 2020-09-03 PROCEDURE — 99214 OFFICE O/P EST MOD 30 MIN: CPT | Mod: 95 | Performed by: INTERNAL MEDICINE

## 2020-09-03 PROCEDURE — 40001009 ZZH VIDEO/TELEPHONE VISIT; NO CHARGE

## 2020-09-03 RX ORDER — VIT C/B6/B5/MAGNESIUM/HERB 173 50-5-6-5MG
CAPSULE ORAL
COMMUNITY
End: 2022-01-01

## 2020-09-03 ASSESSMENT — PAIN SCALES - GENERAL: PAINLEVEL: MILD PAIN (2)

## 2020-09-03 ASSESSMENT — MIFFLIN-ST. JEOR: SCORE: 1394.85

## 2020-09-03 NOTE — PATIENT INSTRUCTIONS
Start IV iron q 3 months with labs. Start in 1 month when he can drive again.   Follow-up in 6 months with video.

## 2020-09-03 NOTE — LETTER
9/3/2020         RE: Mann Escobar  [address]  [place] [address]        Dear Colleague,    Thank you for referring your patient, Mann Escobar, to the [facility] CANCER [facility]. Please see a copy of my visit note below.    Type of Visit: Video Visit  Patient has given verbal consent for Video visit.     Video Start Time: 2:05 pm  Video End Time: 2:30 pm    Originating Location (pt. Location): Home     Distant Location (provider location):  [facility]      Platform used for Video Visit: [facility]        Hematology follow up visit    CC: ANAMIKA on top of thalassemia minor, gastric bypass    HISTORY OF PRESENT ILLNESS:   He has a history of thalassemia minor and is aware of being anemic since his younger age.  He states he has been feeling fatigued for months in 2017.  He had a fall and syncope and found to have a hemoglobin of 7.9 on 10/28/2017 and was noted to have significant microcytosis.  He also had leukocytosis at that time, normal platelet count at that time.    He became severely anemia 2010 with hb down to less than 9 in 2017.   His baseline hb was 11.4 in 2013, mcv 56.    Upper endo and colonoscopy were negative in 12/2017.    Hematology work up 11/2017 was most suggestive of ANAMIKA.    He did not response well to oral vitron C due to gastric bypass, IV iron is offered in 1/2018.  Ferritin up to 200 in 2/2018 from 8 in 12/2017. Hb up to around 10.   He then got on maintenance. He then lost follow up.         INTERVAL HISTORY:  Hb dip down to less than 8 in 7/2020. Had blood transfusion with his surgery.       PAST MEDICAL HISTORY:  Chronic back pain. OA, chronic pain syndrome from back, s/p L spine fusion, s/p bilateral knee replacement, BPH, gout. Gastric bypass.   The patient also reports that he had an upper GI bleed in 2016 when an EGD and colonoscopy summer 2016 was performed, and there was nothing serious found.  He apparently was using lots of NSAIDs at that  time. He s/p had 2 units PRBC.      MEDICATIONS:  Reviewed.        ALLERGIES:  Reviewed.       SOCIAL HISTORY:  Retired currently.  He used to work at [employer] at [employer]. Nonsmoker, occasional alcohol intake.  He is .       FAMILY HISTORY:  Mother had colon cancer in her 80s.  Sister had breast cancer in her 60s and diseased from it.  Brother had colon cancer in his 40s. Father had prostate and bone cancer in his 70s.  No other family members with breast cancer.         REVIEW OF SYSTEMS  The patient does not have issues with any obvious bleeding, bruising. He is not seeing any black-colored stools.   He can not drive, due to recent ankle surgery.       PHYSICAL EXAMINATION ATTAINABLE DURING VIDEO VISIT:  CONSTITUTIONAL - Pt looks like stated age, pleasant, not in acute distress. Not obese.  NEURO: Oriented to time, person, and places. No tremor. Normal gait.   ENT, MOUTH: Pupils are equal.  Sclerae are anicteric.  Moist oral mucosa. No oral thrush.   NECK:  No jugular venous distention.  No thyroid enlargement.   RESPIRATORY: talk nl, no sob during conversation, no cough.   MUSCULOSKELETAL/EXTREMITIES:  No edema.  No joint deformity. Normal range of motion.  SKIN:  No petechiae.  No rash.  No signs of cellulitis.   PSYCHIATRIC: Normal mood and affect. Good memory. Proper insight and judgement.   THE REST OF A COMPREHENSIVE PHYSICIAL EXAM IS DEFERRED DUE TO COVID-19 PUBLIC HEALTH EMERGENCY RELATED VIDEO VISIT RESCTRICTION.      CURRENT LAB DATA REVIEWED TODAY WITH PATIENT DURING VISIT:  HB 9, mcv 65       OLD DATA REVIEWED TODAY WITH SUMMARY:   Ferritin 222 in 2/2018, 12/2017 at 8 from 7 in 11/2017  Hb 10.2 in 2/2018, 12/2017 at 7.8 from 8.2    Upper endo 2/2018 - negative    EGD no bleeding found, gastric biopsy 12/2017 - no ca or H pylori.   Colonoscopy 12/2017 - nl.     11/2017 hb 8.2, MCV 50, wbc/PL nl. reti 0.4 low  Smear - The red cells are microcytic, hypochromic There is marked anisocytosis.  "There are occasional target cells and irregularly shaped fragmented red blood cells.  Wbc/PL are nl.  FIT: negative.    11/2017 hem work up -  MCKAYLA/hapto/TSH/LDH/spep  nl, folate B12 not low,  nl. Iron 15, iron sat 4%.   ferritin 7, epo 50, stfR 16, TfR index (sTFR/ferritin) is > 2 is consistent with ANAMIKA.     Hb electro: Hb A1 94.5% low, Hb A2 4.7% high, HbF nl.   An elevated Hb A2 level can be seen in beta thalassemia trait and rarely in unstable hemoglobin variants.        US 11/2017: No evidence for hepatomegaly, spleen is borderline prominent 13.7 cm in length.    His baseline hb was 11.4 in 2013, mcv 56.        ASSESSMENT AND PLAN DISCUSSED WITH PATIENT TODAY DURING THE VISIT:   1. Chronic microcytic anemia with baseline hb of 11.  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       2. developed severe anemia in 2017 overseveral years. Hematology work up 11/2017 was most suggestive of ANAMIKA.  He is advised on oral iron vitron C po bid on relative empty stomach.     S/p IV iron Injectafer 750 mg wkly x2 which improved ferritin > 200 from 8, hb 10 from 7.   Recommend maintenance IV iron.   He then lost follow up. Hb dip down to less than 8 in 7/2020.     He is not interested in small bowel capsule study now his hb and ferritin are up.     Will reconnect him to IV iron maintenance tx.     Virtual VISIT time is 25 minutes, spent in dicussion and review patient's anemia and treatment history, labs results, symptom managment, further treatment recommendations, follow up plan.         Mann Escobar is a 79 year old male who is being evaluated via a billable video visit.      The patient has been notified of following:     \"This video visit will be conducted via a call between you and your physician/provider. We have found that certain health care needs can be provided without the need for an in-person physical exam.  This service lets us provide the care you need with a video conversation.  If " "a prescription is necessary we can send it directly to your pharmacy.  If lab work is needed we can place an order for that and you can then stop by our lab to have the test done at a later time.    Video visits are billed at different rates depending on your insurance coverage.  Please reach out to your insurance provider with any questions.    If during the course of the call the physician/provider feels a video visit is not appropriate, you will not be charged for this service.\"    Patient has given verbal consent for Video visit? Yes  How would you like to obtain your AVS? Mail a copy  If you are dropped from the video visit, the video invite should be resent to: Text to cell phone: [phone]  Will anyone else be joining your video visit? Yes: wife Urszula. How would they like to receive their invitation? Text to cell phone: [phone]        Video-Visit Details    Type of service:  Video Visit      Originating Location (pt. Location): Home    Distant Location (provider location):  [facility]       Mirella Armstrong [facility]        Again, thank you for allowing me to participate in the care of your patient.        Sincerely,        Anne Marie Andrew MD, MD    "

## 2020-09-03 NOTE — PROGRESS NOTES
"Mann Escobar is a 79 year old male who is being evaluated via a billable video visit.      The patient has been notified of following:     \"This video visit will be conducted via a call between you and your physician/provider. We have found that certain health care needs can be provided without the need for an in-person physical exam.  This service lets us provide the care you need with a video conversation.  If a prescription is necessary we can send it directly to your pharmacy.  If lab work is needed we can place an order for that and you can then stop by our lab to have the test done at a later time.    Video visits are billed at different rates depending on your insurance coverage.  Please reach out to your insurance provider with any questions.    If during the course of the call the physician/provider feels a video visit is not appropriate, you will not be charged for this service.\"    Patient has given verbal consent for Video visit? Yes  How would you like to obtain your AVS? Mail a copy  If you are dropped from the video visit, the video invite should be resent to: Text to cell phone: [phone]  Will anyone else be joining your video visit? Yes: wife Urszula. How would they like to receive their invitation? Text to cell phone: [phone]        Video-Visit Details    Type of service:  Video Visit      Originating Location (pt. Location): Home    Distant Location (provider location):  [facility]       Mirella Armstrong CMA      "

## 2020-09-03 NOTE — LETTER
9/3/2020         RE: Mann Escobar  [address]  [place] [address]        Dear Colleague,    Thank you for referring your patient, Mann Escobar, to the [facility] CANCER [facility]. Please see a copy of my visit note below.    Type of Visit: Video Visit  Patient has given verbal consent for Video visit.     Video Start Time: 2:05 pm  Video End Time: 2:30 pm    Originating Location (pt. Location): Home     Distant Location (provider location):  [facility]      Platform used for Video Visit: [facility]        Hematology follow up visit    CC: ANAMIKA on top of thalassemia minor, gastric bypass    HISTORY OF PRESENT ILLNESS:   He has a history of thalassemia minor and is aware of being anemic since his younger age.  He states he has been feeling fatigued for months in 2017.  He had a fall and syncope and found to have a hemoglobin of 7.9 on 10/28/2017 and was noted to have significant microcytosis.  He also had leukocytosis at that time, normal platelet count at that time.    He became severely anemia 2010 with hb down to less than 9 in 2017.   His baseline hb was 11.4 in 2013, mcv 56.    Upper endo and colonoscopy were negative in 12/2017.    Hematology work up 11/2017 was most suggestive of ANAMIKA.    He did not response well to oral vitron C due to gastric bypass, IV iron is offered in 1/2018.  Ferritin up to 200 in 2/2018 from 8 in 12/2017. Hb up to around 10.   He then got on maintenance. He then lost follow up.         INTERVAL HISTORY:  Hb dip down to less than 8 in 7/2020. Had blood transfusion with his surgery.       PAST MEDICAL HISTORY:  Chronic back pain. OA, chronic pain syndrome from back, s/p L spine fusion, s/p bilateral knee replacement, BPH, gout. Gastric bypass.   The patient also reports that he had an upper GI bleed in 2016 when an EGD and colonoscopy summer 2016 was performed, and there was nothing serious found.  He apparently was using lots of NSAIDs at that  time. He s/p had 2 units PRBC.      MEDICATIONS:  Reviewed.        ALLERGIES:  Reviewed.       SOCIAL HISTORY:  Retired currently.  He used to work at [employer] at [employer]. Nonsmoker, occasional alcohol intake.  He is .       FAMILY HISTORY:  Mother had colon cancer in her 80s.  Sister had breast cancer in her 60s and diseased from it.  Brother had colon cancer in his 40s. Father had prostate and bone cancer in his 70s.  No other family members with breast cancer.         REVIEW OF SYSTEMS  The patient does not have issues with any obvious bleeding, bruising. He is not seeing any black-colored stools.   He can not drive, due to recent ankle surgery.       PHYSICAL EXAMINATION ATTAINABLE DURING VIDEO VISIT:  CONSTITUTIONAL - Pt looks like stated age, pleasant, not in acute distress. Not obese.  NEURO: Oriented to time, person, and places. No tremor. Normal gait.   ENT, MOUTH: Pupils are equal.  Sclerae are anicteric.  Moist oral mucosa. No oral thrush.   NECK:  No jugular venous distention.  No thyroid enlargement.   RESPIRATORY: talk nl, no sob during conversation, no cough.   MUSCULOSKELETAL/EXTREMITIES:  No edema.  No joint deformity. Normal range of motion.  SKIN:  No petechiae.  No rash.  No signs of cellulitis.   PSYCHIATRIC: Normal mood and affect. Good memory. Proper insight and judgement.   THE REST OF A COMPREHENSIVE PHYSICIAL EXAM IS DEFERRED DUE TO COVID-19 PUBLIC HEALTH EMERGENCY RELATED VIDEO VISIT RESCTRICTION.      CURRENT LAB DATA REVIEWED TODAY WITH PATIENT DURING VISIT:  HB 9, mcv 65       OLD DATA REVIEWED TODAY WITH SUMMARY:   Ferritin 222 in 2/2018, 12/2017 at 8 from 7 in 11/2017  Hb 10.2 in 2/2018, 12/2017 at 7.8 from 8.2    Upper endo 2/2018 - negative    EGD no bleeding found, gastric biopsy 12/2017 - no ca or H pylori.   Colonoscopy 12/2017 - nl.     11/2017 hb 8.2, MCV 50, wbc/PL nl. reti 0.4 low  Smear - The red cells are microcytic, hypochromic There is marked anisocytosis.  "There are occasional target cells and irregularly shaped fragmented red blood cells.  Wbc/PL are nl.  FIT: negative.    11/2017 hem work up -  MCKAYLA/hapto/TSH/LDH/spep  nl, folate B12 not low,  nl. Iron 15, iron sat 4%.   ferritin 7, epo 50, stfR 16, TfR index (sTFR/ferritin) is > 2 is consistent with ANAMIKA.     Hb electro: Hb A1 94.5% low, Hb A2 4.7% high, HbF nl.   An elevated Hb A2 level can be seen in beta thalassemia trait and rarely in unstable hemoglobin variants.        US 11/2017: No evidence for hepatomegaly, spleen is borderline prominent 13.7 cm in length.    His baseline hb was 11.4 in 2013, mcv 56.        ASSESSMENT AND PLAN DISCUSSED WITH PATIENT TODAY DURING THE VISIT:   1. Chronic microcytic anemia with baseline hb of 11.  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       2. developed severe anemia in 2017 overseveral years. Hematology work up 11/2017 was most suggestive of ANAMIKA.  He is advised on oral iron vitron C po bid on relative empty stomach.     S/p IV iron Injectafer 750 mg wkly x2 which improved ferritin > 200 from 8, hb 10 from 7.   Recommend maintenance IV iron.   He then lost follow up. Hb dip down to less than 8 in 7/2020.     He is not interested in small bowel capsule study now his hb and ferritin are up.     Will reconnect him to IV iron maintenance tx.     Virtual VISIT time is 25 minutes, spent in dicussion and review patient's anemia and treatment history, labs results, symptom managment, further treatment recommendations, follow up plan.         Mann Escobar is a 79 year old male who is being evaluated via a billable video visit.      The patient has been notified of following:     \"This video visit will be conducted via a call between you and your physician/provider. We have found that certain health care needs can be provided without the need for an in-person physical exam.  This service lets us provide the care you need with a video conversation.  If " "a prescription is necessary we can send it directly to your pharmacy.  If lab work is needed we can place an order for that and you can then stop by our lab to have the test done at a later time.    Video visits are billed at different rates depending on your insurance coverage.  Please reach out to your insurance provider with any questions.    If during the course of the call the physician/provider feels a video visit is not appropriate, you will not be charged for this service.\"    Patient has given verbal consent for Video visit? Yes  How would you like to obtain your AVS? Mail a copy  If you are dropped from the video visit, the video invite should be resent to: Text to cell phone: [phone]  Will anyone else be joining your video visit? Yes: wife Urszula. How would they like to receive their invitation? Text to cell phone: [phone]        Video-Visit Details    Type of service:  Video Visit      Originating Location (pt. Location): Home    Distant Location (provider location):  [facility]       Mirella Armstrong [facility]        Again, thank you for allowing me to participate in the care of your patient.        Sincerely,        Anne Marie Andrew MD, MD    "

## 2020-09-04 ENCOUNTER — PATIENT OUTREACH (OUTPATIENT)
Dept: CARE COORDINATION | Facility: CLINIC | Age: 80
End: 2020-09-04

## 2020-09-04 NOTE — PROGRESS NOTES
Clinic Care Coordination Contact  [facility]/Voicemail       Clinical Data: Care Coordinator Outreach  Proactive follow up outreach    Outreach attempted x 2.  Left message on patient's voicemail with call back information and requested return call if he desires [facility] support for any health-related goals.    Plan:  Care Coordinator will do no further outreaches at this time.      BATOOL GallardoN, RN   [facility]  - Clinic Care Coordinator  Phone: [phone]

## 2020-09-23 ENCOUNTER — TRANSFERRED RECORDS (OUTPATIENT)
Dept: HEALTH INFORMATION MANAGEMENT | Facility: CLINIC | Age: 80
End: 2020-09-23

## 2020-09-28 ENCOUNTER — TELEPHONE (OUTPATIENT)
Dept: FAMILY MEDICINE | Facility: CLINIC | Age: 80
End: 2020-09-28

## 2020-09-28 NOTE — TELEPHONE ENCOUNTER
PT SOC post Podiatrist visit , for PT 1 x more this week then 2x a week x 3 weeks to work on improving ambulation in his shoes, gentle ROM.and balance .

## 2020-10-15 DIAGNOSIS — Z53.9 DIAGNOSIS NOT YET DEFINED: Primary | ICD-10-CM

## 2020-10-15 PROCEDURE — G0180 MD CERTIFICATION HHA PATIENT: HCPCS | Performed by: FAMILY MEDICINE

## 2020-10-19 ENCOUNTER — PATIENT OUTREACH (OUTPATIENT)
Dept: ONCOLOGY | Facility: CLINIC | Age: 80
End: 2020-10-19

## 2020-10-19 DIAGNOSIS — D50.9 IRON DEFICIENCY ANEMIA, UNSPECIFIED IRON DEFICIENCY ANEMIA TYPE: ICD-10-CM

## 2020-10-19 DIAGNOSIS — D56.8 OTHER THALASSEMIA (H): ICD-10-CM

## 2020-10-19 LAB
BASOPHILS # BLD AUTO: 0.1 10E9/L (ref 0–0.2)
BASOPHILS NFR BLD AUTO: 1 %
DIFFERENTIAL METHOD BLD: ABNORMAL
EOSINOPHIL # BLD AUTO: 0.2 10E9/L (ref 0–0.7)
EOSINOPHIL NFR BLD AUTO: 2.6 %
ERYTHROCYTE [DISTWIDTH] IN BLOOD BY AUTOMATED COUNT: 19.3 % (ref 10–15)
FERRITIN SERPL-MCNC: 286 NG/ML (ref 26–388)
HCT VFR BLD AUTO: 26.7 % (ref 40–53)
HGB BLD-MCNC: 8.1 G/DL (ref 13.3–17.7)
IMM GRANULOCYTES # BLD: 0 10E9/L (ref 0–0.4)
IMM GRANULOCYTES NFR BLD: 0.3 %
LYMPHOCYTES # BLD AUTO: 0.8 10E9/L (ref 0.8–5.3)
LYMPHOCYTES NFR BLD AUTO: 13 %
MCH RBC QN AUTO: 19.1 PG (ref 26.5–33)
MCHC RBC AUTO-ENTMCNC: 30.3 G/DL (ref 31.5–36.5)
MCV RBC AUTO: 63 FL (ref 78–100)
MONOCYTES # BLD AUTO: 0.5 10E9/L (ref 0–1.3)
MONOCYTES NFR BLD AUTO: 7.8 %
NEUTROPHILS # BLD AUTO: 4.7 10E9/L (ref 1.6–8.3)
NEUTROPHILS NFR BLD AUTO: 75.3 %
NRBC # BLD AUTO: 0 10*3/UL
NRBC BLD AUTO-RTO: 0 /100
PLATELET # BLD AUTO: 272 10E9/L (ref 150–450)
RBC # BLD AUTO: 4.24 10E12/L (ref 4.4–5.9)
WBC # BLD AUTO: 6.3 10E9/L (ref 4–11)

## 2020-10-19 PROCEDURE — 82728 ASSAY OF FERRITIN: CPT | Performed by: INTERNAL MEDICINE

## 2020-10-19 PROCEDURE — 36415 COLL VENOUS BLD VENIPUNCTURE: CPT | Performed by: INTERNAL MEDICINE

## 2020-10-19 PROCEDURE — 85025 COMPLETE CBC W/AUTO DIFF WBC: CPT | Performed by: INTERNAL MEDICINE

## 2020-10-19 NOTE — PROGRESS NOTES
DATE:  10/19/2020   TIME OF RECEIPT FROM LAB:  12:06  LAB TEST:  HGB   LAB VALUE: 7.8  RESULTS GIVEN WITH READ-BACK TO (PROVIDER):  Dr. Andrew via [employer]  TIME LAB VALUE REPORTED TO PROVIDER:   12:08    Jud Cadena RN on 10/19/2020 at 12:08 PM

## 2020-10-20 ENCOUNTER — TELEPHONE (OUTPATIENT)
Dept: ONCOLOGY | Facility: CLINIC | Age: 80
End: 2020-10-20

## 2020-10-20 NOTE — RESULT ENCOUNTER NOTE
Attempted to call pt with results, left a VM and direct line to return call.     Jud Cadena RN on 10/20/2020 at 3:04 PM

## 2020-10-20 NOTE — TELEPHONE ENCOUNTER
Called pt to give next appt time and date. He doesn't need injectafer today, levels above treatment parameter. Left a vm.   Charge and  notified.   Kiara Gipson RN

## 2020-10-21 NOTE — RESULT ENCOUNTER NOTE
Attempted to call pt with results, left a VM and direct line to return call.     Jud Cadena RN on 10/21/2020 at 11:39 AM

## 2020-10-22 ENCOUNTER — MEDICAL CORRESPONDENCE (OUTPATIENT)
Dept: HEALTH INFORMATION MANAGEMENT | Facility: CLINIC | Age: 80
End: 2020-10-22

## 2020-10-23 NOTE — RESULT ENCOUNTER NOTE
Attempted to call pt with results, left a VM and direct line to return call.     Jud Cadena RN on 10/23/2020 at 1:37 PM

## 2020-10-29 NOTE — RESULT ENCOUNTER NOTE
Attempted to call pt with results, left a VM and direct line to return call.     Jud Cadena RN on 10/29/2020 at 2:51 PM

## 2020-11-13 DIAGNOSIS — Z53.9 DIAGNOSIS NOT YET DEFINED: Primary | ICD-10-CM

## 2020-11-13 PROCEDURE — G0180 MD CERTIFICATION HHA PATIENT: HCPCS | Performed by: NURSE PRACTITIONER

## 2020-11-17 NOTE — RESULT ENCOUNTER NOTE
Message left for patient to return call to clinic to arrange video visit. Direct line provided. Inbasket message sent to  schedulers to reach out to patient and assist with scheduling this visit. Kiana Martin RN, MSN, OCN

## 2020-11-17 NOTE — RESULT ENCOUNTER NOTE
Message left for patient to return call to clinic to arrange video visit. Direct line provided.  Kiana Martin, RN, MSN, OCN

## 2020-11-17 NOTE — PROGRESS NOTES
"Mann Escobar is a 80 year old male who is being evaluated via a billable telephone visit.      The patient has been notified of following:     \"This telephone visit will be conducted via a call between you and your physician/provider. We have found that certain health care needs can be provided without the need for a physical exam.  This service lets us provide the care you need with a short phone conversation.  If a prescription is necessary we can send it directly to your pharmacy.  If lab work is needed we can place an order for that and you can then stop by our lab to have the test done at a later time.    Telephone visits are billed at different rates depending on your insurance coverage. During this emergency period, for some insurers they may be billed the same as an in-person visit.  Please reach out to your insurance provider with any questions.    If during the course of the call the physician/provider feels a telephone visit is not appropriate, you will not be charged for this service.\"    Patient has given verbal consent for Telephone visit?  Yes    What phone number would you like to be contacted at? [phone]    How would you like to obtain your AVS? Mail a copy    Subjective     Mann Escobar is a 80 year old male who presents via phone visit today for the following health issues:    HPI     Hypertension Follow-up      Do you check your blood pressure regularly outside of the clinic? No     Are you following a low salt diet? No    Are your blood pressures ever more than 140 on the top number (systolic) OR more   than 90 on the bottom number (diastolic), for example 140/90? No    Chronic/Recurring Back Pain Follow Up      Where is your back pain located? (Select all that apply) low back and right ankle (had surgery not too long ago)    How would you describe your back pain?  dull ache continually, sharp/stabbing with bending over    Where does your back pain spread? " nowhere    Since your last clinic visit for back pain, how has your pain changed? unchanged    Does your back pain interfere with your job? YES    Since your last visit, have you tried any new treatment? No      How many servings of fruits and vegetables do you eat daily?  0-1    On average, how many sweetened beverages do you drink each day (Examples: soda, juice, sweet tea, etc.  Do NOT count diet or artificially sweetened beverages)?   0    How many days per week do you exercise enough to make your heart beat faster? 3 or less    How many minutes a day do you exercise enough to make your heart beat faster? 9 or less    How many days per week do you miss taking your medication? 0            Review of Systems   Constitutional, HEENT, cardiovascular, pulmonary, gi and gu systems are negative, except as otherwise noted.       Objective          Vitals:  No vitals were obtained today due to virtual visit.  PSYCH: Alert and oriented times 3; coherent speech, normal   rate and volume, able to articulate logical thoughts, able   to abstract reason, no tangential thoughts, no hallucinations   or delusions   RESP: No cough, no audible wheezing, able to talk in full sentences  Remainder of exam unable to be completed due to telephone visits              Assessment & Plan     Chronic left-sided low back pain with left-sided sciatica  Stable.  Medications refilled x3 months.  - oxyCODONE (ROXICODONE) 5 MG tablet; Take 1 tablet (5 mg) by mouth 2 times daily as needed for severe pain    Benign essential hypertension  Medication refilled.  - amLODIPine (NORVASC) 10 MG tablet; Take 1 tablet (10 mg) by mouth daily            Return in about 3 months (around 2/24/2021) for Medication Check.    The risks, benefits and treatment options of prescribed medications or other treatments have been discussed with the patient. The patient verbalized their understanding and should call or follow up if no improvement or if they develop further  problems.    GUNJAN Werner CNP  [facility]    Phone call duration:  10 minutes

## 2020-11-24 ENCOUNTER — VIRTUAL VISIT (OUTPATIENT)
Dept: FAMILY MEDICINE | Facility: CLINIC | Age: 80
End: 2020-11-24
Payer: COMMERCIAL

## 2020-11-24 DIAGNOSIS — M54.42 CHRONIC LEFT-SIDED LOW BACK PAIN WITH LEFT-SIDED SCIATICA: ICD-10-CM

## 2020-11-24 DIAGNOSIS — I10 BENIGN ESSENTIAL HYPERTENSION: ICD-10-CM

## 2020-11-24 DIAGNOSIS — G89.29 CHRONIC LEFT-SIDED LOW BACK PAIN WITH LEFT-SIDED SCIATICA: ICD-10-CM

## 2020-11-24 PROCEDURE — 99214 OFFICE O/P EST MOD 30 MIN: CPT | Mod: 95 | Performed by: NURSE PRACTITIONER

## 2020-11-24 RX ORDER — OXYCODONE HYDROCHLORIDE 5 MG/1
5 TABLET ORAL 2 TIMES DAILY PRN
Qty: 60 TABLET | Refills: 0 | Status: SHIPPED | OUTPATIENT
Start: 2020-12-23 | End: 2020-11-24

## 2020-11-24 RX ORDER — AMLODIPINE BESYLATE 10 MG/1
10 TABLET ORAL DAILY
Qty: 90 TABLET | Refills: 3 | Status: SHIPPED | OUTPATIENT
Start: 2020-11-24 | End: 2021-01-18

## 2020-11-24 RX ORDER — OXYCODONE HYDROCHLORIDE 5 MG/1
5 TABLET ORAL 2 TIMES DAILY PRN
Qty: 60 TABLET | Refills: 0 | Status: SHIPPED | OUTPATIENT
Start: 2021-01-22 | End: 2021-01-28

## 2020-11-24 RX ORDER — OXYCODONE HYDROCHLORIDE 5 MG/1
5 TABLET ORAL 2 TIMES DAILY PRN
Qty: 60 TABLET | Refills: 0 | Status: SHIPPED | OUTPATIENT
Start: 2020-11-24 | End: 2020-11-24

## 2021-01-04 DIAGNOSIS — D50.9 IRON DEFICIENCY ANEMIA, UNSPECIFIED IRON DEFICIENCY ANEMIA TYPE: ICD-10-CM

## 2021-01-04 DIAGNOSIS — D56.8 OTHER THALASSEMIA (H): ICD-10-CM

## 2021-01-04 LAB
ANISOCYTOSIS BLD QL SMEAR: ABNORMAL
BASOPHILS # BLD AUTO: 0.1 10E9/L (ref 0–0.2)
BASOPHILS NFR BLD AUTO: 1.3 %
DACRYOCYTES BLD QL SMEAR: SLIGHT
DIFFERENTIAL METHOD BLD: ABNORMAL
EOSINOPHIL # BLD AUTO: 0.2 10E9/L (ref 0–0.7)
EOSINOPHIL NFR BLD AUTO: 3.4 %
ERYTHROCYTE [DISTWIDTH] IN BLOOD BY AUTOMATED COUNT: 18.4 % (ref 10–15)
FERRITIN SERPL-MCNC: 283 NG/ML (ref 26–388)
HCT VFR BLD AUTO: 29.5 % (ref 40–53)
HGB BLD-MCNC: 8.8 G/DL (ref 13.3–17.7)
IMM GRANULOCYTES # BLD: 0 10E9/L (ref 0–0.4)
IMM GRANULOCYTES NFR BLD: 0.2 %
LYMPHOCYTES # BLD AUTO: 1.2 10E9/L (ref 0.8–5.3)
LYMPHOCYTES NFR BLD AUTO: 19.3 %
MCH RBC QN AUTO: 18.1 PG (ref 26.5–33)
MCHC RBC AUTO-ENTMCNC: 29.8 G/DL (ref 31.5–36.5)
MCV RBC AUTO: 61 FL (ref 78–100)
MICROCYTES BLD QL SMEAR: PRESENT
MONOCYTES # BLD AUTO: 0.5 10E9/L (ref 0–1.3)
MONOCYTES NFR BLD AUTO: 8.2 %
NEUTROPHILS # BLD AUTO: 4.1 10E9/L (ref 1.6–8.3)
NEUTROPHILS NFR BLD AUTO: 67.6 %
NRBC # BLD AUTO: 0 10*3/UL
NRBC BLD AUTO-RTO: 0 /100
PLATELET # BLD AUTO: 207 10E9/L (ref 150–450)
PLATELET # BLD EST: ABNORMAL 10*3/UL
POIKILOCYTOSIS BLD QL SMEAR: SLIGHT
RBC # BLD AUTO: 4.86 10E12/L (ref 4.4–5.9)
TARGETS BLD QL SMEAR: SLIGHT
WBC # BLD AUTO: 6.1 10E9/L (ref 4–11)

## 2021-01-04 PROCEDURE — 85025 COMPLETE CBC W/AUTO DIFF WBC: CPT | Performed by: INTERNAL MEDICINE

## 2021-01-04 PROCEDURE — 82728 ASSAY OF FERRITIN: CPT | Performed by: INTERNAL MEDICINE

## 2021-01-04 PROCEDURE — 36415 COLL VENOUS BLD VENIPUNCTURE: CPT | Performed by: INTERNAL MEDICINE

## 2021-01-05 ENCOUNTER — TELEPHONE (OUTPATIENT)
Dept: INFUSION THERAPY | Facility: CLINIC | Age: 81
End: 2021-01-05

## 2021-01-05 ENCOUNTER — VIRTUAL VISIT (OUTPATIENT)
Dept: ONCOLOGY | Facility: CLINIC | Age: 81
End: 2021-01-05
Attending: INTERNAL MEDICINE
Payer: COMMERCIAL

## 2021-01-05 DIAGNOSIS — N18.32 STAGE 3B CHRONIC KIDNEY DISEASE (H): ICD-10-CM

## 2021-01-05 DIAGNOSIS — D56.8 OTHER THALASSEMIA (H): Primary | ICD-10-CM

## 2021-01-05 PROBLEM — N18.30 CHRONIC KIDNEY DISEASE, STAGE 3 (H): Status: ACTIVE | Noted: 2021-01-05

## 2021-01-05 PROCEDURE — 999N001193 HC VIDEO/TELEPHONE VISIT; NO CHARGE

## 2021-01-05 PROCEDURE — 99213 OFFICE O/P EST LOW 20 MIN: CPT | Mod: 95 | Performed by: INTERNAL MEDICINE

## 2021-01-05 NOTE — LETTER
1/5/2021         RE: Mann Escobar  [address]  [facility] ([place], [place]) [address]        Dear Colleague,    Thank you for referring your patient, Mann Escobar, to the [facility]. Please see a copy of my visit note below.        Oncology Follow-up Visit:  January 5, 2021  Diagnosis:    Thalassemia trait    History Of Present Illness as recorded by Phoebe Andrew MD (20 November 2017)  Mann Escobar is a 77-year-old gentleman who has a history of thalassemia minor and is aware of being anemic since his younger years.    He states he has been feeling fatigued for months in 2017.  He had a fall and syncope and was evaluated in the Emergency Room end of Oct.       He was found to have a hemoglobin of 7.9 10/28/2017 and was noted to have significant microcytosis.  He also had leukocytosis at that time, normal platelet count at that time.    The patient also reports that he had an upper GI bleed in 2016 when an EGD and colonoscopy summer 2016 was performed, and there was nothing serious found.  He apparently was using lots of NSAIDs at that time. He s[place] 2 [place] PRBC [place].       Review of his labs indicates that his hemoglobin was close to 11 until 2010, and that was likely his baseline hemoglobin.    Since 2011, his hemoglobin has declined, his lisa hb was 7.3 in 2012. His last hemoglobin was 8.2 gm in early Nov 2017.       The patient does not have issues with any obvious bleeding, bruising.  He is not seeing any black-colored stools.      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 He became severely more anemia since 2010 with hb down to less than 9 in 2017.     Interval History:  Pretty much all of his medical problems are linked in one way or another to diffuse arthritis.  He's on opoids through his orthopedist for post back surgery pain.  He did in the past have an UGI bleed from excess NSAIDS.  His last ortho procedure was  ankle surgery in July.  He has residual numbness and weakness in his left foot and leg related to prior spinal stenosis.            Review Of Systems:  Review Of Systems  Skin: negative  Eyes: negative  Ears/Nose/Throat: negative  Respiratory: No shortness of breath, dyspnea on exertion, cough, or hemoptysis  Cardiovascular: negative  Gastrointestinal: negative  Genitourinary: negative  Musculoskeletal: numerous body parts with arthritis including back and limbs  Neurologic: left leg with residual deficits from spinal stenosis  Psychiatric: negative  Hematologic/Lymphatic/Immunologic: he and many siblings have thal trait.  He has one biologic child who does not have trait.  Endocrine: negative      Past medical, social, surgical, and family histories reviewed.  Lives with wife    Patient Active Problem List   Diagnosis      HX NEPHROLITHIASIS [V13.01]     Thalassemia minor     Esophageal reflux     Family history of prostate cancer     Leg edema     CARDIOVASCULAR SCREENING; LDL GOAL LESS THAN 130     Internal hemorrhoids     Iron deficiency anemia     First degree AV block     Family history of diabetes mellitus     Scoliosis     Hypopotassemia     Advanced directives, counseling/discussion     HTN (hypertension)     Benign non-nodular prostatic hyperplasia with lower urinary tract symptoms     Chronic low back pain     S/P knee replacement     S/P ankle fusion     Abnormal antinuclear antibody titer     Osteoarthritis     Hypertension     BPH (benign prostatic hyperplasia)     Pseudogout     Chronic pain syndrome     S/P ankle joint replacement     Arthritis of ankle, right     Chronic anemia     Chronic kidney disease, stage 3         Allergies:  Allergies as of 01/05/2021 - Reviewed 01/05/2021   Allergen Reaction Noted     Nkda [no known drug allergies]  09/29/2011       Current Medications:  Current Outpatient Medications   Medication Sig Dispense Refill     acetaminophen (TYLENOL) 325 MG tablet Take 3 tablets  (975 mg) by mouth 3 times daily 120 tablet 0     amLODIPine (NORVASC) 10 MG tablet Take 1 tablet (10 mg) by mouth daily 90 tablet 3     ELDERBERRY PO        ferrous fumarate 65 mg, [place]. FE,-Vitamin C 125 mg (VITRON C)  MG TABS tablet Take 1 tablet by mouth daily 60 tablet 1     HCA VITAMIN B12 500 MCG OR TABS 2 tablets daily       MULTIVITAMIN OR one daily       OVER-THE-COUNTER Elderberry 50mg supplement, one daily       [START ON 1/22/2021] oxyCODONE (ROXICODONE) 5 MG tablet Take 1 tablet (5 mg) by mouth 2 times daily as needed for severe pain 60 tablet 0     terazosin (HYTRIN) 5 MG capsule TAKE 1 CAPSULE AT BEDTIME 90 capsule 3     Turmeric 500 MG CAPS        VIACTIV OR With D 1000mg calcium       zinc gluconate 50 MG tablet Take 50 mg by mouth daily       aspirin (ASA) 325 MG EC tablet Take 1 tablet (325 mg) by mouth daily (Patient not taking: Reported on 1/5/2021) 26 tablet 0        Physical Exam:  There were no vitals taken for this visit.    This was a phone visit.  No exam possible.    Laboratory/Imaging Studies  Orders Only on 01/04/2021   Component Date Value Ref Range Status     WBC 01/04/2021 6.1  4.0 - 11.0 10e9/L Final     RBC Count 01/04/2021 4.86  4.4 - 5.9 10e12/L Final     Hemoglobin 01/04/2021 8.8* 13.3 - 17.7 g/dL Final     Hematocrit 01/04/2021 29.5* 40.0 - 53.0 % Final     MCV 01/04/2021 61* 78 - 100 fl Final     MCH 01/04/2021 18.1* 26.5 - 33.0 pg Final     MCHC 01/04/2021 29.8* 31.5 - 36.5 g/dL Final     RDW 01/04/2021 18.4* 10.0 - 15.0 % Final     Platelet Count 01/04/2021 207  150 - 450 10e9/L Final     Diff Method 01/04/2021 Automated Method   Final     % Neutrophils 01/04/2021 67.6  % Final     % Lymphocytes 01/04/2021 19.3  % Final     % Monocytes 01/04/2021 8.2  % Final     % Eosinophils 01/04/2021 3.4  % Final     % Basophils 01/04/2021 1.3  % Final     % Immature Granulocytes 01/04/2021 0.2  % Final     Nucleated RBCs 01/04/2021 0  0 /100 Final     Absolute Neutrophil  "01/04/2021 4.1  1.6 - 8.3 10e9/L Final     Absolute Lymphocytes 01/04/2021 1.2  0.8 - 5.3 10e9/L Final     Absolute Monocytes 01/04/2021 0.5  0.0 - 1.3 10e9/L Final     Absolute Eosinophils 01/04/2021 0.2  0.0 - 0.7 10e9/L Final     Absolute Basophils 01/04/2021 0.1  0.0 - 0.2 10e9/L Final     Abs Immature Granulocytes 01/04/2021 0.0  0 - 0.4 10e9/L Final     Absolute Nucleated RBC 01/04/2021 0.0   Final     Anisocytosis 01/04/2021 Moderate   Final     Poikilocytosis 01/04/2021 Slight   Final     Teardrop Cells 01/04/2021 Slight   Final     Target Cells 01/04/2021 Slight   Final     Microcytes 01/04/2021 Present   Final     Platelet Estimate 01/04/2021 Automated count confirmed.  Platelet morphology is normal.   Final     Ferritin 01/04/2021 283  26 - 388 ng/mL Final     No results found for this or any previous visit (from the past 744 hour(s)).       ASSESSMENT/PLAN:    Thalassemia trait with expected anemia and microcytic indices.  Iron supplementation is not appropriate and I have instructed patient to discontinue this.  He's been informed that HGB in range of 9 is just fine and thal trait does not evolve to hematologic malignancy.    He will follow up with his primary Shayy Li NP, with return on a PRN basis.    She can monitor ferritin annually.  Certainly if it's >100, then he should not get supplements.    If his hemoglobin falls <8, we could see back.      Telephone Visit Oncology Rooming Note- call [phone]    January 5, 2021 10:49 AM   Mann Escobar is a 80 year old male who presents for:    Chief Complaint   Patient presents with     Hematology     Return Iron deficiency anemia, unspecified iron deficiency anemia type, review labs      Initial Vitals: There were no vitals taken for this visit. Estimated body mass index is 30.65 kg/m  as calculated from the following:    Height as of 9/3/20: 1.6 m (5' 3\").    Weight as of 9/3/20: 78.5 kg (173 lb). There is no height or weight on " file to calculate BSA.  Data Unavailable Comment: Data Unavailable   No LMP for male patient.  Allergies reviewed: Yes  Medications reviewed: Yes    Medications: Medication refills not needed today.  Pharmacy name entered into [employer]:    [facility]  [id number] - [place], [place] - [address]    Clinical concerns: Return Iron deficiency anemia, unspecified iron deficiency anemia type, review labs.   Infusion appt cancelled today due to lab results.      Margie Bustillo CMA                Again, thank you for allowing me to participate in the care of your patient.        Sincerely,        Gail Quinn MD

## 2021-01-05 NOTE — TELEPHONE ENCOUNTER
Pt called to ask what his ferritin level is and if he needs iron infusion today. Pt told his ferritin level drawn on 1/4 Pt would not need infusion level is greater than 100. Pt reminded to keep his 1100 virtual visit with Dr Quinn.   Writer sending message to scheduling dept.

## 2021-01-05 NOTE — LETTER
1/5/2021         RE: Mann Escobar  [address]  [facility] [address]        Dear Colleague,    Thank you for referring your patient, Mann Escobar, to the [facility]. Please see a copy of my visit note below.        Oncology Follow-up Visit:  January 5, 2021  Diagnosis:    Thalassemia trait    History Of Present Illness as recorded by Phoebe Andrew MD (20 November 2017)  Mann Escobar is a 77-year-old gentleman who has a history of thalassemia minor and is aware of being anemic since his younger years.    He states he has been feeling fatigued for months in 2017.  He had a fall and syncope and was evaluated in the Emergency Room end of Oct.       He was found to have a hemoglobin of 7.9 10/28/2017 and was noted to have significant microcytosis.  He also had leukocytosis at that time, normal platelet count at that time.    The patient also reports that he had an upper GI bleed in 2016 when an EGD and colonoscopy summer 2016 was performed, and there was nothing serious found.  He apparently was using lots of NSAIDs at that time. He s[place] 2 [place] PRBC [place].       Review of his labs indicates that his hemoglobin was close to 11 until 2010, and that was likely his baseline hemoglobin.    Since 2011, his hemoglobin has declined, his lisa hb was 7.3 in 2012. His last hemoglobin was 8.2 gm in early Nov 2017.       The patient does not have issues with any obvious bleeding, bruising.  He is not seeing any black-colored stools.      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 He became severely more anemia since 2010 with hb down to less than 9 in 2017.     Interval History:  Pretty much all of his medical problems are linked in one way or another to diffuse arthritis.  He's on opoids through his orthopedist for post back surgery pain.  He did in the past have an UGI bleed from excess NSAIDS.  His last ortho procedure was  ankle surgery in July.  He has residual numbness and weakness in his left foot and leg related to prior spinal stenosis.            Review Of Systems:  Review Of Systems  Skin: negative  Eyes: negative  Ears/Nose/Throat: negative  Respiratory: No shortness of breath, dyspnea on exertion, cough, or hemoptysis  Cardiovascular: negative  Gastrointestinal: negative  Genitourinary: negative  Musculoskeletal: numerous body parts with arthritis including back and limbs  Neurologic: left leg with residual deficits from spinal stenosis  Psychiatric: negative  Hematologic/Lymphatic/Immunologic: he and many siblings have thal trait.  He has one biologic child who does not have trait.  Endocrine: negative      Past medical, social, surgical, and family histories reviewed.  Lives with wife    Patient Active Problem List   Diagnosis      HX NEPHROLITHIASIS [V13.01]     Thalassemia minor     Esophageal reflux     Family history of prostate cancer     Leg edema     CARDIOVASCULAR SCREENING; LDL GOAL LESS THAN 130     Internal hemorrhoids     Iron deficiency anemia     First degree AV block     Family history of diabetes mellitus     Scoliosis     Hypopotassemia     Advanced directives, counseling/discussion     HTN (hypertension)     Benign non-nodular prostatic hyperplasia with lower urinary tract symptoms     Chronic low back pain     S/P knee replacement     S/P ankle fusion     Abnormal antinuclear antibody titer     Osteoarthritis     Hypertension     BPH (benign prostatic hyperplasia)     Pseudogout     Chronic pain syndrome     S/P ankle joint replacement     Arthritis of ankle, right     Chronic anemia     Chronic kidney disease, stage 3         Allergies:  Allergies as of 01/05/2021 - Reviewed 01/05/2021   Allergen Reaction Noted     Nkda [no known drug allergies]  09/29/2011       Current Medications:  Current Outpatient Medications   Medication Sig Dispense Refill     acetaminophen (TYLENOL) 325 MG tablet Take 3 tablets  (975 mg) by mouth 3 times daily 120 tablet 0     amLODIPine (NORVASC) 10 MG tablet Take 1 tablet (10 mg) by mouth daily 90 tablet 3     ELDERBERRY PO        ferrous fumarate 65 mg, [place]. FE,-Vitamin C 125 mg (VITRON C)  MG TABS tablet Take 1 tablet by mouth daily 60 tablet 1     HCA VITAMIN B12 500 MCG OR TABS 2 tablets daily       MULTIVITAMIN OR one daily       OVER-THE-COUNTER Elderberry 50mg supplement, one daily       [START ON 1/22/2021] oxyCODONE (ROXICODONE) 5 MG tablet Take 1 tablet (5 mg) by mouth 2 times daily as needed for severe pain 60 tablet 0     terazosin (HYTRIN) 5 MG capsule TAKE 1 CAPSULE AT BEDTIME 90 capsule 3     Turmeric 500 MG CAPS        VIACTIV OR With D 1000mg calcium       zinc gluconate 50 MG tablet Take 50 mg by mouth daily       aspirin (ASA) 325 MG EC tablet Take 1 tablet (325 mg) by mouth daily (Patient not taking: Reported on 1/5/2021) 26 tablet 0        Physical Exam:  There were no vitals taken for this visit.    This was a phone visit.  No exam possible.    Laboratory/Imaging Studies  Orders Only on 01/04/2021   Component Date Value Ref Range Status     WBC 01/04/2021 6.1  4.0 - 11.0 10e9/L Final     RBC Count 01/04/2021 4.86  4.4 - 5.9 10e12/L Final     Hemoglobin 01/04/2021 8.8* 13.3 - 17.7 g/dL Final     Hematocrit 01/04/2021 29.5* 40.0 - 53.0 % Final     MCV 01/04/2021 61* 78 - 100 fl Final     MCH 01/04/2021 18.1* 26.5 - 33.0 pg Final     MCHC 01/04/2021 29.8* 31.5 - 36.5 g/dL Final     RDW 01/04/2021 18.4* 10.0 - 15.0 % Final     Platelet Count 01/04/2021 207  150 - 450 10e9/L Final     Diff Method 01/04/2021 Automated Method   Final     % Neutrophils 01/04/2021 67.6  % Final     % Lymphocytes 01/04/2021 19.3  % Final     % Monocytes 01/04/2021 8.2  % Final     % Eosinophils 01/04/2021 3.4  % Final     % Basophils 01/04/2021 1.3  % Final     % Immature Granulocytes 01/04/2021 0.2  % Final     Nucleated RBCs 01/04/2021 0  0 /100 Final     Absolute Neutrophil  "01/04/2021 4.1  1.6 - 8.3 10e9/L Final     Absolute Lymphocytes 01/04/2021 1.2  0.8 - 5.3 10e9/L Final     Absolute Monocytes 01/04/2021 0.5  0.0 - 1.3 10e9/L Final     Absolute Eosinophils 01/04/2021 0.2  0.0 - 0.7 10e9/L Final     Absolute Basophils 01/04/2021 0.1  0.0 - 0.2 10e9/L Final     Abs Immature Granulocytes 01/04/2021 0.0  0 - 0.4 10e9/L Final     Absolute Nucleated RBC 01/04/2021 0.0   Final     Anisocytosis 01/04/2021 Moderate   Final     Poikilocytosis 01/04/2021 Slight   Final     Teardrop Cells 01/04/2021 Slight   Final     Target Cells 01/04/2021 Slight   Final     Microcytes 01/04/2021 Present   Final     Platelet Estimate 01/04/2021 Automated count confirmed.  Platelet morphology is normal.   Final     Ferritin 01/04/2021 283  26 - 388 ng/mL Final     No results found for this or any previous visit (from the past 744 hour(s)).       ASSESSMENT/PLAN:    Thalassemia trait with expected anemia and microcytic indices.  Iron supplementation is not appropriate and I have instructed patient to discontinue this.  He's been informed that HGB in range of 9 is just fine and thal trait does not evolve to hematologic malignancy.    He will follow up with his primary Shayy Li NP, with return on a PRN basis.    She can monitor ferritin annually.  Certainly if it's >100, then he should not get supplements.    If his hemoglobin falls <8, we could see back.      Telephone Visit Oncology Rooming Note- call [phone]    January 5, 2021 10:49 AM   Mann Escobar is a 80 year old male who presents for:    Chief Complaint   Patient presents with     Hematology     Return Iron deficiency anemia, unspecified iron deficiency anemia type, review labs      Initial Vitals: There were no vitals taken for this visit. Estimated body mass index is 30.65 kg/m  as calculated from the following:    Height as of 9/3/20: 1.6 m (5' 3\").    Weight as of 9/3/20: 78.5 kg (173 lb). There is no height or weight on " file to calculate BSA.  Data Unavailable Comment: Data Unavailable   No LMP for male patient.  Allergies reviewed: Yes  Medications reviewed: Yes    Medications: Medication refills not needed today.  Pharmacy name entered into [employer]:    [facility]  [facility] - [place], [place] - [address]    Clinical concerns: Return Iron deficiency anemia, unspecified iron deficiency anemia type, review labs.   Infusion appt cancelled today due to lab results.      Margie Bustillo CMA                Again, thank you for allowing me to participate in the care of your patient.        Sincerely,        Gail Quinn MD

## 2021-01-05 NOTE — PROGRESS NOTES
"Telephone Visit Oncology Rooming Note- call [phone]    January 5, 2021 10:49 AM   Mann Escobar is a 80 year old male who presents for:    Chief Complaint   Patient presents with     Hematology     Return Iron deficiency anemia, unspecified iron deficiency anemia type, review labs      Initial Vitals: There were no vitals taken for this visit. Estimated body mass index is 30.65 kg/m  as calculated from the following:    Height as of 9/3/20: 1.6 m (5' 3\").    Weight as of 9/3/20: 78.5 kg (173 lb). There is no height or weight on file to calculate BSA.  Data Unavailable Comment: Data Unavailable   No LMP for male patient.  Allergies reviewed: Yes  Medications reviewed: Yes    Medications: Medication refills not needed today.  Pharmacy name entered into [employer]:    [facility]  [facility] - [place], [place] - [address]    Clinical concerns: Return Iron deficiency anemia, unspecified iron deficiency anemia type, review labs.   Infusion appt cancelled today due to lab results.      Margie Bustillo CMA            "

## 2021-01-05 NOTE — PROGRESS NOTES
Oncology Follow-up Visit:  January 5, 2021  Diagnosis:    Thalassemia trait    History Of Present Illness as recorded by Phoebe Andrew MD (20 November 2017)  Mann Escobar is a 77-year-old gentleman who has a history of thalassemia minor and is aware of being anemic since his younger years.    He states he has been feeling fatigued for months in 2017.  He had a fall and syncope and was evaluated in the Emergency Room end of Oct.       He was found to have a hemoglobin of 7.9 10/28/2017 and was noted to have significant microcytosis.  He also had leukocytosis at that time, normal platelet count at that time.    The patient also reports that he had an upper GI bleed in 2016 when an EGD and colonoscopy summer 2016 was performed, and there was nothing serious found.  He apparently was using lots of NSAIDs at that time. He s/p had 2 units PRBC in [place].       Review of his labs indicates that his hemoglobin was close to 11 until 2010, and that was likely his baseline hemoglobin.    Since 2011, his hemoglobin has declined, his lisa hb was 7.3 in 2012. His last hemoglobin was 8.2 gm in early Nov 2017.       The patient does not have issues with any obvious bleeding, bruising.  He is not seeing any black-colored stools.      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 He became severely more anemia since 2010 with hb down to less than 9 in 2017.     Interval History:  Pretty much all of his medical problems are linked in one way or another to diffuse arthritis.  He's on opoids through his orthopedist for post back surgery pain.  He did in the past have an UGI bleed from excess NSAIDS.  His last ortho procedure was ankle surgery in July.  He has residual numbness and weakness in his left foot and leg related to prior spinal stenosis.            Review Of Systems:  Review Of Systems  Skin: negative  Eyes: negative  Ears/Nose/Throat: negative  Respiratory: No shortness of breath, dyspnea on  exertion, cough, or hemoptysis  Cardiovascular: negative  Gastrointestinal: negative  Genitourinary: negative  Musculoskeletal: numerous body parts with arthritis including back and limbs  Neurologic: left leg with residual deficits from spinal stenosis  Psychiatric: negative  Hematologic/Lymphatic/Immunologic: he and many siblings have thal trait.  He has one biologic child who does not have trait.  Endocrine: negative      Past medical, social, surgical, and family histories reviewed.  Lives with wife    Patient Active Problem List   Diagnosis      HX NEPHROLITHIASIS [V13.01]     Thalassemia minor     Esophageal reflux     Family history of prostate cancer     Leg edema     CARDIOVASCULAR SCREENING; LDL GOAL LESS THAN 130     Internal hemorrhoids     Iron deficiency anemia     First degree AV block     Family history of diabetes mellitus     Scoliosis     Hypopotassemia     Advanced directives, counseling/discussion     HTN (hypertension)     Benign non-nodular prostatic hyperplasia with lower urinary tract symptoms     Chronic low back pain     S/P knee replacement     S/P ankle fusion     Abnormal antinuclear antibody titer     Osteoarthritis     Hypertension     BPH (benign prostatic hyperplasia)     Pseudogout     Chronic pain syndrome     S/P ankle joint replacement     Arthritis of ankle, right     Chronic anemia     Chronic kidney disease, stage 3         Allergies:  Allergies as of 01/05/2021 - Reviewed 01/05/2021   Allergen Reaction Noted     Nkda [no known drug allergies]  09/29/2011       Current Medications:  Current Outpatient Medications   Medication Sig Dispense Refill     acetaminophen (TYLENOL) 325 MG tablet Take 3 tablets (975 mg) by mouth 3 times daily 120 tablet 0     amLODIPine (NORVASC) 10 MG tablet Take 1 tablet (10 mg) by mouth daily 90 tablet 3     ELDERBERRY PO        ferrous fumarate 65 mg, [place]. FE,-Vitamin C 125 mg (VITRON C)  MG TABS tablet Take 1 tablet by mouth daily 60 tablet  1     HCA VITAMIN B12 500 MCG OR TABS 2 tablets daily       MULTIVITAMIN OR one daily       OVER-THE-COUNTER Elderberry 50mg supplement, one daily       [START ON 1/22/2021] oxyCODONE (ROXICODONE) 5 MG tablet Take 1 tablet (5 mg) by mouth 2 times daily as needed for severe pain 60 tablet 0     terazosin (HYTRIN) 5 MG capsule TAKE 1 CAPSULE AT BEDTIME 90 capsule 3     Turmeric 500 MG CAPS        VIACTIV OR With D 1000mg calcium       zinc gluconate 50 MG tablet Take 50 mg by mouth daily       aspirin (ASA) 325 MG EC tablet Take 1 tablet (325 mg) by mouth daily (Patient not taking: Reported on 1/5/2021) 26 tablet 0        Physical Exam:  There were no vitals taken for this visit.    This was a phone visit.  No exam possible.    Laboratory/Imaging Studies  Orders Only on 01/04/2021   Component Date Value Ref Range Status     WBC 01/04/2021 6.1  4.0 - 11.0 10e9/L Final     RBC Count 01/04/2021 4.86  4.4 - 5.9 10e12/L Final     Hemoglobin 01/04/2021 8.8* 13.3 - 17.7 g/dL Final     Hematocrit 01/04/2021 29.5* 40.0 - 53.0 % Final     MCV 01/04/2021 61* 78 - 100 fl Final     MCH 01/04/2021 18.1* 26.5 - 33.0 pg Final     MCHC 01/04/2021 29.8* 31.5 - 36.5 g/dL Final     RDW 01/04/2021 18.4* 10.0 - 15.0 % Final     Platelet Count 01/04/2021 207  150 - 450 10e9/L Final     Diff Method 01/04/2021 Automated Method   Final     % Neutrophils 01/04/2021 67.6  % Final     % Lymphocytes 01/04/2021 19.3  % Final     % Monocytes 01/04/2021 8.2  % Final     % Eosinophils 01/04/2021 3.4  % Final     % Basophils 01/04/2021 1.3  % Final     % Immature Granulocytes 01/04/2021 0.2  % Final     Nucleated RBCs 01/04/2021 0  0 /100 Final     Absolute Neutrophil 01/04/2021 4.1  1.6 - 8.3 10e9/L Final     Absolute Lymphocytes 01/04/2021 1.2  0.8 - 5.3 10e9/L Final     Absolute Monocytes 01/04/2021 0.5  0.0 - 1.3 10e9/L Final     Absolute Eosinophils 01/04/2021 0.2  0.0 - 0.7 10e9/L Final     Absolute Basophils 01/04/2021 0.1  0.0 - 0.2 10e9/L  Final     Abs Immature Granulocytes 01/04/2021 0.0  0 - 0.4 10e9/L Final     Absolute Nucleated RBC 01/04/2021 0.0   Final     Anisocytosis 01/04/2021 Moderate   Final     Poikilocytosis 01/04/2021 Slight   Final     Teardrop Cells 01/04/2021 Slight   Final     Target Cells 01/04/2021 Slight   Final     Microcytes 01/04/2021 Present   Final     Platelet Estimate 01/04/2021 Automated count confirmed.  Platelet morphology is normal.   Final     Ferritin 01/04/2021 283  26 - 388 ng/mL Final     No results found for this or any previous visit (from the past 744 hour(s)).       ASSESSMENT/PLAN:    Thalassemia trait with expected anemia and microcytic indices.  Iron supplementation is not appropriate and I have instructed patient to discontinue this.  He's been informed that HGB in range of 9 is just fine and thal trait does not evolve to hematologic malignancy.    He will follow up with his primary Shayy Li NP, with return on a PRN basis.    She can monitor ferritin annually.  Certainly if it's >100, then he should not get supplements.    If his hemoglobin falls <8, we could see back.

## 2021-01-06 NOTE — PATIENT INSTRUCTIONS
Thalassemia trait with expected anemia and microcytic indices.  Iron supplementation is not appropriate and I have instructed patient to discontinue this.  He's been informed that HGB in range of 9 is just fine and thal trait does not evolve to hematologic malignancy.     He will follow up with his primary Shayy Li NP, with return on a PRN basis.     She can monitor ferritin annually.  Certainly if it's >100, then he should not get supplements.     If his hemoglobin falls <8, we could see back.

## 2021-01-14 ENCOUNTER — HEALTH MAINTENANCE LETTER (OUTPATIENT)
Age: 81
End: 2021-01-14

## 2021-01-15 ENCOUNTER — TELEPHONE (OUTPATIENT)
Dept: FAMILY MEDICINE | Facility: CLINIC | Age: 81
End: 2021-01-15

## 2021-01-15 DIAGNOSIS — G89.29 CHRONIC LEFT-SIDED LOW BACK PAIN WITH LEFT-SIDED SCIATICA: ICD-10-CM

## 2021-01-15 DIAGNOSIS — M54.42 CHRONIC LEFT-SIDED LOW BACK PAIN WITH LEFT-SIDED SCIATICA: ICD-10-CM

## 2021-01-15 DIAGNOSIS — I10 BENIGN ESSENTIAL HYPERTENSION: ICD-10-CM

## 2021-01-15 NOTE — TELEPHONE ENCOUNTER
Pt called asking for refill for oxycodone.     He explains that he changed insurance so that he could stay with Shayy Ramírez.     He now has [employer].  Also, pharmacy change to [facility].     Pt says that he needs a PA for this and that we need to call [phone], [employer].    Also, pt says that he was told that he CAN have this refill sent to mail pharmacy, [facility].    Phoebe Ly RN

## 2021-01-17 NOTE — TELEPHONE ENCOUNTER
Prior Authorization Retail Medication Request    Medication/Dose: oxycodone  ICD code (if different than what is on RX):    Previously Tried and Failed:  Fentanyl; norco; vicodin; dilaudid; morphine; roxicodone; percocet  Rationale:  He explains that he changed insurance so that he could stay with Shayy Ramírez and PA is now required; has used since 2015       Insurance Name:  [facility] [phone]  Insurance ID:  [id number]      Pharmacy Information (if different than what is on RX)  Name:    Phone:

## 2021-01-18 RX ORDER — AMLODIPINE BESYLATE 10 MG/1
10 TABLET ORAL DAILY
Qty: 90 TABLET | Refills: 2 | Status: SHIPPED | OUTPATIENT
Start: 2021-01-18 | End: 2021-06-27

## 2021-01-19 NOTE — TELEPHONE ENCOUNTER
Central Prior Authorization Team   Phone: [phone]      Prior Authorization Approval    Authorization Effective Date: 12/20/2020  Authorization Expiration Date: 1/19/2022  Medication: oxycodone - APPROVED  Approved Dose/Quantity: 60 FOR 30  Reference #:     Insurance Company: JO - Phone [phone] Fax [phone]  Expected CoPay:       CoPay Card Available:      Foundation Assistance Needed:    Which Pharmacy is filling the prescription (Not needed for infusion/clinic administered): [id number] - [address]  Pharmacy Notified: Yes  Patient Notified: Yes (**Instructed pharmacy to notify patient when script is ready to /ship.**)

## 2021-01-20 ENCOUNTER — ANCILLARY PROCEDURE (OUTPATIENT)
Dept: GENERAL RADIOLOGY | Facility: CLINIC | Age: 81
End: 2021-01-20
Attending: FAMILY MEDICINE
Payer: COMMERCIAL

## 2021-01-20 ENCOUNTER — OFFICE VISIT (OUTPATIENT)
Dept: FAMILY MEDICINE | Facility: CLINIC | Age: 81
End: 2021-01-20
Payer: COMMERCIAL

## 2021-01-20 VITALS
SYSTOLIC BLOOD PRESSURE: 138 MMHG | DIASTOLIC BLOOD PRESSURE: 74 MMHG | RESPIRATION RATE: 12 BRPM | HEART RATE: 77 BPM | BODY MASS INDEX: 35.79 KG/M2 | TEMPERATURE: 98.2 F | HEIGHT: 63 IN | WEIGHT: 202 LBS | OXYGEN SATURATION: 97 %

## 2021-01-20 DIAGNOSIS — R63.5 WEIGHT GAIN: Primary | ICD-10-CM

## 2021-01-20 DIAGNOSIS — E66.01 MORBID OBESITY (H): ICD-10-CM

## 2021-01-20 DIAGNOSIS — R60.0 PEDAL EDEMA: ICD-10-CM

## 2021-01-20 LAB
ANION GAP SERPL CALCULATED.3IONS-SCNC: 7 MMOL/L (ref 3–14)
BASOPHILS # BLD AUTO: 0.1 10E9/L (ref 0–0.2)
BASOPHILS NFR BLD AUTO: 0.8 %
BUN SERPL-MCNC: 20 MG/DL (ref 7–30)
CALCIUM SERPL-MCNC: 8.1 MG/DL (ref 8.5–10.1)
CHLORIDE SERPL-SCNC: 112 MMOL/L (ref 94–109)
CO2 SERPL-SCNC: 25 MMOL/L (ref 20–32)
CREAT SERPL-MCNC: 1.9 MG/DL (ref 0.66–1.25)
DIFFERENTIAL METHOD BLD: ABNORMAL
EOSINOPHIL # BLD AUTO: 0.1 10E9/L (ref 0–0.7)
EOSINOPHIL NFR BLD AUTO: 1.9 %
ERYTHROCYTE [DISTWIDTH] IN BLOOD BY AUTOMATED COUNT: 19 % (ref 10–15)
GFR SERPL CREATININE-BSD FRML MDRD: 33 ML/MIN/{1.73_M2}
GLUCOSE SERPL-MCNC: 99 MG/DL (ref 70–99)
HCT VFR BLD AUTO: 28 % (ref 40–53)
HGB BLD-MCNC: 8.7 G/DL (ref 13.3–17.7)
IMM GRANULOCYTES # BLD: 0 10E9/L (ref 0–0.4)
IMM GRANULOCYTES NFR BLD: 0.5 %
LYMPHOCYTES # BLD AUTO: 0.7 10E9/L (ref 0.8–5.3)
LYMPHOCYTES NFR BLD AUTO: 11.8 %
MCH RBC QN AUTO: 18.6 PG (ref 26.5–33)
MCHC RBC AUTO-ENTMCNC: 31.1 G/DL (ref 31.5–36.5)
MCV RBC AUTO: 60 FL (ref 78–100)
MONOCYTES # BLD AUTO: 0.6 10E9/L (ref 0–1.3)
MONOCYTES NFR BLD AUTO: 9.1 %
NEUTROPHILS # BLD AUTO: 4.8 10E9/L (ref 1.6–8.3)
NEUTROPHILS NFR BLD AUTO: 75.9 %
NRBC # BLD AUTO: 0 10*3/UL
NRBC BLD AUTO-RTO: 0 /100
PLATELET # BLD AUTO: 174 10E9/L (ref 150–450)
POTASSIUM SERPL-SCNC: 3.9 MMOL/L (ref 3.4–5.3)
RBC # BLD AUTO: 4.69 10E12/L (ref 4.4–5.9)
SODIUM SERPL-SCNC: 144 MMOL/L (ref 133–144)
TSH SERPL DL<=0.005 MIU/L-ACNC: 0.75 MU/L (ref 0.4–4)
WBC # BLD AUTO: 6.3 10E9/L (ref 4–11)

## 2021-01-20 PROCEDURE — 36415 COLL VENOUS BLD VENIPUNCTURE: CPT | Performed by: FAMILY MEDICINE

## 2021-01-20 PROCEDURE — 84443 ASSAY THYROID STIM HORMONE: CPT | Performed by: FAMILY MEDICINE

## 2021-01-20 PROCEDURE — 71046 X-RAY EXAM CHEST 2 VIEWS: CPT | Performed by: RADIOLOGY

## 2021-01-20 PROCEDURE — 99214 OFFICE O/P EST MOD 30 MIN: CPT | Performed by: FAMILY MEDICINE

## 2021-01-20 PROCEDURE — 85025 COMPLETE CBC W/AUTO DIFF WBC: CPT | Performed by: FAMILY MEDICINE

## 2021-01-20 PROCEDURE — 80048 BASIC METABOLIC PNL TOTAL CA: CPT | Performed by: FAMILY MEDICINE

## 2021-01-20 RX ORDER — POTASSIUM CHLORIDE 1500 MG/1
20 TABLET, EXTENDED RELEASE ORAL 2 TIMES DAILY
Qty: 15 TABLET | Refills: 0 | Status: SHIPPED | OUTPATIENT
Start: 2021-01-20 | End: 2021-03-15

## 2021-01-20 RX ORDER — FUROSEMIDE 20 MG
20 TABLET ORAL DAILY
Qty: 15 TABLET | Refills: 0 | Status: SHIPPED | OUTPATIENT
Start: 2021-01-20 | End: 2021-01-25 | Stop reason: DRUGHIGH

## 2021-01-20 ASSESSMENT — MIFFLIN-ST. JEOR: SCORE: 1521.4

## 2021-01-20 NOTE — PATIENT INSTRUCTIONS
Please start the lasix 20 mg a day which is a water pill.  Take this medication in the morning.     Please start the potassium cl 20 meq a day due to the water pill.    I am concerned that you are retaining fluid.  I am starting you off on a stronger water pill.  You need to have close follow up to recheck your kidney function and your weight.    Please see Shayy Ramírez NP on Monday at 10 am on January 25, 2021.          Thank you for choosing [facility].  You may be receiving an email and/or telephone survey request from [facility] Customer Experience regarding your visit today.  Please take a few minutes to respond to the survey to let us know how we are doing.      If you have questions or concerns, please contact us via [facility] or you can contact your care team at [phone].    Our Clinic hours are:  Monday 6:40 am  to 7:00 pm  Tuesday -Friday 6:40 am to 5:00 pm    The [place] outpatient lab hours are:  Monday - Friday 6:10 am to 4:45 pm  Saturdays 7:00 am to 11:00 am  Appointments are required, call [phone]    If you have clinical questions after hours or would like to schedule an appointment,  call the clinic at [phone].

## 2021-01-20 NOTE — PROGRESS NOTES
"  Assessment & Plan     Weight gain  Seems to have some mild heart failure.  Last echo 6 months ago showed adequate heart function.  He mentions he was on a mild water pill in the past.  On his xray appears to have some mild CHF.  Will start with lasix 20 mg with potassium replacement, get baseline labs, check thyroid too, and have close follow up with recheck of BMP and weight next week.  May need to have repeat cardiac echo.  - Basic metabolic panel  - CBC with platelets differential  - furosemide (LASIX) 20 MG tablet; Take 1 tablet (20 mg) by mouth daily  - potassium chloride ER (KLOR-CON M) 20 MEQ CR tablet; Take 1 tablet (20 mEq) by mouth 2 times daily  - TSH with free T4 reflex    Pedal edema  As above  - Basic metabolic panel  - CBC with platelets differential  - furosemide (LASIX) 20 MG tablet; Take 1 tablet (20 mg) by mouth daily  - potassium chloride ER (KLOR-CON M) 20 MEQ CR tablet; Take 1 tablet (20 mEq) by mouth 2 times daily  - XR Chest 2 Views    Morbid obesity (H)  Noted, weight has gone up  - Basic metabolic panel  - CBC with platelets differential                       BMI:   Estimated body mass index is 35.78 kg/m  as calculated from the following:    Height as of this encounter: 1.6 m (5' 3\").    Weight as of this encounter: 91.6 kg (202 lb).         See Patient Instructions    Return in about 5 days (around 1/25/2021) for see Shayy Ramírez NP at 10 am.    Kelton Powell MD  [facility]    Marcus Darnell is a 80 year old who presents to clinic today for the following health issues     HPI       Concern - weight gain   Onset: For about a week  Description: swelling in legs and lower abdomen into groin area. Was weighing in at 179lb about a week ago, up to 200lb. Blood shot eyes (right and left).   Intensity: severe  Progression of Symptoms:  worsening  Accompanying Signs & Symptoms: No discoloration, painful to bend   Previous history of similar problem: " "Swelling in left side since after left ankle surgery   Precipitating factors:        Worsened by: no   Alleviating factors:        Improved by: compression   Therapies tried and outcome: None    Patient reports he has been gaining weight.  He has swelling of his legs.  No chest pain pressure or tightness.  No PND at night waking him up. He is wondering about a water pill.      Wt Readings from Last 4 Encounters:   01/20/21 91.6 kg (202 lb)   09/03/20 78.5 kg (173 lb)   07/23/20 84.3 kg (185 lb 13.6 oz)   07/20/20 82.3 kg (181 lb 6.4 oz)     He has had about 20# weight gain since at least the past 3-4 months, per patient much more recent    Review of Systems   Review Of Systems  Skin: negative  Eyes:   Ears/Nose/Throat:   Respiratory: No shortness of breath, dyspnea on exertion, cough, or hemoptysis  Cardiovascular: negative  Gastrointestinal:   Genitourinary:   Musculoskeletal: has pedal edema, wearing leg wraps  Neurologic: chronic back and hip pain  Psychiatric: negative  Hematologic/Lymphatic/Immunologic:   Endocrine:         Objective    BP (!) 164/78   Pulse 77   Temp 98.2  F (36.8  C) (Tympanic)   Resp 12   Ht 1.6 m (5' 3\")   Wt 91.6 kg (202 lb)   SpO2 97%   BMI 35.78 kg/m    Body mass index is 35.78 kg/m .  Physical Exam   GENERAL APPEARANCE: alert, no distress and cooperative  RESP: lungs clear to auscultation - no rales, rhonchi or wheezes, may have some crackles at the base of his lungs, will get a CXR  CV: regular rates and rhythm, normal S1 S2, no S3 or S4 and no murmur, click or rub  ABDOMEN: noted he seems to have more fat around his abdomen  MS: extremities have at least 2+ pitting edema with leg wraps on  SKIN: no suspicious lesions or rashes  NEURO: Normal strength and tone, mentation intact and speech normal  PSYCH: mentation appears normal and affect normal/bright    I have personally reviewed the xray and my interpretation is the following:  Noted on CXR seems to have mild vascular " congestion. Borderline cardiomegaly noted too.

## 2021-01-21 ENCOUNTER — TELEPHONE (OUTPATIENT)
Dept: FAMILY MEDICINE | Facility: CLINIC | Age: 81
End: 2021-01-21

## 2021-01-21 NOTE — TELEPHONE ENCOUNTER
It may take more than one dose to notice the effects.  Unfortunately you can't have a higher dose due to your kidney function.  Shayy Ramírez, CNP

## 2021-01-21 NOTE — TELEPHONE ENCOUNTER
First dose lasix 20mg taken this morning he doesn't feel that it is working can he get a higher dose? Did not note any increase in urination. Varsha Bell RN

## 2021-01-21 NOTE — TELEPHONE ENCOUNTER
Reason for Call:  Water Pill Lasix - was given to pt 1/20/21and  He feels he is not going very much as he thinks he should be. Pt took it this am.   2) Pt was given his lab results. Will you close out in Lab Results.  Please Advise.       Phone Number Patient can be reached at: Home number on file [phone] (home)    Best Time: Any Time      Can we leave a detailed message on this number? YES    Call taken on 1/21/2021 at 1:38 PM by Angela Kim

## 2021-01-22 NOTE — PROGRESS NOTES
Assessment & Plan     Peripheral edema  - Basic metabolic panel  (Ca, Cl, CO2, Creat, Gluc, K, Na, BUN)  - Echocardiogram Complete; Future  - furosemide (LASIX) 40 MG tablet; Take 1 tablet (40 mg) by mouth daily    Stage 3b chronic kidney disease      Etiology of the edema is unclear.  Will recheck ECHO  At follow up will check urine albumin.  Kidney function stable - will increase furosemide to 40 mg daily  Close follow up in 1 week - will need close monitoring of kidney function while diuresing.                Return in about 1 week (around 2/1/2021).    The risks, benefits and treatment options of prescribed medications or other treatments have been discussed with the patient. The patient verbalized their understanding and should call or follow up if no improvement or if they develop further problems.    GUNJAN Werner [facility]    Marcus Darnell is a 80 year old who presents to clinic today for the following health issues     HPI       Concern - recheck edema   From last appointment 1/20/21  Onset: a little over a week  Description: patient's weight has increased by 20 lbs ;  Here for follow up  Saw Dr. Powell last week  Started on Furosemide 20mg and told told to follow up this week with PCP  Abdomen and pelvic swelling, leg swelling  Patient states he is full of fluid  Doesn't feel any better than last week  He states his weight is usually 180-183-  Today 192  States he gained 15# in a week  Intensity: severe  Progression of Symptoms:  same  Accompanying Signs & Symptoms: fatigued, shortness of breath  Previous history of similar problem: no  Precipitating factors:        Worsened by: unknown  Alleviating factors:        Improved by: nothing  Therapies tried and outcome: given lasix - he doesn't think it has done anything. Weight is down today but patient states that he had his coat on last week when they weighed him.      Wt Readings from Last 4 Encounters:    01/25/21 87.1 kg (192 lb)   01/20/21 91.6 kg (202 lb)   09/03/20 78.5 kg (173 lb)   07/23/20 84.3 kg (185 lb 13.6 oz)           Review of Systems   Constitutional, HEENT, cardiovascular, pulmonary, gi and gu systems are negative, except as otherwise noted.      Objective    BP (!) 144/70 (BP Location: Right arm)   Pulse 80   Temp 99  F (37.2  C) (Tympanic)   Resp 20   Wt 87.1 kg (192 lb)   SpO2 97%   BMI 34.01 kg/m    Body mass index is 34.01 kg/m .  Physical Exam   GENERAL: healthy, alert and no distress  RESP: lungs clear to auscultation - no rales, rhonchi or wheezes  CV: regular rate and rhythm, normal S1 S2, no S3 or S4, no murmur, click or rub  MS: +2 edema BLE, edema also noted in left abdominal wall.    Results for orders placed or performed in visit on 01/25/21 (from the past 24 hour(s))   Basic metabolic panel  (Ca, Cl, CO2, Creat, Gluc, K, Na, BUN)   Result Value Ref Range    Sodium 142 133 - 144 mmol/L    Potassium 4.0 3.4 - 5.3 mmol/L    Chloride 112 (H) 94 - 109 mmol/L    Carbon Dioxide 29 20 - 32 mmol/L    Anion Gap 1 (L) 3 - 14 mmol/L    Glucose 96 70 - 99 mg/dL    Urea Nitrogen 21 7 - 30 mg/dL    Creatinine 2.02 (H) 0.66 - 1.25 mg/dL    GFR Estimate 30 (L) >60 mL/min/[1.73_m2]    GFR Estimate If Black 35 (L) >60 mL/min/[1.73_m2]    Calcium 8.4 (L) 8.5 - 10.1 mg/dL

## 2021-01-25 ENCOUNTER — OFFICE VISIT (OUTPATIENT)
Dept: FAMILY MEDICINE | Facility: CLINIC | Age: 81
End: 2021-01-25
Payer: COMMERCIAL

## 2021-01-25 VITALS
SYSTOLIC BLOOD PRESSURE: 144 MMHG | BODY MASS INDEX: 34.01 KG/M2 | RESPIRATION RATE: 20 BRPM | WEIGHT: 192 LBS | OXYGEN SATURATION: 97 % | HEART RATE: 80 BPM | DIASTOLIC BLOOD PRESSURE: 70 MMHG | TEMPERATURE: 99 F

## 2021-01-25 DIAGNOSIS — R60.0 PERIPHERAL EDEMA: Primary | ICD-10-CM

## 2021-01-25 DIAGNOSIS — N18.32 STAGE 3B CHRONIC KIDNEY DISEASE (H): ICD-10-CM

## 2021-01-25 LAB
ANION GAP SERPL CALCULATED.3IONS-SCNC: 1 MMOL/L (ref 3–14)
BUN SERPL-MCNC: 21 MG/DL (ref 7–30)
CALCIUM SERPL-MCNC: 8.4 MG/DL (ref 8.5–10.1)
CHLORIDE SERPL-SCNC: 112 MMOL/L (ref 94–109)
CO2 SERPL-SCNC: 29 MMOL/L (ref 20–32)
CREAT SERPL-MCNC: 2.02 MG/DL (ref 0.66–1.25)
GFR SERPL CREATININE-BSD FRML MDRD: 30 ML/MIN/{1.73_M2}
GLUCOSE SERPL-MCNC: 96 MG/DL (ref 70–99)
POTASSIUM SERPL-SCNC: 4 MMOL/L (ref 3.4–5.3)
SODIUM SERPL-SCNC: 142 MMOL/L (ref 133–144)

## 2021-01-25 PROCEDURE — 36415 COLL VENOUS BLD VENIPUNCTURE: CPT | Performed by: NURSE PRACTITIONER

## 2021-01-25 PROCEDURE — 80048 BASIC METABOLIC PNL TOTAL CA: CPT | Performed by: NURSE PRACTITIONER

## 2021-01-25 PROCEDURE — 99214 OFFICE O/P EST MOD 30 MIN: CPT | Performed by: NURSE PRACTITIONER

## 2021-01-25 RX ORDER — FUROSEMIDE 40 MG
40 TABLET ORAL DAILY
Qty: 14 TABLET | Refills: 0 | Status: SHIPPED | OUTPATIENT
Start: 2021-01-25 | End: 2021-02-01

## 2021-01-25 NOTE — PATIENT INSTRUCTIONS
Labs today.      Call to schedule ECHO: [phone]          We are working hard to begin vaccinating more people against COVID-19. Currently, we are only vaccinating Phase 1a workers - healthcare workers who are unable to do their job remotely. Vaccine availability is very limited.           Phase 1b is the next group that will get vaccinated and includes frontline essential workers and adults 75 years of age and older. When we are able to start vaccinating this group, we will share that information on our website. Check this website to stay up to date on COVID-19 vaccination information.  [email or web address]    Thank you for choosing [facility].  You may be receiving an email and/or telephone survey request from [facility] Customer Experience regarding your visit today.  Please take a few minutes to respond to the survey to let us know how we are doing.      If you have questions or concerns, please contact us via [facility]WIN or you can contact your care team at [phone].    Our Clinic hours are:  Monday 6:40 am  to 7:00 pm  Tuesday -Friday 6:40 am to 5:00 pm    The [place] outpatient lab hours are:  Monday - Friday 6:10 am to 4:45 pm  Saturdays 7:00 am to 11:00 am  Appointments are required, call [phone]    If you have clinical questions after hours or would like to schedule an appointment,  call the clinic at [phone]

## 2021-01-28 RX ORDER — OXYCODONE HYDROCHLORIDE 5 MG/1
5 TABLET ORAL 2 TIMES DAILY PRN
Qty: 180 TABLET | Refills: 0 | Status: SHIPPED | OUTPATIENT
Start: 2021-02-20 | End: 2021-06-16

## 2021-01-28 NOTE — TELEPHONE ENCOUNTER
Routed to provider.  PA for oxycodone as noted below.    Also, pt asked if he can have this sent to mail service pharmacy, [facility]?  Pharmacy change due to new insurance.  He says he was told he could have sent to mail pharmacy.    Last sent to [facility], 1/22/21.    Phoebe Ly RN

## 2021-01-29 ENCOUNTER — HOSPITAL ENCOUNTER (OUTPATIENT)
Dept: CARDIOLOGY | Facility: CLINIC | Age: 81
Discharge: HOME | End: 2021-01-29
Attending: NURSE PRACTITIONER | Admitting: NURSE PRACTITIONER
Payer: COMMERCIAL

## 2021-01-29 DIAGNOSIS — R60.0 PERIPHERAL EDEMA: ICD-10-CM

## 2021-01-29 PROCEDURE — 255N000002 HC RX 255 OP 636: Performed by: NURSE PRACTITIONER

## 2021-01-29 PROCEDURE — 999N000208 ECHOCARDIOGRAM COMPLETE

## 2021-01-29 PROCEDURE — 93306 TTE W/DOPPLER COMPLETE: CPT | Mod: 26 | Performed by: INTERNAL MEDICINE

## 2021-01-29 RX ADMIN — HUMAN ALBUMIN MICROSPHERES AND PERFLUTREN 2 ML: 10; .22 INJECTION, SOLUTION INTRAVENOUS at 14:50

## 2021-02-01 ENCOUNTER — OFFICE VISIT (OUTPATIENT)
Dept: FAMILY MEDICINE | Facility: CLINIC | Age: 81
End: 2021-02-01
Payer: COMMERCIAL

## 2021-02-01 VITALS
HEIGHT: 63 IN | RESPIRATION RATE: 20 BRPM | DIASTOLIC BLOOD PRESSURE: 70 MMHG | SYSTOLIC BLOOD PRESSURE: 130 MMHG | TEMPERATURE: 98.3 F | HEART RATE: 79 BPM | WEIGHT: 193 LBS | OXYGEN SATURATION: 96 % | BODY MASS INDEX: 34.2 KG/M2

## 2021-02-01 DIAGNOSIS — I51.89 DIASTOLIC DYSFUNCTION: ICD-10-CM

## 2021-02-01 DIAGNOSIS — R60.0 PERIPHERAL EDEMA: ICD-10-CM

## 2021-02-01 DIAGNOSIS — N18.32 STAGE 3B CHRONIC KIDNEY DISEASE (H): Primary | ICD-10-CM

## 2021-02-01 LAB
ANION GAP SERPL CALCULATED.3IONS-SCNC: 5 MMOL/L (ref 3–14)
BUN SERPL-MCNC: 23 MG/DL (ref 7–30)
CALCIUM SERPL-MCNC: 8.2 MG/DL (ref 8.5–10.1)
CHLORIDE SERPL-SCNC: 109 MMOL/L (ref 94–109)
CO2 SERPL-SCNC: 27 MMOL/L (ref 20–32)
CREAT SERPL-MCNC: 2.12 MG/DL (ref 0.66–1.25)
GFR SERPL CREATININE-BSD FRML MDRD: 28 ML/MIN/{1.73_M2}
GLUCOSE SERPL-MCNC: 117 MG/DL (ref 70–99)
POTASSIUM SERPL-SCNC: 3.7 MMOL/L (ref 3.4–5.3)
SODIUM SERPL-SCNC: 141 MMOL/L (ref 133–144)

## 2021-02-01 PROCEDURE — 36415 COLL VENOUS BLD VENIPUNCTURE: CPT | Performed by: NURSE PRACTITIONER

## 2021-02-01 PROCEDURE — 99214 OFFICE O/P EST MOD 30 MIN: CPT | Performed by: NURSE PRACTITIONER

## 2021-02-01 PROCEDURE — 80048 BASIC METABOLIC PNL TOTAL CA: CPT | Performed by: NURSE PRACTITIONER

## 2021-02-01 RX ORDER — TORSEMIDE 20 MG/1
20 TABLET ORAL DAILY
Qty: 7 TABLET | Refills: 0 | Status: SHIPPED | OUTPATIENT
Start: 2021-02-01 | End: 2021-02-08

## 2021-02-01 RX ORDER — TORSEMIDE 20 MG/1
20 TABLET ORAL DAILY
Qty: 7 TABLET | Refills: 0 | Status: SHIPPED | OUTPATIENT
Start: 2021-02-01 | End: 2021-02-01

## 2021-02-01 ASSESSMENT — MIFFLIN-ST. JEOR: SCORE: 1480.57

## 2021-02-01 NOTE — PROGRESS NOTES
Assessment & Plan     Stage 3b chronic kidney disease  Minimal increase in creatinine with diuresis.  Will continue to monitor weekly  - Basic metabolic panel  (Ca, Cl, CO2, Creat, Gluc, K, Na, BUN)    Diastolic dysfunction  New diastolic dysfunction on recent ECHO  Furosemide 40 mg daily is not effective for diuresis.  Switch to torsemide 20 mg daily and follow up in one week  - Basic metabolic panel  (Ca, Cl, CO2, Creat, Gluc, K, Na, BUN)    Peripheral edema  Furosemide 40 mg daily is not effective for diuresis.  Switch to torsemide 20 mg daily and follow up in one week.  - Basic metabolic panel  (Ca, Cl, CO2, Creat, Gluc, K, Na, BUN)                Return in about 1 week (around 2/8/2021).    The risks, benefits and treatment options of prescribed medications or other treatments have been discussed with the patient. The patient verbalized their understanding and should call or follow up if no improvement or if they develop further problems.    GUNJAN Werner CNP  [facility] is a 80 year old who presents to clinic today for the following health issues     HPI       Concern - edema follow up  Onset: two weeks  Description: patient here for follow up of edema and weight gain  Discuss results of echo  Intensity: severe  Progression of Symptoms:  same  Accompanying Signs & Symptoms: fatigue, shortness of breath  Previous history of similar problem: no  Precipitating factors:        Worsened by: unknown  Alleviating factors:        Improved by: nothing  Therapies tried and outcome: given lasix with close follow up  - patient reports not much improvement in his symptoms with the higher dose dose of furosemide 40 mg daily          Review of Systems   Constitutional, HEENT, cardiovascular, pulmonary, gi and gu systems are negative, except as otherwise noted.      Objective    /70 (BP Location: Right arm)   Pulse 79   Temp 98.3  F (36.8  C) (Tympanic)   " Resp 20   Ht 1.6 m (5' 3\")   Wt 87.5 kg (193 lb)   SpO2 96%   BMI 34.19 kg/m    Body mass index is 34.19 kg/m .  Physical Exam   GENERAL: healthy, alert and no distress  RESP: crackles bilateral lower lobes  CV: regular rate and rhythm, normal S1 S2, no S3 or S4, no murmur, click or rub, no peripheral edema and peripheral pulses strong  MS: +2 pitting edema BLE    Results for orders placed or performed in visit on 02/01/21   Basic metabolic panel  (Ca, Cl, CO2, Creat, Gluc, K, Na, BUN)     Status: Abnormal   Result Value Ref Range    Sodium 141 133 - 144 mmol/L    Potassium 3.7 3.4 - 5.3 mmol/L    Chloride 109 94 - 109 mmol/L    Carbon Dioxide 27 20 - 32 mmol/L    Anion Gap 5 3 - 14 mmol/L    Glucose 117 (H) 70 - 99 mg/dL    Urea Nitrogen 23 7 - 30 mg/dL    Creatinine 2.12 (H) 0.66 - 1.25 mg/dL    GFR Estimate 28 (L) >60 mL/min/[1.73_m2]    GFR Estimate If Black 33 (L) >60 mL/min/[1.73_m2]    Calcium 8.2 (L) 8.5 - 10.1 mg/dL               "

## 2021-02-02 ENCOUNTER — APPOINTMENT (OUTPATIENT)
Dept: LAB | Facility: CLINIC | Age: 81
End: 2021-02-02
Payer: COMMERCIAL

## 2021-02-02 ENCOUNTER — TRANSFERRED RECORDS (OUTPATIENT)
Dept: HEALTH INFORMATION MANAGEMENT | Facility: CLINIC | Age: 81
End: 2021-02-02

## 2021-02-02 DIAGNOSIS — M25.579 ANKLE PAIN: ICD-10-CM

## 2021-02-02 DIAGNOSIS — M25.579 PAIN IN JOINT, ANKLE AND FOOT: Primary | ICD-10-CM

## 2021-02-02 DIAGNOSIS — M25.579 ANKLE PAIN: Primary | ICD-10-CM

## 2021-02-02 LAB
BASOPHILS # BLD AUTO: 0 10E9/L (ref 0–0.2)
BASOPHILS NFR BLD AUTO: 0.8 %
CRP SERPL-MCNC: 5.8 MG/L (ref 0–8)
DIFFERENTIAL METHOD BLD: ABNORMAL
EOSINOPHIL # BLD AUTO: 0.1 10E9/L (ref 0–0.7)
EOSINOPHIL NFR BLD AUTO: 2.8 %
ERYTHROCYTE [DISTWIDTH] IN BLOOD BY AUTOMATED COUNT: 18.8 % (ref 10–15)
ERYTHROCYTE [SEDIMENTATION RATE] IN BLOOD BY WESTERGREN METHOD: 29 MM/H (ref 0–20)
GRAM STN SPEC: NORMAL
GRAM STN SPEC: NORMAL
HCT VFR BLD AUTO: 27.8 % (ref 40–53)
HGB BLD-MCNC: 8.5 G/DL (ref 13.3–17.7)
IMM GRANULOCYTES # BLD: 0 10E9/L (ref 0–0.4)
IMM GRANULOCYTES NFR BLD: 0.2 %
LYMPHOCYTES # BLD AUTO: 0.6 10E9/L (ref 0.8–5.3)
LYMPHOCYTES NFR BLD AUTO: 12.7 %
MCH RBC QN AUTO: 18.4 PG (ref 26.5–33)
MCHC RBC AUTO-ENTMCNC: 30.6 G/DL (ref 31.5–36.5)
MCV RBC AUTO: 60 FL (ref 78–100)
MONOCYTES # BLD AUTO: 0.5 10E9/L (ref 0–1.3)
MONOCYTES NFR BLD AUTO: 9.9 %
NEUTROPHILS # BLD AUTO: 3.7 10E9/L (ref 1.6–8.3)
NEUTROPHILS NFR BLD AUTO: 73.6 %
NRBC # BLD AUTO: 0 10*3/UL
NRBC BLD AUTO-RTO: 0 /100
PLATELET # BLD AUTO: 183 10E9/L (ref 150–450)
RBC # BLD AUTO: 4.61 10E12/L (ref 4.4–5.9)
SPECIMEN SOURCE: NORMAL
WBC # BLD AUTO: 5 10E9/L (ref 4–11)

## 2021-02-02 PROCEDURE — 85025 COMPLETE CBC W/AUTO DIFF WBC: CPT | Performed by: ORTHOPAEDIC SURGERY

## 2021-02-02 PROCEDURE — 87205 SMEAR GRAM STAIN: CPT | Performed by: ORTHOPAEDIC SURGERY

## 2021-02-02 PROCEDURE — 87075 CULTR BACTERIA EXCEPT BLOOD: CPT | Performed by: ORTHOPAEDIC SURGERY

## 2021-02-02 PROCEDURE — 36415 COLL VENOUS BLD VENIPUNCTURE: CPT | Performed by: ORTHOPAEDIC SURGERY

## 2021-02-02 PROCEDURE — 85652 RBC SED RATE AUTOMATED: CPT | Performed by: ORTHOPAEDIC SURGERY

## 2021-02-02 PROCEDURE — 86140 C-REACTIVE PROTEIN: CPT | Performed by: ORTHOPAEDIC SURGERY

## 2021-02-02 PROCEDURE — 87070 CULTURE OTHR SPECIMN AEROBIC: CPT | Performed by: ORTHOPAEDIC SURGERY

## 2021-02-04 ENCOUNTER — IMMUNIZATION (OUTPATIENT)
Dept: FAMILY MEDICINE | Facility: CLINIC | Age: 81
End: 2021-02-04
Payer: COMMERCIAL

## 2021-02-04 PROCEDURE — 0001A PR COVID VAC PFIZER DIL RECON 30 MCG/0.3 ML IM: CPT

## 2021-02-04 PROCEDURE — 91300 PR COVID VAC PFIZER DIL RECON 30 MCG/0.3 ML IM: CPT

## 2021-02-07 LAB
BACTERIA SPEC CULT: NO GROWTH
SPECIMEN SOURCE: NORMAL

## 2021-02-08 ENCOUNTER — TELEPHONE (OUTPATIENT)
Dept: FAMILY MEDICINE | Facility: CLINIC | Age: 81
End: 2021-02-08

## 2021-02-08 ENCOUNTER — OFFICE VISIT (OUTPATIENT)
Dept: FAMILY MEDICINE | Facility: CLINIC | Age: 81
End: 2021-02-08
Payer: COMMERCIAL

## 2021-02-08 VITALS
HEART RATE: 76 BPM | DIASTOLIC BLOOD PRESSURE: 70 MMHG | HEIGHT: 63 IN | BODY MASS INDEX: 34.02 KG/M2 | SYSTOLIC BLOOD PRESSURE: 146 MMHG | TEMPERATURE: 98.3 F | WEIGHT: 192 LBS | RESPIRATION RATE: 20 BRPM

## 2021-02-08 DIAGNOSIS — N18.32 STAGE 3B CHRONIC KIDNEY DISEASE (H): ICD-10-CM

## 2021-02-08 DIAGNOSIS — R14.0 ABDOMINAL DISTENTION: ICD-10-CM

## 2021-02-08 DIAGNOSIS — R60.0 PERIPHERAL EDEMA: ICD-10-CM

## 2021-02-08 DIAGNOSIS — I51.89 DIASTOLIC DYSFUNCTION: Primary | ICD-10-CM

## 2021-02-08 LAB
ALBUMIN SERPL-MCNC: 3.2 G/DL (ref 3.4–5)
ALP SERPL-CCNC: 104 U/L (ref 40–150)
ALT SERPL W P-5'-P-CCNC: 21 U/L (ref 0–70)
ANION GAP SERPL CALCULATED.3IONS-SCNC: 5 MMOL/L (ref 3–14)
AST SERPL W P-5'-P-CCNC: 14 U/L (ref 0–45)
BILIRUB DIRECT SERPL-MCNC: 0.2 MG/DL (ref 0–0.2)
BILIRUB SERPL-MCNC: 0.5 MG/DL (ref 0.2–1.3)
BUN SERPL-MCNC: 28 MG/DL (ref 7–30)
CALCIUM SERPL-MCNC: 8 MG/DL (ref 8.5–10.1)
CHLORIDE SERPL-SCNC: 107 MMOL/L (ref 94–109)
CO2 SERPL-SCNC: 30 MMOL/L (ref 20–32)
CREAT SERPL-MCNC: 2.26 MG/DL (ref 0.66–1.25)
GFR SERPL CREATININE-BSD FRML MDRD: 26 ML/MIN/{1.73_M2}
GLUCOSE SERPL-MCNC: 91 MG/DL (ref 70–99)
POTASSIUM SERPL-SCNC: 3.5 MMOL/L (ref 3.4–5.3)
PROT SERPL-MCNC: 6.1 G/DL (ref 6.8–8.8)
SODIUM SERPL-SCNC: 142 MMOL/L (ref 133–144)

## 2021-02-08 PROCEDURE — 99214 OFFICE O/P EST MOD 30 MIN: CPT | Performed by: NURSE PRACTITIONER

## 2021-02-08 PROCEDURE — 80048 BASIC METABOLIC PNL TOTAL CA: CPT | Performed by: NURSE PRACTITIONER

## 2021-02-08 PROCEDURE — 36415 COLL VENOUS BLD VENIPUNCTURE: CPT | Performed by: NURSE PRACTITIONER

## 2021-02-08 PROCEDURE — 80076 HEPATIC FUNCTION PANEL: CPT | Performed by: NURSE PRACTITIONER

## 2021-02-08 RX ORDER — TORSEMIDE 20 MG/1
20 TABLET ORAL DAILY
Qty: 30 TABLET | Refills: 0 | Status: SHIPPED | OUTPATIENT
Start: 2021-02-08 | End: 2021-03-15

## 2021-02-08 ASSESSMENT — MIFFLIN-ST. JEOR: SCORE: 1476.04

## 2021-02-08 NOTE — PATIENT INSTRUCTIONS
Schedule abdominal ultrasound: [phone]          Thank you for choosing [facility].  You may be receiving an email and/or telephone survey request from [facility] Customer Experience regarding your visit today.  Please take a few minutes to respond to the survey to let us know how we are doing.      If you have questions or concerns, please contact us via [employer] or you can contact your care team at [phone].    Our Clinic hours are:  Monday 6:40 am  to 7:00 pm  Tuesday -Friday 6:40 am to 5:00 pm    The [place] outpatient lab hours are:  Monday - Friday 6:10 am to 4:45 pm  Saturdays 7:00 am to 11:00 am  Appointments are required, call [phone]    If you have clinical questions after hours or would like to schedule an appointment,  call the clinic at [phone].

## 2021-02-08 NOTE — PROGRESS NOTES
Assessment & Plan     Diastolic dysfunction  - Basic metabolic panel  (Ca, Cl, CO2, Creat, Gluc, K, Na, BUN)    Stage 3b chronic kidney disease  - Basic metabolic panel  (Ca, Cl, CO2, Creat, Gluc, K, Na, BUN)  - NEPHROLOGY ADULT REFERRAL; Future    Peripheral edema  - Basic metabolic panel  (Ca, Cl, CO2, Creat, Gluc, K, Na, BUN)  - NEPHROLOGY ADULT REFERRAL; Future    Known h/o CKD, now with new diastolic dysfunction and peripheral edema.  Diuretics are not decreasing weight/decreasing edema.  His creatinine is increasing.  At this point will refer to nephrology.        Abdominal distention  Patient thinks that this left sided fullness is fluid, however that isn't entirely clear on exam.  Hepatic panel today is normal.  Will obtain US to r/o ascites.  - US Abdomen Complete; Future  - Hepatic panel                    Return in about 1 week (around 2/15/2021).    The risks, benefits and treatment options of prescribed medications or other treatments have been discussed with the patient. The patient verbalized their understanding and should call or follow up if no improvement or if they develop further problems.    GUNJAN Werner [facility]        Marcus Darnell is a 80 year old who presents to clinic today for the following health issues     HPI       Concern - recheck peripheral edema/ kidney disease  Onset: about 3 weeks  Description: patient here for recheck of edema  Lasix didn't do anything.  Is on torsemide currently   Patient hasn't noticed any weight change  He is still concerned about area in abdomen full of fluid  Leg swelling- gets worse through the day  Intensity: moderate  Progression of Symptoms:  same  Accompanying Signs & Symptoms: no shortness of breath   Previous history of similar problem: yes  Precipitating factors:        Worsened by: unknown  Alleviating factors:        Improved by: nothing  Therapies tried and outcome: currently on  "torsemide        Review of Systems   Constitutional, HEENT, cardiovascular, pulmonary, gi and gu systems are negative, except as otherwise noted.      Objective    BP (!) 146/70 (BP Location: Right arm)   Pulse 76   Temp 98.3  F (36.8  C) (Tympanic)   Resp 20   Ht 1.6 m (5' 3\")   Wt 87.1 kg (192 lb)   BMI 34.01 kg/m    Body mass index is 34.01 kg/m .  Physical Exam   GENERAL: healthy, alert and no distress  ABDOMEN: Left side of abdomen has a subcutaneous fullness to palpation  MS: BLE edema - +2 pitting    Results for orders placed or performed in visit on 02/08/21 (from the past 24 hour(s))   Basic metabolic panel  (Ca, Cl, CO2, Creat, Gluc, K, Na, BUN)   Result Value Ref Range    Sodium 142 133 - 144 mmol/L    Potassium 3.5 3.4 - 5.3 mmol/L    Chloride 107 94 - 109 mmol/L    Carbon Dioxide 30 20 - 32 mmol/L    Anion Gap 5 3 - 14 mmol/L    Glucose 91 70 - 99 mg/dL    Urea Nitrogen 28 7 - 30 mg/dL    Creatinine 2.26 (H) 0.66 - 1.25 mg/dL    GFR Estimate 26 (L) >60 mL/min/[1.73_m2]    GFR Estimate If Black 31 (L) >60 mL/min/[1.73_m2]    Calcium 8.0 (L) 8.5 - 10.1 mg/dL   Hepatic panel   Result Value Ref Range    Bilirubin Direct 0.2 0.0 - 0.2 mg/dL    Bilirubin Total 0.5 0.2 - 1.3 mg/dL    Albumin 3.2 (L) 3.4 - 5.0 g/dL    Protein Total 6.1 (L) 6.8 - 8.8 g/dL    Alkaline Phosphatase 104 40 - 150 U/L    ALT 21 0 - 70 U/L    AST 14 0 - 45 U/L               "

## 2021-02-09 ENCOUNTER — TRANSFERRED RECORDS (OUTPATIENT)
Dept: HEALTH INFORMATION MANAGEMENT | Facility: CLINIC | Age: 81
End: 2021-02-09

## 2021-02-16 LAB
BACTERIA SPEC CULT: NORMAL
Lab: NORMAL
SPECIMEN SOURCE: NORMAL

## 2021-02-22 ENCOUNTER — HOSPITAL ENCOUNTER (OUTPATIENT)
Dept: ULTRASOUND IMAGING | Facility: CLINIC | Age: 81
Discharge: HOME | End: 2021-02-22
Attending: NURSE PRACTITIONER | Admitting: NURSE PRACTITIONER
Payer: COMMERCIAL

## 2021-02-22 DIAGNOSIS — R14.0 ABDOMINAL DISTENTION: ICD-10-CM

## 2021-02-22 PROCEDURE — 76705 ECHO EXAM OF ABDOMEN: CPT

## 2021-02-25 ENCOUNTER — IMMUNIZATION (OUTPATIENT)
Dept: FAMILY MEDICINE | Facility: CLINIC | Age: 81
End: 2021-02-25
Attending: FAMILY MEDICINE
Payer: COMMERCIAL

## 2021-02-25 PROCEDURE — 0002A PR COVID VAC PFIZER DIL RECON 30 MCG/0.3 ML IM: CPT

## 2021-02-25 PROCEDURE — 91300 PR COVID VAC PFIZER DIL RECON 30 MCG/0.3 ML IM: CPT

## 2021-03-01 ENCOUNTER — TELEPHONE (OUTPATIENT)
Dept: FAMILY MEDICINE | Facility: CLINIC | Age: 81
End: 2021-03-01

## 2021-03-01 NOTE — TELEPHONE ENCOUNTER
"RN reached out to patient to triage symptoms at request of provider, as patient is on his schedule tomorrow for \"weight gain issues quickly   fat build up\".  Patient does have history of heart and circulatory issues, so need to see if he's having symptoms of fluid overload, etc.      Patient reports he's not just gaining weight like he's getting fat, he has weight gain around waist, and chronic edema. Per history, he has diastolic function, perepheral edema and Stage 3 CKD.  He saw PCP Shayy on 2/8/21, who prescribed torsemide 20mg daily. He weighed 192 lbs that day. He weighs daily at home. Weight this morning was 194.6 lbs.  He states he always had swelling in L leg for years.  He denies a marked increase in swelling over the past couple of weeks, seems to stay the same.  He denies chest pain, increase in SOB or cough.  He speaks in full sentences on the phone and is not in distress.  He reports he's been having these symptoms since his ankle surgery in July and feels he's steadily gained weight and declined since then.  He feels there's something else going on besides usual weight gain, as his diet hasn't changed, etc.  It appears he has seen Dr. Powell, as well as PCP for weight gain, edema, obesity.  He states he wants to see another doctor for another perspective.     RN advised that it sounds reasonable for him to see provider tomorrow as scheduled, as he's not currently experiencing any acute, concerning symptoms.  He will keep appt as planned and was educated on symptoms that would warrant an emergency visit, such as severe swelling, SOB, chest pain or weight gain of 3 lbs overnight.      Tatum Garza RN  [facility]        "

## 2021-03-02 ENCOUNTER — OFFICE VISIT (OUTPATIENT)
Dept: FAMILY MEDICINE | Facility: CLINIC | Age: 81
End: 2021-03-02
Payer: COMMERCIAL

## 2021-03-02 VITALS
RESPIRATION RATE: 16 BRPM | WEIGHT: 197 LBS | HEIGHT: 63 IN | BODY MASS INDEX: 34.91 KG/M2 | TEMPERATURE: 98.3 F | HEART RATE: 66 BPM | OXYGEN SATURATION: 94 % | DIASTOLIC BLOOD PRESSURE: 70 MMHG | SYSTOLIC BLOOD PRESSURE: 138 MMHG

## 2021-03-02 DIAGNOSIS — R19.8 INCREASED ABDOMINAL GIRTH: Primary | ICD-10-CM

## 2021-03-02 DIAGNOSIS — R60.0 BILATERAL LEG EDEMA: ICD-10-CM

## 2021-03-02 DIAGNOSIS — R05.8 DRY COUGH: ICD-10-CM

## 2021-03-02 PROCEDURE — 99214 OFFICE O/P EST MOD 30 MIN: CPT | Performed by: FAMILY MEDICINE

## 2021-03-02 RX ORDER — FLUTICASONE PROPIONATE 50 MCG
1 SPRAY, SUSPENSION (ML) NASAL DAILY
Qty: 16 G | Refills: 1 | Status: SHIPPED | OUTPATIENT
Start: 2021-03-02 | End: 2023-01-01

## 2021-03-02 ASSESSMENT — MIFFLIN-ST. JEOR: SCORE: 1498.72

## 2021-03-02 NOTE — PATIENT INSTRUCTIONS
To schedule the ultrasound and ct scan, call [phone].    Further recommendations to be given once results are out.    Continue all medications as prescribed.    If you have persistent coughing, start flonase 1 spray to each nostril every day.      Patient Education     Coping with Edema  What is edema?  Edema is the build-up of fluid in the body, which causes swelling. Swelling most commonly occurs in the feet, ankles, lower legs or hands.  Swelling can occur in the belly or chest may be a sign of a more severe problem.  Certain medicines or conditions can make the swelling worse.  Symptoms include:    Feet and lower legs get larger when you sit or walk.    Hands feel tight when you make a fist.    When you push on the skin, skin stays dented.    Shiny, tight skin.    Fast weight gain.  How is it treated?  Your care team may give you a medicine to reduce the swelling.  They may also suggest that you meet with a dietitian. He or she can help with food choices to reduce the swelling.  What can I do about the swelling?    Place your feet above your heart 3 times a day: Sit with your feet up on a stool with a pillow. Sit on the bed or couch with two pillows under your feet.    Do not stand for long periods of time.    Wear loose-fitting clothes.    Do not cross your legs.    Reduce the salt in your diet. These foods are high in salt:  ? Chips, soup  ? Frozen meals, TV dinners  ? [place], lunch meat, ham  ? Sauces (soy, canned spaghetti sauce)    Walk or do other exercise.    Wear compression stockings.    Drink water as normal.    Weigh yourself every day at the same time to keep track of weight gain.  When should I call my care team?  Call your care team if:    You have a hard time breathing.    You gained 5 pounds or more in 1 week.    Your hands or feet feel cold when you touch them.    You are peeing very little or not at all.    Swelling is moving up your arms or legs.    Your tongue is swelling.    You cannot  eat for more than a day  If you have any side effects, call us. We can help you manage these problems.  For more information, see:  www.[facility].com  www.cancer.org/treatment/treatmentsandsideeffects/physicalsideeffects/dealingwithsymptomsathome  www.cancer.gov/cancertopics/coping/chemotherapy-and-you  Comments:  __________________________________________  __________________________________________  __________________________________________  __________________________________________  __________________________________________  __________________________________________  __________________________________________  For informational purposes only. Not to replace the advice of your health care provider.  Copyright   2014 [employer]. All rights reserved. [employer] 368913 - REV 03/16.           Patient Education     Leg Swelling in Both Legs    Swelling of the feet, ankles, and legs is called edema. It is caused by excess fluid that has collected in the tissues. Extra fluid in the body settles in the lowest part because of gravity. This is why the legs and feet are most affected.  Some of the causes for edema include:    Disease of the heart such as congestive heart failure    Standing or sitting for long periods of time    Infection of the feet or legs    Blood pooling in the veins of your legs (venous insufficiency) when the veins have less elasticity    Dilated veins in your lower leg (varicose veins)    Stockings or other clothing that is tight on your legs. This will cause blood to pool in your legs because the clothing limits blood flow.    Some medicines. These include some hormones such as birth control pills, some blood pressure medicines such as calcium channel blockers, steroids, and some antidepressants such as MAO inhibitors and tricyclics.    Menstrual periods that cause you to retain fluids    Many types of kidney disease    Liver failure or cirrhosis    Pregnancy. Some swelling is  normal, but a sudden increase in leg swelling or weight gain can be a sign of a dangerous complication of pregnancy called eclampsia.    Poor nutrition    Thyroid disease  Treatment will depend on what is causing the swelling in your legs. Your healthcare provider may prescribe water pills (diuretics) to get rid of the extra fluid.  Home care  Follow these guidelines when caring for yourself at home:    Don't wear clothing such as stockings that are tight on your legs.    Keep your legs up while lying or sitting.    If infection, injury, or recent surgery is causing the swelling, stay off your legs as much as possible until symptoms get better.    If your healthcare provider says that your leg swelling is caused by venous insufficiency or varicose veins, don't sit or  one place for long periods of time. Take breaks and walk about every few hours. Brisk walking is a good exercise. It helps circulate the blood that has collected in your leg. Talk with your provider about using support stockings to stop daytime leg swelling.    If your provider says that heart disease is causing your leg swelling, follow a low-salt diet to stop extra fluid from staying in your body. You may also need medicine.  Follow-up care  Follow up with your healthcare provider, or as advised.  When to seek medical advice  Call your healthcare provider right away if any of these occur:    Swelling in both legs or ankles that gets worse    Swelling of the abdomen    Redness, warmth, or swelling in one leg    Fever of 100.4 F (38 C) or higher , or as directed by your healthcare provider    Yellow color to your skin or eyes    Rapid, unexplained weight gain    Having to sleep upright or use an increased number of pillow     Call 911  This is the fastest and safest way to get to the emergency department. The paramedics can also start treatment on the way to the hospital, if needed.  Call 911, or seekmedical attention right away if    You have  new shortness of breath or chest pain    Worsening shortness of breath or chest pain?  Qu[employer] last reviewed this educational content on 11/1/2019 2000-2020 [employer]. [address] [place] [address], [place] [address]. All rights reserved. This information is not intended as a substitute for professional medical care. Always follow your healthcare professional's instructions.

## 2021-03-02 NOTE — PROGRESS NOTES
Assessment & Plan     Increased abdominal girth  - CT Abdomen w/o Contrast  Unclear etiology. Reviewed ultrasound done recently - negative for abnormality.  Could be subcutaneous fat. Could be a large lipoma.  On palpation, not suggestive of intraabdominal pathology or mass.  Discussed with patient the above. Discussed can obtain CT scan abdomen if he would like to pursue more detailed imaging of the soft tissues. He concurs.  Not suspect for fluid retention. No ascites on recent US.  Return precautions discussed and given to patient.     Bilateral leg edema  - CT Abdomen w/o Contrast  - US Venous Competency Bilateral  Recent work unit(s)p suggest could be result of recent decrline in renal function.  Could also be due to mild CHF.  Discussed can rule out venous insuff through US. Patient concurred.  Continue low salt diet, leg elevation and compression wraps.  Return precautions discussed and given to patient.   Reasons to go to ER discussed in detail with patient.    Dry cough  - fluticasone (FLONASE) 50 MCG/ACT nasal spray  Dispense: 16 g; Refill: 1  Advised likely not due to amlodipine.   Lungs are clear.  Could be postnbasal drip. Try flonase daily for a few weeks.  Return precautions discussed and given to patient.     Patient Instructions   To schedule the ultrasound and ct scan, call [phone].    Further recommendations to be given once results are out.    Continue all medications as prescribed.    If you have persistent coughing, start flonase 1 spray to each nostril every day.      Patient Education     Coping with Edema  What is edema?  Edema is the build-up of fluid in the body, which causes swelling. Swelling most commonly occurs in the feet, ankles, lower legs or hands.  Swelling can occur in the belly or chest may be a sign of a more severe problem.  Certain medicines or conditions can make the swelling worse.  Symptoms include:    Feet and lower legs get larger when you sit or walk.    Hands  feel tight when you make a fist.    When you push on the skin, skin stays dented.    Shiny, tight skin.    Fast weight gain.  How is it treated?  Your care team may give you a medicine to reduce the swelling.  They may also suggest that you meet with a dietitian. He or she can help with food choices to reduce the swelling.  What can I do about the swelling?    Place your feet above your heart 3 times a day: Sit with your feet up on a stool with a pillow. Sit on the bed or couch with two pillows under your feet.    Do not stand for long periods of time.    Wear loose-fitting clothes.    Do not cross your legs.    Reduce the salt in your diet. These foods are high in salt:  ? Chips, soup  ? Frozen meals, TV dinners  ? [place], lunch meat, ham  ? Sauces (soy, canned spaghetti sauce)    Walk or do other exercise.    Wear compression stockings.    Drink water as normal.    Weigh yourself every day at the same time to keep track of weight gain.  When should I call my care team?  Call your care team if:    You have a hard time breathing.    You gained 5 pounds or more in 1 week.    Your hands or feet feel cold when you touch them.    You are peeing very little or not at all.    Swelling is moving up your arms or legs.    Your tongue is swelling.    You cannot eat for more than a day  If you have any side effects, call us. We can help you manage these problems.  For more information, see:  www.[facility].com  www.cancer.org/treatment/treatmentsandsideeffects/physicalsideeffects/dealingwithsymptomsathome  www.cancer.gov/cancertopics/coping/chemotherapy-and-you  Comments:  __________________________________________  __________________________________________  __________________________________________  __________________________________________  __________________________________________  __________________________________________  __________________________________________  For informational purposes only. Not to replace the  advice of your health care provider.  Copyright   2014 [facility]. All rights reserved. [employer] 174443 - REV 03/16.           Patient Education     Leg Swelling in Both Legs    Swelling of the feet, ankles, and legs is called edema. It is caused by excess fluid that has collected in the tissues. Extra fluid in the body settles in the lowest part because of gravity. This is why the legs and feet are most affected.  Some of the causes for edema include:    Disease of the heart such as congestive heart failure    Standing or sitting for long periods of time    Infection of the feet or legs    Blood pooling in the veins of your legs (venous insufficiency) when the veins have less elasticity    Dilated veins in your lower leg (varicose veins)    Stockings or other clothing that is tight on your legs. This will cause blood to pool in your legs because the clothing limits blood flow.    Some medicines. These include some hormones such as birth control pills, some blood pressure medicines such as calcium channel blockers, steroids, and some antidepressants such as MAO inhibitors and tricyclics.    Menstrual periods that cause you to retain fluids    Many types of kidney disease    Liver failure or cirrhosis    Pregnancy. Some swelling is normal, but a sudden increase in leg swelling or weight gain can be a sign of a dangerous complication of pregnancy called eclampsia.    Poor nutrition    Thyroid disease  Treatment will depend on what is causing the swelling in your legs. Your healthcare provider may prescribe water pills (diuretics) to get rid of the extra fluid.  Home care  Follow these guidelines when caring for yourself at home:    Don't wear clothing such as stockings that are tight on your legs.    Keep your legs up while lying or sitting.    If infection, injury, or recent surgery is causing the swelling, stay off your legs as much as possible until symptoms get better.    If your healthcare  provider says that your leg swelling is caused by venous insufficiency or varicose veins, don't sit or  one place for long periods of time. Take breaks and walk about every few hours. Brisk walking is a good exercise. It helps circulate the blood that has collected in your leg. Talk with your provider about using support stockings to stop daytime leg swelling.    If your provider says that heart disease is causing your leg swelling, follow a low-salt diet to stop extra fluid from staying in your body. You may also need medicine.  Follow-up care  Follow up with your healthcare provider, or as advised.  When to seek medical advice  Call your healthcare provider right away if any of these occur:    Swelling in both legs or ankles that gets worse    Swelling of the abdomen    Redness, warmth, or swelling in one leg    Fever of 100.4 F (38 C) or higher , or as directed by your healthcare provider    Yellow color to your skin or eyes    Rapid, unexplained weight gain    Having to sleep upright or use an increased number of pillow     Call 911  This is the fastest and safest way to get to the emergency department. The paramedics can also start treatment on the way to the hospital, if needed.  Call 911, or seekmedical attention right away if    You have new shortness of breath or chest pain    Worsening shortness of breath or chest pain?  [employer] last reviewed this educational content on 11/1/2019 2000-2020 [employer]. [address] [place], [place] [address]. All rights reserved. This information is not intended as a substitute for professional medical care. Always follow your healthcare professional's instructions.               Return if symptoms worsen or fail to improve.    Anson Thomas MD  [facility]    Marcus Darnell is a 80 year old who presents for the following health issues  accompanied by his spouse:  Chief Complaint   Patient presents with      Weight Problem     Pt here for weight gain over the past few weeks.  His scale at home shows a 20lb weight change.     Cough     Pt also feels he is having side effect of cough from b/p medication.     Leg Swelling     Pt also having swelling in legs, left leg always worse, now right leg is swollen.  Pt has appt with nephrology in May.        HPI       Medication Followup of amlodopine    Taking Medication as prescribed: yes    Side Effects:  Cough, clearing throat - patient asks if theser are due to amlodipine    Medication Helping Symptoms:  Yes     Concern - increased abdominal girth, swelling in legs   Onset: over the last month, pt states stomach has been since July  Description: Pt has a ring of fat around stomach he is concerned about, also swelling in both his legs.  Pt's scale says he has gained 20lbs over the past month.  Progression of Symptoms:  worsening  Accompanying Signs & Symptoms: none  Previous history of similar problem: none  Precipitating factors:        Worsened by: none  Alleviating factors:        Improved by: none  Therapies tried and outcome: pt was on hydrochlorothiazide in the past, was taken off that last summer     Reviewed recent imaging and blood tests with patient.  Diagnosed with mild diastolic dysfuncton.  Recent declined in renal function.  Negative US abdomen recently. No recent abdomen XR or CT or Mri.    C: NEGATIVE for fever, chills, change in weight  I: NEGATIVE for worrisome rashes, moles or lesions  E: NEGATIVE for vision changes or irritation  R: NEGATIVE for significant cough or SOB  CV: NEGATIVE for chest pain, palpitations or peripheral edema  GI: NEGATIVE for nausea, abdominal pain, heartburn, or change in bowel habits  : NEGATIVE for frequency, dysuria, or hematuria  M: NEGATIVE for significant arthralgias or myalgia  N: NEGATIVE for weakness, dizziness or paresthesias  E: NEGATIVE for temperature intolerance, skin/hair changes  H: NEGATIVE for bleeding  "problems    Review of Systems   Constitutional, HEENT, cardiovascular, pulmonary, GI, , musculoskeletal, neuro, skin, endocrine and psych systems are negative, except as otherwise noted.      Objective    /70   Pulse 66   Temp 98.3  F (36.8  C) (Tympanic)   Resp 16   Ht 1.6 m (5' 3\")   Wt 89.4 kg (197 lb)   SpO2 94%   BMI 34.90 kg/m    Body mass index is 34.9 kg/m .  Physical Exam   GENERAL: centrally obese, walks with walker, alert and no distress  NECK: no tenderness, no adenopathy,  Thyroid not enlarged  RESP: lungs clear to auscultation - no rales, no rhonchi, no wheezes  CV: regular rates and rhythm, normal S1 S2, no S3 or S4 and no murmur, no click or rub  ABD: protuberant and flabby abdomen with left hemiabdomen signifciantly wider than right around waist,  no skin changes, no TTP, no palpable distinct mass, no guarding, no Jackson's sign  MS: extremities- no gross deformities noted, moderate bilateral pitting edema of the legs to the knees  SKIN: no jaundice or rash; no ulcer    Office Visit on 02/08/2021   Component Date Value Ref Range Status     Sodium 02/08/2021 142  133 - 144 mmol/L Final     Potassium 02/08/2021 3.5  3.4 - 5.3 mmol/L Final     Chloride 02/08/2021 107  94 - 109 mmol/L Final     Carbon Dioxide 02/08/2021 30  20 - 32 mmol/L Final     Anion Gap 02/08/2021 5  3 - 14 mmol/L Final     Glucose 02/08/2021 91  70 - 99 mg/dL Final     Urea Nitrogen 02/08/2021 28  7 - 30 mg/dL Final     Creatinine 02/08/2021 2.26* 0.66 - 1.25 mg/dL Final     GFR Estimate 02/08/2021 26* >60 mL/min/[1.73_m2] Final    Comment: Non  GFR Calc  Starting 12/18/2018, serum creatinine based estimated GFR (eGFR) will be   calculated using the Chronic Kidney Disease Epidemiology Collaboration   (CKD-EPI) equation.       GFR Estimate If Black 02/08/2021 31* >60 mL/min/[1.73_m2] Final    Comment:  GFR Calc  Starting 12/18/2018, serum creatinine based estimated GFR (eGFR) will be "   calculated using the Chronic Kidney Disease Epidemiology Collaboration   (CKD-EPI) equation.       Calcium 02/08/2021 8.0* 8.5 - 10.1 mg/dL Final     Bilirubin Direct 02/08/2021 0.2  0.0 - 0.2 mg/dL Final     Bilirubin Total 02/08/2021 0.5  0.2 - 1.3 mg/dL Final     Albumin 02/08/2021 3.2* 3.4 - 5.0 g/dL Final     Protein Total 02/08/2021 6.1* 6.8 - 8.8 g/dL Final     Alkaline Phosphatase 02/08/2021 104  40 - 150 U/L Final     ALT 02/08/2021 21  0 - 70 U/L Final     AST 02/08/2021 14  0 - 45 U/L Final

## 2021-03-15 ENCOUNTER — OFFICE VISIT (OUTPATIENT)
Dept: FAMILY MEDICINE | Facility: CLINIC | Age: 81
End: 2021-03-15
Payer: COMMERCIAL

## 2021-03-15 VITALS
SYSTOLIC BLOOD PRESSURE: 150 MMHG | WEIGHT: 195 LBS | TEMPERATURE: 98.4 F | HEART RATE: 74 BPM | OXYGEN SATURATION: 97 % | DIASTOLIC BLOOD PRESSURE: 72 MMHG | BODY MASS INDEX: 34.55 KG/M2 | HEIGHT: 63 IN

## 2021-03-15 DIAGNOSIS — R60.0 PEDAL EDEMA: Primary | ICD-10-CM

## 2021-03-15 DIAGNOSIS — K21.9 GASTROESOPHAGEAL REFLUX DISEASE WITHOUT ESOPHAGITIS: ICD-10-CM

## 2021-03-15 DIAGNOSIS — I51.89 DIASTOLIC DYSFUNCTION: ICD-10-CM

## 2021-03-15 PROCEDURE — 99214 OFFICE O/P EST MOD 30 MIN: CPT | Performed by: NURSE PRACTITIONER

## 2021-03-15 RX ORDER — POTASSIUM CHLORIDE 1500 MG/1
20 TABLET, EXTENDED RELEASE ORAL DAILY
Qty: 30 TABLET | Refills: 5 | Status: SHIPPED | OUTPATIENT
Start: 2021-03-15 | End: 2022-02-02

## 2021-03-15 RX ORDER — OMEPRAZOLE 20 MG/1
20 TABLET, DELAYED RELEASE ORAL DAILY
Qty: 30 TABLET | Refills: 1 | Status: SHIPPED | OUTPATIENT
Start: 2021-03-15 | End: 2021-06-16

## 2021-03-15 RX ORDER — FUROSEMIDE 40 MG
40 TABLET ORAL DAILY
Qty: 30 TABLET | Refills: 5 | Status: SHIPPED | OUTPATIENT
Start: 2021-03-15 | End: 2022-04-11 | Stop reason: DRUGHIGH

## 2021-03-15 ASSESSMENT — MIFFLIN-ST. JEOR: SCORE: 1489.64

## 2021-03-15 NOTE — PATIENT INSTRUCTIONS
Restart Lasix (furosemide) 40 mg daily  Restart potassium pills.      Consider consultation with internal medicine (Dr Gomez or Dr Isaac).      Keep appts for CT scan and leg scan.  Keep appointment with nephrologist.      May take omeprazole for acid reflux.        Thank you for choosing [facility].  You may be receiving an email and/or telephone survey request from [facility] Customer Experience regarding your visit today.  Please take a few minutes to respond to the survey to let us know how we are doing.      If you have questions or concerns, please contact us via [employer] or you can contact your care team at [phone].    Our Clinic hours are:  Monday 6:40 am  to 7:00 pm  Tuesday -Friday 6:40 am to 5:00 pm    The [place] outpatient lab hours are:  Monday - Friday 6:10 am to 4:45 pm  Saturdays 7:00 am to 11:00 am  Appointments are required, call [phone]    If you have clinical questions after hours or would like to schedule an appointment,  call the clinic at [phone].

## 2021-03-15 NOTE — PROGRESS NOTES
"    Assessment & Plan     Pedal edema  - potassium chloride ER (KLOR-CON M) 20 MEQ CR tablet; Take 1 tablet (20 mEq) by mouth daily  - furosemide (LASIX) 40 MG tablet; Take 1 tablet (40 mg) by mouth daily  - INTERNAL MEDICINE REFERRAL    Diastolic dysfunction  - potassium chloride ER (KLOR-CON M) 20 MEQ CR tablet; Take 1 tablet (20 mEq) by mouth daily  - furosemide (LASIX) 40 MG tablet; Take 1 tablet (40 mg) by mouth daily  - INTERNAL MEDICINE REFERRAL      Peripheral edema in setting of diastolic dysfunction, complicated by CKD.   He was seen by Dr Thomas 2 weeks ago - recommended CT of the abdomen and a venous study which are scheduled for 3/30.   I recommended a nephrology consult - has an appointment early May.  He has been difficult to diurese in setting of worsening renal function - however due to ongoing fluid issues, I recommend that he take the furosemide every day.  He is anxious and frustrated today.  We discussed consultation with an internal medicine physician - he wants to wait until after the studies on 3/30.    I have also noticed some memory issues the last 2 visits - he has forgotten discussions we have had. Will continue to monitor; may need to discuss with family.      Gastroesophageal reflux disease without esophagitis  - omeprazole (PRILOSEC OTC) 20 MG EC tablet; Take 1 tablet (20 mg) by mouth daily         BMI:   Estimated body mass index is 34.54 kg/m  as calculated from the following:    Height as of this encounter: 1.6 m (5' 3\").    Weight as of this encounter: 88.5 kg (195 lb).   Weight management plan: Discussed healthy diet and exercise guidelines    Patient Instructions   Restart Lasix (furosemide) 40 mg daily  Restart potassium pills.      Consider consultation with internal medicine (Dr Gomez or Dr Isaac).      Keep appts for CT scan and leg scan.  Keep appointment with nephrologist.      May take omeprazole for acid reflux.        Thank you for choosing [facility].  You may be " receiving an email and/or telephone survey request from [facility] Customer Experience regarding your visit today.  Please take a few minutes to respond to the survey to let us know how we are doing.      If you have questions or concerns, please contact us via [employer] or you can contact your care team at [phone].    Our Clinic hours are:  Monday 6:40 am  to 7:00 pm  Tuesday -Friday 6:40 am to 5:00 pm    The [place] outpatient lab hours are:  Monday - Friday 6:10 am to 4:45 pm  Saturdays 7:00 am to 11:00 am  Appointments are required, call [phone]    If you have clinical questions after hours or would like to schedule an appointment,  call the clinic at [phone].        Return in about 2 weeks (around 3/29/2021).    The risks, benefits and treatment options of prescribed medications or other treatments have been discussed with the patient. The patient verbalized their understanding and should call or follow up if no improvement or if they develop further problems.    GUNJAN Werner [facility]        Marcus Darnell is a 80 year old who presents for the following health issues     HPI     Concern - follow up  Weight gain concerns  Edema in lower legs and abdominal girth/  Onset:   Worsening the last couple months  Description: patient here for follow up  States he is getting worse  Edema in upper thighs, abdomen  Was so bad the other day that his legs where touching   Intensity: moderate to severe  Progression of Symptoms:  worsening  Accompanying Signs & Symptoms:   Patient reports that he is falling asleep easily  Previous history of similar problem: yes; several office visits  Precipitating factors:        Worsened by: unknown  Alleviating factors:        Improved by: nothing  Therapies tried and outcome: compression stocking; diuretics prescribed  Took lasix the other day  Has  CT  And venous study scheduled for 3/30/21  He is not taking the diuretics daily as  "prescribed  He thinks the furosemide worked better than the torsemide.  Although doesn't think either medication worked all that well.      Abdominal/Flank Pain  Onset/Duration: \"a while back\"  Comes and goes  Description:   Character: Cramping  Location: sid-umbilical region  Radiation: None  Intensity: moderate to severe  Progression of Symptoms:  same and intermittent  Accompanying Signs & Symptoms:  Fever/Chills: no  Gas/Bloating:  Yes- cant bend over to tie shoes  Nausea: no  Vomitting: no  Diarrhea: no  Constipation: yes, once in a while from his oxycontin  Dysuria or Hematuria: no  History:   Trauma: no  Previous similar pain: YES  Previous tests done: none  Precipitating factors:   Does the pain change with:     Food: no    Bowel Movement: no    Urination: no   Other factors:  unknown  Therapies tried and outcome: tums  - seems to help;         Review of Systems   Constitutional, HEENT, cardiovascular, pulmonary, gi and gu systems are negative, except as otherwise noted.      Objective    BP (!) 150/72 (BP Location: Right arm)   Pulse 74   Temp 98.4  F (36.9  C) (Tympanic)   Ht 1.6 m (5' 3\")   Wt 88.5 kg (195 lb)   SpO2 97%   BMI 34.54 kg/m    Body mass index is 34.54 kg/m .  Physical Exam   GENERAL: healthy, alert and no distress  MS: +2 pitting peripheral edema feet to upper thighs                "

## 2021-05-03 ENCOUNTER — VIRTUAL VISIT (OUTPATIENT)
Dept: NEPHROLOGY | Facility: CLINIC | Age: 81
End: 2021-05-03
Payer: COMMERCIAL

## 2021-05-03 DIAGNOSIS — N18.32 STAGE 3B CHRONIC KIDNEY DISEASE (H): ICD-10-CM

## 2021-05-03 DIAGNOSIS — D50.9 IRON DEFICIENCY ANEMIA, UNSPECIFIED IRON DEFICIENCY ANEMIA TYPE: Primary | ICD-10-CM

## 2021-05-03 DIAGNOSIS — I50.32 CHRONIC HEART FAILURE WITH PRESERVED EJECTION FRACTION (H): ICD-10-CM

## 2021-05-03 DIAGNOSIS — Z98.84 HISTORY OF GASTRIC BYPASS: ICD-10-CM

## 2021-05-03 DIAGNOSIS — R60.0 PERIPHERAL EDEMA: ICD-10-CM

## 2021-05-03 PROBLEM — N20.0 NEPHROLITHIASIS: Status: ACTIVE | Noted: 2021-05-03

## 2021-05-03 PROBLEM — G56.00 CARPAL TUNNEL SYNDROME: Status: ACTIVE | Noted: 2021-05-03

## 2021-05-03 PROBLEM — R60.9 EDEMA: Status: ACTIVE | Noted: 2021-05-03

## 2021-05-03 PROCEDURE — 99205 OFFICE O/P NEW HI 60 MIN: CPT | Mod: 95 | Performed by: INTERNAL MEDICINE

## 2021-05-03 RX ORDER — FAMOTIDINE 20 MG/1
20 TABLET, FILM COATED ORAL PRN
Qty: 60 TABLET | Refills: 3 | Status: ON HOLD | OUTPATIENT
Start: 2021-05-03 | End: 2021-10-18

## 2021-05-03 NOTE — LETTER
5/3/2021       RE: Mann Escobar  [id number] [place] [id number]     Dear Colleague,    Thank you for referring your patient, Mann Escobar, to the [facility]. Please see a copy of my visit note below.    Mann is a 80 year old who is being evaluated via a billable video visit.          Video-Visit Details    Type of service:  Video Visit  Video Start time: 12:28 pm  Video End Time:103pm    Originating Location (pt. Location): Home    Distant Location (provider location):  [facility]     Platform used for Video Visit: [employer]   >90 minutes in reviewing history , records, trends and medications and formulating plan    Assessment and Plan:  80 year old male with history of CKD, hypertension, gastric bypass , GI bleeding 2016, iron deficiency anemia and Thalassemia minor, who presents for CKD evaluation. He also has HFpEF and edema/ lymphedema. He has history of multiple orthopedic surgeries  # CKD:  Scr has worsened especially in the last 2-3 years, previously was near 1 but up to 1.2-1.3 in 2017 and 1.3-1.5 in 2019 and up to 2 since 2020.  His Scr in July 2020 was noted up to 2, and had ankle surgery 7/23 and Scr remained around 2 since then.    - no NSAIDs in recent years except toradol 7/23/2020 postop   - no other significant nephrotoxins, on diuretics for many years - hydrochlorothiazide previously, on loop diuretics in recent years    - discussed lowering diuretic doses as able, currently only on 40mg bid, but can consider lowering to once a day   - he will try wrapping legs / elevating and monitoring sodium more closely to see if we can lower diuretic dose, however given swelling can get significant and HFpEF, volume control is important.   - given history of gastric bypass, will check oxalate level  - check proteinuria    # Hypertension: blood pressure seems near /  at goal though does fluctuate.    - he is on amlodipine, which may cause swelling; consider lowering to 5mg and using another agent if needed    -continue furosemide 40mg once a day , goal -135 systolic, and controlling swelling in multiple ways    # Anemia in chronic renal disease:   - Hgb: low 8.5 or so, multifactorial, due to thalassemia, ? Iron deficiency - last ferritin ~200, check iron sat and ferritin; consider EPO if hgb remains <9  - Iron studies: Not checked recently    # Electrolytes:   - Potassium; level: Normal / low normal, on potassium supplement  - Bicarbonate; level: Normal/ high normal    # Mineral Bone Disorder:   - Calcium; level is:  Normal  - Phosphorus; level is: Normal      Assessment and plan was discussed with patient and he voiced his understanding and agreement.    Consult:  Mann Escobar was seen in consultation at the request of Shayy Ramírez for CKD management.    Reason for Visit:  Mann Escobar is a 80 year old male with CKD from ? [place] , HFpEF, who presents for evaluation.    HPI:  He is a pleasant male with history of CKD who is referred for evaluation. He has had elevated creatinine over the past few years, which has fluctuated. He was noted with HFpEF/ moderate diastolic dysfunction.      He has had significant amount of swelling since his ankle surgery last year, but even going back years, he has dealt with swelling and was even in lymphedema clinic many years ago.    He was given furosemide 20 mg then 40mg which helped with the edema.  He notes that if he does not take it , his swelling is worse.   He had a knee replacement in 2000 and has had some edema and then after his ankle surgery in July 2020     His weight was up to 199 lbs from 183 lbs and he thinks this was over just a couple of weeks.  He is now 164-166 lbs.   He had gastric bypass in 2000 and was 280 lbs at that point.    He was taking NSAIDS in the past and had GI bleeding and  thus stopped them and has not used them since, in 2016. He underwent EGD and cscope. He is on PPI but does not take it regularly. He has been seen in hematology for anemia, which is thought due to iron deficiency (with bypass surgery, oral iron not absorbed well) and had IV iron in the past.  He was told he can stop oral iron as ferritin was adequate but he elected to continue it.    He has prostate enlargement and has been prescribed terazosin.  He has continued to take this in case it helps    Renal History:   Kidney Disease and Medical Hx:  h/o HTN: Yes      ROS:   A comprehensive review of systems was obtained and negative, except as noted in the HPI or PMH.    Active Medical Problems:  Patient Active Problem List   Diagnosis      HX NEPHROLITHIASIS [V13.01]     Thalassemia minor     Esophageal reflux     Family history of prostate cancer     Leg edema     CARDIOVASCULAR SCREENING; LDL GOAL LESS THAN 130     Internal hemorrhoids     Iron deficiency anemia     First degree AV block     Family history of diabetes mellitus     Scoliosis     Hypopotassemia     Advanced directives, counseling/discussion     HTN (hypertension)     Benign non-nodular prostatic hyperplasia with lower urinary tract symptoms     Chronic low back pain     S/P knee replacement     S/P ankle fusion     Abnormal antinuclear antibody titer     Osteoarthritis     Hypertension     BPH (benign prostatic hyperplasia)     Pseudogout     Chronic pain syndrome     S/P ankle joint replacement     Arthritis of ankle, right     Chronic anemia     Chronic kidney disease, stage 3     Morbid obesity (H)     Diastolic dysfunction     PMH:   Medical record was reviewed and PMH was discussed with patient and noted below.  Past Medical History:   Diagnosis Date     Arthritis of ankle, left 7/24/2020     Back pain     WITH BILATERAL LEG PAIN AND NUMBNESS OF BOTH FEET     Gastro-oesophageal reflux disease     REFLUX     Hypertension      Hypopotassemia       Internal hemorrhoids      Iron deficiency anaemia      Leg edema      Morbid obesity 9/12/2005     Morbid obesity (H)      Osteoarthrosis      Scoliosis      Thalassemia minor      PSH:   Past Surgical History:   Procedure Laterality Date     CATARACT IOL, RT/LT  2008/    Cataract IOL RT/LT     COLONOSCOPY  2009/07    polyps removed repeat 5 yrs, tubular adenoma     COLONOSCOPY  9/29/2011    Procedure:COLONOSCOPY; Colonoscopy with hemorrhoid banding; Surgeon:JEREMY GARCIA; Location:[facility]     COLONOSCOPY N/A 12/19/2017    Procedure: COLONOSCOPY;  colonoscopy, gastroscopy;  Surgeon: Edmar Isaacs MD;  Location: [facility]     DECOMPRESSION LUMBAR MINIMALLY INVASIVE TWO LEVELS  5/2/2012    Procedure:DECOMPRESSION LUMBAR MINIMALLY INVASIVE TWO LEVELS; Surgeon:LOTTIE MITCHELL; [facility]     ESOPHAGOSCOPY, GASTROSCOPY, DUODENOSCOPY (EGD), COMBINED N/A 2/6/2018    Procedure: COMBINED ESOPHAGOSCOPY, GASTROSCOPY, DUODENOSCOPY (EGD);  gastroscopy;  Surgeon: Edmar Isaacs MD;  Location: [facility]     FUSION SPINE POSTERIOR MINIMALLY INVASIVE ONE LEVEL  5/2/2012    Procedure:FUSION SPINE POSTERIOR MINIMALLY INVASIVE ONE LEVEL; Minimal Access Spinal Technology Transformaninal Lumbar Interbody Fusion L4-5, Decompression L3-5; Surgeon:LOTTIE MITCHELL; Location:[facility]     HC REMOVAL OF HYDROCELE,TUNICA,UNILAT  2006    bilateral     ORTHOPEDIC SURGERY  2000    Lf knee replacement     ORTHOPEDIC SURGERY  2002, 2010    Rt replacement     ORTHOPEDIC SURGERY  2005    Left ankle fused     RECONSTRUCT ANKLE Right 7/23/2020    Procedure: Ankle RECONSTRUCTIve Arthroplasty;  Surgeon: Luke Powers DPM;  Location: [facility]     SURGICAL HISTORY OF -       Cysto     SURGICAL HISTORY OF -   2002    Percutaneous kidney stone removal     SURGICAL HISTORY OF -       ureteral stent removal     SURGICAL HISTORY OF -   2005    bariatric surgery-Favian-en-Y     SURGICAL HISTORY OF -   2008    carpal tunnel surgery rt wrist       Family  Hx:   Family History   Problem Relation Age of Onset     Diabetes Brother      Obesity Brother      Cerebrovascular Disease Paternal Grandfather      Prostate Cancer Father      Cancer Father         bone     Diabetes Father      Heart Disease Mother         CHF     Cerebrovascular Disease Mother      Hypertension Mother      Cancer Mother         tumor in stomach     Cancer - colorectal Mother      Heart Disease Maternal Grandmother         CHF     Diabetes Paternal Grandmother      Breast Cancer Sister      Genitourinary Problems Son         Kidney stones     Cancer Brother      Cancer - colorectal Brother      Diabetes Brother      Personal Hx:   Social History     Tobacco Use     Smoking status: Former Smoker     Packs/day: 0.50     Years: 7.00     Pack years: 3.50     Types: Cigarettes     Quit date: 1962     Years since quittin.3     Smokeless tobacco: Never Used   Substance Use Topics     Alcohol use: Yes     Comment: one every other day. Rarely       Allergies:  Allergies   Allergen Reactions     Nkda [No Known Drug Allergies]        Medications:  Current Outpatient Medications   Medication Sig     acetaminophen (TYLENOL) 325 MG tablet Take 3 tablets (975 mg) by mouth 3 times daily (Patient taking differently: Take 975 mg by mouth 2 times daily as needed )     amLODIPine (NORVASC) 10 MG tablet Take 1 tablet (10 mg) by mouth daily     ferrous fumarate 65 mg, Lime. FE,-Vitamin C 125 mg (VITRON C)  MG TABS tablet Take 1 tablet by mouth daily     fluticasone (FLONASE) 50 MCG/ACT nasal spray Spray 1 spray into both nostrils daily     furosemide (LASIX) 40 MG tablet Take 1 tablet (40 mg) by mouth daily     HCA VITAMIN B12 500 MCG OR TABS 2 tablets daily     MULTIVITAMIN OR one daily     omeprazole (PRILOSEC OTC) 20 MG EC tablet Take 1 tablet (20 mg) by mouth daily     OVER-THE-COUNTER Elderberry 50mg supplement, one daily     oxyCODONE (ROXICODONE) 5 MG tablet Take 1 tablet (5 mg) by mouth 2 times  daily as needed for severe pain This is a 90 day supply     potassium chloride ER (KLOR-CON M) 20 MEQ CR tablet Take 1 tablet (20 mEq) by mouth daily     terazosin (HYTRIN) 5 MG capsule TAKE 1 CAPSULE AT BEDTIME     Turmeric 500 MG CAPS      VIACTIV OR With D 1000mg calcium     zinc gluconate 50 MG tablet Take 50 mg by mouth daily     No current facility-administered medications for this visit.       Vitals:  There were no vitals taken for this visit.  GENERAL: Healthy, alert and no distress  EYES: Eyes grossly normal to inspection.  No discharge or erythema, or obvious scleral/conjunctival abnormalities.  RESP: No audible wheeze, cough, or visible cyanosis.  No visible retractions or increased work of breathing.    SKIN: Visible skin clear. No significant rash, abnormal pigmentation or lesions.  NEURO: Cranial nerves grossly intact.  Mentation and speech appropriate for age.  PSYCH: Mentation appears normal, affect normal/bright, judgement and insight intact, normal speech and appearance well-groomed.      LABS:   CMP  Recent Labs   Lab Test 02/08/21  1121 02/01/21  1148 01/25/21  1058 01/20/21  1801    141 142 144   POTASSIUM 3.5 3.7 4.0 3.9   CHLORIDE 107 109 112* 112*   CO2 30 27 29 25   ANIONGAP 5 5 1* 7   GLC 91 117* 96 99   BUN 28 23 21 20   CR 2.26* 2.12* 2.02* 1.90*   GFRESTIMATED 26* 28* 30* 33*   GFRESTBLACK 31* 33* 35* 38*   RUTH 8.0* 8.2* 8.4* 8.1*     Recent Labs   Lab Test 02/08/21  1121 11/10/17  1401 06/11/15  1306 06/14/13  1226   BILITOTAL 0.5 0.5 0.6 0.9   ALKPHOS 104 110 100 86   ALT 21 20 25 23   AST 14 13 14 19     CBC  Recent Labs   Lab Test 02/02/21  1155 01/20/21  1801 01/04/21  1133 10/19/20  1147   HGB 8.5* 8.7* 8.8* 8.1*   WBC 5.0 6.3 6.1 6.3   RBC 4.61 4.69 4.86 4.24*   HCT 27.8* 28.0* 29.5* 26.7*   MCV 60* 60* 61* 63*   MCH 18.4* 18.6* 18.1* 19.1*   MCHC 30.6* 31.1* 29.8* 30.3*   RDW 18.8* 19.0* 18.4* 19.3*    174 207 272     URINE STUDIES  Recent Labs   Lab Test  11/30/18  1045 09/28/15  1134 06/14/13  1231   COLOR Yellow Yellow Yellow   APPEARANCE Clear Clear Clear   URINEGLC Negative Negative Negative   URINEBILI Negative Small  This is an unconfirmed screening test result. A positive result may be false.  * Negative   URINEKETONE Negative Trace* Negative   SG 1.025 >1.030 1.020   UBLD Trace* Negative Negative   URINEPH 6.0 5.5 7.0   PROTEIN >=300* 100* 30*   UROBILINOGEN 1.0 1.0 1.0   NITRITE Negative Negative Negative   LEUKEST Negative Negative Negative   RBCU O - 2 O - 2  Urine was tested unconcentrated because <10 ml was received.   O - 2   WBCU 0 - 5 O - 2  Urine was tested unconcentrated because <10 ml was received.   O - 2     No lab results found.  PTH  No lab results found.  IRON STUDIES  Recent Labs   Lab Test 01/04/21  1133 10/19/20  1147 10/30/18  1029 11/10/17  1401 11/10/17  1401   IRON  --   --   --   --  15*   FEB  --   --   --   --  368   IRONSAT  --   --   --   --  4*   TESSIE 283 286 251   < > 7*    < > = values in this interval not displayed.         Yoon Alicea MD      Again, thank you for allowing me to participate in the care of your patient.      Sincerely,    Yoon Alicea MD

## 2021-05-03 NOTE — LETTER
5/3/2021      RE: Mann Escobar  [address]  [facility] [address]       Mann is a 80 year old who is being evaluated via a billable video visit.          Video-Visit Details    Type of service:  Video Visit  Video Start time: 12:28 pm  Video End Time:103pm    Originating Location (pt. Location): Home    Distant Location (provider location):  [facility]     Platform used for Video Visit: [employer]   >90 minutes in reviewing history , records, trends and medications and formulating plan    Assessment and Plan:  80 year old male with history of CKD, hypertension, gastric bypass , GI bleeding 2016, iron deficiency anemia and Thalassemia minor, who presents for CKD evaluation. He also has HFpEF and edema/ lymphedema. He has history of multiple orthopedic surgeries  # CKD:  Scr has worsened especially in the last 2-3 years, previously was near 1 but up to 1.2-1.3 in 2017 and 1.3-1.5 in 2019 and up to 2 since 2020.  His Scr in July 2020 was noted up to 2, and had ankle surgery 7/23 and Scr remained around 2 since then.    - no NSAIDs in recent years except toradol 7/23/2020 postop   - no other significant nephrotoxins, on diuretics for many years - hydrochlorothiazide previously, on loop diuretics in recent years    - discussed lowering diuretic doses as able, currently only on 40mg bid, but can consider lowering to once a day   - he will try wrapping legs / elevating and monitoring sodium more closely to see if we can lower diuretic dose, however given swelling can get significant and HFpEF, volume control is important.   - given history of gastric bypass, will check oxalate level  - check proteinuria    # Hypertension: blood pressure seems near / at goal though does fluctuate.    - he is on amlodipine, which may cause swelling; consider lowering to 5mg and using another agent if needed    -continue furosemide 40mg once a day , goal -135 systolic, and controlling  swelling in multiple ways    # Anemia in chronic renal disease:   - Hgb: low 8.5 or so, multifactorial, due to thalassemia, ? Iron deficiency - last ferritin ~200, check iron sat and ferritin; consider EPO if hgb remains <9  - Iron studies: Not checked recently    # Electrolytes:   - Potassium; level: Normal / low normal, on potassium supplement  - Bicarbonate; level: Normal/ high normal    # Mineral Bone Disorder:   - Calcium; level is:  Normal  - Phosphorus; level is: Normal      Assessment and plan was discussed with patient and he voiced his understanding and agreement.    Consult:  Mann Escobar was seen in consultation at the request of Shayy Ramírez for CKD management.    Reason for Visit:  Mann Escobar is a 80 year old male with CKD from ? [place] , HFpEF, who presents for evaluation.    HPI:  He is a pleasant male with history of CKD who is referred for evaluation. He has had elevated creatinine over the past few years, which has fluctuated. He was noted with HFpEF/ moderate diastolic dysfunction.      He has had significant amount of swelling since his ankle surgery last year, but even going back years, he has dealt with swelling and was even in lymphedema clinic many years ago.    He was given furosemide 20 mg then 40mg which helped with the edema.  He notes that if he does not take it , his swelling is worse.   He had a knee replacement in 2000 and has had some edema and then after his ankle surgery in July 2020     His weight was up to 199 lbs from 183 lbs and he thinks this was over just a couple of weeks.  He is now 164-166 lbs.   He had gastric bypass in 2000 and was 280 lbs at that point.    He was taking NSAIDS in the past and had GI bleeding and thus stopped them and has not used them since, in 2016. He underwent EGD and cscope. He is on PPI but does not take it regularly. He has been seen in hematology for anemia, which is thought due to iron deficiency (with bypass  surgery, oral iron not absorbed well) and had IV iron in the past.  He was told he can stop oral iron as ferritin was adequate but he elected to continue it.    He has prostate enlargement and has been prescribed terazosin.  He has continued to take this in case it helps    Renal History:   Kidney Disease and Medical Hx:  h/o HTN: Yes      ROS:   A comprehensive review of systems was obtained and negative, except as noted in the HPI or PMH.    Active Medical Problems:  Patient Active Problem List   Diagnosis      HX NEPHROLITHIASIS [V13.01]     Thalassemia minor     Esophageal reflux     Family history of prostate cancer     Leg edema     CARDIOVASCULAR SCREENING; LDL GOAL LESS THAN 130     Internal hemorrhoids     Iron deficiency anemia     First degree AV block     Family history of diabetes mellitus     Scoliosis     Hypopotassemia     Advanced directives, counseling/discussion     HTN (hypertension)     Benign non-nodular prostatic hyperplasia with lower urinary tract symptoms     Chronic low back pain     S/P knee replacement     S/P ankle fusion     Abnormal antinuclear antibody titer     Osteoarthritis     Hypertension     BPH (benign prostatic hyperplasia)     Pseudogout     Chronic pain syndrome     S/P ankle joint replacement     Arthritis of ankle, right     Chronic anemia     Chronic kidney disease, stage 3     Morbid obesity (H)     Diastolic dysfunction     PMH:   Medical record was reviewed and PMH was discussed with patient and noted below.  Past Medical History:   Diagnosis Date     Arthritis of ankle, left 7/24/2020     Back pain     WITH BILATERAL LEG PAIN AND NUMBNESS OF BOTH FEET     Gastro-oesophageal reflux disease     REFLUX     Hypertension      Hypopotassemia      Internal hemorrhoids      Iron deficiency anaemia      Leg edema      Morbid obesity 9/12/2005     Morbid obesity (H)      Osteoarthrosis      Scoliosis      Thalassemia minor      PSH:   Past Surgical History:   Procedure  Laterality Date     CATARACT IOL, RT/LT  2008/    Cataract IOL RT/LT     COLONOSCOPY  2009/07    polyps removed repeat 5 yrs, tubular adenoma     COLONOSCOPY  9/29/2011    Procedure:COLONOSCOPY; Colonoscopy with hemorrhoid banding; Surgeon:JEREMY GARCIA; Location:[facility]     COLONOSCOPY N/A 12/19/2017    Procedure: COLONOSCOPY;  colonoscopy, gastroscopy;  Surgeon: Edmar Isaacs MD;  Location: [facility]     DECOMPRESSION LUMBAR MINIMALLY INVASIVE TWO LEVELS  5/2/2012    Procedure:DECOMPRESSION LUMBAR MINIMALLY INVASIVE TWO LEVELS; Surgeon:LOTTIE MITCHELL; [facility]     ESOPHAGOSCOPY, GASTROSCOPY, DUODENOSCOPY (EGD), COMBINED N/A 2/6/2018    Procedure: COMBINED ESOPHAGOSCOPY, GASTROSCOPY, DUODENOSCOPY (EGD);  gastroscopy;  Surgeon: Edmar Isaacs MD;  Location: [facility]     FUSION SPINE POSTERIOR MINIMALLY INVASIVE ONE LEVEL  5/2/2012    Procedure:FUSION SPINE POSTERIOR MINIMALLY INVASIVE ONE LEVEL; Minimal Access Spinal Technology Transformaninal Lumbar Interbody Fusion L4-5, Decompression L3-5; Surgeon:LOTTIE MITCHELL; Location:[facility]     HC REMOVAL OF HYDROCELE,TUNICA,UNILAT  2006    bilateral     ORTHOPEDIC SURGERY  2000    Lf knee replacement     ORTHOPEDIC SURGERY  2002, 2010    Rt replacement     ORTHOPEDIC SURGERY  2005    Left ankle fused     RECONSTRUCT ANKLE Right 7/23/2020    Procedure: Ankle RECONSTRUCTIve Arthroplasty;  Surgeon: Luke Powers DPM;  Location: [facility]     SURGICAL HISTORY OF -       Cysto     SURGICAL HISTORY OF -   2002    Percutaneous kidney stone removal     SURGICAL HISTORY OF -       ureteral stent removal     SURGICAL HISTORY OF -   2005    bariatric surgery-Favian-en-Y     SURGICAL HISTORY OF -   2008    carpal tunnel surgery rt wrist       Family Hx:   Family History   Problem Relation Age of Onset     Diabetes Brother      Obesity Brother      Cerebrovascular Disease Paternal Grandfather      Prostate Cancer Father      Cancer Father         bone     Diabetes Father       Heart Disease Mother         CHF     Cerebrovascular Disease Mother      Hypertension Mother      Cancer Mother         tumor in stomach     Cancer - colorectal Mother      Heart Disease Maternal Grandmother         CHF     Diabetes Paternal Grandmother      Breast Cancer Sister      Genitourinary Problems Son         Kidney stones     Cancer Brother      Cancer - colorectal Brother      Diabetes Brother      Personal Hx:   Social History     Tobacco Use     Smoking status: Former Smoker     Packs/day: 0.50     Years: 7.00     Pack years: 3.50     Types: Cigarettes     Quit date: 1962     Years since quittin.3     Smokeless tobacco: Never Used   Substance Use Topics     Alcohol use: Yes     Comment: one every other day. Rarely       Allergies:  Allergies   Allergen Reactions     Nkda [No Known Drug Allergies]        Medications:  Current Outpatient Medications   Medication Sig     acetaminophen (TYLENOL) 325 MG tablet Take 3 tablets (975 mg) by mouth 3 times daily (Patient taking differently: Take 975 mg by mouth 2 times daily as needed )     amLODIPine (NORVASC) 10 MG tablet Take 1 tablet (10 mg) by mouth daily     ferrous fumarate 65 mg, [place]. FE,-Vitamin C 125 mg (VITRON C)  MG TABS tablet Take 1 tablet by mouth daily     fluticasone (FLONASE) 50 MCG/ACT nasal spray Spray 1 spray into both nostrils daily     furosemide (LASIX) 40 MG tablet Take 1 tablet (40 mg) by mouth daily     HCA VITAMIN B12 500 MCG OR TABS 2 tablets daily     MULTIVITAMIN OR one daily     omeprazole (PRILOSEC OTC) 20 MG EC tablet Take 1 tablet (20 mg) by mouth daily     OVER-THE-COUNTER Elderberry 50mg supplement, one daily     oxyCODONE (ROXICODONE) 5 MG tablet Take 1 tablet (5 mg) by mouth 2 times daily as needed for severe pain This is a 90 day supply     potassium chloride ER (KLOR-CON M) 20 MEQ CR tablet Take 1 tablet (20 mEq) by mouth daily     terazosin (HYTRIN) 5 MG capsule TAKE 1 CAPSULE AT BEDTIME     Turmeric  500 MG CAPS      VIACTIV OR With D 1000mg calcium     zinc gluconate 50 MG tablet Take 50 mg by mouth daily     No current facility-administered medications for this visit.       Vitals:  There were no vitals taken for this visit.  GENERAL: Healthy, alert and no distress  EYES: Eyes grossly normal to inspection.  No discharge or erythema, or obvious scleral/conjunctival abnormalities.  RESP: No audible wheeze, cough, or visible cyanosis.  No visible retractions or increased work of breathing.    SKIN: Visible skin clear. No significant rash, abnormal pigmentation or lesions.  NEURO: Cranial nerves grossly intact.  Mentation and speech appropriate for age.  PSYCH: Mentation appears normal, affect normal/bright, judgement and insight intact, normal speech and appearance well-groomed.      LABS:   CMP  Recent Labs   Lab Test 02/08/21  1121 02/01/21  1148 01/25/21  1058 01/20/21  1801    141 142 144   POTASSIUM 3.5 3.7 4.0 3.9   CHLORIDE 107 109 112* 112*   CO2 30 27 29 25   ANIONGAP 5 5 1* 7   GLC 91 117* 96 99   BUN 28 23 21 20   CR 2.26* 2.12* 2.02* 1.90*   GFRESTIMATED 26* 28* 30* 33*   GFRESTBLACK 31* 33* 35* 38*   RUTH 8.0* 8.2* 8.4* 8.1*     Recent Labs   Lab Test 02/08/21  1121 11/10/17  1401 06/11/15  1306 06/14/13  1226   BILITOTAL 0.5 0.5 0.6 0.9   ALKPHOS 104 110 100 86   ALT 21 20 25 23   AST 14 13 14 19     CBC  Recent Labs   Lab Test 02/02/21  1155 01/20/21  1801 01/04/21  1133 10/19/20  1147   HGB 8.5* 8.7* 8.8* 8.1*   WBC 5.0 6.3 6.1 6.3   RBC 4.61 4.69 4.86 4.24*   HCT 27.8* 28.0* 29.5* 26.7*   MCV 60* 60* 61* 63*   MCH 18.4* 18.6* 18.1* 19.1*   MCHC 30.6* 31.1* 29.8* 30.3*   RDW 18.8* 19.0* 18.4* 19.3*    174 207 272     URINE STUDIES  Recent Labs   Lab Test 11/30/18  1045 09/28/15  1134 06/14/13  1231   COLOR Yellow Yellow Yellow   APPEARANCE Clear Clear Clear   URINEGLC Negative Negative Negative   URINEBILI Negative Small  This is an unconfirmed screening test result. A positive result  may be false.  * Negative   URINEKETONE Negative Trace* Negative   SG 1.025 >1.030 1.020   UBLD Trace* Negative Negative   URINEPH 6.0 5.5 7.0   PROTEIN >=300* 100* 30*   UROBILINOGEN 1.0 1.0 1.0   NITRITE Negative Negative Negative   LEUKEST Negative Negative Negative   RBCU O - 2 O - 2  Urine was tested unconcentrated because <10 ml was received.   O - 2   WBCU 0 - 5 O - 2  Urine was tested unconcentrated because <10 ml was received.   O - 2     No lab results found.  PTH  No lab results found.  IRON STUDIES  Recent Labs   Lab Test 01/04/21  1133 10/19/20  1147 10/30/18  1029 11/10/17  1401 11/10/17  1401   IRON  --   --   --   --  15*   FEB  --   --   --   --  368   IRONSAT  --   --   --   --  4*   TESSIE 283 286 251   < > 7*    < > = values in this interval not displayed.         Yoon Alicea MD

## 2021-05-03 NOTE — PROGRESS NOTES
Mann is a 80 year old who is being evaluated via a billable video visit.          Video-Visit Details    Type of service:  Video Visit  Video Start time: 12:28 pm  Video End Time:103pm    Originating Location (pt. Location): Home    Distant Location (provider location):  [facility]     Platform used for Video Visit: [email or web address]   >90 minutes in reviewing history , records, trends and medications and formulating plan    Assessment and Plan:  80 year old male with history of CKD, hypertension, gastric bypass , GI bleeding 2016, iron deficiency anemia and Thalassemia minor, who presents for CKD evaluation. He also has HFpEF and edema/ lymphedema. He has history of multiple orthopedic surgeries  # CKD:  Scr has worsened especially in the last 2-3 years, previously was near 1 but up to 1.2-1.3 in 2017 and 1.3-1.5 in 2019 and up to 2 since 2020.  His Scr in July 2020 was noted up to 2, and had ankle surgery 7/23 and Scr remained around 2 since then.    - no NSAIDs in recent years except toradol 7/23/2020 postop   - no other significant nephrotoxins, on diuretics for many years - hydrochlorothiazide previously, on loop diuretics in recent years    - discussed lowering diuretic doses as able, currently only on 40mg bid, but can consider lowering to once a day   - he will try wrapping legs / elevating and monitoring sodium more closely to see if we can lower diuretic dose, however given swelling can get significant and HFpEF, volume control is important.   - given history of gastric bypass, will check oxalate level  - check proteinuria    # Hypertension: blood pressure seems near / at goal though does fluctuate.    - he is on amlodipine, which may cause swelling; consider lowering to 5mg and using another agent if needed    -continue furosemide 40mg once a day , goal -135 systolic, and controlling swelling in multiple ways    # Anemia in chronic renal disease:   - Hgb: low 8.5 or so, multifactorial,  due to thalassemia, ? Iron deficiency - last ferritin ~200, check iron sat and ferritin; consider EPO if hgb remains <9  - Iron studies: Not checked recently    # Electrolytes:   - Potassium; level: Normal / low normal, on potassium supplement  - Bicarbonate; level: Normal/ high normal    # Mineral Bone Disorder:   - Calcium; level is:  Normal  - Phosphorus; level is: Normal      Assessment and plan was discussed with patient and he voiced his understanding and agreement.    Consult:  Mann Escobar was seen in consultation at the request of Shayy Ramírez for CKD management.    Reason for Visit:  Mann Escobar is a 80 year old male with CKD from ? [place] , HFpEF, who presents for evaluation.    HPI:  He is a pleasant male with history of CKD who is referred for evaluation. He has had elevated creatinine over the past few years, which has fluctuated. He was noted with HFpEF/ moderate diastolic dysfunction.      He has had significant amount of swelling since his ankle surgery last year, but even going back years, he has dealt with swelling and was even in lymphedema clinic many years ago.    He was given furosemide 20 mg then 40mg which helped with the edema.  He notes that if he does not take it , his swelling is worse.   He had a knee replacement in 2000 and has had some edema and then after his ankle surgery in July 2020     His weight was up to 199 lbs from 183 lbs and he thinks this was over just a couple of weeks.  He is now 164-166 lbs.   He had gastric bypass in 2000 and was 280 lbs at that point.    He was taking NSAIDS in the past and had GI bleeding and thus stopped them and has not used them since, in 2016. He underwent EGD and cscope. He is on PPI but does not take it regularly. He has been seen in hematology for anemia, which is thought due to iron deficiency (with bypass surgery, oral iron not absorbed well) and had IV iron in the past.  He was told he can stop oral iron as  ferritin was adequate but he elected to continue it.    He has prostate enlargement and has been prescribed terazosin.  He has continued to take this in case it helps    Renal History:   Kidney Disease and Medical Hx:  h/o HTN: Yes      ROS:   A comprehensive review of systems was obtained and negative, except as noted in the HPI or PMH.    Active Medical Problems:  Patient Active Problem List   Diagnosis      HX NEPHROLITHIASIS [V13.01]     Thalassemia minor     Esophageal reflux     Family history of prostate cancer     Leg edema     CARDIOVASCULAR SCREENING; LDL GOAL LESS THAN 130     Internal hemorrhoids     Iron deficiency anemia     First degree AV block     Family history of diabetes mellitus     Scoliosis     Hypopotassemia     Advanced directives, counseling/discussion     HTN (hypertension)     Benign non-nodular prostatic hyperplasia with lower urinary tract symptoms     Chronic low back pain     S/P knee replacement     S/P ankle fusion     Abnormal antinuclear antibody titer     Osteoarthritis     Hypertension     BPH (benign prostatic hyperplasia)     Pseudogout     Chronic pain syndrome     S/P ankle joint replacement     Arthritis of ankle, right     Chronic anemia     Chronic kidney disease, stage 3     Morbid obesity (H)     Diastolic dysfunction     PMH:   Medical record was reviewed and PMH was discussed with patient and noted below.  Past Medical History:   Diagnosis Date     Arthritis of ankle, left 7/24/2020     Back pain     WITH BILATERAL LEG PAIN AND NUMBNESS OF BOTH FEET     Gastro-oesophageal reflux disease     REFLUX     Hypertension      Hypopotassemia      Internal hemorrhoids      Iron deficiency anaemia      Leg edema      Morbid obesity 9/12/2005     Morbid obesity (H)      Osteoarthrosis      Scoliosis      Thalassemia minor      PSH:   Past Surgical History:   Procedure Laterality Date     CATARACT IOL, RT/LT  2008/    Cataract IOL RT/LT     COLONOSCOPY  2009/07    polyps removed  repeat 5 yrs, tubular adenoma     COLONOSCOPY  9/29/2011    Procedure:COLONOSCOPY; Colonoscopy with hemorrhoid banding; Surgeon:JEREMY GARCIA; Location:[facility]     COLONOSCOPY N/A 12/19/2017    Procedure: COLONOSCOPY;  colonoscopy, gastroscopy;  Surgeon: Edmar Isaacs MD;  Location: [facility]     DECOMPRESSION LUMBAR MINIMALLY INVASIVE TWO LEVELS  5/2/2012    Procedure:DECOMPRESSION LUMBAR MINIMALLY INVASIVE TWO LEVELS; Surgeon:LOTTIE MITCHELL; [facility]     ESOPHAGOSCOPY, GASTROSCOPY, DUODENOSCOPY (EGD), COMBINED N/A 2/6/2018    Procedure: COMBINED ESOPHAGOSCOPY, GASTROSCOPY, DUODENOSCOPY (EGD);  gastroscopy;  Surgeon: Edmar Isaacs MD;  Location: [facility]     FUSION SPINE POSTERIOR MINIMALLY INVASIVE ONE LEVEL  5/2/2012    Procedure:FUSION SPINE POSTERIOR MINIMALLY INVASIVE ONE LEVEL; Minimal Access Spinal Technology Transformaninal Lumbar Interbody Fusion L4-5, Decompression L3-5; Surgeon:LOTTIE MITCHELL; Location:[facility]     HC REMOVAL OF HYDROCELE,TUNICA,UNILAT  2006    bilateral     ORTHOPEDIC SURGERY  2000    Lf knee replacement     ORTHOPEDIC SURGERY  2002, 2010    Rt replacement     ORTHOPEDIC SURGERY  2005    Left ankle fused     RECONSTRUCT ANKLE Right 7/23/2020    Procedure: Ankle RECONSTRUCTIve Arthroplasty;  Surgeon: Luke Powers DPM;  Location: [facility]     SURGICAL HISTORY OF -       Cysto     SURGICAL HISTORY OF -   2002    Percutaneous kidney stone removal     SURGICAL HISTORY OF -       ureteral stent removal     SURGICAL HISTORY OF -   2005    bariatric surgery-Favian-en-Y     SURGICAL HISTORY OF -   2008    carpal tunnel surgery rt wrist       Family Hx:   Family History   Problem Relation Age of Onset     Diabetes Brother      Obesity Brother      Cerebrovascular Disease Paternal Grandfather      Prostate Cancer Father      Cancer Father         bone     Diabetes Father      Heart Disease Mother         CHF     Cerebrovascular Disease Mother      Hypertension Mother      Cancer  Mother         tumor in stomach     Cancer - colorectal Mother      Heart Disease Maternal Grandmother         CHF     Diabetes Paternal Grandmother      Breast Cancer Sister      Genitourinary Problems Son         Kidney stones     Cancer Brother      Cancer - colorectal Brother      Diabetes Brother      Personal Hx:   Social History     Tobacco Use     Smoking status: Former Smoker     Packs/day: 0.50     Years: 7.00     Pack years: 3.50     Types: Cigarettes     Quit date: 1962     Years since quittin.3     Smokeless tobacco: Never Used   Substance Use Topics     Alcohol use: Yes     Comment: one every other day. Rarely       Allergies:  Allergies   Allergen Reactions     Nkda [No Known Drug Allergies]        Medications:  Current Outpatient Medications   Medication Sig     acetaminophen (TYLENOL) 325 MG tablet Take 3 tablets (975 mg) by mouth 3 times daily (Patient taking differently: Take 975 mg by mouth 2 times daily as needed )     amLODIPine (NORVASC) 10 MG tablet Take 1 tablet (10 mg) by mouth daily     ferrous fumarate 65 mg, [place]. FE,-Vitamin C 125 mg (VITRON C)  MG TABS tablet Take 1 tablet by mouth daily     fluticasone (FLONASE) 50 MCG/ACT nasal spray Spray 1 spray into both nostrils daily     furosemide (LASIX) 40 MG tablet Take 1 tablet (40 mg) by mouth daily     HCA VITAMIN B12 500 MCG OR TABS 2 tablets daily     MULTIVITAMIN OR one daily     omeprazole (PRILOSEC OTC) 20 MG EC tablet Take 1 tablet (20 mg) by mouth daily     OVER-THE-COUNTER Elderberry 50mg supplement, one daily     oxyCODONE (ROXICODONE) 5 MG tablet Take 1 tablet (5 mg) by mouth 2 times daily as needed for severe pain This is a 90 day supply     potassium chloride ER (KLOR-CON M) 20 MEQ CR tablet Take 1 tablet (20 mEq) by mouth daily     terazosin (HYTRIN) 5 MG capsule TAKE 1 CAPSULE AT BEDTIME     Turmeric 500 MG CAPS      VIACTIV OR With D 1000mg calcium     zinc gluconate 50 MG tablet Take 50 mg by mouth daily      No current facility-administered medications for this visit.       Vitals:  There were no vitals taken for this visit.  GENERAL: Healthy, alert and no distress  EYES: Eyes grossly normal to inspection.  No discharge or erythema, or obvious scleral/conjunctival abnormalities.  RESP: No audible wheeze, cough, or visible cyanosis.  No visible retractions or increased work of breathing.    SKIN: Visible skin clear. No significant rash, abnormal pigmentation or lesions.  NEURO: Cranial nerves grossly intact.  Mentation and speech appropriate for age.  PSYCH: Mentation appears normal, affect normal/bright, judgement and insight intact, normal speech and appearance well-groomed.      LABS:   CMP  Recent Labs   Lab Test 02/08/21  1121 02/01/21  1148 01/25/21  1058 01/20/21  1801    141 142 144   POTASSIUM 3.5 3.7 4.0 3.9   CHLORIDE 107 109 112* 112*   CO2 30 27 29 25   ANIONGAP 5 5 1* 7   GLC 91 117* 96 99   BUN 28 23 21 20   CR 2.26* 2.12* 2.02* 1.90*   GFRESTIMATED 26* 28* 30* 33*   GFRESTBLACK 31* 33* 35* 38*   RUTH 8.0* 8.2* 8.4* 8.1*     Recent Labs   Lab Test 02/08/21  1121 11/10/17  1401 06/11/15  1306 06/14/13  1226   BILITOTAL 0.5 0.5 0.6 0.9   ALKPHOS 104 110 100 86   ALT 21 20 25 23   AST 14 13 14 19     CBC  Recent Labs   Lab Test 02/02/21  1155 01/20/21  1801 01/04/21  1133 10/19/20  1147   HGB 8.5* 8.7* 8.8* 8.1*   WBC 5.0 6.3 6.1 6.3   RBC 4.61 4.69 4.86 4.24*   HCT 27.8* 28.0* 29.5* 26.7*   MCV 60* 60* 61* 63*   MCH 18.4* 18.6* 18.1* 19.1*   MCHC 30.6* 31.1* 29.8* 30.3*   RDW 18.8* 19.0* 18.4* 19.3*    174 207 272     URINE STUDIES  Recent Labs   Lab Test 11/30/18  1045 09/28/15  1134 06/14/13  1231   COLOR Yellow Yellow Yellow   APPEARANCE Clear Clear Clear   URINEGLC Negative Negative Negative   URINEBILI Negative Small  This is an unconfirmed screening test result. A positive result may be false.  * Negative   URINEKETONE Negative Trace* Negative   SG 1.025 >1.030 1.020   UBLD Trace*  Negative Negative   URINEPH 6.0 5.5 7.0   PROTEIN >=300* 100* 30*   UROBILINOGEN 1.0 1.0 1.0   NITRITE Negative Negative Negative   LEUKEST Negative Negative Negative   RBCU O - 2 O - 2  Urine was tested unconcentrated because <10 ml was received.   O - 2   WBCU 0 - 5 O - 2  Urine was tested unconcentrated because <10 ml was received.   O - 2     No lab results found.  PTH  No lab results found.  IRON STUDIES  Recent Labs   Lab Test 01/04/21  1133 10/19/20  1147 10/30/18  1029 11/10/17  1401 11/10/17  1401   IRON  --   --   --   --  15*   FEB  --   --   --   --  368   IRONSAT  --   --   --   --  4*   TESSIE 283 286 251   < > 7*    < > = values in this interval not displayed.         Yoon Alicea MD

## 2021-05-04 ENCOUNTER — TRANSFERRED RECORDS (OUTPATIENT)
Dept: HEALTH INFORMATION MANAGEMENT | Facility: CLINIC | Age: 81
End: 2021-05-04

## 2021-05-24 ENCOUNTER — HOSPITAL ENCOUNTER (OUTPATIENT)
Dept: ULTRASOUND IMAGING | Facility: CLINIC | Age: 81
Discharge: HOME | End: 2021-05-24
Attending: INTERNAL MEDICINE | Admitting: INTERNAL MEDICINE
Payer: COMMERCIAL

## 2021-05-24 DIAGNOSIS — R60.0 PERIPHERAL EDEMA: ICD-10-CM

## 2021-05-24 DIAGNOSIS — Z98.84 HISTORY OF GASTRIC BYPASS: ICD-10-CM

## 2021-05-24 DIAGNOSIS — N18.32 STAGE 3B CHRONIC KIDNEY DISEASE (H): ICD-10-CM

## 2021-05-24 DIAGNOSIS — I50.32 CHRONIC HEART FAILURE WITH PRESERVED EJECTION FRACTION (H): ICD-10-CM

## 2021-05-24 DIAGNOSIS — D50.9 IRON DEFICIENCY ANEMIA, UNSPECIFIED IRON DEFICIENCY ANEMIA TYPE: ICD-10-CM

## 2021-05-24 PROCEDURE — 76770 US EXAM ABDO BACK WALL COMP: CPT

## 2021-06-02 NOTE — LETTER
[facility]    Facility:   [facility] [[id number]]   Code Status: FULL CODE       Chief Complaint   Patient presents with     Review Of Multiple Medical Conditions     [facility] 10/29/19.       HPI:   Cristal is a 67 y.o. female seen for routine physician follow up in [facility] at [facility]. She has been a resident here since October 2011. She does have multiple complex co morbidities. She is treated for hypertension and has insulin-dependent diabetes mellitus. She sees an endocrinologist. She has chronic neuropathy, chronic kidney disease and is treated with Cymbalta for depression and chronic pain. She is on gabapentin and lyrica for neuropathy pain. She has chronic weakness in her lower extremities and is wheelchair bound. She has chronic urinary incontinence. Hospitalized in April 2018 for chest pain. It is noted she has coronary artery disease having had PCI with stent placement in 2009. Her chest pain was reproducible on exam. Negative 3 sets of troponin. EKG showed no significant ischemic changes. Pharmacological stress was negative for inducible ischemia so no intervention was required. She has periodic follow up with cardiology.     She was admitted to the [facility] for planned hysterectomy. She had been experiencing brownish vaginal discharge, referred to gynecology with endometrial biopsy showing atypia. Her hospital course was uneventful, she underwent davinci assisted total laparoscopic hysterectomy with bilateral salping oophorectomy. She returned to [facility] on 8/6/19.    Today:  She is wearing a soft boot on left foot. Previously had heel wound but the skin is intact, wound has healed, fragile so protected with the boot. No other skin issues. She is nonambulatory in wheelchair. Has chronic pain. No new concerns. No shortness of breath or chest pain. No abdominal pain.  BPs satisfactory. Accuchecks followed for DM, she is on metformin and insulin,  May 31, 2019      Mann Escobar  [address] [address] [facility] [id number]        Dear ,    We are writing to inform you of your test results.    Electrolytes normal.   Kidney function at baseline.   Avoid NSAIDs such as ibuprofen and naproxen.   Stay well hydrated.     Resulted Orders   Basic metabolic panel   Result Value Ref Range    Sodium 139 133 - 144 mmol/L    Potassium 3.9 3.4 - 5.3 mmol/L    Chloride 108 94 - 109 mmol/L    Carbon Dioxide 28 20 - 32 mmol/L    Anion Gap 3 3 - 14 mmol/L    Glucose 105 (H) 70 - 99 mg/dL    Urea Nitrogen 31 (H) 7 - 30 mg/dL    Creatinine 1.52 (H) 0.66 - 1.25 mg/dL    GFR Estimate 43 (L) >60 mL/min/[1.73_m2]      Comment:      Non  GFR Calc  Starting 12/18/2018, serum creatinine based estimated GFR (eGFR) will be   calculated using the Chronic Kidney Disease Epidemiology Collaboration   (CKD-EPI) equation.      GFR Estimate If Black 50 (L) >60 mL/min/[1.73_m2]      Comment:       GFR Calc  Starting 12/18/2018, serum creatinine based estimated GFR (eGFR) will be   calculated using the Chronic Kidney Disease Epidemiology Collaboration   (CKD-EPI) equation.      Calcium 8.6 8.5 - 10.1 mg/dL       If you have any questions or concerns, please call the clinic at the number listed above.       Sincerely,        GUNJAN Werner CNP                 endocrine adjusts meds if needed. No fever, cough or congestion. Overall has seemed to decline since surgery, weight loss, some desirable though most recent at 204 pounds, has lost about 12 pounds in last 2 months. Dietary following to ensure adequate nutrition and intakes given hx of DM.      Past Medical History:  Past Medical History:   Diagnosis Date     Acute cystitis with hematuria      Allergic rhinitis      CAD (coronary artery disease)      Carpal tunnel syndrome      Cataract      Cerebral vascular accident (H)      Chronic kidney disease      Debility      Depression      Diabetes mellitus, type II (H)      Diabetic nephropathy (H)      GERD (gastroesophageal reflux disease)      Glaucoma      Gout      History of pyelonephritis      HTN (hypertension)      Hyperlipidemia      IDDM (insulin dependent diabetes mellitus) (H)     Type 2     Lower extremity edema      Myocardial infarction (H)        Medications:  Current Outpatient Medications   Medication Sig     acetaminophen (TYLENOL) 500 MG tablet Take 1,000 mg by mouth 3 (three) times a day .           aspirin 81 mg chewable tablet Chew 81 mg daily.     atorvastatin (LIPITOR) 80 MG tablet Take 80 mg by mouth bedtime.      carboxymethylcellulose (REFRESH PLUS) 0.5 % Dpet ophthalmic dropperette Administer 1 drop to both eyes 3 (three) times a day.     chlorthalidone (HYGROTEN) 25 MG tablet Take 25 mg by mouth daily.      dorzolamide-timolol (COSOPT) 22.3-6.8 mg/mL ophthalmic solution Administer 1 drop to both eyes 2 (two) times a day.      DULoxetine (CYMBALTA) 60 MG capsule Take 60 mg by mouth daily.     ferrous sulfate 325 (65 FE) MG tablet Take 1 tablet by mouth daily with breakfast.     folic acid (FOLVITE) 1 MG tablet Take 1 mg by mouth daily.     gabapentin (NEURONTIN) 300 MG capsule Take 600 mg by mouth 3 (three) times a day.            insulin glargine U-300 conc (TOUJEO MAX U-300 SOLOSTAR) 300 unit/mL (3 mL) InPn Inject 90 Units under the skin  daily. 2 injections of 45 units from pen     ketoconazole (NIZORAL) 2 % shampoo Apply 1 application topically 2 (two) times a week Apply to damp skin, lather, leave on 5 minutes, and rinse. Apply on Wednesdays and Saturdays..           latanoprost (XALATAN) 0.005 % ophthalmic solution Administer 1 drop to both eyes bedtime.      liraglutide (VICTOZA) 0.6 mg/0.1 mL (18 mg/3 mL) PnIj injection Inject 1.8 mg under the skin daily. Call [phone] Dr. Washington if she develops nausea lasting >3 days.     loratadine (CLARITIN) 10 mg tablet Take 10 mg by mouth daily as needed for allergies.     LYRICA 75 mg capsule TAKE 1 CAP BY MOUTH THREE TIMES DAILY     metFORMIN (GLUCOPHAGE-XR) 500 MG 24 hr tablet Take 1,000 mg by mouth daily.     metoprolol tartrate (LOPRESSOR) 100 MG tablet Take 100 mg by mouth 2 (two) times a day.     nitroglycerin (NITROSTAT) 0.4 MG SL tablet Place 0.4 mg under the tongue every 5 (five) minutes as needed for chest pain. 1 tablet SL Q 5 minutes up to 3 doses. Call [phone] if chest pain persists.     olopatadine 0.2 % Drop Apply 1 drop to eye daily as needed.     ranitidine (ZANTAC) 150 MG tablet Take 150 mg by mouth 2 (two) times a day.            senna-docusate (SENNOSIDES-DOCUSATE SODIUM) 8.6-50 mg tablet Take 2 tablets by mouth 2 (two) times a day as needed for constipation.            simethicone (MYLICON) 80 MG chewable tablet Chew 80 mg 4 (four) times a day. After meals and at HS     traMADol (ULTRAM) 50 mg tablet Take 1 tablet (50 mg total) by mouth 3 (three) times a day as needed for pain. For pain 6 or higher     triamcinolone (KENALOG) 0.1 % ointment Apply 1 application topically daily. Apply to legs in the morning  And to right ear two times a day prn               Physical Exam:   General: Patient is alert female, no distress.   Vitals: /72, Temp 97.7, Pulse 80, RR 18, O2 sat 94% RA.  HEENT: Head is NCAT. Eyes show no injection or icterus. Nares negative. Oropharynx well  hydrated.  Neck: Supple. No tenderness or adenopathy. No JVD.  Lungs: Clear bilaterally. No wheezes.  Cardiovascular: Regular rate and rhythm, normal S1, S2.  Back: No spinal or CVA tenderness.  Abdomen: Soft, no tenderness on exam. Bowel sounds present. No guarding rebound or rigidity.  : Deferred.  Extremities: Mild LE edema is noted.  Musculoskeletal: Deformities small joints hands.  Psych: Mood appears good.      Labs:  Lab Results   Component Value Date    WBC 4.3 09/13/2019    HGB 9.8 (L) 09/13/2019    HCT 31.3 (L) 09/13/2019     (H) 09/13/2019     09/13/2019     Results for orders placed or performed in visit on 09/27/19   Basic Metabolic Panel   Result Value Ref Range    Sodium 134 (L) 136 - 145 mmol/L    Potassium 4.1 3.5 - 5.0 mmol/L    Chloride 105 98 - 107 mmol/L    CO2 20 (L) 22 - 31 mmol/L    Anion Gap, Calculation 9 5 - 18 mmol/L    Glucose 107 70 - 125 mg/dL    Calcium 9.9 8.5 - 10.5 mg/dL    BUN 25 (H) 8 - 22 mg/dL    Creatinine 1.22 (H) 0.60 - 1.10 mg/dL    GFR MDRD Af Amer 53 (L) >60 mL/min/1.73m2    GFR MDRD Non Af Amer 44 (L) >60 mL/min/1.73m2     Lab Results   Component Value Date    HGBA1C 7.0 (H) 01/10/2019       Assessment/Plan:  1. IDDM. On insulin, victoza, and metformin. Continue to follow accuchecks. She sees endocrinology.  2. HTN. BPs overall acceptable. On lisinopril, metoprolol, chlorthalidone.    3. Neuropathy. Chronic pain controlled with gabapentin, lyrica and Cymbalta.  4. Hx of stroke. Wheelchair bound, non ambulatory. On aspirin.  5. Glaucoma. Visual impairment at baseline.  6. Hx CVA.    7. Depression. Continue cymbalta.  8. CKD. Labs as noted above.        Electronically signed by: Meagan Valdez MD

## 2021-06-14 NOTE — PROGRESS NOTES
Assessment & Plan     Chronic left-sided low back pain with left-sided sciatica  Stable   - oxyCODONE (ROXICODONE) 5 MG tablet; Take 1 tablet (5 mg) by mouth 2 times daily as needed for severe pain This is a 90 day supply    Gastroesophageal reflux disease without esophagitis  Stable   - omeprazole (PRILOSEC OTC) 20 MG EC tablet; Take 1 tablet (20 mg) by mouth daily    Chronic pain syndrome  New pain contract signed today.   checked.    The risks, benefits and treatment options of prescribed medications or other treatments have been discussed with the patient. The patient verbalized their understanding and should call or follow up if no improvement or if they develop further problems.    GUNJAN Werner CNP  [facility]        Marcus Darnell is a 80 year old who presents for the following health issues     HPI   Chief Complaint   Patient presents with     Pain     renew pain med     Refill Request     omeprazole      Fall       Chronic Pain Follow-Up    Where in your body do you have pain? Low Back and Other Parts of Body  - Ankle, Shoulder  How has your pain affected your ability to work? Not applicable  Which of these pain treatments have you tried since your last clinic visit? Other: none   How well are you sleeping? Fair  How has your mood been since your last visit? About the same  Have you had a significant life event? No  Other aggravating factors: getting out of bed   Taking medication as directed? Yes  MN  checked today    PHQ-9 SCORE 5/22/2018 5/31/2019   PHQ-9 Total Score 1 3     SARAH-7 SCORE 5/22/2018 5/31/2019   Total Score 0 0     No flowsheet data found.  Encounter-Level CSA - 05/02/2017:    Controlled Substance Agreement - Scan on 5/8/2017  2:07 PM: CONTROLLED SUBSTANCE AGREEMENT     Patient-Level CSA:    There are no patient-level csa.         How many servings of fruits and vegetables do you eat daily?  3-4    On average, how many sweetened beverages do  "you drink each day (Examples: soda, juice, sweet tea, etc.  Do NOT count diet or artificially sweetened beverages)?   0    How many days per week do you exercise enough to make your heart beat faster?0How many minutes a day do you exercise enough to make your heart beat faster?0    How many days per week do you miss taking your medication? 0    Medication Followup of Omeprazole     Taking Medication as prescribed: yes    Side Effects:  None    Medication Helping Symptoms:  yes             Review of Systems   Constitutional, HEENT, cardiovascular, pulmonary, gi and gu systems are negative, except as otherwise noted.      Objective    /68 (BP Location: Right arm, Patient Position: Chair, Cuff Size: Adult Regular)   Pulse 64   Temp 98.5  F (36.9  C) (Tympanic)   Resp 16   Ht 1.556 m (5' 1.25\")   Wt 75.5 kg (166 lb 8 oz)   SpO2 96%   BMI 31.20 kg/m    Body mass index is 31.2 kg/m .  Physical Exam   GENERAL: healthy, alert and no distress  RESP: lungs clear to auscultation - no rales, rhonchi or wheezes  CV: regular rate and rhythm, normal S1 S2, no S3 or S4, no murmur, click or rub, no peripheral edema and peripheral pulses strong                "

## 2021-06-16 ENCOUNTER — OFFICE VISIT (OUTPATIENT)
Dept: FAMILY MEDICINE | Facility: CLINIC | Age: 81
End: 2021-06-16
Payer: COMMERCIAL

## 2021-06-16 VITALS
DIASTOLIC BLOOD PRESSURE: 68 MMHG | RESPIRATION RATE: 16 BRPM | WEIGHT: 166.5 LBS | SYSTOLIC BLOOD PRESSURE: 102 MMHG | TEMPERATURE: 98.5 F | BODY MASS INDEX: 31.43 KG/M2 | HEIGHT: 61 IN | HEART RATE: 64 BPM | OXYGEN SATURATION: 96 %

## 2021-06-16 DIAGNOSIS — G89.29 CHRONIC LEFT-SIDED LOW BACK PAIN WITH LEFT-SIDED SCIATICA: ICD-10-CM

## 2021-06-16 DIAGNOSIS — K21.9 GASTROESOPHAGEAL REFLUX DISEASE WITHOUT ESOPHAGITIS: ICD-10-CM

## 2021-06-16 DIAGNOSIS — M54.42 CHRONIC LEFT-SIDED LOW BACK PAIN WITH LEFT-SIDED SCIATICA: ICD-10-CM

## 2021-06-16 DIAGNOSIS — R60.0 PERIPHERAL EDEMA: ICD-10-CM

## 2021-06-16 DIAGNOSIS — I50.32 CHRONIC HEART FAILURE WITH PRESERVED EJECTION FRACTION (H): ICD-10-CM

## 2021-06-16 DIAGNOSIS — G89.4 CHRONIC PAIN SYNDROME: ICD-10-CM

## 2021-06-16 DIAGNOSIS — D50.9 IRON DEFICIENCY ANEMIA, UNSPECIFIED IRON DEFICIENCY ANEMIA TYPE: ICD-10-CM

## 2021-06-16 DIAGNOSIS — N18.32 STAGE 3B CHRONIC KIDNEY DISEASE (H): ICD-10-CM

## 2021-06-16 DIAGNOSIS — Z98.84 HISTORY OF GASTRIC BYPASS: ICD-10-CM

## 2021-06-16 LAB
ALBUMIN SERPL-MCNC: 3.4 G/DL (ref 3.4–5)
ALBUMIN UR-MCNC: 100 MG/DL
ANION GAP SERPL CALCULATED.3IONS-SCNC: 12 MMOL/L (ref 3–14)
APPEARANCE UR: CLEAR
BILIRUB UR QL STRIP: NEGATIVE
BUN SERPL-MCNC: 47 MG/DL (ref 7–30)
CALCIUM SERPL-MCNC: 7.7 MG/DL (ref 8.5–10.1)
CHLORIDE SERPL-SCNC: 107 MMOL/L (ref 94–109)
CO2 SERPL-SCNC: 24 MMOL/L (ref 20–32)
COLOR UR AUTO: YELLOW
CREAT SERPL-MCNC: 2.73 MG/DL (ref 0.66–1.25)
ERYTHROCYTE [DISTWIDTH] IN BLOOD BY AUTOMATED COUNT: 17 % (ref 10–15)
FERRITIN SERPL-MCNC: 329 NG/ML (ref 26–388)
GFR SERPL CREATININE-BSD FRML MDRD: 21 ML/MIN/{1.73_M2}
GLUCOSE SERPL-MCNC: 80 MG/DL (ref 70–99)
GLUCOSE UR STRIP-MCNC: NEGATIVE MG/DL
HCT VFR BLD AUTO: 26.2 % (ref 40–53)
HGB BLD-MCNC: 8.5 G/DL (ref 13.3–17.7)
HGB UR QL STRIP: NEGATIVE
IRON SATN MFR SERPL: 25 % (ref 15–46)
IRON SERPL-MCNC: 41 UG/DL (ref 35–180)
KETONES UR STRIP-MCNC: NEGATIVE MG/DL
LEUKOCYTE ESTERASE UR QL STRIP: NEGATIVE
MCH RBC QN AUTO: 18.6 PG (ref 26.5–33)
MCHC RBC AUTO-ENTMCNC: 32.4 G/DL (ref 31.5–36.5)
MCV RBC AUTO: 57 FL (ref 78–100)
NITRATE UR QL: NEGATIVE
PH UR STRIP: 5.5 PH (ref 5–7)
PHOSPHATE SERPL-MCNC: 4.2 MG/DL (ref 2.5–4.5)
PLATELET # BLD AUTO: 352 10E9/L (ref 150–450)
POTASSIUM SERPL-SCNC: 3.8 MMOL/L (ref 3.4–5.3)
RBC # BLD AUTO: 4.58 10E12/L (ref 4.4–5.9)
RBC #/AREA URNS AUTO: ABNORMAL /HPF
SODIUM SERPL-SCNC: 143 MMOL/L (ref 133–144)
SOURCE: ABNORMAL
SP GR UR STRIP: 1.02 (ref 1–1.03)
TIBC SERPL-MCNC: 166 UG/DL (ref 240–430)
UROBILINOGEN UR STRIP-ACNC: 1 EU/DL (ref 0.2–1)
WBC # BLD AUTO: 7 10E9/L (ref 4–11)
WBC #/AREA URNS AUTO: ABNORMAL /HPF

## 2021-06-16 PROCEDURE — 81001 URINALYSIS AUTO W/SCOPE: CPT | Performed by: INTERNAL MEDICINE

## 2021-06-16 PROCEDURE — 85027 COMPLETE CBC AUTOMATED: CPT | Performed by: INTERNAL MEDICINE

## 2021-06-16 PROCEDURE — 80069 RENAL FUNCTION PANEL: CPT | Performed by: INTERNAL MEDICINE

## 2021-06-16 PROCEDURE — 99000 SPECIMEN HANDLING OFFICE-LAB: CPT | Performed by: INTERNAL MEDICINE

## 2021-06-16 PROCEDURE — 99214 OFFICE O/P EST MOD 30 MIN: CPT | Performed by: NURSE PRACTITIONER

## 2021-06-16 PROCEDURE — 86334 IMMUNOFIX E-PHORESIS SERUM: CPT | Performed by: PATHOLOGY

## 2021-06-16 PROCEDURE — 82784 ASSAY IGA/IGD/IGG/IGM EACH: CPT | Performed by: INTERNAL MEDICINE

## 2021-06-16 PROCEDURE — 36415 COLL VENOUS BLD VENIPUNCTURE: CPT | Performed by: INTERNAL MEDICINE

## 2021-06-16 PROCEDURE — 83945 ASSAY OF OXALATE: CPT | Mod: 90 | Performed by: INTERNAL MEDICINE

## 2021-06-16 PROCEDURE — 83550 IRON BINDING TEST: CPT | Performed by: INTERNAL MEDICINE

## 2021-06-16 PROCEDURE — 82728 ASSAY OF FERRITIN: CPT | Performed by: INTERNAL MEDICINE

## 2021-06-16 PROCEDURE — 83540 ASSAY OF IRON: CPT | Performed by: INTERNAL MEDICINE

## 2021-06-16 RX ORDER — OMEPRAZOLE 20 MG/1
20 TABLET, DELAYED RELEASE ORAL DAILY
Qty: 90 TABLET | Refills: 3 | Status: ON HOLD | OUTPATIENT
Start: 2021-06-16 | End: 2021-10-18

## 2021-06-16 RX ORDER — OXYCODONE HYDROCHLORIDE 5 MG/1
5 TABLET ORAL 2 TIMES DAILY PRN
Qty: 180 TABLET | Refills: 0 | Status: SHIPPED | OUTPATIENT
Start: 2021-06-16 | End: 2021-09-29

## 2021-06-16 ASSESSMENT — ANXIETY QUESTIONNAIRES
1. FEELING NERVOUS, ANXIOUS, OR ON EDGE: NOT AT ALL
2. NOT BEING ABLE TO STOP OR CONTROL WORRYING: NOT AT ALL
5. BEING SO RESTLESS THAT IT IS HARD TO SIT STILL: NOT AT ALL
GAD7 TOTAL SCORE: 1
7. FEELING AFRAID AS IF SOMETHING AWFUL MIGHT HAPPEN: NOT AT ALL
6. BECOMING EASILY ANNOYED OR IRRITABLE: SEVERAL DAYS
3. WORRYING TOO MUCH ABOUT DIFFERENT THINGS: NOT AT ALL

## 2021-06-16 ASSESSMENT — PATIENT HEALTH QUESTIONNAIRE - PHQ9
5. POOR APPETITE OR OVEREATING: NOT AT ALL
SUM OF ALL RESPONSES TO PHQ QUESTIONS 1-9: 3

## 2021-06-16 ASSESSMENT — MIFFLIN-ST. JEOR: SCORE: 1332.58

## 2021-06-16 NOTE — LETTER
[facility]  -- Controlled Medication Agreement    6/16/2021   Mann Escobar   1940   [id number]       I understand that my provider is prescribing controlled medication (i.e., opioids, tranquilizers, barbiturates) to assist me in managing my chronic pain that has not responded to other treatments.  These medications are intended to decrease pain in order to improve function and/or ability to work.  The risks, benefits, and side effects or these medications have been explained to me and I agree to the following conditions for this type of treatment.    1. I will participate in other treatments (i.e., physical therapy, behavioral therapy, groups,) that my provider recommends.  I will be ready to taper or discontinue medications as other reasonable and effective treatments become available.  I understand I may be expected to see a health psychologist and a physical therapist at the discretion of my physician for ongoing functional assessment.  I will follow through with any recommendations made at this visit.    2. I will take my medications exactly as prescribed and will not change the medication dosage or schedule without my provider s approval.  Refills will not be given if I  run out early .  3. I will keep all regular appointments at the clinic (this includes nurse appointments and appointments with PT or behavioral medicine).  If I have three or more missed or cancelled appointments my medications may be discontinued.  4. I will not request or accept prescriptions for controlled substances from other physicians or individuals for my chronic pain condition.  If I develop another condition that requires the prescription of a controlled medication or if I am hospitalized for any reason, I will inform the clinic within one business day of receiving any treatment or medications.  5. I will designate one pharmacy where all of my prescriptions will be filled.  6. I will bring in the  containers of all medications prescribed each time I see my provider or a nurse even if there is no medication remaining.  This must be the original container from the pharmacy.  7. Refills of controlled medications will be made only during regular office hours, during a scheduled appointment with my provider or nurse.   8. I am responsible for my prescriptions.  If the medication is lost or stolen, I understand it will not be replaced.  9. I agree to abstain from all illegal and recreational drugs and will provide urine or blood specimens to monitor my compliance.  10. I will notify my nurse or provider immediately if I become pregnant.  11. I understand that controlled medications can affect my thinking and judgment and may interfere with my ability to drive.  I will not drive if I have this concern and will not drive if any dosage adjustments are made until my body has adjusted.        I understand that if I violate any of the above conditions my prescription medications and/or treatment may be terminated.  If the violation includes obtaining any controlled substances from other healthcare providers or individuals a report may be made to my physician, pharmacy, and other authorities including the police.    I have read this agreement and it has been explained to me.  I fully understand the consequences of violating this agreement.        _________________                             ___________                _________________       Patient Signature                                Date                              Witness      _________________  GUNJAN Werner CNP

## 2021-06-17 LAB
IGA SERPL-MCNC: 228 MG/DL (ref 84–499)
IGG SERPL-MCNC: 680 MG/DL (ref 610–1616)
IGM SERPL-MCNC: 57 MG/DL (ref 35–242)
PROT PATTERN SERPL IFE-IMP: NORMAL

## 2021-06-17 ASSESSMENT — ANXIETY QUESTIONNAIRES: GAD7 TOTAL SCORE: 1

## 2021-06-18 LAB — OXALATE SERPL-MCNC: 6.9 UMOL/L

## 2021-06-27 DIAGNOSIS — R79.89 HIGH PLASMA OXALATE: Primary | ICD-10-CM

## 2021-06-27 RX ORDER — PYRIDOXINE HCL (VITAMIN B6) 50 MG
50 TABLET ORAL 2 TIMES DAILY
Qty: 60 TABLET | Refills: 11 | Status: SHIPPED | OUTPATIENT
Start: 2021-06-27 | End: 2022-03-30

## 2021-06-28 ENCOUNTER — TELEPHONE (OUTPATIENT)
Dept: FAMILY MEDICINE | Facility: CLINIC | Age: 81
End: 2021-06-28

## 2021-06-28 DIAGNOSIS — K21.9 GASTROESOPHAGEAL REFLUX DISEASE WITHOUT ESOPHAGITIS: Primary | ICD-10-CM

## 2021-06-28 NOTE — TELEPHONE ENCOUNTER
Reason for Call:  Other prescription    Detailed comments: Pharmacy is calling asking for the omeprazole (PRILOSEC OTC) insurance does not cover this but they do cover To the Omeprazole Prilosec RX   [phone]  Ref# [id number]  Phone Number Patient can be reached at: Home number on file [phone] (home)    Best Time: any    Can we leave a detailed message on this number? YES    Call taken on 6/28/2021 at 8:18 AM by Marisa Davis

## 2021-06-30 NOTE — TELEPHONE ENCOUNTER
Please see message below.     The OTC is not covered.  Needs to be ordered as Omeprazole (Prilosec)  And the only option is capsule.    Please review and advise.    Thank you    Stormy BLACKWOOD RN

## 2021-08-20 DIAGNOSIS — N40.1 BENIGN NON-NODULAR PROSTATIC HYPERPLASIA WITH LOWER URINARY TRACT SYMPTOMS: ICD-10-CM

## 2021-08-20 NOTE — TELEPHONE ENCOUNTER
Reason for Call:  Medication or medication refill:    Do you use a [facility]?  Name of the pharmacy and phone number for the current request:  [facility]    Name of the medication requested: terazosin    Other request: none    Can we leave a detailed message on this number? YES    Phone number patient can be reached at: Home number on file [phone] (home)    Best Time: any    Call taken on 8/20/2021 at 2:16 PM by Eri Hunter

## 2021-08-22 DIAGNOSIS — I10 ESSENTIAL HYPERTENSION: Primary | ICD-10-CM

## 2021-08-23 RX ORDER — AMLODIPINE BESYLATE 10 MG/1
TABLET ORAL
Qty: 90 TABLET | Refills: 3 | Status: SHIPPED | OUTPATIENT
Start: 2021-08-23 | End: 2022-02-02

## 2021-08-24 NOTE — TELEPHONE ENCOUNTER
Routing refill request to provider for review/approval because:  Labs out of range: Last Creat was 2.26 on 2/8/21.  Advise.  Barry

## 2021-08-25 RX ORDER — TERAZOSIN 5 MG/1
CAPSULE ORAL
Qty: 90 CAPSULE | Refills: 3 | Status: SHIPPED | OUTPATIENT
Start: 2021-08-25 | End: 2022-08-05

## 2021-08-30 ENCOUNTER — TRANSFERRED RECORDS (OUTPATIENT)
Dept: HEALTH INFORMATION MANAGEMENT | Facility: CLINIC | Age: 81
End: 2021-08-30

## 2021-09-29 ENCOUNTER — OFFICE VISIT (OUTPATIENT)
Dept: FAMILY MEDICINE | Facility: CLINIC | Age: 81
End: 2021-09-29
Payer: COMMERCIAL

## 2021-09-29 VITALS
WEIGHT: 163 LBS | SYSTOLIC BLOOD PRESSURE: 118 MMHG | HEIGHT: 61 IN | HEART RATE: 84 BPM | TEMPERATURE: 98.9 F | DIASTOLIC BLOOD PRESSURE: 62 MMHG | BODY MASS INDEX: 30.78 KG/M2 | OXYGEN SATURATION: 97 %

## 2021-09-29 DIAGNOSIS — R13.10 DYSPHAGIA, UNSPECIFIED TYPE: Primary | ICD-10-CM

## 2021-09-29 DIAGNOSIS — G89.29 CHRONIC LEFT-SIDED LOW BACK PAIN WITH LEFT-SIDED SCIATICA: ICD-10-CM

## 2021-09-29 DIAGNOSIS — M54.42 CHRONIC LEFT-SIDED LOW BACK PAIN WITH LEFT-SIDED SCIATICA: ICD-10-CM

## 2021-09-29 PROCEDURE — 99214 OFFICE O/P EST MOD 30 MIN: CPT | Performed by: NURSE PRACTITIONER

## 2021-09-29 RX ORDER — OXYCODONE HYDROCHLORIDE 5 MG/1
5 TABLET ORAL 2 TIMES DAILY PRN
Qty: 180 TABLET | Refills: 0 | Status: SHIPPED | OUTPATIENT
Start: 2021-09-29 | End: 2022-01-21

## 2021-09-29 ASSESSMENT — MIFFLIN-ST. JEOR: SCORE: 1312.74

## 2021-09-29 NOTE — H&P (VIEW-ONLY)
Assessment & Plan     Dysphagia, unspecified type  Liquids and solids are getting stuck in esophagus after swallowing - will need to vomit to get it out or unstuck.   Has had a 30 lb weight loss.  Recommend EGD for further assessment.  Continue daily PPI for now.  Follow up after EGD  - Adult Gastro Ref - Procedure Only; Future    Chronic left-sided low back pain with left-sided sciatica  Stable.  - oxyCODONE (ROXICODONE) 5 MG tablet; Take 1 tablet (5 mg) by mouth 2 times daily as needed for severe pain This is a 90 day supply      The risks, benefits and treatment options of prescribed medications or other treatments have been discussed with the patient. The patient verbalized their understanding and should call or follow up if no improvement or if they develop further problems.    GUNJAN Werner CNP  [facility]        Marcus Darnell is a 80 year old who presents for the following health issues     HPI     Chronic Pain Follow-Up    Where in your body do you have pain? Low back , ankle, shoulder  How has your pain affected your ability to work? Not applicable  Which of these pain treatments have you tried since your last clinic visit? none  How well are you sleeping? Good  How has your mood been since your last visit? Much worse  Have you had a significant life event? No and Health Concerns  Other aggravating factors: sedentary lifestyle  Taking medication as directed? Yes    Last dose of medication taken:  This morning.    PHQ-9 SCORE 5/22/2018 5/31/2019 6/16/2021   PHQ-9 Total Score 1 3 3     SARAH-7 SCORE 5/22/2018 5/31/2019 6/16/2021   Total Score 0 0 1     No flowsheet data found.  Encounter-Level CSA - 05/02/2017:    Controlled Substance Agreement - Scan on 5/8/2017  2:07 PM: CONTROLLED SUBSTANCE AGREEMENT     Patient-Level CSA:    Controlled Substance Agreement - Opioid - Scan on 6/17/2021  7:30 PM           GERD/Heartburn  Onset/Duration: at least a month  Description:  "  Water and food are getting stuck in esophagus after swallowing.  No sense of taste  Feels like his mouth is full of chalk  Intensity: moderate  Progression of Symptoms: worsening  Accompanying Signs & Symptoms:  Does it feel like food gets stuck or trouble swallowing: YES - both water and food - gets stuck and he will need to vomit to get it out.  Nausea: no  Vomiting (bloody?): YES- just what he tried to eat-  Along with mucous  Abdominal Pain: no  Black-Tarry stools: no  Bloody stools: no  A lot of belching  Weight loss  History:  Previous similar episodes: no  Previous ulcers: no  Precipitating factors:   Caffeine use: YES- one cup per day  Alcohol use: no  NSAID/Aspirin use: no  Tobacco use: no  Worse with no particular food or drink.  Alleviating factors: None  Therapies tried and outcome:             Lifestyle changes: None            Medications: Omeprazole (Prilosec) and pepcid      Wt Readings from Last 4 Encounters:   09/29/21 73.9 kg (163 lb)   06/16/21 75.5 kg (166 lb 8 oz)   03/15/21 88.5 kg (195 lb)   03/02/21 89.4 kg (197 lb)             Review of Systems   Constitutional, HEENT, cardiovascular, pulmonary, gi and gu systems are negative, except as otherwise noted.      Objective    /62 (BP Location: Right arm)   Pulse 84   Temp 98.9  F (37.2  C) (Tympanic)   Ht 1.549 m (5' 1\")   Wt 73.9 kg (163 lb)   SpO2 97%   BMI 30.80 kg/m    Body mass index is 30.8 kg/m .  Physical Exam   GENERAL: healthy, alert and no distress  NECK: no adenopathy, no asymmetry, masses, or scars and thyroid normal to palpation  RESP: lungs clear to auscultation - no rales, rhonchi or wheezes  CV: regular rate and rhythm, normal S1 S2, no S3 or S4, no murmur, click or rub, no peripheral edema and peripheral pulses strong  ABDOMEN: soft, nontender, no hepatosplenomegaly, no masses and bowel sounds normal  MS: no gross musculoskeletal defects noted, no edema                "

## 2021-09-29 NOTE — PROGRESS NOTES
Assessment & Plan     Dysphagia, unspecified type  Liquids and solids are getting stuck in esophagus after swallowing - will need to vomit to get it out or unstuck.   Has had a 30 lb weight loss.  Recommend EGD for further assessment.  Continue daily PPI for now.  Follow up after EGD  - Adult Gastro Ref - Procedure Only; Future    Chronic left-sided low back pain with left-sided sciatica  Stable.  - oxyCODONE (ROXICODONE) 5 MG tablet; Take 1 tablet (5 mg) by mouth 2 times daily as needed for severe pain This is a 90 day supply      The risks, benefits and treatment options of prescribed medications or other treatments have been discussed with the patient. The patient verbalized their understanding and should call or follow up if no improvement or if they develop further problems.    GUNJAN Werner CNP  [facility]        Marcus Darnell is a 80 year old who presents for the following health issues     HPI     Chronic Pain Follow-Up    Where in your body do you have pain? Low back , ankle, shoulder  How has your pain affected your ability to work? Not applicable  Which of these pain treatments have you tried since your last clinic visit? none  How well are you sleeping? Good  How has your mood been since your last visit? Much worse  Have you had a significant life event? No and Health Concerns  Other aggravating factors: sedentary lifestyle  Taking medication as directed? Yes    Last dose of medication taken:  This morning.    PHQ-9 SCORE 5/22/2018 5/31/2019 6/16/2021   PHQ-9 Total Score 1 3 3     SARAH-7 SCORE 5/22/2018 5/31/2019 6/16/2021   Total Score 0 0 1     No flowsheet data found.  Encounter-Level CSA - 05/02/2017:    Controlled Substance Agreement - Scan on 5/8/2017  2:07 PM: CONTROLLED SUBSTANCE AGREEMENT     Patient-Level CSA:    Controlled Substance Agreement - Opioid - Scan on 6/17/2021  7:30 PM           GERD/Heartburn  Onset/Duration: at least a month  Description:  "  Water and food are getting stuck in esophagus after swallowing.  No sense of taste  Feels like his mouth is full of chalk  Intensity: moderate  Progression of Symptoms: worsening  Accompanying Signs & Symptoms:  Does it feel like food gets stuck or trouble swallowing: YES - both water and food - gets stuck and he will need to vomit to get it out.  Nausea: no  Vomiting (bloody?): YES- just what he tried to eat-  Along with mucous  Abdominal Pain: no  Black-Tarry stools: no  Bloody stools: no  A lot of belching  Weight loss  History:  Previous similar episodes: no  Previous ulcers: no  Precipitating factors:   Caffeine use: YES- one cup per day  Alcohol use: no  NSAID/Aspirin use: no  Tobacco use: no  Worse with no particular food or drink.  Alleviating factors: None  Therapies tried and outcome:             Lifestyle changes: None            Medications: Omeprazole (Prilosec) and pepcid      Wt Readings from Last 4 Encounters:   09/29/21 73.9 kg (163 lb)   06/16/21 75.5 kg (166 lb 8 oz)   03/15/21 88.5 kg (195 lb)   03/02/21 89.4 kg (197 lb)             Review of Systems   Constitutional, HEENT, cardiovascular, pulmonary, gi and gu systems are negative, except as otherwise noted.      Objective    /62 (BP Location: Right arm)   Pulse 84   Temp 98.9  F (37.2  C) (Tympanic)   Ht 1.549 m (5' 1\")   Wt 73.9 kg (163 lb)   SpO2 97%   BMI 30.80 kg/m    Body mass index is 30.8 kg/m .  Physical Exam   GENERAL: healthy, alert and no distress  NECK: no adenopathy, no asymmetry, masses, or scars and thyroid normal to palpation  RESP: lungs clear to auscultation - no rales, rhonchi or wheezes  CV: regular rate and rhythm, normal S1 S2, no S3 or S4, no murmur, click or rub, no peripheral edema and peripheral pulses strong  ABDOMEN: soft, nontender, no hepatosplenomegaly, no masses and bowel sounds normal  MS: no gross musculoskeletal defects noted, no edema                "

## 2021-10-01 DIAGNOSIS — Z11.59 ENCOUNTER FOR SCREENING FOR OTHER VIRAL DISEASES: ICD-10-CM

## 2021-10-14 ENCOUNTER — LAB (OUTPATIENT)
Dept: LAB | Facility: CLINIC | Age: 81
End: 2021-10-14
Payer: COMMERCIAL

## 2021-10-14 ENCOUNTER — ANESTHESIA EVENT (OUTPATIENT)
Dept: GASTROENTEROLOGY | Facility: CLINIC | Age: 81
End: 2021-10-14
Payer: COMMERCIAL

## 2021-10-14 DIAGNOSIS — Z11.59 ENCOUNTER FOR SCREENING FOR OTHER VIRAL DISEASES: ICD-10-CM

## 2021-10-14 PROCEDURE — U0003 INFECTIOUS AGENT DETECTION BY NUCLEIC ACID (DNA OR RNA); SEVERE ACUTE RESPIRATORY SYNDROME CORONAVIRUS 2 (SARS-COV-2) (CORONAVIRUS DISEASE [COVID-19]), AMPLIFIED PROBE TECHNIQUE, MAKING USE OF HIGH THROUGHPUT TECHNOLOGIES AS DESCRIBED BY CMS-2020-01-R: HCPCS

## 2021-10-14 PROCEDURE — U0005 INFEC AGEN DETEC AMPLI PROBE: HCPCS

## 2021-10-14 ASSESSMENT — LIFESTYLE VARIABLES: TOBACCO_USE: 1

## 2021-10-14 NOTE — ANESTHESIA PREPROCEDURE EVALUATION
Anesthesia Pre-Procedure Evaluation    Patient: Mann Escobar   MRN: [id number] : 1940        Preoperative Diagnosis: Dysphagia, unspecified type [R13.10]    Procedure : Procedure(s):  ESOPHAGOGASTRODUODENOSCOPY (EGD)          Past Medical History:   Diagnosis Date     Arthritis of ankle, left 2020     Back pain     WITH BILATERAL LEG PAIN AND NUMBNESS OF BOTH FEET     Gastro-oesophageal reflux disease     REFLUX     Hypertension      Hypopotassemia      Internal hemorrhoids      Iron deficiency anaemia      Leg edema      Morbid obesity 2005     Morbid obesity (H)      Osteoarthrosis      Scoliosis      Thalassemia minor       Past Surgical History:   Procedure Laterality Date     CATARACT IOL, RT/LT  /    Cataract IOL RT/LT     COLONOSCOPY      polyps removed repeat 5 yrs, tubular adenoma     COLONOSCOPY  2011    Procedure:COLONOSCOPY; Colonoscopy with hemorrhoid banding; Surgeon:JEREMY GARCIA; Location:[facility]     COLONOSCOPY N/A 2017    Procedure: COLONOSCOPY;  colonoscopy, gastroscopy;  Surgeon: Edmar Isaacs MD;  Location: [facility]     DECOMPRESSION LUMBAR MINIMALLY INVASIVE TWO LEVELS  2012    Procedure:DECOMPRESSION LUMBAR MINIMALLY INVASIVE TWO LEVELS; Surgeon:LOTTIE MITCHELL; [facility]     ESOPHAGOSCOPY, GASTROSCOPY, DUODENOSCOPY (EGD), COMBINED N/A 2018    Procedure: COMBINED ESOPHAGOSCOPY, GASTROSCOPY, DUODENOSCOPY (EGD);  gastroscopy;  Surgeon: Edmar Isaacs MD;  Location: [facility]     FUSION SPINE POSTERIOR MINIMALLY INVASIVE ONE LEVEL  2012    Procedure:FUSION SPINE POSTERIOR MINIMALLY INVASIVE ONE LEVEL; Minimal Access Spinal Technology Transformaninal Lumbar Interbody Fusion L4-5, Decompression L3-5; Surgeon:LOTTIE MITCHELL; Location:[facility]     HC REMOVAL OF HYDROCELE,TUNICA,UNILAT      bilateral     ORTHOPEDIC SURGERY      Lf knee replacement     ORTHOPEDIC SURGERY  ,     Rt replacement     ORTHOPEDIC  SURGERY  2005    Left ankle fused     RECONSTRUCT ANKLE Right 2020    Procedure: Ankle RECONSTRUCTIve Arthroplasty;  Surgeon: Luke Powers DPM;  Location: [facility]     SURGICAL HISTORY OF -       Cysto     SURGICAL HISTORY OF -       Percutaneous kidney stone removal     SURGICAL HISTORY OF -       ureteral stent removal     SURGICAL HISTORY OF -       bariatric surgery-Favian-en-Y     SURGICAL HISTORY OF -       carpal tunnel surgery rt wrist      Allergies   Allergen Reactions     Nkda [No Known Drug Allergies]       Social History     Tobacco Use     Smoking status: Former Smoker     Packs/day: 0.50     Years: 7.00     Pack years: 3.50     Types: Cigarettes     Quit date: 1962     Years since quittin.8     Smokeless tobacco: Never Used   Substance Use Topics     Alcohol use: Yes     Comment: one every other day. Rarely      Wt Readings from Last 1 Encounters:   21 73.9 kg (163 lb)        Anesthesia Evaluation   Pt has had prior anesthetic.         ROS/MED HX  ENT/Pulmonary:     (+) tobacco use, Past use,     Neurologic:       Cardiovascular:     (+) hypertension-----Previous cardiac testing   Echo: Date: 21 Results:  Interpretation Summary     Left ventricular size, global systolic function, and wall motion are normal,  estimated LVEF 55%.  Grade II or moderate diastolic dysfunction.  Right ventricular global function is normal.  Trivial to small pericardial effusion present. There are no echocardiographic  indications of cardiac tamponade.  Dilation of the inferior vena cava is present with abnormal respiratory  variation in diameter.  Mild aortic root dilatation. Max diameter of the visualized portion 4.2 cm.  The ascending aorta is Mildly dilated. Max diameter of the visualized portion  4.1 cm.     This study was compared to a TTE from 2020. Global LV systolic function is  mildly worse, though still within normal range. There is evidence of diastolic  dysfunction on  this present study. There is now a trivial to small pericardial  effusion. IVC is now dilated consistent with hypervolemia.  _____________________________________________________________________________  __        Left Ventricle  The left ventricle is normal in size. There is severe concentric left  ventricular hypertrophy. Left ventricular systolic function is normal. The  visual ejection fraction is estimated at 55%. Grade II or moderate diastolic  dysfunction. No regional wall motion abnormalities noted.     Right Ventricle  The right ventricle is normal in structure, function and size.     Atria  The left atrium is severely dilated. Right atrial size is normal. There is no  color Doppler evidence of an atrial shunt.     Mitral Valve  The mitral valve leaflets are mildly thickened. There is trace mitral  regurgitation.        Tricuspid Valve  There is trace tricuspid regurgitation. Right ventricle systolic pressure  estimate normal. The right ventricular systolic pressure is approximated at  25.9 mmHg plus the right atrial pressure.     Aortic Valve  There is trivial trileaflet aortic sclerosis.     Pulmonic Valve  The pulmonic valve is not well seen, but is grossly normal.     Vessels  Mild aortic root dilatation. Max diameter of the visualized portion 4.2 cm.  The ascending aorta is Mildly dilated. Max diameter of the visualized portion  4.1 cm. Dilation of the inferior vena cava is present with abnormal  respiratory variation in diameter.     Pericardium  A circumferential pericardial effusion is noted. Trivial to small pericardial  effusion present. There are no echocardiographic indications of cardiac  tamponade.     Stress Test: Date: Results:    ECG Reviewed: Date: Results:    Cath: Date: Results:      METS/Exercise Tolerance:     Hematologic: Comments: Thalassemia minor    (+) anemia,     Musculoskeletal: Comment: Scoliosis  FUSION SPINE POSTERIOR MINIMALLY INVASIVE ONE LEVEL DECOMPRESSION LUMBAR  MINIMALLY INVASIVE TWO LEVELS     Pseudogout  Chronic pain syndrome      (+) arthritis,     GI/Hepatic: Comment: Dysphagia    (+) GERD,     Renal/Genitourinary:     (+) renal disease (stage 3), type: CRI, BPH,     Endo:     (+) Obesity,     Psychiatric/Substance Use:       Infectious Disease:       Malignancy:       Other:            Physical Exam    Airway        Mallampati: II   TM distance: > 3 FB   Neck ROM: full   Mouth opening: > 3 cm    Respiratory Devices and Support         Dental  no notable dental history         Cardiovascular   cardiovascular exam normal          Pulmonary   pulmonary exam normal                OUTSIDE LABS:  CBC:   Lab Results   Component Value Date    WBC 7.0 06/16/2021    WBC 5.0 02/02/2021    HGB 8.5 (L) 06/16/2021    HGB 8.5 (L) 02/02/2021    HCT 26.2 (L) 06/16/2021    HCT 27.8 (L) 02/02/2021     06/16/2021     02/02/2021     BMP:   Lab Results   Component Value Date     06/16/2021     02/08/2021    POTASSIUM 3.8 06/16/2021    POTASSIUM 3.5 02/08/2021    CHLORIDE 107 06/16/2021    CHLORIDE 107 02/08/2021    CO2 24 06/16/2021    CO2 30 02/08/2021    BUN 47 (H) 06/16/2021    BUN 28 02/08/2021    CR 2.73 (H) 06/16/2021    CR 2.26 (H) 02/08/2021    GLC 80 06/16/2021    GLC 91 02/08/2021     COAGS:   Lab Results   Component Value Date    INR 3.11 (H) 09/07/2010     POC:   Lab Results   Component Value Date    BGM 91 07/23/2020     HEPATIC:   Lab Results   Component Value Date    ALBUMIN 3.4 06/16/2021    PROTTOTAL 6.1 (L) 02/08/2021    ALT 21 02/08/2021    AST 14 02/08/2021    ALKPHOS 104 02/08/2021    BILITOTAL 0.5 02/08/2021     OTHER:   Lab Results   Component Value Date    A1C 5.2 10/25/2013    RUTH 7.7 (L) 06/16/2021    PHOS 4.2 06/16/2021    TSH 0.75 01/20/2021    CRP 5.8 02/02/2021    SED 29 (H) 02/02/2021       Anesthesia Plan    ASA Status:  3   NPO Status:  NPO Appropriate    Anesthesia Type: General.              Consents    Anesthesia Plan(s) and  associated risks, benefits, and realistic alternatives discussed. Questions answered and patient/representative(s) expressed understanding.     - Discussed with:  Patient         Postoperative Care            Comments:                GUNJAN Huynh CRNA

## 2021-10-15 LAB — SARS-COV-2 RNA RESP QL NAA+PROBE: NEGATIVE

## 2021-10-18 ENCOUNTER — HOSPITAL ENCOUNTER (OUTPATIENT)
Facility: CLINIC | Age: 81
Discharge: HOME | End: 2021-10-18
Attending: SURGERY | Admitting: SURGERY
Payer: COMMERCIAL

## 2021-10-18 ENCOUNTER — ANESTHESIA (OUTPATIENT)
Dept: GASTROENTEROLOGY | Facility: CLINIC | Age: 81
End: 2021-10-18
Payer: COMMERCIAL

## 2021-10-18 VITALS
DIASTOLIC BLOOD PRESSURE: 81 MMHG | TEMPERATURE: 98.7 F | OXYGEN SATURATION: 99 % | HEIGHT: 61 IN | HEART RATE: 81 BPM | BODY MASS INDEX: 30.21 KG/M2 | WEIGHT: 160 LBS | RESPIRATION RATE: 16 BRPM | SYSTOLIC BLOOD PRESSURE: 153 MMHG

## 2021-10-18 DIAGNOSIS — K28.9 MARGINAL ULCER: Primary | ICD-10-CM

## 2021-10-18 DIAGNOSIS — K21.9 GASTROESOPHAGEAL REFLUX DISEASE WITHOUT ESOPHAGITIS: ICD-10-CM

## 2021-10-18 LAB — UPPER GI ENDOSCOPY: NORMAL

## 2021-10-18 PROCEDURE — 250N000011 HC RX IP 250 OP 636: Performed by: NURSE ANESTHETIST, CERTIFIED REGISTERED

## 2021-10-18 PROCEDURE — 88342 IMHCHEM/IMCYTCHM 1ST ANTB: CPT | Mod: TC | Performed by: SURGERY

## 2021-10-18 PROCEDURE — 250N000009 HC RX 250: Performed by: NURSE ANESTHETIST, CERTIFIED REGISTERED

## 2021-10-18 PROCEDURE — 370N000017 HC ANESTHESIA TECHNICAL FEE, PER MIN: Performed by: SURGERY

## 2021-10-18 PROCEDURE — 258N000003 HC RX IP 258 OP 636: Performed by: SURGERY

## 2021-10-18 PROCEDURE — 44361 SMALL BOWEL ENDOSCOPY/BIOPSY: CPT | Performed by: SURGERY

## 2021-10-18 PROCEDURE — 88305 TISSUE EXAM BY PATHOLOGIST: CPT | Mod: 26 | Performed by: PATHOLOGY

## 2021-10-18 PROCEDURE — 43239 EGD BIOPSY SINGLE/MULTIPLE: CPT | Performed by: SURGERY

## 2021-10-18 PROCEDURE — 43235 EGD DIAGNOSTIC BRUSH WASH: CPT | Performed by: SURGERY

## 2021-10-18 PROCEDURE — 88342 IMHCHEM/IMCYTCHM 1ST ANTB: CPT | Mod: 26 | Performed by: PATHOLOGY

## 2021-10-18 PROCEDURE — 250N000009 HC RX 250: Performed by: SURGERY

## 2021-10-18 RX ORDER — GLYCOPYRROLATE 0.2 MG/ML
INJECTION, SOLUTION INTRAMUSCULAR; INTRAVENOUS PRN
Status: DISCONTINUED | OUTPATIENT
Start: 2021-10-18 | End: 2021-10-18

## 2021-10-18 RX ORDER — PROPOFOL 10 MG/ML
INJECTION, EMULSION INTRAVENOUS CONTINUOUS PRN
Status: DISCONTINUED | OUTPATIENT
Start: 2021-10-18 | End: 2021-10-18

## 2021-10-18 RX ORDER — LIDOCAINE HYDROCHLORIDE 10 MG/ML
INJECTION, SOLUTION INFILTRATION; PERINEURAL PRN
Status: DISCONTINUED | OUTPATIENT
Start: 2021-10-18 | End: 2021-10-18

## 2021-10-18 RX ORDER — SUCRALFATE 1 G/1
1 TABLET ORAL
Qty: 42 TABLET | Refills: 0 | Status: SHIPPED | OUTPATIENT
Start: 2021-10-18 | End: 2021-10-18

## 2021-10-18 RX ORDER — LIDOCAINE 40 MG/G
CREAM TOPICAL
Status: DISCONTINUED | OUTPATIENT
Start: 2021-10-18 | End: 2021-10-18 | Stop reason: HOSPADM

## 2021-10-18 RX ORDER — SUCRALFATE 1 G/1
1 TABLET ORAL
Qty: 42 TABLET | Refills: 0 | Status: SHIPPED | OUTPATIENT
Start: 2021-10-18 | End: 2021-12-15

## 2021-10-18 RX ORDER — OMEPRAZOLE 20 MG/1
20 TABLET, DELAYED RELEASE ORAL 2 TIMES DAILY
Qty: 90 TABLET | Refills: 3 | Status: SHIPPED | OUTPATIENT
Start: 2021-10-18 | End: 2022-01-21 | Stop reason: DRUGHIGH

## 2021-10-18 RX ORDER — SODIUM CHLORIDE, SODIUM LACTATE, POTASSIUM CHLORIDE, CALCIUM CHLORIDE 600; 310; 30; 20 MG/100ML; MG/100ML; MG/100ML; MG/100ML
INJECTION, SOLUTION INTRAVENOUS CONTINUOUS
Status: DISCONTINUED | OUTPATIENT
Start: 2021-10-18 | End: 2021-10-18 | Stop reason: HOSPADM

## 2021-10-18 RX ADMIN — SODIUM CHLORIDE, POTASSIUM CHLORIDE, SODIUM LACTATE AND CALCIUM CHLORIDE 30 ML: 600; 310; 30; 20 INJECTION, SOLUTION INTRAVENOUS at 12:58

## 2021-10-18 RX ADMIN — LIDOCAINE HYDROCHLORIDE 10 ML: 10 INJECTION, SOLUTION INFILTRATION; PERINEURAL at 13:04

## 2021-10-18 RX ADMIN — TOPICAL ANESTHETIC 1 SPRAY: 200 SPRAY DENTAL; PERIODONTAL at 13:04

## 2021-10-18 RX ADMIN — PROPOFOL 200 MCG/KG/MIN: 10 INJECTION, EMULSION INTRAVENOUS at 13:04

## 2021-10-18 RX ADMIN — LIDOCAINE HYDROCHLORIDE 0.1 ML: 10 INJECTION, SOLUTION EPIDURAL; INFILTRATION; INTRACAUDAL; PERINEURAL at 12:58

## 2021-10-18 RX ADMIN — GLYCOPYRROLATE 0.2 MG: 0.2 INJECTION, SOLUTION INTRAMUSCULAR; INTRAVENOUS at 13:04

## 2021-10-18 ASSESSMENT — MIFFLIN-ST. JEOR: SCORE: 1299.14

## 2021-10-18 NOTE — INTERVAL H&P NOTE
I have reviewed the surgical (or preoperative) H&P that is linked to this encounter, and examined the patient. There are no significant changes    dysphagia.  WEight loss 30 lbs; currently on PPI; hx of RNY-GB.    Formerly [facility], [facility]1983 Galeano, DO

## 2021-10-18 NOTE — ANESTHESIA POSTPROCEDURE EVALUATION
Patient: Mann Escobar    Procedure: Procedure(s):  ESOPHAGOGASTRODUODENOSCOPY (EGD)       Diagnosis:Dysphagia, unspecified type [R13.10]  Diagnosis Additional Information: No value filed.    Anesthesia Type:  General    Note:  Disposition: Outpatient   Postop Pain Control: Uneventful            Sign Out: Well controlled pain   PONV: No   Neuro/Psych: Uneventful            Sign Out: Acceptable/Baseline neuro status   Airway/Respiratory: Uneventful            Sign Out: Acceptable/Baseline resp. status   CV/Hemodynamics: Uneventful            Sign Out: Acceptable CV status; No obvious hypovolemia; No obvious fluid overload   Other NRE: NONE   DID A NON-ROUTINE EVENT OCCUR? No           Last vitals:  Vitals Value Taken Time   BP     Temp     Pulse     Resp     SpO2         Electronically Signed By: Murphy Dc CRNA, GUNJAN LE  October 18, 2021  1:15 PM

## 2021-10-18 NOTE — LETTER
Mann Escobar  [address] [address] [facility] [address]  October 22, 2021    Dear Mann,   This letter is to inform you of the results of your pathology report on your upper endoscopy (EGD).    Your pathology report was:  Final Diagnosis   A(1). Gastric pouch, biopsy:  -Small bowel mucosa with mild villous blunting and acute inflammation and adjacent necroinflammatory debris.  -No viable gastric type mucosa identified.  -Immunohistochemical stains for H. pylori is negative.  -Negative for dysplasia or malignancy     Result above showed biopsy consistent with ulcer disease at the anastomosis. I recommend you take the medications prescribed and follow-up with your bariatric surgeon if your symptoms have not improved.    If you have any questions or concerns please do not hesitate to call my office at [phone].  Sincerely,     Formerly [facility] Galeano, DO  [facility]

## 2021-10-18 NOTE — ANESTHESIA CARE TRANSFER NOTE
Patient: Mann Escobar    Procedure: Procedure(s):  ESOPHAGOGASTRODUODENOSCOPY (EGD)       Diagnosis: Dysphagia, unspecified type [R13.10]  Diagnosis Additional Information: No value filed.    Anesthesia Type:   General     Note:    Oropharynx: oropharynx clear of all foreign objects and spontaneously breathing  Level of Consciousness: awake and drowsy  Oxygen Supplementation: room air    Independent Airway: airway patency satisfactory and stable  Dentition: dentition unchanged  Vital Signs Stable: post-procedure vital signs reviewed and stable  Report to RN Given: handoff report given  Patient transferred to: Phase II    Handoff Report: Identifed the Patient, Identified the Reponsible Provider, Reviewed the pertinent medical history, Discussed the surgical course, Reviewed Intra-OP anesthesia mangement and issues during anesthesia, Set expectations for post-procedure period and Allowed opportunity for questions and acknowledgement of understanding      Vitals:  Vitals Value Taken Time   BP     Temp     Pulse     Resp     SpO2         Electronically Signed By: Murphy Dc CRNA, APRN CRNA  October 18, 2021  1:14 PM

## 2021-10-21 LAB
PATH REPORT.COMMENTS IMP SPEC: NORMAL
PATH REPORT.COMMENTS IMP SPEC: NORMAL
PATH REPORT.FINAL DX SPEC: NORMAL
PATH REPORT.GROSS SPEC: NORMAL
PATH REPORT.MICROSCOPIC SPEC OTHER STN: NORMAL
PATH REPORT.MICROSCOPIC SPEC OTHER STN: NORMAL
PATH REPORT.RELEVANT HX SPEC: NORMAL
PHOTO IMAGE: NORMAL

## 2021-11-16 ENCOUNTER — OFFICE VISIT (OUTPATIENT)
Dept: AUDIOLOGY | Facility: CLINIC | Age: 81
End: 2021-11-16
Payer: COMMERCIAL

## 2021-11-16 DIAGNOSIS — H90.3 SENSORINEURAL HEARING LOSS, BILATERAL: Primary | ICD-10-CM

## 2021-11-16 PROCEDURE — 92557 COMPREHENSIVE HEARING TEST: CPT | Performed by: AUDIOLOGIST

## 2021-11-16 PROCEDURE — 92550 TYMPANOMETRY & REFLEX THRESH: CPT | Performed by: AUDIOLOGIST

## 2021-11-16 PROCEDURE — 99207 PR NO CHARGE LOS: CPT | Performed by: AUDIOLOGIST

## 2021-11-16 NOTE — PROGRESS NOTES
AUDIOLOGY REPORT    SUBJECTIVE:  Mann Escobar is a 81 year old male who was seen at [facility] for an audiologic evaluation, referred by [facility].  No previous audiograms are available at today's appointment.  The patient is here today with his spouse reports difficulty hearing his TV, in groups and with soft voices. He reports a history of bilateral tinnitus. The patient denies bilateral otalgia and bilateral drainage.     OBJECTIVE:    Otoscopic exam indicates ears are clear of cerumen bilaterally       Pure Tone Thresholds assessed using conventional audiometry with good  reliability from 250-8000 Hz bilaterally using circumaural headphones     RIGHT:  borderline-normal, mild, moderate and severe sensorineural hearing loss    LEFT:    borderline-normal, mild, moderate and severe sensorineural hearing loss    Tympanogram:    RIGHT: normal eardrum mobility    LEFT:   normal eardrum mobility    Reflexes (reported by stimulus ear 1000 Hz):     RIGHT: Ipsilateral is absent    RIGHT: Contralateral is absent    LEFT: Ipsilateral is absent    LEFT: Contralateral is absent    Speech Reception Threshold:    RIGHT: 30 dB HL    LEFT:   30 dB HL  Word Recognition Score:     RIGHT: 84% at 70 dB HL using NU-6 recorded word list.    LEFT:   84% at 70 dB HL using NU-6 recorded word list.      ASSESSMENT:   Borderline normal hearing through 1000 Hz sloping to a mild to severe sensorineural hearing loss bilaterally.     Today s results were discussed with the patient and his spouse in detail.     PLAN:  Patient was counseled regarding hearing loss and impact on communication. Patient is a good candidate for amplification at this time.  Patient will return to clinic for a hearing aid consultation.  Please call this clinic with questions regarding these results or recommendations.        Ksenia Moore M.A. PATRICE-AAA  Clinical audiologist [place] # [id number]  11/16/2021

## 2021-12-14 DIAGNOSIS — K28.9 MARGINAL ULCER: ICD-10-CM

## 2021-12-14 NOTE — TELEPHONE ENCOUNTER
Pending Prescriptions:                       Disp   Refills    sucralfate (CARAFATE) 1 GM tablet         42 tab*0            Sig: Take 1 tablet (1 g) by mouth 3 times daily    Future Office Visit:   Next 5 appointments (look out 90 days)    Dec 29, 2021 11:20 AM  (Arrive by 11:00 AM)  Provider Visit with GUNJAN Werner CNP  [facility] ([facility] )  Arrive at: Clinic A [facility] [facility] [address] [phone]           Requesting refill before [facility]

## 2021-12-14 NOTE — TELEPHONE ENCOUNTER
Pending Prescriptions:                       Disp   Refills    sucralfate (CARAFATE) 1 GM tablet          42 tab*0        Sig: Take 1 tablet (1 g) by mouth 3 times daily        Routing refill request to provider for review/approval because:  Passes protocol, but last refilled by hospital provider for short refill, so forwarding to PCP to check for appropriateness.  Patient has upcoming appt.       Last Written Prescription Date:  10/18/21  Last Fill Quantity: 42,  # refills: 0   Last office visit: 9/29/2021 with prescribing provider.   Future Office Visit:   Next 5 appointments (look out 90 days)      Dec 29, 2021 11:20 AM  (Arrive by 11:00 AM)  Provider Visit with GUNJAN Werner CNP  [facility] ([facility] )  Arrive at: Clinic A [address] [facility] [address]  [phone]             Tatum Garza RN

## 2021-12-15 RX ORDER — SUCRALFATE 1 G/1
1 TABLET ORAL
Qty: 42 TABLET | Refills: 0 | OUTPATIENT
Start: 2021-12-15

## 2021-12-15 RX ORDER — SUCRALFATE 1 G/1
1 TABLET ORAL
Qty: 42 TABLET | Refills: 0 | Status: SHIPPED | OUTPATIENT
Start: 2021-12-15 | End: 2021-12-21

## 2021-12-15 NOTE — TELEPHONE ENCOUNTER
Called patient to deliver message. Patient says he is in agony and pain from ulcer and doesn't have any medicine for pain. All patient has is Prilosec.Patient cannot get appointment idania Lucas until the 29th. Juanita Hylton on 12/15/2021 at 2:40 PM

## 2021-12-21 ENCOUNTER — OFFICE VISIT (OUTPATIENT)
Dept: FAMILY MEDICINE | Facility: CLINIC | Age: 81
End: 2021-12-21
Payer: COMMERCIAL

## 2021-12-21 VITALS
HEART RATE: 69 BPM | DIASTOLIC BLOOD PRESSURE: 60 MMHG | OXYGEN SATURATION: 99 % | RESPIRATION RATE: 16 BRPM | SYSTOLIC BLOOD PRESSURE: 114 MMHG | BODY MASS INDEX: 30.73 KG/M2 | TEMPERATURE: 98.8 F | WEIGHT: 162.8 LBS | HEIGHT: 61 IN

## 2021-12-21 DIAGNOSIS — K28.9 MARGINAL ULCER: Primary | ICD-10-CM

## 2021-12-21 DIAGNOSIS — R13.10 DYSPHAGIA, UNSPECIFIED TYPE: ICD-10-CM

## 2021-12-21 PROCEDURE — 99214 OFFICE O/P EST MOD 30 MIN: CPT | Performed by: FAMILY MEDICINE

## 2021-12-21 RX ORDER — SUCRALFATE 1 G/1
1 TABLET ORAL 4 TIMES DAILY
Qty: 56 TABLET | Refills: 0 | Status: SHIPPED | OUTPATIENT
Start: 2021-12-21 | End: 2022-01-27

## 2021-12-21 ASSESSMENT — MIFFLIN-ST. JEOR: SCORE: 1306.84

## 2021-12-21 NOTE — PATIENT INSTRUCTIONS
Continue omeprazole for now.    Carafate tablet 30 minutes before meals and at bedtime.    Schedule gastroenterology consult.      Patient Education     Lifestyle Changes for Controlling GERD  When you have GERD, stomach acid feels as if it s backing up toward your mouth. Making lifestyle changes can often improve your symptoms. This is true if you take medicine to control your GERD or not. Talk with your healthcare provider about the following suggestions. They may help you get relief from your symptoms.  Raise your head    Reflux is more likely to happen when you re lying down flat. That's because stomach fluid can flow backward more easily. Raising the head of your bed 4 to 6 inches can help. To do this:    Slide blocks or books under the legs at the head of your bed. Or put a wedge under the mattress. Many [employer] stores can make a wedge for you. The wedge should go from your waist to the top of your head.    Don t just prop your head up on a few pillows. This increases pressure on your stomach. It can make GERD worse.  Watch your eating habits  Certain foods may increase the acid in your stomach. Or they may relax the lower esophageal sphincter. This makes GERD more likely. It s best to avoid the following if they cause you symptoms:    Coffee, tea, and carbonated drinks (with and without caffeine)    Fatty, fried, or spicy food    Mint, chocolate, onions, tomatoes, and citrus    Peppermint    Any other foods that seem to irritate your stomach or cause you pain  Relieve the pressure  Tips include the following:    Eat smaller meals, even if you have to eat more often.    Don t lie down right after you eat. Wait a few hours for your stomach to empty.    Don't wear tight belts or tight-fitting clothes.    Lose any extra weight.  Tobacco and alcohol  Don't smoke tobacco or drink alcohol. They can make GERD symptoms worse.  [employer] last reviewed this educational content on 6/1/2019 2000-2021 [employer]  [employer]. All rights reserved. This information is not intended as a substitute for professional medical care. Always follow your healthcare professional's instructions.           Patient Education     Tips to Control Acid Reflux    To control acid reflux, you ll need to make some basic diet and lifestyle changes. The simple steps outlined below may be all you ll need to ease discomfort.   Watch what you eat    Don't have fatty foods or spicy foods.    Eat fewer acidic foods, such as citrus and tomato-based foods. These can increase symptoms.    Limit drinking alcohol, caffeine, and fizzy beverages. All increase acid reflux.    Try limiting chocolate, peppermint, and spearmint. These can make acid reflux worse in some people.    Watch when you eat    Don't lie down for 3 hours after eating.    Don't snack before going to bed.    Raise your head  Raising your head and upper body by 4 to 6 inches helps limit reflux when you re lying down. Put blocks under the head of your bed frame or a wedge under your mattress to raise it.   Other changes    Lose weight, if you need to    Don t exercise near bedtime    Don't wear tight-fitting clothes    Limit aspirin and ibuprofen    Stop smoking    [employer] last reviewed this educational content on 6/1/2019 2000-2021 [employer]. All rights reserved. This information is not intended as a substitute for professional medical care. Always follow your healthcare professional's instructions.

## 2021-12-21 NOTE — PROGRESS NOTES
Assessment & Plan     Marginal ulcer  - Adult Gastro Ref - Consult Only  - sucralfate (CARAFATE) 1 GM tablet  Dispense: 56 tablet; Refill: 0    Dysphagia, unspecified type  - Adult Gastro Ref - Consult Only    Persistent epigastric symptoms from a diagnosed ucler, and still with dysphagia with certain food.  Been on PPI for a while now. Continue this.  Modified sucralfate administration.  Advised lifestyle modifications.  Consult GI due to no improvement.  Return precautions discussed and given to patient.  Reasons to go to ER discussed in detail with patient.  [id number]}     Patient Instructions   Continue omeprazole for now.    Carafate tablet 30 minutes before meals and at bedtime.    Schedule gastroenterology consult.      Patient Education     Lifestyle Changes for Controlling GERD  When you have GERD, stomach acid feels as if it s backing up toward your mouth. Making lifestyle changes can often improve your symptoms. This is true if you take medicine to control your GERD or not. Talk with your healthcare provider about the following suggestions. They may help you get relief from your symptoms.  Raise your head    Reflux is more likely to happen when you re lying down flat. That's because stomach fluid can flow backward more easily. Raising the head of your bed 4 to 6 inches can help. To do this:    Slide blocks or books under the legs at the head of your bed. Or put a wedge under the mattress. Many e[employer]tab stores can make a wedge for you. The wedge should go from your waist to the top of your head.    Don t just prop your head up on a few pillows. This increases pressure on your stomach. It can make GERD worse.  Watch your eating habits  Certain foods may increase the acid in your stomach. Or they may relax the lower esophageal sphincter. This makes GERD more likely. It s best to avoid the following if they cause you symptoms:    Coffee, tea, and carbonated drinks (with and without caffeine)    Fatty, fried, or  spicy food    Mint, chocolate, onions, tomatoes, and citrus    Peppermint    Any other foods that seem to irritate your stomach or cause you pain  Relieve the pressure  Tips include the following:    Eat smaller meals, even if you have to eat more often.    Don t lie down right after you eat. Wait a few hours for your stomach to empty.    Don't wear tight belts or tight-fitting clothes.    Lose any extra weight.  Tobacco and alcohol  Don't smoke tobacco or drink alcohol. They can make GERD symptoms worse.  [employer] last reviewed this educational content on 6/1/2019 2000-2021 [employer]. All rights reserved. This information is not intended as a substitute for professional medical care. Always follow your healthcare professional's instructions.           Patient Education     Tips to Control Acid Reflux    To control acid reflux, you ll need to make some basic diet and lifestyle changes. The simple steps outlined below may be all you ll need to ease discomfort.   Watch what you eat    Don't have fatty foods or spicy foods.    Eat fewer acidic foods, such as citrus and tomato-based foods. These can increase symptoms.    Limit drinking alcohol, caffeine, and fizzy beverages. All increase acid reflux.    Try limiting chocolate, peppermint, and spearmint. These can make acid reflux worse in some people.    Watch when you eat    Don't lie down for 3 hours after eating.    Don't snack before going to bed.    Raise your head  Raising your head and upper body by 4 to 6 inches helps limit reflux when you re lying down. Put blocks under the head of your bed frame or a wedge under your mattress to raise it.   Other changes    Lose weight, if you need to    Don t exercise near bedtime    Don't wear tight-fitting clothes    Limit aspirin and ibuprofen    Stop smoking    [employer] last reviewed this educational content on 6/1/2019 2000-2021 [employer]. All rights reserved. This information is not  "intended as a substitute for professional medical care. Always follow your healthcare professional's instructions.               Return in about 1 month (around 1/21/2022) for consult [facility] gastroenterology.    Anson Thomas MD  [facility]    Marcus Darnell is a 81 year old who presents for the following health issues   Chief Complaint   Patient presents with     Ulcer     Pt here for a f/u on stomach ulcer he was tx for in October, not any better.       HPI   GERD/Heartburn  Onset/Duration: past few months, has not gotten any better   Description: Pt feels pain in his chest area, feels like something is stuck, gerd symptoms  Intensity: severe when it happens  Progression of Symptoms: worsening and constant  Accompanying Signs & Symptoms:  Does it feel like food gets stuck or trouble swallowing: YES- anything gets stuck, even water  Nausea: no  Vomiting (bloody?): YES- dry heaves  Abdominal Pain: very seldom, more in his chest  Black-Tarry stools: no  Bloody stools: no  Foul taste in mouth  History:  Previous similar episodes: YES  Previous ulcers: YES- years ago  Precipitating factors:   Caffeine use: YES- 1/2 cup of coffee in the morning   Alcohol use: no  NSAID/Aspirin use: no  Tobacco use: no  Worse with spicy foods and no particular food or drink.  Alleviating factors: None  Therapies tried and outcome:             Lifestyle changes: None            Medications: Omeprazole (Prilosec) and carafate, has not noticed much difference  EGD done 10/18/21 - ulcer found then - was given omeprazole 20 mg BID.  Also on carafate 3x a day.    Review of Systems   Constitutional, HEENT, cardiovascular, pulmonary, GI, , musculoskeletal, neuro, skin, endocrine and psych systems are negative, except as otherwise noted.      Objective    /60   Pulse 69   Temp 98.8  F (37.1  C) (Tympanic)   Resp 16   Ht 1.549 m (5' 1\")   Wt 73.8 kg (162 lb 12.8 oz)   SpO2 99%   BMI 30.76 kg/m    Body " mass index is 30.76 kg/m .  Physical Exam   GENERAL: borderline obese , alert and no distress  EYES: no icterus  RESP: lungs clear to auscultation - no rales, no rhonchi, no wheezes  CV: regular rates and rhythm, normal S1 S2, no S3 or S4 and no murmur, no click or rub  ABD: rounded abdomen, no skin changes, soft,  no TTP, no  palpable mass, no guarding, no Jackson's sign, no palpable organomegaly  MS: extremities- no gross deformities noted, no edema  SKIN: good turgor, no jaundice or rash    Admission on 10/18/2021, Discharged on 10/18/2021   Component Date Value Ref Range Status     Case Report 10/18/2021    Final                    Value:Surgical Pathology Report                         Case: [id number]                                  Authorizing Provider:  Anju Galeano MD          Collected:           10/18/2021 01:10 PM          Ordering Location:     [facility]   Received:            10/18/2021 01:27 PM                                 [place]                                                                      Pathologist:           Martha Lara MD PhD                                                      Specimen:    Gastric Ulcer, Gastric pouch biopsy                                                         Final Diagnosis 10/18/2021    Final                    Value:This result contains rich text formatting which cannot be displayed here.     Clinical Information 10/18/2021    Final                    Value:This result contains rich text formatting which cannot be displayed here.     Gross Description 10/18/2021    Final                    Value:This result contains rich text formatting which cannot be displayed here.     Microscopic Description 10/18/2021    Final                    Value:This result contains rich text formatting which cannot be displayed here.     Special Stains 10/18/2021    Final                    Value:This result contains rich text formatting which cannot be  displayed here.     Performing Labs 10/18/2021    Final                    Value:This result contains rich text formatting which cannot be displayed here.     Upper GI Endoscopy 10/18/2021    Final                    Value:_______________________________________________________________________________  Patient Name: Mann Escobar       Procedure Date: 10/18/2021 12:55 PM  MRN: [id number]                       YOB: 1940  Admit Type: Outpatient                Age: 80  Gender: Male                          Attending MD: Anju Galeano MD  Total Sedation Time:                    _______________________________________________________________________________     Procedure:           Upper GI endoscopy  Indications:         Dyspepsia, Weight loss, Status post Favian-en-Y  Providers:           Anju Galeano MD  Referring MD:        Shayy Ramírez NP  Medicines:           Monitored Anesthesia Care  Complications:       No immediate complications.  _______________________________________________________________________________  Procedure:           After obtaining informed consent, the endoscope was                        passed under direct vision. Throughout the procedure,                                                  the patient's blood pressure, pulse, and oxygen                        saturations were monitored continuously. The Endoscope                        was introduced through the mouth, and advanced to the                        mid-jejunum. The upper GI endoscopy was accomplished                        without difficulty. The patient tolerated the procedure                        well.                                                                                   Findings:       The examined esophagus was normal.       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 The gastrojejunal        anastomosis was characterized by ulceration. This was traversed. The         pouch-to-jejunum limb was characterized by ulceration. Biopsies were        taken with a cold forceps for histology and Helicobacter pylori testing.        Verification of patient identification for the specimen was done by the        physician, nurse and technician using the patient's name, birth date and                                  medical record number. Estimated blood loss was minimal.       The examined Emma limb was otherwise normal.                                                                                   Impression:          - Normal esophagus.                       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 Biopsied.                       - Normal examined jejunum.  Recommendation:      - Patient has a contact number available for                        emergencies. The signs and symptoms of potential delayed                        complications were discussed with the patient. Return to                        normal activities tomorrow. Written discharge                        instructions were provided to the patient.                       - Resume previous diet.                       - Use Prilosec (omeprazole) 20 mg PO BID for 3 months.                       - Use sucralfate ta                          blets 1 gram PO QID for 2 weeks.                                                                                     Electronically Signed by Dr. Galeano  ________________  Anju Galeano MD  10/18/2021 1:22:00 PM  I was physically present for the entire viewing portion of the exam.  [id number] Galeano MD  Number of Addenda: 0    Note Initiated On: 10/18/2021 12:55 PM  Scope In:  Scope Out:

## 2021-12-22 ENCOUNTER — OFFICE VISIT (OUTPATIENT)
Dept: AUDIOLOGY | Facility: CLINIC | Age: 81
End: 2021-12-22
Payer: COMMERCIAL

## 2021-12-22 ENCOUNTER — TELEPHONE (OUTPATIENT)
Dept: GASTROENTEROLOGY | Facility: CLINIC | Age: 81
End: 2021-12-22
Payer: COMMERCIAL

## 2021-12-22 DIAGNOSIS — H90.3 SENSORINEURAL HEARING LOSS, BILATERAL: Primary | ICD-10-CM

## 2021-12-22 PROCEDURE — 99207 PR NO CHARGE LOS: CPT | Performed by: AUDIOLOGIST

## 2021-12-22 NOTE — TELEPHONE ENCOUNTER
[facility] Call Center    Phone Message    May a detailed message be left on voicemail: yes     Reason for Call: Other: Patient is being referred urgently, 3-5 days, for dysphagia and a marginal ulcer. Patient is not experiencing weight loss, but first available is 30+ days out. Please review per scheduling guidelines. Thanks!      Action Taken: Message routed to:  [facility] and [facility]    Travel Screening: Not Applicable

## 2021-12-22 NOTE — PROGRESS NOTES
AUDIOLOGY REPORT    SUBJECTIVE: Mann Escobar is a 81 year old male was seen in the Audiology Clinic at  [facility] on 12/22/21 to discuss concerns with hearing and functional communication difficulties. The patient was accompanied by their wife. Mann has been seen previously on 11/16/2021, and results revealed a normal sloping to severe sensorineural hearing loss bilaterally.  Mann is being seen today through  [facility] authorization.  Mann notes difficulty with communication in a variety of listening situations.    OBJECTIVE:    Patient is a hearing aid candidate. Patient would like to move forward with a hearing aid evaluation today. Therefore, the patient was presented with different options for amplification to help aid in communication. Discussed styles, levels of technology and monaural vs. binaural fitting.     The hearing aid(s) mutually chosen were:  Binaural: Phonak Audeo P90-R  COLOR: P5  Champagne  BATTERY SIZE: rechargeable  CANAL/ LENGTH: #0 M 4.0        ASSESSMENT:   No diagnosis found.    Reviewed purchase information and warranty information with patient. The 45 day trial period was explained to patient. The patient was given a copy of the [place] Department of Health consumer brochure on purchasing hearing instruments. Patient risk factors have been provided to the patient in writing prior to the sale of the hearing aid per FDA regulation. The risk factors are also available in the User Instructional Booklet to be presented on the day of the hearing aid fitting. Hearing aid evaluation completed and request submitted tot [facility].  Hearing aid evaluation completed.    PLAN: Mann is scheduled to return in 2-3 weeks for a hearing aid fitting and programming [facility]  Please contact this clinic with any questions or concerns.      Ksenia IBRAHIM[facility], #[id number]

## 2021-12-30 ENCOUNTER — PRE VISIT (OUTPATIENT)
Dept: GASTROENTEROLOGY | Facility: CLINIC | Age: 81
End: 2021-12-30

## 2021-12-30 ENCOUNTER — VIRTUAL VISIT (OUTPATIENT)
Dept: GASTROENTEROLOGY | Facility: CLINIC | Age: 81
End: 2021-12-30
Attending: FAMILY MEDICINE
Payer: COMMERCIAL

## 2021-12-30 DIAGNOSIS — Z98.84 HISTORY OF ROUX-EN-Y GASTRIC BYPASS: ICD-10-CM

## 2021-12-30 DIAGNOSIS — K28.9 MARGINAL ULCER: ICD-10-CM

## 2021-12-30 DIAGNOSIS — R11.10 RETCHING: Primary | ICD-10-CM

## 2021-12-30 PROCEDURE — 99205 OFFICE O/P NEW HI 60 MIN: CPT | Mod: 95 | Performed by: INTERNAL MEDICINE

## 2021-12-30 RX ORDER — OMEPRAZOLE 40 MG/1
40 CAPSULE, DELAYED RELEASE ORAL 2 TIMES DAILY
Qty: 180 CAPSULE | Refills: 3 | Status: SHIPPED | OUTPATIENT
Start: 2021-12-30 | End: 2022-01-01

## 2021-12-30 NOTE — TELEPHONE ENCOUNTER
REFERRAL INFORMATION:    Referring Provider:  Dr. Anson Thomas    Referring Clinic:  [facility]     Reason for Visit/Diagnosis: Marginal ulcer, Dysphagia     FUTURE VISIT INFORMATION:    Appointment Date: 12/30/2021    Appointment Time: 3:40 PM      NOTES STATUS DETAILS   OFFICE NOTE from Referring Provider Internal 12/21/2021 Office visit with Dr. Thomas     OFFICE NOTE from Other Specialist Internal 9/29/2021, 3/15/2021 Office visit with GUNJAN Werner CNP ([facility])     HOSPITAL DISCHARGE SUMMARY/  ED VISITS Internal 10/18/2021 ([facility])    OPERATIVE REPORT N/A    MEDICATION LIST Internal         ENDOSCOPY  Internal/ Received  EGD: 10/18/2021, 2/6/18, 12/19/17  EGD: 8/20/16 ([facility])     COLONOSCOPY Internal/  Received  12/19/17, 9/29/11 8/20/16 ([facility])     ERCP N/A    EUS N/A    STOOL TESTING Internal 11/21/17   PERTINENT LABS Internal    PATHOLOGY REPORTS (RELATED) Internal 10/18/2021, 12/19/17   IMAGING (CT, MRI, EGD, MRCP, Small Bowel Follow Through/SBT, MR/CT Enterography) Internal US Abdomen: 2/22/2021, 11/17/17

## 2021-12-30 NOTE — LETTER
"    12/30/2021         RE: Mann Escobar  [address]  [facility] [address]        Dear Colleague,    Thank you for referring your patient, Mann Escobar, to the [facility]STRO[facility] [place]. Please see a copy of my visit note below.    Gastroenterology Visit for: Mann Escobar 1940   MRN: [id number]     Reason for Visit:  chief complaint    Referred by: Martha  / [facility] [address]  Patient Care Team:  Shayy Ramírez APRN CNP as PCP - General (Family Practice)  Shayy Ramírez APRN CNP as Assigned PCP  Yoon Alicea MD as MD (Nephrology)  Yoon Alicea MD as Assigned Nephrology Provider  Anne Marie Andrew MD as Assigned Cancer Care Provider  Anson Thomas MD as MD (Family Medicine)  Sandor Britton DO as MD (Gastroenterology)    History of Present Illness:   Mann Escobar is 81 year old male with soheila en y gastric bypass, marginal ulcer, reflux, nephrolithiasis, thalassemia minor, arthritis, htn who is presenting as a new patient in consultation at the request of Dr. Thomas with a chief complaint of marginal ulcer, and dry heaves/retching.  ---------------------------------------------------------------  Mann Escobar states he was diagnosed with an ulcer and at the time he would take a sip of water and get dry heaves. He wasn't vomiting. There was pain associated. He would feel water sitting in his chest. Symptoms had been ongoing for a month prior to endoscopy. He had the same symptoms yesterday evening. He feels that he can't eat foods currently. He is eating soup instead. Symptom is triggered by water. He feels that he could eat solid foods such as steak.    He has a \"terrible taste\" in his mouth. He doesn't get heartburn but is uncertain what heartburn feels like. He states he doesn't taste food very well: states this has been ongoing " for a long time. Has seen dentist.     Patient gets colonoscopy every 5 years due to family history.    ---------------------------------------------------------------     Mann Escobar denies dysphagia,  heartburn/reflux, nausea, vomiting, early satiety, abdominal pain, abdominal distension/bloating, diarrhea, constipation, hematochezia, or melena. No unintentional weight loss.     Family history of colon cancer: brother around age 55.   Wt Readings from Last 5 Encounters:   12/21/21 73.8 kg (162 lb 12.8 oz)   10/18/21 72.6 kg (160 lb)   09/29/21 73.9 kg (163 lb)   06/16/21 75.5 kg (166 lb 8 oz)   03/15/21 88.5 kg (195 lb)        Esophageal Questionnaire(s)    BEDQ Questionnaire  No flowsheet data found.  No flowsheet data found.    Eckardt Questionnaire  No flowsheet data found.    Promis 10 Questionnaire  No flowsheet data found.        STUDIES & PROCEDURES:    EGD:   Date: 10/18/21  Impression:   The examined esophagus was normal.        Evidence of a Emma-en-Y gastrojejunostomy was found. The gastrojejunal        anastomosis was characterized by ulceration. This was traversed. The        pouch-to-jejunum limb was characterized by ulceration. Biopsies were        taken with a cold forceps for histology and Helicobacter pylori testing.        Verification of patient identification for the specimen was done by the        physician, nurse and technician using the patient's name, birth date and        medical record number. Estimated blood loss was minimal.        The examined Emma limb was otherwise normal.                                                                                     Impression:          - Normal esophagus.                        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 Biopsied.                        - Normal examined jejunum.   Pathology Report:  A(1). Gastric  pouch, biopsy:  -Small bowel mucosa with mild villous blunting and acute inflammation and adjacent necroinflammatory debris.  -No viable gastric type mucosa identified.  -Immunohistochemical stains for H. pylori is negative.  -Negative for dysplasia or malignancy    Colonoscopy:  Date: 12/19/2017  Impression:  Findings:        The perianal and digital rectal examinations were normal.        A few small-mouthed diverticula were found in the sigmoid colon.        The exam was otherwise normal throughout the examined colon.        The retroflexed view of the distal rectum and anal verge was normal and        showed no anal or rectal abnormalities.   Pathology Report:     EndoFLIP directed at the UES or LES (8cm (EF-325) balloon length or 16cm (EF-322) balloon length):   Date:  8cm balloon  Balloon inflation Balloon pressure CSA (mm^2) DI (mm^2/mmHg) Dmin (mm) Compliance   20 (ladmark ID)        30        40        50           16cm balloon  Balloon inflation Balloon pressure CSA (mm^2) DI (mm^2/mmHg) Dmin (mm) Compliance   30 (ladmark ID)        40        50        60        70           High Resolution Manometry:  Date:  Impression:    PH/Impedance:  Date:  Impression:     Bravo:  48 or 96hr  Date:  Impression:    CT:  Date:  Impression:    Esophagram:  Date:  Impression:     Prior medical records were reviewed including, but not limited to, notes from referring providers, lab work, radiographic tests, and other diagnostic tests. Pertinent results were summarized above.     History     Past Medical History:   Diagnosis Date     Arthritis of ankle, left 7/24/2020     Back pain     WITH BILATERAL LEG PAIN AND NUMBNESS OF BOTH FEET     Gastro-oesophageal reflux disease     REFLUX     Hypertension      Hypopotassemia      Internal hemorrhoids      Iron deficiency anaemia      Leg edema      Morbid obesity 9/12/2005     Morbid obesity (H)      Osteoarthrosis      Scoliosis      Thalassemia minor        Past Surgical  History:   Procedure Laterality Date     CATARACT IOL, RT/LT  2008/    Cataract IOL RT/LT     COLONOSCOPY  2009/07    polyps removed repeat 5 yrs, tubular adenoma     COLONOSCOPY  9/29/2011    Procedure:COLONOSCOPY; Colonoscopy with hemorrhoid banding; Surgeon:JEREMY GARCIA; Location:[facility]     COLONOSCOPY N/A 12/19/2017    Procedure: COLONOSCOPY;  colonoscopy, gastroscopy;  Surgeon: Edmar Isaacs MD;  Location: [facility]     DECOMPRESSION LUMBAR MINIMALLY INVASIVE TWO LEVELS  5/2/2012    Procedure:DECOMPRESSION LUMBAR MINIMALLY INVASIVE TWO LEVELS; Surgeon:LOTTIE MITCHELL; [facility]     ESOPHAGOSCOPY, GASTROSCOPY, DUODENOSCOPY (EGD), COMBINED N/A 2/6/2018    Procedure: COMBINED ESOPHAGOSCOPY, GASTROSCOPY, DUODENOSCOPY (EGD);  gastroscopy;  Surgeon: Edmar Isaacs MD;  Location: [facility]     ESOPHAGOSCOPY, GASTROSCOPY, DUODENOSCOPY (EGD), COMBINED N/A 10/18/2021    Procedure: ESOPHAGOGASTRODUODENOSCOPY (EGD);  Surgeon: Anju Galeano MD;  Location: [facility]     FUSION SPINE POSTERIOR MINIMALLY INVASIVE ONE LEVEL  5/2/2012    Procedure:FUSION SPINE POSTERIOR MINIMALLY INVASIVE ONE LEVEL; Minimal Access Spinal Technology Transformaninal Lumbar Interbody Fusion L4-5, Decompression L3-5; Surgeon:LOTTIE MITCHELL; [facility]     HC REMOVAL OF HYDROCELE,TUNICA,UNILAT  2006    bilateral     ORTHOPEDIC SURGERY  2000    Lf knee replacement     ORTHOPEDIC SURGERY  2002, 2010    Rt replacement     ORTHOPEDIC SURGERY  2005    Left ankle fused     RECONSTRUCT ANKLE Right 7/23/2020    Procedure: Ankle RECONSTRUCTIve Arthroplasty;  Surgeon: Luke Powers DPM;  Location: [facility]     SURGICAL HISTORY OF -       Cysto     SURGICAL HISTORY OF -   2002    Percutaneous kidney stone removal     SURGICAL HISTORY OF -       ureteral stent removal     SURGICAL HISTORY OF -   2005    bariatric surgery-Favian-en-Y     SURGICAL HISTORY OF -   2008    carpal tunnel surgery rt wrist       Social History     Socioeconomic History      Marital status:      Spouse name: Not on file     Number of children: Not on file     Years of education: Not on file     Highest education level: Not on file   Occupational History     Not on file   Tobacco Use     Smoking status: Former Smoker     Packs/day: 0.50     Years: 7.00     Pack years: 3.50     Types: Cigarettes     Quit date: 1962     Years since quittin.0     Smokeless tobacco: Never Used   Vaping Use     Vaping Use: Never used   Substance and Sexual Activity     Alcohol use: Yes     Comment: one every other day. Rarely     Drug use: No     Sexual activity: Yes     Partners: Female   Other Topics Concern      Service Yes     Blood Transfusions No     Comment: ???     Caffeine Concern No     Occupational Exposure No     Hobby Hazards No     Sleep Concern No     Stress Concern No     Weight Concern No     Special Diet No     Back Care No     Exercise Not Asked     Bike Helmet Not Asked     Seat Belt Yes     Self-Exams No     Parent/sibling w/ CABG, MI or angioplasty before 65F 55M? No   Social History Narrative     Not on file     Social Determinants of Health     Financial Resource Strain: Not on file   Food Insecurity: Not on file   Transportation Needs: Not on file   Physical Activity: Not on file   Stress: Not on file   Social Connections: Not on file   Intimate Partner Violence: Not on file   Housing Stability: Not on file       Family History   Problem Relation Age of Onset     Diabetes Brother      Obesity Brother      Cerebrovascular Disease Paternal Grandfather      Prostate Cancer Father      Cancer Father         bone     Diabetes Father      Heart Disease Mother         CHF     Cerebrovascular Disease Mother      Hypertension Mother      Cancer Mother         tumor in stomach     Cancer - colorectal Mother      Heart Disease Maternal Grandmother         CHF     Diabetes Paternal Grandmother      Breast Cancer Sister      Genitourinary Problems Son         Kidney  stones     Cancer Brother      Cancer - colorectal Brother      Diabetes Brother      Family history reviewed and edited as appropriate    Medications and Allergies:     Outpatient Encounter Medications as of 12/30/2021   Medication Sig Dispense Refill     acetaminophen (TYLENOL) 325 MG tablet Take 3 tablets (975 mg) by mouth 3 times daily (Patient taking differently: Take 975 mg by mouth 2 times daily as needed ) 120 tablet 0     amLODIPine (NORVASC) 10 MG tablet TAKE 1 TABLET DAILY 90 tablet 3     fluticasone (FLONASE) 50 MCG/ACT nasal spray Spray 1 spray into both nostrils daily 16 g 1     furosemide (LASIX) 40 MG tablet Take 1 tablet (40 mg) by mouth daily (Patient taking differently: Take 40 mg by mouth daily Takes PRN) 30 tablet 5     HCA VITAMIN B12 500 MCG OR TABS 2 tablets daily       MULTIVITAMIN OR one daily       omeprazole (PRILOSEC OTC) 20 MG EC tablet Take 1 tablet (20 mg) by mouth 2 times daily 90 tablet 3     omeprazole (PRILOSEC) 20 MG DR capsule Take 1 capsule (20 mg) by mouth daily 90 capsule 3     omeprazole (PRILOSEC) 40 MG DR capsule Take 1 capsule (40 mg) by mouth 2 times daily 180 capsule 3     OVER-THE-COUNTER Elderberry 50mg supplement, one daily       oxyCODONE (ROXICODONE) 5 MG tablet Take 1 tablet (5 mg) by mouth 2 times daily as needed for severe pain This is a 90 day supply 180 tablet 0     sucralfate (CARAFATE) 1 GM tablet Take 1 tablet (1 g) by mouth 4 times daily Take 30 minutes before meals and at bedtime 56 tablet 0     terazosin (HYTRIN) 5 MG capsule TAKE 1 CAPSULE AT BEDTIME 90 capsule 3     Turmeric 500 MG CAPS        VIACTIV OR With D 1000mg calcium       vitamin B6 (VITAMIN B-6) 50 MG tablet Take 1 tablet (50 mg) by mouth 2 times daily 60 tablet 11     zinc gluconate 50 MG tablet Take 50 mg by mouth daily       potassium chloride ER (KLOR-CON M) 20 MEQ CR tablet Take 1 tablet (20 mEq) by mouth daily (Patient not taking: Reported on 9/29/2021) 30 tablet 5     No  facility-administered encounter medications on file as of 12/30/2021.        Allergies   Allergen Reactions     Nkda [No Known Drug Allergies]         Review of systems:  A full 10 point review of systems was obtained and was negative except for the pertinent positives and negatives stated within the HPI.    Objective Findings:   Physical Exam:    Constitutional: There were no vitals taken for this visit.  General: Alert, cooperative, no distress, well-appearing  Head: Atraumatic, normocephalic, no obvious abnormalities   Eyes: EOMI, Sclera anicteric, no obvious conjunctival hemorrhage   Nose: Nares normal, no obvious malformation, no obvious rhinorrhea   Respiratory: normal appearing respiration, no cough  Musculoskeletal: diminished ambulation per patient  Skin: No jaundice, no obvious rash  Neurologic: AAOx3, no obvious neurologic abnormality  Psychiatric: Normal Affect, appropriate mood  Extremities: No obvious edema, no obvious malformation     Labs, Radiology, Pathology     Lab Results   Component Value Date    WBC 7.0 06/16/2021    WBC 5.0 02/02/2021    WBC 6.3 01/20/2021    HGB 8.5 (L) 06/16/2021    HGB 8.5 (L) 02/02/2021    HGB 8.7 (L) 01/20/2021     06/16/2021     02/02/2021     01/20/2021    CHOL 134 11/06/2017    CHOL 127 10/16/2013    CHOL 128 10/08/2012    TRIG 50 11/06/2017    TRIG 62 10/16/2013    TRIG 70 10/08/2012    HDL 82 11/06/2017    HDL 58 10/16/2013    HDL 58 10/08/2012    ALT 21 02/08/2021    ALT 20 11/10/2017    ALT 25 06/11/2015    AST 14 02/08/2021    AST 13 11/10/2017    AST 14 06/11/2015     06/16/2021     02/08/2021     02/01/2021    BUN 47 (H) 06/16/2021    BUN 28 02/08/2021    BUN 23 02/01/2021    CO2 24 06/16/2021    CO2 30 02/08/2021    CO2 27 02/01/2021    TSH 0.75 01/20/2021    TSH 0.47 11/10/2017    TSH 0.79 10/25/2013    INR 3.11 (H) 09/07/2010    INR 4.77 (H) 09/02/2010    INR 1.63 (H) 08/30/2010        Liver Function Studies -   Recent  Labs   Lab Test 06/16/21  1435 02/08/21  1121   PROTTOTAL  --  6.1*   ALBUMIN 3.4 3.2*   BILITOTAL  --  0.5   ALKPHOS  --  104   AST  --  14   ALT  --  21          Patient Active Problem List    Diagnosis Date Noted     HTN (hypertension) 10/08/2012     Priority: High     Chronic low back pain 10/08/2012     Priority: High     Had back surgery in 5/2012         Leg edema 08/18/2010     Priority: High     Marginal ulcer 12/30/2021     Priority: Medium     Retching 12/30/2021     Priority: Medium     History of Favian-en-Y gastric bypass 12/30/2021     Priority: Medium     Carpal tunnel syndrome 05/03/2021     Priority: Medium     Edema 05/03/2021     Priority: Medium     Nephrolithiasis 05/03/2021     Priority: Medium     Dec 10, 2003 Entered By: ANGELIA STEVENS Comment: s/p nephrolithostomy       Diastolic dysfunction 02/01/2021     Priority: Medium     Morbid obesity (H) 01/20/2021     Priority: Medium     Chronic kidney disease, stage 3 (H) 01/05/2021     Priority: Medium     Arthritis of ankle, right 07/24/2020     Priority: Medium     Chronic anemia 07/24/2020     Priority: Medium     S/P ankle joint replacement 07/23/2020     Priority: Medium     Chronic pain syndrome 02/06/2017     Priority: Medium     Patient is followed by GUNJAN Vinson CNP for ongoing prescription of pain medication.  All refills should only be approved by this provider, or covering partner.    Medication(s): oxycodone.   Maximum quantity per month: 60  Clinic visit frequency required: Q 3 months     Controlled substance agreement:  Encounter-Level CSA:     There are no encounter-level csa.        Pain Clinic evaluation in the past:     Last [facility] website verification: 6/16/2021           Pseudogout 09/26/2016     Priority: Medium     BPH (benign prostatic hyperplasia) 06/11/2015     Priority: Medium     Hypertension      Priority: Medium     Abnormal antinuclear antibody titer 05/09/2014     Priority: Medium     Osteoarthritis  05/09/2014     Priority: Medium     Advanced directives, counseling/discussion 10/08/2012     Priority: Medium     Discussed advance care planning with patient; however, patient declined at this time. 10/8/2012          Benign non-nodular prostatic hyperplasia with lower urinary tract symptoms 10/08/2012     Priority: Medium     S/P knee replacement 10/08/2012     Priority: Medium     Both sides         S/P ankle fusion 10/08/2012     Priority: Medium     Left side         First degree AV block 04/25/2012     Priority: Medium     Family history of diabetes mellitus 04/25/2012     Priority: Medium     Scoliosis 04/25/2012     Priority: Medium     Hypopotassemia 04/25/2012     Priority: Medium     Internal hemorrhoids 08/23/2011     Priority: Medium     Iron deficiency anemia 08/23/2011     Priority: Medium     CARDIOVASCULAR SCREENING; LDL GOAL LESS THAN 130 10/31/2010     Priority: Medium     Family history of prostate cancer 09/02/2009     Priority: Medium     FATHER       Esophageal reflux 05/25/2006     Priority: Medium     Thalassemia minor      Priority: Medium     thalassemia minor (chronic low hgb)        HX NEPHROLITHIASIS [V13.01] 09/12/2005     Priority: Medium      Assessment and Plan   Assessment:    Mann Escobar is 81 year old male with soheila en y gastric bypass, marginal ulcer, reflux, nephrolithiasis, thalassemia minor, arthritis, htn who is presenting as a new patient in consultation at the request of Dr. Thomas with a chief complaint of marginal ulcer, and retching/dry heaves.    The patient was seen in video telemedicine consultation regarding symptoms of dry heaves/retching in the setting of a marginal ulcer.    At this time it is unclear if his symptoms are related to the marginal ulcer or if he has a motility abnormality or reflux that is the driving source for his symptoms.    I have asked that he begin taking his omeprazole 40 mg twice daily.  He should open his capsule to improve  absorption of the medication in light of his Favian-en-Y gastric bypass.  This may result in better acid control and healing for his marginal ulcer.  He may pursue his repeat upper endoscopy with his local team.  If he has persistent symptoms following resolution of his ulcer then he may require high-resolution esophageal manometry and pH impedance testing to determine if his symptoms of retching with water are due to some other process.  Omeprazole 40 mg twice daily was prescribed for the patient.    1. Retching    2. Marginal ulcer    3. History of Favian-en-Y gastric bypass       Orders Placed This Encounter   Procedures     Adult Gastro Ref - Procedure Only      Plan:  1. Please increase your omeprazole to 40mg twice a day 30-60 minutes before breakfast. When you take the capsule, please open it so you are ingesting the granules. You can swallow the granules with water or any liquid you choose.    2. You may obtain your upper endoscopy at your local endoscopy center if you would like or you may follow with me here at the [facility].    3. If your ulcer is healed but you continue to have symptoms at the time of your next endoscopy we may need to pursue additional testing with high resolution esophageal manometry and pH testing. These tests will need to be performed at the [place] if they need to occur.     Follow up plan:   Return to clinic 3 months and as needed.    The risks and benefits of my recommendations, as well as other treatment options were discussed with the patient and any available family today. All questions were answered.     o Follow up: As planned above. Today, I personally spent 29 minutes in direct face to face time with the patient, of which greater than 50% of the time was spent in patient education and counseling as described above. Approximately 25 minutes were spent on indirect care associated with the patient's consultation including but not limited to review of: patient  medical records to date, clinic visits, hospital records, lab results, imaging studies, procedural documentation, and coordinating care with other providers. The findings from this review are summarized in the above note. All of the above accounted for a cumulative time of 54 minutes and was performed on the date of service.     The patient verbalized understanding of the plan and was appreciative for the time spent and information provided during the office visit.     Author:   Sandor Britton DO   of Medicine  Program Head, Esophageal Disorders Program  Division of Gastroenterology, Hepatology, and Nutrition  [facility]     Documentation assisted by voice recognition and documentation system.

## 2021-12-30 NOTE — PATIENT INSTRUCTIONS
It was a pleasure taking care of you today.  I've included a brief summary of our discussion and care plan from today's visit below.  Please review this information with your primary care provider.  _______________________________________________________________________    My recommendations are summarized as follows:    1. Please increase your omeprazole to 40mg twice a day 30-60 minutes before breakfast. When you take the capsule, please open it so you are ingesting the granules. You can swallow the granules with water or any liquid you choose.    2. You may obtain your upper endoscopy at your local endoscopy center if you would like or you may follow with me here at the [facility].    3. If your ulcer is healed but you continue to have symptoms at the time of your next endoscopy we may need to pursue additional testing with high resolution esophageal manometry and pH testing. These tests will need to be performed at the [facility] if they need to occur.       To schedule endoscopic procedures you may call: [phone]  To schedule radiology tests you may call: [phone]  To schedule an ENT appointment you may call: [phone]    Please call my nurse Malina ([phone]), Bozena ([phone]) with any questions or concerns.  If you were seen through the [facility] please feel free to reach out to Linda at [phone]   --    Return to GI Clinic in 3 months to review your progress.    _______________________________________________________________________    Who do I call with any questions after my visit?  Please be in touch if there are any further questions that arise following today's visit.  There are multiple ways to contact your gastroenterology care team.        During business hours, you may reach a Gastroenterology nurse at [phone] and choose option 3.         To schedule or reschedule an appointment, please call [phone].       You can always send a secure message  through Stance.  Stance messages are answered by your nurse or doctor typically within 24 hours.  Please allow extra time on weekends and holidays.        For urgent/emergent questions after business hours, you may reach the on-call GI Fellow by contacting the [facility]  at [phone].     How will I get the results of any tests ordered?    You will receive all of your results.  If you have signed up for [employer], any tests ordered at your visit will be available to you after your physician reviews them.  Typically this takes 1-2 weeks.  If there are urgent results that require a change in your care plan, your physician or nurse will call you to discuss the next steps.      What is Stance?  Stance is a secure way for you to access all of your healthcare records from the [facility].  It is a web based computer program, so you can sign on to it from any location.  It also allows you to send secure messages to your care team.  I recommend signing up for Stance access if you have not already done so and are comfortable with using a computer.      How to I schedule a follow-up visit?  If you did not schedule a follow-up visit today, please call [phone] to schedule a follow-up office visit.        Sincerely,    Sandor Britton DO   of Medicine  Program Head, Esophageal Disorders Program  Division of Gastroenterology, Hepatology, and Nutrition  [facility]

## 2021-12-30 NOTE — PROGRESS NOTES
Mann is a 81 year old who is being evaluated via a billable video visit.      How would you like to obtain your AVS? Mail a copy  If the video visit is dropped, the invitation should be resent by: Text to cell phone: [phone]  Will anyone else be joining your video visit? No      Video Start Time: 4:00 PM  Video-Visit Details    Type of service:  Video Visit    Video End Time:4:29 PM    Originating Location (pt. Location): Home    Distant Location (provider location):  [facility] [place]     Platform used for Video Visit: [facility]     Gastroenterology Visit for: Mann Escobar 1940   MRN: [id number]     Reason for Visit:  chief complaint    Referred by: Martha  / Fest[facility] / [facility] [id number]  Patient Care Team:  Shayy Ramírez APRN CNP as PCP - General (Family Practice)  Shayy Ramírez APRN CNP as Assigned PCP  Yoon Alicea MD as MD (Nephrology)  Yoon Alicea MD as Assigned Nephrology Provider  Anne Marie Andrew MD as Assigned Cancer Care Provider  Anson Thomas MD as MD (Family Medicine)  Sandor Brtiton DO as MD (Gastroenterology)    History of Present Illness:   Mann Escobar is 81 year old male with soheila en y gastric bypass, marginal ulcer, reflux, nephrolithiasis, thalassemia minor, arthritis, htn who is presenting as a new patient in consultation at the request of Dr. Thomas with a chief complaint of marginal ulcer, and dry heaves/retching.  ---------------------------------------------------------------  Mann Escobar states he was diagnosed with an ulcer and at the time he would take a sip of water and get dry heaves. He wasn't vomiting. There was pain associated. He would feel water sitting in his chest. Symptoms had been ongoing for a month prior to endoscopy. He had the same symptoms yesterday evening. He feels that he can't eat foods currently. He is eating soup  "instead. Symptom is triggered by water. He feels that he could eat solid foods such as steak.    He has a \"terrible taste\" in his mouth. He doesn't get heartburn but is uncertain what heartburn feels like. He states he doesn't taste food very well: states this has been ongoing for a long time. Has seen dentist.     Patient gets colonoscopy every 5 years due to family history.    ---------------------------------------------------------------     Mann Escobar denies dysphagia,  heartburn/reflux, nausea, vomiting, early satiety, abdominal pain, abdominal distension/bloating, diarrhea, constipation, hematochezia, or melena. No unintentional weight loss.     Family history of colon cancer: brother around age 55.   Wt Readings from Last 5 Encounters:   12/21/21 73.8 kg (162 lb 12.8 oz)   10/18/21 72.6 kg (160 lb)   09/29/21 73.9 kg (163 lb)   06/16/21 75.5 kg (166 lb 8 oz)   03/15/21 88.5 kg (195 lb)        Esophageal Questionnaire(s)    BEDQ Questionnaire  No flowsheet data found.  No flowsheet data found.    Eckardt Questionnaire  No flowsheet data found.    Promis 10 Questionnaire  No flowsheet data found.        STUDIES & PROCEDURES:    EGD:   Date: 10/18/21  Impression:   The examined esophagus was normal.        Evidence of a Emma-en-Y gastrojejunostomy was found. The gastrojejunal        anastomosis was characterized by ulceration. This was traversed. The        pouch-to-jejunum limb was characterized by ulceration. Biopsies were        taken with a cold forceps for histology and Helicobacter pylori testing.        Verification of patient identification for the specimen was done by the        physician, nurse and technician using the patient's name, birth date and        medical record number. Estimated blood loss was minimal.        The examined Emma limb was otherwise normal.                                                                                     Impression:          - Normal " esophagus.                        - Favian-en-Y gastrojejunostomy with gastrojejunal                        anastomosis characterized by ulceration - 6mm cratered                        ulcer. Biopsied.                        - Normal examined jejunum.   Pathology Report:  A(1). Gastric pouch, biopsy:  -Small bowel mucosa with mild villous blunting and acute inflammation and adjacent necroinflammatory debris.  -No viable gastric type mucosa identified.  -Immunohistochemical stains for H. pylori is negative.  -Negative for dysplasia or malignancy    Colonoscopy:  Date: 12/19/2017  Impression:  Findings:        The perianal and digital rectal examinations were normal.        A few small-mouthed diverticula were found in the sigmoid colon.        The exam was otherwise normal throughout the examined colon.        The retroflexed view of the distal rectum and anal verge was normal and        showed no anal or rectal abnormalities.   Pathology Report:     EndoFLIP directed at the UES or LES (8cm (EF-325) balloon length or 16cm (EF-322) balloon length):   Date:  8cm balloon  Balloon inflation Balloon pressure CSA (mm^2) DI (mm^2/mmHg) Dmin (mm) Compliance   20 (ladmark ID)        30        40        50           16cm balloon  Balloon inflation Balloon pressure CSA (mm^2) DI (mm^2/mmHg) Dmin (mm) Compliance   30 (ladmark ID)        40        50        60        70           High Resolution Manometry:  Date:  Impression:    PH/Impedance:  Date:  Impression:     Bravo:  48 or 96hr  Date:  Impression:    CT:  Date:  Impression:    Esophagram:  Date:  Impression:     Prior medical records were reviewed including, but not limited to, notes from referring providers, lab work, radiographic tests, and other diagnostic tests. Pertinent results were summarized above.     History     Past Medical History:   Diagnosis Date     Arthritis of ankle, left 7/24/2020     Back pain     WITH BILATERAL LEG PAIN AND NUMBNESS OF BOTH FEET      Gastro-oesophageal reflux disease     REFLUX     Hypertension      Hypopotassemia      Internal hemorrhoids      Iron deficiency anaemia      Leg edema      Morbid obesity 9/12/2005     Morbid obesity (H)      Osteoarthrosis      Scoliosis      Thalassemia minor        Past Surgical History:   Procedure Laterality Date     CATARACT IOL, RT/LT  2008/    Cataract IOL RT/LT     COLONOSCOPY  2009/07    polyps removed repeat 5 yrs, tubular adenoma     COLONOSCOPY  9/29/2011    Procedure:COLONOSCOPY; Colonoscopy with hemorrhoid banding; Surgeon:JEREMY GARCIA; Location:[facility]     COLONOSCOPY N/A 12/19/2017    Procedure: COLONOSCOPY;  colonoscopy, gastroscopy;  Surgeon: Edmar Isaacs MD;  Location: [facility]     DECOMPRESSION LUMBAR MINIMALLY INVASIVE TWO LEVELS  5/2/2012    Procedure:DECOMPRESSION LUMBAR MINIMALLY INVASIVE TWO LEVELS; Surgeon:LOTTIE MICTHELL; [facility]     ESOPHAGOSCOPY, GASTROSCOPY, DUODENOSCOPY (EGD), COMBINED N/A 2/6/2018    Procedure: COMBINED ESOPHAGOSCOPY, GASTROSCOPY, DUODENOSCOPY (EGD);  gastroscopy;  Surgeon: Edmar Isaacs MD;  Location: [facility]     ESOPHAGOSCOPY, GASTROSCOPY, DUODENOSCOPY (EGD), COMBINED N/A 10/18/2021    Procedure: ESOPHAGOGASTRODUODENOSCOPY (EGD);  Surgeon: Anju Galeano MD;  Location: [facility]     FUSION SPINE POSTERIOR MINIMALLY INVASIVE ONE LEVEL  5/2/2012    Procedure:FUSION SPINE POSTERIOR MINIMALLY INVASIVE ONE LEVEL; Minimal Access Spinal Technology Transformaninal Lumbar Interbody Fusion L4-5, Decompression L3-5; Surgeon:LOTTIE MITCHELL; [facility]     HC REMOVAL OF HYDROCELE,TUNICA,UNILAT  2006    bilateral     ORTHOPEDIC SURGERY  2000    Lf knee replacement     ORTHOPEDIC SURGERY  2002, 2010    Rt replacement     ORTHOPEDIC SURGERY  2005    Left ankle fused     RECONSTRUCT ANKLE Right 7/23/2020    Procedure: Ankle RECONSTRUCTIve Arthroplasty;  Surgeon: Luke Powers DPM;  Location: [facility]     SURGICAL HISTORY OF -       Cysto     SURGICAL  HISTORY OF -       Percutaneous kidney stone removal     SURGICAL HISTORY OF -       ureteral stent removal     SURGICAL HISTORY OF -       bariatric surgery-Favian-en-Y     SURGICAL HISTORY OF -       carpal tunnel surgery rt wrist       Social History     Socioeconomic History     Marital status:      Spouse name: Not on file     Number of children: Not on file     Years of education: Not on file     Highest education level: Not on file   Occupational History     Not on file   Tobacco Use     Smoking status: Former Smoker     Packs/day: 0.50     Years: 7.00     Pack years: 3.50     Types: Cigarettes     Quit date: 1962     Years since quittin.0     Smokeless tobacco: Never Used   Vaping Use     Vaping Use: Never used   Substance and Sexual Activity     Alcohol use: Yes     Comment: one every other day. Rarely     Drug use: No     Sexual activity: Yes     Partners: Female   Other Topics Concern      Service Yes     Blood Transfusions No     Comment: ???     Caffeine Concern No     Occupational Exposure No     Hobby Hazards No     Sleep Concern No     Stress Concern No     Weight Concern No     Special Diet No     Back Care No     Exercise Not Asked     Bike Helmet Not Asked     Seat Belt Yes     Self-Exams No     Parent/sibling w/ CABG, MI or angioplasty before 65F 55M? No   Social History Narrative     Not on file     Social Determinants of Health     Financial Resource Strain: Not on file   Food Insecurity: Not on file   Transportation Needs: Not on file   Physical Activity: Not on file   Stress: Not on file   Social Connections: Not on file   Intimate Partner Violence: Not on file   Housing Stability: Not on file       Family History   Problem Relation Age of Onset     Diabetes Brother      Obesity Brother      Cerebrovascular Disease Paternal Grandfather      Prostate Cancer Father      Cancer Father         bone     Diabetes Father      Heart Disease Mother         CHF      Cerebrovascular Disease Mother      Hypertension Mother      Cancer Mother         tumor in stomach     Cancer - colorectal Mother      Heart Disease Maternal Grandmother         CHF     Diabetes Paternal Grandmother      Breast Cancer Sister      Genitourinary Problems Son         Kidney stones     Cancer Brother      Cancer - colorectal Brother      Diabetes Brother      Family history reviewed and edited as appropriate    Medications and Allergies:     Outpatient Encounter Medications as of 12/30/2021   Medication Sig Dispense Refill     acetaminophen (TYLENOL) 325 MG tablet Take 3 tablets (975 mg) by mouth 3 times daily (Patient taking differently: Take 975 mg by mouth 2 times daily as needed ) 120 tablet 0     amLODIPine (NORVASC) 10 MG tablet TAKE 1 TABLET DAILY 90 tablet 3     fluticasone (FLONASE) 50 MCG/ACT nasal spray Spray 1 spray into both nostrils daily 16 g 1     furosemide (LASIX) 40 MG tablet Take 1 tablet (40 mg) by mouth daily (Patient taking differently: Take 40 mg by mouth daily Takes PRN) 30 tablet 5     HCA VITAMIN B12 500 MCG OR TABS 2 tablets daily       MULTIVITAMIN OR one daily       omeprazole (PRILOSEC OTC) 20 MG EC tablet Take 1 tablet (20 mg) by mouth 2 times daily 90 tablet 3     omeprazole (PRILOSEC) 20 MG DR capsule Take 1 capsule (20 mg) by mouth daily 90 capsule 3     omeprazole (PRILOSEC) 40 MG DR capsule Take 1 capsule (40 mg) by mouth 2 times daily 180 capsule 3     OVER-THE-COUNTER Elderberry 50mg supplement, one daily       oxyCODONE (ROXICODONE) 5 MG tablet Take 1 tablet (5 mg) by mouth 2 times daily as needed for severe pain This is a 90 day supply 180 tablet 0     sucralfate (CARAFATE) 1 GM tablet Take 1 tablet (1 g) by mouth 4 times daily Take 30 minutes before meals and at bedtime 56 tablet 0     terazosin (HYTRIN) 5 MG capsule TAKE 1 CAPSULE AT BEDTIME 90 capsule 3     Turmeric 500 MG CAPS        VIACTIV OR With D 1000mg calcium       vitamin B6 (VITAMIN B-6) 50 MG  tablet Take 1 tablet (50 mg) by mouth 2 times daily 60 tablet 11     zinc gluconate 50 MG tablet Take 50 mg by mouth daily       potassium chloride ER (KLOR-CON M) 20 MEQ CR tablet Take 1 tablet (20 mEq) by mouth daily (Patient not taking: Reported on 9/29/2021) 30 tablet 5     No facility-administered encounter medications on file as of 12/30/2021.        Allergies   Allergen Reactions     Nkda [No Known Drug Allergies]         Review of systems:  A full 10 point review of systems was obtained and was negative except for the pertinent positives and negatives stated within the HPI.    Objective Findings:   Physical Exam:    Constitutional: There were no vitals taken for this visit.  General: Alert, cooperative, no distress, well-appearing  Head: Atraumatic, normocephalic, no obvious abnormalities   Eyes: EOMI, Sclera anicteric, no obvious conjunctival hemorrhage   Nose: Nares normal, no obvious malformation, no obvious rhinorrhea   Respiratory: normal appearing respiration, no cough  Musculoskeletal: diminished ambulation per patient  Skin: No jaundice, no obvious rash  Neurologic: AAOx3, no obvious neurologic abnormality  Psychiatric: Normal Affect, appropriate mood  Extremities: No obvious edema, no obvious malformation     Labs, Radiology, Pathology     Lab Results   Component Value Date    WBC 7.0 06/16/2021    WBC 5.0 02/02/2021    WBC 6.3 01/20/2021    HGB 8.5 (L) 06/16/2021    HGB 8.5 (L) 02/02/2021    HGB 8.7 (L) 01/20/2021     06/16/2021     02/02/2021     01/20/2021    CHOL 134 11/06/2017    CHOL 127 10/16/2013    CHOL 128 10/08/2012    TRIG 50 11/06/2017    TRIG 62 10/16/2013    TRIG 70 10/08/2012    HDL 82 11/06/2017    HDL 58 10/16/2013    HDL 58 10/08/2012    ALT 21 02/08/2021    ALT 20 11/10/2017    ALT 25 06/11/2015    AST 14 02/08/2021    AST 13 11/10/2017    AST 14 06/11/2015     06/16/2021     02/08/2021     02/01/2021    BUN 47 (H) 06/16/2021    BUN 28  02/08/2021    BUN 23 02/01/2021    CO2 24 06/16/2021    CO2 30 02/08/2021    CO2 27 02/01/2021    TSH 0.75 01/20/2021    TSH 0.47 11/10/2017    TSH 0.79 10/25/2013    INR 3.11 (H) 09/07/2010    INR 4.77 (H) 09/02/2010    INR 1.63 (H) 08/30/2010        Liver Function Studies -   Recent Labs   Lab Test 06/16/21  1435 02/08/21  1121   PROTTOTAL  --  6.1*   ALBUMIN 3.4 3.2*   BILITOTAL  --  0.5   ALKPHOS  --  104   AST  --  14   ALT  --  21          Patient Active Problem List    Diagnosis Date Noted     HTN (hypertension) 10/08/2012     Priority: High     Chronic low back pain 10/08/2012     Priority: High     Had back surgery in 5/2012         Leg edema 08/18/2010     Priority: High     Marginal ulcer 12/30/2021     Priority: Medium     Retching 12/30/2021     Priority: Medium     History of Favian-en-Y gastric bypass 12/30/2021     Priority: Medium     Carpal tunnel syndrome 05/03/2021     Priority: Medium     Edema 05/03/2021     Priority: Medium     Nephrolithiasis 05/03/2021     Priority: Medium     Dec 10, 2003 Entered By: ANGELIA STEVENS Comment: s/p nephrolithostomy       Diastolic dysfunction 02/01/2021     Priority: Medium     Morbid obesity (H) 01/20/2021     Priority: Medium     Chronic kidney disease, stage 3 (H) 01/05/2021     Priority: Medium     Arthritis of ankle, right 07/24/2020     Priority: Medium     Chronic anemia 07/24/2020     Priority: Medium     S/P ankle joint replacement 07/23/2020     Priority: Medium     Chronic pain syndrome 02/06/2017     Priority: Medium     Patient is followed by GUNJAN Vinson CNP for ongoing prescription of pain medication.  All refills should only be approved by this provider, or covering partner.    Medication(s): oxycodone.   Maximum quantity per month: 60  Clinic visit frequency required: Q 3 months     Controlled substance agreement:  Encounter-Level CSA:     There are no encounter-level csa.        Pain Clinic evaluation in the past:     Last [facility] website  verification: 6/16/2021           Pseudogout 09/26/2016     Priority: Medium     BPH (benign prostatic hyperplasia) 06/11/2015     Priority: Medium     Hypertension      Priority: Medium     Abnormal antinuclear antibody titer 05/09/2014     Priority: Medium     Osteoarthritis 05/09/2014     Priority: Medium     Advanced directives, counseling/discussion 10/08/2012     Priority: Medium     Discussed advance care planning with patient; however, patient declined at this time. 10/8/2012          Benign non-nodular prostatic hyperplasia with lower urinary tract symptoms 10/08/2012     Priority: Medium     S/P knee replacement 10/08/2012     Priority: Medium     Both sides         S/P ankle fusion 10/08/2012     Priority: Medium     Left side         First degree AV block 04/25/2012     Priority: Medium     Family history of diabetes mellitus 04/25/2012     Priority: Medium     Scoliosis 04/25/2012     Priority: Medium     Hypopotassemia 04/25/2012     Priority: Medium     Internal hemorrhoids 08/23/2011     Priority: Medium     Iron deficiency anemia 08/23/2011     Priority: Medium     CARDIOVASCULAR SCREENING; LDL GOAL LESS THAN 130 10/31/2010     Priority: Medium     Family history of prostate cancer 09/02/2009     Priority: Medium     FATHER       Esophageal reflux 05/25/2006     Priority: Medium     Thalassemia minor      Priority: Medium     thalassemia minor (chronic low hgb)        HX NEPHROLITHIASIS [V13.01] 09/12/2005     Priority: Medium      Assessment and Plan   Assessment:    Mann Escobar is 81 year old male with soheila en y gastric bypass, marginal ulcer, reflux, nephrolithiasis, thalassemia minor, arthritis, htn who is presenting as a new patient in consultation at the request of Dr. Thomas with a chief complaint of marginal ulcer, and retching/dry heaves.    The patient was seen in video telemedicine consultation regarding symptoms of dry heaves/retching in the setting of a marginal  ulcer.    At this time it is unclear if his symptoms are related to the marginal ulcer or if he has a motility abnormality or reflux that is the driving source for his symptoms.    I have asked that he begin taking his omeprazole 40 mg twice daily.  He should open his capsule to improve absorption of the medication in light of his Favian-en-Y gastric bypass.  This may result in better acid control and healing for his marginal ulcer.  He may pursue his repeat upper endoscopy with his local team.  If he has persistent symptoms following resolution of his ulcer then he may require high-resolution esophageal manometry and pH impedance testing to determine if his symptoms of retching with water are due to some other process.  Omeprazole 40 mg twice daily was prescribed for the patient.    1. Retching    2. Marginal ulcer    3. History of Favian-en-Y gastric bypass       Orders Placed This Encounter   Procedures     Adult Gastro Ref - Procedure Only      Plan:  1. Please increase your omeprazole to 40mg twice a day 30-60 minutes before breakfast. When you take the capsule, please open it so you are ingesting the granules. You can swallow the granules with water or any liquid you choose.    2. You may obtain your upper endoscopy at your local endoscopy center if you would like or you may follow with me here at the [facility].    3. If your ulcer is healed but you continue to have symptoms at the time of your next endoscopy we may need to pursue additional testing with high resolution esophageal manometry and pH testing. These tests will need to be performed at the [facility] if they need to occur.     Follow up plan:   Return to clinic 3 months and as needed.    The risks and benefits of my recommendations, as well as other treatment options were discussed with the patient and any available family today. All questions were answered.     o Follow up: As planned above. Today, I personally spent 29 minutes in direct  face to face time with the patient, of which greater than 50% of the time was spent in patient education and counseling as described above. Approximately 25 minutes were spent on indirect care associated with the patient's consultation including but not limited to review of: patient medical records to date, clinic visits, hospital records, lab results, imaging studies, procedural documentation, and coordinating care with other providers. The findings from this review are summarized in the above note. All of the above accounted for a cumulative time of 54 minutes and was performed on the date of service.     The patient verbalized understanding of the plan and was appreciative for the time spent and information provided during the office visit.     Author:   Sandor Britton DO   of Medicine  Program Head, Esophageal Disorders Program  Division of Gastroenterology, Hepatology, and Nutrition  [facility]     Documentation assisted by voice recognition and documentation system.

## 2022-01-01 ENCOUNTER — OFFICE VISIT (OUTPATIENT)
Dept: NEPHROLOGY | Facility: CLINIC | Age: 82
End: 2022-01-01
Payer: COMMERCIAL

## 2022-01-01 ENCOUNTER — INFUSION THERAPY VISIT (OUTPATIENT)
Dept: INFUSION THERAPY | Facility: CLINIC | Age: 82
End: 2022-01-01
Attending: INTERNAL MEDICINE
Payer: COMMERCIAL

## 2022-01-01 ENCOUNTER — TELEPHONE (OUTPATIENT)
Dept: ORTHOPEDICS | Facility: CLINIC | Age: 82
End: 2022-01-01

## 2022-01-01 ENCOUNTER — APPOINTMENT (OUTPATIENT)
Dept: LAB | Facility: CLINIC | Age: 82
End: 2022-01-01
Payer: COMMERCIAL

## 2022-01-01 ENCOUNTER — OFFICE VISIT (OUTPATIENT)
Dept: FAMILY MEDICINE | Facility: CLINIC | Age: 82
End: 2022-01-01
Payer: COMMERCIAL

## 2022-01-01 ENCOUNTER — TELEPHONE (OUTPATIENT)
Dept: NEPHROLOGY | Facility: CLINIC | Age: 82
End: 2022-01-01

## 2022-01-01 ENCOUNTER — ONCOLOGY VISIT (OUTPATIENT)
Dept: ONCOLOGY | Facility: CLINIC | Age: 82
End: 2022-01-01
Attending: INTERNAL MEDICINE
Payer: COMMERCIAL

## 2022-01-01 ENCOUNTER — OFFICE VISIT (OUTPATIENT)
Dept: TRANSPLANT | Facility: CLINIC | Age: 82
End: 2022-01-01
Attending: SURGERY
Payer: COMMERCIAL

## 2022-01-01 ENCOUNTER — VIRTUAL VISIT (OUTPATIENT)
Dept: NEPHROLOGY | Facility: CLINIC | Age: 82
End: 2022-01-01
Payer: COMMERCIAL

## 2022-01-01 ENCOUNTER — HEALTH MAINTENANCE LETTER (OUTPATIENT)
Age: 82
End: 2022-01-01

## 2022-01-01 ENCOUNTER — PATIENT OUTREACH (OUTPATIENT)
Dept: NEPHROLOGY | Facility: CLINIC | Age: 82
End: 2022-01-01

## 2022-01-01 ENCOUNTER — LAB (OUTPATIENT)
Dept: LAB | Facility: CLINIC | Age: 82
End: 2022-01-01
Payer: COMMERCIAL

## 2022-01-01 ENCOUNTER — PREP FOR PROCEDURE (OUTPATIENT)
Dept: TRANSPLANT | Facility: CLINIC | Age: 82
End: 2022-01-01

## 2022-01-01 ENCOUNTER — OFFICE VISIT (OUTPATIENT)
Dept: ORTHOPEDICS | Facility: CLINIC | Age: 82
End: 2022-01-01
Payer: COMMERCIAL

## 2022-01-01 VITALS
BODY MASS INDEX: 28.89 KG/M2 | SYSTOLIC BLOOD PRESSURE: 99 MMHG | HEIGHT: 61 IN | DIASTOLIC BLOOD PRESSURE: 68 MMHG | WEIGHT: 153 LBS

## 2022-01-01 VITALS
SYSTOLIC BLOOD PRESSURE: 140 MMHG | HEART RATE: 83 BPM | DIASTOLIC BLOOD PRESSURE: 90 MMHG | RESPIRATION RATE: 18 BRPM | TEMPERATURE: 97.8 F

## 2022-01-01 VITALS
WEIGHT: 145 LBS | HEART RATE: 76 BPM | OXYGEN SATURATION: 95 % | RESPIRATION RATE: 16 BRPM | BODY MASS INDEX: 27.38 KG/M2 | DIASTOLIC BLOOD PRESSURE: 66 MMHG | HEIGHT: 61 IN | SYSTOLIC BLOOD PRESSURE: 128 MMHG | TEMPERATURE: 97.8 F

## 2022-01-01 VITALS
HEART RATE: 84 BPM | WEIGHT: 143 LBS | BODY MASS INDEX: 27.03 KG/M2 | DIASTOLIC BLOOD PRESSURE: 64 MMHG | OXYGEN SATURATION: 93 % | SYSTOLIC BLOOD PRESSURE: 97 MMHG

## 2022-01-01 VITALS
BODY MASS INDEX: 28.91 KG/M2 | TEMPERATURE: 97.8 F | SYSTOLIC BLOOD PRESSURE: 97 MMHG | RESPIRATION RATE: 12 BRPM | DIASTOLIC BLOOD PRESSURE: 61 MMHG | HEART RATE: 84 BPM | WEIGHT: 153 LBS | OXYGEN SATURATION: 98 %

## 2022-01-01 VITALS — SYSTOLIC BLOOD PRESSURE: 117 MMHG | HEART RATE: 77 BPM | DIASTOLIC BLOOD PRESSURE: 68 MMHG | TEMPERATURE: 97.4 F

## 2022-01-01 VITALS
HEART RATE: 75 BPM | RESPIRATION RATE: 18 BRPM | SYSTOLIC BLOOD PRESSURE: 119 MMHG | TEMPERATURE: 98.2 F | DIASTOLIC BLOOD PRESSURE: 70 MMHG

## 2022-01-01 VITALS
DIASTOLIC BLOOD PRESSURE: 68 MMHG | SYSTOLIC BLOOD PRESSURE: 118 MMHG | BODY MASS INDEX: 26.64 KG/M2 | HEART RATE: 74 BPM | WEIGHT: 141 LBS

## 2022-01-01 VITALS — HEART RATE: 72 BPM | DIASTOLIC BLOOD PRESSURE: 74 MMHG | SYSTOLIC BLOOD PRESSURE: 127 MMHG

## 2022-01-01 DIAGNOSIS — N18.4 CKD (CHRONIC KIDNEY DISEASE) STAGE 4, GFR 15-29 ML/MIN (H): Primary | ICD-10-CM

## 2022-01-01 DIAGNOSIS — N18.32 ANEMIA OF CHRONIC RENAL FAILURE, STAGE 3B (H): Primary | ICD-10-CM

## 2022-01-01 DIAGNOSIS — N18.4 CKD (CHRONIC KIDNEY DISEASE) STAGE 4, GFR 15-29 ML/MIN (H): ICD-10-CM

## 2022-01-01 DIAGNOSIS — N18.32 STAGE 3B CHRONIC KIDNEY DISEASE (H): ICD-10-CM

## 2022-01-01 DIAGNOSIS — D56.8 OTHER THALASSEMIA (H): ICD-10-CM

## 2022-01-01 DIAGNOSIS — I10 ESSENTIAL HYPERTENSION: ICD-10-CM

## 2022-01-01 DIAGNOSIS — E87.6 HYPOKALEMIA: ICD-10-CM

## 2022-01-01 DIAGNOSIS — D63.1 ANEMIA OF CHRONIC RENAL FAILURE, STAGE 4 (SEVERE) (H): ICD-10-CM

## 2022-01-01 DIAGNOSIS — R79.89 HIGH PLASMA OXALATE: ICD-10-CM

## 2022-01-01 DIAGNOSIS — N18.32 STAGE 3B CHRONIC KIDNEY DISEASE (H): Primary | ICD-10-CM

## 2022-01-01 DIAGNOSIS — G89.29 CHRONIC LEFT-SIDED LOW BACK PAIN WITH LEFT-SIDED SCIATICA: ICD-10-CM

## 2022-01-01 DIAGNOSIS — M54.42 CHRONIC LEFT-SIDED LOW BACK PAIN WITH LEFT-SIDED SCIATICA: ICD-10-CM

## 2022-01-01 DIAGNOSIS — D63.1 ANEMIA OF CHRONIC RENAL FAILURE, STAGE 3B (H): Primary | ICD-10-CM

## 2022-01-01 DIAGNOSIS — D63.1 ANEMIA OF CHRONIC RENAL FAILURE, STAGE 3B (H): ICD-10-CM

## 2022-01-01 DIAGNOSIS — M25.512 PAIN OF BOTH SHOULDER JOINTS: Primary | ICD-10-CM

## 2022-01-01 DIAGNOSIS — N18.4 ANEMIA OF CHRONIC RENAL FAILURE, STAGE 4 (SEVERE) (H): ICD-10-CM

## 2022-01-01 DIAGNOSIS — N40.1 BENIGN NON-NODULAR PROSTATIC HYPERPLASIA WITH LOWER URINARY TRACT SYMPTOMS: ICD-10-CM

## 2022-01-01 DIAGNOSIS — M12.812 ROTATOR CUFF ARTHROPATHY OF BOTH SHOULDERS: ICD-10-CM

## 2022-01-01 DIAGNOSIS — E83.51 HYPOCALCEMIA: ICD-10-CM

## 2022-01-01 DIAGNOSIS — Z98.84 HISTORY OF GASTRIC BYPASS: ICD-10-CM

## 2022-01-01 DIAGNOSIS — N18.5 CKD (CHRONIC KIDNEY DISEASE) STAGE 5, GFR LESS THAN 15 ML/MIN (H): ICD-10-CM

## 2022-01-01 DIAGNOSIS — N25.81 SECONDARY HYPERPARATHYROIDISM OF RENAL ORIGIN (H): Primary | ICD-10-CM

## 2022-01-01 DIAGNOSIS — Z01.818 ENCOUNTER FOR OTHER PREPROCEDURAL EXAMINATION: Primary | ICD-10-CM

## 2022-01-01 DIAGNOSIS — F11.20 CONTINUOUS OPIOID DEPENDENCE (H): ICD-10-CM

## 2022-01-01 DIAGNOSIS — N18.32 ANEMIA OF CHRONIC RENAL FAILURE, STAGE 3B (H): ICD-10-CM

## 2022-01-01 DIAGNOSIS — Z99.2 ESRD ON DIALYSIS (H): Primary | ICD-10-CM

## 2022-01-01 DIAGNOSIS — N18.4 CHRONIC KIDNEY DISEASE, STAGE 4, SEVERELY DECREASED GFR (H): Primary | ICD-10-CM

## 2022-01-01 DIAGNOSIS — M25.511 PAIN OF BOTH SHOULDER JOINTS: Primary | ICD-10-CM

## 2022-01-01 DIAGNOSIS — R60.9 EDEMA, UNSPECIFIED TYPE: ICD-10-CM

## 2022-01-01 DIAGNOSIS — D50.9 IRON DEFICIENCY ANEMIA, UNSPECIFIED IRON DEFICIENCY ANEMIA TYPE: ICD-10-CM

## 2022-01-01 DIAGNOSIS — Z23 HIGH PRIORITY FOR 2019-NCOV VACCINE: ICD-10-CM

## 2022-01-01 DIAGNOSIS — Z98.84 HISTORY OF GASTRIC BYPASS: Primary | ICD-10-CM

## 2022-01-01 DIAGNOSIS — M12.811 ROTATOR CUFF ARTHROPATHY OF BOTH SHOULDERS: ICD-10-CM

## 2022-01-01 DIAGNOSIS — G89.4 CHRONIC PAIN SYNDROME: Primary | ICD-10-CM

## 2022-01-01 DIAGNOSIS — M25.419: ICD-10-CM

## 2022-01-01 DIAGNOSIS — N18.6 ESRD ON DIALYSIS (H): Primary | ICD-10-CM

## 2022-01-01 LAB
ALBUMIN SERPL BCG-MCNC: 3 G/DL (ref 3.5–5.2)
ALBUMIN SERPL BCG-MCNC: 3 G/DL (ref 3.5–5.2)
ALBUMIN SERPL BCG-MCNC: 3.1 G/DL (ref 3.5–5.2)
ALBUMIN SERPL BCG-MCNC: 3.2 G/DL (ref 3.5–5.2)
ALBUMIN SERPL-MCNC: 2.6 G/DL (ref 3.4–5)
ALBUMIN UR-MCNC: 30 MG/DL
ANION GAP SERPL CALCULATED.3IONS-SCNC: 10 MMOL/L (ref 3–14)
ANION GAP SERPL CALCULATED.3IONS-SCNC: 10 MMOL/L (ref 7–15)
ANION GAP SERPL CALCULATED.3IONS-SCNC: 11 MMOL/L (ref 7–15)
ANION GAP SERPL CALCULATED.3IONS-SCNC: 14 MMOL/L (ref 7–15)
ANION GAP SERPL CALCULATED.3IONS-SCNC: 17 MMOL/L (ref 7–15)
APPEARANCE UR: CLEAR
BASOPHILS # BLD AUTO: 0.1 10E3/UL (ref 0–0.2)
BASOPHILS NFR BLD AUTO: 1 %
BILIRUB UR QL STRIP: NEGATIVE
BUN SERPL-MCNC: 61.7 MG/DL (ref 8–23)
BUN SERPL-MCNC: 61.7 MG/DL (ref 8–23)
BUN SERPL-MCNC: 68.3 MG/DL (ref 8–23)
BUN SERPL-MCNC: 73 MG/DL (ref 7–30)
BUN SERPL-MCNC: 75.1 MG/DL (ref 8–23)
CALCIUM SERPL-MCNC: 5.9 MG/DL (ref 8.8–10.2)
CALCIUM SERPL-MCNC: 6.3 MG/DL (ref 8.5–10.1)
CALCIUM SERPL-MCNC: 6.3 MG/DL (ref 8.8–10.2)
CALCIUM SERPL-MCNC: 6.4 MG/DL (ref 8.8–10.2)
CALCIUM SERPL-MCNC: 7 MG/DL (ref 8.8–10.2)
CHLORIDE BLD-SCNC: 104 MMOL/L (ref 94–109)
CHLORIDE SERPL-SCNC: 102 MMOL/L (ref 98–107)
CHLORIDE SERPL-SCNC: 103 MMOL/L (ref 98–107)
CHLORIDE SERPL-SCNC: 106 MMOL/L (ref 98–107)
CHLORIDE SERPL-SCNC: 106 MMOL/L (ref 98–107)
CO2 SERPL-SCNC: 28 MMOL/L (ref 20–32)
COLOR UR AUTO: YELLOW
CREAT SERPL-MCNC: 4.03 MG/DL (ref 0.67–1.17)
CREAT SERPL-MCNC: 4.23 MG/DL (ref 0.67–1.17)
CREAT SERPL-MCNC: 4.28 MG/DL (ref 0.67–1.17)
CREAT SERPL-MCNC: 4.49 MG/DL (ref 0.66–1.25)
CREAT SERPL-MCNC: 4.52 MG/DL (ref 0.67–1.17)
CYSTATIN C (ROCHE): 4 MG/L (ref 0.6–1)
DEPRECATED HCO3 PLAS-SCNC: 24 MMOL/L (ref 22–29)
DEPRECATED HCO3 PLAS-SCNC: 26 MMOL/L (ref 22–29)
EOSINOPHIL # BLD AUTO: 0.1 10E3/UL (ref 0–0.7)
EOSINOPHIL NFR BLD AUTO: 1 %
EOSINOPHIL NFR BLD AUTO: 2 %
ERYTHROCYTE [DISTWIDTH] IN BLOOD BY AUTOMATED COUNT: 17.3 % (ref 10–15)
ERYTHROCYTE [DISTWIDTH] IN BLOOD BY AUTOMATED COUNT: 17.8 % (ref 10–15)
ERYTHROCYTE [DISTWIDTH] IN BLOOD BY AUTOMATED COUNT: 18.4 % (ref 10–15)
ERYTHROCYTE [DISTWIDTH] IN BLOOD BY AUTOMATED COUNT: 19.1 % (ref 10–15)
FERRITIN SERPL-MCNC: 323 NG/ML (ref 31–409)
FRAGMENTS BLD QL SMEAR: SLIGHT
GFR SERPL CREATININE-BSD FRML MDRD: 11 ML/MIN/1.73M2
GFR SERPL CREATININE-BSD FRML MDRD: 12 ML/MIN/1.73M2
GFR SERPL CREATININE-BSD FRML MDRD: 12 ML/MIN/1.73M2
GFR SERPL CREATININE-BSD FRML MDRD: 13 ML/MIN/1.73M2
GFR SERPL CREATININE-BSD FRML MDRD: 13 ML/MIN/1.73M2
GFR SERPL CREATININE-BSD FRML MDRD: 14 ML/MIN/1.73M2
GLUCOSE BLD-MCNC: 104 MG/DL (ref 70–99)
GLUCOSE SERPL-MCNC: 103 MG/DL (ref 70–99)
GLUCOSE SERPL-MCNC: 118 MG/DL (ref 70–99)
GLUCOSE SERPL-MCNC: 132 MG/DL (ref 70–99)
GLUCOSE SERPL-MCNC: 93 MG/DL (ref 70–99)
GLUCOSE UR STRIP-MCNC: NEGATIVE MG/DL
HCT VFR BLD AUTO: 29.3 % (ref 40–53)
HCT VFR BLD AUTO: 29.4 % (ref 40–53)
HCT VFR BLD AUTO: 30 % (ref 40–53)
HCT VFR BLD AUTO: 30.1 % (ref 40–53)
HCT VFR BLD AUTO: 30.5 % (ref 40–53)
HCT VFR BLD AUTO: 31 % (ref 40–53)
HCT VFR BLD AUTO: 31.4 % (ref 40–53)
HCT VFR BLD AUTO: 33 % (ref 40–53)
HGB BLD-MCNC: 10 G/DL (ref 13.3–17.7)
HGB BLD-MCNC: 8.9 G/DL (ref 13.3–17.7)
HGB BLD-MCNC: 8.9 G/DL (ref 13.3–17.7)
HGB BLD-MCNC: 9.1 G/DL (ref 13.3–17.7)
HGB BLD-MCNC: 9.1 G/DL (ref 13.3–17.7)
HGB BLD-MCNC: 9.4 G/DL (ref 13.3–17.7)
HGB BLD-MCNC: 9.4 G/DL (ref 13.3–17.7)
HGB BLD-MCNC: 9.5 G/DL (ref 13.3–17.7)
HGB UR QL STRIP: ABNORMAL
IMM GRANULOCYTES # BLD: 0 10E3/UL
IMM GRANULOCYTES NFR BLD: 0 %
IRON BINDING CAPACITY (ROCHE): 152 UG/DL (ref 240–430)
IRON SATN MFR SERPL: 32 % (ref 15–46)
IRON SERPL-MCNC: 48 UG/DL (ref 61–157)
KETONES UR STRIP-MCNC: NEGATIVE MG/DL
LEUKOCYTE ESTERASE UR QL STRIP: NEGATIVE
LYMPHOCYTES # BLD AUTO: 0.7 10E3/UL (ref 0.8–5.3)
LYMPHOCYTES # BLD AUTO: 0.8 10E3/UL (ref 0.8–5.3)
LYMPHOCYTES NFR BLD AUTO: 11 %
LYMPHOCYTES NFR BLD AUTO: 12 %
MCH RBC QN AUTO: 18.4 PG (ref 26.5–33)
MCH RBC QN AUTO: 18.5 PG (ref 26.5–33)
MCH RBC QN AUTO: 18.6 PG (ref 26.5–33)
MCH RBC QN AUTO: 18.6 PG (ref 26.5–33)
MCHC RBC AUTO-ENTMCNC: 29.9 G/DL (ref 31.5–36.5)
MCHC RBC AUTO-ENTMCNC: 30.3 G/DL (ref 31.5–36.5)
MCHC RBC AUTO-ENTMCNC: 30.3 G/DL (ref 31.5–36.5)
MCHC RBC AUTO-ENTMCNC: 30.8 G/DL (ref 31.5–36.5)
MCV RBC AUTO: 60 FL (ref 78–100)
MCV RBC AUTO: 61 FL (ref 78–100)
MONOCYTES # BLD AUTO: 0.5 10E3/UL (ref 0–1.3)
MONOCYTES # BLD AUTO: 0.5 10E3/UL (ref 0–1.3)
MONOCYTES # BLD AUTO: 0.6 10E3/UL (ref 0–1.3)
MONOCYTES # BLD AUTO: 0.6 10E3/UL (ref 0–1.3)
MONOCYTES NFR BLD AUTO: 10 %
MONOCYTES NFR BLD AUTO: 8 %
MONOCYTES NFR BLD AUTO: 8 %
MONOCYTES NFR BLD AUTO: 9 %
NEUTROPHILS # BLD AUTO: 4.6 10E3/UL (ref 1.6–8.3)
NEUTROPHILS # BLD AUTO: 4.8 10E3/UL (ref 1.6–8.3)
NEUTROPHILS # BLD AUTO: 4.9 10E3/UL (ref 1.6–8.3)
NEUTROPHILS # BLD AUTO: 5 10E3/UL (ref 1.6–8.3)
NEUTROPHILS NFR BLD AUTO: 75 %
NEUTROPHILS NFR BLD AUTO: 76 %
NEUTROPHILS NFR BLD AUTO: 77 %
NEUTROPHILS NFR BLD AUTO: 79 %
NITRATE UR QL: NEGATIVE
NRBC # BLD AUTO: 0 10E3/UL
NRBC BLD AUTO-RTO: 0 /100
OXALATE SERPL-SCNC: 16.9 UMOL/L
PH UR STRIP: 5.5 [PH] (ref 5–7)
PHOSPHATE SERPL-MCNC: 4.2 MG/DL (ref 2.5–4.5)
PHOSPHATE SERPL-MCNC: 4.4 MG/DL (ref 2.5–4.5)
PHOSPHATE SERPL-MCNC: 4.8 MG/DL (ref 2.5–4.5)
PHOSPHATE SERPL-MCNC: 4.9 MG/DL (ref 2.5–4.5)
PHOSPHATE SERPL-MCNC: 5.6 MG/DL (ref 2.5–4.5)
PLAT MORPH BLD: ABNORMAL
PLATELET # BLD AUTO: 181 10E3/UL (ref 150–450)
PLATELET # BLD AUTO: 185 10E3/UL (ref 150–450)
PLATELET # BLD AUTO: 226 10E3/UL (ref 150–450)
PLATELET # BLD AUTO: 255 10E3/UL (ref 150–450)
POTASSIUM BLD-SCNC: 3.1 MMOL/L (ref 3.4–5.3)
POTASSIUM SERPL-SCNC: 2.8 MMOL/L (ref 3.4–5.3)
POTASSIUM SERPL-SCNC: 3.2 MMOL/L (ref 3.4–5.3)
POTASSIUM SERPL-SCNC: 3.3 MMOL/L (ref 3.4–5.3)
POTASSIUM SERPL-SCNC: 3.6 MMOL/L (ref 3.4–5.3)
PTH-INTACT SERPL-MCNC: 426 PG/ML (ref 15–65)
RBC # BLD AUTO: 4.79 10E6/UL (ref 4.4–5.9)
RBC # BLD AUTO: 5.07 10E6/UL (ref 4.4–5.9)
RBC # BLD AUTO: 5.12 10E6/UL (ref 4.4–5.9)
RBC # BLD AUTO: 5.39 10E6/UL (ref 4.4–5.9)
RBC #/AREA URNS AUTO: NORMAL /HPF
RBC MORPH BLD: ABNORMAL
SODIUM SERPL-SCNC: 142 MMOL/L (ref 133–144)
SODIUM SERPL-SCNC: 142 MMOL/L (ref 136–145)
SODIUM SERPL-SCNC: 143 MMOL/L (ref 136–145)
SP GR UR STRIP: 1.01 (ref 1–1.03)
UROBILINOGEN UR STRIP-ACNC: 0.2 E.U./DL
WBC # BLD AUTO: 6.1 10E3/UL (ref 4–11)
WBC # BLD AUTO: 6.2 10E3/UL (ref 4–11)
WBC # BLD AUTO: 6.2 10E3/UL (ref 4–11)
WBC # BLD AUTO: 6.6 10E3/UL (ref 4–11)
WBC #/AREA URNS AUTO: NORMAL /HPF

## 2022-01-01 PROCEDURE — 36415 COLL VENOUS BLD VENIPUNCTURE: CPT | Performed by: PATHOLOGY

## 2022-01-01 PROCEDURE — 99204 OFFICE O/P NEW MOD 45 MIN: CPT | Performed by: SURGERY

## 2022-01-01 PROCEDURE — 0124A COVID-19 VACCINE BIVALENT BOOSTER 12+ (PFIZER): CPT | Performed by: NURSE PRACTITIONER

## 2022-01-01 PROCEDURE — 82728 ASSAY OF FERRITIN: CPT | Performed by: INTERNAL MEDICINE

## 2022-01-01 PROCEDURE — 36415 COLL VENOUS BLD VENIPUNCTURE: CPT

## 2022-01-01 PROCEDURE — 83550 IRON BINDING TEST: CPT | Performed by: INTERNAL MEDICINE

## 2022-01-01 PROCEDURE — 85014 HEMATOCRIT: CPT

## 2022-01-01 PROCEDURE — 82610 CYSTATIN C: CPT | Performed by: PATHOLOGY

## 2022-01-01 PROCEDURE — 96372 THER/PROPH/DIAG INJ SC/IM: CPT | Performed by: INTERNAL MEDICINE

## 2022-01-01 PROCEDURE — 250N000011 HC RX IP 250 OP 636: Performed by: INTERNAL MEDICINE

## 2022-01-01 PROCEDURE — 85018 HEMOGLOBIN: CPT | Performed by: INTERNAL MEDICINE

## 2022-01-01 PROCEDURE — 83945 ASSAY OF OXALATE: CPT | Mod: 90

## 2022-01-01 PROCEDURE — 80069 RENAL FUNCTION PANEL: CPT | Performed by: PHYSICIAN ASSISTANT

## 2022-01-01 PROCEDURE — 85014 HEMATOCRIT: CPT | Performed by: INTERNAL MEDICINE

## 2022-01-01 PROCEDURE — 83970 ASSAY OF PARATHORMONE: CPT | Performed by: PHYSICIAN ASSISTANT

## 2022-01-01 PROCEDURE — 82040 ASSAY OF SERUM ALBUMIN: CPT | Performed by: PHYSICIAN ASSISTANT

## 2022-01-01 PROCEDURE — 85014 HEMATOCRIT: CPT | Performed by: PATHOLOGY

## 2022-01-01 PROCEDURE — 85018 HEMOGLOBIN: CPT

## 2022-01-01 PROCEDURE — 85025 COMPLETE CBC W/AUTO DIFF WBC: CPT | Performed by: INTERNAL MEDICINE

## 2022-01-01 PROCEDURE — 80069 RENAL FUNCTION PANEL: CPT | Performed by: PATHOLOGY

## 2022-01-01 PROCEDURE — 99215 OFFICE O/P EST HI 40 MIN: CPT | Performed by: PHYSICIAN ASSISTANT

## 2022-01-01 PROCEDURE — 99000 SPECIMEN HANDLING OFFICE-LAB: CPT

## 2022-01-01 PROCEDURE — 99213 OFFICE O/P EST LOW 20 MIN: CPT | Performed by: INTERNAL MEDICINE

## 2022-01-01 PROCEDURE — 91312 COVID-19 VACCINE BIVALENT BOOSTER 12+ (PFIZER): CPT | Performed by: NURSE PRACTITIONER

## 2022-01-01 PROCEDURE — 85025 COMPLETE CBC W/AUTO DIFF WBC: CPT | Performed by: PHYSICIAN ASSISTANT

## 2022-01-01 PROCEDURE — 36415 COLL VENOUS BLD VENIPUNCTURE: CPT | Performed by: PHYSICIAN ASSISTANT

## 2022-01-01 PROCEDURE — 36415 COLL VENOUS BLD VENIPUNCTURE: CPT | Performed by: INTERNAL MEDICINE

## 2022-01-01 PROCEDURE — 36415 COLL VENOUS BLD VENIPUNCTURE: CPT | Mod: 90

## 2022-01-01 PROCEDURE — 82310 ASSAY OF CALCIUM: CPT | Performed by: PHYSICIAN ASSISTANT

## 2022-01-01 PROCEDURE — 99213 OFFICE O/P EST LOW 20 MIN: CPT | Performed by: NURSE PRACTITIONER

## 2022-01-01 PROCEDURE — 99215 OFFICE O/P EST HI 40 MIN: CPT | Mod: 95 | Performed by: INTERNAL MEDICINE

## 2022-01-01 PROCEDURE — 99213 OFFICE O/P EST LOW 20 MIN: CPT | Mod: 25 | Performed by: FAMILY MEDICINE

## 2022-01-01 PROCEDURE — 85018 HEMOGLOBIN: CPT | Performed by: PATHOLOGY

## 2022-01-01 PROCEDURE — G0463 HOSPITAL OUTPT CLINIC VISIT: HCPCS | Mod: 25

## 2022-01-01 PROCEDURE — 81001 URINALYSIS AUTO W/SCOPE: CPT

## 2022-01-01 PROCEDURE — 20611 DRAIN/INJ JOINT/BURSA W/US: CPT | Mod: 50 | Performed by: FAMILY MEDICINE

## 2022-01-01 RX ORDER — TERAZOSIN 2 MG/1
CAPSULE ORAL
Qty: 30 CAPSULE | Refills: 11 | Status: SHIPPED | OUTPATIENT
Start: 2022-01-01 | End: 2022-01-01

## 2022-01-01 RX ORDER — EPINEPHRINE 1 MG/ML
0.3 INJECTION, SOLUTION, CONCENTRATE INTRAVENOUS EVERY 5 MIN PRN
Status: CANCELLED | OUTPATIENT
Start: 2022-01-01

## 2022-01-01 RX ORDER — CALCITRIOL 0.25 UG/1
0.25 CAPSULE, LIQUID FILLED ORAL DAILY
Qty: 90 CAPSULE | Refills: 3 | Status: ON HOLD | OUTPATIENT
Start: 2022-01-01 | End: 2023-01-01

## 2022-01-01 RX ORDER — FUROSEMIDE 40 MG
TABLET ORAL
Qty: 120 TABLET | Refills: 0 | Status: SHIPPED | OUTPATIENT
Start: 2022-01-01 | End: 2022-01-01

## 2022-01-01 RX ORDER — DIPHENHYDRAMINE HYDROCHLORIDE 50 MG/ML
50 INJECTION INTRAMUSCULAR; INTRAVENOUS
Status: CANCELLED
Start: 2022-01-01

## 2022-01-01 RX ORDER — ALBUTEROL SULFATE 0.83 MG/ML
2.5 SOLUTION RESPIRATORY (INHALATION)
Status: CANCELLED | OUTPATIENT
Start: 2022-01-01

## 2022-01-01 RX ORDER — METHYLPREDNISOLONE SODIUM SUCCINATE 125 MG/2ML
125 INJECTION, POWDER, LYOPHILIZED, FOR SOLUTION INTRAMUSCULAR; INTRAVENOUS
Status: CANCELLED
Start: 2022-01-01

## 2022-01-01 RX ORDER — MEPERIDINE HYDROCHLORIDE 25 MG/ML
25 INJECTION INTRAMUSCULAR; INTRAVENOUS; SUBCUTANEOUS EVERY 30 MIN PRN
Status: CANCELLED | OUTPATIENT
Start: 2022-01-01

## 2022-01-01 RX ORDER — POTASSIUM CHLORIDE 750 MG/1
10 TABLET, EXTENDED RELEASE ORAL DAILY
Qty: 30 TABLET | Refills: 11 | Status: ON HOLD | OUTPATIENT
Start: 2022-01-01 | End: 2023-01-01

## 2022-01-01 RX ORDER — FUROSEMIDE 40 MG
TABLET ORAL
Qty: 120 TABLET | Refills: 0 | Status: SHIPPED | OUTPATIENT
Start: 2022-01-01 | End: 2023-01-01 | Stop reason: ALTCHOICE

## 2022-01-01 RX ORDER — ALBUTEROL SULFATE 90 UG/1
1-2 AEROSOL, METERED RESPIRATORY (INHALATION)
Status: CANCELLED
Start: 2022-01-01

## 2022-01-01 RX ORDER — NALOXONE HYDROCHLORIDE 0.4 MG/ML
0.2 INJECTION, SOLUTION INTRAMUSCULAR; INTRAVENOUS; SUBCUTANEOUS
Status: CANCELLED | OUTPATIENT
Start: 2022-01-01

## 2022-01-01 RX ORDER — NALOXONE HYDROCHLORIDE 0.4 MG/ML
0.2 INJECTION, SOLUTION INTRAMUSCULAR; INTRAVENOUS; SUBCUTANEOUS
Status: CANCELLED | OUTPATIENT
Start: 2023-01-01

## 2022-01-01 RX ORDER — ALBUTEROL SULFATE 90 UG/1
1-2 AEROSOL, METERED RESPIRATORY (INHALATION)
Status: CANCELLED
Start: 2023-01-01

## 2022-01-01 RX ORDER — POTASSIUM CHLORIDE 1500 MG/1
20 TABLET, EXTENDED RELEASE ORAL DAILY
Qty: 30 TABLET | Refills: 11 | Status: CANCELLED | OUTPATIENT
Start: 2022-01-01

## 2022-01-01 RX ORDER — ROPIVACAINE HYDROCHLORIDE 5 MG/ML
3 INJECTION, SOLUTION EPIDURAL; INFILTRATION; PERINEURAL
Status: DISCONTINUED | OUTPATIENT
Start: 2022-01-01 | End: 2023-01-01 | Stop reason: ALTCHOICE

## 2022-01-01 RX ORDER — OXYCODONE HYDROCHLORIDE 5 MG/1
5 TABLET ORAL 2 TIMES DAILY PRN
Qty: 180 TABLET | Refills: 0 | Status: SHIPPED | OUTPATIENT
Start: 2022-01-01 | End: 2023-01-01

## 2022-01-01 RX ORDER — TRIAMCINOLONE ACETONIDE 40 MG/ML
40 INJECTION, SUSPENSION INTRA-ARTICULAR; INTRAMUSCULAR
Status: DISCONTINUED | OUTPATIENT
Start: 2022-01-01 | End: 2023-01-01 | Stop reason: ALTCHOICE

## 2022-01-01 RX ORDER — EPINEPHRINE 1 MG/ML
0.3 INJECTION, SOLUTION, CONCENTRATE INTRAVENOUS EVERY 5 MIN PRN
Status: CANCELLED | OUTPATIENT
Start: 2023-01-01

## 2022-01-01 RX ORDER — METHYLPREDNISOLONE SODIUM SUCCINATE 125 MG/2ML
125 INJECTION, POWDER, LYOPHILIZED, FOR SOLUTION INTRAMUSCULAR; INTRAVENOUS
Status: CANCELLED
Start: 2023-01-01

## 2022-01-01 RX ORDER — CHOLECALCIFEROL (VITAMIN D3) 50 MCG
1 TABLET ORAL DAILY
Qty: 30 TABLET | Refills: 11 | Status: SHIPPED | OUTPATIENT
Start: 2022-01-01 | End: 2023-01-01

## 2022-01-01 RX ORDER — DIPHENHYDRAMINE HYDROCHLORIDE 50 MG/ML
50 INJECTION INTRAMUSCULAR; INTRAVENOUS
Status: CANCELLED
Start: 2023-01-01

## 2022-01-01 RX ORDER — MEPERIDINE HYDROCHLORIDE 25 MG/ML
25 INJECTION INTRAMUSCULAR; INTRAVENOUS; SUBCUTANEOUS EVERY 30 MIN PRN
Status: CANCELLED | OUTPATIENT
Start: 2023-01-01

## 2022-01-01 RX ORDER — ALBUTEROL SULFATE 0.83 MG/ML
2.5 SOLUTION RESPIRATORY (INHALATION)
Status: CANCELLED | OUTPATIENT
Start: 2023-01-01

## 2022-01-01 RX ORDER — TERAZOSIN 5 MG/1
CAPSULE ORAL
Qty: 90 CAPSULE | Refills: 3 | Status: CANCELLED
Start: 2022-01-01

## 2022-01-01 RX ORDER — FUROSEMIDE 40 MG
TABLET ORAL
Qty: 120 TABLET | Refills: 1 | Status: SHIPPED | OUTPATIENT
Start: 2022-01-01 | End: 2022-01-01

## 2022-01-01 RX ADMIN — DARBEPOETIN ALFA 100 MCG: 100 INJECTION, SOLUTION INTRAVENOUS; SUBCUTANEOUS at 13:02

## 2022-01-01 RX ADMIN — TRIAMCINOLONE ACETONIDE 40 MG: 40 INJECTION, SUSPENSION INTRA-ARTICULAR; INTRAMUSCULAR at 10:15

## 2022-01-01 RX ADMIN — ROPIVACAINE HYDROCHLORIDE 3 ML: 5 INJECTION, SOLUTION EPIDURAL; INFILTRATION; PERINEURAL at 10:15

## 2022-01-01 RX ADMIN — DARBEPOETIN ALFA 100 MCG: 100 INJECTION, SOLUTION INTRAVENOUS; SUBCUTANEOUS at 12:30

## 2022-01-01 RX ADMIN — DARBEPOETIN ALFA 100 MCG: 100 INJECTION, SOLUTION INTRAVENOUS; SUBCUTANEOUS at 13:12

## 2022-01-01 RX ADMIN — DARBEPOETIN ALFA 100 MCG: 100 INJECTION, SOLUTION INTRAVENOUS; SUBCUTANEOUS at 13:31

## 2022-01-01 RX ADMIN — DARBEPOETIN ALFA 100 MCG: 100 INJECTION, SOLUTION INTRAVENOUS; SUBCUTANEOUS at 13:37

## 2022-01-01 ASSESSMENT — PAIN SCALES - GENERAL
PAINLEVEL: WORST PAIN (10)
PAINLEVEL: SEVERE PAIN (6)
PAINLEVEL: WORST PAIN (10)
PAINLEVEL: EXTREME PAIN (8)

## 2022-01-01 ASSESSMENT — ANXIETY QUESTIONNAIRES
GAD7 TOTAL SCORE: 1
1. FEELING NERVOUS, ANXIOUS, OR ON EDGE: NOT AT ALL
6. BECOMING EASILY ANNOYED OR IRRITABLE: SEVERAL DAYS
7. FEELING AFRAID AS IF SOMETHING AWFUL MIGHT HAPPEN: NOT AT ALL
GAD7 TOTAL SCORE: 1
2. NOT BEING ABLE TO STOP OR CONTROL WORRYING: NOT AT ALL
5. BEING SO RESTLESS THAT IT IS HARD TO SIT STILL: NOT AT ALL
3. WORRYING TOO MUCH ABOUT DIFFERENT THINGS: NOT AT ALL
IF YOU CHECKED OFF ANY PROBLEMS ON THIS QUESTIONNAIRE, HOW DIFFICULT HAVE THESE PROBLEMS MADE IT FOR YOU TO DO YOUR WORK, TAKE CARE OF THINGS AT HOME, OR GET ALONG WITH OTHER PEOPLE: SOMEWHAT DIFFICULT

## 2022-01-01 ASSESSMENT — PATIENT HEALTH QUESTIONNAIRE - PHQ9
5. POOR APPETITE OR OVEREATING: NOT AT ALL
SUM OF ALL RESPONSES TO PHQ QUESTIONS 1-9: 5

## 2022-01-20 NOTE — PROGRESS NOTES
"  Assessment & Plan     Chronic left-sided low back pain with left-sided sciatica  Unchanged.  Patient only reporting more instability and balance issues.  Recommended physical therapy but patient declined.  - oxyCODONE (ROXICODONE) 5 MG tablet; Take 1 tablet (5 mg) by mouth 2 times daily as needed for severe pain This is a 90 day supply    Thalassemia minor  Due for lab follow-up.  - CBC with platelets; Future  - CBC with platelets    Stage 3b chronic kidney disease (H)  Due for recheck of kidney function.  He last saw nephrology in May.  - Basic metabolic panel  (Ca, Cl, CO2, Creat, Gluc, K, Na, BUN); Future  - Basic metabolic panel  (Ca, Cl, CO2, Creat, Gluc, K, Na, BUN)    Chronic heart failure with preserved ejection fraction (H)  Currently asymptomatic.  He is taking the furosemide as needed for edema.  States that nephrology told him to minimize diuretic use.    Screening for hyperlipidemia  - Lipid panel reflex to direct LDL Non-fasting; Future  - Lipid panel reflex to direct LDL Non-fasting             BMI:   Estimated body mass index is 27.78 kg/m  as calculated from the following:    Height as of this encounter: 1.549 m (5' 1\").    Weight as of this encounter: 66.7 kg (147 lb).           Return in about 3 months (around 4/21/2022).    The risks, benefits and treatment options of prescribed medications or other treatments have been discussed with the patient. The patient verbalized their understanding and should call or follow up if no improvement or if they develop further problems.    GUNJAN Werner CNP  [facility]        Marcus Darnell is a 81 year old who presents for the following health issues     HPI     Pain History:  When did you first notice your pain? - More than 6 weeks   Have you seen this provider for your pain in the past?   Yes   Where in your body do you have pain? Back and ankles, shoulders  Are you seeing anyone else for your pain? No    PHQ-9 SCORE " 5/22/2018 5/31/2019 6/16/2021   PHQ-9 Total Score 1 3 3       SARAH-7 SCORE 5/22/2018 5/31/2019 6/16/2021   Total Score 0 0 1       PHQ-9 SCORE 5/22/2018 5/31/2019 6/16/2021   PHQ-9 Total Score 1 3 3     PEG Score 1/21/2022   PEG Total Score 9       Chronic Pain Follow Up:    Analgesia/pain control:    - Recent changes:  no    - Overall control: Tolerable with discomfort    - Current treatments: opioids   Adherence:     - Do you ever take more pain medicine than prescribed? No    - When did you take your last dose of pain medicine?  He is out of medication-  Hasn't taken any for a week   Adverse effects: No     Patient reports that he is having more feelings of instability in his left leg.  Wife reports that his balance is poor.      PDMP Review       Value Time User    State PDMP site checked  Yes 1/21/2022 10:10 AM Shayy Ramírez APRN CNP        Last CSA Agreement:   CSA -- Patient Level:    Controlled Substance Agreement - Opioid - Scan on 6/17/2021  7:30 PM         Heart Failure Follow-up    Are you experiencing any shortness of breath? No    Are you experiencing any swelling in your legs or feet?  No    Are you using more pillows than usual? No    Do you cough at night?  No    Do you check your weight daily?  No    Have you had a weight change recently?  No    Are you having any of the following side effects from your medications? (Select all that apply)  The patient does not report symptoms of dizziness, fatigue, cough, swelling, or slow heart beat.    Since your last visit, how many times have you gone to the cardiologist, urgent care, emergency room, or hospital because of your heart failure?   None    Chronic Kidney Disease Follow-up      Do you take any over the counter pain medicine?: No          Review of Systems   Constitutional, HEENT, cardiovascular, pulmonary, GI, , musculoskeletal, neuro, skin, endocrine and psych systems are negative, except as otherwise noted.      Objective    /58 (BP  "Location: Right arm)   Pulse 76   Temp 97.9  F (36.6  C) (Tympanic)   Ht 1.549 m (5' 1\")   Wt 66.7 kg (147 lb)   SpO2 99%   BMI 27.78 kg/m    Body mass index is 27.78 kg/m .  Physical Exam   GENERAL: healthy, alert and no distress  NECK: no adenopathy, no asymmetry, masses, or scars and thyroid normal to palpation  RESP: lungs clear to auscultation - no rales, rhonchi or wheezes  CV: regular rate and rhythm, normal S1 S2, no S3 or S4, no murmur, click or rub, no peripheral edema and peripheral pulses strong  ABDOMEN: soft, nontender, no hepatosplenomegaly, no masses and bowel sounds normal  MS: no gross musculoskeletal defects noted, no edema                  "

## 2022-01-21 ENCOUNTER — OFFICE VISIT (OUTPATIENT)
Dept: FAMILY MEDICINE | Facility: CLINIC | Age: 82
End: 2022-01-21
Payer: COMMERCIAL

## 2022-01-21 VITALS
OXYGEN SATURATION: 99 % | BODY MASS INDEX: 27.75 KG/M2 | DIASTOLIC BLOOD PRESSURE: 58 MMHG | WEIGHT: 147 LBS | HEART RATE: 76 BPM | HEIGHT: 61 IN | SYSTOLIC BLOOD PRESSURE: 120 MMHG | TEMPERATURE: 97.9 F

## 2022-01-21 DIAGNOSIS — G89.29 CHRONIC LEFT-SIDED LOW BACK PAIN WITH LEFT-SIDED SCIATICA: Primary | ICD-10-CM

## 2022-01-21 DIAGNOSIS — D64.9 ANEMIA, UNSPECIFIED TYPE: ICD-10-CM

## 2022-01-21 DIAGNOSIS — Z13.220 SCREENING FOR HYPERLIPIDEMIA: ICD-10-CM

## 2022-01-21 DIAGNOSIS — D56.8 OTHER THALASSEMIA (H): ICD-10-CM

## 2022-01-21 DIAGNOSIS — I50.32 CHRONIC HEART FAILURE WITH PRESERVED EJECTION FRACTION (H): ICD-10-CM

## 2022-01-21 DIAGNOSIS — N18.32 STAGE 3B CHRONIC KIDNEY DISEASE (H): ICD-10-CM

## 2022-01-21 DIAGNOSIS — M54.42 CHRONIC LEFT-SIDED LOW BACK PAIN WITH LEFT-SIDED SCIATICA: Primary | ICD-10-CM

## 2022-01-21 PROBLEM — E66.01 MORBID OBESITY (H): Status: RESOLVED | Noted: 2021-01-20 | Resolved: 2022-01-21

## 2022-01-21 LAB
ANION GAP SERPL CALCULATED.3IONS-SCNC: 5 MMOL/L (ref 3–14)
BUN SERPL-MCNC: 50 MG/DL (ref 7–30)
CALCIUM SERPL-MCNC: 8 MG/DL (ref 8.5–10.1)
CHLORIDE BLD-SCNC: 112 MMOL/L (ref 94–109)
CHOLEST SERPL-MCNC: 145 MG/DL
CO2 SERPL-SCNC: 24 MMOL/L (ref 20–32)
CREAT SERPL-MCNC: 3.56 MG/DL (ref 0.66–1.25)
ERYTHROCYTE [DISTWIDTH] IN BLOOD BY AUTOMATED COUNT: 19.9 % (ref 10–15)
FASTING STATUS PATIENT QL REPORTED: NO
FERRITIN SERPL-MCNC: 280 NG/ML (ref 26–388)
GFR SERPL CREATININE-BSD FRML MDRD: 16 ML/MIN/1.73M2
GLUCOSE BLD-MCNC: 92 MG/DL (ref 70–99)
HCT VFR BLD AUTO: 20.5 % (ref 40–53)
HDLC SERPL-MCNC: 83 MG/DL
HGB BLD-MCNC: 6.4 G/DL (ref 13.3–17.7)
IRON SERPL-MCNC: 80 UG/DL (ref 35–180)
LDLC SERPL CALC-MCNC: 55 MG/DL
MCH RBC QN AUTO: 19.3 PG (ref 26.5–33)
MCHC RBC AUTO-ENTMCNC: 31.2 G/DL (ref 31.5–36.5)
MCV RBC AUTO: 62 FL (ref 78–100)
NONHDLC SERPL-MCNC: 62 MG/DL
PLATELET # BLD AUTO: 200 10E3/UL (ref 150–450)
POTASSIUM BLD-SCNC: 3.6 MMOL/L (ref 3.4–5.3)
RBC # BLD AUTO: 3.31 10E6/UL (ref 4.4–5.9)
SODIUM SERPL-SCNC: 141 MMOL/L (ref 133–144)
TRIGL SERPL-MCNC: 34 MG/DL
WBC # BLD AUTO: 4.8 10E3/UL (ref 4–11)

## 2022-01-21 PROCEDURE — 83540 ASSAY OF IRON: CPT | Performed by: NURSE PRACTITIONER

## 2022-01-21 PROCEDURE — 80048 BASIC METABOLIC PNL TOTAL CA: CPT | Performed by: NURSE PRACTITIONER

## 2022-01-21 PROCEDURE — 85027 COMPLETE CBC AUTOMATED: CPT | Performed by: NURSE PRACTITIONER

## 2022-01-21 PROCEDURE — 80061 LIPID PANEL: CPT | Performed by: NURSE PRACTITIONER

## 2022-01-21 PROCEDURE — 99214 OFFICE O/P EST MOD 30 MIN: CPT | Performed by: NURSE PRACTITIONER

## 2022-01-21 PROCEDURE — 36415 COLL VENOUS BLD VENIPUNCTURE: CPT | Performed by: NURSE PRACTITIONER

## 2022-01-21 PROCEDURE — 82728 ASSAY OF FERRITIN: CPT | Performed by: NURSE PRACTITIONER

## 2022-01-21 RX ORDER — OXYCODONE HYDROCHLORIDE 5 MG/1
5 TABLET ORAL 2 TIMES DAILY PRN
Qty: 180 TABLET | Refills: 0 | Status: SHIPPED | OUTPATIENT
Start: 2022-01-21 | End: 2022-05-03

## 2022-01-21 ASSESSMENT — MIFFLIN-ST. JEOR: SCORE: 1235.17

## 2022-01-24 ENCOUNTER — TELEPHONE (OUTPATIENT)
Dept: FAMILY MEDICINE | Facility: CLINIC | Age: 82
End: 2022-01-24
Payer: COMMERCIAL

## 2022-01-24 NOTE — TELEPHONE ENCOUNTER
Reason for Call:  Other prescription    Detailed comments: [facility] calling stating the insurance needs a diagnosis why patient is taking Oxycodone. Please call [facility]. Reference # [id number]    Phone Number Patient can be reached at: Other phone number: [phone]    Best Time: any    Can we leave a detailed message on this number? YES    Call taken on 1/24/2022 at 11:29 AM by Carlie Saucedo

## 2022-01-25 DIAGNOSIS — D56.8 OTHER THALASSEMIA (H): Primary | ICD-10-CM

## 2022-01-26 ENCOUNTER — LAB (OUTPATIENT)
Dept: LAB | Facility: CLINIC | Age: 82
End: 2022-01-26
Payer: COMMERCIAL

## 2022-01-26 ENCOUNTER — INFUSION THERAPY VISIT (OUTPATIENT)
Dept: INFUSION THERAPY | Facility: CLINIC | Age: 82
End: 2022-01-26
Attending: INTERNAL MEDICINE
Payer: COMMERCIAL

## 2022-01-26 ENCOUNTER — ONCOLOGY VISIT (OUTPATIENT)
Dept: ONCOLOGY | Facility: CLINIC | Age: 82
End: 2022-01-26
Attending: INTERNAL MEDICINE
Payer: COMMERCIAL

## 2022-01-26 VITALS
RESPIRATION RATE: 12 BRPM | DIASTOLIC BLOOD PRESSURE: 69 MMHG | TEMPERATURE: 98.5 F | SYSTOLIC BLOOD PRESSURE: 132 MMHG | OXYGEN SATURATION: 99 % | BODY MASS INDEX: 30.04 KG/M2 | HEART RATE: 78 BPM | WEIGHT: 159 LBS

## 2022-01-26 DIAGNOSIS — D63.1 ANEMIA OF CHRONIC RENAL FAILURE, STAGE 3B (H): ICD-10-CM

## 2022-01-26 DIAGNOSIS — D56.8 OTHER THALASSEMIA (H): Primary | ICD-10-CM

## 2022-01-26 DIAGNOSIS — N18.32 ANEMIA OF CHRONIC RENAL FAILURE, STAGE 3B (H): ICD-10-CM

## 2022-01-26 DIAGNOSIS — D63.1 ANEMIA OF CHRONIC RENAL FAILURE, STAGE 3B (H): Primary | ICD-10-CM

## 2022-01-26 DIAGNOSIS — N18.32 STAGE 3B CHRONIC KIDNEY DISEASE (H): ICD-10-CM

## 2022-01-26 DIAGNOSIS — D56.8 OTHER THALASSEMIA (H): ICD-10-CM

## 2022-01-26 DIAGNOSIS — K28.9 MARGINAL ULCER: ICD-10-CM

## 2022-01-26 DIAGNOSIS — N18.32 ANEMIA OF CHRONIC RENAL FAILURE, STAGE 3B (H): Primary | ICD-10-CM

## 2022-01-26 DIAGNOSIS — N18.32 STAGE 3B CHRONIC KIDNEY DISEASE (H): Primary | ICD-10-CM

## 2022-01-26 LAB
ALBUMIN SERPL-MCNC: 2.8 G/DL (ref 3.4–5)
ALBUMIN UR-MCNC: 100 MG/DL
ANION GAP SERPL CALCULATED.3IONS-SCNC: 5 MMOL/L (ref 3–14)
APPEARANCE UR: ABNORMAL
BASOPHILS # BLD AUTO: 0 10E3/UL (ref 0–0.2)
BASOPHILS NFR BLD AUTO: 1 %
BILIRUB UR QL STRIP: NEGATIVE
BUN SERPL-MCNC: 54 MG/DL (ref 7–30)
CALCIUM SERPL-MCNC: 7.9 MG/DL (ref 8.5–10.1)
CHLORIDE BLD-SCNC: 114 MMOL/L (ref 94–109)
CO2 SERPL-SCNC: 25 MMOL/L (ref 20–32)
COLOR UR AUTO: YELLOW
CREAT SERPL-MCNC: 4.03 MG/DL (ref 0.66–1.25)
EOSINOPHIL # BLD AUTO: 0.1 10E3/UL (ref 0–0.7)
EOSINOPHIL NFR BLD AUTO: 2 %
ERYTHROCYTE [DISTWIDTH] IN BLOOD BY AUTOMATED COUNT: 19.6 % (ref 10–15)
GFR SERPL CREATININE-BSD FRML MDRD: 14 ML/MIN/1.73M2
GLUCOSE BLD-MCNC: 128 MG/DL (ref 70–99)
GLUCOSE UR STRIP-MCNC: NEGATIVE MG/DL
HCT VFR BLD AUTO: 19.6 % (ref 40–53)
HGB BLD-MCNC: 6.1 G/DL (ref 13.3–17.7)
HGB UR QL STRIP: NEGATIVE
IMM GRANULOCYTES # BLD: 0 10E3/UL
IMM GRANULOCYTES NFR BLD: 1 %
IRON SATN MFR SERPL: 31 % (ref 15–46)
IRON SERPL-MCNC: 64 UG/DL (ref 35–180)
KETONES UR STRIP-MCNC: NEGATIVE MG/DL
LEUKOCYTE ESTERASE UR QL STRIP: NEGATIVE
LYMPHOCYTES # BLD AUTO: 0.7 10E3/UL (ref 0.8–5.3)
LYMPHOCYTES NFR BLD AUTO: 17 %
MCH RBC QN AUTO: 19.5 PG (ref 26.5–33)
MCHC RBC AUTO-ENTMCNC: 31.1 G/DL (ref 31.5–36.5)
MCV RBC AUTO: 63 FL (ref 78–100)
MONOCYTES # BLD AUTO: 0.3 10E3/UL (ref 0–1.3)
MONOCYTES NFR BLD AUTO: 9 %
NEUTROPHILS # BLD AUTO: 2.7 10E3/UL (ref 1.6–8.3)
NEUTROPHILS NFR BLD AUTO: 70 %
NITRATE UR QL: NEGATIVE
NRBC # BLD AUTO: 0 10E3/UL
NRBC BLD AUTO-RTO: 0 /100
PH UR STRIP: 5 [PH] (ref 5–7)
PHOSPHATE SERPL-MCNC: 2.7 MG/DL (ref 2.5–4.5)
PLATELET # BLD AUTO: 179 10E3/UL (ref 150–450)
POTASSIUM BLD-SCNC: 3.7 MMOL/L (ref 3.4–5.3)
RBC # BLD AUTO: 3.13 10E6/UL (ref 4.4–5.9)
RBC URINE: <1 /HPF
SODIUM SERPL-SCNC: 144 MMOL/L (ref 133–144)
SP GR UR STRIP: 1.02 (ref 1–1.03)
SQUAMOUS EPITHELIAL: <1 /HPF
TIBC SERPL-MCNC: 209 UG/DL (ref 240–430)
UROBILINOGEN UR STRIP-MCNC: NORMAL MG/DL
WBC # BLD AUTO: 3.8 10E3/UL (ref 4–11)
WBC URINE: 3 /HPF

## 2022-01-26 PROCEDURE — 96372 THER/PROPH/DIAG INJ SC/IM: CPT | Performed by: INTERNAL MEDICINE

## 2022-01-26 PROCEDURE — 80069 RENAL FUNCTION PANEL: CPT

## 2022-01-26 PROCEDURE — 36415 COLL VENOUS BLD VENIPUNCTURE: CPT

## 2022-01-26 PROCEDURE — 84156 ASSAY OF PROTEIN URINE: CPT

## 2022-01-26 PROCEDURE — 250N000011 HC RX IP 250 OP 636: Performed by: INTERNAL MEDICINE

## 2022-01-26 PROCEDURE — 99214 OFFICE O/P EST MOD 30 MIN: CPT | Performed by: INTERNAL MEDICINE

## 2022-01-26 PROCEDURE — 83550 IRON BINDING TEST: CPT

## 2022-01-26 PROCEDURE — 85025 COMPLETE CBC W/AUTO DIFF WBC: CPT

## 2022-01-26 PROCEDURE — 81001 URINALYSIS AUTO W/SCOPE: CPT

## 2022-01-26 RX ORDER — MEPERIDINE HYDROCHLORIDE 25 MG/ML
25 INJECTION INTRAMUSCULAR; INTRAVENOUS; SUBCUTANEOUS EVERY 30 MIN PRN
Status: CANCELLED | OUTPATIENT
Start: 2022-01-26

## 2022-01-26 RX ORDER — METHYLPREDNISOLONE SODIUM SUCCINATE 125 MG/2ML
125 INJECTION, POWDER, LYOPHILIZED, FOR SOLUTION INTRAMUSCULAR; INTRAVENOUS
Status: CANCELLED
Start: 2022-02-26

## 2022-01-26 RX ORDER — ALBUTEROL SULFATE 0.83 MG/ML
2.5 SOLUTION RESPIRATORY (INHALATION)
Status: CANCELLED | OUTPATIENT
Start: 2022-02-01

## 2022-01-26 RX ORDER — MEPERIDINE HYDROCHLORIDE 25 MG/ML
25 INJECTION INTRAMUSCULAR; INTRAVENOUS; SUBCUTANEOUS EVERY 30 MIN PRN
Status: CANCELLED | OUTPATIENT
Start: 2022-02-26

## 2022-01-26 RX ORDER — METHYLPREDNISOLONE SODIUM SUCCINATE 125 MG/2ML
125 INJECTION, POWDER, LYOPHILIZED, FOR SOLUTION INTRAMUSCULAR; INTRAVENOUS
Status: CANCELLED
Start: 2022-01-26

## 2022-01-26 RX ORDER — METHYLPREDNISOLONE SODIUM SUCCINATE 125 MG/2ML
125 INJECTION, POWDER, LYOPHILIZED, FOR SOLUTION INTRAMUSCULAR; INTRAVENOUS
Status: CANCELLED
Start: 2022-02-01

## 2022-01-26 RX ORDER — NALOXONE HYDROCHLORIDE 0.4 MG/ML
0.2 INJECTION, SOLUTION INTRAMUSCULAR; INTRAVENOUS; SUBCUTANEOUS
Status: CANCELLED | OUTPATIENT
Start: 2022-01-26

## 2022-01-26 RX ORDER — NALOXONE HYDROCHLORIDE 0.4 MG/ML
0.2 INJECTION, SOLUTION INTRAMUSCULAR; INTRAVENOUS; SUBCUTANEOUS
Status: CANCELLED | OUTPATIENT
Start: 2022-02-26

## 2022-01-26 RX ORDER — ALBUTEROL SULFATE 90 UG/1
1-2 AEROSOL, METERED RESPIRATORY (INHALATION)
Status: CANCELLED
Start: 2022-01-26

## 2022-01-26 RX ORDER — ALBUTEROL SULFATE 90 UG/1
1-2 AEROSOL, METERED RESPIRATORY (INHALATION)
Status: CANCELLED
Start: 2022-02-26

## 2022-01-26 RX ORDER — ALBUTEROL SULFATE 0.83 MG/ML
2.5 SOLUTION RESPIRATORY (INHALATION)
Status: CANCELLED | OUTPATIENT
Start: 2022-01-26

## 2022-01-26 RX ORDER — ALBUTEROL SULFATE 90 UG/1
1-2 AEROSOL, METERED RESPIRATORY (INHALATION)
Status: CANCELLED
Start: 2022-02-01

## 2022-01-26 RX ORDER — DIPHENHYDRAMINE HYDROCHLORIDE 50 MG/ML
50 INJECTION INTRAMUSCULAR; INTRAVENOUS
Status: CANCELLED
Start: 2022-01-26

## 2022-01-26 RX ORDER — EPINEPHRINE 1 MG/ML
0.3 INJECTION, SOLUTION, CONCENTRATE INTRAVENOUS EVERY 5 MIN PRN
Status: CANCELLED | OUTPATIENT
Start: 2022-01-26

## 2022-01-26 RX ORDER — ALBUTEROL SULFATE 0.83 MG/ML
2.5 SOLUTION RESPIRATORY (INHALATION)
Status: CANCELLED | OUTPATIENT
Start: 2022-02-26

## 2022-01-26 RX ORDER — DIPHENHYDRAMINE HYDROCHLORIDE 50 MG/ML
50 INJECTION INTRAMUSCULAR; INTRAVENOUS
Status: CANCELLED
Start: 2022-02-01

## 2022-01-26 RX ORDER — NALOXONE HYDROCHLORIDE 0.4 MG/ML
0.2 INJECTION, SOLUTION INTRAMUSCULAR; INTRAVENOUS; SUBCUTANEOUS
Status: CANCELLED | OUTPATIENT
Start: 2022-02-01

## 2022-01-26 RX ORDER — MEPERIDINE HYDROCHLORIDE 25 MG/ML
25 INJECTION INTRAMUSCULAR; INTRAVENOUS; SUBCUTANEOUS EVERY 30 MIN PRN
Status: CANCELLED | OUTPATIENT
Start: 2022-02-01

## 2022-01-26 RX ORDER — DIPHENHYDRAMINE HYDROCHLORIDE 50 MG/ML
50 INJECTION INTRAMUSCULAR; INTRAVENOUS
Status: CANCELLED
Start: 2022-02-26

## 2022-01-26 RX ORDER — EPINEPHRINE 1 MG/ML
0.3 INJECTION, SOLUTION, CONCENTRATE INTRAVENOUS EVERY 5 MIN PRN
Status: CANCELLED | OUTPATIENT
Start: 2022-02-26

## 2022-01-26 RX ORDER — EPINEPHRINE 1 MG/ML
0.3 INJECTION, SOLUTION, CONCENTRATE INTRAVENOUS EVERY 5 MIN PRN
Status: CANCELLED | OUTPATIENT
Start: 2022-02-01

## 2022-01-26 RX ADMIN — DARBEPOETIN ALFA 32 MCG: 40 INJECTION, SOLUTION INTRAVENOUS; SUBCUTANEOUS at 14:52

## 2022-01-26 ASSESSMENT — PAIN SCALES - GENERAL: PAINLEVEL: EXTREME PAIN (8)

## 2022-01-26 NOTE — PROGRESS NOTES
Infusion Nursing Note:  Mann Escobar presents today for Aranes.    Patient seen by provider today: Yes: Dr. Golden   present during visit today: Not Applicable.    Note: N/A.      Intravenous Access:  No Intravenous access/labs at this visit.    Treatment Conditions:  Lab Results   Component Value Date    HGB 6.1 (LL) 01/26/2022    WBC 3.8 (L) 01/26/2022    ANEU 3.7 02/02/2021    ANEUTAUTO 2.7 01/26/2022     01/26/2022      Results reviewed, labs MET treatment parameters, ok to proceed with treatment.      Post Infusion Assessment:  Patient tolerated injection without incident.  Site patent and intact, free from redness, edema or discomfort.  No evidence of extravasations.  Access discontinued per protocol.       Discharge Plan:   Copy of AVS reviewed with patient and/or family.  Patient will return 2/24/22 for next appointment.  Patient discharged in stable condition accompanied by: self.  Departure Mode: Ambulatory.      Maddison Amaya RN                     no abrasions, no jaundice, no lesions, no pruritis, and no rashes.

## 2022-01-26 NOTE — PROGRESS NOTES
The patient is being seen for the following issues:  Encounter Diagnoses   Name Primary?     Other thalassemia (H) Yes     Stage 3b chronic kidney disease (H)      Anemia of chronic renal failure, stage 3b (H)      He recently experienced exacerbation of his chronic anemia due to gastrointestinal bleeding related to peptic ulcer disease.  He also has kidney disease for which he is being seen by nephrology.    His most recent CBC from 5 days ago revealed a hemoglobin of 6.4 with an MCV of 62.  His hemoglobin had been 8.5 about 7 months ago which has been his recent baseline.  Per previous documentation by Dr. Andrew his hemoglobin was running at baseline in the 10 g range prior to 2013.  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  His MCV usually runs around 60. Dr. Andrew gave him IV iron in the past for iron deficiency. He had an iron level checked 5 days ago which was normal at 80 mcg/DL.  Recent BMP revealed a worsening of his baseline chronic kidney disease with a rise in his serum creatinine to 3.5    His hemoglobin today is only 6.1 g/DL but his only symptom from his anemia appears to be increased cold intolerance.    PHYSICAL EXAMINATION:    General: Todays' vital signs reviewed in the EMR.     ECOG PS is 2  Cardiovascular: Cor RRR  Respiratory: Lungs CTA    ASSESSMENT:    Encounter Diagnoses   Name Primary?     Other thalassemia (H) Yes     Stage 3b chronic kidney disease (H)      Anemia of chronic renal failure, stage 3b (H)        His hemoglobin is marginally down but his serum creatinine has deteriorated significantly.  Optimization of treatment for anemia of chronic disease would include administration of erythrocyte stimulating agents and close monitoring of iron saturation to maintain his hemoglobin level.    PLAN:    I think you would benefit from the administration of red blood cell stimulating agents which are recommended in patients with chronic kidney disease to  raise your hemoglobin.  I hope we can get close to about 10 g/DL - which I believe would be your optimal hemoglobin given your history of thalassemia minor/trait.      I have entered orders for this treatment in the hospital computer system and you may receive this treatment in our cancer clinic here in [place], [place] today if we can obtain insurance approval. Your hemoglobin and iron studies will be monitored closely while you receive these injections and if necessary you will also receive intravenous iron.    Please return to see me for an office visit next week to recheck your hemoglobin. I think we will need to monitor you more closely while you are on treatment with Aranesp but hopefully, once your hemoglobin starts to improve after starting Aranesp we can extend your f/u visits to every 12 weeks or so.

## 2022-01-26 NOTE — PROGRESS NOTES
DATE:  1/26/2022   TIME OF RECEIPT FROM LAB: 8646  LAB TEST:  Hemoglobin  LAB VALUE:  6.1  RESULTS GIVEN WITH READ-BACK TO (PROVIDER):  Dr. Golden .  Pt is seeing this provider in clinic at time of results being reported.    TIME LAB VALUE REPORTED TO PROVIDER:   6310      Tangela Becker RN

## 2022-01-26 NOTE — PROGRESS NOTES
"Oncology Rooming Note    January 26, 2022 1:22 PM   Mann Escobar is a 81 year old male who presents for:    Chief Complaint   Patient presents with     Oncology Clinic Visit     thalassemia - Labs and provider visit     Initial Vitals: /69 (BP Location: Right arm, Patient Position: Sitting, Cuff Size: Adult Regular)   Pulse 78   Temp 98.5  F (36.9  C) (Oral)   Resp 12   Wt 72.1 kg (159 lb)   SpO2 99%   BMI 30.04 kg/m   Estimated body mass index is 30.04 kg/m  as calculated from the following:    Height as of 1/21/22: 1.549 m (5' 1\").    Weight as of this encounter: 72.1 kg (159 lb). Body surface area is 1.76 meters squared.  Extreme Pain (8) Comment: Data Unavailable   No LMP for male patient.  Allergies reviewed: Yes  Medications reviewed: Yes    Medications: Medication refills not needed today.  Pharmacy name entered into [employer]:    [facility]  [facility] - [facility], [place] - [address] [place] [address] PHARMACY #41 - [address]    Clinical concerns:  None       Roxie Beasley CMA            "

## 2022-01-26 NOTE — LETTER
1/26/2022         RE: Mann Escobar  [address]  [facility] [address]        Dear Colleague,    Thank you for referring your patient, Mann Escobar, to the [facility]. Please see a copy of my visit note below.    The patient is being seen for the following issues:  Encounter Diagnoses   Name Primary?     Other thalassemia (H) Yes     Stage 3b chronic kidney disease (H)      Anemia of chronic renal failure, stage 3b (H)      He recently experienced exacerbation of his chronic anemia due to gastrointestinal bleeding related to peptic ulcer disease.  He also has kidney disease for which he is being seen by nephrology.    His most recent CBC from 5 days ago revealed a hemoglobin of 6.4 with an MCV of 62.  His hemoglobin had been 8.5 about 7 months ago which has been his recent baseline.  Per previous documentation by Dr. Andrew his hemoglobin was running at baseline in the 10 g range prior to 2013.  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  His MCV usually runs around 60. Dr. Andrew gave him IV iron in the past for iron deficiency. He had an iron level checked 5 days ago which was normal at 80 mcg/DL.  Recent BMP revealed a worsening of his baseline chronic kidney disease with a rise in his serum creatinine to 3.5    His hemoglobin today is only 6.1 g/DL but his only symptom from his anemia appears to be increased cold intolerance.    PHYSICAL EXAMINATION:    General: Todays' vital signs reviewed in the EMR.     ECOG PS is 2  Cardiovascular: Cor RRR  Respiratory: Lungs CTA    ASSESSMENT:    Encounter Diagnoses   Name Primary?     Other thalassemia (H) Yes     Stage 3b chronic kidney disease (H)      Anemia of chronic renal failure, stage 3b (H)        His hemoglobin is marginally down but his serum creatinine has deteriorated significantly.  Optimization of treatment for anemia of chronic disease would include  "administration of erythrocyte stimulating agents and close monitoring of iron saturation to maintain his hemoglobin level.    PLAN:    I think you would benefit from the administration of red blood cell stimulating agents which are recommended in patients with chronic kidney disease to raise your hemoglobin.  I hope we can get close to about 10 g/DL - which I believe would be your optimal hemoglobin given your history of thalassemia minor/trait.      I have entered orders for this treatment in the hospital computer system and you may receive this treatment in our cancer clinic here in [place], [place] today if we can obtain insurance approval. Your hemoglobin and iron studies will be monitored closely while you receive these injections and if necessary you will also receive intravenous iron.    Please return to see me for an office visit next week to recheck your hemoglobin. I think we will need to monitor you more closely while you are on treatment with Aranesp but hopefully, once your hemoglobin starts to improve after starting Aranesp we can extend your f/u visits to every 12 weeks or so.    Oncology Rooming Note    January 26, 2022 1:22 PM   Mann Escobar is a 81 year old male who presents for:    Chief Complaint   Patient presents with     Oncology Clinic Visit     thalassemia - Labs and provider visit     Initial Vitals: /69 (BP Location: Right arm, Patient Position: Sitting, Cuff Size: Adult Regular)   Pulse 78   Temp 98.5  F (36.9  C) (Oral)   Resp 12   Wt 72.1 kg (159 lb)   SpO2 99%   BMI 30.04 kg/m   Estimated body mass index is 30.04 kg/m  as calculated from the following:    Height as of 1/21/22: 1.549 m (5' 1\").    Weight as of this encounter: 72.1 kg (159 lb). Body surface area is 1.76 meters squared.  Extreme Pain (8) Comment: Data Unavailable   No LMP for male patient.  Allergies reviewed: Yes  Medications reviewed: Yes    Medications: Medication refills not needed " today.  Pharmacy name entered into [facility]:    [facility]  [facility] - [facility], [place] - [address] [facility] [address] - [address]    Clinical concerns:  None       Roxie Beasley CMA              DATE:  1/26/2022   TIME OF RECEIPT FROM LAB: 1333  LAB TEST:  Hemoglobin  LAB VALUE:  6.1  RESULTS GIVEN WITH READ-BACK TO (PROVIDER):  Dr. Golden .  Pt is seeing this provider in clinic at time of results being reported.    TIME LAB VALUE REPORTED TO PROVIDER:   1169      Tangela Becker RN      Information on aranesp given to patient today.Liz Daniel, ASTRID on 1/26/2022 at 2:44 PM        Again, thank you for allowing me to participate in the care of your patient.        Sincerely,        Boom Golden MD

## 2022-01-27 LAB
CREAT UR-MCNC: 133 MG/DL
PROT UR-MCNC: 1.07 G/L
PROT/CREAT 24H UR: 0.8 G/G CR (ref 0–0.2)

## 2022-01-27 RX ORDER — SUCRALFATE 1 G/1
1 TABLET ORAL 4 TIMES DAILY
Qty: 56 TABLET | Refills: 0 | Status: SHIPPED | OUTPATIENT
Start: 2022-01-27 | End: 2022-08-09

## 2022-02-01 ENCOUNTER — ONCOLOGY VISIT (OUTPATIENT)
Dept: ONCOLOGY | Facility: CLINIC | Age: 82
End: 2022-02-01
Attending: INTERNAL MEDICINE
Payer: COMMERCIAL

## 2022-02-01 ENCOUNTER — LAB (OUTPATIENT)
Dept: LAB | Facility: CLINIC | Age: 82
End: 2022-02-01
Payer: COMMERCIAL

## 2022-02-01 VITALS
DIASTOLIC BLOOD PRESSURE: 60 MMHG | HEIGHT: 61 IN | TEMPERATURE: 98.1 F | SYSTOLIC BLOOD PRESSURE: 115 MMHG | BODY MASS INDEX: 30.43 KG/M2 | RESPIRATION RATE: 18 BRPM | HEART RATE: 69 BPM | OXYGEN SATURATION: 100 % | WEIGHT: 161.2 LBS

## 2022-02-01 DIAGNOSIS — N18.32 STAGE 3B CHRONIC KIDNEY DISEASE (H): ICD-10-CM

## 2022-02-01 DIAGNOSIS — D56.8 OTHER THALASSEMIA (H): Primary | ICD-10-CM

## 2022-02-01 DIAGNOSIS — N18.32 ANEMIA OF CHRONIC RENAL FAILURE, STAGE 3B (H): ICD-10-CM

## 2022-02-01 DIAGNOSIS — D56.8 OTHER THALASSEMIA (H): ICD-10-CM

## 2022-02-01 DIAGNOSIS — D63.1 ANEMIA OF CHRONIC RENAL FAILURE, STAGE 3B (H): ICD-10-CM

## 2022-02-01 LAB
BASOPHILS # BLD AUTO: 0 10E3/UL (ref 0–0.2)
BASOPHILS NFR BLD AUTO: 1 %
EOSINOPHIL # BLD AUTO: 0.1 10E3/UL (ref 0–0.7)
EOSINOPHIL NFR BLD AUTO: 1 %
ERYTHROCYTE [DISTWIDTH] IN BLOOD BY AUTOMATED COUNT: 19.9 % (ref 10–15)
HCT VFR BLD AUTO: 19.5 % (ref 40–53)
HGB BLD-MCNC: 6.1 G/DL (ref 13.3–17.7)
IMM GRANULOCYTES # BLD: 0 10E3/UL
IMM GRANULOCYTES NFR BLD: 1 %
LYMPHOCYTES # BLD AUTO: 0.5 10E3/UL (ref 0.8–5.3)
LYMPHOCYTES NFR BLD AUTO: 12 %
MCH RBC QN AUTO: 19.8 PG (ref 26.5–33)
MCHC RBC AUTO-ENTMCNC: 31.3 G/DL (ref 31.5–36.5)
MCV RBC AUTO: 63 FL (ref 78–100)
MONOCYTES # BLD AUTO: 0.4 10E3/UL (ref 0–1.3)
MONOCYTES NFR BLD AUTO: 9 %
NEUTROPHILS # BLD AUTO: 3.4 10E3/UL (ref 1.6–8.3)
NEUTROPHILS NFR BLD AUTO: 76 %
NRBC # BLD AUTO: 0 10E3/UL
NRBC BLD AUTO-RTO: 0 /100
PLATELET # BLD AUTO: 202 10E3/UL (ref 150–450)
RBC # BLD AUTO: 3.08 10E6/UL (ref 4.4–5.9)
WBC # BLD AUTO: 4.4 10E3/UL (ref 4–11)

## 2022-02-01 PROCEDURE — 99214 OFFICE O/P EST MOD 30 MIN: CPT | Performed by: INTERNAL MEDICINE

## 2022-02-01 PROCEDURE — 36415 COLL VENOUS BLD VENIPUNCTURE: CPT

## 2022-02-01 PROCEDURE — G0463 HOSPITAL OUTPT CLINIC VISIT: HCPCS

## 2022-02-01 PROCEDURE — 85025 COMPLETE CBC W/AUTO DIFF WBC: CPT

## 2022-02-01 RX ORDER — FOLIC ACID 0.8 MG
800 TABLET ORAL DAILY
Qty: 30 TABLET | Refills: 11 | Status: SHIPPED | OUTPATIENT
Start: 2022-02-01 | End: 2023-01-01

## 2022-02-01 ASSESSMENT — MIFFLIN-ST. JEOR: SCORE: 1299.58

## 2022-02-01 ASSESSMENT — PAIN SCALES - GENERAL: PAINLEVEL: EXTREME PAIN (8)

## 2022-02-01 NOTE — PROGRESS NOTES
The patient is being seen for the following issues:  Encounter Diagnoses   Name Primary?     Other thalassemia (H) Yes     Stage 3b chronic kidney disease (H)      Anemia of chronic renal failure, stage 3b (H)      He recently experienced exacerbation of his chronic anemia due to gastrointestinal bleeding related to peptic ulcer disease.  He also has kidney disease for which he is being seen by nephrology.    His most recent CBC's have been running in the low 6 g range.     His MCV has been around 60.     Per previous documentation by Dr. Andrew his hemoglobin was running at baseline in the 10 g range prior to 2013.  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  His MCV usually runs around 60. Dr. Andrew gave him IV iron in the past for iron deficiency.     Recent iron studies revealed an iron saturation of 31%     His only symptoms from his anemia are mild fatigue and cold toxicity.       PHYSICAL EXAMINATION:    General: Todays' vital signs reviewed in the EMR.     NAD.      ASSESSMENT:    Encounter Diagnoses   Name Primary?     Other thalassemia (H) Yes     Stage 3b chronic kidney disease (H)      Anemia of chronic renal failure, stage 3b (H)        Your hemoglobin is stable but your serum creatinine has deteriorated significantly.  Optimization of treatment for anemia of chronic disease would include administration of erythrocyte stimulating agents which I started in the form of Aranesp injections on 1/26/2022.    I had you return to the clinic today to recheck your hemoglobin and fortunately it has remained stable 6.1 g/DL when compared to last weeks value.    Given the high risk of RBC alloimmunization in patients with thalassemia I would like to avoid RBC transfusion and given your minimal symptoms from your anemia I was not ordering RBC transfusion today but schedule you to return to the clinic for repeat labs in 1 week.    If you experience worsening call toxicity or  fatigue or develop dizziness, shortness of breath, or chest pain you should go to the emergency department immediately for consideration of an RBC transfusion.       PLAN:    Start taking folic acid. I sent a Rx to your pharmacy today.    Return for CBCD and type and cross in 1 week. We will contact you with results by telephone.    Continue Aranesp on or after the 26th of each month.    Iron studies and OV with NP on 4/26 before Aransep injection.    Your hemoglobin and iron studies will be monitored closely while you receive Aranesp injections and if necessary you will also receive intravenous iron.    Please return to my clinic next week to recheck your hemoglobin.

## 2022-02-01 NOTE — PROGRESS NOTES
DATE:  2/1/2022   TIME OF RECEIPT FROM LAB:  249pm  LAB TEST:  HGB  LAB VALUE:  HGB 6.1  RESULTS GIVEN WITH READ-BACK TO (PROVIDER):  Dr. Golden  TIME LAB VALUE REPORTED TO PROVIDER:   250pm    Jud Cadena RN on 2/1/2022 at 2:53 PM

## 2022-02-01 NOTE — LETTER
"    2/1/2022         RE: Mann Escobar  [address]  [facility] [address]        Dear Colleague,    Thank you for referring your patient, Mann Escobar, to the [facility]. Please see a copy of my visit note below.    Oncology Rooming Note    February 1, 2022 2:41 PM   Mann Escobar is a 81 year old male who presents for:    Chief Complaint   Patient presents with     Oncology Clinic Visit     Thalassemia minor - Labs and provider visit     Initial Vitals: /60 (BP Location: Right arm, Patient Position: Sitting, Cuff Size: Adult Regular)   Pulse 69   Temp 98.1  F (36.7  C) (Tympanic)   Resp 18   Ht 1.549 m (5' 1\")   Wt 73.1 kg (161 lb 3.2 oz)   SpO2 100%   BMI 30.46 kg/m   Estimated body mass index is 30.46 kg/m  as calculated from the following:    Height as of this encounter: 1.549 m (5' 1\").    Weight as of this encounter: 73.1 kg (161 lb 3.2 oz). Body surface area is 1.77 meters squared.  Extreme Pain (8) Comment: Data Unavailable   No LMP for male patient.  Allergies reviewed: Yes  Medications reviewed: Yes    Medications: Medication refills not needed today.  Pharmacy name entered into [facility]:    [facility]  [facility] - [address] [facility]ARMACY [address] - [address]    Clinical concerns: Thalassemia minor - Review lab results.       Mirella Armstrong, GRANT              The patient is being seen for the following issues:  Encounter Diagnoses   Name Primary?     Other thalassemia (H) Yes     Stage 3b chronic kidney disease (H)      Anemia of chronic renal failure, stage 3b (H)      He recently experienced exacerbation of his chronic anemia due to gastrointestinal bleeding related to peptic ulcer disease.  He also has kidney disease for which he is being seen by nephrology.    His most recent CBC's have been running in " the low 6 g range.     His MCV has been around 60.     Per previous documentation by Dr. Andrew his hemoglobin was running at baseline in the 10 g range prior to 2013.  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  His MCV usually runs around 60. Dr. Andrew gave him IV iron in the past for iron deficiency.     Recent iron studies revealed an iron saturation of 31%     His only symptoms from his anemia are mild fatigue and cold toxicity.       PHYSICAL EXAMINATION:    General: Todays' vital signs reviewed in the EMR.     NAD.      ASSESSMENT:    Encounter Diagnoses   Name Primary?     Other thalassemia (H) Yes     Stage 3b chronic kidney disease (H)      Anemia of chronic renal failure, stage 3b (H)        Your hemoglobin is stable but your serum creatinine has deteriorated significantly.  Optimization of treatment for anemia of chronic disease would include administration of erythrocyte stimulating agents which I started in the form of Aranesp injections on 1/26/2022.    I had you return to the clinic today to recheck your hemoglobin and fortunately it has remained stable 6.1 g/DL when compared to last weeks value.    Given the high risk of RBC alloimmunization in patients with thalassemia I would like to avoid RBC transfusion and given your minimal symptoms from your anemia I was not ordering RBC transfusion today but schedule you to return to the clinic for repeat labs in 1 week.    If you experience worsening call toxicity or fatigue or develop dizziness, shortness of breath, or chest pain you should go to the emergency department immediately for consideration of an RBC transfusion.       PLAN:    Start taking folic acid. I sent a Rx to your pharmacy today.    Return for CBCD and type and cross in 1 week. We will contact you with results by telephone.    Continue Aranesp on or after the 26th of each month.    Iron studies and OV with NP on 4/26 before Aransep injection.    Your  hemoglobin and iron studies will be monitored closely while you receive Aranesp injections and if necessary you will also receive intravenous iron.    Please return to my clinic next week to recheck your hemoglobin.       DATE:  2/1/2022   TIME OF RECEIPT FROM LAB:  249pm  LAB TEST:  HGB  LAB VALUE:  HGB 6.1  RESULTS GIVEN WITH READ-BACK TO (PROVIDER):  Dr. Golden  TIME LAB VALUE REPORTED TO PROVIDER:   250pm    Jud Cadena RN on 2/1/2022 at 2:53 PM          Again, thank you for allowing me to participate in the care of your patient.        Sincerely,        Boom Golden MD

## 2022-02-01 NOTE — PATIENT INSTRUCTIONS
Start taking folic acid. I sent a Rx to your pharmacy today.    Return for CBCD and type and cross in 1 week. We will contact you with results by telephone.    Continue Aranesp on or after the 26th of each month.    Iron studies and OV with NP on 4/26 before Aransep injection.

## 2022-02-01 NOTE — PROGRESS NOTES
"Oncology Rooming Note    February 1, 2022 2:41 PM   Mann Escobar is a 81 year old male who presents for:    Chief Complaint   Patient presents with     Oncology Clinic Visit     Thalassemia minor - Labs and provider visit     Initial Vitals: /60 (BP Location: Right arm, Patient Position: Sitting, Cuff Size: Adult Regular)   Pulse 69   Temp 98.1  F (36.7  C) (Tympanic)   Resp 18   Ht 1.549 m (5' 1\")   Wt 73.1 kg (161 lb 3.2 oz)   SpO2 100%   BMI 30.46 kg/m   Estimated body mass index is 30.46 kg/m  as calculated from the following:    Height as of this encounter: 1.549 m (5' 1\").    Weight as of this encounter: 73.1 kg (161 lb 3.2 oz). Body surface area is 1.77 meters squared.  Extreme Pain (8) Comment: Data Unavailable   No LMP for male patient.  Allergies reviewed: Yes  Medications reviewed: Yes    Medications: Medication refills not needed today.  Pharmacy name entered into [employer]:    [facility]  [facility] - [facility], [place] - [address] [place] [address] [facility]ACY [id number] - [place], [place] - [address] [place] [address] [address]    Clinical concerns: Thalassemia minor - Review lab results.       Mirella Armstrong [facility]            "

## 2022-02-02 ENCOUNTER — VIRTUAL VISIT (OUTPATIENT)
Dept: NEPHROLOGY | Facility: CLINIC | Age: 82
End: 2022-02-02
Attending: INTERNAL MEDICINE
Payer: COMMERCIAL

## 2022-02-02 DIAGNOSIS — N17.9 ACUTE KIDNEY INJURY SUPERIMPOSED ON CKD (H): ICD-10-CM

## 2022-02-02 DIAGNOSIS — R79.89 HIGH PLASMA OXALATE: Primary | ICD-10-CM

## 2022-02-02 DIAGNOSIS — N18.9 ACUTE KIDNEY INJURY SUPERIMPOSED ON CKD (H): ICD-10-CM

## 2022-02-02 DIAGNOSIS — N18.5 ANEMIA IN STAGE 5 CHRONIC KIDNEY DISEASE, NOT ON CHRONIC DIALYSIS (H): ICD-10-CM

## 2022-02-02 DIAGNOSIS — I10 ESSENTIAL HYPERTENSION: ICD-10-CM

## 2022-02-02 DIAGNOSIS — D63.1 ANEMIA IN STAGE 5 CHRONIC KIDNEY DISEASE, NOT ON CHRONIC DIALYSIS (H): ICD-10-CM

## 2022-02-02 PROCEDURE — 99215 OFFICE O/P EST HI 40 MIN: CPT | Mod: 95 | Performed by: INTERNAL MEDICINE

## 2022-02-02 RX ORDER — AMLODIPINE BESYLATE 5 MG/1
10 TABLET ORAL DAILY
Qty: 30 TABLET | Refills: 4 | Status: SHIPPED | OUTPATIENT
Start: 2022-02-02 | End: 2022-02-14

## 2022-02-02 ASSESSMENT — PAIN SCALES - GENERAL: PAINLEVEL: EXTREME PAIN (8)

## 2022-02-02 NOTE — PROGRESS NOTES
Thi [phone]    Mann is a 81 year old who is being evaluated via a billable video visit.      How would you like to obtain your AVS? Mail a copy  If the video visit is dropped, the invitation should be resent by: Text to cell phone: [phone]  Will anyone else be joining your video visit? No  Video Start Time: 228 pm  Video-Visit Details    Type of service:  Video Visit    Video End Time:301pm    Originating Location (pt. Location): Home    Distant Location (provider location):  [facility] [place]     Platform used for Video Visit: Thi Darnell is a 80 year old who is being evaluated via a billable video visit.        Assessment and Plan:  81 year old male with history of CKD, hypertension, gastric bypass , GI bleeding 2016, iron deficiency anemia and Thalassemia minor, who presents for CKD followup. He also has HFpEF and edema/ lymphedema. He has history of multiple orthopedic surgeries. His Scr is increased from 2 to 4 recently in setting of significant anemia.  # CKD progressively worsening:  Scr has worsened especially in the last 2-3 years, previously was near 1 but up to 1.2-1.3 in 2017 and 1.3-1.5 in 2019 and up to 2 since 2020.  His Scr in July 2020 was noted up to 2, and had ankle surgery 7/23 and Scr remained around 2 since then.    - no NSAIDs in recent years except toradol 7/23/2020 postop   - no other significant nephrotoxins, on diuretics for many years - hydrochlorothiazide previously, on loop diuretics in recent years    - discussed lowering diuretic doses as able, he lowered to once a day / as needed.    - he will try wrapping legs / elevating and monitoring sodium more closely to see if we can lower diuretic dose, however given swelling can get significant and HFpEF, volume control is important.   - given history of gastric bypass, we checked oxalate level- it was elevated, started pyridoxine 50mg has been taking daily not twice a day  - low grade  proteinuria  - discussed biopsy in the next couple of weeks if renal function does not improve  - lower amlodipine to 5mg   -recheck renal function early next week.  - did discuss that if renal function does not improve, he is near dialysis threshold and we discussed dialysis options briefly.  # Hypertension: blood pressure seems to be lower    - he is on amlodipine,which we will lower to 5mg    -continue furosemide 40mg once a day , goal -135 systolic, and controlling swelling in multiple ways    # Anemia- acute on chronic, due to thalassemia and chronic renal disease, gastric ulcer noted recently:   - Hgb down to 6.1, multifactorial, due to thalassemia and kidney disease,   - Iron studies: adequate.    # Electrolytes:   - Potassium; level: Normal / low normal, not taking potassium supplement.  - Bicarbonate; level: Normal/ high normal    # Mineral Bone Disorder:   - Calcium; level is:  Normal  - Phosphorus; level is: Normal      Assessment and plan was discussed with patient and he voiced his understanding and agreement.      Reason for Visit:  Mann Escobar is a 81 year old male with CKD from ? , HFpEF, who presents for followup.    HPI:  He is a pleasant male with history of CKD who is referred for evaluation. He has had elevated creatinine over the past few years, which has fluctuated. He was noted with HFpEF/ moderate diastolic dysfunction.      He has had significant amount of swelling since his ankle surgery last year, but even going back years, he has dealt with swelling and was even in lymphedema clinic many years ago.    He was given furosemide 20 mg then 40mg which helped with the edema.  He lost 30+ lbs of weight with furosemide and now takes it as needed to maintain his weight.   He had a knee replacement in 2000 and has had some edema and then after his ankle surgery in July 2020     His weight was up to 199 lbs from 183 lbs and he thinks this was over just a couple of weeks.  He  is now near 160 lbs.   He had gastric bypass in 2000 and was 280 lbs at that point.    He was taking NSAIDS in the past and had GI bleeding and noted with another ulcer last Fall  He was noted with hemoglobin down to 6.1 recently. Last check was in June 2021. He denies dark stools or evidence of bleeding  He was started on aranesp by hematology.    He has prostate enlargement and has been prescribed terazosin.  He has continued to take this in case it helps.  He states he has been having normal urination and denies change in appetite.   His Scr is up to 4 in recent days. He is eating well and hydrating, does not think he is dehydrated.   His weight change is significant over recent months. His oxalate was elevated when we checked it last year. He did start pyridoxine but only one pill a day.   His ultrasound did show several cysts bilaterally but otherwise unremarkable, with normal sizes though right kidney 10.5 and left 13cm.     Renal History:   Kidney Disease and Medical Hx:  h/o HTN: Yes      ROS:   A comprehensive review of systems was obtained and negative, except as noted in the HPI or PMH.    Active Medical Problems:  Patient Active Problem List   Diagnosis      HX NEPHROLITHIASIS [V13.01]     Thalassemia minor     Esophageal reflux     Family history of prostate cancer     Leg edema     CARDIOVASCULAR SCREENING; LDL GOAL LESS THAN 130     Internal hemorrhoids     Iron deficiency anemia     First degree AV block     Family history of diabetes mellitus     Scoliosis     Hypopotassemia     Advanced directives, counseling/discussion     HTN (hypertension)     Benign non-nodular prostatic hyperplasia with lower urinary tract symptoms     Chronic low back pain     S/P knee replacement     S/P ankle fusion     Abnormal antinuclear antibody titer     Osteoarthritis     Hypertension     BPH (benign prostatic hyperplasia)     Pseudogout     Chronic pain syndrome     S/P ankle joint replacement     Arthritis of ankle,  right     Chronic anemia     Chronic kidney disease, stage 3 (H)     Diastolic dysfunction     Carpal tunnel syndrome     Edema     Nephrolithiasis     Marginal ulcer     Retching     History of Favian-en-Y gastric bypass     Chronic heart failure with preserved ejection fraction (H)     Anemia of chronic renal failure, stage 3b (H)     PMH:   Medical record was reviewed and PMH was discussed with patient and noted below.  Past Medical History:   Diagnosis Date     Arthritis of ankle, left 7/24/2020     Back pain     WITH BILATERAL LEG PAIN AND NUMBNESS OF BOTH FEET     Gastro-oesophageal reflux disease     REFLUX     Hypertension      Hypopotassemia      Internal hemorrhoids      Iron deficiency anaemia      Leg edema      Morbid obesity 9/12/2005     Morbid obesity (H)      Osteoarthrosis      Scoliosis      Thalassemia minor      PSH:   Past Surgical History:   Procedure Laterality Date     CATARACT IOL, RT/LT  2008/    Cataract IOL RT/LT     COLONOSCOPY  2009/07    polyps removed repeat 5 yrs, tubular adenoma     COLONOSCOPY  9/29/2011    Procedure:COLONOSCOPY; Colonoscopy with hemorrhoid banding; Surgeon:JEREMY GARCIA; Location:[facility]     COLONOSCOPY N/A 12/19/2017    Procedure: COLONOSCOPY;  colonoscopy, gastroscopy;  Surgeon: Edmar Isaacs MD;  Location: [facility]     DECOMPRESSION LUMBAR MINIMALLY INVASIVE TWO LEVELS  5/2/2012    Procedure:DECOMPRESSION LUMBAR MINIMALLY INVASIVE TWO LEVELS; Surgeon:LOTTIE MITCHELL; [facility]     ESOPHAGOSCOPY, GASTROSCOPY, DUODENOSCOPY (EGD), COMBINED N/A 2/6/2018    Procedure: COMBINED ESOPHAGOSCOPY, GASTROSCOPY, DUODENOSCOPY (EGD);  gastroscopy;  Surgeon: Edmar Isaacs MD;  Location: [facility]     ESOPHAGOSCOPY, GASTROSCOPY, DUODENOSCOPY (EGD), COMBINED N/A 10/18/2021    Procedure: ESOPHAGOGASTRODUODENOSCOPY (EGD);  Surgeon: Anju Galeano MD;  Location: [facility]     FUSION SPINE POSTERIOR MINIMALLY INVASIVE ONE LEVEL  5/2/2012    Procedure:FUSION SPINE POSTERIOR  MINIMALLY INVASIVE ONE LEVEL; Minimal Access Spinal Technology Transformaninal Lumbar Interbody Fusion L4-5, Decompression L3-5; Surgeon:LOTTIE MITCHELL; Location[facility]     HC REMOVAL OF HYDROCELE,TUNICA,UNILAT  2006    bilateral     ORTHOPEDIC SURGERY      Lf knee replacement     ORTHOPEDIC SURGERY  ,     Rt replacement     ORTHOPEDIC SURGERY      Left ankle fused     RECONSTRUCT ANKLE Right 2020    Procedure: Ankle RECONSTRUCTIve Arthroplasty;  Surgeon: Luke Powers DPM;  Location: [facility]     SURGICAL HISTORY OF -       Cysto     SURGICAL HISTORY OF -       Percutaneous kidney stone removal     SURGICAL HISTORY OF -       ureteral stent removal     SURGICAL HISTORY OF -       bariatric surgery-Favian-en-Y     SURGICAL HISTORY OF -       carpal tunnel surgery rt wrist       Family Hx:   Family History   Problem Relation Age of Onset     Diabetes Brother      Obesity Brother      Cerebrovascular Disease Paternal Grandfather      Prostate Cancer Father      Cancer Father         bone     Diabetes Father      Heart Disease Mother         CHF     Cerebrovascular Disease Mother      Hypertension Mother      Cancer Mother         tumor in stomach     Cancer - colorectal Mother      Heart Disease Maternal Grandmother         CHF     Diabetes Paternal Grandmother      Breast Cancer Sister      Genitourinary Problems Son         Kidney stones     Cancer Brother      Cancer - colorectal Brother      Diabetes Brother      Personal Hx:   Social History     Tobacco Use     Smoking status: Former Smoker     Packs/day: 0.50     Years: 7.00     Pack years: 3.50     Types: Cigarettes     Quit date: 1962     Years since quittin.1     Smokeless tobacco: Never Used   Substance Use Topics     Alcohol use: Yes     Comment: one every other day. Rarely       Allergies:  Allergies   Allergen Reactions     Nkda [No Known Drug Allergies]        Medications:  Current Outpatient Medications    Medication Sig     acetaminophen (TYLENOL) 325 MG tablet Take 3 tablets (975 mg) by mouth 3 times daily (Patient taking differently: Take 975 mg by mouth every 8 hours as needed for fever or pain )     amLODIPine (NORVASC) 10 MG tablet TAKE 1 TABLET DAILY     fluticasone (FLONASE) 50 MCG/ACT nasal spray Spray 1 spray into both nostrils daily     folic acid 800 MCG tablet Take 1 tablet (800 mcg) by mouth daily     furosemide (LASIX) 40 MG tablet Take 1 tablet (40 mg) by mouth daily (Patient taking differently: Take 40 mg by mouth daily as needed (Edema) )     HCA VITAMIN B12 500 MCG OR TABS 2 tablets daily     MULTIVITAMIN OR one daily     omeprazole (PRILOSEC) 40 MG DR capsule Take 1 capsule (40 mg) by mouth 2 times daily     OVER-THE-COUNTER Elderberry 50mg supplement, one daily     oxyCODONE (ROXICODONE) 5 MG tablet Take 1 tablet (5 mg) by mouth 2 times daily as needed for severe pain This is a 90 day supply     sucralfate (CARAFATE) 1 GM tablet Take 1 tablet (1 g) by mouth 4 times daily Take 30 minutes before meals and at bedtime     terazosin (HYTRIN) 5 MG capsule TAKE 1 CAPSULE AT BEDTIME     Turmeric 500 MG CAPS Does not take all the time     VIACTIV OR With D 1000mg calcium     vitamin B6 (VITAMIN B-6) 50 MG tablet Take 1 tablet (50 mg) by mouth 2 times daily (Patient taking differently: Take 50 mg by mouth daily )     zinc gluconate 50 MG tablet Take 50 mg by mouth daily     potassium chloride ER (KLOR-CON M) 20 MEQ CR tablet Take 1 tablet (20 mEq) by mouth daily (Patient not taking: Reported on 9/29/2021)     No current facility-administered medications for this visit.      Vitals:  There were no vitals taken for this visit.  GENERAL: Healthy, alert and no distress  EYES: Eyes grossly normal to inspection.  No discharge or erythema, or obvious scleral/conjunctival abnormalities.  RESP: No audible wheeze, cough, or visible cyanosis.  No visible retractions or increased work of breathing.    SKIN: Visible  skin clear. No significant rash, abnormal pigmentation or lesions.  NEURO: Cranial nerves grossly intact.  Mentation and speech appropriate for age.  PSYCH: Mentation appears normal, affect normal/bright, judgement and insight intact, normal speech and appearance well-groomed.      LABS:   CMP  Recent Labs   Lab Test 01/26/22  1303 01/21/22  1006 06/16/21  1435 02/08/21  1121 02/01/21  1148 01/25/21  1058    141 143 142 141 142   POTASSIUM 3.7 3.6 3.8 3.5 3.7 4.0   CHLORIDE 114* 112* 107 107 109 112*   CO2 25 24 24 30 27 29   ANIONGAP 5 5 12 5 5 1*   * 92 80 91 117* 96   BUN 54* 50* 47* 28 23 21   CR 4.03* 3.56* 2.73* 2.26* 2.12* 2.02*   GFRESTIMATED 14* 16* 21* 26* 28* 30*   GFRESTBLACK  --   --  24* 31* 33* 35*   RUTH 7.9* 8.0* 7.7* 8.0* 8.2* 8.4*     Recent Labs   Lab Test 02/08/21  1121 11/10/17  1401 06/11/15  1306   BILITOTAL 0.5 0.5 0.6   ALKPHOS 104 110 100   ALT 21 20 25   AST 14 13 14     CBC  Recent Labs   Lab Test 02/01/22  1426 01/26/22  1303 01/21/22  1006 06/16/21  1435   HGB 6.1* 6.1* 6.4* 8.5*   WBC 4.4 3.8* 4.8 7.0   RBC 3.08* 3.13* 3.31* 4.58   HCT 19.5* 19.6* 20.5* 26.2*   MCV 63* 63* 62* 57*   MCH 19.8* 19.5* 19.3* 18.6*   MCHC 31.3* 31.1* 31.2* 32.4   RDW 19.9* 19.6* 19.9* 17.0*    179 200 352     URINE STUDIES  Recent Labs   Lab Test 01/26/22  1303 06/16/21  1444 11/30/18  1045 09/28/15  1134   COLOR Yellow Yellow Yellow Yellow   APPEARANCE Slightly Cloudy* Clear Clear Clear   URINEGLC Negative Negative Negative Negative   URINEBILI Negative Negative Negative Small  This is an unconfirmed screening test result. A positive result may be false.  *   URINEKETONE Negative Negative Negative Trace*   SG 1.016 1.020 1.025 >1.030   UBLD Negative Negative Trace* Negative   URINEPH 5.0 5.5 6.0 5.5   PROTEIN 100 * 100* >=300* 100*   UROBILINOGEN  --  1.0 1.0 1.0   NITRITE Negative Negative Negative Negative   LEUKEST Negative Negative Negative Negative   RBCU <1 O - 2 O - 2 O -  2  Urine was tested unconcentrated because <10 ml was received.     WBCU 3 0 - 5 0 - 5 O - 2  Urine was tested unconcentrated because <10 ml was received.       Recent Labs   Lab Test 01/26/22  1303   UTPG 0.80*     PTH  No lab results found.  IRON STUDIES  Recent Labs   Lab Test 01/26/22  1308 01/21/22  1006 06/16/21  1435 01/04/21  1133 12/29/17  1045 11/10/17  1401   IRON 64 80 41  --   --  15*   *  --  166*  --   --  368   IRONSAT 31  --  25  --   --  4*   TESSIE  --  280 329 283   < > 7*    < > = values in this interval not displayed.         Yoon Alicea MD

## 2022-02-02 NOTE — LETTER
2/2/2022     RE: Mann Escobar  [address]  [facility] [address]     Dear Colleague,    Thank you for referring your patient, Mann Escobar, to the [facility] [place] at [facility]. Please see a copy of my visit note below.    Thi [phone]    Mann is a 81 year old who is being evaluated via a billable video visit.      How would you like to obtain your AVS? Mail a copy  If the video visit is dropped, the invitation should be resent by: Text to cell phone: [phone]  Will anyone else be joining your video visit? No  Video Start Time: 228 pm  Video-Visit Details    Type of service:  Video Visit    Video End Time:301pm    Originating Location (pt. Location): Home    Distant Location (provider location):  [facility] [place]     Platform used for Video Visit: Thi Darnell is a 80 year old who is being evaluated via a billable video visit.        Assessment and Plan:  81 year old male with history of CKD, hypertension, gastric bypass , GI bleeding 2016, iron deficiency anemia and Thalassemia minor, who presents for CKD followup. He also has HFpEF and edema/ lymphedema. He has history of multiple orthopedic surgeries. His Scr is increased from 2 to 4 recently in setting of significant anemia.  # CKD progressively worsening:  Scr has worsened especially in the last 2-3 years, previously was near 1 but up to 1.2-1.3 in 2017 and 1.3-1.5 in 2019 and up to 2 since 2020.  His Scr in July 2020 was noted up to 2, and had ankle surgery 7/23 and Scr remained around 2 since then.    - no NSAIDs in recent years except toradol 7/23/2020 postop   - no other significant nephrotoxins, on diuretics for many years - hydrochlorothiazide previously, on loop diuretics in recent years    - discussed lowering diuretic doses as able, he lowered to once a day / as needed.    - he will  try wrapping legs / elevating and monitoring sodium more closely to see if we can lower diuretic dose, however given swelling can get significant and HFpEF, volume control is important.   - given history of gastric bypass, we checked oxalate level- it was elevated, started pyridoxine 50mg has been taking daily not twice a day  - low grade proteinuria  - discussed biopsy in the next couple of weeks if renal function does not improve  - lower amlodipine to 5mg   -recheck renal function early next week.  - did discuss that if renal function does not improve, he is near dialysis threshold and we discussed dialysis options briefly.  # Hypertension: blood pressure seems to be lower    - he is on amlodipine,which we will lower to 5mg    -continue furosemide 40mg once a day , goal -135 systolic, and controlling swelling in multiple ways    # Anemia- acute on chronic, due to thalassemia and chronic renal disease, gastric ulcer noted recently:   - Hgb down to 6.1, multifactorial, due to thalassemia and kidney disease,   - Iron studies: adequate.    # Electrolytes:   - Potassium; level: Normal / low normal, not taking potassium supplement.  - Bicarbonate; level: Normal/ high normal    # Mineral Bone Disorder:   - Calcium; level is:  Normal  - Phosphorus; level is: Normal      Assessment and plan was discussed with patient and he voiced his understanding and agreement.      Reason for Visit:  Mann Escobar is a 81 year old male with CKD from ? , HFpEF, who presents for followup.    HPI:  He is a pleasant male with history of CKD who is referred for evaluation. He has had elevated creatinine over the past few years, which has fluctuated. He was noted with HFpEF/ moderate diastolic dysfunction.      He has had significant amount of swelling since his ankle surgery last year, but even going back years, he has dealt with swelling and was even in lymphedema clinic many years ago.    He was given furosemide 20 mg  then 40mg which helped with the edema.  He lost 30+ lbs of weight with furosemide and now takes it as needed to maintain his weight.   He had a knee replacement in 2000 and has had some edema and then after his ankle surgery in July 2020     His weight was up to 199 lbs from 183 lbs and he thinks this was over just a couple of weeks.  He is now near 160 lbs.   He had gastric bypass in 2000 and was 280 lbs at that point.    He was taking NSAIDS in the past and had GI bleeding and noted with another ulcer last Fall  He was noted with hemoglobin down to 6.1 recently. Last check was in June 2021. He denies dark stools or evidence of bleeding  He was started on aranesp by hematology.    He has prostate enlargement and has been prescribed terazosin.  He has continued to take this in case it helps.  He states he has been having normal urination and denies change in appetite.   His Scr is up to 4 in recent days. He is eating well and hydrating, does not think he is dehydrated.   His weight change is significant over recent months. His oxalate was elevated when we checked it last year. He did start pyridoxine but only one pill a day.   His ultrasound did show several cysts bilaterally but otherwise unremarkable, with normal sizes though right kidney 10.5 and left 13cm.     Renal History:   Kidney Disease and Medical Hx:  h/o HTN: Yes      ROS:   A comprehensive review of systems was obtained and negative, except as noted in the HPI or PMH.    Active Medical Problems:  Patient Active Problem List   Diagnosis      HX NEPHROLITHIASIS [V13.01]     Thalassemia minor     Esophageal reflux     Family history of prostate cancer     Leg edema     CARDIOVASCULAR SCREENING; LDL GOAL LESS THAN 130     Internal hemorrhoids     Iron deficiency anemia     First degree AV block     Family history of diabetes mellitus     Scoliosis     Hypopotassemia     Advanced directives, counseling/discussion     HTN (hypertension)     Benign non-nodular  prostatic hyperplasia with lower urinary tract symptoms     Chronic low back pain     S/P knee replacement     S/P ankle fusion     Abnormal antinuclear antibody titer     Osteoarthritis     Hypertension     BPH (benign prostatic hyperplasia)     Pseudogout     Chronic pain syndrome     S/P ankle joint replacement     Arthritis of ankle, right     Chronic anemia     Chronic kidney disease, stage 3 (H)     Diastolic dysfunction     Carpal tunnel syndrome     Edema     Nephrolithiasis     Marginal ulcer     Retching     History of Favian-en-Y gastric bypass     Chronic heart failure with preserved ejection fraction (H)     Anemia of chronic renal failure, stage 3b (H)     PMH:   Medical record was reviewed and PMH was discussed with patient and noted below.  Past Medical History:   Diagnosis Date     Arthritis of ankle, left 7/24/2020     Back pain     WITH BILATERAL LEG PAIN AND NUMBNESS OF BOTH FEET     Gastro-oesophageal reflux disease     REFLUX     Hypertension      Hypopotassemia      Internal hemorrhoids      Iron deficiency anaemia      Leg edema      Morbid obesity 9/12/2005     Morbid obesity (H)      Osteoarthrosis      Scoliosis      Thalassemia minor      PSH:   Past Surgical History:   Procedure Laterality Date     CATARACT IOL, RT/LT  2008/    Cataract IOL RT/LT     COLONOSCOPY  2009/07    polyps removed repeat 5 yrs, tubular adenoma     COLONOSCOPY  9/29/2011    Procedure:COLONOSCOPY; Colonoscopy with hemorrhoid banding; Surgeon:JEREMY GARCIA; Location:[facility]     COLONOSCOPY N/A 12/19/2017    Procedure: COLONOSCOPY;  colonoscopy, gastroscopy;  Surgeon: Edmar Isaacs MD;  Location: [facility]     DECOMPRESSION LUMBAR MINIMALLY INVASIVE TWO LEVELS  5/2/2012    Procedure:DECOMPRESSION LUMBAR MINIMALLY INVASIVE TWO LEVELS; Surgeon:LOTTIE MITCHELL; [facility]     ESOPHAGOSCOPY, GASTROSCOPY, DUODENOSCOPY (EGD), COMBINED N/A 2/6/2018    Procedure: COMBINED ESOPHAGOSCOPY, GASTROSCOPY, DUODENOSCOPY  (EGD);  gastroscopy;  Surgeon: Edmar Isaacs MD;  Location: [facility]     ESOPHAGOSCOPY, GASTROSCOPY, DUODENOSCOPY (EGD), COMBINED N/A 10/18/2021    Procedure: ESOPHAGOGASTRODUODENOSCOPY (EGD);  Surgeon: Anju Galeano MD;  Location: [facility]     FUSION SPINE POSTERIOR MINIMALLY INVASIVE ONE LEVEL  5/2/2012    Procedure:FUSION SPINE POSTERIOR MINIMALLY INVASIVE ONE LEVEL; Minimal Access Spinal Technology Transformaninal Lumbar Interbody Fusion L4-5, Decompression L3-5; Surgeon:LOTTIE MITCHELL; [facility]     HC REMOVAL OF HYDROCELE,TUNICA,UNILAT  2006    bilateral     ORTHOPEDIC SURGERY  2000    Lf knee replacement     ORTHOPEDIC SURGERY  2002, 2010    Rt replacement     ORTHOPEDIC SURGERY  2005    Left ankle fused     RECONSTRUCT ANKLE Right 7/23/2020    Procedure: Ankle RECONSTRUCTIve Arthroplasty;  Surgeon: Luke Powers DPM;  Location: [facility]     SURGICAL HISTORY OF -       Cysto     SURGICAL HISTORY OF -   2002    Percutaneous kidney stone removal     SURGICAL HISTORY OF -       ureteral stent removal     SURGICAL HISTORY OF -   2005    bariatric surgery-Favian-en-Y     SURGICAL HISTORY OF -   2008    carpal tunnel surgery rt wrist       Family Hx:   Family History   Problem Relation Age of Onset     Diabetes Brother      Obesity Brother      Cerebrovascular Disease Paternal Grandfather      Prostate Cancer Father      Cancer Father         bone     Diabetes Father      Heart Disease Mother         CHF     Cerebrovascular Disease Mother      Hypertension Mother      Cancer Mother         tumor in stomach     Cancer - colorectal Mother      Heart Disease Maternal Grandmother         CHF     Diabetes Paternal Grandmother      Breast Cancer Sister      Genitourinary Problems Son         Kidney stones     Cancer Brother      Cancer - colorectal Brother      Diabetes Brother      Personal Hx:   Social History     Tobacco Use     Smoking status: Former Smoker     Packs/day: 0.50     Years: 7.00     Pack years:  3.50     Types: Cigarettes     Quit date: 1962     Years since quittin.1     Smokeless tobacco: Never Used   Substance Use Topics     Alcohol use: Yes     Comment: one every other day. Rarely       Allergies:  Allergies   Allergen Reactions     Nkda [No Known Drug Allergies]        Medications:  Current Outpatient Medications   Medication Sig     acetaminophen (TYLENOL) 325 MG tablet Take 3 tablets (975 mg) by mouth 3 times daily (Patient taking differently: Take 975 mg by mouth every 8 hours as needed for fever or pain )     amLODIPine (NORVASC) 10 MG tablet TAKE 1 TABLET DAILY     fluticasone (FLONASE) 50 MCG/ACT nasal spray Spray 1 spray into both nostrils daily     folic acid 800 MCG tablet Take 1 tablet (800 mcg) by mouth daily     furosemide (LASIX) 40 MG tablet Take 1 tablet (40 mg) by mouth daily (Patient taking differently: Take 40 mg by mouth daily as needed (Edema) )     HCA VITAMIN B12 500 MCG OR TABS 2 tablets daily     MULTIVITAMIN OR one daily     omeprazole (PRILOSEC) 40 MG DR capsule Take 1 capsule (40 mg) by mouth 2 times daily     OVER-THE-COUNTER Elderberry 50mg supplement, one daily     oxyCODONE (ROXICODONE) 5 MG tablet Take 1 tablet (5 mg) by mouth 2 times daily as needed for severe pain This is a 90 day supply     sucralfate (CARAFATE) 1 GM tablet Take 1 tablet (1 g) by mouth 4 times daily Take 30 minutes before meals and at bedtime     terazosin (HYTRIN) 5 MG capsule TAKE 1 CAPSULE AT BEDTIME     Turmeric 500 MG CAPS Does not take all the time     VIACTIV OR With D 1000mg calcium     vitamin B6 (VITAMIN B-6) 50 MG tablet Take 1 tablet (50 mg) by mouth 2 times daily (Patient taking differently: Take 50 mg by mouth daily )     zinc gluconate 50 MG tablet Take 50 mg by mouth daily     potassium chloride ER (KLOR-CON M) 20 MEQ CR tablet Take 1 tablet (20 mEq) by mouth daily (Patient not taking: Reported on 2021)     No current facility-administered medications for this visit.       Vitals:  There were no vitals taken for this visit.  GENERAL: Healthy, alert and no distress  EYES: Eyes grossly normal to inspection.  No discharge or erythema, or obvious scleral/conjunctival abnormalities.  RESP: No audible wheeze, cough, or visible cyanosis.  No visible retractions or increased work of breathing.    SKIN: Visible skin clear. No significant rash, abnormal pigmentation or lesions.  NEURO: Cranial nerves grossly intact.  Mentation and speech appropriate for age.  PSYCH: Mentation appears normal, affect normal/bright, judgement and insight intact, normal speech and appearance well-groomed.      LABS:   CMP  Recent Labs   Lab Test 01/26/22  1303 01/21/22  1006 06/16/21  1435 02/08/21  1121 02/01/21  1148 01/25/21  1058    141 143 142 141 142   POTASSIUM 3.7 3.6 3.8 3.5 3.7 4.0   CHLORIDE 114* 112* 107 107 109 112*   CO2 25 24 24 30 27 29   ANIONGAP 5 5 12 5 5 1*   * 92 80 91 117* 96   BUN 54* 50* 47* 28 23 21   CR 4.03* 3.56* 2.73* 2.26* 2.12* 2.02*   GFRESTIMATED 14* 16* 21* 26* 28* 30*   GFRESTBLACK  --   --  24* 31* 33* 35*   RUTH 7.9* 8.0* 7.7* 8.0* 8.2* 8.4*     Recent Labs   Lab Test 02/08/21  1121 11/10/17  1401 06/11/15  1306   BILITOTAL 0.5 0.5 0.6   ALKPHOS 104 110 100   ALT 21 20 25   AST 14 13 14     CBC  Recent Labs   Lab Test 02/01/22  1426 01/26/22  1303 01/21/22  1006 06/16/21  1435   HGB 6.1* 6.1* 6.4* 8.5*   WBC 4.4 3.8* 4.8 7.0   RBC 3.08* 3.13* 3.31* 4.58   HCT 19.5* 19.6* 20.5* 26.2*   MCV 63* 63* 62* 57*   MCH 19.8* 19.5* 19.3* 18.6*   MCHC 31.3* 31.1* 31.2* 32.4   RDW 19.9* 19.6* 19.9* 17.0*    179 200 352     URINE STUDIES  Recent Labs   Lab Test 01/26/22  1303 06/16/21  1444 11/30/18  1045 09/28/15  1134   COLOR Yellow Yellow Yellow Yellow   APPEARANCE Slightly Cloudy* Clear Clear Clear   URINEGLC Negative Negative Negative Negative   URINEBILI Negative Negative Negative Small  This is an unconfirmed screening test result. A positive result may be  false.  *   URINEKETONE Negative Negative Negative Trace*   SG 1.016 1.020 1.025 >1.030   UBLD Negative Negative Trace* Negative   URINEPH 5.0 5.5 6.0 5.5   PROTEIN 100 * 100* >=300* 100*   UROBILINOGEN  --  1.0 1.0 1.0   NITRITE Negative Negative Negative Negative   LEUKEST Negative Negative Negative Negative   RBCU <1 O - 2 O - 2 O - 2  Urine was tested unconcentrated because <10 ml was received.     WBCU 3 0 - 5 0 - 5 O - 2  Urine was tested unconcentrated because <10 ml was received.       Recent Labs   Lab Test 01/26/22  1303   UTPG 0.80*     PTH  No lab results found.  IRON STUDIES  Recent Labs   Lab Test 01/26/22  1308 01/21/22  1006 06/16/21  1435 01/04/21  1133 12/29/17  1045 11/10/17  1401   IRON 64 80 41  --   --  15*   *  --  166*  --   --  368   IRONSAT 31  --  25  --   --  4*   TESSIE  --  280 329 283   < > 7*    < > = values in this interval not displayed.         Yoon Alicea MD

## 2022-02-08 ENCOUNTER — TELEPHONE (OUTPATIENT)
Dept: NEPHROLOGY | Facility: CLINIC | Age: 82
End: 2022-02-08
Payer: COMMERCIAL

## 2022-02-08 NOTE — TELEPHONE ENCOUNTER
[facility] Call Center    Phone Message    May a detailed message be left on voicemail: yes     Reason for Call: Medication Question or concern regarding medication   Prescription Clarification  Name of Medication: amLODIPine (NORVASC) 5 MG tablet [27922] (Order [id number])  Prescribing Provider: Yoon Alicea MD   Pharmacy: [facility]   What on the order needs clarification? Per patient, Dr. Alicea wanted to cut him down on his medication but the new med is for the same dosage. Please reach out to patient.          Action Taken: Message routed to:  [facility] Nephrology    Travel Screening: Not Applicable

## 2022-02-09 ENCOUNTER — LAB (OUTPATIENT)
Dept: LAB | Facility: CLINIC | Age: 82
End: 2022-02-09
Payer: COMMERCIAL

## 2022-02-09 ENCOUNTER — PATIENT OUTREACH (OUTPATIENT)
Dept: NEPHROLOGY | Facility: CLINIC | Age: 82
End: 2022-02-09

## 2022-02-09 ENCOUNTER — TELEPHONE (OUTPATIENT)
Dept: ONCOLOGY | Facility: CLINIC | Age: 82
End: 2022-02-09

## 2022-02-09 VITALS — DIASTOLIC BLOOD PRESSURE: 63 MMHG | SYSTOLIC BLOOD PRESSURE: 128 MMHG | HEART RATE: 72 BPM

## 2022-02-09 DIAGNOSIS — D56.8 OTHER THALASSEMIA (H): ICD-10-CM

## 2022-02-09 DIAGNOSIS — D63.1 ANEMIA OF CHRONIC RENAL FAILURE, STAGE 3B (H): ICD-10-CM

## 2022-02-09 DIAGNOSIS — N18.32 ANEMIA OF CHRONIC RENAL FAILURE, STAGE 3B (H): ICD-10-CM

## 2022-02-09 LAB
ABO/RH(D): NORMAL
ANTIBODY SCREEN: NEGATIVE
BASO STIPL BLD QL SMEAR: PRESENT
BASOPHILS # BLD AUTO: 0.1 10E3/UL (ref 0–0.2)
BASOPHILS NFR BLD AUTO: 1 %
DACRYOCYTES BLD QL SMEAR: SLIGHT
EOSINOPHIL # BLD AUTO: 0.1 10E3/UL (ref 0–0.7)
EOSINOPHIL NFR BLD AUTO: 2 %
ERYTHROCYTE [DISTWIDTH] IN BLOOD BY AUTOMATED COUNT: 19.9 % (ref 10–15)
FRAGMENTS BLD QL SMEAR: ABNORMAL
HCT VFR BLD AUTO: 21.5 % (ref 40–53)
HGB BLD-MCNC: 6.7 G/DL (ref 13.3–17.7)
IMM GRANULOCYTES # BLD: 0 10E3/UL
IMM GRANULOCYTES NFR BLD: 0 %
LYMPHOCYTES # BLD AUTO: 0.6 10E3/UL (ref 0.8–5.3)
LYMPHOCYTES NFR BLD AUTO: 15 %
MCH RBC QN AUTO: 19.6 PG (ref 26.5–33)
MCHC RBC AUTO-ENTMCNC: 31.2 G/DL (ref 31.5–36.5)
MCV RBC AUTO: 63 FL (ref 78–100)
MONOCYTES # BLD AUTO: 0.4 10E3/UL (ref 0–1.3)
MONOCYTES NFR BLD AUTO: 8 %
NEUTROPHILS # BLD AUTO: 3.1 10E3/UL (ref 1.6–8.3)
NEUTROPHILS NFR BLD AUTO: 74 %
NRBC # BLD AUTO: 0 10E3/UL
NRBC BLD AUTO-RTO: 0 /100
PLAT MORPH BLD: ABNORMAL
PLATELET # BLD AUTO: 196 10E3/UL (ref 150–450)
RBC # BLD AUTO: 3.41 10E6/UL (ref 4.4–5.9)
RBC MORPH BLD: ABNORMAL
SPECIMEN EXPIRATION DATE: NORMAL
SPHEROCYTES BLD QL SMEAR: SLIGHT
TARGETS BLD QL SMEAR: ABNORMAL
WBC # BLD AUTO: 4.2 10E3/UL (ref 4–11)

## 2022-02-09 PROCEDURE — 85025 COMPLETE CBC W/AUTO DIFF WBC: CPT

## 2022-02-09 PROCEDURE — 86850 RBC ANTIBODY SCREEN: CPT

## 2022-02-09 PROCEDURE — 36415 COLL VENOUS BLD VENIPUNCTURE: CPT

## 2022-02-09 PROCEDURE — 86901 BLOOD TYPING SEROLOGIC RH(D): CPT

## 2022-02-09 NOTE — RESULT ENCOUNTER NOTE
Called patient with lab results. Per Dr. Golden,  Patient will come in for aranesp injection every 2 weeks now with another one tomorrow. Patient verbalized understanding and agreement to plan. Patient confirms taking folic acid supplements. Liz Daniel RN on 2/9/2022 at 4:23 PM

## 2022-02-09 NOTE — PROGRESS NOTES
Yoon Alicea MD Engen, Kayla, RN   I am very worried about his kidney function   Please call him next week and check on him and his BP   I lowered amlodipine to 5mg   I will add him on to clinic in 2 weeks   He will need education/ kidney smart if things arent better next week   Thanks    02/09/22 in clinic appt: BP/P 128/63/72 Pt took amlodipine 5mg po yesterday. Pt wants to clarify how much he should take. Will route to MD.    02/14/22 1037: Yoon Alicea MD Engen, Kayla, RN  Caller: Unspecified (4 days ago, 10:46 AM)  Yes, 5mg for now, I changed prescription   Thanks    Informed pt to take amlodipine 5mg po every day. Pt v/u and had no questions or concerns at this time.

## 2022-02-09 NOTE — TELEPHONE ENCOUNTER
His hemoglobin is coming up very slowly.    I increased his Aranesp to 60 mcg every 2 weeks.    He will also get Injectafer every 8 weeks if his iron saturation is less than 25%.    Therapy plans have been adjusted accordingly in the EMR.    The patient can see myself or Mojgan Love for an office visit every 3 months.

## 2022-02-10 ENCOUNTER — INFUSION THERAPY VISIT (OUTPATIENT)
Dept: INFUSION THERAPY | Facility: CLINIC | Age: 82
End: 2022-02-10
Attending: INTERNAL MEDICINE
Payer: COMMERCIAL

## 2022-02-10 ENCOUNTER — PATIENT OUTREACH (OUTPATIENT)
Dept: ONCOLOGY | Facility: CLINIC | Age: 82
End: 2022-02-10
Payer: COMMERCIAL

## 2022-02-10 VITALS — TEMPERATURE: 96.7 F | DIASTOLIC BLOOD PRESSURE: 54 MMHG | HEART RATE: 66 BPM | SYSTOLIC BLOOD PRESSURE: 130 MMHG

## 2022-02-10 DIAGNOSIS — N18.32 ANEMIA OF CHRONIC RENAL FAILURE, STAGE 3B (H): Primary | ICD-10-CM

## 2022-02-10 DIAGNOSIS — D63.1 ANEMIA OF CHRONIC RENAL FAILURE, STAGE 3B (H): Primary | ICD-10-CM

## 2022-02-10 DIAGNOSIS — D56.8 OTHER THALASSEMIA (H): ICD-10-CM

## 2022-02-10 DIAGNOSIS — D50.9 IRON DEFICIENCY ANEMIA, UNSPECIFIED IRON DEFICIENCY ANEMIA TYPE: Primary | ICD-10-CM

## 2022-02-10 PROCEDURE — 250N000011 HC RX IP 250 OP 636: Performed by: INTERNAL MEDICINE

## 2022-02-10 PROCEDURE — 96372 THER/PROPH/DIAG INJ SC/IM: CPT | Performed by: INTERNAL MEDICINE

## 2022-02-10 RX ORDER — NALOXONE HYDROCHLORIDE 0.4 MG/ML
0.2 INJECTION, SOLUTION INTRAMUSCULAR; INTRAVENOUS; SUBCUTANEOUS
Status: CANCELLED | OUTPATIENT
Start: 2022-02-10

## 2022-02-10 RX ORDER — EPINEPHRINE 1 MG/ML
0.3 INJECTION, SOLUTION, CONCENTRATE INTRAVENOUS EVERY 5 MIN PRN
Status: CANCELLED | OUTPATIENT
Start: 2022-02-23

## 2022-02-10 RX ORDER — METHYLPREDNISOLONE SODIUM SUCCINATE 125 MG/2ML
125 INJECTION, POWDER, LYOPHILIZED, FOR SOLUTION INTRAMUSCULAR; INTRAVENOUS
Status: CANCELLED
Start: 2022-02-10

## 2022-02-10 RX ORDER — EPINEPHRINE 1 MG/ML
0.3 INJECTION, SOLUTION, CONCENTRATE INTRAVENOUS EVERY 5 MIN PRN
Status: CANCELLED | OUTPATIENT
Start: 2022-02-10

## 2022-02-10 RX ORDER — MEPERIDINE HYDROCHLORIDE 25 MG/ML
25 INJECTION INTRAMUSCULAR; INTRAVENOUS; SUBCUTANEOUS EVERY 30 MIN PRN
Status: CANCELLED | OUTPATIENT
Start: 2022-02-10

## 2022-02-10 RX ORDER — DIPHENHYDRAMINE HYDROCHLORIDE 50 MG/ML
50 INJECTION INTRAMUSCULAR; INTRAVENOUS
Status: CANCELLED
Start: 2022-02-23

## 2022-02-10 RX ORDER — METHYLPREDNISOLONE SODIUM SUCCINATE 125 MG/2ML
125 INJECTION, POWDER, LYOPHILIZED, FOR SOLUTION INTRAMUSCULAR; INTRAVENOUS
Status: CANCELLED
Start: 2022-02-23

## 2022-02-10 RX ORDER — ALBUTEROL SULFATE 0.83 MG/ML
2.5 SOLUTION RESPIRATORY (INHALATION)
Status: CANCELLED | OUTPATIENT
Start: 2022-02-23

## 2022-02-10 RX ORDER — DIPHENHYDRAMINE HYDROCHLORIDE 50 MG/ML
50 INJECTION INTRAMUSCULAR; INTRAVENOUS
Status: CANCELLED
Start: 2022-02-10

## 2022-02-10 RX ORDER — MEPERIDINE HYDROCHLORIDE 25 MG/ML
25 INJECTION INTRAMUSCULAR; INTRAVENOUS; SUBCUTANEOUS EVERY 30 MIN PRN
Status: CANCELLED | OUTPATIENT
Start: 2022-02-23

## 2022-02-10 RX ORDER — ALBUTEROL SULFATE 90 UG/1
1-2 AEROSOL, METERED RESPIRATORY (INHALATION)
Status: CANCELLED
Start: 2022-02-10

## 2022-02-10 RX ORDER — ALBUTEROL SULFATE 90 UG/1
1-2 AEROSOL, METERED RESPIRATORY (INHALATION)
Status: CANCELLED
Start: 2022-02-23

## 2022-02-10 RX ORDER — ALBUTEROL SULFATE 0.83 MG/ML
2.5 SOLUTION RESPIRATORY (INHALATION)
Status: CANCELLED | OUTPATIENT
Start: 2022-02-10

## 2022-02-10 RX ORDER — NALOXONE HYDROCHLORIDE 0.4 MG/ML
0.2 INJECTION, SOLUTION INTRAMUSCULAR; INTRAVENOUS; SUBCUTANEOUS
Status: CANCELLED | OUTPATIENT
Start: 2022-02-23

## 2022-02-10 RX ADMIN — DARBEPOETIN ALFA 60 MCG: 60 INJECTION, SOLUTION INTRAVENOUS; SUBCUTANEOUS at 13:24

## 2022-02-10 NOTE — PROGRESS NOTES
Infusion Nursing Note:  Mann Escobar presents today for Aranesp.    Patient seen by provider today: No   present during visit today: Not Applicable.    Note: N/A.      Intravenous Access:  No Intravenous access/labs at this visit.    Treatment Conditions:  Lab Results   Component Value Date    HGB 6.7 (LL) 02/09/2022    WBC 4.2 02/09/2022    ANEU 3.7 02/02/2021    ANEUTAUTO 3.1 02/09/2022     02/09/2022      Results reviewed, labs MET treatment parameters, ok to proceed with treatment.      Post Infusion Assessment:  Patient tolerated Aranesp injection subcutaneous to the left upper arm without incident.       Discharge Plan:   Patient discharged in stable condition accompanied by: self.  Departure Mode: Ambulatory.  Pt to return on 2/24/22 at 2:00 pm for labs followed by possible Aranesp at 2:30 pm.        Tangela Becker RN

## 2022-02-13 ENCOUNTER — HEALTH MAINTENANCE LETTER (OUTPATIENT)
Age: 82
End: 2022-02-13

## 2022-02-14 DIAGNOSIS — I10 ESSENTIAL HYPERTENSION: ICD-10-CM

## 2022-02-14 RX ORDER — AMLODIPINE BESYLATE 5 MG/1
5 TABLET ORAL DAILY
Qty: 30 TABLET | Refills: 11 | Status: SHIPPED | OUTPATIENT
Start: 2022-02-14 | End: 2022-01-01

## 2022-02-23 ENCOUNTER — OFFICE VISIT (OUTPATIENT)
Dept: AUDIOLOGY | Facility: CLINIC | Age: 82
End: 2022-02-23
Payer: COMMERCIAL

## 2022-02-23 DIAGNOSIS — H90.3 SENSORINEURAL HEARING LOSS, BILATERAL: Primary | ICD-10-CM

## 2022-02-23 PROCEDURE — V5160 DISPENSING FEE BINAURAL: HCPCS | Performed by: AUDIOLOGIST

## 2022-02-23 PROCEDURE — 92593 PR HEARING AID CHECK, BINAURAL: CPT | Performed by: AUDIOLOGIST

## 2022-02-23 PROCEDURE — V5011 HEARING AID FITTING/CHECKING: HCPCS | Mod: RT | Performed by: AUDIOLOGIST

## 2022-02-23 PROCEDURE — V5020 CONFORMITY EVALUATION: HCPCS | Mod: RT | Performed by: AUDIOLOGIST

## 2022-02-23 PROCEDURE — 99207 PR NO CHARGE LOS: CPT | Performed by: AUDIOLOGIST

## 2022-02-23 NOTE — PROGRESS NOTES
AUDIOLOGY REPORT    SUBJECTIVE: Mann Escobar, a 81 year old male, was seen in the Audiology Clinic at [facility] today for a Binaural hearing aid fitting. Previous results have revealed a bilateral mild to severe  sensorineural hearing loss. The patient is a new hearing aid user.       OBJECTIVE:  Prior to fitting, a hearing aid check was performed to ensure device functionality. The hearing aid conformity evaluation was completed.The hearing aids were placed and they provided a good fit. Real-ear-probe-microphone measurements were completed on the Elevate Digital system and were a good match to NAL-NL2 target with soft sounds audible, moderate sounds comfortable, and loud sounds below discomfort. UCLs are verified through maximum power output measures and demonstrate appropriate limiting of loud inputs. Mr. Escobar was oriented to proper hearing aid use, care, cleaning (no water, dry brush), batteries (rechargable, low-battery signal), aid insertion/removal, user booklet, warranty information, storage cases, and other hearing aid details. The patient confirmed understanding of hearing aid use and care, and showed proper insertion of hearing aid and batteries while in the office today. Mr. Escobar reported good volume and sound quality today.    EAR(S) FIT: Binaural  MA HEARING AID MAKE: Right: Phonak Audeo P90-R; Left: Phonak Audeo P90-R  HEARING AID STYLE: Right: NOÉ; Left: NOÉ  DOME SIZE: Right:  medium open; Left::  medium open   LENGTH: Right:  #0 M 4.0; Left:  #0 M 4.0  SERIAL NUMBERS: Right: [id number]; Left: [id number]  WARRANTY END DATE: Right: 1/10/2025; Left:: 1/10/2025      CHARGES:   Hearing Aid Check: Binaural, 38266, $81.00  Dispensing Fee: Binaural, , $500.00, ,   Fit/Orientation: Binaural, , $416.00  Hearing Aid Conformity Evaluation: 2, , $174.00  Total: $1171.00    Hearing aids being fit through [facility].        ASSESSMENT: Binaural   hearing aid fitting completed today. Verification measures were performed. The 45 day trial period was explained to patient, and they expressed understanding. Mr. Escobar signed the Hearing Aid Purchase Agreement and was given a copy, as well as details on his hearing aids. Patient was counseled that exact out of pocket amounts cannot be determined for hearing aid claims being sent to insurance. Any insurance coverage information presented to the patient is an estimate only, and is not a guarantee of payment. Patient has been advised to check with their own insurance.    PLAN: Mr. Escobar will return for follow-up in 2-3 weeks for a hearing aid review appointment. Please call this clinic with questions regarding today s appointment.    Ksenia Moore M.A. -[facility], #[id number]

## 2022-02-24 ENCOUNTER — APPOINTMENT (OUTPATIENT)
Dept: LAB | Facility: CLINIC | Age: 82
End: 2022-02-24
Payer: COMMERCIAL

## 2022-02-24 ENCOUNTER — INFUSION THERAPY VISIT (OUTPATIENT)
Dept: INFUSION THERAPY | Facility: CLINIC | Age: 82
End: 2022-02-24
Attending: INTERNAL MEDICINE
Payer: COMMERCIAL

## 2022-02-24 VITALS — SYSTOLIC BLOOD PRESSURE: 160 MMHG | TEMPERATURE: 97.8 F | DIASTOLIC BLOOD PRESSURE: 71 MMHG

## 2022-02-24 DIAGNOSIS — D56.8 OTHER THALASSEMIA (H): ICD-10-CM

## 2022-02-24 DIAGNOSIS — N18.32 ANEMIA OF CHRONIC RENAL FAILURE, STAGE 3B (H): Primary | ICD-10-CM

## 2022-02-24 DIAGNOSIS — D63.1 ANEMIA OF CHRONIC RENAL FAILURE, STAGE 3B (H): Primary | ICD-10-CM

## 2022-02-24 LAB
BASO STIPL BLD QL SMEAR: PRESENT
BASOPHILS # BLD AUTO: 0.1 10E3/UL (ref 0–0.2)
BASOPHILS NFR BLD AUTO: 1 %
DACRYOCYTES BLD QL SMEAR: SLIGHT
EOSINOPHIL # BLD AUTO: 0.1 10E3/UL (ref 0–0.7)
EOSINOPHIL NFR BLD AUTO: 1 %
ERYTHROCYTE [DISTWIDTH] IN BLOOD BY AUTOMATED COUNT: 20.5 % (ref 10–15)
FRAGMENTS BLD QL SMEAR: ABNORMAL
HCT VFR BLD AUTO: 24.1 % (ref 40–53)
HGB BLD-MCNC: 7.5 G/DL (ref 13.3–17.7)
HOLD SPECIMEN: NORMAL
IMM GRANULOCYTES # BLD: 0 10E3/UL
IMM GRANULOCYTES NFR BLD: 0 %
LYMPHOCYTES # BLD AUTO: 0.6 10E3/UL (ref 0.8–5.3)
LYMPHOCYTES NFR BLD AUTO: 12 %
MCH RBC QN AUTO: 19.6 PG (ref 26.5–33)
MCHC RBC AUTO-ENTMCNC: 31.1 G/DL (ref 31.5–36.5)
MCV RBC AUTO: 63 FL (ref 78–100)
MONOCYTES # BLD AUTO: 0.4 10E3/UL (ref 0–1.3)
MONOCYTES NFR BLD AUTO: 7 %
NEUTROPHILS # BLD AUTO: 4.4 10E3/UL (ref 1.6–8.3)
NEUTROPHILS NFR BLD AUTO: 79 %
NRBC # BLD AUTO: 0 10E3/UL
NRBC BLD AUTO-RTO: 0 /100
PLAT MORPH BLD: ABNORMAL
PLATELET # BLD AUTO: 188 10E3/UL (ref 150–450)
RBC # BLD AUTO: 3.83 10E6/UL (ref 4.4–5.9)
RBC MORPH BLD: ABNORMAL
SPHEROCYTES BLD QL SMEAR: SLIGHT
TARGETS BLD QL SMEAR: ABNORMAL
WBC # BLD AUTO: 5.6 10E3/UL (ref 4–11)

## 2022-02-24 PROCEDURE — 36415 COLL VENOUS BLD VENIPUNCTURE: CPT

## 2022-02-24 PROCEDURE — 250N000011 HC RX IP 250 OP 636: Performed by: INTERNAL MEDICINE

## 2022-02-24 PROCEDURE — 96372 THER/PROPH/DIAG INJ SC/IM: CPT | Performed by: INTERNAL MEDICINE

## 2022-02-24 PROCEDURE — 85025 COMPLETE CBC W/AUTO DIFF WBC: CPT | Performed by: INTERNAL MEDICINE

## 2022-02-24 RX ORDER — NALOXONE HYDROCHLORIDE 0.4 MG/ML
0.2 INJECTION, SOLUTION INTRAMUSCULAR; INTRAVENOUS; SUBCUTANEOUS
Status: CANCELLED | OUTPATIENT
Start: 2022-03-10

## 2022-02-24 RX ORDER — DIPHENHYDRAMINE HYDROCHLORIDE 50 MG/ML
50 INJECTION INTRAMUSCULAR; INTRAVENOUS
Status: CANCELLED
Start: 2022-03-10

## 2022-02-24 RX ORDER — METHYLPREDNISOLONE SODIUM SUCCINATE 125 MG/2ML
125 INJECTION, POWDER, LYOPHILIZED, FOR SOLUTION INTRAMUSCULAR; INTRAVENOUS
Status: CANCELLED
Start: 2022-03-10

## 2022-02-24 RX ORDER — ALBUTEROL SULFATE 90 UG/1
1-2 AEROSOL, METERED RESPIRATORY (INHALATION)
Status: CANCELLED
Start: 2022-03-10

## 2022-02-24 RX ORDER — MEPERIDINE HYDROCHLORIDE 25 MG/ML
25 INJECTION INTRAMUSCULAR; INTRAVENOUS; SUBCUTANEOUS EVERY 30 MIN PRN
Status: CANCELLED | OUTPATIENT
Start: 2022-03-10

## 2022-02-24 RX ORDER — ALBUTEROL SULFATE 0.83 MG/ML
2.5 SOLUTION RESPIRATORY (INHALATION)
Status: CANCELLED | OUTPATIENT
Start: 2022-03-10

## 2022-02-24 RX ORDER — EPINEPHRINE 1 MG/ML
0.3 INJECTION, SOLUTION, CONCENTRATE INTRAVENOUS EVERY 5 MIN PRN
Status: CANCELLED | OUTPATIENT
Start: 2022-03-10

## 2022-02-24 RX ADMIN — DARBEPOETIN ALFA 60 MCG: 60 INJECTION, SOLUTION INTRAVENOUS; SUBCUTANEOUS at 15:46

## 2022-02-24 NOTE — PROGRESS NOTES
Infusion Nursing Note:  [facility]    Patient seen by provider today: No   present during visit today: Not Applicable.    Note: N/A.      Intravenous Access:  Labs drawn via  .    Treatment Conditions:  Lab Results   Component Value Date    HGB 7.5 (L) 02/24/2022    WBC 5.6 02/24/2022    ANEU 3.7 02/02/2021    ANEUTAUTO 4.4 02/24/2022     02/24/2022      Results reviewed, labs MET treatment parameters, ok to proceed with treatment.  160/71    Post Infusion Assessment:  Patient tolerated injection without incident.  Site patent and intact, free from redness, edema or discomfort.  Access discontinued per protocol.       Discharge Plan:   Copy of AVS reviewed with patient and/or family.  Patient will return 3/10/22 for next appointment.  Patient discharged in stable condition accompanied by: self.  Departure Mode: Ambulatory with walker.      Maddison Amaya RN

## 2022-03-10 ENCOUNTER — INFUSION THERAPY VISIT (OUTPATIENT)
Dept: INFUSION THERAPY | Facility: CLINIC | Age: 82
End: 2022-03-10
Attending: INTERNAL MEDICINE
Payer: COMMERCIAL

## 2022-03-10 ENCOUNTER — APPOINTMENT (OUTPATIENT)
Dept: LAB | Facility: CLINIC | Age: 82
End: 2022-03-10
Payer: COMMERCIAL

## 2022-03-10 VITALS — HEART RATE: 68 BPM | DIASTOLIC BLOOD PRESSURE: 69 MMHG | SYSTOLIC BLOOD PRESSURE: 151 MMHG

## 2022-03-10 DIAGNOSIS — D63.1 ANEMIA OF CHRONIC RENAL FAILURE, STAGE 3B (H): Primary | ICD-10-CM

## 2022-03-10 DIAGNOSIS — N18.32 ANEMIA OF CHRONIC RENAL FAILURE, STAGE 3B (H): Primary | ICD-10-CM

## 2022-03-10 DIAGNOSIS — D56.8 OTHER THALASSEMIA (H): ICD-10-CM

## 2022-03-10 LAB
BASOPHILS # BLD AUTO: 0.1 10E3/UL (ref 0–0.2)
BASOPHILS NFR BLD AUTO: 1 %
EOSINOPHIL # BLD AUTO: 0.1 10E3/UL (ref 0–0.7)
EOSINOPHIL NFR BLD AUTO: 2 %
ERYTHROCYTE [DISTWIDTH] IN BLOOD BY AUTOMATED COUNT: 19 % (ref 10–15)
HCT VFR BLD AUTO: 25.5 % (ref 40–53)
HGB BLD-MCNC: 8 G/DL (ref 13.3–17.7)
IMM GRANULOCYTES # BLD: 0 10E3/UL
IMM GRANULOCYTES NFR BLD: 0 %
LYMPHOCYTES # BLD AUTO: 0.7 10E3/UL (ref 0.8–5.3)
LYMPHOCYTES NFR BLD AUTO: 14 %
MCH RBC QN AUTO: 20 PG (ref 26.5–33)
MCHC RBC AUTO-ENTMCNC: 31.4 G/DL (ref 31.5–36.5)
MCV RBC AUTO: 64 FL (ref 78–100)
MONOCYTES # BLD AUTO: 0.5 10E3/UL (ref 0–1.3)
MONOCYTES NFR BLD AUTO: 10 %
NEUTROPHILS # BLD AUTO: 3.7 10E3/UL (ref 1.6–8.3)
NEUTROPHILS NFR BLD AUTO: 73 %
NRBC # BLD AUTO: 0 10E3/UL
NRBC BLD AUTO-RTO: 0 /100
PLATELET # BLD AUTO: 173 10E3/UL (ref 150–450)
RBC # BLD AUTO: 4 10E6/UL (ref 4.4–5.9)
WBC # BLD AUTO: 5.1 10E3/UL (ref 4–11)

## 2022-03-10 PROCEDURE — 36415 COLL VENOUS BLD VENIPUNCTURE: CPT

## 2022-03-10 PROCEDURE — 85025 COMPLETE CBC W/AUTO DIFF WBC: CPT | Performed by: INTERNAL MEDICINE

## 2022-03-10 PROCEDURE — 250N000011 HC RX IP 250 OP 636: Performed by: INTERNAL MEDICINE

## 2022-03-10 PROCEDURE — 96372 THER/PROPH/DIAG INJ SC/IM: CPT | Performed by: INTERNAL MEDICINE

## 2022-03-10 RX ORDER — EPINEPHRINE 1 MG/ML
0.3 INJECTION, SOLUTION, CONCENTRATE INTRAVENOUS EVERY 5 MIN PRN
Status: CANCELLED | OUTPATIENT
Start: 2022-03-24

## 2022-03-10 RX ORDER — METHYLPREDNISOLONE SODIUM SUCCINATE 125 MG/2ML
125 INJECTION, POWDER, LYOPHILIZED, FOR SOLUTION INTRAMUSCULAR; INTRAVENOUS
Status: CANCELLED
Start: 2022-03-24

## 2022-03-10 RX ORDER — ALBUTEROL SULFATE 0.83 MG/ML
2.5 SOLUTION RESPIRATORY (INHALATION)
Status: CANCELLED | OUTPATIENT
Start: 2022-03-24

## 2022-03-10 RX ORDER — MEPERIDINE HYDROCHLORIDE 25 MG/ML
25 INJECTION INTRAMUSCULAR; INTRAVENOUS; SUBCUTANEOUS EVERY 30 MIN PRN
Status: CANCELLED | OUTPATIENT
Start: 2022-03-24

## 2022-03-10 RX ORDER — ALBUTEROL SULFATE 90 UG/1
1-2 AEROSOL, METERED RESPIRATORY (INHALATION)
Status: CANCELLED
Start: 2022-03-24

## 2022-03-10 RX ORDER — NALOXONE HYDROCHLORIDE 0.4 MG/ML
0.2 INJECTION, SOLUTION INTRAMUSCULAR; INTRAVENOUS; SUBCUTANEOUS
Status: CANCELLED | OUTPATIENT
Start: 2022-03-24

## 2022-03-10 RX ORDER — DIPHENHYDRAMINE HYDROCHLORIDE 50 MG/ML
50 INJECTION INTRAMUSCULAR; INTRAVENOUS
Status: CANCELLED
Start: 2022-03-24

## 2022-03-10 RX ADMIN — DARBEPOETIN ALFA 60 MCG: 60 INJECTION, SOLUTION INTRAVENOUS; SUBCUTANEOUS at 14:10

## 2022-03-10 NOTE — PROGRESS NOTES
Infusion Nursing Note:  Mann Escobar presents today for labs and Aranesp    Patient seen by provider today: No   present during visit today: Not Applicable.    Note: N/A.      Intravenous Access:  Labs drawn without difficulty.    Treatment Conditions:  Hemoglobin 8.0 today    Results reviewed, labs MET treatment parameters, ok to proceed with treatment.      Post Infusion Assessment:  Patient tolerated Aranesp injection subcutaneous to the left upper arm without incident.       Discharge Plan:   Patient discharged in stable condition accompanied by: self.  Departure Mode: Ambulatory.  Pt to return on 3/24/22 at 2:00 pm for labs followed by possible Aranesp at 2:30 pm.       Tangela Becker RN

## 2022-03-24 ENCOUNTER — INFUSION THERAPY VISIT (OUTPATIENT)
Dept: INFUSION THERAPY | Facility: CLINIC | Age: 82
End: 2022-03-24
Attending: INTERNAL MEDICINE
Payer: COMMERCIAL

## 2022-03-24 ENCOUNTER — APPOINTMENT (OUTPATIENT)
Dept: LAB | Facility: CLINIC | Age: 82
End: 2022-03-24
Payer: COMMERCIAL

## 2022-03-24 VITALS — RESPIRATION RATE: 16 BRPM | SYSTOLIC BLOOD PRESSURE: 160 MMHG | DIASTOLIC BLOOD PRESSURE: 67 MMHG | HEART RATE: 75 BPM

## 2022-03-24 DIAGNOSIS — D63.1 ANEMIA OF CHRONIC RENAL FAILURE, STAGE 3B (H): Primary | ICD-10-CM

## 2022-03-24 DIAGNOSIS — N18.5 ANEMIA IN STAGE 5 CHRONIC KIDNEY DISEASE, NOT ON CHRONIC DIALYSIS (H): ICD-10-CM

## 2022-03-24 DIAGNOSIS — R79.89 HIGH PLASMA OXALATE: ICD-10-CM

## 2022-03-24 DIAGNOSIS — N18.32 ANEMIA OF CHRONIC RENAL FAILURE, STAGE 3B (H): Primary | ICD-10-CM

## 2022-03-24 DIAGNOSIS — D56.8 OTHER THALASSEMIA (H): ICD-10-CM

## 2022-03-24 DIAGNOSIS — D50.9 IRON DEFICIENCY ANEMIA, UNSPECIFIED IRON DEFICIENCY ANEMIA TYPE: ICD-10-CM

## 2022-03-24 DIAGNOSIS — N17.9 ACUTE KIDNEY INJURY SUPERIMPOSED ON CKD (H): ICD-10-CM

## 2022-03-24 DIAGNOSIS — I10 ESSENTIAL HYPERTENSION: ICD-10-CM

## 2022-03-24 DIAGNOSIS — N18.9 ACUTE KIDNEY INJURY SUPERIMPOSED ON CKD (H): ICD-10-CM

## 2022-03-24 DIAGNOSIS — D63.1 ANEMIA IN STAGE 5 CHRONIC KIDNEY DISEASE, NOT ON CHRONIC DIALYSIS (H): ICD-10-CM

## 2022-03-24 LAB
ALBUMIN SERPL-MCNC: 2.9 G/DL (ref 3.4–5)
ALBUMIN UR-MCNC: 100 MG/DL
ANION GAP SERPL CALCULATED.3IONS-SCNC: 7 MMOL/L (ref 3–14)
APPEARANCE UR: CLEAR
BASOPHILS # BLD AUTO: 0.1 10E3/UL (ref 0–0.2)
BASOPHILS NFR BLD AUTO: 1 %
BILIRUB UR QL STRIP: NEGATIVE
BUN SERPL-MCNC: 47 MG/DL (ref 7–30)
CALCIUM SERPL-MCNC: 6.9 MG/DL (ref 8.5–10.1)
CHLORIDE BLD-SCNC: 110 MMOL/L (ref 94–109)
CO2 SERPL-SCNC: 26 MMOL/L (ref 20–32)
COLOR UR AUTO: YELLOW
CREAT SERPL-MCNC: 3.49 MG/DL (ref 0.66–1.25)
CREAT SERPL-MCNC: 3.49 MG/DL (ref 0.66–1.25)
CREAT UR-MCNC: 80 MG/DL
CREAT UR-MCNC: 81 MG/DL
CREAT UR-MCNC: 81 MG/DL
DACRYOCYTES BLD QL SMEAR: SLIGHT
EOSINOPHIL # BLD AUTO: 0.1 10E3/UL (ref 0–0.7)
EOSINOPHIL NFR BLD AUTO: 1 %
ERYTHROCYTE [DISTWIDTH] IN BLOOD BY AUTOMATED COUNT: 18.6 % (ref 10–15)
FERRITIN SERPL-MCNC: 253 NG/ML (ref 26–388)
FRACT EXCRET NA UR+SERPL-RTO: 0.8 %
FRAGMENTS BLD QL SMEAR: ABNORMAL
GFR SERPL CREATININE-BSD FRML MDRD: 17 ML/MIN/1.73M2
GLUCOSE BLD-MCNC: 150 MG/DL (ref 70–99)
GLUCOSE UR STRIP-MCNC: 50 MG/DL
HCT VFR BLD AUTO: 28.7 % (ref 40–53)
HGB BLD-MCNC: 8.8 G/DL (ref 13.3–17.7)
HGB UR QL STRIP: NEGATIVE
IMM GRANULOCYTES # BLD: 0 10E3/UL
IMM GRANULOCYTES NFR BLD: 0 %
IRON SATN MFR SERPL: 28 % (ref 15–46)
IRON SERPL-MCNC: 49 UG/DL (ref 35–180)
KETONES UR STRIP-MCNC: NEGATIVE MG/DL
LEUKOCYTE ESTERASE UR QL STRIP: NEGATIVE
LYMPHOCYTES # BLD AUTO: 0.7 10E3/UL (ref 0.8–5.3)
LYMPHOCYTES NFR BLD AUTO: 11 %
MCH RBC QN AUTO: 19.8 PG (ref 26.5–33)
MCHC RBC AUTO-ENTMCNC: 30.7 G/DL (ref 31.5–36.5)
MCV RBC AUTO: 65 FL (ref 78–100)
MICROALBUMIN UR-MCNC: 588 MG/L
MICROALBUMIN/CREAT UR: 725.93 MG/G CR (ref 0–17)
MONOCYTES # BLD AUTO: 0.4 10E3/UL (ref 0–1.3)
MONOCYTES NFR BLD AUTO: 6 %
MUCOUS THREADS #/AREA URNS LPF: PRESENT /LPF
NEUTROPHILS # BLD AUTO: 4.9 10E3/UL (ref 1.6–8.3)
NEUTROPHILS NFR BLD AUTO: 81 %
NITRATE UR QL: NEGATIVE
NRBC # BLD AUTO: 0 10E3/UL
NRBC BLD AUTO-RTO: 0 /100
PH UR STRIP: 5 [PH] (ref 5–7)
PHOSPHATE SERPL-MCNC: 4 MG/DL (ref 2.5–4.5)
PLAT MORPH BLD: ABNORMAL
PLATELET # BLD AUTO: 211 10E3/UL (ref 150–450)
POTASSIUM BLD-SCNC: 3.7 MMOL/L (ref 3.4–5.3)
PROT UR-MCNC: 1.5 G/L
PROT/CREAT 24H UR: 1.88 G/G CR (ref 0–0.2)
RBC # BLD AUTO: 4.44 10E6/UL (ref 4.4–5.9)
RBC MORPH BLD: ABNORMAL
RBC URINE: 0 /HPF
SODIUM SERPL-SCNC: 143 MMOL/L (ref 133–144)
SODIUM SERPL-SCNC: 143 MMOL/L (ref 133–144)
SODIUM UR-SCNC: 25 MMOL/L
SP GR UR STRIP: 1.02 (ref 1–1.03)
TARGETS BLD QL SMEAR: ABNORMAL
TIBC SERPL-MCNC: 174 UG/DL (ref 240–430)
TOTAL PROTEIN SERUM FOR ELP: 5.6 G/DL (ref 6.8–8.8)
UROBILINOGEN UR STRIP-MCNC: NORMAL MG/DL
WBC # BLD AUTO: 6.1 10E3/UL (ref 4–11)
WBC URINE: 1 /HPF

## 2022-03-24 PROCEDURE — 84295 ASSAY OF SERUM SODIUM: CPT | Performed by: INTERNAL MEDICINE

## 2022-03-24 PROCEDURE — 84155 ASSAY OF PROTEIN SERUM: CPT | Performed by: INTERNAL MEDICINE

## 2022-03-24 PROCEDURE — 80069 RENAL FUNCTION PANEL: CPT | Performed by: INTERNAL MEDICINE

## 2022-03-24 PROCEDURE — 84165 PROTEIN E-PHORESIS SERUM: CPT | Mod: TC | Performed by: PATHOLOGY

## 2022-03-24 PROCEDURE — 82043 UR ALBUMIN QUANTITATIVE: CPT | Performed by: INTERNAL MEDICINE

## 2022-03-24 PROCEDURE — 84166 PROTEIN E-PHORESIS/URINE/CSF: CPT | Performed by: PATHOLOGY

## 2022-03-24 PROCEDURE — 84156 ASSAY OF PROTEIN URINE: CPT | Performed by: INTERNAL MEDICINE

## 2022-03-24 PROCEDURE — 96372 THER/PROPH/DIAG INJ SC/IM: CPT | Performed by: INTERNAL MEDICINE

## 2022-03-24 PROCEDURE — 82728 ASSAY OF FERRITIN: CPT | Performed by: INTERNAL MEDICINE

## 2022-03-24 PROCEDURE — 83521 IG LIGHT CHAINS FREE EACH: CPT | Performed by: INTERNAL MEDICINE

## 2022-03-24 PROCEDURE — 83945 ASSAY OF OXALATE: CPT | Performed by: INTERNAL MEDICINE

## 2022-03-24 PROCEDURE — 84300 ASSAY OF URINE SODIUM: CPT | Performed by: INTERNAL MEDICINE

## 2022-03-24 PROCEDURE — 83550 IRON BINDING TEST: CPT | Performed by: INTERNAL MEDICINE

## 2022-03-24 PROCEDURE — 250N000011 HC RX IP 250 OP 636: Performed by: INTERNAL MEDICINE

## 2022-03-24 PROCEDURE — 85025 COMPLETE CBC W/AUTO DIFF WBC: CPT | Performed by: INTERNAL MEDICINE

## 2022-03-24 PROCEDURE — 81001 URINALYSIS AUTO W/SCOPE: CPT | Performed by: INTERNAL MEDICINE

## 2022-03-24 PROCEDURE — 36415 COLL VENOUS BLD VENIPUNCTURE: CPT

## 2022-03-24 RX ORDER — EPINEPHRINE 1 MG/ML
0.3 INJECTION, SOLUTION, CONCENTRATE INTRAVENOUS EVERY 5 MIN PRN
Status: CANCELLED | OUTPATIENT
Start: 2022-04-07

## 2022-03-24 RX ORDER — ALBUTEROL SULFATE 90 UG/1
1-2 AEROSOL, METERED RESPIRATORY (INHALATION)
Status: CANCELLED
Start: 2022-04-07

## 2022-03-24 RX ORDER — NALOXONE HYDROCHLORIDE 0.4 MG/ML
0.2 INJECTION, SOLUTION INTRAMUSCULAR; INTRAVENOUS; SUBCUTANEOUS
Status: CANCELLED | OUTPATIENT
Start: 2022-04-07

## 2022-03-24 RX ORDER — DIPHENHYDRAMINE HYDROCHLORIDE 50 MG/ML
50 INJECTION INTRAMUSCULAR; INTRAVENOUS
Status: CANCELLED
Start: 2022-04-07

## 2022-03-24 RX ORDER — METHYLPREDNISOLONE SODIUM SUCCINATE 125 MG/2ML
125 INJECTION, POWDER, LYOPHILIZED, FOR SOLUTION INTRAMUSCULAR; INTRAVENOUS
Status: CANCELLED
Start: 2022-04-07

## 2022-03-24 RX ORDER — ALBUTEROL SULFATE 0.83 MG/ML
2.5 SOLUTION RESPIRATORY (INHALATION)
Status: CANCELLED | OUTPATIENT
Start: 2022-04-07

## 2022-03-24 RX ORDER — MEPERIDINE HYDROCHLORIDE 25 MG/ML
25 INJECTION INTRAMUSCULAR; INTRAVENOUS; SUBCUTANEOUS EVERY 30 MIN PRN
Status: CANCELLED | OUTPATIENT
Start: 2022-04-07

## 2022-03-24 RX ADMIN — DARBEPOETIN ALFA 60 MCG: 60 INJECTION, SOLUTION INTRAVENOUS; SUBCUTANEOUS at 15:23

## 2022-03-24 NOTE — PROGRESS NOTES
Infusion Nursing Note:  Mann Escobar presents today for Aranesp.    Patient seen by provider today: No   present during visit today: Not Applicable.    Note: Pt reports he slipped and fell a few days ago and reports L shoulder and hips pain since fall.      Intravenous Access:  N/A.    Treatment Conditions:  Hgb 8.8  Pt does meet parameters for treatment today.     Post Infusion Assessment:  Patient tolerated injection without incident.       Discharge Plan:   Discharge instructions reviewed with: Patient.  Patient and/or family verbalized understanding of discharge instructions and all questions answered.  Patient discharged in stable condition accompanied by: self.  Departure Mode: Ambulatory.      Katy Meyers RN

## 2022-03-25 LAB
ALBUMIN MFR UR ELPH: 55 %
ALBUMIN SERPL ELPH-MCNC: 3.6 G/DL (ref 3.7–5.1)
ALPHA1 GLOB MFR UR ELPH: 14.1 %
ALPHA1 GLOB SERPL ELPH-MCNC: 0.4 G/DL (ref 0.2–0.4)
ALPHA2 GLOB MFR UR ELPH: 8.4 %
ALPHA2 GLOB SERPL ELPH-MCNC: 0.6 G/DL (ref 0.5–0.9)
B-GLOBULIN MFR UR ELPH: 14.9 %
B-GLOBULIN SERPL ELPH-MCNC: 0.5 G/DL (ref 0.6–1)
GAMMA GLOB MFR UR ELPH: 7.6 %
GAMMA GLOB SERPL ELPH-MCNC: 0.4 G/DL (ref 0.7–1.6)
KAPPA LC FREE SER-MCNC: 6.7 MG/DL (ref 0.33–1.94)
KAPPA LC FREE/LAMBDA FREE SER NEPH: 1.49 {RATIO} (ref 0.26–1.65)
LAMBDA LC FREE SERPL-MCNC: 4.5 MG/DL (ref 0.57–2.63)
M PROTEIN MFR UR ELPH: 1.1 %
M PROTEIN SERPL ELPH-MCNC: 0 G/DL
PROT PATTERN SERPL ELPH-IMP: ABNORMAL
PROT PATTERN UR ELPH-IMP: ABNORMAL

## 2022-03-25 PROCEDURE — 84166 PROTEIN E-PHORESIS/URINE/CSF: CPT | Mod: 26 | Performed by: PATHOLOGY

## 2022-03-25 PROCEDURE — 84165 PROTEIN E-PHORESIS SERUM: CPT | Mod: 26 | Performed by: PATHOLOGY

## 2022-03-28 ENCOUNTER — TELEPHONE (OUTPATIENT)
Dept: INFUSION THERAPY | Facility: CLINIC | Age: 82
End: 2022-03-28
Payer: COMMERCIAL

## 2022-03-28 NOTE — PROGRESS NOTES
Call placed to pt. Informing Iron Sat 28 % on 3/24/22.  Injectafer infusion not indicated for tomorrow as parameter reads hold infusion if sat > 25%. Mirtha Resendiz RN

## 2022-03-30 ENCOUNTER — VIRTUAL VISIT (OUTPATIENT)
Dept: NEPHROLOGY | Facility: CLINIC | Age: 82
End: 2022-03-30
Attending: INTERNAL MEDICINE
Payer: COMMERCIAL

## 2022-03-30 DIAGNOSIS — D47.2 MONOCLONAL GAMMOPATHY OF UNKNOWN SIGNIFICANCE: ICD-10-CM

## 2022-03-30 DIAGNOSIS — R79.89 HIGH PLASMA OXALATE: ICD-10-CM

## 2022-03-30 DIAGNOSIS — N18.4 CKD (CHRONIC KIDNEY DISEASE) STAGE 4, GFR 15-29 ML/MIN (H): Primary | ICD-10-CM

## 2022-03-30 PROCEDURE — 99215 OFFICE O/P EST HI 40 MIN: CPT | Mod: 95 | Performed by: INTERNAL MEDICINE

## 2022-03-30 RX ORDER — PYRIDOXINE HCL (VITAMIN B6) 50 MG
50 TABLET ORAL 2 TIMES DAILY
Qty: 60 TABLET | Refills: 11 | Status: SHIPPED | OUTPATIENT
Start: 2022-03-30 | End: 2023-01-01

## 2022-03-30 RX ORDER — FUROSEMIDE 40 MG
80 TABLET ORAL 2 TIMES DAILY
Qty: 120 TABLET | Refills: 3 | Status: SHIPPED | OUTPATIENT
Start: 2022-03-30 | End: 2022-04-11

## 2022-03-30 NOTE — LETTER
3/30/2022     RE: Mann Escobar  [address]  [facility] [address]     Dear Colleague,    Thank you for referring your patient, Mann Escobar, to the [facility] [place] at [facility]. Please see a copy of my visit note below.    Mann is a 81 year old who is being evaluated via a billable video visit.      How would you like to obtain your AVS? Mail a copy  If the video visit is dropped, the invitation should be resent by: Text to cell phone: [phone]  Will anyone else be joining your video visit? No    Video Start Time: 824 am  Video-Visit Details    Type of service:  Video Visit    Video End Time:857 am    Originating Location (pt. Location): Home    Distant Location (provider location):  [facility] [place]     Platform used for Video Visit: [employer]    >60 minutes spent on day of service in coordination of care and review of progress notes and labs    Assessment and Plan:  81 year old male with history of CKD, hypertension, gastric bypass , GI bleeding 2016, iron deficiency anemia and Thalassemia minor, who presents for CKD followup. He also has HFpEF and edema/ lymphedema. He has history of multiple orthopedic surgeries. His Scr is increased from 2 to 4 and significant anemia.  # CKD progressively worsening:  Scr has worsened especially in the last 2-3 years, previously was near 1 but up to 1.2-1.3 in 2017 and 1.3-1.5 in 2019 and up to 2 since 2020.  His Scr in July 2020 was noted up to 2, and had ankle surgery 7/23 and Scr remained around 2 since then.    - no NSAIDs in recent years except toradol 7/23/2020 postop   - no other significant nephrotoxins, on diuretics for many years - hydrochlorothiazide previously, on loop diuretics in recent years    - discussed lowering diuretic doses as able, he lowered to once a day but creatinine still 3.5 and has swelling   - he  is wrapping legs / elevating and monitoring sodium more closely to see if we can lower diuretic dose, however given swelling can get significant and HFpEF, volume control is important.   - given history of gastric bypass, we checked oxalate level- it was elevated, started pyridoxine 50mg has been taking twice a day  - low grade proteinuria  - discussed biopsy in the next couple of weeks - he is willing, IR for sedation  .  # Hypertension: blood pressure seems to be lower    - he is on amlodipine,which we will lower to 5mg    -continue furosemide but given 10 pound increase, will increase to 80mg/40mg for the next week and reassess , goal -135 systolic, and controlling swelling in multiple ways    # Anemia- acute on chronic, due to thalassemia and chronic renal disease, gastric ulcer noted recently:   - Hgb down to 6.1, multifactorial, due to thalassemia and kidney disease, on EPO, sees hematology  - Iron studies: adequate.    # Electrolytes:   - Potassium; level: Normal / low normal, not taking potassium supplement.  - Bicarbonate; level: Normal/ high normal    # Mineral Bone Disorder:   - Calcium; level is:  Normal  - Phosphorus; level is: Normal      Assessment and plan was discussed with patient and he voiced his understanding and agreement.      Reason for Visit:  Mann Escobar is a 81 year old male with CKD from ? , HFpEF, who presents for followup.    HPI:  He is a pleasant male with history of CKD who follows up for progressively worsening renal function. He has had elevated creatinine over the past few years, which has fluctuated. He was noted with HFpEF/ moderate diastolic dysfunction.      He has had significant amount of swelling since his ankle surgery last year, but even going back years, he has dealt with swelling and was even in lymphedema clinic many years ago.    He was given furosemide 20 mg then 40mg which helped with the edema.  He lost 30+ lbs of weight with furosemide and  now takes it as needed to maintain his weight.   He had a knee replacement in 2000 and has had some edema and then after his ankle surgery in July 2020     His weight was up to 199 lbs from 183 lbs and had gotten down to near 160 lbs. And now up to ~170 lbs.   He had gastric bypass in 2000 and was 280 lbs at that point.    He was taking NSAIDS in the past and had GI bleeding and noted with another ulcer last Fall  He was noted with hemoglobin down to 6.1 a few months ago. Last check was in June 2021. He denies dark stools or evidence of bleeding  He was started on aranesp by hematology.    He has prostate enlargement and has been prescribed terazosin.  He has continued to take this in case it helps.  He states he has been having normal urination and denies change in appetite.   His Scr is up to 4 in recent days. He is eating well and hydrating, does not think he is dehydrated.   His weight change is significant over recent months. His oxalate was elevated when we checked it last year. He did start pyridoxine but only one pill a day.   His ultrasound did show several cysts bilaterally but otherwise unremarkable, with normal sizes though right kidney 10.5 and left 13cm.     We discussed kidney biopsy in order to definitively know if his renal disease is due to oxalate or another etiology and he is willing to proceed. His SPEP was negative, UPEP had a small monoclonal band in the gamma region with peak of 1.1   I have low suspicion that this is significant but we will see with kidney biopsy   He has gained 10 lbs in recent weeks, with his chronic swelling despite trying to elevated. His blood pressure is 150s range     Renal History:   Kidney Disease and Medical Hx:  h/o HTN: Yes      ROS:   A comprehensive review of systems was obtained and negative, except as noted in the HPI or PMH.    Active Medical Problems:  Patient Active Problem List   Diagnosis      HX NEPHROLITHIASIS [V13.01]     Thalassemia minor      Esophageal reflux     Family history of prostate cancer     Leg edema     CARDIOVASCULAR SCREENING; LDL GOAL LESS THAN 130     Internal hemorrhoids     Iron deficiency anemia     First degree AV block     Family history of diabetes mellitus     Scoliosis     Hypopotassemia     Advanced directives, counseling/discussion     HTN (hypertension)     Benign non-nodular prostatic hyperplasia with lower urinary tract symptoms     Chronic low back pain     S/P knee replacement     S/P ankle fusion     Abnormal antinuclear antibody titer     Osteoarthritis     Hypertension     BPH (benign prostatic hyperplasia)     Pseudogout     Chronic pain syndrome     S/P ankle joint replacement     Arthritis of ankle, right     Chronic anemia     Chronic kidney disease, stage 3 (H)     Diastolic dysfunction     Carpal tunnel syndrome     Edema     Nephrolithiasis     Marginal ulcer     Retching     History of Favian-en-Y gastric bypass     Chronic heart failure with preserved ejection fraction (H)     Anemia of chronic renal failure, stage 3b (H)     PMH:   Medical record was reviewed and PMH was discussed with patient and noted below.  Past Medical History:   Diagnosis Date     Arthritis of ankle, left 7/24/2020     Back pain     WITH BILATERAL LEG PAIN AND NUMBNESS OF BOTH FEET     Gastro-oesophageal reflux disease     REFLUX     Hypertension      Hypopotassemia      Internal hemorrhoids      Iron deficiency anaemia      Leg edema      Morbid obesity 9/12/2005     Morbid obesity (H)      Osteoarthrosis      Scoliosis      Thalassemia minor      PSH:   Past Surgical History:   Procedure Laterality Date     CATARACT IOL, RT/LT  2008/    Cataract IOL RT/LT     COLONOSCOPY  2009/07    polyps removed repeat 5 yrs, tubular adenoma     COLONOSCOPY  9/29/2011    Procedure:COLONOSCOPY; Colonoscopy with hemorrhoid banding; Surgeon:JEREMY GARCIA; Location:[facility]     COLONOSCOPY N/A 12/19/2017    Procedure: COLONOSCOPY;  colonoscopy,  gastroscopy;  Surgeon: Edmar Isaacs MD;  Location: [facility]     DECOMPRESSION LUMBAR MINIMALLY INVASIVE TWO LEVELS  5/2/2012    Procedure:DECOMPRESSION LUMBAR MINIMALLY INVASIVE TWO LEVELS; Surgeon:LOTTIE MITCHELL; [facility]     ESOPHAGOSCOPY, GASTROSCOPY, DUODENOSCOPY (EGD), COMBINED N/A 2/6/2018    Procedure: COMBINED ESOPHAGOSCOPY, GASTROSCOPY, DUODENOSCOPY (EGD);  gastroscopy;  Surgeon: Edmar Isaacs MD;  Location: [facility]     ESOPHAGOSCOPY, GASTROSCOPY, DUODENOSCOPY (EGD), COMBINED N/A 10/18/2021    Procedure: ESOPHAGOGASTRODUODENOSCOPY (EGD);  Surgeon: Anju Galeano MD;  Location: [facility]     FUSION SPINE POSTERIOR MINIMALLY INVASIVE ONE LEVEL  5/2/2012    Procedure:FUSION SPINE POSTERIOR MINIMALLY INVASIVE ONE LEVEL; Minimal Access Spinal Technology Transformaninal Lumbar Interbody Fusion L4-5, Decompression L3-5; Surgeon:LOTTIE MITCHELL; Location:[facility]     HC REMOVAL OF HYDROCELE,TUNICA,UNILAT  2006    bilateral     ORTHOPEDIC SURGERY  2000    Lf knee replacement     ORTHOPEDIC SURGERY  2002, 2010    Rt replacement     ORTHOPEDIC SURGERY  2005    Left ankle fused     RECONSTRUCT ANKLE Right 7/23/2020    Procedure: Ankle RECONSTRUCTIve Arthroplasty;  Surgeon: Luke Powers DPM;  Location: [facility]     SURGICAL HISTORY OF -       Cysto     SURGICAL HISTORY OF -   2002    Percutaneous kidney stone removal     SURGICAL HISTORY OF -       ureteral stent removal     SURGICAL HISTORY OF -   2005    bariatric surgery-Favian-en-Y     SURGICAL HISTORY OF -   2008    carpal tunnel surgery rt wrist       Family Hx:   Family History   Problem Relation Age of Onset     Diabetes Brother      Obesity Brother      Cerebrovascular Disease Paternal Grandfather      Prostate Cancer Father      Cancer Father         bone     Diabetes Father      Heart Disease Mother         CHF     Cerebrovascular Disease Mother      Hypertension Mother      Cancer Mother         tumor in stomach     Cancer - colorectal Mother       Heart Disease Maternal Grandmother         CHF     Diabetes Paternal Grandmother      Breast Cancer Sister      Genitourinary Problems Son         Kidney stones     Cancer Brother      Cancer - colorectal Brother      Diabetes Brother      Personal Hx:   Social History     Tobacco Use     Smoking status: Former Smoker     Packs/day: 0.50     Years: 7.00     Pack years: 3.50     Types: Cigarettes     Quit date: 1962     Years since quittin.2     Smokeless tobacco: Never Used   Substance Use Topics     Alcohol use: Yes     Comment: one every other day. Rarely       Allergies:  Allergies   Allergen Reactions     Nkda [No Known Drug Allergies]        Medications:  Current Outpatient Medications   Medication Sig     amLODIPine (NORVASC) 5 MG tablet Take 1 tablet (5 mg) by mouth daily     folic acid 800 MCG tablet Take 1 tablet (800 mcg) by mouth daily     furosemide (LASIX) 40 MG tablet Take 1 tablet (40 mg) by mouth daily (Patient taking differently: Take 40 mg by mouth daily as needed (Edema) )     HCA VITAMIN B12 500 MCG OR TABS 2 tablets daily     MULTIVITAMIN OR one daily     omeprazole (PRILOSEC) 40 MG DR capsule Take 1 capsule (40 mg) by mouth 2 times daily     OVER-THE-COUNTER Elderberry 50mg supplement, one daily     oxyCODONE (ROXICODONE) 5 MG tablet Take 1 tablet (5 mg) by mouth 2 times daily as needed for severe pain This is a 90 day supply     sucralfate (CARAFATE) 1 GM tablet Take 1 tablet (1 g) by mouth 4 times daily Take 30 minutes before meals and at bedtime     terazosin (HYTRIN) 5 MG capsule TAKE 1 CAPSULE AT BEDTIME     Turmeric 500 MG CAPS Does not take all the time     VIACTIV OR With D 1000mg calcium     zinc gluconate 50 MG tablet Take 50 mg by mouth daily     fluticasone (FLONASE) 50 MCG/ACT nasal spray Spray 1 spray into both nostrils daily (Patient not taking: Reported on 3/30/2022)     vitamin B6 (VITAMIN B-6) 50 MG tablet Take 1 tablet (50 mg) by mouth 2 times daily (Patient not  taking: Reported on 3/30/2022)     No current facility-administered medications for this visit.      Vitals:  There were no vitals taken for this visit.  GENERAL: Healthy, alert and no distress  EYES: Eyes grossly normal to inspection.  No discharge or erythema, or obvious scleral/conjunctival abnormalities.  RESP: No audible wheeze, cough, or visible cyanosis.  No visible retractions or increased work of breathing.    SKIN: Visible skin clear. No significant rash, abnormal pigmentation or lesions.  NEURO: Cranial nerves grossly intact.  Mentation and speech appropriate for age.  PSYCH: Mentation appears normal, affect normal/bright, judgement and insight intact, normal speech and appearance well-groomed.      LABS:   CMP  Recent Labs   Lab Test 03/24/22  1423 01/26/22  1303 01/21/22  1006 06/16/21  1435 02/08/21  1121 02/01/21  1148 01/25/21  1058     143 144 141 143 142 141 142   POTASSIUM 3.7 3.7 3.6 3.8 3.5 3.7 4.0   CHLORIDE 110* 114* 112* 107 107 109 112*   CO2 26 25 24 24 30 27 29   ANIONGAP 7 5 5 12 5 5 1*   * 128* 92 80 91 117* 96   BUN 47* 54* 50* 47* 28 23 21   CR 3.49*  3.49* 4.03* 3.56* 2.73* 2.26* 2.12* 2.02*   GFRESTIMATED 17* 14* 16* 21* 26* 28* 30*   GFRESTBLACK  --   --   --  24* 31* 33* 35*   RUTH 6.9* 7.9* 8.0* 7.7* 8.0* 8.2* 8.4*     Recent Labs   Lab Test 02/08/21  1121 11/10/17  1401 06/11/15  1306   BILITOTAL 0.5 0.5 0.6   ALKPHOS 104 110 100   ALT 21 20 25   AST 14 13 14     CBC  Recent Labs   Lab Test 03/24/22  1424 03/10/22  1342 02/24/22  1421 02/09/22  1355   HGB 8.8* 8.0* 7.5* 6.7*   WBC 6.1 5.1 5.6 4.2   RBC 4.44 4.00* 3.83* 3.41*   HCT 28.7* 25.5* 24.1* 21.5*   MCV 65* 64* 63* 63*   MCH 19.8* 20.0* 19.6* 19.6*   MCHC 30.7* 31.4* 31.1* 31.2*   RDW 18.6* 19.0* 20.5* 19.9*    173 188 196     URINE STUDIES  Recent Labs   Lab Test 03/24/22  1538 01/26/22  1303 06/16/21  1444 11/30/18  1045 09/28/15  1134   COLOR Yellow Yellow Yellow Yellow Yellow   APPEARANCE Clear  Slightly Cloudy* Clear Clear Clear   URINEGLC 50 * Negative Negative Negative Negative   URINEBILI Negative Negative Negative Negative Small  This is an unconfirmed screening test result. A positive result may be false.  *   URINEKETONE Negative Negative Negative Negative Trace*   SG 1.016 1.016 1.020 1.025 >1.030   UBLD Negative Negative Negative Trace* Negative   URINEPH 5.0 5.0 5.5 6.0 5.5   PROTEIN 100 * 100 * 100* >=300* 100*   UROBILINOGEN  --   --  1.0 1.0 1.0   NITRITE Negative Negative Negative Negative Negative   LEUKEST Negative Negative Negative Negative Negative   RBCU 0 <1 O - 2 O - 2 O - 2  Urine was tested unconcentrated because <10 ml was received.     WBCU 1 3 0 - 5 0 - 5 O - 2  Urine was tested unconcentrated because <10 ml was received.       Recent Labs   Lab Test 03/24/22  1538 01/26/22  1303   UTPG 1.88* 0.80*     PTH  No lab results found.  IRON STUDIES  Recent Labs   Lab Test 03/24/22  1423 01/26/22  1308 01/21/22  1006 06/16/21  1435   IRON 49 64 80 41   * 209*  --  166*   IRONSAT 28 31  --  25   TESSIE 253  --  280 329       Yoon Orlando Alicea MD

## 2022-03-30 NOTE — PROGRESS NOTES
Mann is a 81 year old who is being evaluated via a billable video visit.      How would you like to obtain your AVS? Mail a copy  If the video visit is dropped, the invitation should be resent by: Text to cell phone: [phone]  Will anyone else be joining your video visit? No    Video Start Time: 824 am  Video-Visit Details    Type of service:  Video Visit    Video End Time:857 am    Originating Location (pt. Location): Home    Distant Location (provider location):  [facility] [place]     Platform used for Video Visit: [employer]    >60 minutes spent on day of service in coordination of care and review of progress notes and labs    Assessment and Plan:  81 year old male with history of CKD, hypertension, gastric bypass , GI bleeding 2016, iron deficiency anemia and Thalassemia minor, who presents for CKD followup. He also has HFpEF and edema/ lymphedema. He has history of multiple orthopedic surgeries. His Scr is increased from 2 to 4 and significant anemia.  # CKD progressively worsening:  Scr has worsened especially in the last 2-3 years, previously was near 1 but up to 1.2-1.3 in 2017 and 1.3-1.5 in 2019 and up to 2 since 2020.  His Scr in July 2020 was noted up to 2, and had ankle surgery 7/23 and Scr remained around 2 since then.    - no NSAIDs in recent years except toradol 7/23/2020 postop   - no other significant nephrotoxins, on diuretics for many years - hydrochlorothiazide previously, on loop diuretics in recent years    - discussed lowering diuretic doses as able, he lowered to once a day but creatinine still 3.5 and has swelling   - he is wrapping legs / elevating and monitoring sodium more closely to see if we can lower diuretic dose, however given swelling can get significant and HFpEF, volume control is important.   - given history of gastric bypass, we checked oxalate level- it was elevated, started pyridoxine 50mg has been taking twice a day  - low grade  proteinuria  - discussed biopsy in the next couple of weeks - he is willing, IR for sedation  .  # Hypertension: blood pressure seems to be lower    - he is on amlodipine,which we will lower to 5mg    -continue furosemide but given 10 pound increase, will increase to 80mg/40mg for the next week and reassess , goal -135 systolic, and controlling swelling in multiple ways    # Anemia- acute on chronic, due to thalassemia and chronic renal disease, gastric ulcer noted recently:   - Hgb down to 6.1, multifactorial, due to thalassemia and kidney disease, on EPO, sees hematology  - Iron studies: adequate.    # Electrolytes:   - Potassium; level: Normal / low normal, not taking potassium supplement.  - Bicarbonate; level: Normal/ high normal    # Mineral Bone Disorder:   - Calcium; level is:  Normal  - Phosphorus; level is: Normal      Assessment and plan was discussed with patient and he voiced his understanding and agreement.      Reason for Visit:  Mann Escobar is a 81 year old male with CKD from ? , HFpEF, who presents for followup.    HPI:  He is a pleasant male with history of CKD who follows up for progressively worsening renal function. He has had elevated creatinine over the past few years, which has fluctuated. He was noted with HFpEF/ moderate diastolic dysfunction.      He has had significant amount of swelling since his ankle surgery last year, but even going back years, he has dealt with swelling and was even in lymphedema clinic many years ago.    He was given furosemide 20 mg then 40mg which helped with the edema.  He lost 30+ lbs of weight with furosemide and now takes it as needed to maintain his weight.   He had a knee replacement in 2000 and has had some edema and then after his ankle surgery in July 2020     His weight was up to 199 lbs from 183 lbs and had gotten down to near 160 lbs. And now up to ~170 lbs.   He had gastric bypass in 2000 and was 280 lbs at that point.    He was  taking NSAIDS in the past and had GI bleeding and noted with another ulcer last Fall  He was noted with hemoglobin down to 6.1 a few months ago. Last check was in June 2021. He denies dark stools or evidence of bleeding  He was started on aranesp by hematology.    He has prostate enlargement and has been prescribed terazosin.  He has continued to take this in case it helps.  He states he has been having normal urination and denies change in appetite.   His Scr is up to 4 in recent days. He is eating well and hydrating, does not think he is dehydrated.   His weight change is significant over recent months. His oxalate was elevated when we checked it last year. He did start pyridoxine but only one pill a day.   His ultrasound did show several cysts bilaterally but otherwise unremarkable, with normal sizes though right kidney 10.5 and left 13cm.     We discussed kidney biopsy in order to definitively know if his renal disease is due to oxalate or another etiology and he is willing to proceed. His SPEP was negative, UPEP had a small monoclonal band in the gamma region with peak of 1.1   I have low suspicion that this is significant but we will see with kidney biopsy   He has gained 10 lbs in recent weeks, with his chronic swelling despite trying to elevated. His blood pressure is 150s range     Renal History:   Kidney Disease and Medical Hx:  h/o HTN: Yes      ROS:   A comprehensive review of systems was obtained and negative, except as noted in the HPI or PMH.    Active Medical Problems:  Patient Active Problem List   Diagnosis      HX NEPHROLITHIASIS [V13.01]     Thalassemia minor     Esophageal reflux     Family history of prostate cancer     Leg edema     CARDIOVASCULAR SCREENING; LDL GOAL LESS THAN 130     Internal hemorrhoids     Iron deficiency anemia     First degree AV block     Family history of diabetes mellitus     Scoliosis     Hypopotassemia     Advanced directives, counseling/discussion     HTN  (hypertension)     Benign non-nodular prostatic hyperplasia with lower urinary tract symptoms     Chronic low back pain     S/P knee replacement     S/P ankle fusion     Abnormal antinuclear antibody titer     Osteoarthritis     Hypertension     BPH (benign prostatic hyperplasia)     Pseudogout     Chronic pain syndrome     S/P ankle joint replacement     Arthritis of ankle, right     Chronic anemia     Chronic kidney disease, stage 3 (H)     Diastolic dysfunction     Carpal tunnel syndrome     Edema     Nephrolithiasis     Marginal ulcer     Retching     History of Favian-en-Y gastric bypass     Chronic heart failure with preserved ejection fraction (H)     Anemia of chronic renal failure, stage 3b (H)     PMH:   Medical record was reviewed and PMH was discussed with patient and noted below.  Past Medical History:   Diagnosis Date     Arthritis of ankle, left 7/24/2020     Back pain     WITH BILATERAL LEG PAIN AND NUMBNESS OF BOTH FEET     Gastro-oesophageal reflux disease     REFLUX     Hypertension      Hypopotassemia      Internal hemorrhoids      Iron deficiency anaemia      Leg edema      Morbid obesity 9/12/2005     Morbid obesity (H)      Osteoarthrosis      Scoliosis      Thalassemia minor      PSH:   Past Surgical History:   Procedure Laterality Date     CATARACT IOL, RT/LT  2008/    Cataract IOL RT/LT     COLONOSCOPY  2009/07    polyps removed repeat 5 yrs, tubular adenoma     COLONOSCOPY  9/29/2011    Procedure:COLONOSCOPY; Colonoscopy with hemorrhoid banding; Surgeon:JEREMY GARCIA; Location:[facility]     COLONOSCOPY N/A 12/19/2017    Procedure: COLONOSCOPY;  colonoscopy, gastroscopy;  Surgeon: Edmar Isaacs MD;  Location: [facility]     DECOMPRESSION LUMBAR MINIMALLY INVASIVE TWO LEVELS  5/2/2012    Procedure:DECOMPRESSION LUMBAR MINIMALLY INVASIVE TWO LEVELS; Surgeon:LOTTIE MITCHELL; [facility]     ESOPHAGOSCOPY, GASTROSCOPY, DUODENOSCOPY (EGD), COMBINED N/A 2/6/2018    Procedure: COMBINED  ESOPHAGOSCOPY, GASTROSCOPY, DUODENOSCOPY (EGD);  gastroscopy;  Surgeon: Edmar Isaacs MD;  Location: [facility]     ESOPHAGOSCOPY, GASTROSCOPY, DUODENOSCOPY (EGD), COMBINED N/A 10/18/2021    Procedure: ESOPHAGOGASTRODUODENOSCOPY (EGD);  Surgeon: Anju Galeano MD;  Location: [facility]     FUSION SPINE POSTERIOR MINIMALLY INVASIVE ONE LEVEL  5/2/2012    Procedure:FUSION SPINE POSTERIOR MINIMALLY INVASIVE ONE LEVEL; Minimal Access Spinal Technology Transformaninal Lumbar Interbody Fusion L4-5, Decompression L3-5; Surgeon:LOTTIE MITCHELL; Location:[facility]     HC REMOVAL OF HYDROCELE,TUNICA,UNILAT  2006    bilateral     ORTHOPEDIC SURGERY  2000    Lf knee replacement     ORTHOPEDIC SURGERY  2002, 2010    Rt replacement     ORTHOPEDIC SURGERY  2005    Left ankle fused     RECONSTRUCT ANKLE Right 7/23/2020    Procedure: Ankle RECONSTRUCTIve Arthroplasty;  Surgeon: Luke Powers DPM;  Location: [facility]     SURGICAL HISTORY OF -       Cysto     SURGICAL HISTORY OF -   2002    Percutaneous kidney stone removal     SURGICAL HISTORY OF -       ureteral stent removal     SURGICAL HISTORY OF -   2005    bariatric surgery-Favian-en-Y     SURGICAL HISTORY OF -   2008    carpal tunnel surgery rt wrist       Family Hx:   Family History   Problem Relation Age of Onset     Diabetes Brother      Obesity Brother      Cerebrovascular Disease Paternal Grandfather      Prostate Cancer Father      Cancer Father         bone     Diabetes Father      Heart Disease Mother         CHF     Cerebrovascular Disease Mother      Hypertension Mother      Cancer Mother         tumor in stomach     Cancer - colorectal Mother      Heart Disease Maternal Grandmother         CHF     Diabetes Paternal Grandmother      Breast Cancer Sister      Genitourinary Problems Son         Kidney stones     Cancer Brother      Cancer - colorectal Brother      Diabetes Brother      Personal Hx:   Social History     Tobacco Use     Smoking status: Former Smoker      Packs/day: 0.50     Years: 7.00     Pack years: 3.50     Types: Cigarettes     Quit date: 1962     Years since quittin.2     Smokeless tobacco: Never Used   Substance Use Topics     Alcohol use: Yes     Comment: one every other day. Rarely       Allergies:  Allergies   Allergen Reactions     Nkda [No Known Drug Allergies]        Medications:  Current Outpatient Medications   Medication Sig     amLODIPine (NORVASC) 5 MG tablet Take 1 tablet (5 mg) by mouth daily     folic acid 800 MCG tablet Take 1 tablet (800 mcg) by mouth daily     furosemide (LASIX) 40 MG tablet Take 1 tablet (40 mg) by mouth daily (Patient taking differently: Take 40 mg by mouth daily as needed (Edema) )     HCA VITAMIN B12 500 MCG OR TABS 2 tablets daily     MULTIVITAMIN OR one daily     omeprazole (PRILOSEC) 40 MG DR capsule Take 1 capsule (40 mg) by mouth 2 times daily     OVER-THE-COUNTER Elderberry 50mg supplement, one daily     oxyCODONE (ROXICODONE) 5 MG tablet Take 1 tablet (5 mg) by mouth 2 times daily as needed for severe pain This is a 90 day supply     sucralfate (CARAFATE) 1 GM tablet Take 1 tablet (1 g) by mouth 4 times daily Take 30 minutes before meals and at bedtime     terazosin (HYTRIN) 5 MG capsule TAKE 1 CAPSULE AT BEDTIME     Turmeric 500 MG CAPS Does not take all the time     VIACTIV OR With D 1000mg calcium     zinc gluconate 50 MG tablet Take 50 mg by mouth daily     fluticasone (FLONASE) 50 MCG/ACT nasal spray Spray 1 spray into both nostrils daily (Patient not taking: Reported on 3/30/2022)     vitamin B6 (VITAMIN B-6) 50 MG tablet Take 1 tablet (50 mg) by mouth 2 times daily (Patient not taking: Reported on 3/30/2022)     No current facility-administered medications for this visit.      Vitals:  There were no vitals taken for this visit.  GENERAL: Healthy, alert and no distress  EYES: Eyes grossly normal to inspection.  No discharge or erythema, or obvious scleral/conjunctival abnormalities.  RESP: No audible  wheeze, cough, or visible cyanosis.  No visible retractions or increased work of breathing.    SKIN: Visible skin clear. No significant rash, abnormal pigmentation or lesions.  NEURO: Cranial nerves grossly intact.  Mentation and speech appropriate for age.  PSYCH: Mentation appears normal, affect normal/bright, judgement and insight intact, normal speech and appearance well-groomed.      LABS:   CMP  Recent Labs   Lab Test 03/24/22  1423 01/26/22  1303 01/21/22  1006 06/16/21  1435 02/08/21  1121 02/01/21  1148 01/25/21  1058     143 144 141 143 142 141 142   POTASSIUM 3.7 3.7 3.6 3.8 3.5 3.7 4.0   CHLORIDE 110* 114* 112* 107 107 109 112*   CO2 26 25 24 24 30 27 29   ANIONGAP 7 5 5 12 5 5 1*   * 128* 92 80 91 117* 96   BUN 47* 54* 50* 47* 28 23 21   CR 3.49*  3.49* 4.03* 3.56* 2.73* 2.26* 2.12* 2.02*   GFRESTIMATED 17* 14* 16* 21* 26* 28* 30*   GFRESTBLACK  --   --   --  24* 31* 33* 35*   RUTH 6.9* 7.9* 8.0* 7.7* 8.0* 8.2* 8.4*     Recent Labs   Lab Test 02/08/21  1121 11/10/17  1401 06/11/15  1306   BILITOTAL 0.5 0.5 0.6   ALKPHOS 104 110 100   ALT 21 20 25   AST 14 13 14     CBC  Recent Labs   Lab Test 03/24/22  1424 03/10/22  1342 02/24/22  1421 02/09/22  1355   HGB 8.8* 8.0* 7.5* 6.7*   WBC 6.1 5.1 5.6 4.2   RBC 4.44 4.00* 3.83* 3.41*   HCT 28.7* 25.5* 24.1* 21.5*   MCV 65* 64* 63* 63*   MCH 19.8* 20.0* 19.6* 19.6*   MCHC 30.7* 31.4* 31.1* 31.2*   RDW 18.6* 19.0* 20.5* 19.9*    173 188 196     URINE STUDIES  Recent Labs   Lab Test 03/24/22  1538 01/26/22  1303 06/16/21  1444 11/30/18  1045 09/28/15  1134   COLOR Yellow Yellow Yellow Yellow Yellow   APPEARANCE Clear Slightly Cloudy* Clear Clear Clear   URINEGLC 50 * Negative Negative Negative Negative   URINEBILI Negative Negative Negative Negative Small  This is an unconfirmed screening test result. A positive result may be false.  *   URINEKETONE Negative Negative Negative Negative Trace*   SG 1.016 1.016 1.020 1.025 >1.030   UBLD Negative  Negative Negative Trace* Negative   URINEPH 5.0 5.0 5.5 6.0 5.5   PROTEIN 100 * 100 * 100* >=300* 100*   UROBILINOGEN  --   --  1.0 1.0 1.0   NITRITE Negative Negative Negative Negative Negative   LEUKEST Negative Negative Negative Negative Negative   RBCU 0 <1 O - 2 O - 2 O - 2  Urine was tested unconcentrated because <10 ml was received.     WBCU 1 3 0 - 5 0 - 5 O - 2  Urine was tested unconcentrated because <10 ml was received.       Recent Labs   Lab Test 03/24/22  1538 01/26/22  1303   UTPG 1.88* 0.80*     PTH  No lab results found.  IRON STUDIES  Recent Labs   Lab Test 03/24/22  1423 01/26/22  1308 01/21/22  1006 06/16/21  1435   IRON 49 64 80 41   * 209*  --  166*   IRONSAT 28 31  --  25   TESSIE 253  --  280 329         Yoon Orlando Alicea MD

## 2022-04-04 ENCOUNTER — MYC MEDICAL ADVICE (OUTPATIENT)
Dept: GASTROENTEROLOGY | Facility: CLINIC | Age: 82
End: 2022-04-04
Payer: COMMERCIAL

## 2022-04-07 ENCOUNTER — ANCILLARY PROCEDURE (OUTPATIENT)
Dept: GENERAL RADIOLOGY | Facility: CLINIC | Age: 82
End: 2022-04-07
Attending: FAMILY MEDICINE
Payer: COMMERCIAL

## 2022-04-07 ENCOUNTER — INFUSION THERAPY VISIT (OUTPATIENT)
Dept: INFUSION THERAPY | Facility: CLINIC | Age: 82
End: 2022-04-07
Attending: INTERNAL MEDICINE
Payer: COMMERCIAL

## 2022-04-07 ENCOUNTER — PATIENT OUTREACH (OUTPATIENT)
Dept: NEPHROLOGY | Facility: CLINIC | Age: 82
End: 2022-04-07

## 2022-04-07 ENCOUNTER — APPOINTMENT (OUTPATIENT)
Dept: LAB | Facility: CLINIC | Age: 82
End: 2022-04-07
Payer: COMMERCIAL

## 2022-04-07 ENCOUNTER — OFFICE VISIT (OUTPATIENT)
Dept: ORTHOPEDICS | Facility: CLINIC | Age: 82
End: 2022-04-07
Payer: COMMERCIAL

## 2022-04-07 VITALS
DIASTOLIC BLOOD PRESSURE: 79 MMHG | SYSTOLIC BLOOD PRESSURE: 139 MMHG | BODY MASS INDEX: 31.15 KG/M2 | WEIGHT: 165 LBS | HEIGHT: 61 IN

## 2022-04-07 VITALS
SYSTOLIC BLOOD PRESSURE: 139 MMHG | DIASTOLIC BLOOD PRESSURE: 69 MMHG | RESPIRATION RATE: 18 BRPM | HEART RATE: 78 BPM | TEMPERATURE: 97.9 F

## 2022-04-07 DIAGNOSIS — M25.512 PAIN OF BOTH SHOULDER JOINTS: ICD-10-CM

## 2022-04-07 DIAGNOSIS — M25.511 PAIN OF BOTH SHOULDER JOINTS: ICD-10-CM

## 2022-04-07 DIAGNOSIS — M12.812 ROTATOR CUFF ARTHROPATHY OF BOTH SHOULDERS: ICD-10-CM

## 2022-04-07 DIAGNOSIS — N18.32 ANEMIA OF CHRONIC RENAL FAILURE, STAGE 3B (H): Primary | ICD-10-CM

## 2022-04-07 DIAGNOSIS — N18.4 CKD (CHRONIC KIDNEY DISEASE) STAGE 4, GFR 15-29 ML/MIN (H): ICD-10-CM

## 2022-04-07 DIAGNOSIS — M19.011 OSTEOARTHRITIS OF BILATERAL GLENOHUMERAL JOINTS: ICD-10-CM

## 2022-04-07 DIAGNOSIS — R79.89 HIGH PLASMA OXALATE: ICD-10-CM

## 2022-04-07 DIAGNOSIS — M19.012 OSTEOARTHRITIS OF BILATERAL GLENOHUMERAL JOINTS: ICD-10-CM

## 2022-04-07 DIAGNOSIS — M12.811 ROTATOR CUFF ARTHROPATHY OF BOTH SHOULDERS: ICD-10-CM

## 2022-04-07 DIAGNOSIS — D63.1 ANEMIA OF CHRONIC RENAL FAILURE, STAGE 3B (H): Primary | ICD-10-CM

## 2022-04-07 DIAGNOSIS — M25.411 EFFUSION OF RIGHT SHOULDER: Primary | ICD-10-CM

## 2022-04-07 LAB
APPEARANCE FLD: ABNORMAL
CELL COUNT BODY FLUID SOURCE: ABNORMAL
COLOR FLD: YELLOW
CRYSTALS SNV MICRO: ABNORMAL
HCT VFR BLD AUTO: 27.4 % (ref 40–53)
HGB BLD-MCNC: 8.5 G/DL (ref 13.3–17.7)
WBC # FLD AUTO: ABNORMAL /UL

## 2022-04-07 PROCEDURE — 87070 CULTURE OTHR SPECIMN AEROBIC: CPT | Performed by: FAMILY MEDICINE

## 2022-04-07 PROCEDURE — 87075 CULTR BACTERIA EXCEPT BLOOD: CPT | Performed by: FAMILY MEDICINE

## 2022-04-07 PROCEDURE — 36415 COLL VENOUS BLD VENIPUNCTURE: CPT | Performed by: INTERNAL MEDICINE

## 2022-04-07 PROCEDURE — 89050 BODY FLUID CELL COUNT: CPT | Performed by: FAMILY MEDICINE

## 2022-04-07 PROCEDURE — 87015 SPECIMEN INFECT AGNT CONCNTJ: CPT | Performed by: FAMILY MEDICINE

## 2022-04-07 PROCEDURE — 20611 DRAIN/INJ JOINT/BURSA W/US: CPT | Mod: RT | Performed by: FAMILY MEDICINE

## 2022-04-07 PROCEDURE — 99204 OFFICE O/P NEW MOD 45 MIN: CPT | Mod: 25 | Performed by: FAMILY MEDICINE

## 2022-04-07 PROCEDURE — 85014 HEMATOCRIT: CPT | Performed by: INTERNAL MEDICINE

## 2022-04-07 PROCEDURE — 250N000011 HC RX IP 250 OP 636: Mod: EC | Performed by: INTERNAL MEDICINE

## 2022-04-07 PROCEDURE — 87205 SMEAR GRAM STAIN: CPT | Performed by: FAMILY MEDICINE

## 2022-04-07 PROCEDURE — 73030 X-RAY EXAM OF SHOULDER: CPT | Mod: TC | Performed by: RADIOLOGY

## 2022-04-07 PROCEDURE — 96372 THER/PROPH/DIAG INJ SC/IM: CPT | Performed by: INTERNAL MEDICINE

## 2022-04-07 PROCEDURE — 85018 HEMOGLOBIN: CPT | Performed by: INTERNAL MEDICINE

## 2022-04-07 PROCEDURE — 89060 EXAM SYNOVIAL FLUID CRYSTALS: CPT | Performed by: FAMILY MEDICINE

## 2022-04-07 RX ORDER — METHYLPREDNISOLONE SODIUM SUCCINATE 125 MG/2ML
125 INJECTION, POWDER, LYOPHILIZED, FOR SOLUTION INTRAMUSCULAR; INTRAVENOUS
Status: CANCELLED
Start: 2022-04-21

## 2022-04-07 RX ORDER — ALBUTEROL SULFATE 0.83 MG/ML
2.5 SOLUTION RESPIRATORY (INHALATION)
Status: CANCELLED | OUTPATIENT
Start: 2022-04-21

## 2022-04-07 RX ORDER — DIPHENHYDRAMINE HYDROCHLORIDE 50 MG/ML
50 INJECTION INTRAMUSCULAR; INTRAVENOUS
Status: CANCELLED
Start: 2022-04-21

## 2022-04-07 RX ORDER — NALOXONE HYDROCHLORIDE 0.4 MG/ML
0.2 INJECTION, SOLUTION INTRAMUSCULAR; INTRAVENOUS; SUBCUTANEOUS
Status: CANCELLED | OUTPATIENT
Start: 2022-04-21

## 2022-04-07 RX ORDER — ALBUTEROL SULFATE 90 UG/1
1-2 AEROSOL, METERED RESPIRATORY (INHALATION)
Status: CANCELLED
Start: 2022-04-21

## 2022-04-07 RX ORDER — MEPERIDINE HYDROCHLORIDE 25 MG/ML
25 INJECTION INTRAMUSCULAR; INTRAVENOUS; SUBCUTANEOUS EVERY 30 MIN PRN
Status: CANCELLED | OUTPATIENT
Start: 2022-04-21

## 2022-04-07 RX ORDER — EPINEPHRINE 1 MG/ML
0.3 INJECTION, SOLUTION, CONCENTRATE INTRAVENOUS EVERY 5 MIN PRN
Status: CANCELLED | OUTPATIENT
Start: 2022-04-21

## 2022-04-07 RX ADMIN — TRIAMCINOLONE ACETONIDE 40 MG: 40 INJECTION, SUSPENSION INTRA-ARTICULAR; INTRAMUSCULAR at 14:50

## 2022-04-07 RX ADMIN — ROPIVACAINE HYDROCHLORIDE 3 ML: 5 INJECTION, SOLUTION EPIDURAL; INFILTRATION; PERINEURAL at 14:50

## 2022-04-07 RX ADMIN — DARBEPOETIN ALFA 60 MCG: 60 INJECTION, SOLUTION INTRAVENOUS; SUBCUTANEOUS at 14:34

## 2022-04-07 ASSESSMENT — PAIN SCALES - GENERAL: PAINLEVEL: WORST PAIN (10)

## 2022-04-07 NOTE — PROGRESS NOTES
Nephrology Note: Nursing Outreach Encounter    REASON FOR CALL:                                                      REASON FOR CALL: Symptom Follow Up                                          SITUATION/BACKROUND:                                                    Patient is being treated for CKD Stage 4.    03/30/22  Loren Sparks, Loren Melendez, Yoon Stewart MD Engen, Kayla, ASTRID; Hayley Stock, HAMN   Hi K and K     Loren-He gained 10 lbs in recent weeks, I am increasing furosemide from 40mg daily to 80/40mg . Please reach out next week to followup on his weights     Hayley- am also going to have him do a kidney biopsy, he prefers in 3-4 weeks with better weather, in IR with sedation     Thank you        ASSESSMENT:                                                      Pt reports he is down 7 lbs. And swelling in lower extremities has slightly improved, but still present. Edema in lower extremities worsens when pt stands for extended periods of time. Pt denies any episodes of lightheadedness or dizziness, reports he does not measure BP at home, but has appt today where they will measure BP.       PLAN:                                                      Follow Up:   Patient to follow up as scheduled at next apt     Patient verbalized understanding and will contact the clinic with any further questions or concerns.     Loren Sparks RN

## 2022-04-07 NOTE — PROGRESS NOTES
Mann Escobar  :  1940  DOS: 2022  MRN: [id number]    Sports Medicine Clinic Visit    PCP: Shayy Ramírez    Mann Escobar is a 81 year old Right hand dominant male who is seen as a self referral presenting with chronic bilateral shoulder pain.    Injury: Gradual onset of pain over the past 6+ years, left>>>right.  Patient notes that right shoulder has been significantly worse over the past 3 - 5 days, he did fall ~ one week ago landing on left side.  Pain located over bilateral deep anterior lateral glenohumeral joint, radiating to lateral upper arms.  Additional Features:  Positive: grinding, weakness and limited AROM bilaterally.  Symptoms are better with Other medications: oxycodone and Rest.  Symptoms are worse with: shoulder flexion/abduction, pushing/pulling self up from seated position, weight bearing through arms, lying on either shoulder.  Other evaluation and/or treatments so far consists of: Ice, Tylenol, Ibuprofen, Other medications: oxycodone and Rest.  Recent imaging completed: No recent imaging completed.  Prior History of related problems: Patient has previously consulted Dr Burrell @ [facility], left shoulder injection last completed , right shoulder last completed in .    Social History: retired    Review of Systems  Musculoskeletal: as above  Remainder of review of systems is negative including constitutional, CV, pulmonary, GI, Skin and Neurologic except as noted in HPI or medical history.    Past Medical History:   Diagnosis Date     Arthritis of ankle, left 2020     Back pain     WITH BILATERAL LEG PAIN AND NUMBNESS OF BOTH FEET     Gastro-oesophageal reflux disease     REFLUX     Hypertension      Hypopotassemia      Internal hemorrhoids      Iron deficiency anaemia      Leg edema      Morbid obesity 2005     Morbid obesity (H)      Osteoarthrosis      Scoliosis      Thalassemia minor      Past Surgical History:   Procedure Laterality  Date     CATARACT IOL, RT/LT  2008/    Cataract IOL RT/LT     COLONOSCOPY  2009/07    polyps removed repeat 5 yrs, tubular adenoma     COLONOSCOPY  9/29/2011    Procedure:COLONOSCOPY; Colonoscopy with hemorrhoid banding; Surgeon:JEREMY GARCIA; Location:[facility]     COLONOSCOPY N/A 12/19/2017    Procedure: COLONOSCOPY;  colonoscopy, gastroscopy;  Surgeon: Edmar Isaacs MD;  Location: [facility]     DECOMPRESSION LUMBAR MINIMALLY INVASIVE TWO LEVELS  5/2/2012    Procedure:DECOMPRESSION LUMBAR MINIMALLY INVASIVE TWO LEVELS; Surgeon:LOTTIE MITCHELL; [facility]     ESOPHAGOSCOPY, GASTROSCOPY, DUODENOSCOPY (EGD), COMBINED N/A 2/6/2018    Procedure: COMBINED ESOPHAGOSCOPY, GASTROSCOPY, DUODENOSCOPY (EGD);  gastroscopy;  Surgeon: Edmar Isaacs MD;  Location: [facility]     ESOPHAGOSCOPY, GASTROSCOPY, DUODENOSCOPY (EGD), COMBINED N/A 10/18/2021    Procedure: ESOPHAGOGASTRODUODENOSCOPY (EGD);  Surgeon: Anju Galeano MD;  Location: [facility]     FUSION SPINE POSTERIOR MINIMALLY INVASIVE ONE LEVEL  5/2/2012    Procedure:FUSION SPINE POSTERIOR MINIMALLY INVASIVE ONE LEVEL; Minimal Access Spinal Technology Transformaninal Lumbar Interbody Fusion L4-5, Decompression L3-5; Surgeon:LOTTIE MITCHELL; [facility]     HC REMOVAL OF HYDROCELE,TUNICA,UNILAT  2006    bilateral     ORTHOPEDIC SURGERY  2000    Lf knee replacement     ORTHOPEDIC SURGERY  2002, 2010    Rt replacement     ORTHOPEDIC SURGERY  2005    Left ankle fused     RECONSTRUCT ANKLE Right 7/23/2020    Procedure: Ankle RECONSTRUCTIve Arthroplasty;  Surgeon: Luke Powers DPM;  Location: [facility]     SURGICAL HISTORY OF -       Cysto     SURGICAL HISTORY OF -   2002    Percutaneous kidney stone removal     SURGICAL HISTORY OF -       ureteral stent removal     SURGICAL HISTORY OF -   2005    bariatric surgery-Favian-en-Y     SURGICAL HISTORY OF -   2008    carpal tunnel surgery rt wrist     Family History   Problem Relation Age of Onset     Diabetes Brother       "Obesity Brother      Cerebrovascular Disease Paternal Grandfather      Prostate Cancer Father      Cancer Father         bone     Diabetes Father      Heart Disease Mother         CHF     Cerebrovascular Disease Mother      Hypertension Mother      Cancer Mother         tumor in stomach     Cancer - colorectal Mother      Heart Disease Maternal Grandmother         CHF     Diabetes Paternal Grandmother      Breast Cancer Sister      Genitourinary Problems Son         Kidney stones     Cancer Brother      Cancer - colorectal Brother      Diabetes Brother        Objective  /79   Ht 1.549 m (5' 1\")   Wt 74.8 kg (165 lb)   BMI 31.18 kg/m        General: healthy, alert and in no distress      HEENT: no scleral icterus or conjunctival erythema     Skin: no suspicious lesions or rash. No jaundice.     CV: regular rhythm by palpation, 2+ distal pulses, no pedal edema      Resp: normal respiratory effort without conversational dyspnea     Psych: normal mood and affect      Gait: nonantalgic, modest baseline coordination and balance, using walker    Neuro: normal light touch sensory exam of the extremities. Motor strength as noted below     Bilateral Shoulder exam    ROM:        Limited active and passive ROM with flexion, extension, abduction, internal and external rotation, worse currently on the right    Tender:        subacromial space       posterior shoulder       Anterior shoulder       R>L    Non Tender:       remainder of shoulder    Strength:        abduction 3/5       internal rotation 4/5       external rotation 2/5       adduction 4/5       Similar on the left    Impingement testing:        positive (+) empty can       positive (+) crank    Stability testing:       neg (-) anterior glide       neg (-) sulcus sign    Skin:       no visible deformities       well perfused       capillary refill brisk       Palpable fluid collection about the shoulder generally, ballotable, R>>L    Sensation:        normal " sensation over shoulder and upper extremity       Radiology  Recent Results (from the past 744 hour(s))   XR Shoulder 3 View Bilateral    Narrative    SHOULDER THREE VIEWS BILATERAL   4/7/2022 3:53 PM     HISTORY:  Shoulder pain bilaterally.    COMPARISON: Left shoulder radiographs from 9/24/2016. Right shoulder  radiographs from 5/20/2013.      Impression    IMPRESSION:    Right: Old healed right rib fractures again noted. Severe  osteoarthrosis of the glenohumeral joint, progressed. Narrowing of the  subacromial space by the coracoacromial arch configuration at AC joint  hypertrophy and high-riding humeral head again noted. Chronic  bone-on-bone contact between the acromion and humeral head indicating  chronic full-thickness rotator cuff tear.    Left: Severe osteoarthrosis of the glenohumeral joint, progressed. The  humeral head is high riding and there is remodeling of the superior  aspect of the humeral head and the undersurface of the acromion  indicating full-thickness rotator cuff tear. There is also some  destructive change in the superior aspect of the humeral head and  glenoid which has progressed since the prior study which would raise  the question of [place] shoulder or an inflammatory or infectious  process in the appropriate clinical setting.    JOE MENA MD         SYSTEM ID:  [id number]       Large Joint Injection/Arthocentesis: R glenohumeral joint    Date/Time: 4/7/2022 2:50 PM  Performed by: Danielito Mohan DO  Authorized by: Danielito Mohan DO     Indications:  Pain, osteoarthritis and joint swelling  Needle Size:  18 G  Guidance: ultrasound    Approach:  Posterolateral  Location:  Shoulder      Site:  R glenohumeral joint  Medications:  3 mL ropivacaine 5 MG/ML; 40 mg triamcinolone 40 MG/ML  Aspirate amount (mL):  43  Aspirate:  Serous, yellow and cloudy  Aspirate analysis: sent for lab analysis    Outcome:  Tolerated well, no immediate complications  Procedure  discussed: discussed risks, benefits, and alternatives    Consent Given by:  Patient  Timeout: timeout called immediately prior to procedure    Prep: patient was prepped and draped in usual sterile fashion     Ultrasound images of procedure were permanently stored.         Assessment:  1. Effusion of right shoulder    2. Pain of both shoulder joints    3. Osteoarthritis of bilateral glenohumeral joints    4. Rotator cuff arthropathy of both shoulders        Plan:  Discussed the assessment with the patient.  Follow up: 2 weeks based on clinical progress  Medically complicated, with chronic b/l shoulder pain, usually L>R but currently much worse on the right  Cannot take NSAIDs due to CKD and other comorbidities  Trial of right shoulder US guided aspiration and CSI today for better pain control, aspirate sent for laboratory analysis: crystals, cell count, gram stain, cultures  Compressive brace option reviewed  Activity modification reviewed  Could consider PT in the future based on progress  Aspiration/injeciton to be considered on the left based on progress  XR images independently visualized and reviewed with patient today in clinic  Expectations and goals of CSI reviewed  Often 2-3 days for steroid effect, and can take up to two weeks for maximum effect  We discussed modified progressive pain-free activity as tolerated  Do not overuse in first two weeks if feeling better due to concern for vulnerability while steroid is working  Supportive care reviewed  All questions were answered today  Contact us with additional questions or concerns  Signs and sx of concern reviewed      Danielito Mohan DO, GABE  Sports Medicine Physician  [facility]          Disclaimer: This note consists of symbols derived from keyboarding, dictation and/or voice recognition software. As a result, there may be errors in the script that have gone undetected. Please consider this when interpreting information  found in this chart.

## 2022-04-07 NOTE — LETTER
2022         RE: Mann Escobar  [address]  [place] [address]        Dear Colleague,    Thank you for referring your patient, Mann Escobar, to the [facility] [place]. Please see a copy of my visit note below.    Mann Escobar  :  1940  DOS: 2022  MRN: [id number]    Sports Medicine Clinic Visit    PCP: Shayy Ramírez    Mann Escobar is a 81 year old Right hand dominant male who is seen as a self referral presenting with chronic bilateral shoulder pain.    Injury: Gradual onset of pain over the past 6+ years, left>>>right.  Patient notes that right shoulder has been significantly worse over the past 3 - 5 days, he did fall ~ one week ago landing on left side.  Pain located over bilateral deep anterior lateral glenohumeral joint, radiating to lateral upper arms.  Additional Features:  Positive: grinding, weakness and limited AROM bilaterally.  Symptoms are better with Other medications: oxycodone and Rest.  Symptoms are worse with: shoulder flexion/abduction, pushing/pulling self up from seated position, weight bearing through arms, lying on either shoulder.  Other evaluation and/or treatments so far consists of: Ice, Tylenol, Ibuprofen, Other medications: oxycodone and Rest.  Recent imaging completed: No recent imaging completed.  Prior History of related problems: Patient has [facility] Burrell [facility], left shoulder injection last completed , right shoulder last completed in .    Social History: retired    Review of Systems  Musculoskeletal: as above  Remainder of review of systems is negative including constitutional, CV, pulmonary, GI, Skin and Neurologic except as noted in HPI or medical history.    Past Medical History:   Diagnosis Date     Arthritis of ankle, left 2020     Back pain     WITH BILATERAL LEG PAIN AND NUMBNESS OF BOTH FEET     Gastro-oesophageal  reflux disease     REFLUX     Hypertension      Hypopotassemia      Internal hemorrhoids      Iron deficiency anaemia      Leg edema      Morbid obesity 9/12/2005     Morbid obesity (H)      Osteoarthrosis      Scoliosis      Thalassemia minor      Past Surgical History:   Procedure Laterality Date     CATARACT IOL, RT/LT  2008/    Cataract IOL RT/LT     COLONOSCOPY  2009/07    polyps removed repeat 5 yrs, tubular adenoma     COLONOSCOPY  9/29/2011    Procedure:COLONOSCOPY; Colonoscopy with hemorrhoid banding; Surgeon:JEREMY GARCIA; Location:[facility]     COLONOSCOPY N/A 12/19/2017    Procedure: COLONOSCOPY;  colonoscopy, gastroscopy;  Surgeon: Edmar Isaacs MD;  Location: [facility]     DECOMPRESSION LUMBAR MINIMALLY INVASIVE TWO LEVELS  5/2/2012    Procedure:DECOMPRESSION LUMBAR MINIMALLY INVASIVE TWO LEVELS; Surgeon:LOTTIE MITCHELL; [facility]     ESOPHAGOSCOPY, GASTROSCOPY, DUODENOSCOPY (EGD), COMBINED N/A 2/6/2018    Procedure: COMBINED ESOPHAGOSCOPY, GASTROSCOPY, DUODENOSCOPY (EGD);  gastroscopy;  Surgeon: Edmar Isaacs MD;  Location: [facility]     ESOPHAGOSCOPY, GASTROSCOPY, DUODENOSCOPY (EGD), COMBINED N/A 10/18/2021    Procedure: ESOPHAGOGASTRODUODENOSCOPY (EGD);  Surgeon: Anju Galeano MD;  Location: [facility]     FUSION SPINE POSTERIOR MINIMALLY INVASIVE ONE LEVEL  5/2/2012    Procedure:FUSION SPINE POSTERIOR MINIMALLY INVASIVE ONE LEVEL; Minimal Access Spinal Technology Transformaninal Lumbar Interbody Fusion L4-5, Decompression L3-5; Surgeon:LOTTIE MITCHELL; [facility]     HC REMOVAL OF HYDROCELE,TUNICA,UNILAT  2006    bilateral     ORTHOPEDIC SURGERY  2000    Lf knee replacement     ORTHOPEDIC SURGERY  2002, 2010    Rt replacement     ORTHOPEDIC SURGERY  2005    Left ankle fused     RECONSTRUCT ANKLE Right 7/23/2020    Procedure: Ankle RECONSTRUCTIve Arthroplasty;  Surgeon: Luke Powers DPM;  Location: [facility]     SURGICAL HISTORY OF -       Cysto     SURGICAL HISTORY OF -   2002     "Percutaneous kidney stone removal     SURGICAL HISTORY OF -       ureteral stent removal     SURGICAL HISTORY OF -   2005    bariatric surgery-Favian-en-Y     SURGICAL HISTORY OF -   2008    carpal tunnel surgery rt wrist     Family History   Problem Relation Age of Onset     Diabetes Brother      Obesity Brother      Cerebrovascular Disease Paternal Grandfather      Prostate Cancer Father      Cancer Father         bone     Diabetes Father      Heart Disease Mother         CHF     Cerebrovascular Disease Mother      Hypertension Mother      Cancer Mother         tumor in stomach     Cancer - colorectal Mother      Heart Disease Maternal Grandmother         CHF     Diabetes Paternal Grandmother      Breast Cancer Sister      Genitourinary Problems Son         Kidney stones     Cancer Brother      Cancer - colorectal Brother      Diabetes Brother        Objective  /79   Ht 1.549 m (5' 1\")   Wt 74.8 kg (165 lb)   BMI 31.18 kg/m        General: healthy, alert and in no distress      HEENT: no scleral icterus or conjunctival erythema     Skin: no suspicious lesions or rash. No jaundice.     CV: regular rhythm by palpation, 2+ distal pulses, no pedal edema      Resp: normal respiratory effort without conversational dyspnea     Psych: normal mood and affect      Gait: nonantalgic, modest baseline coordination and balance, using walker    Neuro: normal light touch sensory exam of the extremities. Motor strength as noted below     Bilateral Shoulder exam    ROM:        Limited active and passive ROM with flexion, extension, abduction, internal and external rotation, worse currently on the right    Tender:        subacromial space       posterior shoulder       Anterior shoulder       R>L    Non Tender:       remainder of shoulder    Strength:        abduction 3/5       internal rotation 4/5       external rotation 2/5       adduction 4/5       Similar on the left    Impingement testing:        positive (+) empty " can       positive (+) crank    Stability testing:       neg (-) anterior glide       neg (-) sulcus sign    Skin:       no visible deformities       well perfused       capillary refill brisk       Palpable fluid collection about the shoulder generally, ballotable, R>>L    Sensation:        normal sensation over shoulder and upper extremity       Radiology  Recent Results (from the past 744 hour(s))   XR Shoulder 3 View Bilateral    Narrative    SHOULDER THREE VIEWS BILATERAL   4/7/2022 3:53 PM     HISTORY:  Shoulder pain bilaterally.    COMPARISON: Left shoulder radiographs from 9/24/2016. Right shoulder  radiographs from 5/20/2013.      Impression    IMPRESSION:    Right: Old healed right rib fractures again noted. Severe  osteoarthrosis of the glenohumeral joint, progressed. Narrowing of the  subacromial space by the coracoacromial arch configuration at AC joint  hypertrophy and high-riding humeral head again noted. Chronic  bone-on-bone contact between the acromion and humeral head indicating  chronic full-thickness rotator cuff tear.    Left: Severe osteoarthrosis of the glenohumeral joint, progressed. The  humeral head is high riding and there is remodeling of the superior  aspect of the humeral head and the undersurface of the acromion  indicating full-thickness rotator cuff tear. There is also some  destructive change in the superior aspect of the humeral head and  glenoid which has progressed since the prior study which would raise  the question of [place] shoulder or an inflammatory or infectious  process in the appropriate clinical setting.    JOE MENA MD         SYSTEM ID:  [id number]       Large Joint Injection/Arthocentesis: R glenohumeral joint    Date/Time: 4/7/2022 2:50 PM  Performed by: Danielito Mhoan DO  Authorized by: Danielito Mohan DO     Indications:  Pain, osteoarthritis and joint swelling  Needle Size:  18 G  Guidance: ultrasound    Approach:   Posterolateral  Location:  Shoulder      Site:  R glenohumeral joint  Medications:  3 mL ropivacaine 5 MG/ML; 40 mg triamcinolone 40 MG/ML  Aspirate amount (mL):  43  Aspirate:  Serous, yellow and cloudy  Aspirate analysis: sent for lab analysis    Outcome:  Tolerated well, no immediate complications  Procedure discussed: discussed risks, benefits, and alternatives    Consent Given by:  Patient  Timeout: timeout called immediately prior to procedure    Prep: patient was prepped and draped in usual sterile fashion     Ultrasound images of procedure were permanently stored.         Assessment:  1. Effusion of right shoulder    2. Pain of both shoulder joints    3. Osteoarthritis of bilateral glenohumeral joints    4. Rotator cuff arthropathy of both shoulders        Plan:  Discussed the assessment with the patient.  Follow up: 2 weeks based on clinical progress  Medically complicated, with chronic b/l shoulder pain, usually L>R but currently much worse on the right  Cannot take NSAIDs due to CKD and other comorbidities  Trial of right shoulder US guided aspiration and CSI today for better pain control, aspirate sent for laboratory analysis: crystals, cell count, gram stain, cultures  Compressive brace option reviewed  Activity modification reviewed  Could consider PT in the future based on progress  Aspiration/injeciton to be considered on the left based on progress  XR images independently visualized and reviewed with patient today in clinic  Expectations and goals of CSI reviewed  Often 2-3 days for steroid effect, and can take up to two weeks for maximum effect  We discussed modified progressive pain-free activity as tolerated  Do not overuse in first two weeks if feeling better due to concern for vulnerability while steroid is working  Supportive care reviewed  All questions were answered today  Contact us with additional questions or concerns  Signs and sx of concern reviewed      Danielito Mohan DO, CAPRASANTH  Sports  Medicine Physician  [facility]          Disclaimer: This note consists of symbols derived from keyboarding, dictation and/or voice recognition software. As a result, there may be errors in the script that have gone undetected. Please consider this when interpreting information found in this chart.      Again, thank you for allowing me to participate in the care of your patient.        Sincerely,        Danielito Mohan, DO

## 2022-04-07 NOTE — PROGRESS NOTES
Infusion Nursing Note:  Mann Escobar presents today for Aranesp.    Patient seen by provider today: No   present during visit today: Not Applicable.    Note: Labs drawn peripherally.      Intravenous Access:  N/A.    Treatment Conditions:  Results reviewed, labs MET treatment parameters, ok to proceed with treatment. Hgb<10, B/P within treatment parameters.      Post Infusion Assessment:  Patient tolerated injection without incident.       Discharge Plan:   Patient discharged in stable condition accompanied by: self.  Departure Mode: Ambulatory w/ walker.      Racquel Hickman RN

## 2022-04-08 NOTE — PROGRESS NOTES
Pt called to report BP was 139/79 at yesterday's office visit. Informed pt to continue current regimen for BP management. Will update provider.

## 2022-04-11 ENCOUNTER — OFFICE VISIT (OUTPATIENT)
Dept: AUDIOLOGY | Facility: CLINIC | Age: 82
End: 2022-04-11
Payer: COMMERCIAL

## 2022-04-11 ENCOUNTER — TELEPHONE (OUTPATIENT)
Dept: ORTHOPEDICS | Facility: CLINIC | Age: 82
End: 2022-04-11

## 2022-04-11 DIAGNOSIS — H90.3 SENSORINEURAL HEARING LOSS, BILATERAL: Primary | ICD-10-CM

## 2022-04-11 PROCEDURE — V5299 HEARING SERVICE: HCPCS | Performed by: AUDIOLOGIST

## 2022-04-11 PROCEDURE — 99207 PR NO CHARGE LOS: CPT | Performed by: AUDIOLOGIST

## 2022-04-11 RX ORDER — FUROSEMIDE 40 MG
40 TABLET ORAL 2 TIMES DAILY
Qty: 120 TABLET | Refills: 3 | COMMUNITY
Start: 2022-04-11 | End: 2022-05-25

## 2022-04-11 RX ORDER — ROPIVACAINE HYDROCHLORIDE 5 MG/ML
3 INJECTION, SOLUTION EPIDURAL; INFILTRATION; PERINEURAL
Status: DISCONTINUED | OUTPATIENT
Start: 2022-04-07 | End: 2022-06-09 | Stop reason: ALTCHOICE

## 2022-04-11 RX ORDER — TRIAMCINOLONE ACETONIDE 40 MG/ML
40 INJECTION, SUSPENSION INTRA-ARTICULAR; INTRAMUSCULAR
Status: DISCONTINUED | OUTPATIENT
Start: 2022-04-07 | End: 2022-06-09 | Stop reason: ALTCHOICE

## 2022-04-11 NOTE — PROGRESS NOTES
AUDIOLOGY REPORT    SUBJECTIVE:Mann Escobar is a 81 year old male who was seen in the Audiology Clinic at the [facility] on 4/11/2022  for a follow-up check regarding the fitting of new hearing aids. Previous results have revealed a borderline normal sloping to severe sensorineural hearing loss bilaterally.  The patient has been seen previously in this clinic and was fit with Phonak Audeo P90-R hearing aids  on 2/23/2022 through [facility].  Mann reports good sound quality with the hearing aid(s). Reviewed use of his volume control.     OBJECTIVE:   The International Outcome Inventory-Hearing Aids (IOI-HA) was administered today.The patient s responses to the 7 questions can be compared to normative data relative to how others are performing with their hearing aids, as well as focusing audiologic care and counseling.This patient s Quality of Life score (Question 7) was 5, which is above normative average.     Based on patient report, the following changes were made;none.    Reviewed 45 day trial period, care, cleaning (no water, dry brush), batteries (rechargable) insertion/removal, toxicity, low-battery signal), aid insertion/removal, volume adjustment (if applicable), user booklet, warranty information, storage cases, and other hearing aid details.     No charge visit today (in warranty hearing aid check).     ASSESSMENT: A follow-up appointment for hearing aid fitting was completed today. IOI-HA administered today. Changes to hearing aid was completed as outlined above.     PLAN:Mann will return for follow-up as needed, per [facility] authorized visits. Please call this clinic with any questions regarding today s appointment.    Ksenia IBARRA, #[id number]

## 2022-04-11 NOTE — TELEPHONE ENCOUNTER
Reviewed lab results from joint fluid analysis completed 4/7/22 with Dr Mohan.  Patient was + for pseudo gout.  Treatment recommendation would steroid injection and monitor for flare in future.  Steroid injection was already completed on 4/7/22.    Spoke to patient discussed lab results and recommendations.  Patient notes that overall right shoulder is much improved following his glenohumeral joint injection, he did obtain shoulder spica brace.  Patient was scheduled for left shoulder glenohumeral joint steroid injection on 4/21/22 as previously discussed.  Will further review lab results at that time.    Perdo Montejo ATC

## 2022-04-11 NOTE — TELEPHONE ENCOUNTER
Called to remind patient of their upcoming appointment with our GI clinic, on Thursday 4/21/2022 at 1PM with Dr. Britton. This appointment is scheduled as a video visit. You will receive a call approximately 30 minutes prior to check you in, you must be in [place] for this visit., if your appointment is virtual (video or telephone) you need to be in [place] for the visit. To reschedule or cancel patient to call [phone].    Carie Ramirez MA

## 2022-04-11 NOTE — PROGRESS NOTES
Yoon Alicea MD Engen, Kayla, RN  Caller: Unspecified (4 days ago, 11:02 AM)  Ok, let's go to 40/40 and monitor   Repeat labs if he is able willing- renal panel     Thanks    Informed pt to decrease lasix from 80/40 to 40/40. Will draw renal panel during aranesp injection on 04/21. Pt verbalized understanding and had no questions or concerns at this time. Will follow up with pt on 04/15/22.

## 2022-04-12 LAB
BACTERIA SNV CULT: NO GROWTH
GRAM STAIN RESULT: NORMAL
GRAM STAIN RESULT: NORMAL

## 2022-04-13 LAB — OXALATE SERPL-SCNC: 18.2 UMOL/L

## 2022-04-15 ENCOUNTER — PATIENT OUTREACH (OUTPATIENT)
Dept: NEPHROLOGY | Facility: CLINIC | Age: 82
End: 2022-04-15
Payer: COMMERCIAL

## 2022-04-15 NOTE — PROGRESS NOTES
Nephrology Note: Nursing Outreach Encounter    REASON FOR CALL:                                                      REASON FOR CALL: Blood Pressure Follow Up, Symptom Follow Up                                          SITUATION/BACKROUND:                                                    Patient is being treated for CKD Stage 4.    04/11/22: Decreased lasix from 80/40 to 40/40.       ASSESSMENT:                                                      Pt reports weight consistent at 165 lbs. Lower extremity edema has remained the same since decreasing lasix dose. Confirmed upcoming lab appt date/time. Pt reports he has no concerns at this time.     Uremic Symptoms: Yes,  Edema: Yes;       PLAN:                                                      Follow Up:   Patient to follow up as scheduled at next apt     Patient verbalized understanding and will contact the clinic with any further questions or concerns.     Loren Sparks RN

## 2022-04-21 ENCOUNTER — OFFICE VISIT (OUTPATIENT)
Dept: ORTHOPEDICS | Facility: CLINIC | Age: 82
End: 2022-04-21
Payer: COMMERCIAL

## 2022-04-21 VITALS — HEIGHT: 61 IN | WEIGHT: 165 LBS | BODY MASS INDEX: 31.15 KG/M2

## 2022-04-21 DIAGNOSIS — M12.811 ROTATOR CUFF ARTHROPATHY OF BOTH SHOULDERS: ICD-10-CM

## 2022-04-21 DIAGNOSIS — M25.412 EFFUSION OF LEFT SHOULDER: ICD-10-CM

## 2022-04-21 DIAGNOSIS — M12.812 ROTATOR CUFF ARTHROPATHY OF BOTH SHOULDERS: ICD-10-CM

## 2022-04-21 DIAGNOSIS — M19.012 OSTEOARTHRITIS OF BILATERAL GLENOHUMERAL JOINTS: Primary | ICD-10-CM

## 2022-04-21 DIAGNOSIS — M19.011 OSTEOARTHRITIS OF BILATERAL GLENOHUMERAL JOINTS: Primary | ICD-10-CM

## 2022-04-21 LAB — BACTERIA SNV CULT: NORMAL

## 2022-04-21 PROCEDURE — 20611 DRAIN/INJ JOINT/BURSA W/US: CPT | Mod: LT | Performed by: FAMILY MEDICINE

## 2022-04-21 RX ADMIN — TRIAMCINOLONE ACETONIDE 40 MG: 40 INJECTION, SUSPENSION INTRA-ARTICULAR; INTRAMUSCULAR at 16:30

## 2022-04-21 RX ADMIN — ROPIVACAINE HYDROCHLORIDE 3 ML: 5 INJECTION, SOLUTION EPIDURAL; INFILTRATION; PERINEURAL at 16:30

## 2022-04-21 NOTE — LETTER
2022         RE: Mann Escobar  [address]  [facility] [address]        Dear Colleague,    Thank you for referring your patient, Mann Escobar, to the [facility] [place]. Please see a copy of my visit note below.    Mann Escobar  :  1940  DOS: 2022  MRN: [id number]    Sports Medicine Clinic Procedure    Ultrasound Guided Left Shoulder Intra-articular Glenohumeral Joint Injection    Clinical History: Interim History - 2022  Since last visit on 2022 patient has moderate-severe left shoulder pain.  Right shoulder glenohumeral injection completed on 22 provided ~ 50 - 60% relief at this time.  Describes continued weakness in both shoulders.  No new injury in the interim.      Diagnosis:   1. Osteoarthritis of bilateral glenohumeral joints    2. Effusion of left shoulder    3. Rotator cuff arthropathy of both shoulders      Large Joint Injection/Arthocentesis: L glenohumeral joint    Date/Time: 2022 4:30 PM  Performed by: Danielito Mohan DO  Authorized by: Danielito Mohan DO     Indications:  Pain, osteoarthritis and joint swelling  Needle Size:  18 G  Guidance: ultrasound    Approach:  Anterior  Location:  Shoulder      Site:  L glenohumeral joint  Medications:  3 mL ropivacaine 5 MG/ML; 40 mg triamcinolone 40 MG/ML  Aspirate amount (mL):  65  Aspirate:  Serous, blood-tinged and cloudy  Outcome:  Tolerated well, no immediate complications  Procedure discussed: discussed risks, benefits, and alternatives    Consent Given by:  Patient  Timeout: timeout called immediately prior to procedure    Prep: patient was prepped and draped in usual sterile fashion     Ultrasound images of procedure were permanently stored.           Impression:  Successful Left intra-articular shoulder injection and aspiration.    Plan:  Follow up with me in 2 weeks if no change in sx   Expectations,  relative risks and goals of CSI reviewed  Often 2-3 days for steroid effect, and can take up to two weeks for maximum effect  We discussed modified progressive pain-free activity as tolerated  Do not overuse in first two weeks if feeling better due to concern for vulnerability while steroid is working  Supportive care reviewed  All questions were answered today  Contact us with additional questions or concerns  Signs and sx of concern reviewed      Danielito Mohan DO, CAPRASANTH  Primary Care Sports Medicine  [facility]         Again, thank you for allowing me to participate in the care of your patient.        Sincerely,        Danielito Mohan DO

## 2022-04-21 NOTE — PROGRESS NOTES
Mann Escobar  :  1940  DOS: 2022  MRN: [id number]    Sports Medicine Clinic Procedure    Ultrasound Guided Left Shoulder Intra-articular Glenohumeral Joint Injection    Clinical History: Interim History - 2022  Since last visit on 2022 patient has moderate-severe left shoulder pain.  Right shoulder glenohumeral injection completed on 22 provided ~ 50 - 60% relief at this time.  Describes continued weakness in both shoulders.  No new injury in the interim.      Diagnosis:   1. Osteoarthritis of bilateral glenohumeral joints    2. Effusion of left shoulder    3. Rotator cuff arthropathy of both shoulders      Large Joint Injection/Arthocentesis: L glenohumeral joint    Date/Time: 2022 4:30 PM  Performed by: Danielito Mohan DO  Authorized by: Danielito Mohan DO     Indications:  Pain, osteoarthritis and joint swelling  Needle Size:  18 G  Guidance: ultrasound    Approach:  Anterior  Location:  Shoulder      Site:  L glenohumeral joint  Medications:  3 mL ropivacaine 5 MG/ML; 40 mg triamcinolone 40 MG/ML  Aspirate amount (mL):  65  Aspirate:  Serous, blood-tinged and cloudy  Outcome:  Tolerated well, no immediate complications  Procedure discussed: discussed risks, benefits, and alternatives    Consent Given by:  Patient  Timeout: timeout called immediately prior to procedure    Prep: patient was prepped and draped in usual sterile fashion     Ultrasound images of procedure were permanently stored.           Impression:  Successful Left intra-articular shoulder injection and aspiration.    Plan:  Follow up with me in 2 weeks if no change in sx   Expectations, relative risks and goals of CSI reviewed  Often 2-3 days for steroid effect, and can take up to two weeks for maximum effect  We discussed modified progressive pain-free activity as tolerated  Do not overuse in first two weeks if feeling better due to concern for vulnerability while steroid is  working  Supportive care reviewed  All questions were answered today  Contact us with additional questions or concerns  Signs and sx of concern reviewed      Danielito Mohan DO, CAPRASANTH  Primary Care Sports Medicine  [facility]

## 2022-04-22 ENCOUNTER — INFUSION THERAPY VISIT (OUTPATIENT)
Dept: INFUSION THERAPY | Facility: CLINIC | Age: 82
End: 2022-04-22
Attending: INTERNAL MEDICINE
Payer: COMMERCIAL

## 2022-04-22 ENCOUNTER — APPOINTMENT (OUTPATIENT)
Dept: LAB | Facility: CLINIC | Age: 82
End: 2022-04-22
Payer: COMMERCIAL

## 2022-04-22 VITALS — SYSTOLIC BLOOD PRESSURE: 174 MMHG | HEART RATE: 73 BPM | DIASTOLIC BLOOD PRESSURE: 77 MMHG | TEMPERATURE: 98.4 F

## 2022-04-22 DIAGNOSIS — N18.32 ANEMIA OF CHRONIC RENAL FAILURE, STAGE 3B (H): Primary | ICD-10-CM

## 2022-04-22 DIAGNOSIS — D63.1 ANEMIA OF CHRONIC RENAL FAILURE, STAGE 3B (H): Primary | ICD-10-CM

## 2022-04-22 LAB
HCT VFR BLD AUTO: 27.1 % (ref 40–53)
HGB BLD-MCNC: 8.4 G/DL (ref 13.3–17.7)

## 2022-04-22 PROCEDURE — 85014 HEMATOCRIT: CPT | Performed by: INTERNAL MEDICINE

## 2022-04-22 PROCEDURE — 85018 HEMOGLOBIN: CPT | Performed by: INTERNAL MEDICINE

## 2022-04-22 PROCEDURE — 250N000011 HC RX IP 250 OP 636: Mod: EC | Performed by: INTERNAL MEDICINE

## 2022-04-22 PROCEDURE — 96372 THER/PROPH/DIAG INJ SC/IM: CPT | Performed by: INTERNAL MEDICINE

## 2022-04-22 PROCEDURE — 36415 COLL VENOUS BLD VENIPUNCTURE: CPT | Performed by: INTERNAL MEDICINE

## 2022-04-22 RX ORDER — ALBUTEROL SULFATE 90 UG/1
1-2 AEROSOL, METERED RESPIRATORY (INHALATION)
Status: CANCELLED
Start: 2022-05-05

## 2022-04-22 RX ORDER — METHYLPREDNISOLONE SODIUM SUCCINATE 125 MG/2ML
125 INJECTION, POWDER, LYOPHILIZED, FOR SOLUTION INTRAMUSCULAR; INTRAVENOUS
Status: CANCELLED
Start: 2022-05-05

## 2022-04-22 RX ORDER — EPINEPHRINE 1 MG/ML
0.3 INJECTION, SOLUTION, CONCENTRATE INTRAVENOUS EVERY 5 MIN PRN
Status: CANCELLED | OUTPATIENT
Start: 2022-05-05

## 2022-04-22 RX ORDER — DIPHENHYDRAMINE HYDROCHLORIDE 50 MG/ML
50 INJECTION INTRAMUSCULAR; INTRAVENOUS
Status: CANCELLED
Start: 2022-05-05

## 2022-04-22 RX ORDER — MEPERIDINE HYDROCHLORIDE 25 MG/ML
25 INJECTION INTRAMUSCULAR; INTRAVENOUS; SUBCUTANEOUS EVERY 30 MIN PRN
Status: CANCELLED | OUTPATIENT
Start: 2022-05-05

## 2022-04-22 RX ORDER — ALBUTEROL SULFATE 0.83 MG/ML
2.5 SOLUTION RESPIRATORY (INHALATION)
Status: CANCELLED | OUTPATIENT
Start: 2022-05-05

## 2022-04-22 RX ORDER — NALOXONE HYDROCHLORIDE 0.4 MG/ML
0.2 INJECTION, SOLUTION INTRAMUSCULAR; INTRAVENOUS; SUBCUTANEOUS
Status: CANCELLED | OUTPATIENT
Start: 2022-05-05

## 2022-04-22 RX ADMIN — DARBEPOETIN ALFA 60 MCG: 60 INJECTION, SOLUTION INTRAVENOUS; SUBCUTANEOUS at 15:13

## 2022-04-22 ASSESSMENT — PAIN SCALES - GENERAL: PAINLEVEL: WORST PAIN (10)

## 2022-04-22 NOTE — PROGRESS NOTES
Infusion Nursing Note:  Mann Escobar presents today for Aranesp.    Patient seen by provider today: No   present during visit today: Not Applicable.    Note: Labs drawn peripherally.      Intravenous Access:  N/A.    Treatment Conditions:  Results reviewed, labs MET treatment parameters, ok to proceed with treatment.  B/P within treatment parameters.      Post Infusion Assessment:  Patient tolerated injection without incident.       Discharge Plan:   Patient discharged in stable condition accompanied by: self.  Departure Mode: Ambulatory.      Racquel Hickman RN

## 2022-04-25 DIAGNOSIS — N18.4 CKD (CHRONIC KIDNEY DISEASE) STAGE 4, GFR 15-29 ML/MIN (H): Primary | ICD-10-CM

## 2022-04-25 RX ORDER — TRIAMCINOLONE ACETONIDE 40 MG/ML
40 INJECTION, SUSPENSION INTRA-ARTICULAR; INTRAMUSCULAR
Status: DISCONTINUED | OUTPATIENT
Start: 2022-04-21 | End: 2022-06-09 | Stop reason: ALTCHOICE

## 2022-04-25 RX ORDER — ROPIVACAINE HYDROCHLORIDE 5 MG/ML
3 INJECTION, SOLUTION EPIDURAL; INFILTRATION; PERINEURAL
Status: DISCONTINUED | OUTPATIENT
Start: 2022-04-21 | End: 2022-06-09 | Stop reason: ALTCHOICE

## 2022-04-27 DIAGNOSIS — Z11.59 ENCOUNTER FOR SCREENING FOR OTHER VIRAL DISEASES: Primary | ICD-10-CM

## 2022-04-28 NOTE — PROGRESS NOTES
Kidney biopsy scheduled with IR for May 13th. My chart sent.  Hayley Stock LPN  Nephrology  [phone]

## 2022-05-03 ENCOUNTER — OFFICE VISIT (OUTPATIENT)
Dept: FAMILY MEDICINE | Facility: CLINIC | Age: 82
End: 2022-05-03
Payer: COMMERCIAL

## 2022-05-03 VITALS
TEMPERATURE: 97.5 F | HEIGHT: 61 IN | DIASTOLIC BLOOD PRESSURE: 68 MMHG | HEART RATE: 74 BPM | SYSTOLIC BLOOD PRESSURE: 122 MMHG | BODY MASS INDEX: 31.15 KG/M2 | WEIGHT: 165 LBS | RESPIRATION RATE: 14 BRPM | OXYGEN SATURATION: 97 %

## 2022-05-03 DIAGNOSIS — Z00.00 ENCOUNTER FOR MEDICARE ANNUAL WELLNESS EXAM: Primary | ICD-10-CM

## 2022-05-03 DIAGNOSIS — M54.42 CHRONIC LEFT-SIDED LOW BACK PAIN WITH LEFT-SIDED SCIATICA: ICD-10-CM

## 2022-05-03 DIAGNOSIS — G89.29 CHRONIC LEFT-SIDED LOW BACK PAIN WITH LEFT-SIDED SCIATICA: ICD-10-CM

## 2022-05-03 DIAGNOSIS — Z79.899 ENCOUNTER FOR LONG-TERM (CURRENT) USE OF MEDICATIONS: ICD-10-CM

## 2022-05-03 LAB — CREAT UR-MCNC: 79 MG/DL

## 2022-05-03 PROCEDURE — 91305 COVID-19,PF,PFIZER (12+ YRS): CPT | Performed by: NURSE PRACTITIONER

## 2022-05-03 PROCEDURE — 99397 PER PM REEVAL EST PAT 65+ YR: CPT | Mod: 25 | Performed by: NURSE PRACTITIONER

## 2022-05-03 PROCEDURE — 0054A COVID-19,PF,PFIZER (12+ YRS): CPT | Performed by: NURSE PRACTITIONER

## 2022-05-03 PROCEDURE — 80307 DRUG TEST PRSMV CHEM ANLYZR: CPT | Performed by: NURSE PRACTITIONER

## 2022-05-03 PROCEDURE — 99213 OFFICE O/P EST LOW 20 MIN: CPT | Mod: 25 | Performed by: NURSE PRACTITIONER

## 2022-05-03 RX ORDER — OXYCODONE HYDROCHLORIDE 5 MG/1
5 TABLET ORAL 2 TIMES DAILY PRN
Qty: 180 TABLET | Refills: 0 | Status: SHIPPED | OUTPATIENT
Start: 2022-05-03 | End: 2022-08-25

## 2022-05-03 ASSESSMENT — ANXIETY QUESTIONNAIRES
1. FEELING NERVOUS, ANXIOUS, OR ON EDGE: NOT AT ALL
3. WORRYING TOO MUCH ABOUT DIFFERENT THINGS: NOT AT ALL
6. BECOMING EASILY ANNOYED OR IRRITABLE: SEVERAL DAYS
7. FEELING AFRAID AS IF SOMETHING AWFUL MIGHT HAPPEN: NOT AT ALL
2. NOT BEING ABLE TO STOP OR CONTROL WORRYING: NOT AT ALL
IF YOU CHECKED OFF ANY PROBLEMS ON THIS QUESTIONNAIRE, HOW DIFFICULT HAVE THESE PROBLEMS MADE IT FOR YOU TO DO YOUR WORK, TAKE CARE OF THINGS AT HOME, OR GET ALONG WITH OTHER PEOPLE: SOMEWHAT DIFFICULT
GAD7 TOTAL SCORE: 1
5. BEING SO RESTLESS THAT IT IS HARD TO SIT STILL: NOT AT ALL

## 2022-05-03 ASSESSMENT — PATIENT HEALTH QUESTIONNAIRE - PHQ9
5. POOR APPETITE OR OVEREATING: NOT AT ALL
SUM OF ALL RESPONSES TO PHQ QUESTIONS 1-9: 5

## 2022-05-03 ASSESSMENT — PAIN SCALES - GENERAL: PAINLEVEL: EXTREME PAIN (8)

## 2022-05-03 NOTE — LETTER
Opioid / Opioid Plus Controlled Substance Agreement    This is an agreement between you and your provider about the safe and appropriate use of controlled substance/opioids prescribed by your care team. Controlled substances are medicines that can cause physical and mental dependence (abuse).    There are strict laws about having and using these medicines. We here at [facility] are committing to working with you in your efforts to get better. To support you in this work, we ll help you schedule regular office appointments for medicine refills. If we must cancel or change your appointment for any reason, we ll make sure you have enough medicine to last until your next appointment.     As a Provider, I will:    Listen carefully to your concerns and treat you with respect.     Recommend a treatment plan that I believe is in your best interest. This plan may involve therapies other than opioid pain medication.     Talk with you often about the possible benefits, and the risk of harm of any medicine that we prescribe for you.     Provide a plan on how to taper (discontinue or go off) using this medicine if the decision is made to stop its use.    As a Patient, I understand that opioid(s):     Are a controlled substance prescribed by my care team to help me function or work and manage my condition(s).     Are strong medicines and can cause serious side effects such as:    Drowsiness, which can seriously affect my driving ability    A lower breathing rate, enough to cause death    Harm to my thinking ability     Depression     Abuse of and addiction to this medicine    Need to be taken exactly as prescribed. Combining opioids with certain medicines or chemicals (such as illegal drugs, sedatives, sleeping pills, and benzodiazepines) can be dangerous or even fatal. If I stop opioids suddenly, I may have severe withdrawal symptoms.    Do not work for all types of pain nor for all patients. If they re not helpful, I may  be asked to stop them.        The risks, benefits and side effects of these medicine(s) were explained to me. I agree that:  1. I will take part in other treatments as advised by my care team. This may be psychiatry or counseling, physical therapy, behavioral therapy, group treatment or a referral to a specialist.     2. I will keep all my appointments. I understand that this is part of the monitoring of opioids. My care team may require an office visit for EVERY opioid/controlled substance refill. If I miss appointments or don t follow instructions, my care team may stop my medicine.    3. I will take my medicines as prescribed. I will not change the dose or schedule unless my care team tells me to. There will be no refills if I run out early.     4. I may be asked to come to the clinic and complete a urine drug test or complete a pill count at any time. If I don t give a urine sample or participate in a pill count, the care team may stop my medicine.    5. I will only receive prescriptions from this clinic for chronic pain. If I am treated by another provider for acute pain issues, I will tell them that I am taking opioid pain medication for chronic pain and that I have a treatment agreement with this provider. I will inform my [facility] care team within one business day if I am given a prescription for any pain medication by another healthcare provider. My [facility] care team can contact other providers and pharmacists about my use of any medicines.    6. It is up to me to make sure that I don t run out of my medicines on weekends or holidays. If my care team is willing to refill my opioid prescription without a visit, I must request refills only during office hours. Refills may take up to 3 business days to process. I will use one pharmacy to fill all my opioid and other controlled substance prescriptions. I will notify the clinic about any changes to my insurance or medication  availability.    7. I am responsible for my prescriptions. If the medicine/prescription is lost, stolen or destroyed, it will not be replaced. I also agree not to share controlled substance medicines with anyone.    8. I am aware I should not use any illegal or recreational drugs. I agree not to drink alcohol unless my care team says I can.       9. If I enroll in the [place] [facility] Cannabis program, I will tell my care team prior to my next refill.     10. I will tell my care team right away if I become pregnant, have a new medical problem treated outside of my regular clinic, or have a change in my medications.    11. I understand that this medicine can affect my thinking, judgment and reaction time. Alcohol and drugs affect the brain and body, which can affect the safety of my driving. Being under the influence of alcohol or drugs can affect my decision-making, behaviors, personal safety, and the safety of others. Driving while impaired (DWI) can occur if a person is driving, operating, or in physical control of a car, motorcycle, boat, snowmobile, ATV, motorbike, off-road vehicle, or any other motor vehicle ([place] Statute 169A.20). I understand the risk if I choose to drive or operate any vehicle or machinery.    I understand that if I do not follow any of the conditions above, my prescriptions or treatment may be stopped or changed.          Opioids  What You Need to Know    What are opioids?   Opioids are pain medicines that must be prescribed by a doctor. They are also known as narcotics.     Examples are:   1. morphine (MS Contin, Liz)  2. oxycodone (Oxycontin)  3. oxycodone and acetaminophen (Percocet)  4. hydrocodone and acetaminophen (Vicodin, Norco)   5. fentanyl patch (Duragesic)   6. hydromorphone (Dilaudid)   7. methadone  8. codeine (Tylenol #3)     What do opioids do well?   Opioids are best for severe short-term pain such as after a surgery or injury. They may work well for cancer pain. They may  help some people with long-lasting (chronic) pain.     What do opioids NOT do well?   Opioids never get rid of pain entirely, and they don t work well for most patients with chronic pain. Opioids don t reduce swelling, one of the causes of pain.                                    Other ways to manage chronic pain and improve function include:       Treat the health problem that may be causing pain    Anti-inflammation medicines, which reduce swelling and tenderness, such as ibuprofen (Advil, Motrin) or naproxen (Aleve)    Acetaminophen (Tylenol)    Antidepressants and anti-seizure medicines, especially for nerve pain    Topical treatments such as patches or creams    Injections or nerve blocks    Chiropractic or osteopathic treatment    Acupuncture, massage, deep breathing, meditation, visual imagery, aromatherapy    Use heat or ice at the pain site    Physical therapy     Exercise    Stop smoking    Take part in therapy       Risks and side effects     Talk to your doctor before you start or decide to keep taking opioids. Possible side effects include:      Lowering your breathing rate enough to cause death    Overdose, including death, especially if taking higher than prescribed doses    Worse depression symptoms; less pleasure in things you usually enjoy    Feeling tired or sluggish    Slower thoughts or cloudy thinking    Being more sensitive to pain over time; pain is harder to control    Trouble sleeping or restless sleep    Changes in hormone levels (for example, less testosterone)    Changes in sex drive or ability to have sex    Constipation    Unsafe driving    Itching and sweating    Dizziness    Nausea, throwing up and dry mouth    What else should I know about opioids?    Opioids may lead to dependence, tolerance, or addiction.      Dependence means that if you stop or reduce the medicine too quickly, you will have withdrawal symptoms. These include loose poop (diarrhea), jitters, flu-like symptoms,  nervousness and tremors. Dependence is not the same as addiction.                       Tolerance means needing higher doses over time to get the same effect. This may increase the chance of serious side effects.      Addiction is when people improperly use a substance that harms their body, their mind or their relations with others. Use of opiates can cause a relapse of addiction if you have a history of drug or alcohol abuse.      People who have used opioids for a long time may have a lower quality of life, worse depression, higher levels of pain and more visits to doctors.    You can overdose on opioids. Take these steps to lower your risk of overdose:    1. Recognize the signs:  Signs of overdose include decrease or loss of consciousness (blackout), slowed breathing, trouble waking up and blue lips. If someone is worried about overdose, they should call 911.    2. Talk to your doctor about Narcan (naloxone).   If you are at risk for overdose, you may be given a prescription for Narcan. This medicine very quickly reverses the effects of opioids.   If you overdose, a friend or family member can give you Narcan while waiting for the ambulance. They need to know the signs of overdose and how to give Narcan.     3. Don't use alcohol or street drugs.   Taking them with opioids can cause death.    4. Do not take any of these medicines unless your doctor says it s OK. Taking these with opioids can cause death:    Benzodiazepines, such as lorazepam (Ativan), alprazolam (Xanax) or diazepam (Valium)    Muscle relaxers, such as cyclobenzaprine (Flexeril)    Sleeping pills like zolpidem (Ambien)     Other opioids      How to keep you and other people safe while taking opioids:    1. Never share your opioids with others.  Opioid medicines are regulated by the Drug Enforcement Agency ([employer]OR[employer]). Selling or sharing medications is a criminal act.    2. Be sure to store opioids in a secure place, locked up if possible. Young children  can easily swallow them and overdose.    3. When you are traveling with your medicines, keep them in the original bottles. If you use a pill box, be sure you also carry a copy of your medicine list from your clinic or pharmacy.    4. Safe disposal of opioids    Most pharmacies have places to get rid of medicine, called disposal kiosks. Medicine disposal options are also available in every [facility]. Search your county and  medication disposal  to find more options. You can find more details at:  [email or web address]     I agree that my provider, clinic care team, and pharmacy may work with any city, state or federal law enforcement agency that investigates the misuse, sale, or other diversion of my controlled medicine. I will allow my provider to discuss my care with, or share a copy of, this agreement with any other treating provider, pharmacy or emergency room where I receive care.    I have read this agreement and have asked questions about anything I did not understand.    _______________________________________________________  Patient Signature - Mann Escobar _____________________                   Date     _______________________________________________________  Provider Signature - GUNJAN Werner CNP   _____________________                   Date     _______________________________________________________  Witness Signature (required if provider not present while patient signing)   _____________________                   Date

## 2022-05-03 NOTE — PATIENT INSTRUCTIONS
Patient Education   Personalized Prevention Plan  You are due for the preventive services outlined below.  Your care team is available to assist you in scheduling these services.  If you have already completed any of these items, please share that information with your care team to update in your medical record.  Health Maintenance Due   Topic Date Due     URINE DRUG SCREEN  Never done     FALL RISK ASSESSMENT  07/30/2021     Diptheria Tetanus Pertussis (DTAP/TDAP/TD) Vaccine (1 - Tdap) 08/04/2021     COVID-19 Vaccine (4 - Booster for Pfizer series) 02/09/2022       Preventing Falls at Home  A person can fall for many reasons. Older adults may fall because reaction time slows down as we age. Your muscles and joints may get stiff, weak, or less flexible because of illness, medicines, or a physical condition.   Other health problems that make falls more likely include:     Arthritis    Dizziness or lightheadedness when you stand up (orthostatic hypotension)    History of a stroke    Dizziness    Anemia    Certain medicines taken for mental illness or to control blood pressure.    Problems with balance or gait    Bladder or urinary problems    History of falling    Changes in vision (vision impairment)    Changes in thinking skills and memory (cognitive impairment)  Injuries from a fall can include serious injuries such as broken bones, dislocated joints, internal bleeding and cuts. Injuries like these can limit your independence.   Prevention tips  To help prevent falls and fall-related injuries, follow the tips below.    Floors  To make floors safer:     Put nonskid pads under area rugs.    Remove small rugs.    Replace worn floor coverings.    Tack carpets firmly to each step on carpeted stairs. Put nonskid strips on the edges of uncarpeted stairs.    Keep floors and stairs free of clutter and cords.    Arrange furniture so there are clear pathways.    Clean up any spills right away.  Bathrooms    To make  bathrooms safer:     Install grab bars in the tub or shower.    Apply nonskid strips or put a nonskid rubber mat in the tub or shower.    Sit on a bath chair to bathe.    Use bathmats with nonskid backing.  Lighting  To improve visibility in your home:      Keep a flashlight in each room. Or put a lamp next to the bed within easy reach.    Put nightlights in the bedrooms, hallways, kitchen, and bathrooms.    Make sure all stairways have good lighting.    Take your time when going up and down stairs.    Put handrails on both sides of stairs and in walkways for more support. To prevent injury to your wrist or arm, don t use handrails to pull yourself up.    Install grab bars to pull yourself up.    Move or rearrange items that you use often. This will make them easier to find or reach.    Look at your home to find any safety hazards. Especially look at doorways, walkways, and the driveway. Remove or repair any safety problems that you find.  Other changes to make    Look around to find any safety hazards. Look closely at doorways, walkways, and the driveway. Remove or repair any safety problems that you find.    Wear shoes that fit well.    Take your time when going up and down stairs.    Put handrails on both sides of stairs and in walkways for more support. To prevent injury to your wrist or arm, don t use handrails to pull yourself up.    Install grab bars wherever needed to pull yourself up.    Arrange items that you use often. This will make them easier to find or reach.    [employer] last reviewed this educational content on 3/1/2020    3559-9940 [employer]. All rights reserved. This information is not intended as a substitute for professional medical care. Always follow your healthcare professional's instructions.

## 2022-05-03 NOTE — PROGRESS NOTES
Assessment & Plan     Encounter for Medicare annual wellness exam      Chronic left-sided low back pain with left-sided sciatica  Stable.  - oxyCODONE (ROXICODONE) 5 MG tablet; Take 1 tablet (5 mg) by mouth 2 times daily as needed for severe pain This is a 90 day supply    Encounter for long-term (current) use of medications  - [id number] - Urine Drug Confirmation Panel (Comprehensive); Future  - [id number] - Urine Drug Confirmation Panel (Comprehensive)        Return in about 53 weeks (around 5/9/2023) for Annual Wellness Visit.    The risks, benefits and treatment options of prescribed medications or other treatments have been discussed with the patient. The patient verbalized their understanding and should call or follow up if no improvement or if they develop further problems.    GUNJAN Werner CNP  [facility] is a 81 year old who presents for the following health issues     HPI     Pain History:  When did you first notice your pain? - Chronic Pain   Have you seen this provider for your pain in the past?   Yes   Where in your body do you have pain? Chronic left-sided low back pain with left-sided sciatica   Are you seeing anyone else for your pain? No    PHQ-9 SCORE 5/31/2019 6/16/2021 5/3/2022   PHQ-9 Total Score 3 3 5       SARAH-7 SCORE 5/31/2019 6/16/2021 5/3/2022   Total Score 0 1 1     Chronic Pain Follow Up:    Analgesia/pain control:    - Recent changes:  no    - Overall control: Tolerable with discomfort    - Current treatments: opioids  Adherence:     - Do you ever take more pain medicine than prescribed? No    - When did you take your last dose of pain medicine?  This morning   Adverse effects: No   PDMP Review       Value Time User    State PDMP site checked  Yes 5/3/2022 11:00 AM Shayy Ramírez APRN CNP        Last CSA Agreement:   CSA -- Patient Level:    Controlled Substance Agreement - Opioid - Scan on 6/17/2021  7:30 PM           Annual  "Wellness Visit  Patient has been advised of split billing requirements and indicates understanding: Yes     Are you in the first 12 months of your Medicare Part B coverage?  No    Physical Health:    In general, how would you rate your overall physical health? poor    Outside of work, how many days during the week do you exercise?none    Outside of work, approximately how many minutes a day do you exercise?not applicable    If you drink alcohol do you typically have >3 drinks per day or >7 drinks per week? No    Do you usually eat at least 4 servings of fruit and vegetables a day, include whole grains & fiber and avoid regularly eating high fat or \"junk\" foods? Yes    Do you have any problems taking medications regularly? No    Do you have any side effects from medications? none    Needs assistance for the following daily activities: no assistance needed    Which of the following safety concerns are present in your home?  none identified     Hearing impairment: Yes, Need to ask people to speak up or repeat themselves. Does hearing airds at home.     In the past 6 months, have you been bothered by leaking of urine? yes    Mental Health:    In general, how would you rate your overall mental or emotional health? good  PHQ-2 Score:      Do you feel safe in your environment? Yes    Have you ever done Advance Care Planning? (For example, a Health Directive, POLST, or a discussion with a medical provider or your loved ones about your wishes)? No, advance care planning information given to patient to review.  Patient declined advance care planning discussion at this time.    Fall risk:  Fallen 2 or more times in the past year?: Yes  Any fall with injury in the past year?: No    Cognitive Screenin) Repeat 3 items (Leader, Season, Table)    2) Clock draw: NORMAL  3) 3 item recall: Recalls 2 objects   Results: NORMAL clock, 1-2 items recalled: COGNITIVE IMPAIRMENT LESS LIKELY    Mini-CogTM Copyright S Ivonne. Licensed by " "the author for use in [facility]; reprinted with permission (zi@[facility]). All rights reserved.      Do you have sleep apnea, excessive snoring or daytime drowsiness?: yes    Current providers sharing in care for this patient include:   Patient Care Team:  Shayy Ramírez APRN CNP as PCP - General (Family Practice)  Shayy Ramírez APRN CNP as Assigned PCP  Yoon Alicea MD as MD (Nephrology)  Yoon Alicea MD as Assigned Nephrology Provider  Anson Thomas MD as MD (Family Medicine)  Sandor Britton DO as MD (Gastroenterology)  Sandor Britton DO as Assigned Gastroenterology Provider  Boom Golden MD as Assigned Cancer Care Provider  Loren Sparks RN as Specialty Care Coordinator (Nephrology)  Danielito Mohan DO as Assigned Musculoskeletal Provider    Patient has been advised of split billing requirements and indicates understanding: Yes    Review of Systems   Constitutional, HEENT, cardiovascular, pulmonary, GI, , musculoskeletal, neuro, skin, endocrine and psych systems are negative, except as otherwise noted.      Objective    /68   Pulse 74   Temp 97.5  F (36.4  C) (Tympanic)   Resp 14   Ht 1.549 m (5' 1\")   Wt 74.8 kg (165 lb)   SpO2 97%   BMI 31.18 kg/m    Body mass index is 31.18 kg/m .  Physical Exam   GENERAL: healthy, alert and no distress  PSYCH: mentation appears normal, affect normal/bright                  He is at risk for falling and has been provided with information to reduce the risk of falling at home.  "

## 2022-05-04 ASSESSMENT — ANXIETY QUESTIONNAIRES: GAD7 TOTAL SCORE: 1

## 2022-05-05 ENCOUNTER — INFUSION THERAPY VISIT (OUTPATIENT)
Dept: INFUSION THERAPY | Facility: CLINIC | Age: 82
End: 2022-05-05
Attending: INTERNAL MEDICINE
Payer: COMMERCIAL

## 2022-05-05 ENCOUNTER — APPOINTMENT (OUTPATIENT)
Dept: LAB | Facility: CLINIC | Age: 82
End: 2022-05-05
Payer: COMMERCIAL

## 2022-05-05 ENCOUNTER — ONCOLOGY VISIT (OUTPATIENT)
Dept: ONCOLOGY | Facility: CLINIC | Age: 82
End: 2022-05-05
Attending: INTERNAL MEDICINE
Payer: COMMERCIAL

## 2022-05-05 VITALS
DIASTOLIC BLOOD PRESSURE: 72 MMHG | HEART RATE: 77 BPM | TEMPERATURE: 98.2 F | BODY MASS INDEX: 31.18 KG/M2 | OXYGEN SATURATION: 96 % | RESPIRATION RATE: 12 BRPM | SYSTOLIC BLOOD PRESSURE: 133 MMHG | WEIGHT: 165 LBS

## 2022-05-05 DIAGNOSIS — N18.32 ANEMIA OF CHRONIC RENAL FAILURE, STAGE 3B (H): Primary | ICD-10-CM

## 2022-05-05 DIAGNOSIS — N18.32 ANEMIA OF CHRONIC RENAL FAILURE, STAGE 3B (H): ICD-10-CM

## 2022-05-05 DIAGNOSIS — D63.1 ANEMIA OF CHRONIC RENAL FAILURE, STAGE 3B (H): Primary | ICD-10-CM

## 2022-05-05 DIAGNOSIS — D63.1 ANEMIA OF CHRONIC RENAL FAILURE, STAGE 3B (H): ICD-10-CM

## 2022-05-05 DIAGNOSIS — D56.8 OTHER THALASSEMIA (H): ICD-10-CM

## 2022-05-05 DIAGNOSIS — D56.8 OTHER THALASSEMIA (H): Primary | ICD-10-CM

## 2022-05-05 LAB
BASOPHILS # BLD AUTO: 0 10E3/UL (ref 0–0.2)
BASOPHILS NFR BLD AUTO: 1 %
DACRYOCYTES BLD QL SMEAR: SLIGHT
DHC UR CFM-MCNC: 230 NG/ML
DHC/CREAT UR: 291 NG/MG {CREAT}
EOSINOPHIL # BLD AUTO: 0.1 10E3/UL (ref 0–0.7)
EOSINOPHIL NFR BLD AUTO: 2 %
ERYTHROCYTE [DISTWIDTH] IN BLOOD BY AUTOMATED COUNT: 17 % (ref 10–15)
FRAGMENTS BLD QL SMEAR: ABNORMAL
HCT VFR BLD AUTO: 26.1 % (ref 40–53)
HGB BLD-MCNC: 8.2 G/DL (ref 13.3–17.7)
HYDROCODONE UR CFM-MCNC: 712 NG/ML
HYDROCODONE/CREAT UR: 901 NG/MG {CREAT}
HYDROMORPHONE UR CFM-MCNC: 342 NG/ML
HYDROMORPHONE/CREAT UR: 433 NG/MG {CREAT}
IMM GRANULOCYTES # BLD: 0 10E3/UL
IMM GRANULOCYTES NFR BLD: 0 %
LYMPHOCYTES # BLD AUTO: 0.6 10E3/UL (ref 0.8–5.3)
LYMPHOCYTES NFR BLD AUTO: 11 %
MCH RBC QN AUTO: 19.2 PG (ref 26.5–33)
MCHC RBC AUTO-ENTMCNC: 31.4 G/DL (ref 31.5–36.5)
MCV RBC AUTO: 61 FL (ref 78–100)
MONOCYTES # BLD AUTO: 0.5 10E3/UL (ref 0–1.3)
MONOCYTES NFR BLD AUTO: 10 %
NEUTROPHILS # BLD AUTO: 4 10E3/UL (ref 1.6–8.3)
NEUTROPHILS NFR BLD AUTO: 76 %
NRBC # BLD AUTO: 0 10E3/UL
NRBC BLD AUTO-RTO: 0 /100
OXYCODONE UR CFM-MCNC: 119 NG/ML
OXYCODONE/CREAT UR: 151 NG/MG {CREAT}
OXYMORPHONE UR CFM-MCNC: 740 NG/ML
OXYMORPHONE/CREAT UR: 937 NG/MG {CREAT}
PLAT MORPH BLD: ABNORMAL
PLATELET # BLD AUTO: 212 10E3/UL (ref 150–450)
RBC # BLD AUTO: 4.26 10E6/UL (ref 4.4–5.9)
RBC MORPH BLD: ABNORMAL
TARGETS BLD QL SMEAR: ABNORMAL
WBC # BLD AUTO: 5.3 10E3/UL (ref 4–11)

## 2022-05-05 PROCEDURE — 250N000011 HC RX IP 250 OP 636: Performed by: INTERNAL MEDICINE

## 2022-05-05 PROCEDURE — 85025 COMPLETE CBC W/AUTO DIFF WBC: CPT | Performed by: INTERNAL MEDICINE

## 2022-05-05 PROCEDURE — 96372 THER/PROPH/DIAG INJ SC/IM: CPT | Performed by: INTERNAL MEDICINE

## 2022-05-05 PROCEDURE — 36415 COLL VENOUS BLD VENIPUNCTURE: CPT

## 2022-05-05 PROCEDURE — G0463 HOSPITAL OUTPT CLINIC VISIT: HCPCS | Mod: 25

## 2022-05-05 PROCEDURE — 99215 OFFICE O/P EST HI 40 MIN: CPT | Performed by: INTERNAL MEDICINE

## 2022-05-05 RX ORDER — MEPERIDINE HYDROCHLORIDE 25 MG/ML
25 INJECTION INTRAMUSCULAR; INTRAVENOUS; SUBCUTANEOUS EVERY 30 MIN PRN
Status: CANCELLED | OUTPATIENT
Start: 2022-05-19

## 2022-05-05 RX ORDER — EPINEPHRINE 1 MG/ML
0.3 INJECTION, SOLUTION, CONCENTRATE INTRAVENOUS EVERY 5 MIN PRN
Status: CANCELLED | OUTPATIENT
Start: 2022-05-19

## 2022-05-05 RX ORDER — ALBUTEROL SULFATE 0.83 MG/ML
2.5 SOLUTION RESPIRATORY (INHALATION)
Status: CANCELLED | OUTPATIENT
Start: 2022-05-19

## 2022-05-05 RX ORDER — NALOXONE HYDROCHLORIDE 0.4 MG/ML
0.2 INJECTION, SOLUTION INTRAMUSCULAR; INTRAVENOUS; SUBCUTANEOUS
Status: DISCONTINUED | OUTPATIENT
Start: 2022-05-05 | End: 2022-05-05 | Stop reason: HOSPADM

## 2022-05-05 RX ORDER — ALBUTEROL SULFATE 90 UG/1
1-2 AEROSOL, METERED RESPIRATORY (INHALATION)
Status: CANCELLED
Start: 2022-05-12

## 2022-05-05 RX ORDER — DIPHENHYDRAMINE HYDROCHLORIDE 50 MG/ML
50 INJECTION INTRAMUSCULAR; INTRAVENOUS
Status: DISCONTINUED | OUTPATIENT
Start: 2022-05-05 | End: 2022-05-05 | Stop reason: HOSPADM

## 2022-05-05 RX ORDER — ALBUTEROL SULFATE 90 UG/1
1-2 AEROSOL, METERED RESPIRATORY (INHALATION)
Status: DISCONTINUED | OUTPATIENT
Start: 2022-05-05 | End: 2022-05-05 | Stop reason: HOSPADM

## 2022-05-05 RX ORDER — MEPERIDINE HYDROCHLORIDE 25 MG/ML
25 INJECTION INTRAMUSCULAR; INTRAVENOUS; SUBCUTANEOUS EVERY 30 MIN PRN
Status: DISCONTINUED | OUTPATIENT
Start: 2022-05-05 | End: 2022-05-05 | Stop reason: HOSPADM

## 2022-05-05 RX ORDER — ALBUTEROL SULFATE 0.83 MG/ML
2.5 SOLUTION RESPIRATORY (INHALATION)
Status: DISCONTINUED | OUTPATIENT
Start: 2022-05-05 | End: 2022-05-05 | Stop reason: HOSPADM

## 2022-05-05 RX ORDER — EPINEPHRINE 1 MG/ML
0.3 INJECTION, SOLUTION, CONCENTRATE INTRAVENOUS EVERY 5 MIN PRN
Status: CANCELLED | OUTPATIENT
Start: 2022-05-12

## 2022-05-05 RX ORDER — DIPHENHYDRAMINE HYDROCHLORIDE 50 MG/ML
50 INJECTION INTRAMUSCULAR; INTRAVENOUS
Status: CANCELLED
Start: 2022-05-12

## 2022-05-05 RX ORDER — NALOXONE HYDROCHLORIDE 0.4 MG/ML
0.2 INJECTION, SOLUTION INTRAMUSCULAR; INTRAVENOUS; SUBCUTANEOUS
Status: CANCELLED | OUTPATIENT
Start: 2022-05-19

## 2022-05-05 RX ORDER — METHYLPREDNISOLONE SODIUM SUCCINATE 125 MG/2ML
125 INJECTION, POWDER, LYOPHILIZED, FOR SOLUTION INTRAMUSCULAR; INTRAVENOUS
Status: DISCONTINUED | OUTPATIENT
Start: 2022-05-05 | End: 2022-05-05 | Stop reason: HOSPADM

## 2022-05-05 RX ORDER — METHYLPREDNISOLONE SODIUM SUCCINATE 125 MG/2ML
125 INJECTION, POWDER, LYOPHILIZED, FOR SOLUTION INTRAMUSCULAR; INTRAVENOUS
Status: CANCELLED
Start: 2022-05-19

## 2022-05-05 RX ORDER — MEPERIDINE HYDROCHLORIDE 25 MG/ML
25 INJECTION INTRAMUSCULAR; INTRAVENOUS; SUBCUTANEOUS EVERY 30 MIN PRN
Status: CANCELLED | OUTPATIENT
Start: 2022-05-12

## 2022-05-05 RX ORDER — METHYLPREDNISOLONE SODIUM SUCCINATE 125 MG/2ML
125 INJECTION, POWDER, LYOPHILIZED, FOR SOLUTION INTRAMUSCULAR; INTRAVENOUS
Status: CANCELLED
Start: 2022-05-12

## 2022-05-05 RX ORDER — ALBUTEROL SULFATE 90 UG/1
1-2 AEROSOL, METERED RESPIRATORY (INHALATION)
Status: CANCELLED
Start: 2022-05-19

## 2022-05-05 RX ORDER — NALOXONE HYDROCHLORIDE 0.4 MG/ML
0.2 INJECTION, SOLUTION INTRAMUSCULAR; INTRAVENOUS; SUBCUTANEOUS
Status: CANCELLED | OUTPATIENT
Start: 2022-05-12

## 2022-05-05 RX ORDER — DIPHENHYDRAMINE HYDROCHLORIDE 50 MG/ML
50 INJECTION INTRAMUSCULAR; INTRAVENOUS
Status: CANCELLED
Start: 2022-05-19

## 2022-05-05 RX ORDER — ALBUTEROL SULFATE 0.83 MG/ML
2.5 SOLUTION RESPIRATORY (INHALATION)
Status: CANCELLED | OUTPATIENT
Start: 2022-05-12

## 2022-05-05 RX ORDER — EPINEPHRINE 1 MG/ML
0.3 INJECTION, SOLUTION, CONCENTRATE INTRAVENOUS EVERY 5 MIN PRN
Status: DISCONTINUED | OUTPATIENT
Start: 2022-05-05 | End: 2022-05-05 | Stop reason: HOSPADM

## 2022-05-05 RX ADMIN — DARBEPOETIN ALFA 100 MCG: 100 INJECTION, SOLUTION INTRAVENOUS; SUBCUTANEOUS at 15:18

## 2022-05-05 ASSESSMENT — PAIN SCALES - GENERAL: PAINLEVEL: WORST PAIN (10)

## 2022-05-05 NOTE — PROGRESS NOTES
The patient is being seen for the following issues:  Encounter Diagnoses   Name Primary?     Other thalassemia (H) Yes     Anemia of chronic renal failure, stage 3b (H)      He recently experienced exacerbation of his chronic anemia due to gastrointestinal bleeding related to peptic ulcer disease.  He also has kidney disease for which he is being seen by nephrology.    Prior to starting Aranesp his hemoglobin had been running in the low 6 g range.     His MCV has been around 60.     Per previous documentation by Dr. Andrew his hemoglobin was running at baseline in the 10 g range prior to 2013.  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  His MCV was around 60. Dr. Andrew gave him IV iron in the past for iron deficiency. The last time he was iron deficient was at the end of 2017.     His kidney function has deteriorated quite a bit recently so he is scheduled for a kidney biopsy on 5/13.    His only symptoms from his anemia are mild fatigue and cold toxicity.       PHYSICAL EXAMINATION:    General: Todays' vital signs reviewed in the EMR. He is in no acute distress.  /72 (BP Location: Right arm, Patient Position: Sitting, Cuff Size: Adult Regular)   Pulse 77   Temp 98.2  F (36.8  C) (Tympanic)   Resp 12   Wt 74.8 kg (165 lb)   SpO2 96%   BMI 31.18 kg/m          ASSESSMENT:    Encounter Diagnoses   Name Primary?     Other thalassemia (H) Yes     Anemia of chronic renal failure, stage 3b (H)        Your hemoglobin is stable.    Given the high risk of RBC alloimmunization in patients with thalassemia I would like to avoid RBC transfusion and continue Aranesp every 3 weeks.    Continue taking folic acid. A prescription has 11 refills on it which should last 1 year.    I don't anticipate that you will need iron but your iron studies will be monitored every 8 weeks while you continue to receive Aranesp injections and if you unexpectedly become iron deficient you will receive  intravenous iron.    Please return to my clinic for your next office visit with me in 6 months.    I spent a total of 47 minutes on the care of this patient on the day of service including face to face time and the remainder in chart review, care coordination, and documentation on the day of service.

## 2022-05-05 NOTE — LETTER
"    5/5/2022         RE: Mann Escobar  [address]  [place] [place] [facility] [address]        Dear Colleague,    Thank you for referring your patient, Mann Escobar, to the [facility]. Please see a copy of my visit note below.    Oncology Rooming Note    May 5, 2022 2:27 PM   Mann Escobar is a 81 year old male who presents for:    Chief Complaint   Patient presents with     Oncology Clinic Visit     Thalassemia - Labs provider and infusion     Initial Vitals: /72 (BP Location: Right arm, Patient Position: Sitting, Cuff Size: Adult Regular)   Pulse 77   Temp 98.2  F (36.8  C) (Tympanic)   Resp 12   Wt 74.8 kg (165 lb)   SpO2 96%   BMI 31.18 kg/m   Estimated body mass index is 31.18 kg/m  as calculated from the following:    Height as of 5/3/22: 1.549 m (5' 1\").    Weight as of this encounter: 74.8 kg (165 lb). Body surface area is 1.79 meters squared.  Worst Pain (10) Comment: Data Unavailable   No LMP for male patient.  Allergies reviewed: Yes  Medications reviewed: Yes    Medications: Medication refills not needed today.  Pharmacy name entered into [facility]:    [facility]  [facility] - [place], [place] - [address] [place] [address] PHARMACY [id number] - [place], [place] - [address] [address] [facility] [address]    Clinical concerns:  None      Roxie Beasley CMA              The patient is being seen for the following issues:  Encounter Diagnoses   Name Primary?     Other thalassemia (H) Yes     Anemia of chronic renal failure, stage 3b (H)      He recently experienced exacerbation of his chronic anemia due to gastrointestinal bleeding related to peptic ulcer disease.  He also has kidney disease for which he is being seen by nephrology.    Prior to starting Aranesp his hemoglobin had been running in the low 6 g range.     His MCV has been around 60.     Per previous documentation by Dr. Andrew his hemoglobin was " running at baseline in the 10 g range prior to 2013.  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  His MCV was around 60. Dr. Andrew gave him IV iron in the past for iron deficiency. The last time he was iron deficient was at the end of 2017.     His kidney function has deteriorated quite a bit recently so he is scheduled for a kidney biopsy on 5/13.    His only symptoms from his anemia are mild fatigue and cold toxicity.       PHYSICAL EXAMINATION:    General: Todays' vital signs reviewed in the EMR. He is in no acute distress.  /72 (BP Location: Right arm, Patient Position: Sitting, Cuff Size: Adult Regular)   Pulse 77   Temp 98.2  F (36.8  C) (Tympanic)   Resp 12   Wt 74.8 kg (165 lb)   SpO2 96%   BMI 31.18 kg/m          ASSESSMENT:    Encounter Diagnoses   Name Primary?     Other thalassemia (H) Yes     Anemia of chronic renal failure, stage 3b (H)        Your hemoglobin is stable.    Given the high risk of RBC alloimmunization in patients with thalassemia I would like to avoid RBC transfusion and continue Aranesp every 3 weeks.    Continue taking folic acid. A prescription has 11 refills on it which should last 1 year.    I don't anticipate that you will need iron but your iron studies will be monitored every 8 weeks while you continue to receive Aranesp injections and if you unexpectedly become iron deficient you will receive intravenous iron.    Please return to my clinic for your next office visit with me in 6 months.    I spent a total of 47 minutes on the care of this patient on the day of service including face to face time and the remainder in chart review, care coordination, and documentation on the day of service.          Again, thank you for allowing me to participate in the care of your patient.        Sincerely,        Boom Golden MD

## 2022-05-05 NOTE — PROGRESS NOTES
Infusion Nursing Note:  Mann Escobar presents today for Aranes.    Patient seen by provider today: Yes: Dr. Golden   present during visit today: Not Applicable.    Note: N/A.      Intravenous Access:  No Intravenous access/labs at this visit.    Treatment Conditions:  Results reviewed, labs MET treatment parameters, ok to proceed with treatment.      Post Infusion Assessment:  Patient tolerated injection without incident.       Discharge Plan:   Patient discharged in stable condition accompanied by: self.  Departure Mode: Ambulatory.      Priscilla Boone RN

## 2022-05-05 NOTE — PROGRESS NOTES
"Oncology Rooming Note    May 5, 2022 2:27 PM   Mann Escobar is a 81 year old male who presents for:    Chief Complaint   Patient presents with     Oncology Clinic Visit     Thalassemia - Labs provider and infusion     Initial Vitals: /72 (BP Location: Right arm, Patient Position: Sitting, Cuff Size: Adult Regular)   Pulse 77   Temp 98.2  F (36.8  C) (Tympanic)   Resp 12   Wt 74.8 kg (165 lb)   SpO2 96%   BMI 31.18 kg/m   Estimated body mass index is 31.18 kg/m  as calculated from the following:    Height as of 5/3/22: 1.549 m (5' 1\").    Weight as of this encounter: 74.8 kg (165 lb). Body surface area is 1.79 meters squared.  Worst Pain (10) Comment: Data Unavailable   No LMP for male patient.  Allergies reviewed: Yes  Medications reviewed: Yes    Medications: Medication refills not needed today.  Pharmacy name entered into [facility]:    [facility]  [facility] - [facility], [place] - [address] [place] [address] PHARMACY [address] - [address]    Clinical concerns:  None      Roxie Beasley CMA            "

## 2022-05-05 NOTE — PATIENT INSTRUCTIONS
Your hemoglobin is stable.    Given the high risk of RBC alloimmunization in patients with thalassemia I would like to avoid RBC transfusion and continue Aranesp every 3 weeks.    Continue taking folic acid. A prescription has 11 refills on it which should last 1 year.    I don't anticipate that you will need iron but your iron studies will be monitored every 8 weeks while you continue to receive Aranesp injections and if you unexpectedly become iron deficient you will receive intravenous iron.    Please return to my clinic for your next office visit with me in 6 months.

## 2022-05-07 ENCOUNTER — HOSPITAL ENCOUNTER (EMERGENCY)
Facility: CLINIC | Age: 82
Discharge: HOME | End: 2022-05-07
Attending: EMERGENCY MEDICINE | Admitting: EMERGENCY MEDICINE
Payer: COMMERCIAL

## 2022-05-07 VITALS
DIASTOLIC BLOOD PRESSURE: 84 MMHG | RESPIRATION RATE: 16 BRPM | HEIGHT: 61 IN | TEMPERATURE: 98.1 F | HEART RATE: 82 BPM | OXYGEN SATURATION: 98 % | WEIGHT: 165 LBS | SYSTOLIC BLOOD PRESSURE: 139 MMHG | BODY MASS INDEX: 31.15 KG/M2

## 2022-05-07 DIAGNOSIS — S81.812A LACERATION OF LOWER LEG, LEFT, INITIAL ENCOUNTER: ICD-10-CM

## 2022-05-07 DIAGNOSIS — R60.0 PERIPHERAL EDEMA: ICD-10-CM

## 2022-05-07 PROCEDURE — 12002 RPR S/N/AX/GEN/TRNK2.6-7.5CM: CPT | Performed by: EMERGENCY MEDICINE

## 2022-05-07 PROCEDURE — 12002 RPR S/N/AX/GEN/TRNK2.6-7.5CM: CPT

## 2022-05-07 PROCEDURE — 99282 EMERGENCY DEPT VISIT SF MDM: CPT | Mod: 25 | Performed by: EMERGENCY MEDICINE

## 2022-05-07 PROCEDURE — 99284 EMERGENCY DEPT VISIT MOD MDM: CPT | Mod: 25

## 2022-05-07 ASSESSMENT — ENCOUNTER SYMPTOMS
CONSTITUTIONAL NEGATIVE: 1
ENDOCRINE NEGATIVE: 1
PSYCHIATRIC NEGATIVE: 1
HEMATOLOGIC/LYMPHATIC NEGATIVE: 1
NEUROLOGICAL NEGATIVE: 1
GASTROINTESTINAL NEGATIVE: 1
WOUND: 1
CARDIOVASCULAR NEGATIVE: 1
RESPIRATORY NEGATIVE: 1
EYES NEGATIVE: 1
ALLERGIC/IMMUNOLOGIC NEGATIVE: 1

## 2022-05-07 NOTE — DISCHARGE INSTRUCTIONS
1) suture removal in 10 days.  Keep the wound clean and dry change the dressing least 2 times a day.  Apply topical antibiotics at least twice a day.  A referral to the wound care clinic was placed to help with follow-up care so he can be seen to ensure there is no risk for infection.    2) Be sure to keep your leg elevated to reduce swelling.  Consider taking additional dose of your furosemide once daily over the next 2 to 3 days.  Call your care team to review your upcoming appointments  
103.32

## 2022-05-07 NOTE — ED PROVIDER NOTES
History     Chief Complaint   Patient presents with     Edema     Laceration     Hit LLE on a cart today, and has laceration for repair. However, with edema, leg is leaking clear fluid. Take lasix daily.      HPI  Mann Escobar is a 81 year old male who presents for evaluation with a left lower extremity wound that he sustained after a cut from a cart.  Patient has multiple medical diagnoses including history of chronic pain syndrome, diastolic dysfunction, chronic anemia secondary to chronic kidney disease stage III, history of gastric bypass, chronic heart failure,, hypertension peripheral edema.  Patient's prescribed medications were reviewed including amlodipine, omeprazole, Carafate Shameka furosemide 40 mg twice daily, turmeric.  On my examination patient reports he was at the grocery store when he was swinging out of his scooter onto one of the mobilized grocery carts when he struck his left lower leg sustaining a 3 cm laceration.  Patient had leaking of fluid about his legs consistent with his peripheral edema after his wound and wanted to come in for wound check and wound care.  Patient reports has been compliant with his furosemide which he takes 40 mg twice a day  Allergies:  Allergies   Allergen Reactions     Nkda [No Known Drug Allergies]        Problem List:    Patient Active Problem List    Diagnosis Date Noted     Anemia of chronic renal failure, stage 3b (H) 01/26/2022     Priority: High     HTN (hypertension) 10/08/2012     Priority: High     Chronic low back pain 10/08/2012     Priority: High     Had back surgery in 5/2012         Leg edema 08/18/2010     Priority: High     Chronic heart failure with preserved ejection fraction (H) 01/21/2022     Priority: Medium     Marginal ulcer 12/30/2021     Priority: Medium     Retching 12/30/2021     Priority: Medium     History of Favian-en-Y gastric bypass 12/30/2021     Priority: Medium     Carpal tunnel syndrome 05/03/2021     Priority:  Medium     Edema 05/03/2021     Priority: Medium     Nephrolithiasis 05/03/2021     Priority: Medium     Dec 10, 2003 Entered By: ANGELIA STEVENS Comment: s/p nephrolithostomy       Diastolic dysfunction 02/01/2021     Priority: Medium     Chronic kidney disease, stage 3 (H) 01/05/2021     Priority: Medium     Arthritis of ankle, right 07/24/2020     Priority: Medium     Chronic anemia 07/24/2020     Priority: Medium     S/P ankle joint replacement 07/23/2020     Priority: Medium     Chronic pain syndrome 02/06/2017     Priority: Medium     Patient is followed by GUNJAN Vinson CNP for ongoing prescription of pain medication.  All refills should only be approved by this provider, or covering partner.    Medication(s): oxycodone.   Maximum quantity per month: 60  Clinic visit frequency required: Q 3 months     Controlled substance agreement:  Encounter-Level CSA:     There are no encounter-level csa.        Pain Clinic evaluation in the past:     Last [facility] website verification: 6/16/2021           Pseudogout 09/26/2016     Priority: Medium     BPH (benign prostatic hyperplasia) 06/11/2015     Priority: Medium     Hypertension      Priority: Medium     Abnormal antinuclear antibody titer 05/09/2014     Priority: Medium     Osteoarthritis 05/09/2014     Priority: Medium     Advanced directives, counseling/discussion 10/08/2012     Priority: Medium     Discussed advance care planning with patient; however, patient declined at this time. 10/8/2012          Benign non-nodular prostatic hyperplasia with lower urinary tract symptoms 10/08/2012     Priority: Medium     S/P knee replacement 10/08/2012     Priority: Medium     Both sides         S/P ankle fusion 10/08/2012     Priority: Medium     Left side         First degree AV block 04/25/2012     Priority: Medium     Family history of diabetes mellitus 04/25/2012     Priority: Medium     Scoliosis 04/25/2012     Priority: Medium     Hypopotassemia 04/25/2012      Priority: Medium     Internal hemorrhoids 08/23/2011     Priority: Medium     Iron deficiency anemia 08/23/2011     Priority: Medium     CARDIOVASCULAR SCREENING; LDL GOAL LESS THAN 130 10/31/2010     Priority: Medium     Family history of prostate cancer 09/02/2009     Priority: Medium     FATHER       Esophageal reflux 05/25/2006     Priority: Medium     Thalassemia minor      Priority: Medium     thalassemia minor (chronic low hgb)        HX NEPHROLITHIASIS [V13.01] 09/12/2005     Priority: Medium        Past Medical History:    Past Medical History:   Diagnosis Date     Arthritis of ankle, left 7/24/2020     Back pain      Gastro-oesophageal reflux disease      Hypertension      Hypopotassemia      Internal hemorrhoids      Iron deficiency anaemia      Leg edema      Morbid obesity 9/12/2005     Morbid obesity (H)      Osteoarthrosis      Scoliosis      Thalassemia minor        Past Surgical History:    Past Surgical History:   Procedure Laterality Date     CATARACT IOL, RT/LT  2008/    Cataract IOL RT/LT     COLONOSCOPY  2009/07    polyps removed repeat 5 yrs, tubular adenoma     COLONOSCOPY  9/29/2011    Procedure:COLONOSCOPY; Colonoscopy with hemorrhoid banding; Surgeon:JEREMY GARCIA; Location:[facility]     COLONOSCOPY N/A 12/19/2017    Procedure: COLONOSCOPY;  colonoscopy, gastroscopy;  Surgeon: Edmar Isaacs MD;  Location: [facility]     DECOMPRESSION LUMBAR MINIMALLY INVASIVE TWO LEVELS  5/2/2012    Procedure:DECOMPRESSION LUMBAR MINIMALLY INVASIVE TWO LEVELS; Surgeon:LOTTIE MITCHELL; [facility]     ESOPHAGOSCOPY, GASTROSCOPY, DUODENOSCOPY (EGD), COMBINED N/A 2/6/2018    Procedure: COMBINED ESOPHAGOSCOPY, GASTROSCOPY, DUODENOSCOPY (EGD);  gastroscopy;  Surgeon: dEmar Isaacs MD;  Location: [facility]     ESOPHAGOSCOPY, GASTROSCOPY, DUODENOSCOPY (EGD), COMBINED N/A 10/18/2021    Procedure: ESOPHAGOGASTRODUODENOSCOPY (EGD);  Surgeon: Anju Galeano MD;  Location: [facility]     FUSION SPINE POSTERIOR  MINIMALLY INVASIVE ONE LEVEL  2012    Procedure:FUSION SPINE POSTERIOR MINIMALLY INVASIVE ONE LEVEL; Minimal Access Spinal Technology Transformaninal Lumbar Interbody Fusion L4-5, Decompression L3-5; Surgeon:LOTTIE MITCHELL; [facility]     HC REMOVAL OF HYDROCELE,TUNICA,UNILAT  2006    bilateral     ORTHOPEDIC SURGERY      Lf knee replacement     ORTHOPEDIC SURGERY  ,     Rt replacement     ORTHOPEDIC SURGERY      Left ankle fused     RECONSTRUCT ANKLE Right 2020    Procedure: Ankle RECONSTRUCTIve Arthroplasty;  Surgeon: Luke Powers DPM;  Location: [facility]     SURGICAL HISTORY OF -       Cysto     SURGICAL HISTORY OF -       Percutaneous kidney stone removal     SURGICAL HISTORY OF -       ureteral stent removal     SURGICAL HISTORY OF -       bariatric surgery-Favian-en-Y     SURGICAL HISTORY OF -       carpal tunnel surgery rt wrist       Family History:    Family History   Problem Relation Age of Onset     Diabetes Brother      Obesity Brother      Cerebrovascular Disease Paternal Grandfather      Prostate Cancer Father      Cancer Father         bone     Diabetes Father      Heart Disease Mother         CHF     Cerebrovascular Disease Mother      Hypertension Mother      Cancer Mother         tumor in stomach     Cancer - colorectal Mother      Heart Disease Maternal Grandmother         CHF     Diabetes Paternal Grandmother      Breast Cancer Sister      Genitourinary Problems Son         Kidney stones     Cancer Brother      Cancer - colorectal Brother      Diabetes Brother        Social History:  Marital Status:   [2]  Social History     Tobacco Use     Smoking status: Former Smoker     Packs/day: 0.50     Years: 7.00     Pack years: 3.50     Types: Cigarettes     Quit date: 1962     Years since quittin.3     Smokeless tobacco: Never Used   Vaping Use     Vaping Use: Never used   Substance Use Topics     Alcohol use: Yes     Comment: one every  "other day. Rarely     Drug use: No        Medications:    amLODIPine (NORVASC) 5 MG tablet  fluticasone (FLONASE) 50 MCG/ACT nasal spray  folic acid 800 MCG tablet  furosemide (LASIX) 40 MG tablet  HCA VITAMIN B12 500 MCG OR TABS  MULTIVITAMIN OR  omeprazole (PRILOSEC) 40 MG DR capsule  OVER-THE-COUNTER  oxyCODONE (ROXICODONE) 5 MG tablet  pyridOXINE (VITAMIN B-6) 50 MG tablet  sucralfate (CARAFATE) 1 GM tablet  terazosin (HYTRIN) 5 MG capsule  Turmeric 500 MG CAPS  VIACTIV OR  zinc gluconate 50 MG tablet          Review of Systems   Constitutional: Negative.    HENT: Negative.    Eyes: Negative.    Respiratory: Negative.    Cardiovascular: Negative.    Gastrointestinal: Negative.    Endocrine: Negative.    Genitourinary: Negative.    Musculoskeletal:        Left lower leg wound with peripheral edema   Skin: Positive for wound (left lower leg).   Allergic/Immunologic: Negative.    Neurological: Negative.    Hematological: Negative.    Psychiatric/Behavioral: Negative.    All other systems reviewed and are negative.      Physical Exam   BP: 139/84  Pulse: 82  Temp: 98.1  F (36.7  C)  Resp: 16  Height: 154.9 cm (5' 1\")  Weight: 74.8 kg (165 lb)  SpO2: 98 %      Physical Exam  Constitutional:       Appearance: Normal appearance.   HENT:      Head: Normocephalic and atraumatic.      Right Ear: Tympanic membrane normal.      Nose: Nose normal.   Eyes:      Extraocular Movements: Extraocular movements intact.      Pupils: Pupils are equal, round, and reactive to light.   Cardiovascular:      Rate and Rhythm: Normal rate.   Pulmonary:      Effort: Pulmonary effort is normal. No respiratory distress.      Breath sounds: Normal breath sounds. No stridor. No wheezing, rhonchi or rales.   Chest:      Chest wall: No tenderness.   Musculoskeletal:         General: Swelling, tenderness and signs of injury present.      Cervical back: Normal range of motion and neck supple.      Right lower leg: Edema present.      Left lower leg: " "Edema present.   Skin:     Capillary Refill: Capillary refill takes less than 2 seconds.   Neurological:      General: No focal deficit present.      Mental Status: He is alert and oriented to person, place, and time.      Cranial Nerves: No cranial nerve deficit.      Sensory: No sensory deficit.      Motor: No weakness.      Coordination: Coordination normal.      Gait: Gait normal.      Deep Tendon Reflexes: Reflexes normal.   Psychiatric:         Mood and Affect: Mood normal.         Behavior: Behavior normal.         Thought Content: Thought content normal.         Judgment: Judgment normal.                   ED Course                 Procedures              Critical Care time:  none               ED medications: 1% lidocaine with epinephrine-3 mL      ED vitals:  Vitals:    05/07/22 1559   BP: 139/84   Pulse: 82   Resp: 16   Temp: 98.1  F (36.7  C)   TempSrc: Temporal   SpO2: 98%   Weight: 74.8 kg (165 lb)   Height: 1.549 m (5' 1\")     ED labs and imaging:        Assessments & Plan (with Medical Decision Making)   Assessment Summary and clinical Impression: 81-year-old male with  medical diagnoses including history of anemia of chronic renal failure, thalassemia, peripheral edema, diastolic dysfunction who presented with lower extremity swelling and a wound sustained after he accidentally cut his leg against the motorized cart in the grocery store.  He was swinging from his scooter to sit into a motorized grocery cart when he bumped his left lower leg and sustained a 3 cm wound.  He presented for wound care and laceration repair.   See photo images in the physical exam section above for distribution of wound changes about the left lower leg.  After reviewing options for wound closure he agreed to suture closure.  The wound was irrigated and explored to ensure there was no foreign body.  The wound edges were well approximated with 5. 0 Ethilon suture in simple interrupted fashion x10 stitches.  Wound care " instructions were reviewed with the patient.  Follow-up care in wound care clinic with referral placed.    ED course and Plan:  Reviewed the medical record.  Patient is followed by oncology.  Visit on May 5, 2022.  After reviewing options for wound care and laceration repair he agreed to have his wound closed.  The wound was irrigated and explored to ensure there was no foreign body.  After anesthesia with 1% lidocaine with epinephrine- 3ml the wound edges were well approximated with 5.0 Ethilon suture in simple interrupted fashion x10 stitches.  Wound care instructions, including signs of wound infection reviewed with the patient and spouse present during ED course.  Wound was covered with topical antibiotics, Vaseline gauze, 4 x 4 gauze and Kerlix roll. Suture removal in 10 days.  Referral to wound care clinic was provided to help with follow-up care given patient's comorbidities and peripheral edema.  Patient and spouse expressed comfort, understanding and agreement with plan of care.      Disclaimer: This note consists of symbols derived from keyboarding, dictation and/or voice recognition software. As a result, there may be errors in the script that have gone undetected. Please consider this when interpreting information found in this chart.  I have reviewed the nursing notes.    I have reviewed the findings, diagnosis, plan and need for follow up with the patient.       Discharge Medication List as of 5/7/2022  5:43 PM          Final diagnoses:   Laceration of lower leg, left, initial encounter - 3 cm after accidentally bumping his leg against a grocery cart   Peripheral edema       5/7/2022   [facility] EMERGENCY DEPT     Jabier Gómez MD  05/08/22 0041

## 2022-05-07 NOTE — ED TRIAGE NOTES
Hit LLE on a cart today, and has laceration for repair. However, with edema, leg is leaking clear fluid. Take lasix daily.      Triage Assessment     Row Name 05/07/22 1600       Triage Assessment (Adult)    Airway WDL WDL       Respiratory WDL    Respiratory WDL WDL       Skin Circulation/Temperature WDL    Skin Circulation/Temperature WDL WDL       Cardiac WDL    Cardiac WDL WDL       Peripheral/Neurovascular WDL    Peripheral Neurovascular WDL WDL       Cognitive/Neuro/Behavioral WDL    Cognitive/Neuro/Behavioral WDL WDL

## 2022-05-09 ENCOUNTER — PATIENT OUTREACH (OUTPATIENT)
Dept: CARE COORDINATION | Facility: CLINIC | Age: 82
End: 2022-05-09
Payer: COMMERCIAL

## 2022-05-09 ENCOUNTER — TELEPHONE (OUTPATIENT)
Dept: WOUND CARE | Facility: CLINIC | Age: 82
End: 2022-05-09
Payer: COMMERCIAL

## 2022-05-09 DIAGNOSIS — Z71.89 OTHER SPECIFIED COUNSELING: ICD-10-CM

## 2022-05-09 NOTE — TELEPHONE ENCOUNTER
Notify patient that I do not think he should switch diuretics at this time.  Seeing nephrology again this week - we can get their opinion as well.  Shayy Ramírez, CNP

## 2022-05-09 NOTE — TELEPHONE ENCOUNTER
Stitches should be removed with an RN visit. Should not be a provider visit  Shayy Ramírez CNP

## 2022-05-09 NOTE — PROGRESS NOTES
"Clinic Care Coordination Contact  [facility]: Post-Discharge Note  SITUATION                                                      Admission:    Admission Date: 05/07/22   Reason for Admission: Laceration of lower leg, left, initial encounter  Discharge:   Discharge Date: 05/07/22  Discharge Diagnosis: Laceration of lower leg, left, initial encounter    BACKGROUND                                                      Per hospital discharge summary and inpatient provider notes:    Mann Escobar is a 81 year old male who presents for evaluation with a left lower extremity wound that he sustained after a cut from a cart.  Patient has multiple medical diagnoses including history of chronic pain syndrome, diastolic dysfunction, chronic anemia secondary to chronic kidney disease stage III, history of gastric bypass, chronic heart failure,, hypertension peripheral edema.  Patient's prescribed medications were reviewed including amlodipine, omeprazole, Carafate Shameka furosemide 40 mg twice daily, turmeric.  On my examination patient reports he was at the grocery store when he was swinging out of his scooter onto one of the mobilized grocery carts when he struck his left lower leg sustaining a 3 cm laceration.  Patient had leaking of fluid about his legs consistent with his peripheral edema after his wound and wanted to come in for wound check and wound care.  Patient reports has been compliant with his furosemide which he takes 40 mg twice a day    ASSESSMENT      Enrollment  Primary Care Care Coordination Status: Declined    Discharge Assessment  How are you doing now that you are home?: \"Doing good\"  How are your symptoms? (Red Flag symptoms escalate to triage hotline per guidelines): Improved  Do you feel your condition is stable enough to be safe at home until your provider visit?: Yes  Does the patient have their discharge instructions? : Yes  Does the patient have questions regarding their discharge " instructions? : No  Were you started on any new medications or were there changes to any of your previous medications? : No  Does the patient have all of their medications?: Yes  Do you have questions regarding any of your medications? : No  Do you have all of your needed medical supplies or equipment (DME)?  (i.e. oxygen tank, CPAP, cane, etc.): Yes  Discharge follow-up appointment scheduled within 14 calendar days? : Yes  Discharge Follow Up Appointment Date: 05/19/22  Discharge Follow Up Appointment Scheduled with?: Primary Care Provider    Post-op ([facility] Only)  If the patient had a surgery or procedure, do they have any questions for a nurse?: No    PLAN                                                      Outpatient Plan:    Follow up with Shayy Ramírez APRN CNP (Family Medicine) in 10 days (5/17/2022); For suture removal  Follow up with [facility] (EMERGENCY MEDICINE); Follow-up care and wound care as discussed.      Future Appointments   Date Time Provider Department Center   5/11/2022 10:00 AM NB COVID LAB NBLAB[facility]   5/13/2022  9:30 AM U2A ROOM 5 [facility]   5/13/2022 11:00 AM [address]   5/19/2022 10:00 [facility]   5/26/2022 12:40 PM [facility]   5/26/2022  1:00 PM [facility] NURSE [facility]   6/16/2022  1:00 PM [facility]   6/16/2022  1:30 PM [facility]   7/7/2022 12:40 PM [facility]   7/7/2022  1:00 PM [facility] NURSE [facility]   7/28/2022  1:00 PM [facility]   7/28/2022  1:30 PM [facility] NURSE [facility]   8/18/2022  1:00 PM [facility]   8/18/2022  1:30 PM [facility] NURSE [facility]   9/8/2022 12:30 PM [facility]   9/8/2022  1:00 PM [facility] NURSE [facility]  KAYE [facility]   9/29/2022 12:30 PM [facility]   9/29/2022  1:00 PM [facility] NURSE [facility]   10/20/2022 12:30 PM [facility]   10/20/2022  1:00 PM [facility] NURSE [facility]   11/10/2022 11:50 AM [facility]   11/10/2022 12:30 PM Boom Golden MD [facility]   11/10/2022  1:00 PM [facility] NURSE [facility]   12/1/2022 12:30 PM [facility]   12/1/2022  1:00 PM [facility]         For any urgent concerns, please contact our 24 hour nurse triage line: [phone] ([phone])         JOSE Andino  [phone]  [facility]  [facility]

## 2022-05-09 NOTE — TELEPHONE ENCOUNTER
"Call to pt from wound care referral for leg wound. Chart reviewed. Per pt, sutures are holding and there is no more drainage and swelling has \"gone way down\". He has a follow-up with primary provider next week to remove the sutures.     Pt/writer agreed to plan that he will contact wound care clinic after appointment next week if wound care consult if needed (area not healing).    Milka Mratin RN, CWOCN     "

## 2022-05-10 ENCOUNTER — TELEPHONE (OUTPATIENT)
Dept: INTERVENTIONAL RADIOLOGY/VASCULAR | Facility: CLINIC | Age: 82
End: 2022-05-10
Payer: COMMERCIAL

## 2022-05-10 NOTE — TELEPHONE ENCOUNTER
Called the patient and notified him of what the PCP stated.  He stated understanding and states he will wait until he talks to nephrology.

## 2022-05-10 NOTE — TELEPHONE ENCOUNTER
I spoke with the pt and informed him that he has approval to bring 2 visitors with him per Dr. Wu. He denied any other questions and has read his [employer] message. Pedro RESENDIZ

## 2022-05-11 ENCOUNTER — TELEPHONE (OUTPATIENT)
Dept: INTERVENTIONAL RADIOLOGY/VASCULAR | Facility: CLINIC | Age: 82
End: 2022-05-11
Payer: COMMERCIAL

## 2022-05-11 NOTE — TELEPHONE ENCOUNTER
[facility] Call Center    Phone Message    May a detailed message be left on voicemail: yes     Reason for Call: Other: Pt called and will need to r/s his renal biopsy for tomorrow. Pt said his arthiritis is getting really bad and he cannot move. Please call him back. Thanks    Action Taken: Message routed to:  [facility] ([facility] KIM    Travel Screening: Not Applicable

## 2022-05-11 NOTE — TELEPHONE ENCOUNTER
Called and spoke with Pt.  Provided procedure scheduling telephone.  Pt will call to reschedule.     Genevieve Bailon LPN

## 2022-05-13 ENCOUNTER — TELEPHONE (OUTPATIENT)
Dept: FAMILY MEDICINE | Facility: CLINIC | Age: 82
End: 2022-05-13
Payer: COMMERCIAL

## 2022-05-13 NOTE — TELEPHONE ENCOUNTER
Reason for Call:  Other Opioid Contract    Detailed comments:Patient has questions about his Opioid Contract, would like nurse to call him back.       Phone Number Patient can be reached at: Home number on file [phone] (home)    Best Time: Any    Can we leave a detailed message on this number? YES    Call taken on 5/13/2022 at 12:13 PM by Juanita Hylton

## 2022-05-17 ENCOUNTER — ALLIED HEALTH/NURSE VISIT (OUTPATIENT)
Dept: FAMILY MEDICINE | Facility: CLINIC | Age: 82
End: 2022-05-17
Payer: COMMERCIAL

## 2022-05-17 DIAGNOSIS — Z48.02 ENCOUNTER FOR REMOVAL OF SUTURES: Primary | ICD-10-CM

## 2022-05-17 PROCEDURE — 99207 PR NO CHARGE NURSE ONLY: CPT

## 2022-05-17 NOTE — PROGRESS NOTES
Patient presents for suture removal. The wound is well healed without signs of infection.  The 10 sutures are removed without difficulty. Return prn. Shayy Ramírez stops in to see status of laceration and agrees to the removal.  telfa and paper tape applied after.  Patient tolerated procedure well. Tori WALTERS RN

## 2022-05-18 NOTE — TELEPHONE ENCOUNTER
Patient calls and no longer needs asst with controlled substance contract.  He was in yesterday and spoke to Shayy Ramírez in person. . Tori WALTERS RN

## 2022-05-25 DIAGNOSIS — R79.89 HIGH PLASMA OXALATE: ICD-10-CM

## 2022-05-25 DIAGNOSIS — N18.4 CKD (CHRONIC KIDNEY DISEASE) STAGE 4, GFR 15-29 ML/MIN (H): ICD-10-CM

## 2022-05-25 RX ORDER — FUROSEMIDE 40 MG
TABLET ORAL
Qty: 120 TABLET | Refills: 2 | Status: SHIPPED | OUTPATIENT
Start: 2022-05-25 | End: 2022-01-01

## 2022-05-26 ENCOUNTER — APPOINTMENT (OUTPATIENT)
Dept: LAB | Facility: CLINIC | Age: 82
End: 2022-05-26
Payer: COMMERCIAL

## 2022-05-26 ENCOUNTER — INFUSION THERAPY VISIT (OUTPATIENT)
Dept: INFUSION THERAPY | Facility: CLINIC | Age: 82
End: 2022-05-26
Attending: INTERNAL MEDICINE
Payer: COMMERCIAL

## 2022-05-26 VITALS — DIASTOLIC BLOOD PRESSURE: 68 MMHG | SYSTOLIC BLOOD PRESSURE: 156 MMHG

## 2022-05-26 DIAGNOSIS — D63.1 ANEMIA OF CHRONIC RENAL FAILURE, STAGE 3B (H): Primary | ICD-10-CM

## 2022-05-26 DIAGNOSIS — N18.32 ANEMIA OF CHRONIC RENAL FAILURE, STAGE 3B (H): Primary | ICD-10-CM

## 2022-05-26 DIAGNOSIS — D56.8 OTHER THALASSEMIA (H): ICD-10-CM

## 2022-05-26 DIAGNOSIS — D50.9 IRON DEFICIENCY ANEMIA, UNSPECIFIED IRON DEFICIENCY ANEMIA TYPE: ICD-10-CM

## 2022-05-26 LAB
BASOPHILS # BLD AUTO: 0.1 10E3/UL (ref 0–0.2)
BASOPHILS NFR BLD AUTO: 1 %
EOSINOPHIL # BLD AUTO: 0.1 10E3/UL (ref 0–0.7)
EOSINOPHIL NFR BLD AUTO: 2 %
ERYTHROCYTE [DISTWIDTH] IN BLOOD BY AUTOMATED COUNT: 17.2 % (ref 10–15)
FERRITIN SERPL-MCNC: 288 NG/ML (ref 26–388)
HCT VFR BLD AUTO: 28.6 % (ref 40–53)
HGB BLD-MCNC: 8.5 G/DL (ref 13.3–17.7)
IMM GRANULOCYTES # BLD: 0 10E3/UL
IMM GRANULOCYTES NFR BLD: 0 %
IRON SATN MFR SERPL: 19 % (ref 15–46)
IRON SERPL-MCNC: 33 UG/DL (ref 35–180)
LYMPHOCYTES # BLD AUTO: 0.9 10E3/UL (ref 0.8–5.3)
LYMPHOCYTES NFR BLD AUTO: 13 %
MCH RBC QN AUTO: 18.4 PG (ref 26.5–33)
MCHC RBC AUTO-ENTMCNC: 29.7 G/DL (ref 31.5–36.5)
MCV RBC AUTO: 62 FL (ref 78–100)
MONOCYTES # BLD AUTO: 0.7 10E3/UL (ref 0–1.3)
MONOCYTES NFR BLD AUTO: 10 %
NEUTROPHILS # BLD AUTO: 5 10E3/UL (ref 1.6–8.3)
NEUTROPHILS NFR BLD AUTO: 74 %
NRBC # BLD AUTO: 0 10E3/UL
NRBC BLD AUTO-RTO: 0 /100
PLATELET # BLD AUTO: 248 10E3/UL (ref 150–450)
RBC # BLD AUTO: 4.61 10E6/UL (ref 4.4–5.9)
TIBC SERPL-MCNC: 176 UG/DL (ref 240–430)
WBC # BLD AUTO: 6.8 10E3/UL (ref 4–11)

## 2022-05-26 PROCEDURE — 250N000011 HC RX IP 250 OP 636: Mod: EC | Performed by: INTERNAL MEDICINE

## 2022-05-26 PROCEDURE — 82728 ASSAY OF FERRITIN: CPT | Performed by: INTERNAL MEDICINE

## 2022-05-26 PROCEDURE — 96372 THER/PROPH/DIAG INJ SC/IM: CPT | Performed by: INTERNAL MEDICINE

## 2022-05-26 PROCEDURE — 85025 COMPLETE CBC W/AUTO DIFF WBC: CPT | Performed by: INTERNAL MEDICINE

## 2022-05-26 PROCEDURE — 83550 IRON BINDING TEST: CPT | Performed by: INTERNAL MEDICINE

## 2022-05-26 PROCEDURE — 36415 COLL VENOUS BLD VENIPUNCTURE: CPT

## 2022-05-26 RX ORDER — DIPHENHYDRAMINE HYDROCHLORIDE 50 MG/ML
50 INJECTION INTRAMUSCULAR; INTRAVENOUS
Status: CANCELLED
Start: 2022-06-02

## 2022-05-26 RX ORDER — ALBUTEROL SULFATE 0.83 MG/ML
2.5 SOLUTION RESPIRATORY (INHALATION)
Status: CANCELLED | OUTPATIENT
Start: 2022-06-02

## 2022-05-26 RX ORDER — NALOXONE HYDROCHLORIDE 0.4 MG/ML
0.2 INJECTION, SOLUTION INTRAMUSCULAR; INTRAVENOUS; SUBCUTANEOUS
Status: CANCELLED | OUTPATIENT
Start: 2022-06-02

## 2022-05-26 RX ORDER — EPINEPHRINE 1 MG/ML
0.3 INJECTION, SOLUTION, CONCENTRATE INTRAVENOUS EVERY 5 MIN PRN
Status: CANCELLED | OUTPATIENT
Start: 2022-06-02

## 2022-05-26 RX ORDER — METHYLPREDNISOLONE SODIUM SUCCINATE 125 MG/2ML
125 INJECTION, POWDER, LYOPHILIZED, FOR SOLUTION INTRAMUSCULAR; INTRAVENOUS
Status: CANCELLED
Start: 2022-06-02

## 2022-05-26 RX ORDER — ALBUTEROL SULFATE 90 UG/1
1-2 AEROSOL, METERED RESPIRATORY (INHALATION)
Status: CANCELLED
Start: 2022-06-02

## 2022-05-26 RX ORDER — MEPERIDINE HYDROCHLORIDE 25 MG/ML
25 INJECTION INTRAMUSCULAR; INTRAVENOUS; SUBCUTANEOUS EVERY 30 MIN PRN
Status: CANCELLED | OUTPATIENT
Start: 2022-06-02

## 2022-05-26 RX ADMIN — DARBEPOETIN ALFA 100 MCG: 100 INJECTION, SOLUTION INTRAVENOUS; SUBCUTANEOUS at 13:59

## 2022-05-26 NOTE — PROGRESS NOTES
Infusion Nursing Note:  [facility]    Patient seen by provider today: No   present during visit today: Not Applicable.    Note: N/A.      Intravenous Access:  Labs drawn peripherally via .    Treatment Conditions:  Lab Results   Component Value Date    HGB 8.5 (L) 05/26/2022    WBC 6.8 05/26/2022    ANEU 3.7 02/02/2021    ANEUTAUTO 5.0 05/26/2022     05/26/2022      Results reviewed, labs MET treatment parameters, ok to proceed with treatment.      Post Infusion Assessment:  Patient tolerated injection without incident.  Site patent and intact, free from redness, edema or discomfort.  Access discontinued per protocol.       Discharge Plan:   Copy of AVS reviewed with patient and/or family.  Patient will return 6/16/22 for next appointment.  Patient discharged in stable condition accompanied by: self.  Departure Mode: Ambulatory.      Maddison Amaya RN

## 2022-06-08 DIAGNOSIS — N18.4 CKD (CHRONIC KIDNEY DISEASE) STAGE 4, GFR 15-29 ML/MIN (H): Primary | ICD-10-CM

## 2022-06-09 ENCOUNTER — OFFICE VISIT (OUTPATIENT)
Dept: ORTHOPEDICS | Facility: CLINIC | Age: 82
End: 2022-06-09
Payer: COMMERCIAL

## 2022-06-09 VITALS
BODY MASS INDEX: 31.15 KG/M2 | DIASTOLIC BLOOD PRESSURE: 70 MMHG | HEIGHT: 61 IN | SYSTOLIC BLOOD PRESSURE: 135 MMHG | WEIGHT: 165 LBS

## 2022-06-09 DIAGNOSIS — M19.011 OSTEOARTHRITIS OF BILATERAL GLENOHUMERAL JOINTS: ICD-10-CM

## 2022-06-09 DIAGNOSIS — M19.012 OSTEOARTHRITIS OF BILATERAL GLENOHUMERAL JOINTS: ICD-10-CM

## 2022-06-09 DIAGNOSIS — M12.811 ROTATOR CUFF ARTHROPATHY OF BOTH SHOULDERS: ICD-10-CM

## 2022-06-09 DIAGNOSIS — M25.511 PAIN OF BOTH SHOULDER JOINTS: Primary | ICD-10-CM

## 2022-06-09 DIAGNOSIS — M25.512 PAIN OF BOTH SHOULDER JOINTS: Primary | ICD-10-CM

## 2022-06-09 DIAGNOSIS — M12.812 ROTATOR CUFF ARTHROPATHY OF BOTH SHOULDERS: ICD-10-CM

## 2022-06-09 DIAGNOSIS — M25.411 EFFUSION OF RIGHT SHOULDER: ICD-10-CM

## 2022-06-09 PROCEDURE — 20611 DRAIN/INJ JOINT/BURSA W/US: CPT | Mod: RT | Performed by: FAMILY MEDICINE

## 2022-06-09 PROCEDURE — 99213 OFFICE O/P EST LOW 20 MIN: CPT | Mod: 25 | Performed by: FAMILY MEDICINE

## 2022-06-09 RX ORDER — ROPIVACAINE HYDROCHLORIDE 5 MG/ML
3 INJECTION, SOLUTION EPIDURAL; INFILTRATION; PERINEURAL
Status: DISCONTINUED | OUTPATIENT
Start: 2022-06-09 | End: 2022-08-16 | Stop reason: ALTCHOICE

## 2022-06-09 RX ADMIN — ROPIVACAINE HYDROCHLORIDE 3 ML: 5 INJECTION, SOLUTION EPIDURAL; INFILTRATION; PERINEURAL at 11:55

## 2022-06-09 NOTE — LETTER
2022         RE: Mann Escobar  [address]  [facility] [address]        Dear Colleague,    Thank you for referring your patient, Mann Escobar, to the [facility] [place]. Please see a copy of my visit note below.    Mann Escobar  :  1940  DOS: 22  MRN: [id number]    Sports Medicine Clinic Visit    PCP: Shayy Ramírez    Mann Escobar is a 81 year old Right hand dominant male who is seen as a self referral presenting with chronic bilateral shoulder pain.    Injury: Gradual onset of pain over the past 6+ years, left>>>right.  Patient notes that right shoulder has been significantly worse over the past 3 - 5 days, he did fall ~ one week ago landing on left side.  Pain located over bilateral deep anterior lateral glenohumeral joint, radiating to lateral upper arms.  Additional Features:  Positive: grinding, weakness and limited AROM bilaterally.  Symptoms are better with Other medications: oxycodone and Rest.  Symptoms are worse with: shoulder flexion/abduction, pushing/pulling self up from seated position, weight bearing through arms, lying on either shoulder.  Other evaluation and/or treatments so far consists of: Ice, Tylenol, Ibuprofen, Other medications: oxycodone and Rest.  Recent imaging completed: No recent imaging completed.  Prior History of related problems: Patient has previously consulted Dr Burrell @ [facility], left shoulder injection last completed , right shoulder last completed in .    Social History: retired     Interim History - 2022  Since last visit on 2022 patient has severe bilateral shoulder pain, left>>>right.  Bilateral shoulder glenohumeral injections and aspirations provided ~ 60 - 70% relief for 2 - 3 weeks.  He notes continued weakness with shoulder flexion/abduction movements.  No new injury in the interim.    Review of Systems  Musculoskeletal: as  above  Remainder of review of systems is negative including constitutional, CV, pulmonary, GI, Skin and Neurologic except as noted in HPI or medical history.    Past Medical History:   Diagnosis Date     Arthritis of ankle, left 7/24/2020     Back pain     WITH BILATERAL LEG PAIN AND NUMBNESS OF BOTH FEET     Gastro-oesophageal reflux disease     REFLUX     Hypertension      Hypopotassemia      Internal hemorrhoids      Iron deficiency anaemia      Leg edema      Morbid obesity 9/12/2005     Morbid obesity (H)      Osteoarthrosis      Scoliosis      Thalassemia minor      Past Surgical History:   Procedure Laterality Date     CATARACT IOL, RT/LT  2008/    Cataract IOL RT/LT     COLONOSCOPY  2009/07    polyps removed repeat 5 yrs, tubular adenoma     COLONOSCOPY  9/29/2011    Procedure:COLONOSCOPY; Colonoscopy with hemorrhoid banding; Surgeon:JEREMY GARCIA; [facility]     COLONOSCOPY N/A 12/19/2017    Procedure: COLONOSCOPY;  colonoscopy, gastroscopy;  Surgeon: Edmar Isaacs MD;  Location: [facility]     DECOMPRESSION LUMBAR MINIMALLY INVASIVE TWO LEVELS  5/2/2012    Procedure:DECOMPRESSION LUMBAR MINIMALLY INVASIVE TWO LEVELS; Surgeon:LOTTIE MITCHELL; [facility]     ESOPHAGOSCOPY, GASTROSCOPY, DUODENOSCOPY (EGD), COMBINED N/A 2/6/2018    Procedure: COMBINED ESOPHAGOSCOPY, GASTROSCOPY, DUODENOSCOPY (EGD);  gastroscopy;  Surgeon: Edmar Isaacs MD;  Location: [facility]     ESOPHAGOSCOPY, GASTROSCOPY, DUODENOSCOPY (EGD), COMBINED N/A 10/18/2021    Procedure: ESOPHAGOGASTRODUODENOSCOPY (EGD);  Surgeon: Anju Galeano MD;  Location: [facility]     FUSION SPINE POSTERIOR MINIMALLY INVASIVE ONE LEVEL  5/2/2012    Procedure:FUSION SPINE POSTERIOR MINIMALLY INVASIVE ONE LEVEL; Minimal Access Spinal Technology Transformaninal Lumbar Interbody Fusion L4-5, Decompression L3-5; Surgeon:LOTTIE MITCHELL; [facility]     HC REMOVAL OF HYDROCELE,TUNICA,UNILAT  2006    bilateral     ORTHOPEDIC SURGERY  2000    Lf knee  "replacement     ORTHOPEDIC SURGERY  2002, 2010    Rt replacement     ORTHOPEDIC SURGERY  2005    Left ankle fused     RECONSTRUCT ANKLE Right 7/23/2020    Procedure: Ankle RECONSTRUCTIve Arthroplasty;  Surgeon: Luke Powers DPM;  Location: [facility]     SURGICAL HISTORY OF -       Cysto     SURGICAL HISTORY OF -   2002    Percutaneous kidney stone removal     SURGICAL HISTORY OF -       ureteral stent removal     SURGICAL HISTORY OF -   2005    bariatric surgery-Favian-en-Y     SURGICAL HISTORY OF -   2008    carpal tunnel surgery rt wrist     Family History   Problem Relation Age of Onset     Diabetes Brother      Obesity Brother      Cerebrovascular Disease Paternal Grandfather      Prostate Cancer Father      Cancer Father         bone     Diabetes Father      Heart Disease Mother         CHF     Cerebrovascular Disease Mother      Hypertension Mother      Cancer Mother         tumor in stomach     Cancer - colorectal Mother      Heart Disease Maternal Grandmother         CHF     Diabetes Paternal Grandmother      Breast Cancer Sister      Genitourinary Problems Son         Kidney stones     Cancer Brother      Cancer - colorectal Brother      Diabetes Brother        Objective  /70   Ht 1.549 m (5' 1\")   Wt 74.8 kg (165 lb)   BMI 31.18 kg/m        General: healthy, alert and in no distress      HEENT: no scleral icterus or conjunctival erythema     Skin: no suspicious lesions or rash. No jaundice.     CV: regular rhythm by palpation, 2+ distal pulses, no pedal edema      Resp: normal respiratory effort without conversational dyspnea     Psych: normal mood and affect      Gait: nonantalgic, modest baseline coordination and balance, using walker    Neuro: normal light touch sensory exam of the extremities. Motor strength as noted below     Bilateral Shoulder exam    ROM:        Limited active and passive ROM with flexion, extension, abduction, internal and external rotation, worse currently on the right " again today    Tender:        subacromial space       posterior shoulder       Anterior shoulder       R>L    Non Tender:       remainder of shoulder    Strength:        abduction 3/5       internal rotation 4/5       external rotation 2/5       adduction 4/5       Similar on the left    Impingement testing:        positive (+) empty can       positive (+) crank    Stability testing:       neg (-) anterior glide       neg (-) sulcus sign    Skin:       no visible deformities       well perfused       capillary refill brisk       Palpable fluid collection about the shoulder generally, ballotable, R>>L    Sensation:        normal sensation over shoulder and upper extremity       Radiology  Recent Results (from the past 744 hour(s))   XR Shoulder 3 View Bilateral    Narrative    SHOULDER THREE VIEWS BILATERAL   4/7/2022 3:53 PM     HISTORY:  Shoulder pain bilaterally.    COMPARISON: Left shoulder radiographs from 9/24/2016. Right shoulder  radiographs from 5/20/2013.      Impression    IMPRESSION:    Right: Old healed right rib fractures again noted. Severe  osteoarthrosis of the glenohumeral joint, progressed. Narrowing of the  subacromial space by the coracoacromial arch configuration at AC joint  hypertrophy and high-riding humeral head again noted. Chronic  bone-on-bone contact between the acromion and humeral head indicating  chronic full-thickness rotator cuff tear.    Left: Severe osteoarthrosis of the glenohumeral joint, progressed. The  humeral head is high riding and there is remodeling of the superior  aspect of the humeral head and the undersurface of the acromion  indicating full-thickness rotator cuff tear. There is also some  destructive change in the superior aspect of the humeral head and  glenoid which has progressed since the prior study which would raise  the question of [place] shoulder or an inflammatory or infectious  process in the appropriate clinical setting.    JOE MENA MD          SYSTEM ID:  [id number]       Large Joint Injection/Arthocentesis: R glenohumeral joint    Date/Time: 6/9/2022 11:55 AM  Performed by: Danielito Mohan DO  Authorized by: Danielito Mohan DO     Indications:  Pain, osteoarthritis and joint swelling  Needle Size:  21 G  Guidance: ultrasound    Approach:  Anterolateral  Location:  Shoulder      Site:  R glenohumeral joint  Medications:  3 mL ropivacaine 5 MG/ML  Aspirate amount (mL):  32  Aspirate:  Serous and yellow  Outcome:  Tolerated well, no immediate complications  Procedure discussed: discussed risks, benefits, and alternatives    Consent Given by:  Patient  Timeout: timeout called immediately prior to procedure    Prep: patient was prepped and draped in usual sterile fashion     Ultrasound images of procedure were permanently stored.         Assessment:  1. Pain of both shoulder joints    2. Rotator cuff arthropathy of both shoulders    3. Osteoarthritis of bilateral glenohumeral joints    4. Effusion of right shoulder        Plan:  Discussed the assessment with the patient.  Follow up: 2 weeks prn based on clinical progress  Medically complicated, with chronic b/l shoulder pain, usually L>R but currently much worse on the right again today  Cannot take NSAIDs due to CKD and other comorbidities  Right shoulder US guided aspiration and local anesthetic injection today for better pain control, he will compare to effect from CSI, preferred to avoid CSI today as it has only been 2 mo since last procedure  Compressive brace option reviewed, currently using   Activity modification reviewed  Could consider PT in the future based on progress  Offered orthopedic consultation again today, continues to decline, not interested in surgery and would certainly be a risk for surgery without guaranteed outcome, conversation still reasonable anytime if desired  XR images independently visualized and reviewed with patient today in clinic  Supportive care  reviewed  All questions were answered today  Contact us with additional questions or concerns  Signs and sx of concern reviewed      Danielito Mohan DO, CAQ  Sports Medicine Physician  [facility]          Disclaimer: This note consists of symbols derived from keyboarding, dictation and/or voice recognition software. As a result, there may be errors in the script that have gone undetected. Please consider this when interpreting information found in this chart.      Again, thank you for allowing me to participate in the care of your patient.        Sincerely,        Danielito Mohan DO

## 2022-06-09 NOTE — PROGRESS NOTES
Mann Escobar  :  1940  DOS: 22  MRN: [id number]    Sports Medicine Clinic Visit    PCP: Shayy Ramírez    Mann Escobar is a 81 year old Right hand dominant male who is seen as a self referral presenting with chronic bilateral shoulder pain.    Injury: Gradual onset of pain over the past 6+ years, left>>>right.  Patient notes that right shoulder has been significantly worse over the past 3 - 5 days, he did fall ~ one week ago landing on left side.  Pain located over bilateral deep anterior lateral glenohumeral joint, radiating to lateral upper arms.  Additional Features:  Positive: grinding, weakness and limited AROM bilaterally.  Symptoms are better with Other medications: oxycodone and Rest.  Symptoms are worse with: shoulder flexion/abduction, pushing/pulling self up from seated position, weight bearing through arms, lying on either shoulder.  Other evaluation and/or treatments so far consists of: Ice, Tylenol, Ibuprofen, Other medications: oxycodone and Rest.  Recent imaging completed: No recent imaging completed.  Prior History of related problems: Patient has previously consulted Dr Burrell @ [facility], left shoulder injection last completed , right shoulder last completed in .    Social History: retired     Interim History - 2022  Since last visit on 2022 patient has severe bilateral shoulder pain, left>>>right.  Bilateral shoulder glenohumeral injections and aspirations provided ~ 60 - 70% relief for 2 - 3 weeks.  He notes continued weakness with shoulder flexion/abduction movements.  No new injury in the interim.    Review of Systems  Musculoskeletal: as above  Remainder of review of systems is negative including constitutional, CV, pulmonary, GI, Skin and Neurologic except as noted in HPI or medical history.    Past Medical History:   Diagnosis Date     Arthritis of ankle, left 2020     Back pain     WITH BILATERAL LEG PAIN AND NUMBNESS OF  BOTH FEET     Gastro-oesophageal reflux disease     REFLUX     Hypertension      Hypopotassemia      Internal hemorrhoids      Iron deficiency anaemia      Leg edema      Morbid obesity 9/12/2005     Morbid obesity (H)      Osteoarthrosis      Scoliosis      Thalassemia minor      Past Surgical History:   Procedure Laterality Date     CATARACT IOL, RT/LT  2008/    Cataract IOL RT/LT     COLONOSCOPY  2009/07    polyps removed repeat 5 yrs, tubular adenoma     COLONOSCOPY  9/29/2011    Procedure:COLONOSCOPY; Colonoscopy with hemorrhoid banding; Surgeon:JEREMY GARCIA; [facility]     COLONOSCOPY N/A 12/19/2017    Procedure: COLONOSCOPY;  colonoscopy, gastroscopy;  Surgeon: Edmar Isaacs MD;  Location: [facility]     DECOMPRESSION LUMBAR MINIMALLY INVASIVE TWO LEVELS  5/2/2012    Procedure:DECOMPRESSION LUMBAR MINIMALLY INVASIVE TWO LEVELS; Surgeon:LOTTIE MITCHELL; [facility]     ESOPHAGOSCOPY, GASTROSCOPY, DUODENOSCOPY (EGD), COMBINED N/A 2/6/2018    Procedure: COMBINED ESOPHAGOSCOPY, GASTROSCOPY, DUODENOSCOPY (EGD);  gastroscopy;  Surgeon: Edmar Isaacs MD;  Location: [facility]     ESOPHAGOSCOPY, GASTROSCOPY, DUODENOSCOPY (EGD), COMBINED N/A 10/18/2021    Procedure: ESOPHAGOGASTRODUODENOSCOPY (EGD);  Surgeon: Anju Galeano MD;  Location: [facility]     FUSION SPINE POSTERIOR MINIMALLY INVASIVE ONE LEVEL  5/2/2012    Procedure:FUSION SPINE POSTERIOR MINIMALLY INVASIVE ONE LEVEL; Minimal Access Spinal Technology Transformaninal Lumbar Interbody Fusion L4-5, Decompression L3-5; Surgeon:LOTTIE MITCHELL; Location:[facility]     HC REMOVAL OF HYDROCELE,TUNICA,UNILAT  2006    bilateral     ORTHOPEDIC SURGERY  2000    Lf knee replacement     ORTHOPEDIC SURGERY  2002, 2010    Rt replacement     ORTHOPEDIC SURGERY  2005    Left ankle fused     RECONSTRUCT ANKLE Right 7/23/2020    Procedure: Ankle RECONSTRUCTIve Arthroplasty;  Surgeon: Luke Powers DPM;  Location: [facility]     SURGICAL HISTORY OF -       Cysto      "SURGICAL HISTORY OF -   2002    Percutaneous kidney stone removal     SURGICAL HISTORY OF -       ureteral stent removal     SURGICAL HISTORY OF -   2005    bariatric surgery-Favian-en-Y     SURGICAL HISTORY OF -   2008    carpal tunnel surgery rt wrist     Family History   Problem Relation Age of Onset     Diabetes Brother      Obesity Brother      Cerebrovascular Disease Paternal Grandfather      Prostate Cancer Father      Cancer Father         bone     Diabetes Father      Heart Disease Mother         CHF     Cerebrovascular Disease Mother      Hypertension Mother      Cancer Mother         tumor in stomach     Cancer - colorectal Mother      Heart Disease Maternal Grandmother         CHF     Diabetes Paternal Grandmother      Breast Cancer Sister      Genitourinary Problems Son         Kidney stones     Cancer Brother      Cancer - colorectal Brother      Diabetes Brother        Objective  /70   Ht 1.549 m (5' 1\")   Wt 74.8 kg (165 lb)   BMI 31.18 kg/m        General: healthy, alert and in no distress      HEENT: no scleral icterus or conjunctival erythema     Skin: no suspicious lesions or rash. No jaundice.     CV: regular rhythm by palpation, 2+ distal pulses, no pedal edema      Resp: normal respiratory effort without conversational dyspnea     Psych: normal mood and affect      Gait: nonantalgic, modest baseline coordination and balance, using walker    Neuro: normal light touch sensory exam of the extremities. Motor strength as noted below     Bilateral Shoulder exam    ROM:        Limited active and passive ROM with flexion, extension, abduction, internal and external rotation, worse currently on the right again today    Tender:        subacromial space       posterior shoulder       Anterior shoulder       R>L    Non Tender:       remainder of shoulder    Strength:        abduction 3/5       internal rotation 4/5       external rotation 2/5       adduction 4/5       Similar on the " left    Impingement testing:        positive (+) empty can       positive (+) crank    Stability testing:       neg (-) anterior glide       neg (-) sulcus sign    Skin:       no visible deformities       well perfused       capillary refill brisk       Palpable fluid collection about the shoulder generally, ballotable, R>>L    Sensation:        normal sensation over shoulder and upper extremity       Radiology  Recent Results (from the past 744 hour(s))   XR Shoulder 3 View Bilateral    Narrative    SHOULDER THREE VIEWS BILATERAL   4/7/2022 3:53 PM     HISTORY:  Shoulder pain bilaterally.    COMPARISON: Left shoulder radiographs from 9/24/2016. Right shoulder  radiographs from 5/20/2013.      Impression    IMPRESSION:    Right: Old healed right rib fractures again noted. Severe  osteoarthrosis of the glenohumeral joint, progressed. Narrowing of the  subacromial space by the coracoacromial arch configuration at AC joint  hypertrophy and high-riding humeral head again noted. Chronic  bone-on-bone contact between the acromion and humeral head indicating  chronic full-thickness rotator cuff tear.    Left: Severe osteoarthrosis of the glenohumeral joint, progressed. The  humeral head is high riding and there is remodeling of the superior  aspect of the humeral head and the undersurface of the acromion  indicating full-thickness rotator cuff tear. There is also some  destructive change in the superior aspect of the humeral head and  glenoid which has progressed since the prior study which would raise  the question of [place] shoulder or an inflammatory or infectious  process in the appropriate clinical setting.    JOE MENA MD         SYSTEM ID:  [id number]       Large Joint Injection/Arthocentesis: R glenohumeral joint    Date/Time: 6/9/2022 11:55 AM  Performed by: Danielito Mohan DO  Authorized by: Danielito Mohan DO     Indications:  Pain, osteoarthritis and joint swelling  Needle Size:  21  G  Guidance: ultrasound    Approach:  Anterolateral  Location:  Shoulder      Site:  R glenohumeral joint  Medications:  3 mL ropivacaine 5 MG/ML  Aspirate amount (mL):  32  Aspirate:  Serous and yellow  Outcome:  Tolerated well, no immediate complications  Procedure discussed: discussed risks, benefits, and alternatives    Consent Given by:  Patient  Timeout: timeout called immediately prior to procedure    Prep: patient was prepped and draped in usual sterile fashion     Ultrasound images of procedure were permanently stored.         Assessment:  1. Pain of both shoulder joints    2. Rotator cuff arthropathy of both shoulders    3. Osteoarthritis of bilateral glenohumeral joints    4. Effusion of right shoulder        Plan:  Discussed the assessment with the patient.  Follow up: 2 weeks prn based on clinical progress  Medically complicated, with chronic b/l shoulder pain, usually L>R but currently much worse on the right again today  Cannot take NSAIDs due to CKD and other comorbidities  Right shoulder US guided aspiration and local anesthetic injection today for better pain control, he will compare to effect from CSI, preferred to avoid CSI today as it has only been 2 mo since last procedure  Compressive brace option reviewed, currently using   Activity modification reviewed  Could consider PT in the future based on progress  Offered orthopedic consultation again today, continues to decline, not interested in surgery and would certainly be a risk for surgery without guaranteed outcome, conversation still reasonable anytime if desired  XR images independently visualized and reviewed with patient today in clinic  Supportive care reviewed  All questions were answered today  Contact us with additional questions or concerns  Signs and sx of concern reviewed      Danielito Mohan DO, CAQ  Sports Medicine Physician  [facility]          Disclaimer: This note consists of symbols derived  from keyboarding, dictation and/or voice recognition software. As a result, there may be errors in the script that have gone undetected. Please consider this when interpreting information found in this chart.

## 2022-06-13 ENCOUNTER — APPOINTMENT (OUTPATIENT)
Dept: INTERVENTIONAL RADIOLOGY/VASCULAR | Facility: CLINIC | Age: 82
End: 2022-06-13
Attending: INTERNAL MEDICINE
Payer: COMMERCIAL

## 2022-06-13 ENCOUNTER — HOSPITAL ENCOUNTER (OUTPATIENT)
Facility: CLINIC | Age: 82
Discharge: HOME | End: 2022-06-13
Attending: INTERNAL MEDICINE | Admitting: RADIOLOGY
Payer: COMMERCIAL

## 2022-06-13 ENCOUNTER — APPOINTMENT (OUTPATIENT)
Dept: MEDSURG UNIT | Facility: CLINIC | Age: 82
End: 2022-06-13
Attending: INTERNAL MEDICINE
Payer: COMMERCIAL

## 2022-06-13 VITALS
TEMPERATURE: 98.2 F | DIASTOLIC BLOOD PRESSURE: 70 MMHG | HEART RATE: 70 BPM | OXYGEN SATURATION: 98 % | RESPIRATION RATE: 18 BRPM | SYSTOLIC BLOOD PRESSURE: 128 MMHG

## 2022-06-13 DIAGNOSIS — N18.4 CKD (CHRONIC KIDNEY DISEASE) STAGE 4, GFR 15-29 ML/MIN (H): ICD-10-CM

## 2022-06-13 LAB
ANION GAP SERPL CALCULATED.3IONS-SCNC: 9 MMOL/L (ref 3–14)
BUN SERPL-MCNC: 54 MG/DL (ref 7–30)
CALCIUM SERPL-MCNC: 7 MG/DL (ref 8.5–10.1)
CHLORIDE BLD-SCNC: 110 MMOL/L (ref 94–109)
CO2 SERPL-SCNC: 24 MMOL/L (ref 20–32)
CREAT SERPL-MCNC: 3.69 MG/DL (ref 0.66–1.25)
ERYTHROCYTE [DISTWIDTH] IN BLOOD BY AUTOMATED COUNT: 19.2 % (ref 10–15)
GFR SERPL CREATININE-BSD FRML MDRD: 16 ML/MIN/1.73M2
GLUCOSE BLD-MCNC: 85 MG/DL (ref 70–99)
HCT VFR BLD AUTO: 29 % (ref 40–53)
HGB BLD-MCNC: 8.7 G/DL (ref 13.3–17.7)
INR PPP: 1.26 (ref 0.85–1.15)
MCH RBC QN AUTO: 18.5 PG (ref 26.5–33)
MCHC RBC AUTO-ENTMCNC: 30 G/DL (ref 31.5–36.5)
MCV RBC AUTO: 62 FL (ref 78–100)
PLATELET # BLD AUTO: 191 10E3/UL (ref 150–450)
POTASSIUM BLD-SCNC: 4.3 MMOL/L (ref 3.4–5.3)
RBC # BLD AUTO: 4.71 10E6/UL (ref 4.4–5.9)
SARS-COV-2 RNA RESP QL NAA+PROBE: NEGATIVE
SODIUM SERPL-SCNC: 143 MMOL/L (ref 133–144)
WBC # BLD AUTO: 6.4 10E3/UL (ref 4–11)

## 2022-06-13 PROCEDURE — 82310 ASSAY OF CALCIUM: CPT | Performed by: NURSE PRACTITIONER

## 2022-06-13 PROCEDURE — 88313 SPECIAL STAINS GROUP 2: CPT | Performed by: INTERNAL MEDICINE

## 2022-06-13 PROCEDURE — 93010 ELECTROCARDIOGRAM REPORT: CPT | Mod: 59 | Performed by: INTERNAL MEDICINE

## 2022-06-13 PROCEDURE — 250N000009 HC RX 250: Performed by: STUDENT IN AN ORGANIZED HEALTH CARE EDUCATION/TRAINING PROGRAM

## 2022-06-13 PROCEDURE — 250N000011 HC RX IP 250 OP 636: Performed by: STUDENT IN AN ORGANIZED HEALTH CARE EDUCATION/TRAINING PROGRAM

## 2022-06-13 PROCEDURE — 99152 MOD SED SAME PHYS/QHP 5/>YRS: CPT | Mod: GC | Performed by: RADIOLOGY

## 2022-06-13 PROCEDURE — 85610 PROTHROMBIN TIME: CPT | Performed by: NURSE PRACTITIONER

## 2022-06-13 PROCEDURE — 50200 RENAL BIOPSY PERQ: CPT | Mod: LT | Performed by: RADIOLOGY

## 2022-06-13 PROCEDURE — 85027 COMPLETE CBC AUTOMATED: CPT | Performed by: NURSE PRACTITIONER

## 2022-06-13 PROCEDURE — 99152 MOD SED SAME PHYS/QHP 5/>YRS: CPT

## 2022-06-13 PROCEDURE — 88350 IMFLUOR EA ADDL 1ANTB STN PX: CPT | Performed by: INTERNAL MEDICINE

## 2022-06-13 PROCEDURE — 999N000133 HC STATISTIC PP CARE STAGE 2

## 2022-06-13 PROCEDURE — 272N000653 HC COIL/EMBOLIC DEVICE CR8

## 2022-06-13 PROCEDURE — 88305 TISSUE EXAM BY PATHOLOGIST: CPT | Performed by: INTERNAL MEDICINE

## 2022-06-13 PROCEDURE — 76942 ECHO GUIDE FOR BIOPSY: CPT | Mod: 26 | Performed by: RADIOLOGY

## 2022-06-13 PROCEDURE — 258N000003 HC RX IP 258 OP 636: Performed by: NURSE PRACTITIONER

## 2022-06-13 PROCEDURE — 88348 ELECTRON MICROSCOPY DX: CPT | Performed by: INTERNAL MEDICINE

## 2022-06-13 PROCEDURE — 999N000142 HC STATISTIC PROCEDURE PREP ONLY

## 2022-06-13 PROCEDURE — 84999 UNLISTED CHEMISTRY PROCEDURE: CPT | Performed by: INTERNAL MEDICINE

## 2022-06-13 PROCEDURE — U0005 INFEC AGEN DETEC AMPLI PROBE: HCPCS | Performed by: RADIOLOGY

## 2022-06-13 PROCEDURE — 36415 COLL VENOUS BLD VENIPUNCTURE: CPT | Performed by: NURSE PRACTITIONER

## 2022-06-13 PROCEDURE — 999N000054 HC STATISTIC EKG NON-CHARGEABLE

## 2022-06-13 PROCEDURE — 88346 IMFLUOR 1ST 1ANTB STAIN PX: CPT | Performed by: INTERNAL MEDICINE

## 2022-06-13 PROCEDURE — 272N000505 IR RENAL BIOPSY LEFT

## 2022-06-13 RX ORDER — NALOXONE HYDROCHLORIDE 0.4 MG/ML
0.2 INJECTION, SOLUTION INTRAMUSCULAR; INTRAVENOUS; SUBCUTANEOUS
Status: DISCONTINUED | OUTPATIENT
Start: 2022-06-13 | End: 2022-06-13 | Stop reason: HOSPADM

## 2022-06-13 RX ORDER — LIDOCAINE 40 MG/G
CREAM TOPICAL
Status: DISCONTINUED | OUTPATIENT
Start: 2022-06-13 | End: 2022-06-13 | Stop reason: HOSPADM

## 2022-06-13 RX ORDER — FLUMAZENIL 0.1 MG/ML
0.2 INJECTION, SOLUTION INTRAVENOUS
Status: DISCONTINUED | OUTPATIENT
Start: 2022-06-13 | End: 2022-06-13 | Stop reason: HOSPADM

## 2022-06-13 RX ORDER — SODIUM CHLORIDE 9 MG/ML
INJECTION, SOLUTION INTRAVENOUS CONTINUOUS
Status: DISCONTINUED | OUTPATIENT
Start: 2022-06-13 | End: 2022-06-13 | Stop reason: HOSPADM

## 2022-06-13 RX ORDER — NALOXONE HYDROCHLORIDE 0.4 MG/ML
0.4 INJECTION, SOLUTION INTRAMUSCULAR; INTRAVENOUS; SUBCUTANEOUS
Status: DISCONTINUED | OUTPATIENT
Start: 2022-06-13 | End: 2022-06-13 | Stop reason: HOSPADM

## 2022-06-13 RX ORDER — FENTANYL CITRATE 50 UG/ML
25-50 INJECTION, SOLUTION INTRAMUSCULAR; INTRAVENOUS EVERY 5 MIN PRN
Status: DISCONTINUED | OUTPATIENT
Start: 2022-06-13 | End: 2022-06-13 | Stop reason: HOSPADM

## 2022-06-13 RX ADMIN — FENTANYL CITRATE 25 MCG: 50 INJECTION, SOLUTION INTRAMUSCULAR; INTRAVENOUS at 14:11

## 2022-06-13 RX ADMIN — MIDAZOLAM HYDROCHLORIDE 2 MG: 1 INJECTION, SOLUTION INTRAMUSCULAR; INTRAVENOUS at 14:28

## 2022-06-13 RX ADMIN — FENTANYL CITRATE 50 MCG: 50 INJECTION, SOLUTION INTRAMUSCULAR; INTRAVENOUS at 14:32

## 2022-06-13 RX ADMIN — SODIUM CHLORIDE: 9 INJECTION, SOLUTION INTRAVENOUS at 13:15

## 2022-06-13 RX ADMIN — LIDOCAINE HYDROCHLORIDE 8 ML: 10 INJECTION, SOLUTION EPIDURAL; INFILTRATION; INTRACAUDAL; PERINEURAL at 14:31

## 2022-06-13 RX ADMIN — MIDAZOLAM HYDROCHLORIDE 1 MG: 1 INJECTION, SOLUTION INTRAMUSCULAR; INTRAVENOUS at 14:11

## 2022-06-13 RX ADMIN — MIDAZOLAM HYDROCHLORIDE 1 MG: 1 INJECTION, SOLUTION INTRAMUSCULAR; INTRAVENOUS at 14:32

## 2022-06-13 RX ADMIN — FENTANYL CITRATE 50 MCG: 50 INJECTION, SOLUTION INTRAMUSCULAR; INTRAVENOUS at 14:28

## 2022-06-13 NOTE — DISCHARGE INSTRUCTIONS
[facility]    Interventional Radiology  Patient Instructions Following Renal Biopsy    AFTER YOU GO HOME  If you were given sedation DO NOT drive or operate machinery at home or at work for at least 24 hours  DO relax and take it easy for 48 hours, no strenuous activity for 24 hours  DO drink plenty of fluids  DO resume your regular diet, unless otherwise instructed by your Primary Physician  Keep the dressing dry and in place for 24 hours.  DO NOT SMOKE FOR AT LEAST 24 HOURS, if you have been given any medications that were to help you relax or sedate you during your procedure  DO NOT drink alcoholic beverages the day of your procedure  DO NOT do any strenuous exercise or lifting (> 10 lbs) for at least 7 days following your procedure  DO NOT take a bath or shower for at least 12 hours following your procedure  Remove dressing after shower the next day. Replace with Band aid for 2 days.  Never leave a wet dressing in place.  DO NOT make any important or legal decisions for 24 hours following your procedure  There should be minimum drainage from the biopsy site    CALL THE PHYSICIAN IF:  You start bleeding from the procedure site.  If you do start to bleed from that site, lie down flat and hold pressure on the site for a minimum of 10 minutes.  Your physician will tell you if you need to return to the hospital  You develop nausea or vomiting  You have excessive swelling, redness, or tenderness at the site  You have drainage that looks like it is infected.  You experience severe pain  You develop hives or a rash or unexplained itching  You develop shortness of breath  You develop a temperature of 101 degrees F or greater  You develop bloody clots or red urine after you are discharged      ADDITIONAL INSTRUCTIONS  If you are taking Coumadin, restart tonight.  Follow up with your Coumadin Clinic or Primary Care MD to have your INR rechecked.    [facility] INTERVENTIONAL RADIOLOGY DEPARTMENT  Procedure  Physician: Dr. Jason Donohue                                Date of procedure: June 13, 2022  Telephone Numbers: [phone] Monday-Friday 8:00 am to 4:30 pm  [phone] After 4:30 pm Monday-Friday, Weekends & Holidays.   Ask for the Interventional Radiologist on call.  Someone is on call 24 hrs/day  [facility] toll free number: [phone] Monday-Friday 8:00 am to 4:30 pm  [facility] Emergency Dept: [phone]

## 2022-06-13 NOTE — PROGRESS NOTES
Patient Name: Mann Escobar  Medical Record Number: [id number]  Today's Date: 6/13/2022    Procedure: Image Guided Left Kidney Biopsy  Proceduralist: Kee Valadez PA-C; Dr. Mcbride  Pathology present: Yes, Renal fellow present to review samples    Procedure Start: 1410; 1429  Procedure end: 1445  Sedation medications administered: 3 mg versed & 125 mcg Fentanyl     Report given to: 2A RN  : N/A    Other Notes: Pt arrived to IR room 6 from . Consent reviewed. Pt denies any questions or concerns regarding procedure. Pt positioned Left Lateral Up and monitored per protocol. Pt tolerated procedure without any noted complications. Pt transferred back to .

## 2022-06-13 NOTE — PROGRESS NOTES
Prep complete for Left Renal biopsy. Awaiting lab results. Family at bedside, will transport patient home post procedure.

## 2022-06-13 NOTE — PROCEDURES
[facility]    Procedure: IR Procedure Note    Date/Time: 6/13/2022 2:57 PM  Performed by: Robert Mosquera MD  Authorized by: Robert Mosquera MD   IR Fellow Physician:  Radiology Resident Physician: Jason Donohue        UNIVERSAL PROTOCOL   Site Marked: NA  Prior Images Obtained and Reviewed:  Yes  Required items: Required blood products, implants, devices and special equipment available    Patient identity confirmed:  Verbally with patient, arm band, provided demographic data and hospital-assigned identification number  Patient was reevaluated immediately before administering moderate or deep sedation or anesthesia  Confirmation Checklist:  Patient's identity using two indicators, relevant allergies, procedure was appropriate and matched the consent or emergent situation and correct equipment/implants were available  Time out: Immediately prior to the procedure a time out was called    Universal Protocol: the Joint Commission Universal Protocol was followed    Preparation: Patient was prepped and draped in usual sterile fashion       ANESTHESIA    Anesthesia: Local infiltration  Local Anesthetic:  Lidocaine 1% without epinephrine      SEDATION  Patient Sedated: Yes    Sedation:  Fentanyl and midazolam  Vital signs: Vital signs monitored during sedation    See dictated procedure note for full details.  Findings: Successful left kidney biopsy    Specimens: none    Complications: None    Condition: Stable    Plan: Return to patient care unit      PROCEDURE    Patient Tolerance:  Patient tolerated the procedure well with no immediate complications  Length of time physician/provider present for 1:1 monitoring during sedation: 30

## 2022-06-13 NOTE — PROGRESS NOTES
PIV discontinued. Discharge paperwork reviewed with patient, escorted to lobby for transport home.

## 2022-06-13 NOTE — PROGRESS NOTES
Arrived back from kidney biopsy procedure to Unit 2a. VSS. Denies pain. L flank procedural site C/D/I. No bleeding/hematoma noted. Tolerating PO. Family at bedside. Stable.

## 2022-06-13 NOTE — PRE-PROCEDURE
GENERAL PRE-PROCEDURE:   Procedure:  Left kidney biospy  Date/Time:  6/13/2022 1:12 PM    Verbal consent obtained?: Yes    Written consent obtained?: Yes    Risks and benefits: Risks, benefits and alternatives were discussed    Consent given by:  Patient  Patient states understanding of procedure being performed: Yes    Patient's understanding of procedure matches consent: Yes    Procedure consent matches procedure scheduled: Yes    Expected level of sedation:  Moderate  Appropriately NPO:  Yes  ASA Class:  2  Mallampati  :  Grade 2- soft palate, base of uvula, tonsillar pillars, and portion of posterior pharyngeal wall visible  Lungs:  Lungs clear with good breath sounds bilaterally  Heart:  Normal heart sounds and rate  History & Physical reviewed:  History and physical reviewed and no updates needed  Statement of review:  I have reviewed the lab findings, diagnostic data, medications, and the plan for sedation

## 2022-06-14 LAB
ATRIAL RATE - MUSE: 55 BPM
DIASTOLIC BLOOD PRESSURE - MUSE: NORMAL MMHG
INTERPRETATION ECG - MUSE: NORMAL
P AXIS - MUSE: 73 DEGREES
PR INTERVAL - MUSE: 262 MS
QRS DURATION - MUSE: 98 MS
QT - MUSE: 486 MS
QTC - MUSE: 464 MS
R AXIS - MUSE: -23 DEGREES
SYSTOLIC BLOOD PRESSURE - MUSE: NORMAL MMHG
T AXIS - MUSE: 65 DEGREES
VENTRICULAR RATE- MUSE: 55 BPM

## 2022-06-16 ENCOUNTER — INFUSION THERAPY VISIT (OUTPATIENT)
Dept: INFUSION THERAPY | Facility: CLINIC | Age: 82
End: 2022-06-16
Attending: INTERNAL MEDICINE
Payer: COMMERCIAL

## 2022-06-16 ENCOUNTER — APPOINTMENT (OUTPATIENT)
Dept: LAB | Facility: CLINIC | Age: 82
End: 2022-06-16
Payer: COMMERCIAL

## 2022-06-16 VITALS — SYSTOLIC BLOOD PRESSURE: 155 MMHG | HEART RATE: 68 BPM | DIASTOLIC BLOOD PRESSURE: 77 MMHG

## 2022-06-16 DIAGNOSIS — N18.32 ANEMIA OF CHRONIC RENAL FAILURE, STAGE 3B (H): Primary | ICD-10-CM

## 2022-06-16 DIAGNOSIS — D56.8 OTHER THALASSEMIA (H): ICD-10-CM

## 2022-06-16 DIAGNOSIS — D63.1 ANEMIA OF CHRONIC RENAL FAILURE, STAGE 3B (H): Primary | ICD-10-CM

## 2022-06-16 LAB
PATH REPORT.COMMENTS IMP SPEC: NORMAL
PATH REPORT.COMMENTS IMP SPEC: NORMAL
PATH REPORT.FINAL DX SPEC: NORMAL
PATH REPORT.RELEVANT HX SPEC: NORMAL
PHOTO IMAGE: NORMAL

## 2022-06-16 PROCEDURE — 250N000011 HC RX IP 250 OP 636: Performed by: INTERNAL MEDICINE

## 2022-06-16 PROCEDURE — 96372 THER/PROPH/DIAG INJ SC/IM: CPT | Performed by: INTERNAL MEDICINE

## 2022-06-16 RX ORDER — DIPHENHYDRAMINE HYDROCHLORIDE 50 MG/ML
50 INJECTION INTRAMUSCULAR; INTRAVENOUS
Status: CANCELLED
Start: 2022-06-23

## 2022-06-16 RX ORDER — MEPERIDINE HYDROCHLORIDE 25 MG/ML
25 INJECTION INTRAMUSCULAR; INTRAVENOUS; SUBCUTANEOUS EVERY 30 MIN PRN
Status: CANCELLED | OUTPATIENT
Start: 2022-06-23

## 2022-06-16 RX ORDER — METHYLPREDNISOLONE SODIUM SUCCINATE 125 MG/2ML
125 INJECTION, POWDER, LYOPHILIZED, FOR SOLUTION INTRAMUSCULAR; INTRAVENOUS
Status: CANCELLED
Start: 2022-06-23

## 2022-06-16 RX ORDER — ALBUTEROL SULFATE 0.83 MG/ML
2.5 SOLUTION RESPIRATORY (INHALATION)
Status: CANCELLED | OUTPATIENT
Start: 2022-06-23

## 2022-06-16 RX ORDER — NALOXONE HYDROCHLORIDE 0.4 MG/ML
0.2 INJECTION, SOLUTION INTRAMUSCULAR; INTRAVENOUS; SUBCUTANEOUS
Status: CANCELLED | OUTPATIENT
Start: 2022-06-23

## 2022-06-16 RX ORDER — ALBUTEROL SULFATE 90 UG/1
1-2 AEROSOL, METERED RESPIRATORY (INHALATION)
Status: CANCELLED
Start: 2022-06-23

## 2022-06-16 RX ORDER — EPINEPHRINE 1 MG/ML
0.3 INJECTION, SOLUTION, CONCENTRATE INTRAVENOUS EVERY 5 MIN PRN
Status: CANCELLED | OUTPATIENT
Start: 2022-06-23

## 2022-06-16 RX ADMIN — DARBEPOETIN ALFA 100 MCG: 100 INJECTION, SOLUTION INTRAVENOUS; SUBCUTANEOUS at 13:27

## 2022-06-16 NOTE — PROGRESS NOTES
Infusion Nursing Note:  Mann Escobar presents today for [facility].    Patient seen by provider today: No   present during visit today: Not Applicable.    Note: N/A.    Intravenous Access:  No Intravenous access/labs at this visit.    Treatment Conditions:  Lab Results   Component Value Date    HGB 8.7 (L) 06/13/2022    WBC 6.4 06/13/2022    ANEU 3.7 02/02/2021    ANEUTAUTO 5.0 05/26/2022     06/13/2022      Results reviewed, labs MET treatment parameters, ok to proceed with treatment.    Post Infusion Assessment:  Patient tolerated injection without incident.  Site patent and intact, free from redness, edema or discomfort.  No evidence of extravasations.     Discharge Plan:   Discharge instructions reviewed with: Patient.  Patient and/or family verbalized understanding of discharge instructions and all questions answered.  AVS to patient via [employer].  Patient will return 7/7/2022 for next appointment.   Patient discharged in stable condition accompanied by: self.  Departure Mode: Ambulatory.    Kristi Delgado RN

## 2022-06-20 ENCOUNTER — MYC MEDICAL ADVICE (OUTPATIENT)
Dept: NEPHROLOGY | Facility: CLINIC | Age: 82
End: 2022-06-20

## 2022-06-20 LAB — MAYO MISC RESULT: NORMAL

## 2022-06-21 ENCOUNTER — PATIENT OUTREACH (OUTPATIENT)
Dept: NEPHROLOGY | Facility: CLINIC | Age: 82
End: 2022-06-21

## 2022-06-21 DIAGNOSIS — N18.4 CKD (CHRONIC KIDNEY DISEASE) STAGE 4, GFR 15-29 ML/MIN (H): Primary | ICD-10-CM

## 2022-06-21 NOTE — PROGRESS NOTES
Yoon Alicea MD Engen, Kayla, RN  Hi   His GFR is 13     Needs education about dialysis options etc.   Thanks    06/21/22: Referral sent to [facility]. Pt enrolled in CKD Journey.

## 2022-06-21 NOTE — TELEPHONE ENCOUNTER
Needs 6 week Follow-up with [facility] [place]  3-4 months with Dr. Deanne YOUNGBLOOD(left mess to call)    Cris Snider, GRANT

## 2022-07-07 ENCOUNTER — INFUSION THERAPY VISIT (OUTPATIENT)
Dept: INFUSION THERAPY | Facility: CLINIC | Age: 82
End: 2022-07-07
Attending: INTERNAL MEDICINE
Payer: COMMERCIAL

## 2022-07-07 ENCOUNTER — APPOINTMENT (OUTPATIENT)
Dept: LAB | Facility: CLINIC | Age: 82
End: 2022-07-07
Payer: COMMERCIAL

## 2022-07-07 VITALS
DIASTOLIC BLOOD PRESSURE: 68 MMHG | RESPIRATION RATE: 18 BRPM | TEMPERATURE: 98.6 F | SYSTOLIC BLOOD PRESSURE: 125 MMHG | HEART RATE: 72 BPM

## 2022-07-07 DIAGNOSIS — N18.32 ANEMIA OF CHRONIC RENAL FAILURE, STAGE 3B (H): Primary | ICD-10-CM

## 2022-07-07 DIAGNOSIS — D56.8 OTHER THALASSEMIA (H): ICD-10-CM

## 2022-07-07 DIAGNOSIS — D63.1 ANEMIA OF CHRONIC RENAL FAILURE, STAGE 3B (H): Primary | ICD-10-CM

## 2022-07-07 LAB
BASOPHILS # BLD AUTO: 0.1 10E3/UL (ref 0–0.2)
BASOPHILS NFR BLD AUTO: 1 %
EOSINOPHIL # BLD AUTO: 0.2 10E3/UL (ref 0–0.7)
EOSINOPHIL NFR BLD AUTO: 4 %
ERYTHROCYTE [DISTWIDTH] IN BLOOD BY AUTOMATED COUNT: 19.5 % (ref 10–15)
HCT VFR BLD AUTO: 28 % (ref 40–53)
HGB BLD-MCNC: 8.5 G/DL (ref 13.3–17.7)
IMM GRANULOCYTES # BLD: 0 10E3/UL
IMM GRANULOCYTES NFR BLD: 0 %
LYMPHOCYTES # BLD AUTO: 1 10E3/UL (ref 0.8–5.3)
LYMPHOCYTES NFR BLD AUTO: 17 %
MCH RBC QN AUTO: 18.6 PG (ref 26.5–33)
MCHC RBC AUTO-ENTMCNC: 30.4 G/DL (ref 31.5–36.5)
MCV RBC AUTO: 61 FL (ref 78–100)
MONOCYTES # BLD AUTO: 0.5 10E3/UL (ref 0–1.3)
MONOCYTES NFR BLD AUTO: 8 %
NEUTROPHILS # BLD AUTO: 4.2 10E3/UL (ref 1.6–8.3)
NEUTROPHILS NFR BLD AUTO: 70 %
NRBC # BLD AUTO: 0 10E3/UL
NRBC BLD AUTO-RTO: 0 /100
PLATELET # BLD AUTO: 189 10E3/UL (ref 150–450)
RBC # BLD AUTO: 4.56 10E6/UL (ref 4.4–5.9)
WBC # BLD AUTO: 6 10E3/UL (ref 4–11)

## 2022-07-07 PROCEDURE — 36415 COLL VENOUS BLD VENIPUNCTURE: CPT

## 2022-07-07 PROCEDURE — 250N000011 HC RX IP 250 OP 636: Performed by: INTERNAL MEDICINE

## 2022-07-07 PROCEDURE — 85025 COMPLETE CBC W/AUTO DIFF WBC: CPT | Performed by: INTERNAL MEDICINE

## 2022-07-07 PROCEDURE — 96372 THER/PROPH/DIAG INJ SC/IM: CPT | Performed by: INTERNAL MEDICINE

## 2022-07-07 RX ORDER — ALBUTEROL SULFATE 90 UG/1
1-2 AEROSOL, METERED RESPIRATORY (INHALATION)
Status: CANCELLED
Start: 2022-07-14

## 2022-07-07 RX ORDER — ALBUTEROL SULFATE 0.83 MG/ML
2.5 SOLUTION RESPIRATORY (INHALATION)
Status: CANCELLED | OUTPATIENT
Start: 2022-07-14

## 2022-07-07 RX ORDER — EPINEPHRINE 1 MG/ML
0.3 INJECTION, SOLUTION, CONCENTRATE INTRAVENOUS EVERY 5 MIN PRN
Status: CANCELLED | OUTPATIENT
Start: 2022-07-14

## 2022-07-07 RX ORDER — NALOXONE HYDROCHLORIDE 0.4 MG/ML
0.2 INJECTION, SOLUTION INTRAMUSCULAR; INTRAVENOUS; SUBCUTANEOUS
Status: CANCELLED | OUTPATIENT
Start: 2022-07-14

## 2022-07-07 RX ORDER — METHYLPREDNISOLONE SODIUM SUCCINATE 125 MG/2ML
125 INJECTION, POWDER, LYOPHILIZED, FOR SOLUTION INTRAMUSCULAR; INTRAVENOUS
Status: CANCELLED
Start: 2022-07-14

## 2022-07-07 RX ORDER — MEPERIDINE HYDROCHLORIDE 25 MG/ML
25 INJECTION INTRAMUSCULAR; INTRAVENOUS; SUBCUTANEOUS EVERY 30 MIN PRN
Status: CANCELLED | OUTPATIENT
Start: 2022-07-14

## 2022-07-07 RX ORDER — DIPHENHYDRAMINE HYDROCHLORIDE 50 MG/ML
50 INJECTION INTRAMUSCULAR; INTRAVENOUS
Status: CANCELLED
Start: 2022-07-14

## 2022-07-07 RX ADMIN — DARBEPOETIN ALFA 100 MCG: 100 INJECTION, SOLUTION INTRAVENOUS; SUBCUTANEOUS at 14:10

## 2022-07-07 ASSESSMENT — PAIN SCALES - GENERAL: PAINLEVEL: EXTREME PAIN (8)

## 2022-07-07 NOTE — PROGRESS NOTES
Infusion Nursing Note:  [facility]    Patient seen by provider today: No   present during visit today: Not Applicable.    Note: N/A.    Intravenous Access:  Labs drawn peripherally by .    Treatment Conditions:  Lab Results   Component Value Date    HGB 8.5 (L) 07/07/2022    WBC 6.0 07/07/2022    ANEU 3.7 02/02/2021    ANEUTAUTO 4.2 07/07/2022     07/07/2022      Results reviewed, labs MET treatment parameters, ok to proceed with treatment.    Post Infusion Assessment:  Patient tolerated injection without incident.  Site patent and intact, free from redness, edema or discomfort.  No evidence of extravasations.     Discharge Plan:   Discharge instructions reviewed with: Patient.  Patient and/or family verbalized understanding of discharge instructions and all questions answered.  AVS to patient via [employer].  Patient will return 7/28/2022 for next appointment.   Patient discharged in stable condition accompanied by: self.  Departure Mode: Ambulatory.    Kristi Delgado RN

## 2022-07-28 ENCOUNTER — INFUSION THERAPY VISIT (OUTPATIENT)
Dept: INFUSION THERAPY | Facility: CLINIC | Age: 82
End: 2022-07-28
Attending: INTERNAL MEDICINE
Payer: COMMERCIAL

## 2022-07-28 ENCOUNTER — APPOINTMENT (OUTPATIENT)
Dept: LAB | Facility: CLINIC | Age: 82
End: 2022-07-28
Payer: COMMERCIAL

## 2022-07-28 VITALS — SYSTOLIC BLOOD PRESSURE: 147 MMHG | DIASTOLIC BLOOD PRESSURE: 84 MMHG | RESPIRATION RATE: 16 BRPM | HEART RATE: 69 BPM

## 2022-07-28 DIAGNOSIS — D63.1 ANEMIA OF CHRONIC RENAL FAILURE, STAGE 3B (H): Primary | ICD-10-CM

## 2022-07-28 DIAGNOSIS — D50.9 IRON DEFICIENCY ANEMIA, UNSPECIFIED IRON DEFICIENCY ANEMIA TYPE: ICD-10-CM

## 2022-07-28 DIAGNOSIS — D56.8 OTHER THALASSEMIA (H): ICD-10-CM

## 2022-07-28 DIAGNOSIS — N18.32 ANEMIA OF CHRONIC RENAL FAILURE, STAGE 3B (H): Primary | ICD-10-CM

## 2022-07-28 LAB
BASOPHILS # BLD AUTO: 0 10E3/UL (ref 0–0.2)
BASOPHILS NFR BLD AUTO: 1 %
DACRYOCYTES BLD QL SMEAR: SLIGHT
EOSINOPHIL # BLD AUTO: 0.2 10E3/UL (ref 0–0.7)
EOSINOPHIL NFR BLD AUTO: 3 %
ERYTHROCYTE [DISTWIDTH] IN BLOOD BY AUTOMATED COUNT: 19.4 % (ref 10–15)
FERRITIN SERPL-MCNC: 242 NG/ML (ref 26–388)
FRAGMENTS BLD QL SMEAR: SLIGHT
HCT VFR BLD AUTO: 26.2 % (ref 40–53)
HGB BLD-MCNC: 8 G/DL (ref 13.3–17.7)
IMM GRANULOCYTES # BLD: 0 10E3/UL
IMM GRANULOCYTES NFR BLD: 0 %
IRON SATN MFR SERPL: 40 % (ref 15–46)
IRON SERPL-MCNC: 57 UG/DL (ref 35–180)
LYMPHOCYTES # BLD AUTO: 0.8 10E3/UL (ref 0.8–5.3)
LYMPHOCYTES NFR BLD AUTO: 14 %
MCH RBC QN AUTO: 18.5 PG (ref 26.5–33)
MCHC RBC AUTO-ENTMCNC: 30.5 G/DL (ref 31.5–36.5)
MCV RBC AUTO: 61 FL (ref 78–100)
MONOCYTES # BLD AUTO: 0.6 10E3/UL (ref 0–1.3)
MONOCYTES NFR BLD AUTO: 11 %
NEUTROPHILS # BLD AUTO: 3.8 10E3/UL (ref 1.6–8.3)
NEUTROPHILS NFR BLD AUTO: 71 %
NRBC # BLD AUTO: 0 10E3/UL
NRBC BLD AUTO-RTO: 0 /100
PLAT MORPH BLD: ABNORMAL
PLATELET # BLD AUTO: 210 10E3/UL (ref 150–450)
RBC # BLD AUTO: 4.32 10E6/UL (ref 4.4–5.9)
RBC MORPH BLD: ABNORMAL
TARGETS BLD QL SMEAR: ABNORMAL
TIBC SERPL-MCNC: 144 UG/DL (ref 240–430)
WBC # BLD AUTO: 5.4 10E3/UL (ref 4–11)

## 2022-07-28 PROCEDURE — 250N000011 HC RX IP 250 OP 636: Mod: EC | Performed by: INTERNAL MEDICINE

## 2022-07-28 PROCEDURE — 85025 COMPLETE CBC W/AUTO DIFF WBC: CPT | Performed by: INTERNAL MEDICINE

## 2022-07-28 PROCEDURE — 36415 COLL VENOUS BLD VENIPUNCTURE: CPT

## 2022-07-28 PROCEDURE — 82728 ASSAY OF FERRITIN: CPT | Performed by: INTERNAL MEDICINE

## 2022-07-28 PROCEDURE — 83550 IRON BINDING TEST: CPT | Performed by: INTERNAL MEDICINE

## 2022-07-28 PROCEDURE — 96372 THER/PROPH/DIAG INJ SC/IM: CPT | Performed by: INTERNAL MEDICINE

## 2022-07-28 RX ORDER — DIPHENHYDRAMINE HYDROCHLORIDE 50 MG/ML
50 INJECTION INTRAMUSCULAR; INTRAVENOUS
Status: CANCELLED
Start: 2022-08-04

## 2022-07-28 RX ORDER — NALOXONE HYDROCHLORIDE 0.4 MG/ML
0.2 INJECTION, SOLUTION INTRAMUSCULAR; INTRAVENOUS; SUBCUTANEOUS
Status: CANCELLED | OUTPATIENT
Start: 2022-08-04

## 2022-07-28 RX ORDER — METHYLPREDNISOLONE SODIUM SUCCINATE 125 MG/2ML
125 INJECTION, POWDER, LYOPHILIZED, FOR SOLUTION INTRAMUSCULAR; INTRAVENOUS
Status: CANCELLED
Start: 2022-08-04

## 2022-07-28 RX ORDER — ALBUTEROL SULFATE 90 UG/1
1-2 AEROSOL, METERED RESPIRATORY (INHALATION)
Status: CANCELLED
Start: 2022-08-04

## 2022-07-28 RX ORDER — MEPERIDINE HYDROCHLORIDE 25 MG/ML
25 INJECTION INTRAMUSCULAR; INTRAVENOUS; SUBCUTANEOUS EVERY 30 MIN PRN
Status: CANCELLED | OUTPATIENT
Start: 2022-08-04

## 2022-07-28 RX ORDER — ALBUTEROL SULFATE 0.83 MG/ML
2.5 SOLUTION RESPIRATORY (INHALATION)
Status: CANCELLED | OUTPATIENT
Start: 2022-08-04

## 2022-07-28 RX ORDER — EPINEPHRINE 1 MG/ML
0.3 INJECTION, SOLUTION, CONCENTRATE INTRAVENOUS EVERY 5 MIN PRN
Status: CANCELLED | OUTPATIENT
Start: 2022-08-04

## 2022-07-28 RX ADMIN — DARBEPOETIN ALFA 100 MCG: 100 INJECTION, SOLUTION INTRAVENOUS; SUBCUTANEOUS at 15:07

## 2022-07-28 NOTE — PROGRESS NOTES
Infusion Nursing Note:  Mann Escobar presents today for [facility].    Patient seen by provider today: No   present during visit today: Not Applicable.    Note: Labs drawn peripherally.    Intravenous Access:  N/A.    Treatment Conditions:  Hbg 8 & B/P within treatment parameters..    Post Infusion Assessment:  Patient tolerated injection without incident.     Discharge Plan:   Patient discharged in stable condition accompanied by: self.  Departure Mode: Ambulatory w/ cane.      Racquel Hickman RN

## 2022-08-01 ENCOUNTER — PATIENT OUTREACH (OUTPATIENT)
Dept: NEPHROLOGY | Facility: CLINIC | Age: 82
End: 2022-08-01

## 2022-08-01 NOTE — PROGRESS NOTES
Assessment/Plan:    Bacterial conjunctivitis of right eye  - will prescribe bleph 10 eyedrops for use 4x per day for 7 days; use in both eyes to prevent future infection of the left eye  - handwashing is very important, especially with children   - apply warm or cool compress on the eye for 15 minutes 2-3 times per day; whichever feels more soothing     Acute vaginitis  - will treat prophylactically with metronidzole 500 mg BID x 7 days for vaginitis since patient has had this in the past with the same symptoms        Diagnoses and all orders for this visit:    Bacterial conjunctivitis of right eye  -     sulfacetamide (BLEPH-10) 10 % ophthalmic solution; Administer 2 drops to both eyes 4 (four) times a day    Acute vaginitis    Dysuria  -     POCT urine dip  -     Discontinue: sulfamethoxazole-trimethoprim (BACTRIM DS) 800-160 mg per tablet; Take 1 tablet by mouth every 12 (twelve) hours for 7 days  -     UA w Reflex to Microscopic w Reflex to Culture - Clinic Collect    Other orders  -     metroNIDAZOLE (FLAGYL) 500 mg tablet; take 1 tablet by oral route twice daily for 7 days          Subjective: Patient complains of itching, burning, and redness of her right eye that started this morning  Upon awakening, she states her eye was crusted shut with thick yellow purulence  She does not have any changes in vision  Does not wear contacts  No associated fever or chills  Patient is also complaining of a discomfort when urination, as well as a foul vaginal odor that she says is similar to what she presented with when she ended up being diagnosed with gardnerella and trichomonas vaginalis  Endorses white discharge in her underwear, but no blood  Patient ID: Yvonne Harvey is a 32 y o  female  HPI    The following portions of the patient's history were reviewed and updated as appropriate: She  has no past medical history on file    Patient Active Problem List    Diagnosis Date Noted    Bacterial conjunctivitis Neph Tracking Flowsheet Last Filled Values     CKD Education Status Referred    CKD Education Referral Date 06/21/22    CKD Education Type [facility] CKD Basics    Patient's Referral Dates Auto Populate Patient's Referral Dates    Home Care Referral 7/26/20    Journey Referral 6/24/22    Transplant Status  Not Referred  age           of right eye 10/09/2018    Acute vaginitis 10/09/2018    Encounter for counseling regarding contraception 2018     She  has a past surgical history that includes  section (2017)  Her family history includes Diabetes in her maternal grandmother; No Known Problems in her brother, brother, brother, brother, father, mother, paternal grandmother, sister, and sister  She  reports that she has been smoking Cigarettes  She uses smokeless tobacco  She reports that she does not drink alcohol or use drugs  Current Outpatient Prescriptions   Medication Sig Dispense Refill    metroNIDAZOLE (FLAGYL) 500 mg tablet take 1 tablet by oral route twice daily for 7 days      sulfacetamide (BLEPH-10) 10 % ophthalmic solution Administer 2 drops to both eyes 4 (four) times a day 15 mL 0     No current facility-administered medications for this visit  No current outpatient prescriptions on file prior to visit  No current facility-administered medications on file prior to visit  She is allergic to meperidine       Review of Systems   Constitutional: Negative  HENT: Negative for facial swelling and rhinorrhea  Eyes: Positive for pain, discharge, redness and itching  Negative for visual disturbance  Respiratory: Negative for shortness of breath and wheezing  Cardiovascular: Negative for chest pain and palpitations  Gastrointestinal: Negative for nausea and vomiting  Genitourinary: Positive for dysuria and vaginal discharge  Negative for vaginal bleeding  Foul vaginal discharge    Neurological: Negative for dizziness, weakness, light-headedness and headaches  Objective:      /80 (BP Location: Left arm, Patient Position: Sitting, Cuff Size: Adult)   Pulse 81   Temp (!) 97 °F (36 1 °C) (Temporal)   Resp 16   Wt 65 3 kg (144 lb)   SpO2 98%   BMI 26 77 kg/m²          Physical Exam   Constitutional: She is oriented to person, place, and time   She appears well-developed and well-nourished  No distress  Eyes: Pupils are equal, round, and reactive to light  Conjunctivae and EOM are normal  Right eye exhibits no discharge  No scleral icterus  Patient has diffuse erythema of the sclera as well as mild erythema of the lower eyelid, but no mucopurulent discharge visualized on exam; PERRLA   Cardiovascular: Normal rate, regular rhythm and normal heart sounds  Pulmonary/Chest: Effort normal and breath sounds normal    Neurological: She is alert and oriented to person, place, and time  Skin: Skin is warm and dry  She is not diaphoretic

## 2022-08-03 DIAGNOSIS — N40.1 BENIGN NON-NODULAR PROSTATIC HYPERPLASIA WITH LOWER URINARY TRACT SYMPTOMS: ICD-10-CM

## 2022-08-05 RX ORDER — TERAZOSIN 5 MG/1
CAPSULE ORAL
Qty: 90 CAPSULE | Refills: 3 | Status: SHIPPED | OUTPATIENT
Start: 2022-08-05 | End: 2022-08-25

## 2022-08-05 NOTE — TELEPHONE ENCOUNTER
"Has appointment scheduled for 8/25/22      Requested Prescriptions   Pending Prescriptions Disp Refills     terazosin (HYTRIN) 5 MG capsule [Pharmacy Med Name: TERAZOSIN CAPS 5MG] 90 capsule 3     Sig: TAKE 1 CAPSULE AT BEDTIME       Alpha Blockers Failed - 8/3/2022 11:33 PM        Failed - Blood pressure under 140/90 in past 12 months     BP Readings from Last 3 Encounters:   07/28/22 (!) 147/84   07/07/22 125/68   06/16/22 (!) 155/77                 Passed - Recent (12 mo) or future (30 days) visit within the authorizing provider's specialty     Patient has had an office visit with the authorizing provider or a provider within the authorizing providers department within the previous 12 mos or has a future within next 30 days. See \"Patient Info\" tab in inbasket, or \"Choose Columns\" in Meds & Orders section of the refill encounter.              Passed - Patient does not have Tadalafil, Vardenafil, or Sildenafil on their medication list        Passed - Medication is active on med list        Passed - Patient is 18 years of age or older       Antiadrenergic Antihypertensives Failed - 8/3/2022 11:33 PM        Failed - Blood pressure less than 140/90 in past 6 months     BP Readings from Last 3 Encounters:   07/28/22 (!) 147/84   07/07/22 125/68   06/16/22 (!) 155/77                 Failed - Normal serum creatinine on file in past 12 months     Recent Labs   Lab Test 06/13/22  1304 09/07/16  0910 08/03/15  2006   CR 3.69*   < >  --    CREAT  --   --  1.6*    < > = values in this interval not displayed.       Ok to refill medication if creatinine is low          Passed - Medication is active on med list        Passed - Patient is age 18 or older        Passed - Recent (6 mo) or future (30 days) visit within the authorizing provider's specialty     Patient had office visit in the last 6 months or has a visit in the next 30 days with authorizing provider or within the authorizing provider's specialty.  See \"Patient Info\" " "tab in inbasket, or \"Choose Columns\" in Meds & Orders section of the refill encounter.                 "

## 2022-08-09 DIAGNOSIS — K28.9 MARGINAL ULCER: ICD-10-CM

## 2022-08-09 RX ORDER — SUCRALFATE 1 G/1
1 TABLET ORAL 4 TIMES DAILY
Qty: 56 TABLET | Refills: 0 | Status: SHIPPED | OUTPATIENT
Start: 2022-08-09 | End: 2022-01-01

## 2022-08-09 NOTE — TELEPHONE ENCOUNTER
Sucralfate, 1 gram   Take 1 tablet by mouth 4 times daily take 30 min before meals and at bedtime.

## 2022-08-16 ENCOUNTER — OFFICE VISIT (OUTPATIENT)
Dept: ORTHOPEDICS | Facility: CLINIC | Age: 82
End: 2022-08-16
Payer: COMMERCIAL

## 2022-08-16 VITALS
WEIGHT: 149.5 LBS | DIASTOLIC BLOOD PRESSURE: 72 MMHG | BODY MASS INDEX: 28.22 KG/M2 | SYSTOLIC BLOOD PRESSURE: 125 MMHG | HEIGHT: 61 IN

## 2022-08-16 DIAGNOSIS — M19.012 OSTEOARTHRITIS OF BILATERAL GLENOHUMERAL JOINTS: ICD-10-CM

## 2022-08-16 DIAGNOSIS — M12.812 ROTATOR CUFF ARTHROPATHY OF BOTH SHOULDERS: ICD-10-CM

## 2022-08-16 DIAGNOSIS — M25.511 PAIN OF BOTH SHOULDER JOINTS: Primary | ICD-10-CM

## 2022-08-16 DIAGNOSIS — M12.811 ROTATOR CUFF ARTHROPATHY OF BOTH SHOULDERS: ICD-10-CM

## 2022-08-16 DIAGNOSIS — M25.512 PAIN OF BOTH SHOULDER JOINTS: Primary | ICD-10-CM

## 2022-08-16 DIAGNOSIS — M19.011 OSTEOARTHRITIS OF BILATERAL GLENOHUMERAL JOINTS: ICD-10-CM

## 2022-08-16 DIAGNOSIS — M25.411 EFFUSION OF RIGHT SHOULDER: ICD-10-CM

## 2022-08-16 DIAGNOSIS — M25.412 EFFUSION OF LEFT SHOULDER: ICD-10-CM

## 2022-08-16 PROCEDURE — 20611 DRAIN/INJ JOINT/BURSA W/US: CPT | Mod: 50 | Performed by: FAMILY MEDICINE

## 2022-08-16 PROCEDURE — 99213 OFFICE O/P EST LOW 20 MIN: CPT | Mod: 25 | Performed by: FAMILY MEDICINE

## 2022-08-16 RX ORDER — ROPIVACAINE HYDROCHLORIDE 5 MG/ML
3 INJECTION, SOLUTION EPIDURAL; INFILTRATION; PERINEURAL
Status: DISCONTINUED | OUTPATIENT
Start: 2022-08-16 | End: 2022-01-01 | Stop reason: ALTCHOICE

## 2022-08-16 RX ORDER — TRIAMCINOLONE ACETONIDE 40 MG/ML
40 INJECTION, SUSPENSION INTRA-ARTICULAR; INTRAMUSCULAR
Status: DISCONTINUED | OUTPATIENT
Start: 2022-08-16 | End: 2022-01-01 | Stop reason: ALTCHOICE

## 2022-08-16 RX ADMIN — ROPIVACAINE HYDROCHLORIDE 3 ML: 5 INJECTION, SOLUTION EPIDURAL; INFILTRATION; PERINEURAL at 10:30

## 2022-08-16 RX ADMIN — TRIAMCINOLONE ACETONIDE 40 MG: 40 INJECTION, SUSPENSION INTRA-ARTICULAR; INTRAMUSCULAR at 10:30

## 2022-08-16 NOTE — PROGRESS NOTES
Mann Escobar  :  1940  DOS: 22  MRN: [id number]    Sports Medicine Clinic Visit    PCP: Shayy Ramírez    Mann Escobar is a 81 year old Right hand dominant male who is seen as a self referral presenting with chronic bilateral shoulder pain.    Injury: Gradual onset of pain over the past 6+ years, left>>>right.  Patient notes that right shoulder has been significantly worse over the past 3 - 5 days, he did fall ~ one week ago landing on left side.  Pain located over bilateral deep anterior lateral glenohumeral joint, radiating to lateral upper arms.  Additional Features:  Positive: grinding, weakness and limited AROM bilaterally.  Symptoms are better with Other medications: oxycodone and Rest.  Symptoms are worse with: shoulder flexion/abduction, pushing/pulling self up from seated position, weight bearing through arms, lying on either shoulder.  Other evaluation and/or treatments so far consists of: Ice, Tylenol, Ibuprofen, Other medications: oxycodone and Rest.  Recent imaging completed: No recent imaging completed.  Prior History of related problems: Patient has previously consulted Dr Burrell @ [facility], left shoulder injection last completed , right shoulder last completed in .    Social History: retired     Interim History - 2022  Since last visit on 2022 patient has severe bilateral shoulder pain, left>>>right.  Bilateral shoulder glenohumeral injections and aspirations provided ~ 60 - 70% relief for 2 - 3 weeks.  He notes continued weakness with shoulder flexion/abduction movements.  No new injury in the interim.    Interim History - 2022  Since last visit on 2022 patient has moderate-severe bilateral shoulder pain right>>>left.  Right shoulder glenohumeral joint aspiration completed on 22 provided ~ 80% relief for ~ 2 - 4 weeks.  He notes significant limitation with shoulder flexion/abduction today.  Bilateral shoulder joint  appear to effused.  No new injury in the interim.      Review of Systems  Musculoskeletal: as above  Remainder of review of systems is negative including constitutional, CV, pulmonary, GI, Skin and Neurologic except as noted in HPI or medical history.    Past Medical History:   Diagnosis Date     Arthritis of ankle, left 7/24/2020     Back pain     WITH BILATERAL LEG PAIN AND NUMBNESS OF BOTH FEET     Gastro-oesophageal reflux disease     REFLUX     Hypertension      Hypopotassemia      Internal hemorrhoids      Iron deficiency anaemia      Leg edema      Morbid obesity 9/12/2005     Morbid obesity (H)      Osteoarthrosis      Scoliosis      Thalassemia minor      Past Surgical History:   Procedure Laterality Date     CATARACT IOL, RT/LT  2008/    Cataract IOL RT/LT     COLONOSCOPY  2009/07    polyps removed repeat 5 yrs, tubular adenoma     COLONOSCOPY  9/29/2011    Procedure:COLONOSCOPY; Colonoscopy with hemorrhoid banding; Surgeon:JEREMY GARCIA; Location:[facility]     COLONOSCOPY N/A 12/19/2017    Procedure: COLONOSCOPY;  colonoscopy, gastroscopy;  Surgeon: Edmar Isaacs MD;  Location: [facility]     DECOMPRESSION LUMBAR MINIMALLY INVASIVE TWO LEVELS  5/2/2012    Procedure:DECOMPRESSION LUMBAR MINIMALLY INVASIVE TWO LEVELS; Surgeon:LOTTIE MITCHELL; Location: OR     ESOPHAGOSCOPY, GASTROSCOPY, DUODENOSCOPY (EGD), COMBINED N/A 2/6/2018    Procedure: COMBINED ESOPHAGOSCOPY, GASTROSCOPY, DUODENOSCOPY (EGD);  gastroscopy;  Surgeon: Edmar Isaacs MD;  Location: [facility]     ESOPHAGOSCOPY, GASTROSCOPY, DUODENOSCOPY (EGD), COMBINED N/A 10/18/2021    Procedure: ESOPHAGOGASTRODUODENOSCOPY (EGD);  Surgeon: Anju Galeano MD;  Location: [facility]     FUSION SPINE POSTERIOR MINIMALLY INVASIVE ONE LEVEL  5/2/2012    Procedure:FUSION SPINE POSTERIOR MINIMALLY INVASIVE ONE LEVEL; Minimal Access Spinal Technology Transformaninal Lumbar Interbody Fusion L4-5, Decompression L3-5; Surgeon:LOTTIE MITCHELL; Location: OR     HC  "REMOVAL OF HYDROCELE,TUNICA,UNILAT  2006    bilateral     IR RENAL BIOPSY LEFT  6/13/2022     ORTHOPEDIC SURGERY  2000    Lf knee replacement     ORTHOPEDIC SURGERY  2002, 2010    Rt replacement     ORTHOPEDIC SURGERY  2005    Left ankle fused     RECONSTRUCT ANKLE Right 7/23/2020    Procedure: Ankle RECONSTRUCTIve Arthroplasty;  Surgeon: Luke Powers DPM;  Location: [facility]     SURGICAL HISTORY OF -       Cysto     SURGICAL HISTORY OF -   2002    Percutaneous kidney stone removal     SURGICAL HISTORY OF -       ureteral stent removal     SURGICAL HISTORY OF -   2005    bariatric surgery-Favian-en-Y     SURGICAL HISTORY OF -   2008    carpal tunnel surgery rt wrist     Family History   Problem Relation Age of Onset     Diabetes Brother      Obesity Brother      Cerebrovascular Disease Paternal Grandfather      Prostate Cancer Father      Cancer Father         bone     Diabetes Father      Heart Disease Mother         CHF     Cerebrovascular Disease Mother      Hypertension Mother      Cancer Mother         tumor in stomach     Cancer - colorectal Mother      Heart Disease Maternal Grandmother         CHF     Diabetes Paternal Grandmother      Breast Cancer Sister      Genitourinary Problems Son         Kidney stones     Cancer Brother      Cancer - colorectal Brother      Diabetes Brother        Objective  /72   Ht 1.549 m (5' 1\")   Wt 67.8 kg (149 lb 8 oz)   BMI 28.25 kg/m        General: healthy, alert and in no distress      HEENT: no scleral icterus or conjunctival erythema     Skin: no suspicious lesions or rash. No jaundice.     CV: regular rhythm by palpation, 2+ distal pulses, no pedal edema      Resp: normal respiratory effort without conversational dyspnea     Psych: normal mood and affect      Gait: nonantalgic, modest baseline coordination and balance, using walker    Neuro: normal light touch sensory exam of the extremities. Motor strength as noted below     Bilateral Shoulder exam    ROM: "        Limited active and passive ROM with flexion, extension, abduction, internal and external rotation, worse currently on the right again today, palpable, ballotable fluid collections    Tender:        subacromial space       posterior shoulder       Anterior shoulder       R>L    Non Tender:       remainder of shoulder    Strength:        abduction 3/5       internal rotation 4/5       external rotation 2/5       adduction 4/5       Similar on the left    Impingement testing:       positive (+) empty can       positive (+) crank    Stability testing:       neg (-) anterior glide       neg (-) sulcus sign    Skin:       no visible deformities       well perfused       capillary refill brisk       Palpable fluid collection about the shoulder generally, ballotable, R>>L    Sensation:        normal sensation over shoulder and upper extremity       Radiology  Recent Results (from the past 744 hour(s))   XR Shoulder 3 View Bilateral    Narrative    SHOULDER THREE VIEWS BILATERAL   4/7/2022 3:53 PM     HISTORY:  Shoulder pain bilaterally.    COMPARISON: Left shoulder radiographs from 9/24/2016. Right shoulder  radiographs from 5/20/2013.      Impression    IMPRESSION:    Right: Old healed right rib fractures again noted. Severe  osteoarthrosis of the glenohumeral joint, progressed. Narrowing of the  subacromial space by the coracoacromial arch configuration at AC joint  hypertrophy and high-riding humeral head again noted. Chronic  bone-on-bone contact between the acromion and humeral head indicating  chronic full-thickness rotator cuff tear.    Left: Severe osteoarthrosis of the glenohumeral joint, progressed. The  humeral head is high riding and there is remodeling of the superior  aspect of the humeral head and the undersurface of the acromion  indicating full-thickness rotator cuff tear. There is also some  destructive change in the superior aspect of the humeral head and  glenoid which has progressed since the  prior study which would raise  the question of [place] shoulder or an inflammatory or infectious  process in the appropriate clinical setting.    JOE MENA MD         SYSTEM ID:  [id number]       Large Joint Injection/Arthocentesis: bilateral glenohumeral    Date/Time: 8/16/2022 10:30 AM  Performed by: Danielito Mohan DO  Authorized by: Danielito Mohan DO     Indications:  Pain, osteoarthritis and joint swelling  Needle Size:  18 G  Guidance: ultrasound    Approach:  Anterolateral  Location:  Shoulder  Laterality:  Bilateral      Site:  Bilateral glenohumeral  Medications (Right):  40 mg triamcinolone 40 MG/ML; 3 mL ropivacaine 5 MG/ML  Aspirate amount (mL):  30  Aspirate:  Serous and yellow  Medications (Left):  40 mg triamcinolone 40 MG/ML; 3 mL ropivacaine 5 MG/ML  Aspirate amount (mL):  82  Aspirate:  Blood-tinged and serous  Outcome:  Tolerated well, no immediate complications  Procedure discussed: discussed risks, benefits, and alternatives    Consent Given by:  Patient  Timeout: timeout called immediately prior to procedure    Prep: patient was prepped and draped in usual sterile fashion     Ultrasound images of procedure were permanently stored.         Assessment:  1. Pain of both shoulder joints    2. Rotator cuff arthropathy of both shoulders    3. Osteoarthritis of bilateral glenohumeral joints    4. Effusion of right shoulder    5. Effusion of left shoulder        Plan:  Discussed the assessment with the patient.  Follow up: 2 weeks prn based on clinical progress  Medically complicated, with chronic b/l shoulder pain, usually L>R but currently much worse on the right again today  Cannot take NSAIDs due to CKD and other comorbidities  Bilateral shoulder US guided aspiration and CSI today for better pain control  Compressive brace option reviewed, currently using   Activity modification reviewed  Could consider PT in the future based on progress  Expectations and goals of CSI  reviewed  Often 2-3 days for steroid effect, and can take up to two weeks for maximum effect  We discussed modified progressive pain-free activity as tolerated  Do not overuse in first two weeks if feeling better due to concern for vulnerability while steroid is working  Supportive care reviewed  All questions were answered today  Contact us with additional questions or concerns  Signs and sx of concern reviewed      Danielito Mohan DO, GABE  Sports Medicine Physician  [facility]          Disclaimer: This note consists of symbols derived from keyboarding, dictation and/or voice recognition software. As a result, there may be errors in the script that have gone undetected. Please consider this when interpreting information found in this chart.

## 2022-08-16 NOTE — LETTER
2022         RE: Mann Escobar  [address]  [facility] [address]        Dear Colleague,    Thank you for referring your patient, Mann Escobar, to the [facility] [place]. Please see a copy of my visit note below.    Mann Escobar  :  1940  DOS: 22  MRN: [id number]    Sports Medicine Clinic Visit    PCP: Shayy Ramírez    Mann Escobar is a 81 year old Right hand dominant male who is seen as a self referral presenting with chronic bilateral shoulder pain.    Injury: Gradual onset of pain over the past 6+ years, left>>>right.  Patient notes that right shoulder has been significantly worse over the past 3 - 5 days, he did fall ~ one week ago landing on left side.  Pain located over bilateral deep anterior lateral glenohumeral joint, radiating to lateral upper arms.  Additional Features:  Positive: grinding, weakness and limited AROM bilaterally.  Symptoms are better with Other medications: oxycodone and Rest.  Symptoms are worse with: shoulder flexion/abduction, pushing/pulling self up from seated position, weight bearing through arms, lying on either shoulder.  Other evaluation and/or treatments so far consists of: Ice, Tylenol, Ibuprofen, Other medications: oxycodone and Rest.  Recent imaging completed: No recent imaging completed.  Prior History of related problems: Patient has previously consulted Dr Burrell @ [facility], left shoulder injection last completed , right shoulder last completed in .    Social History: retired     Interim History - 2022  Since last visit on 2022 patient has severe bilateral shoulder pain, left>>>right.  Bilateral shoulder glenohumeral injections and aspirations provided ~ 60 - 70% relief for 2 - 3 weeks.  He notes continued weakness with shoulder flexion/abduction movements.  No new injury in the interim.    Interim History - 2022  Since  last visit on 6/9/2022 patient has moderate-severe bilateral shoulder pain right>>>left.  Right shoulder glenohumeral joint aspiration completed on 6/9/22 provided ~ 80% relief for ~ 2 - 4 weeks.  He notes significant limitation with shoulder flexion/abduction today.  Bilateral shoulder joint appear to effused.  No new injury in the interim.      Review of Systems  Musculoskeletal: as above  Remainder of review of systems is negative including constitutional, CV, pulmonary, GI, Skin and Neurologic except as noted in HPI or medical history.    Past Medical History:   Diagnosis Date     Arthritis of ankle, left 7/24/2020     Back pain     WITH BILATERAL LEG PAIN AND NUMBNESS OF BOTH FEET     Gastro-oesophageal reflux disease     REFLUX     Hypertension      Hypopotassemia      Internal hemorrhoids      Iron deficiency anaemia      Leg edema      Morbid obesity 9/12/2005     Morbid obesity (H)      Osteoarthrosis      Scoliosis      Thalassemia minor      Past Surgical History:   Procedure Laterality Date     CATARACT IOL, RT/LT  2008/    Cataract IOL RT/LT     COLONOSCOPY  2009/07    polyps removed repeat 5 yrs, tubular adenoma     COLONOSCOPY  9/29/2011    Procedure:COLONOSCOPY; Colonoscopy with hemorrhoid banding; Surgeon:JEREMY GARCIA; Location:[facility]     COLONOSCOPY N/A 12/19/2017    Procedure: COLONOSCOPY;  colonoscopy, gastroscopy;  Surgeon: Edmar Isaacs MD;  Location: [facility]     DECOMPRESSION LUMBAR MINIMALLY INVASIVE TWO LEVELS  5/2/2012    Procedure:DECOMPRESSION LUMBAR MINIMALLY INVASIVE TWO LEVELS; Surgeon:LOTTIE MITCHELL; Location: OR     ESOPHAGOSCOPY, GASTROSCOPY, DUODENOSCOPY (EGD), COMBINED N/A 2/6/2018    Procedure: COMBINED ESOPHAGOSCOPY, GASTROSCOPY, DUODENOSCOPY (EGD);  gastroscopy;  Surgeon: Edmar Isaacs MD;  Location: [facility]     ESOPHAGOSCOPY, GASTROSCOPY, DUODENOSCOPY (EGD), COMBINED N/A 10/18/2021    Procedure: ESOPHAGOGASTRODUODENOSCOPY (EGD);  Surgeon: Anju Galeano MD;   "Location: [facility]     FUSION SPINE POSTERIOR MINIMALLY INVASIVE ONE LEVEL  5/2/2012    Procedure:FUSION SPINE POSTERIOR MINIMALLY INVASIVE ONE LEVEL; Minimal Access Spinal Technology Transformaninal Lumbar Interbody Fusion L4-5, Decompression L3-5; Surgeon:LOTTIE MITCHELL; Location:[facility]     HC REMOVAL OF HYDROCELE,TUNICA,UNILAT  2006    bilateral     IR RENAL BIOPSY LEFT  6/13/2022     ORTHOPEDIC SURGERY  2000    Lf knee replacement     ORTHOPEDIC SURGERY  2002, 2010    Rt replacement     ORTHOPEDIC SURGERY  2005    Left ankle fused     RECONSTRUCT ANKLE Right 7/23/2020    Procedure: Ankle RECONSTRUCTIve Arthroplasty;  Surgeon: Luke Powers DPM;  Location: [facility]     SURGICAL HISTORY OF -       Cysto     SURGICAL HISTORY OF -   2002    Percutaneous kidney stone removal     SURGICAL HISTORY OF -       ureteral stent removal     SURGICAL HISTORY OF -   2005    bariatric surgery-Favian-en-Y     SURGICAL HISTORY OF -   2008    carpal tunnel surgery rt wrist     Family History   Problem Relation Age of Onset     Diabetes Brother      Obesity Brother      Cerebrovascular Disease Paternal Grandfather      Prostate Cancer Father      Cancer Father         bone     Diabetes Father      Heart Disease Mother         CHF     Cerebrovascular Disease Mother      Hypertension Mother      Cancer Mother         tumor in stomach     Cancer - colorectal Mother      Heart Disease Maternal Grandmother         CHF     Diabetes Paternal Grandmother      Breast Cancer Sister      Genitourinary Problems Son         Kidney stones     Cancer Brother      Cancer - colorectal Brother      Diabetes Brother        Objective  /72   Ht 1.549 m (5' 1\")   Wt 67.8 kg (149 lb 8 oz)   BMI 28.25 kg/m        General: healthy, alert and in no distress      HEENT: no scleral icterus or conjunctival erythema     Skin: no suspicious lesions or rash. No jaundice.     CV: regular rhythm by palpation, 2+ distal pulses, no pedal edema      Resp: " normal respiratory effort without conversational dyspnea     Psych: normal mood and affect      Gait: nonantalgic, modest baseline coordination and balance, using walker    Neuro: normal light touch sensory exam of the extremities. Motor strength as noted below     Bilateral Shoulder exam    ROM:        Limited active and passive ROM with flexion, extension, abduction, internal and external rotation, worse currently on the right again today, palpable, ballotable fluid collections    Tender:        subacromial space       posterior shoulder       Anterior shoulder       R>L    Non Tender:       remainder of shoulder    Strength:        abduction 3/5       internal rotation 4/5       external rotation 2/5       adduction 4/5       Similar on the left    Impingement testing:       positive (+) empty can       positive (+) crank    Stability testing:       neg (-) anterior glide       neg (-) sulcus sign    Skin:       no visible deformities       well perfused       capillary refill brisk       Palpable fluid collection about the shoulder generally, ballotable, R>>L    Sensation:        normal sensation over shoulder and upper extremity       Radiology  Recent Results (from the past 744 hour(s))   XR Shoulder 3 View Bilateral    Narrative    SHOULDER THREE VIEWS BILATERAL   4/7/2022 3:53 PM     HISTORY:  Shoulder pain bilaterally.    COMPARISON: Left shoulder radiographs from 9/24/2016. Right shoulder  radiographs from 5/20/2013.      Impression    IMPRESSION:    Right: Old healed right rib fractures again noted. Severe  osteoarthrosis of the glenohumeral joint, progressed. Narrowing of the  subacromial space by the coracoacromial arch configuration at AC joint  hypertrophy and high-riding humeral head again noted. Chronic  bone-on-bone contact between the acromion and humeral head indicating  chronic full-thickness rotator cuff tear.    Left: Severe osteoarthrosis of the glenohumeral joint, progressed. The  humeral  head is high riding and there is remodeling of the superior  aspect of the humeral head and the undersurface of the acromion  indicating full-thickness rotator cuff tear. There is also some  destructive change in the superior aspect of the humeral head and  glenoid which has progressed since the prior study which would raise  the question of [place] shoulder or an inflammatory or infectious  process in the appropriate clinical setting.    JOE MENA MD         SYSTEM ID:  [id number]       Large Joint Injection/Arthocentesis: bilateral glenohumeral    Date/Time: 8/16/2022 10:30 AM  Performed by: Danielito Mohan DO  Authorized by: Danielito Mohan DO     Indications:  Pain, osteoarthritis and joint swelling  Needle Size:  18 G  Guidance: ultrasound    Approach:  Anterolateral  Location:  Shoulder  Laterality:  Bilateral      Site:  Bilateral glenohumeral  Medications (Right):  40 mg triamcinolone 40 MG/ML; 3 mL ropivacaine 5 MG/ML  Aspirate amount (mL):  30  Aspirate:  Serous and yellow  Medications (Left):  40 mg triamcinolone 40 MG/ML; 3 mL ropivacaine 5 MG/ML  Aspirate amount (mL):  82  Aspirate:  Blood-tinged and serous  Outcome:  Tolerated well, no immediate complications  Procedure discussed: discussed risks, benefits, and alternatives    Consent Given by:  Patient  Timeout: timeout called immediately prior to procedure    Prep: patient was prepped and draped in usual sterile fashion     Ultrasound images of procedure were permanently stored.         Assessment:  1. Pain of both shoulder joints    2. Rotator cuff arthropathy of both shoulders    3. Osteoarthritis of bilateral glenohumeral joints    4. Effusion of right shoulder    5. Effusion of left shoulder        Plan:  Discussed the assessment with the patient.  Follow up: 2 weeks prn based on clinical progress  Medically complicated, with chronic b/l shoulder pain, usually L>R but currently much worse on the right again today  Cannot  take NSAIDs due to CKD and other comorbidities  Bilateral shoulder US guided aspiration and CSI today for better pain control  Compressive brace option reviewed, currently using   Activity modification reviewed  Could consider PT in the future based on progress  Expectations and goals of CSI reviewed  Often 2-3 days for steroid effect, and can take up to two weeks for maximum effect  We discussed modified progressive pain-free activity as tolerated  Do not overuse in first two weeks if feeling better due to concern for vulnerability while steroid is working  Supportive care reviewed  All questions were answered today  Contact us with additional questions or concerns  Signs and sx of concern reviewed      Danielito Mohan DO, GABE  Sports Medicine Physician  [facility]          Disclaimer: This note consists of symbols derived from keyboarding, dictation and/or voice recognition software. As a result, there may be errors in the script that have gone undetected. Please consider this when interpreting information found in this chart.      Again, thank you for allowing me to participate in the care of your patient.        Sincerely,        Danielito Mohan DO

## 2022-08-18 ENCOUNTER — INFUSION THERAPY VISIT (OUTPATIENT)
Dept: INFUSION THERAPY | Facility: CLINIC | Age: 82
End: 2022-08-18
Attending: INTERNAL MEDICINE
Payer: COMMERCIAL

## 2022-08-18 ENCOUNTER — APPOINTMENT (OUTPATIENT)
Dept: LAB | Facility: CLINIC | Age: 82
End: 2022-08-18
Payer: COMMERCIAL

## 2022-08-18 VITALS — SYSTOLIC BLOOD PRESSURE: 148 MMHG | DIASTOLIC BLOOD PRESSURE: 66 MMHG | RESPIRATION RATE: 16 BRPM | HEART RATE: 75 BPM

## 2022-08-18 DIAGNOSIS — N18.32 ANEMIA OF CHRONIC RENAL FAILURE, STAGE 3B (H): Primary | ICD-10-CM

## 2022-08-18 DIAGNOSIS — D63.1 ANEMIA OF CHRONIC RENAL FAILURE, STAGE 3B (H): Primary | ICD-10-CM

## 2022-08-18 LAB
HCT VFR BLD AUTO: 29 % (ref 40–53)
HGB BLD-MCNC: 8.9 G/DL (ref 13.3–17.7)

## 2022-08-18 PROCEDURE — 250N000011 HC RX IP 250 OP 636: Performed by: INTERNAL MEDICINE

## 2022-08-18 PROCEDURE — 85018 HEMOGLOBIN: CPT | Performed by: INTERNAL MEDICINE

## 2022-08-18 PROCEDURE — 36415 COLL VENOUS BLD VENIPUNCTURE: CPT | Performed by: INTERNAL MEDICINE

## 2022-08-18 PROCEDURE — 85014 HEMATOCRIT: CPT | Performed by: INTERNAL MEDICINE

## 2022-08-18 PROCEDURE — 96372 THER/PROPH/DIAG INJ SC/IM: CPT | Performed by: INTERNAL MEDICINE

## 2022-08-18 RX ORDER — ALBUTEROL SULFATE 90 UG/1
1-2 AEROSOL, METERED RESPIRATORY (INHALATION)
Status: CANCELLED
Start: 2022-09-08

## 2022-08-18 RX ORDER — NALOXONE HYDROCHLORIDE 0.4 MG/ML
0.2 INJECTION, SOLUTION INTRAMUSCULAR; INTRAVENOUS; SUBCUTANEOUS
Status: CANCELLED | OUTPATIENT
Start: 2022-09-08

## 2022-08-18 RX ORDER — EPINEPHRINE 1 MG/ML
0.3 INJECTION, SOLUTION, CONCENTRATE INTRAVENOUS EVERY 5 MIN PRN
Status: CANCELLED | OUTPATIENT
Start: 2022-09-08

## 2022-08-18 RX ORDER — METHYLPREDNISOLONE SODIUM SUCCINATE 125 MG/2ML
125 INJECTION, POWDER, LYOPHILIZED, FOR SOLUTION INTRAMUSCULAR; INTRAVENOUS
Status: CANCELLED
Start: 2022-09-08

## 2022-08-18 RX ORDER — MEPERIDINE HYDROCHLORIDE 25 MG/ML
25 INJECTION INTRAMUSCULAR; INTRAVENOUS; SUBCUTANEOUS EVERY 30 MIN PRN
Status: CANCELLED | OUTPATIENT
Start: 2022-09-08

## 2022-08-18 RX ORDER — ALBUTEROL SULFATE 0.83 MG/ML
2.5 SOLUTION RESPIRATORY (INHALATION)
Status: CANCELLED | OUTPATIENT
Start: 2022-09-08

## 2022-08-18 RX ORDER — DIPHENHYDRAMINE HYDROCHLORIDE 50 MG/ML
50 INJECTION INTRAMUSCULAR; INTRAVENOUS
Status: CANCELLED
Start: 2022-09-08

## 2022-08-18 RX ADMIN — DARBEPOETIN ALFA 100 MCG: 100 INJECTION, SOLUTION INTRAVENOUS; SUBCUTANEOUS at 13:54

## 2022-08-18 NOTE — PROGRESS NOTES
Infusion Nursing Note:  Mann Escobar presents today for Aranesp.    Patient seen by provider today: No   present during visit today: Not Applicable.    Note: N/A.    Intravenous Access:  N/A.    Treatment Conditions:  Lab Results   Component Value Date    HGB 8.9 (L) 08/18/2022    WBC 5.4 07/28/2022    ANEU 3.7 02/02/2021    ANEUTAUTO 3.8 07/28/2022     07/28/2022      Results reviewed, labs MET treatment parameters, ok to proceed with treatment.    Post Infusion Assessment:  Patient tolerated injection without incident.     Discharge Plan:   Discharge instructions reviewed with: Patient.  Patient and/or family verbalized understanding of discharge instructions and all questions answered.  Patient discharged in stable condition accompanied by: self.  Departure Mode: Ambulatory.      Katy Meyers RN

## 2022-08-25 ENCOUNTER — OFFICE VISIT (OUTPATIENT)
Dept: FAMILY MEDICINE | Facility: CLINIC | Age: 82
End: 2022-08-25
Payer: COMMERCIAL

## 2022-08-25 VITALS
HEIGHT: 61 IN | SYSTOLIC BLOOD PRESSURE: 110 MMHG | HEART RATE: 76 BPM | TEMPERATURE: 97.7 F | RESPIRATION RATE: 20 BRPM | OXYGEN SATURATION: 98 % | BODY MASS INDEX: 28.51 KG/M2 | WEIGHT: 151 LBS | DIASTOLIC BLOOD PRESSURE: 62 MMHG

## 2022-08-25 DIAGNOSIS — N40.1 BENIGN NON-NODULAR PROSTATIC HYPERPLASIA WITH LOWER URINARY TRACT SYMPTOMS: ICD-10-CM

## 2022-08-25 DIAGNOSIS — M54.42 CHRONIC LEFT-SIDED LOW BACK PAIN WITH LEFT-SIDED SCIATICA: ICD-10-CM

## 2022-08-25 DIAGNOSIS — N18.32 STAGE 3B CHRONIC KIDNEY DISEASE (H): ICD-10-CM

## 2022-08-25 DIAGNOSIS — F11.20 CONTINUOUS OPIOID DEPENDENCE (H): Primary | ICD-10-CM

## 2022-08-25 DIAGNOSIS — G89.29 CHRONIC LEFT-SIDED LOW BACK PAIN WITH LEFT-SIDED SCIATICA: ICD-10-CM

## 2022-08-25 LAB
CREAT UR-MCNC: 49 MG/DL
CREAT UR-MCNC: 50 MG/DL
MICROALBUMIN UR-MCNC: 142 MG/L
MICROALBUMIN/CREAT UR: 289.8 MG/G CR (ref 0–17)

## 2022-08-25 PROCEDURE — 99214 OFFICE O/P EST MOD 30 MIN: CPT | Performed by: NURSE PRACTITIONER

## 2022-08-25 PROCEDURE — 80307 DRUG TEST PRSMV CHEM ANLYZR: CPT | Performed by: NURSE PRACTITIONER

## 2022-08-25 PROCEDURE — 82043 UR ALBUMIN QUANTITATIVE: CPT | Performed by: NURSE PRACTITIONER

## 2022-08-25 RX ORDER — LANOLIN ALCOHOL/MO/W.PET/CERES
CREAM (GRAM) TOPICAL
COMMUNITY
Start: 2022-05-12 | End: 2022-08-25 | Stop reason: DRUGHIGH

## 2022-08-25 RX ORDER — TERAZOSIN 5 MG/1
CAPSULE ORAL
Qty: 90 CAPSULE | Refills: 3 | Status: SHIPPED | OUTPATIENT
Start: 2022-08-25 | End: 2022-01-01

## 2022-08-25 RX ORDER — OXYCODONE HYDROCHLORIDE 5 MG/1
5 TABLET ORAL 2 TIMES DAILY PRN
Qty: 180 TABLET | Refills: 0 | Status: SHIPPED | OUTPATIENT
Start: 2022-08-25 | End: 2022-01-01

## 2022-08-25 ASSESSMENT — PAIN SCALES - GENERAL: PAINLEVEL: WORST PAIN (10)

## 2022-08-25 NOTE — PROGRESS NOTES
Assessment & Plan     Continuous opioid dependence (H)  Pain is stable.  Failed drug screen last appointment - patient states that he accidentally took his wife's pain pill that day. Will repeat today.  - oxyCODONE (ROXICODONE) 5 MG tablet; Take 1 tablet (5 mg) by mouth 2 times daily as needed for severe pain This is a 90 day supply  - Drug Confirmation Panel Urine with Creat - lab collect    Chronic left-sided low back pain with left-sided sciatica  stable  - oxyCODONE (ROXICODONE) 5 MG tablet; Take 1 tablet (5 mg) by mouth 2 times daily as needed for severe pain This is a 90 day supply    Stage 3b chronic kidney disease (H)  - Albumin Random Urine Quantitative with Creat Ratio    Benign non-nodular prostatic hyperplasia with lower urinary tract symptoms  Stable.  - terazosin (HYTRIN) 5 MG capsule; TAKE 1 CAPSULE AT BEDTIME      The risks, benefits and treatment options of prescribed medications or other treatments have been discussed with the patient. The patient verbalized their understanding and should call or follow up if no improvement or if they develop further problems.    GUNJAN Werner [facility]              Marcus Darnell is a 81 year old, presenting for the following health issues:  Refill Request (Pain medication )      HPI     Pain History:  When did you first notice your pain? - Chronic Pain   Have you seen this provider for your pain in the past?   Yes   Where in your body do you have pain? Joints-  Knees, shoulders, back  Are you seeing anyone else for your pain? No    PHQ-9 SCORE 5/31/2019 6/16/2021 5/3/2022   PHQ-9 Total Score 3 3 5       SARAH-7 SCORE 5/31/2019 6/16/2021 5/3/2022   Total Score 0 1 1           PEG Score 1/21/2022 5/3/2022 8/25/2022   PEG Total Score 9 9.33 10       Chronic Pain Follow Up:    Analgesia/pain control:    - Recent changes:  no    - Overall control: Tolerable with discomfort    - Current treatments: opioids  Adherence:     - Do  "you ever take more pain medicine than prescribed? No    - When did you take your last dose of pain medicine?  This morning   Adverse effects: No     PDMP Review       Value Time User    State PDMP site checked  Yes 8/25/2022  2:14 PM Shayy Ramírez APRN CNP        Last CSA Agreement:   CSA -- Patient Level:    Controlled Substance Agreement - Opioid - Scan on 5/8/2022  1:19 PM  Controlled Substance Agreement - Opioid - Scan on 6/17/2021  7:30 PM       Last UDS: 5/5/2022    Chronic Kidney Disease Follow-up      Do you take any over the counter pain medicine?: No     Follows with nephrology        Medication Followup of terazosin    Taking Medication as prescribed: yes    Side Effects:  None    Medication Helping Symptoms:  yes      Review of Systems   Constitutional, HEENT, cardiovascular, pulmonary, GI, , musculoskeletal, neuro, skin, endocrine and psych systems are negative, except as otherwise noted.      Objective    /62 (BP Location: Right arm, Cuff Size: Adult Regular)   Pulse 76   Temp 97.7  F (36.5  C) (Tympanic)   Resp 20   Ht 1.549 m (5' 1\")   Wt 68.5 kg (151 lb)   SpO2 98%   BMI 28.53 kg/m    Body mass index is 28.53 kg/m .  Physical Exam   GENERAL: healthy, alert and no distress                    .  ..  "

## 2022-08-25 NOTE — LETTER
August 31, 2022      Mann Escobar  [address]  [facility] [place] [address]        Dear ,    We are writing to inform you of your test results.    Urine drug screen contains oxycodone as expected.  Will repeat annually.    Resulted Orders   Albumin Random Urine Quantitative with Creat Ratio   Result Value Ref Range    Creatinine Urine mg/dL 49 mg/dL    Albumin Urine mg/L 142 mg/L    Albumin Urine mg/g Cr 289.80 (H) 0.00 - 17.00 mg/g Cr   Urine Drug Confirmation Panel   Result Value Ref Range    Oxycodone ng/mL 275 (H) <50 ng/mL    Oxycodone 550 Absent ng/mg [creat]      Comment:      Sources of oxycodone are scheduled prescription medications.    Oxymorphone ng/mL 940 (H) <50 ng/mL    Oxymorphone 1,880 Absent ng/mg [creat]      Comment:      Oxymorphone is an expected metabolite of oxycodone. Oxymorphone is also available as a scheduled prescription medication.    Narrative    This test was developed and its performance characteristics determined by the [facility],  [facility] Laboratory. It has not been cleared or approved by the FDA. The laboratory is regulated under CLIA as qualified to perform high-complexity testing. This test is used for clinical purposes. It should not be regarded as investigational or for research.   Urine Creatinine for Drug Screen Panel   Result Value Ref Range    Creatinine Urine for Drug Screen 50 mg/dL      Comment:      The reference range has not been established for creatinine in random urines. The results should be integrated into the clinical context for interpretation.       If you have any questions or concerns, please call the clinic at the number listed above.       Sincerely,      GUNJAN Werner CNP

## 2022-08-26 NOTE — PROGRESS NOTES
Nephrology Progress Note  2022    Assessment and Plan:  81 year old male with history of CKD, hypertension, gastric bypass , GI bleeding 2016, iron deficiency anemia and Thalassemia minor, who presents for CKD followup. He also has HFpEF and edema/ lymphedema. He has history of multiple orthopedic surgeries. His Scr is increased from 2 to 4 and significant anemia.    # CKD progressively worsening:  Scr has worsened especially in the last 2-3 years, previously was near 1 but up to 1.2-1.3 in  and 1.3-1.5 in  and up to 2 since .  His Scr in 2020 was noted up to 2, and had ankle surgery  and Scr remained around 2 since then.    - no NSAIDs in recent years except toradol 2020 postop   - no other significant nephrotoxins, on diuretics for many years - hydrochlorothiazide previously, on loop diuretics in recent years    - discussed lowering diuretic doses as able, he lowered to once a day but creatinine still 3.5 and has swelling   - he is wrapping legs / elevating and monitoring sodium more closely to see if we can lower diuretic dose, however given swelling can get significant and HFpEF, volume control is important.   - given history of gastric bypass, we checked oxalate level- it was elevated, started pyridoxine 50mg has been taking twice a day  - low grade proteinuria  - kidney biopsy 0n 22:   Kidney, needle biopsy: Limited biopsy sample showin) Arteriosclerosis, severe.  2) Extensive global       glomerulosclerosis, with extensive tubular atrophy and interstitial fibrosis.  - labs today  - referred to [facility]. He has been contacted to schedule kidney smart class but has not scheduled yet.   - discussed possible need for dialysis in coming months. He will schedule kidney smart class but is most interested in PD   .  # Hypertension: blood pressure ~120s systolic    -current regimen: amlodipine 5 mg daily, lasix 80/80 mg  - swelling still present but significantly improved     -continue furosemide     -  goal -135 systolic, and controlling swelling in multiple ways    # Anemia- acute on chronic, due to thalassemia and chronic renal disease, gastric ulcer noted recently:   - Previous Hgb down to 6.1, multifactorial, due to thalassemia and kidney disease, on EPO, sees hematology  - Iron studies: adequate.    # Electrolytes:   - Potassium; level: Normal / low normal, not taking potassium supplement.  - Bicarbonate; level: Normal/ high normal  - renal panel today    # Mineral Bone Disorder:   - Calcium; level is:  Normal  - Phosphorus; level is: Normal   - check vitamin D and PTH     Assessment and plan was discussed with patient and he voiced his understanding and agreement.      Reason for Visit:  Mann Escobar is an 81 year old male with CKD from? Biopsy showed severe arteriosclerosis and extensive global glomerulosclerosis, with extensive tubular atrophy and interstitial fibrosis, HFpEF, who presents for followup.    HPI:  He is a pleasant male with history of CKD who follows up for progressively worsening renal function. He has had elevated creatinine over the past few years, which has fluctuated. He was noted with HFpEF/ moderate diastolic dysfunction.      He has had significant amount of swelling since his ankle surgery last year, but even going back years, he has dealt with swelling and was even in lymphedema clinic many years ago.    He was given furosemide 20 mg then 40mg which helped with the edema.  He lost now 40+ lbs of weight with furosemide and now takes it as needed to maintain his weight and was taking it as needed. He now takes 80 mg BID.   He had a knee replacement in 2000 and has had some edema and then after his ankle surgery in July 2020     His weight was up to 199 lbs from 183 lbs and had gotten down to near 160 lbs. And now between 145-150 on average.   He had gastric bypass in 2000 and was 280 lbs at that point.    He was taking NSAIDS in the  "past and had GI bleeding and noted with another ulcer last Fall  He was noted with hemoglobin down to 6.1 several months ago. Last check was in June 2021. He denies dark stools or evidence of bleeding  He was started on aranesp by hematology.    He has prostate enlargement and has been prescribed terazosin.  He has continued to take this in case it helps.  He states he has been having normal urination and denies change in appetite.   His Scr is up to 4 in January, most recent in June was 3.69. He has been eating more salt. He has not had a great appetite so eating more of the stuff he finds appetizing. He has been trying to hydrate well.     His weight change is significant over recent months. His oxalate was elevated when we checked it last year. He did start pyridoxine but only one pill a day.   His ultrasound did show several cysts bilaterally but otherwise unremarkable, with normal sizes though right kidney 10.5 and left 13cm.     He had a kidney biopsy on 6/13 which showed \"severe arteriosclerosis, extensive global       glomerulosclerosis, and extensive tubular atrophy and       interstitial fibrosis. The sample is limited in that the       tissue for immunofluorescence studies did not contain any       glomeruli. There is no evidence of oxalate nephropathy.\"   His SPEP was negative, UPEP had a small monoclonal band in the gamma region with peak of 1.1     Renal History:   Kidney Disease and Medical Hx:  h/o HTN: Yes      ROS:   A comprehensive review of systems was obtained and negative, except as noted in the HPI or PMH.    Active Medical Problems:  Patient Active Problem List   Diagnosis      HX NEPHROLITHIASIS [V13.01]     Thalassemia minor     Esophageal reflux     Family history of prostate cancer     Leg edema     CARDIOVASCULAR SCREENING; LDL GOAL LESS THAN 130     Internal hemorrhoids     Iron deficiency anemia     First degree AV block     Family history of diabetes mellitus     Scoliosis     " Hypopotassemia     Advanced directives, counseling/discussion     HTN (hypertension)     Benign non-nodular prostatic hyperplasia with lower urinary tract symptoms     Chronic low back pain     S/P knee replacement     S/P ankle fusion     Abnormal antinuclear antibody titer     Osteoarthritis     Hypertension     BPH (benign prostatic hyperplasia)     Pseudogout     Chronic pain syndrome     S/P ankle joint replacement     Arthritis of ankle, right     Chronic anemia     Chronic kidney disease, stage 3 (H)     Diastolic dysfunction     Carpal tunnel syndrome     Edema     Nephrolithiasis     Marginal ulcer     Retching     History of Favian-en-Y gastric bypass     Chronic heart failure with preserved ejection fraction (H)     Anemia of chronic renal failure, stage 3b (H)     Continuous opioid dependence (H)     PMH:   Medical record was reviewed and PMH was discussed with patient and noted below.  Past Medical History:   Diagnosis Date     Arthritis of ankle, left 7/24/2020     Back pain     WITH BILATERAL LEG PAIN AND NUMBNESS OF BOTH FEET     Gastro-oesophageal reflux disease     REFLUX     Hypertension      Hypopotassemia      Internal hemorrhoids      Iron deficiency anaemia      Leg edema      Morbid obesity 9/12/2005     Morbid obesity (H)      Osteoarthrosis      Scoliosis      Thalassemia minor      PSH:   Past Surgical History:   Procedure Laterality Date     CATARACT IOL, RT/LT  2008/    Cataract IOL RT/LT     COLONOSCOPY  2009/07    polyps removed repeat 5 yrs, tubular adenoma     COLONOSCOPY  9/29/2011    Procedure:COLONOSCOPY; Colonoscopy with hemorrhoid banding; Surgeon:JEREMY GARCIA; Location:[facility]     COLONOSCOPY N/A 12/19/2017    Procedure: COLONOSCOPY;  colonoscopy, gastroscopy;  Surgeon: Edmar Isaacs MD;  Location: [facility]     DECOMPRESSION LUMBAR MINIMALLY INVASIVE TWO LEVELS  5/2/2012    Procedure:DECOMPRESSION LUMBAR MINIMALLY INVASIVE TWO LEVELS; Surgeon:LOTTIE MITCHELL; Location:  OR     ESOPHAGOSCOPY, GASTROSCOPY, DUODENOSCOPY (EGD), COMBINED N/A 2/6/2018    Procedure: COMBINED ESOPHAGOSCOPY, GASTROSCOPY, DUODENOSCOPY (EGD);  gastroscopy;  Surgeon: Edmar Isaacs MD;  Location: [facility]     ESOPHAGOSCOPY, GASTROSCOPY, DUODENOSCOPY (EGD), COMBINED N/A 10/18/2021    Procedure: ESOPHAGOGASTRODUODENOSCOPY (EGD);  Surgeon: Anju Galeano MD;  Location: [facility]     FUSION SPINE POSTERIOR MINIMALLY INVASIVE ONE LEVEL  5/2/2012    Procedure:FUSION SPINE POSTERIOR MINIMALLY INVASIVE ONE LEVEL; Minimal Access Spinal Technology Transformaninal Lumbar Interbody Fusion L4-5, Decompression L3-5; Surgeon:LOTTIE MITCHELL; Location:[facility]     HC REMOVAL OF HYDROCELE,TUNICA,UNILAT  2006    bilateral     IR RENAL BIOPSY LEFT  6/13/2022     ORTHOPEDIC SURGERY  2000    Lf knee replacement     ORTHOPEDIC SURGERY  2002, 2010    Rt replacement     ORTHOPEDIC SURGERY  2005    Left ankle fused     RECONSTRUCT ANKLE Right 7/23/2020    Procedure: Ankle RECONSTRUCTIve Arthroplasty;  Surgeon: Luke Powers DPM;  Location: [facility]     SURGICAL HISTORY OF -       Cysto     SURGICAL HISTORY OF -   2002    Percutaneous kidney stone removal     SURGICAL HISTORY OF -       ureteral stent removal     SURGICAL HISTORY OF -   2005    bariatric surgery-Favian-en-Y     SURGICAL HISTORY OF -   2008    carpal tunnel surgery rt wrist       Family Hx:   Family History   Problem Relation Age of Onset     Diabetes Brother      Obesity Brother      Cerebrovascular Disease Paternal Grandfather      Prostate Cancer Father      Cancer Father         bone     Diabetes Father      Heart Disease Mother         CHF     Cerebrovascular Disease Mother      Hypertension Mother      Cancer Mother         tumor in stomach     Cancer - colorectal Mother      Heart Disease Maternal Grandmother         CHF     Diabetes Paternal Grandmother      Breast Cancer Sister      Genitourinary Problems Son         Kidney stones     Cancer Brother      Cancer -  colorectal Brother      Diabetes Brother      Personal Hx:   Social History     Tobacco Use     Smoking status: Former Smoker     Packs/day: 0.50     Years: 7.00     Pack years: 3.50     Types: Cigarettes     Quit date: 1962     Years since quittin.6     Smokeless tobacco: Never Used   Substance Use Topics     Alcohol use: Yes     Comment: one every other day. Rarely       Allergies:  Allergies   Allergen Reactions     Nkda [No Known Drug Allergies]        Medications:  Current Outpatient Medications   Medication Sig     amLODIPine (NORVASC) 5 MG tablet Take 1 tablet (5 mg) by mouth daily     fluticasone (FLONASE) 50 MCG/ACT nasal spray Spray 1 spray into both nostrils daily (Patient not taking: Reported on 2022)     folic acid 800 MCG tablet Take 1 tablet (800 mcg) by mouth daily     furosemide (LASIX) 40 MG tablet START WITH 2 TABS IN MORNING AND 1 TABLET IN AFTERNOON, GOAL TO LOSE 1-2 POUNDS A DAY. TAKE 2 TABLETS BY MOUTH 2 TIMES DAILY     HCA VITAMIN B12 500 MCG OR TABS 2 tablets daily     MULTIVITAMIN OR one daily     omeprazole (PRILOSEC) 40 MG DR capsule Take 1 capsule (40 mg) by mouth 2 times daily     OVER-THE-COUNTER Elderberry 50mg supplement, one daily     oxyCODONE (ROXICODONE) 5 MG tablet Take 1 tablet (5 mg) by mouth 2 times daily as needed for severe pain This is a 90 day supply     pyridOXINE (VITAMIN B-6) 50 MG tablet Take 1 tablet (50 mg) by mouth 2 times daily     sucralfate (CARAFATE) 1 GM tablet Take 1 tablet (1 g) by mouth 4 times daily Take 30 minutes before meals and at bedtime (Patient taking differently: Take 1 g by mouth 4 times daily Take 30 minutes before meals and at bedtime    doesn't take this consistently)     terazosin (HYTRIN) 5 MG capsule TAKE 1 CAPSULE AT BEDTIME     Turmeric 500 MG CAPS Does not take all the time     VIACTIV OR With D 1000mg calcium     zinc gluconate 50 MG tablet Take 50 mg by mouth daily     Current Facility-Administered Medications   Medication  "    ropivacaine (NAROPIN) injection 3 mL     ropivacaine (NAROPIN) injection 3 mL     triamcinolone (KENALOG-40) injection 40 mg     triamcinolone (KENALOG-40) injection 40 mg      Vitals:  /72   Pulse 67   Ht 1.549 m (5' 1\")   Wt 68 kg (150 lb)   BMI 28.34 kg/m    GENERAL: Healthy, alert and no distress  Skin: skin changes noted to LE from chronic LE edema  Heart: RRR  Lungs: CTA  Extremities: 2+ edema to mid shin bilaterally L>R    LABS:   CMP  Recent Labs   Lab Test 06/13/22  1304 03/24/22  1423 01/26/22  1303 01/21/22  1006 06/16/21  1435 02/08/21  1121 02/01/21  1148 01/25/21  1058    143  143 144 141 143 142 141 142   POTASSIUM 4.3 3.7 3.7 3.6 3.8 3.5 3.7 4.0   CHLORIDE 110* 110* 114* 112* 107 107 109 112*   CO2 24 26 25 24 24 30 27 29   ANIONGAP 9 7 5 5 12 5 5 1*   GLC 85 150* 128* 92 80 91 117* 96   BUN 54* 47* 54* 50* 47* 28 23 21   CR 3.69* 3.49*  3.49* 4.03* 3.56* 2.73* 2.26* 2.12* 2.02*   GFRESTIMATED 16* 17* 14* 16* 21* 26* 28* 30*   GFRESTBLACK  --   --   --   --  24* 31* 33* 35*   RUTH 7.0* 6.9* 7.9* 8.0* 7.7* 8.0* 8.2* 8.4*     Recent Labs   Lab Test 02/08/21  1121 11/10/17  1401 06/11/15  1306   BILITOTAL 0.5 0.5 0.6   ALKPHOS 104 110 100   ALT 21 20 25   AST 14 13 14     CBC  Recent Labs   Lab Test 08/18/22  1321 07/28/22  1403 07/07/22  1323 06/13/22  1304 05/26/22  1257   HGB 8.9* 8.0* 8.5* 8.7* 8.5*   WBC  --  5.4 6.0 6.4 6.8   RBC  --  4.32* 4.56 4.71 4.61   HCT 29.0* 26.2* 28.0* 29.0* 28.6*   MCV  --  61* 61* 62* 62*   MCH  --  18.5* 18.6* 18.5* 18.4*   MCHC  --  30.5* 30.4* 30.0* 29.7*   RDW  --  19.4* 19.5* 19.2* 17.2*   PLT  --  210 189 191 248     URINE STUDIES  Recent Labs   Lab Test 03/24/22  1538 01/26/22  1303 06/16/21  1444 11/30/18  1045 09/28/15  1134   COLOR Yellow Yellow Yellow Yellow Yellow   APPEARANCE Clear Slightly Cloudy* Clear Clear Clear   URINEGLC 50 * Negative Negative Negative Negative   URINEBILI Negative Negative Negative Negative Small  This is an " unconfirmed screening test result. A positive result may be false.  *   URINEKETONE Negative Negative Negative Negative Trace*   SG 1.016 1.016 1.020 1.025 >1.030   UBLD Negative Negative Negative Trace* Negative   URINEPH 5.0 5.0 5.5 6.0 5.5   PROTEIN 100 * 100 * 100* >=300* 100*   UROBILINOGEN  --   --  1.0 1.0 1.0   NITRITE Negative Negative Negative Negative Negative   LEUKEST Negative Negative Negative Negative Negative   RBCU 0 <1 O - 2 O - 2 O - 2  Urine was tested unconcentrated because <10 ml was received.     WBCU 1 3 0 - 5 0 - 5 O - 2  Urine was tested unconcentrated because <10 ml was received.       Recent Labs   Lab Test 03/24/22  1538 01/26/22  1303   [facility] 1.88* 0.80*     PTH  No lab results found.  IRON STUDIES  Recent Labs   Lab Test 07/28/22  1322 05/26/22  1257 03/24/22  1423   IRON 57 33* 49   * 176* 174*   IRONSAT 40 19 28   TESSIE 242 288 253         Stormy Madrigal PA-C    Visit length 25minutes. An additional 20 minutes were spent on date of service in chart review, documentation, and other activities as noted.

## 2022-08-29 ENCOUNTER — OFFICE VISIT (OUTPATIENT)
Dept: NEPHROLOGY | Facility: CLINIC | Age: 82
End: 2022-08-29
Payer: COMMERCIAL

## 2022-08-29 VITALS
SYSTOLIC BLOOD PRESSURE: 121 MMHG | DIASTOLIC BLOOD PRESSURE: 72 MMHG | BODY MASS INDEX: 28.32 KG/M2 | HEART RATE: 67 BPM | HEIGHT: 61 IN | WEIGHT: 150 LBS

## 2022-08-29 DIAGNOSIS — D63.1 ANEMIA OF CHRONIC RENAL FAILURE, STAGE 4 (SEVERE) (H): ICD-10-CM

## 2022-08-29 DIAGNOSIS — N18.4 CKD (CHRONIC KIDNEY DISEASE) STAGE 4, GFR 15-29 ML/MIN (H): Primary | ICD-10-CM

## 2022-08-29 DIAGNOSIS — N18.4 ANEMIA OF CHRONIC RENAL FAILURE, STAGE 4 (SEVERE) (H): ICD-10-CM

## 2022-08-29 DIAGNOSIS — R60.9 EDEMA, UNSPECIFIED TYPE: ICD-10-CM

## 2022-08-29 DIAGNOSIS — I10 ESSENTIAL HYPERTENSION: ICD-10-CM

## 2022-08-29 LAB
ALBUMIN SERPL-MCNC: 2.7 G/DL (ref 3.4–5)
ANION GAP SERPL CALCULATED.3IONS-SCNC: 11 MMOL/L (ref 3–14)
BUN SERPL-MCNC: 64 MG/DL (ref 7–30)
CALCIUM SERPL-MCNC: 6 MG/DL (ref 8.5–10.1)
CHLORIDE BLD-SCNC: 109 MMOL/L (ref 94–109)
CO2 SERPL-SCNC: 24 MMOL/L (ref 20–32)
CREAT SERPL-MCNC: 4.22 MG/DL (ref 0.66–1.25)
GFR SERPL CREATININE-BSD FRML MDRD: 13 ML/MIN/1.73M2
GLUCOSE BLD-MCNC: 97 MG/DL (ref 70–99)
PHOSPHATE SERPL-MCNC: 5.3 MG/DL (ref 2.5–4.5)
POTASSIUM BLD-SCNC: 3.6 MMOL/L (ref 3.4–5.3)
PTH-INTACT SERPL-MCNC: 356 PG/ML (ref 15–65)
SODIUM SERPL-SCNC: 144 MMOL/L (ref 133–144)

## 2022-08-29 PROCEDURE — 36415 COLL VENOUS BLD VENIPUNCTURE: CPT | Performed by: PHYSICIAN ASSISTANT

## 2022-08-29 PROCEDURE — 82306 VITAMIN D 25 HYDROXY: CPT | Performed by: PHYSICIAN ASSISTANT

## 2022-08-29 PROCEDURE — 83970 ASSAY OF PARATHORMONE: CPT | Performed by: PHYSICIAN ASSISTANT

## 2022-08-29 PROCEDURE — 80069 RENAL FUNCTION PANEL: CPT | Performed by: PHYSICIAN ASSISTANT

## 2022-08-29 PROCEDURE — 99215 OFFICE O/P EST HI 40 MIN: CPT | Performed by: PHYSICIAN ASSISTANT

## 2022-08-29 NOTE — LETTER
2022       RE: Mann Escobar  [address]  [facility] [address]     Dear Colleague,    Thank you for referring your patient, Mann Escobar, to the [facility] at [facility]. Please see a copy of my visit note below.    Nephrology Progress Note  2022    Assessment and Plan:  81 year old male with history of CKD, hypertension, gastric bypass , GI bleeding , iron deficiency anemia and Thalassemia minor, who presents for CKD followup. He also has HFpEF and edema/ lymphedema. He has history of multiple orthopedic surgeries. His Scr is increased from 2 to 4 and significant anemia.    # CKD progressively worsening:  Scr has worsened especially in the last 2-3 years, previously was near 1 but up to 1.2-1.3 in  and 1.3-1.5 in  and up to 2 since .  His Scr in 2020 was noted up to 2, and had ankle surgery  and Scr remained around 2 since then.    - no NSAIDs in recent years except toradol 2020 postop   - no other significant nephrotoxins, on diuretics for many years - hydrochlorothiazide previously, on loop diuretics in recent years    - discussed lowering diuretic doses as able, he lowered to once a day but creatinine still 3.5 and has swelling   - he is wrapping legs / elevating and monitoring sodium more closely to see if we can lower diuretic dose, however given swelling can get significant and HFpEF, volume control is important.   - given history of gastric bypass, we checked oxalate level- it was elevated, started pyridoxine 50mg has been taking twice a day  - low grade proteinuria  - kidney biopsy 0n 22:   Kidney, needle biopsy: Limited biopsy sample showin) Arteriosclerosis, severe.  2) Extensive global       glomerulosclerosis, with extensive tubular atrophy and interstitial fibrosis.  - labs today  - referred to CKD journey. He has been contacted to schedule  kidney smart class but has not scheduled yet.   - discussed possible need for dialysis in coming months. He will schedule kidney smart class but is most interested in PD   .  # Hypertension: blood pressure ~120s systolic    -current regimen: amlodipine 5 mg daily, lasix 80/80 mg  - swelling still present but significantly improved    -continue furosemide     -  goal -135 systolic, and controlling swelling in multiple ways    # Anemia- acute on chronic, due to thalassemia and chronic renal disease, gastric ulcer noted recently:   - Previous Hgb down to 6.1, multifactorial, due to thalassemia and kidney disease, on EPO, sees hematology  - Iron studies: adequate.    # Electrolytes:   - Potassium; level: Normal / low normal, not taking potassium supplement.  - Bicarbonate; level: Normal/ high normal  - renal panel today    # Mineral Bone Disorder:   - Calcium; level is:  Normal  - Phosphorus; level is: Normal   - check vitamin D and PTH     Assessment and plan was discussed with patient and he voiced his understanding and agreement.      Reason for Visit:  Mann Escobar is an 81 year old male with CKD from? Biopsy showed severe arteriosclerosis and extensive global glomerulosclerosis, with extensive tubular atrophy and interstitial fibrosis, HFpEF, who presents for followup.    HPI:  He is a pleasant male with history of CKD who follows up for progressively worsening renal function. He has had elevated creatinine over the past few years, which has fluctuated. He was noted with HFpEF/ moderate diastolic dysfunction.      He has had significant amount of swelling since his ankle surgery last year, but even going back years, he has dealt with swelling and was even in lymphedema clinic many years ago.    He was given furosemide 20 mg then 40mg which helped with the edema.  He lost now 40+ lbs of weight with furosemide and now takes it as needed to maintain his weight and was taking it as needed. He now  "takes 80 mg BID.   He had a knee replacement in 2000 and has had some edema and then after his ankle surgery in July 2020     His weight was up to 199 lbs from 183 lbs and had gotten down to near 160 lbs. And now between 145-150 on average.   He had gastric bypass in 2000 and was 280 lbs at that point.    He was taking NSAIDS in the past and had GI bleeding and noted with another ulcer last Fall  He was noted with hemoglobin down to 6.1 several months ago. Last check was in June 2021. He denies dark stools or evidence of bleeding  He was started on aranesp by hematology.    He has prostate enlargement and has been prescribed terazosin.  He has continued to take this in case it helps.  He states he has been having normal urination and denies change in appetite.   His Scr is up to 4 in January, most recent in June was 3.69. He has been eating more salt. He has not had a great appetite so eating more of the stuff he finds appetizing. He has been trying to hydrate well.     His weight change is significant over recent months. His oxalate was elevated when we checked it last year. He did start pyridoxine but only one pill a day.   His ultrasound did show several cysts bilaterally but otherwise unremarkable, with normal sizes though right kidney 10.5 and left 13cm.     He had a kidney biopsy on 6/13 which showed \"severe arteriosclerosis, extensive global       glomerulosclerosis, and extensive tubular atrophy and       interstitial fibrosis. The sample is limited in that the       tissue for immunofluorescence studies did not contain any       glomeruli. There is no evidence of oxalate nephropathy.\"   His SPEP was negative, UPEP had a small monoclonal band in the gamma region with peak of 1.1     Renal History:   Kidney Disease and Medical Hx:  h/o HTN: Yes      ROS:   A comprehensive review of systems was obtained and negative, except as noted in the HPI or PMH.    Active Medical Problems:  Patient Active Problem List "   Diagnosis      HX NEPHROLITHIASIS [V13.01]     Thalassemia minor     Esophageal reflux     Family history of prostate cancer     Leg edema     CARDIOVASCULAR SCREENING; LDL GOAL LESS THAN 130     Internal hemorrhoids     Iron deficiency anemia     First degree AV block     Family history of diabetes mellitus     Scoliosis     Hypopotassemia     Advanced directives, counseling/discussion     HTN (hypertension)     Benign non-nodular prostatic hyperplasia with lower urinary tract symptoms     Chronic low back pain     S/P knee replacement     S/P ankle fusion     Abnormal antinuclear antibody titer     Osteoarthritis     Hypertension     BPH (benign prostatic hyperplasia)     Pseudogout     Chronic pain syndrome     S/P ankle joint replacement     Arthritis of ankle, right     Chronic anemia     Chronic kidney disease, stage 3 (H)     Diastolic dysfunction     Carpal tunnel syndrome     Edema     Nephrolithiasis     Marginal ulcer     Retching     History of Favian-en-Y gastric bypass     Chronic heart failure with preserved ejection fraction (H)     Anemia of chronic renal failure, stage 3b (H)     Continuous opioid dependence (H)     PMH:   Medical record was reviewed and PMH was discussed with patient and noted below.  Past Medical History:   Diagnosis Date     Arthritis of ankle, left 7/24/2020     Back pain     WITH BILATERAL LEG PAIN AND NUMBNESS OF BOTH FEET     Gastro-oesophageal reflux disease     REFLUX     Hypertension      Hypopotassemia      Internal hemorrhoids      Iron deficiency anaemia      Leg edema      Morbid obesity 9/12/2005     Morbid obesity (H)      Osteoarthrosis      Scoliosis      Thalassemia minor      PSH:   Past Surgical History:   Procedure Laterality Date     CATARACT IOL, RT/LT  2008/    Cataract IOL RT/LT     COLONOSCOPY  2009/07    polyps removed repeat 5 yrs, tubular adenoma     COLONOSCOPY  9/29/2011    Procedure:COLONOSCOPY; Colonoscopy with hemorrhoid banding; Surgeon:RADHA  JEREMY NIGEL; Location:[facility]     COLONOSCOPY N/A 12/19/2017    Procedure: COLONOSCOPY;  colonoscopy, gastroscopy;  Surgeon: Edmar Isaacs MD;  Location: [facility]     DECOMPRESSION LUMBAR MINIMALLY INVASIVE TWO LEVELS  5/2/2012    Procedure:DECOMPRESSION LUMBAR MINIMALLY INVASIVE TWO LEVELS; Surgeon:LOTTIE MITCHELL; [facility]     ESOPHAGOSCOPY, GASTROSCOPY, DUODENOSCOPY (EGD), COMBINED N/A 2/6/2018    Procedure: COMBINED ESOPHAGOSCOPY, GASTROSCOPY, DUODENOSCOPY (EGD);  gastroscopy;  Surgeon: Edmar Isaacs MD;  Location: [facility]     ESOPHAGOSCOPY, GASTROSCOPY, DUODENOSCOPY (EGD), COMBINED N/A 10/18/2021    Procedure: ESOPHAGOGASTRODUODENOSCOPY (EGD);  Surgeon: Anju Galeano MD;  Location: [facility]     FUSION SPINE POSTERIOR MINIMALLY INVASIVE ONE LEVEL  5/2/2012    Procedure:FUSION SPINE POSTERIOR MINIMALLY INVASIVE ONE LEVEL; Minimal Access Spinal Technology Transformaninal Lumbar Interbody Fusion L4-5, Decompression L3-5; Surgeon:LOTTIE MITCHELL; Location:[facility]     HC REMOVAL OF HYDROCELE,TUNICA,UNILAT  2006    bilateral     IR RENAL BIOPSY LEFT  6/13/2022     ORTHOPEDIC SURGERY  2000    Lf knee replacement     ORTHOPEDIC SURGERY  2002, 2010    Rt replacement     ORTHOPEDIC SURGERY  2005    Left ankle fused     RECONSTRUCT ANKLE Right 7/23/2020    Procedure: Ankle RECONSTRUCTIve Arthroplasty;  Surgeon: Luke Powers DPM;  Location: [facility]     SURGICAL HISTORY OF -       Cysto     SURGICAL HISTORY OF -   2002    Percutaneous kidney stone removal     SURGICAL HISTORY OF -       ureteral stent removal     SURGICAL HISTORY OF -   2005    bariatric surgery-Favian-en-Y     SURGICAL HISTORY OF -   2008    carpal tunnel surgery rt wrist       Family Hx:   Family History   Problem Relation Age of Onset     Diabetes Brother      Obesity Brother      Cerebrovascular Disease Paternal Grandfather      Prostate Cancer Father      Cancer Father         bone     Diabetes Father      Heart Disease Mother         CHF      Cerebrovascular Disease Mother      Hypertension Mother      Cancer Mother         tumor in stomach     Cancer - colorectal Mother      Heart Disease Maternal Grandmother         CHF     Diabetes Paternal Grandmother      Breast Cancer Sister      Genitourinary Problems Son         Kidney stones     Cancer Brother      Cancer - colorectal Brother      Diabetes Brother      Personal Hx:   Social History     Tobacco Use     Smoking status: Former Smoker     Packs/day: 0.50     Years: 7.00     Pack years: 3.50     Types: Cigarettes     Quit date: 1962     Years since quittin.6     Smokeless tobacco: Never Used   Substance Use Topics     Alcohol use: Yes     Comment: one every other day. Rarely       Allergies:  Allergies   Allergen Reactions     Nkda [No Known Drug Allergies]        Medications:  Current Outpatient Medications   Medication Sig     amLODIPine (NORVASC) 5 MG tablet Take 1 tablet (5 mg) by mouth daily     fluticasone (FLONASE) 50 MCG/ACT nasal spray Spray 1 spray into both nostrils daily (Patient not taking: Reported on 2022)     folic acid 800 MCG tablet Take 1 tablet (800 mcg) by mouth daily     furosemide (LASIX) 40 MG tablet START WITH 2 TABS IN MORNING AND 1 TABLET IN AFTERNOON, GOAL TO LOSE 1-2 POUNDS A DAY. TAKE 2 TABLETS BY MOUTH 2 TIMES DAILY     HCA VITAMIN B12 500 MCG OR TABS 2 tablets daily     MULTIVITAMIN OR one daily     omeprazole (PRILOSEC) 40 MG DR capsule Take 1 capsule (40 mg) by mouth 2 times daily     OVER-THE-COUNTER Elderberry 50mg supplement, one daily     oxyCODONE (ROXICODONE) 5 MG tablet Take 1 tablet (5 mg) by mouth 2 times daily as needed for severe pain This is a 90 day supply     pyridOXINE (VITAMIN B-6) 50 MG tablet Take 1 tablet (50 mg) by mouth 2 times daily     sucralfate (CARAFATE) 1 GM tablet Take 1 tablet (1 g) by mouth 4 times daily Take 30 minutes before meals and at bedtime (Patient taking differently: Take 1 g by mouth 4 times daily Take 30 minutes  "before meals and at bedtime    doesn't take this consistently)     terazosin (HYTRIN) 5 MG capsule TAKE 1 CAPSULE AT BEDTIME     Turmeric 500 MG CAPS Does not take all the time     VIACTIV OR With D 1000mg calcium     zinc gluconate 50 MG tablet Take 50 mg by mouth daily     Current Facility-Administered Medications   Medication     ropivacaine (NAROPIN) injection 3 mL     ropivacaine (NAROPIN) injection 3 mL     triamcinolone (KENALOG-40) injection 40 mg     triamcinolone (KENALOG-40) injection 40 mg      Vitals:  /72   Pulse 67   Ht 1.549 m (5' 1\")   Wt 68 kg (150 lb)   BMI 28.34 kg/m    GENERAL: Healthy, alert and no distress  Skin: skin changes noted to LE from chronic LE edema  Heart: RRR  Lungs: CTA  Extremities: 2+ edema to mid shin bilaterally L>R    LABS:   CMP  Recent Labs   Lab Test 06/13/22  1304 03/24/22  1423 01/26/22  1303 01/21/22  1006 06/16/21  1435 02/08/21  1121 02/01/21  1148 01/25/21  1058    143  143 144 141 143 142 141 142   POTASSIUM 4.3 3.7 3.7 3.6 3.8 3.5 3.7 4.0   CHLORIDE 110* 110* 114* 112* 107 107 109 112*   CO2 24 26 25 24 24 30 27 29   ANIONGAP 9 7 5 5 12 5 5 1*   GLC 85 150* 128* 92 80 91 117* 96   BUN 54* 47* 54* 50* 47* 28 23 21   CR 3.69* 3.49*  3.49* 4.03* 3.56* 2.73* 2.26* 2.12* 2.02*   GFRESTIMATED 16* 17* 14* 16* 21* 26* 28* 30*   GFRESTBLACK  --   --   --   --  24* 31* 33* 35*   RUTH 7.0* 6.9* 7.9* 8.0* 7.7* 8.0* 8.2* 8.4*     Recent Labs   Lab Test 02/08/21  1121 11/10/17  1401 06/11/15  1306   BILITOTAL 0.5 0.5 0.6   ALKPHOS 104 110 100   ALT 21 20 25   AST 14 13 14     CBC  Recent Labs   Lab Test 08/18/22  1321 07/28/22  1403 07/07/22  1323 06/13/22  1304 05/26/22  1257   HGB 8.9* 8.0* 8.5* 8.7* 8.5*   WBC  --  5.4 6.0 6.4 6.8   RBC  --  4.32* 4.56 4.71 4.61   HCT 29.0* 26.2* 28.0* 29.0* 28.6*   MCV  --  61* 61* 62* 62*   MCH  --  18.5* 18.6* 18.5* 18.4*   MCHC  --  30.5* 30.4* 30.0* 29.7*   RDW  --  19.4* 19.5* 19.2* 17.2*   PLT  --  210 189 191 248 "     URINE STUDIES  Recent Labs   Lab Test 03/24/22  1538 01/26/22  1303 06/16/21  1444 11/30/18  1045 09/28/15  1134   COLOR Yellow Yellow Yellow Yellow Yellow   APPEARANCE Clear Slightly Cloudy* Clear Clear Clear   URINEGLC 50 * Negative Negative Negative Negative   URINEBILI Negative Negative Negative Negative Small  This is an unconfirmed screening test result. A positive result may be false.  *   URINEKETONE Negative Negative Negative Negative Trace*   SG 1.016 1.016 1.020 1.025 >1.030   UBLD Negative Negative Negative Trace* Negative   URINEPH 5.0 5.0 5.5 6.0 5.5   PROTEIN 100 * 100 * 100* >=300* 100*   UROBILINOGEN  --   --  1.0 1.0 1.0   NITRITE Negative Negative Negative Negative Negative   LEUKEST Negative Negative Negative Negative Negative   RBCU 0 <1 O - 2 O - 2 O - 2  Urine was tested unconcentrated because <10 ml was received.     WBCU 1 3 0 - 5 0 - 5 O - 2  Urine was tested unconcentrated because <10 ml was received.       Recent Labs   Lab Test 03/24/22  1538 01/26/22  1303   UTPG 1.88* 0.80*     PTH  No lab results found.  IRON STUDIES  Recent Labs   Lab Test 07/28/22  1322 05/26/22  1257 03/24/22  1423   IRON 57 33* 49   * 176* 174*   IRONSAT 40 19 28   TESSIE 242 288 253         Yonas Madrigal PA-C    Visit length 25minutes. An additional 20 minutes were spent on date of service in chart review, documentation, and other activities as noted.       Again, thank you for allowing me to participate in the care of your patient.      Sincerely,    YONAS NOONAN PA-C

## 2022-08-29 NOTE — PATIENT INSTRUCTIONS
Labs today, will call with results and recommendations.   No medication changes today.   Continue good hydration, follow low sodium diet.   Avoid ibuprofen/aleve.   Attend kidney smart class to get information on types of dialysis then will make referral to surgeon.   Follow up with me in 6 weeks and with Dr. Alicea in 3 months.   Call sooner with any concerns

## 2022-08-30 LAB
DEPRECATED CALCIDIOL+CALCIFEROL SERPL-MC: 37 UG/L (ref 20–75)
OXYCODONE UR CFM-MCNC: 275 NG/ML
OXYCODONE/CREAT UR: 550 NG/MG {CREAT}
OXYMORPHONE UR CFM-MCNC: 940 NG/ML
OXYMORPHONE/CREAT UR: 1880 NG/MG {CREAT}

## 2022-08-31 ENCOUNTER — TELEPHONE (OUTPATIENT)
Dept: NEPHROLOGY | Facility: CLINIC | Age: 82
End: 2022-08-31

## 2022-08-31 DIAGNOSIS — E83.51 HYPOCALCEMIA: Primary | ICD-10-CM

## 2022-08-31 RX ORDER — VITAMIN B COMPLEX
1 TABLET ORAL DAILY
Qty: 30 TABLET | Refills: 11 | Status: SHIPPED | OUTPATIENT
Start: 2022-08-31 | End: 2022-01-01 | Stop reason: DRUGHIGH

## 2022-08-31 NOTE — TELEPHONE ENCOUNTER
"Stormy Madrigal PA-C Forrest, Rebecca J, RN  Please see if pt is having any leg cramps, tetany symptoms. His calcium is low and trying to decide if he needs IV calcium or can start oral supplement.     Thanks!   Stormy     Spoke with Mann. He denies any cramping or \"enrique horses\" in his legs. Writer asked about back cramps and he said his low back \"aches\". He says he normally has an achy back in the morning but it has sort of \"lingered\" throughout the day. Otherwise he is feeling fine.    Stormy Madrigal PA-C Forrest, Rebecca J, RN  Caller: Unspecified (Today,  1:32 PM)  Okay. Please have him start cholecalciferol 1000 units daily.     Thank you   Stormy      Relayed the above information. Sent script to his [facility] pharmacy. Mann verbalizes understanding.        "

## 2022-09-08 ENCOUNTER — APPOINTMENT (OUTPATIENT)
Dept: LAB | Facility: CLINIC | Age: 82
End: 2022-09-08
Payer: COMMERCIAL

## 2022-09-08 ENCOUNTER — INFUSION THERAPY VISIT (OUTPATIENT)
Dept: INFUSION THERAPY | Facility: CLINIC | Age: 82
End: 2022-09-08
Attending: INTERNAL MEDICINE
Payer: COMMERCIAL

## 2022-09-08 VITALS — TEMPERATURE: 98.1 F | HEART RATE: 69 BPM | DIASTOLIC BLOOD PRESSURE: 76 MMHG | SYSTOLIC BLOOD PRESSURE: 124 MMHG

## 2022-09-08 DIAGNOSIS — N18.32 ANEMIA OF CHRONIC RENAL FAILURE, STAGE 3B (H): Primary | ICD-10-CM

## 2022-09-08 DIAGNOSIS — D63.1 ANEMIA OF CHRONIC RENAL FAILURE, STAGE 3B (H): Primary | ICD-10-CM

## 2022-09-08 DIAGNOSIS — D56.8 OTHER THALASSEMIA (H): ICD-10-CM

## 2022-09-08 LAB
BASOPHILS # BLD AUTO: 0.1 10E3/UL (ref 0–0.2)
BASOPHILS NFR BLD AUTO: 1 %
EOSINOPHIL # BLD AUTO: 0.1 10E3/UL (ref 0–0.7)
EOSINOPHIL NFR BLD AUTO: 1 %
ERYTHROCYTE [DISTWIDTH] IN BLOOD BY AUTOMATED COUNT: 18.6 % (ref 10–15)
HCT VFR BLD AUTO: 28.6 % (ref 40–53)
HGB BLD-MCNC: 9 G/DL (ref 13.3–17.7)
IMM GRANULOCYTES # BLD: 0 10E3/UL
IMM GRANULOCYTES NFR BLD: 0 %
LYMPHOCYTES # BLD AUTO: 0.9 10E3/UL (ref 0.8–5.3)
LYMPHOCYTES NFR BLD AUTO: 13 %
MCH RBC QN AUTO: 18.9 PG (ref 26.5–33)
MCHC RBC AUTO-ENTMCNC: 31.5 G/DL (ref 31.5–36.5)
MCV RBC AUTO: 60 FL (ref 78–100)
MONOCYTES # BLD AUTO: 0.7 10E3/UL (ref 0–1.3)
MONOCYTES NFR BLD AUTO: 10 %
NEUTROPHILS # BLD AUTO: 5.1 10E3/UL (ref 1.6–8.3)
NEUTROPHILS NFR BLD AUTO: 75 %
NRBC # BLD AUTO: 0 10E3/UL
NRBC BLD AUTO-RTO: 0 /100
PLATELET # BLD AUTO: 195 10E3/UL (ref 150–450)
RBC # BLD AUTO: 4.75 10E6/UL (ref 4.4–5.9)
WBC # BLD AUTO: 6.9 10E3/UL (ref 4–11)

## 2022-09-08 PROCEDURE — 85025 COMPLETE CBC W/AUTO DIFF WBC: CPT | Performed by: INTERNAL MEDICINE

## 2022-09-08 PROCEDURE — 250N000011 HC RX IP 250 OP 636: Performed by: INTERNAL MEDICINE

## 2022-09-08 PROCEDURE — 36415 COLL VENOUS BLD VENIPUNCTURE: CPT

## 2022-09-08 PROCEDURE — 96372 THER/PROPH/DIAG INJ SC/IM: CPT | Performed by: INTERNAL MEDICINE

## 2022-09-08 RX ORDER — DIPHENHYDRAMINE HYDROCHLORIDE 50 MG/ML
50 INJECTION INTRAMUSCULAR; INTRAVENOUS
Status: CANCELLED
Start: 2022-01-01

## 2022-09-08 RX ORDER — MEPERIDINE HYDROCHLORIDE 25 MG/ML
25 INJECTION INTRAMUSCULAR; INTRAVENOUS; SUBCUTANEOUS EVERY 30 MIN PRN
Status: CANCELLED | OUTPATIENT
Start: 2022-01-01

## 2022-09-08 RX ORDER — NALOXONE HYDROCHLORIDE 0.4 MG/ML
0.2 INJECTION, SOLUTION INTRAMUSCULAR; INTRAVENOUS; SUBCUTANEOUS
Status: CANCELLED | OUTPATIENT
Start: 2022-01-01

## 2022-09-08 RX ORDER — ALBUTEROL SULFATE 90 UG/1
1-2 AEROSOL, METERED RESPIRATORY (INHALATION)
Status: CANCELLED
Start: 2022-01-01

## 2022-09-08 RX ORDER — METHYLPREDNISOLONE SODIUM SUCCINATE 125 MG/2ML
125 INJECTION, POWDER, LYOPHILIZED, FOR SOLUTION INTRAMUSCULAR; INTRAVENOUS
Status: CANCELLED
Start: 2022-01-01

## 2022-09-08 RX ORDER — ALBUTEROL SULFATE 0.83 MG/ML
2.5 SOLUTION RESPIRATORY (INHALATION)
Status: CANCELLED | OUTPATIENT
Start: 2022-01-01

## 2022-09-08 RX ORDER — EPINEPHRINE 1 MG/ML
0.3 INJECTION, SOLUTION, CONCENTRATE INTRAVENOUS EVERY 5 MIN PRN
Status: CANCELLED | OUTPATIENT
Start: 2022-01-01

## 2022-09-08 RX ADMIN — DARBEPOETIN ALFA 100 MCG: 100 INJECTION, SOLUTION INTRAVENOUS; SUBCUTANEOUS at 13:15

## 2022-09-08 NOTE — PROGRESS NOTES
Infusion Nursing Note:  Mann Escobar presents today for Aranesp.    Patient seen by provider today: No   present during visit today: Not Applicable.    Note: pt states he will be attending a class about Dialysis soon.    Intravenous Access:  No Intravenous access/labs at this visit.    Treatment Conditions:  Results reviewed, labs MET treatment parameters, ok to proceed with treatment.    Post Infusion Assessment:  Patient tolerated injection without incident.     Discharge Plan:   Patient discharged in stable condition accompanied by: self.  Departure Mode: Ambulatory, with walker/rollator.      Priscilla Boone RN

## 2022-09-16 DIAGNOSIS — N18.4 CKD (CHRONIC KIDNEY DISEASE) STAGE 4, GFR 15-29 ML/MIN (H): ICD-10-CM

## 2022-09-16 DIAGNOSIS — R79.89 HIGH PLASMA OXALATE: ICD-10-CM

## 2022-09-29 NOTE — PROGRESS NOTES
Infusion Nursing Note:  Mann Escobar presents today for Aranesp.    Patient seen by provider today: No   present during visit today: Not Applicable.    Note: Labs drawn peripherally.    Intravenous Access:  N/A.    Treatment Conditions:  Lab Results   Component Value Date    HGB 9.4 (L) 09/29/2022    WBC 6.2 09/29/2022    ANEU 3.7 02/02/2021    ANEUTAUTO 4.6 09/29/2022     09/29/2022      Results reviewed, labs MET treatment parameters, ok to proceed with treatment.  B/P within tx parameters.    Post Infusion Assessment:  Patient tolerated injection without incident.     Discharge Plan:   Patient discharged in stable condition accompanied by: self.  Departure Mode: Ambulatory w/ walker.      Racquel Hickman RN

## 2022-10-10 NOTE — PATIENT INSTRUCTIONS
Continue good hydration, low sodium diet.   Follow low phosphorus diet.  Check blood pressure at home, keep log.   Labs today and labs in one month.   Will refer for surgical consult, possible PD catheter placement.  Follow up with Dr. Alicea in December as planned.

## 2022-10-10 NOTE — PROGRESS NOTES
Nephrology Progress Note  10/10/2022    Assessment and Plan:  81 year old male with history of CKD, hypertension, gastric bypass , GI bleeding 2016, iron deficiency anemia and Thalassemia minor, who presents for CKD followup. He also has HFpEF and edema/ lymphedema. He has history of multiple orthopedic surgeries. His Scr is increased from 2 to 4 and significant anemia.    # CKD progressively worsening:  Scr has worsened especially in the last 2-3 years, previously was near 1 but up to 1.2-1.3 in  and 1.3-1.5 in  and up to 2 since .  His Scr in 2020 was noted up to 2, and had ankle surgery  and Scr remained around 2 since then.    - no NSAIDs in recent years except toradol 2020 postop   - no other significant nephrotoxins, on diuretics for many years - hydrochlorothiazide previously, on loop diuretics in recent years    - discussed lowering diuretic doses as able, he lowered to once a day but creatinine still 3.5 and has swelling   - he is wrapping legs / elevating and monitoring sodium more closely to see if we can lower diuretic dose, however given swelling can get significant and HFpEF, volume control is important.   - given history of gastric bypass, we checked oxalate level- it was elevated, started pyridoxine 50mg has been taking twice a day  - low grade proteinuria  - kidney biopsy 0n 22:   Kidney, needle biopsy: Limited biopsy sample showin) Arteriosclerosis, severe.  2) Extensive global       glomerulosclerosis, with extensive tubular atrophy and interstitial fibrosis.  - labs today  - referred to CKD journey. He completed kidney smart class  - discussed possible need for dialysis in coming months. He agrees to be referred for PD catheter consult. .  # Hypertension: blood pressure ~120s systolic    -current regimen: amlodipine 5 mg daily, lasix 80/80 mg  - swelling still present but significantly improved    -continue furosemide     -  goal -135 systolic, and  controlling swelling in multiple ways    # Anemia- acute on chronic, due to thalassemia and chronic renal disease, gastric ulcer noted recently:   - Previous Hgb 9.4, multifactorial, due to thalassemia and kidney disease, on EPO, sees hematology  - Iron studies: adequate.    # Electrolytes:   - Potassium; level: Normal / low normal, not taking potassium supplement.  - Bicarbonate; level: Normal/ high normal  - renal panel today    # Mineral Bone Disorder:   - Calcium; level low last check,   - Phosphorus; level is: Normal   - Vit D 37,   - started vit D 1000 units daily  - check calcium today, recheck Vit D and PTH in 3-6 months     Assessment and plan was discussed with patient and he voiced his understanding and agreement.    Disposition: Labs today, follow up with Dr. Alicea as planned.      Reason for Visit:  Mann Escobar is an 81 year old male with CKD from? Biopsy showed severe arteriosclerosis and extensive global glomerulosclerosis, with extensive tubular atrophy and interstitial fibrosis, HFpEF, who presents for followup.    HPI:  He is a pleasant male with history of CKD who follows up for progressively worsening renal function. He has had elevated creatinine over the past few years, which has fluctuated. He was noted with HFpEF/ moderate diastolic dysfunction.      He has had significant amount of swelling since his ankle surgery last year, but even going back years, he has dealt with swelling and was even in lymphedema clinic many years ago.    He was given furosemide 20 mg then 40mg which helped with the edema.  He lost now 40+ lbs of weight with furosemide and now takes it as needed to maintain his weight and was taking it as needed. He now takes 80 mg BID.   He had a knee replacement in 2000 and has had some edema and then after his ankle surgery in July 2020     His weight was up to 199 lbs from 183 lbs and had gotten down to near 160 lbs. And now between 145-150 on average.  "  He had gastric bypass in 2000 and was 280 lbs at that point.    He was taking NSAIDS in the past and had GI bleeding and noted with another ulcer last Fall  He was noted with hemoglobin down to 6.1 several months ago. Last check was in June 2021. He denies dark stools or evidence of bleeding  He was started on aranesp by hematology.    He has prostate enlargement and has been prescribed terazosin.  He has continued to take this in case it helps.  He states he has been having normal urination and denies change in appetite.   His Scr is up to 4 in January, most recent in June was 3.69. He has been eating more salt. He has not had a great appetite so eating more of the stuff he finds appetizing. He has been trying to hydrate well.     His weight change is significant over recent months. His oxalate was elevated when we checked it last year. He did start pyridoxine but only one pill a day.   His ultrasound did show several cysts bilaterally but otherwise unremarkable, with normal sizes though right kidney 10.5 and left 13cm.     He had a kidney biopsy on 6/13 which showed \"severe arteriosclerosis, extensive global       glomerulosclerosis, and extensive tubular atrophy and       interstitial fibrosis. The sample is limited in that the       tissue for immunofluorescence studies did not contain any       glomeruli. There is no evidence of oxalate nephropathy.\"   His SPEP was negative, UPEP had a small monoclonal band in the gamma region with peak of 1.1     Renal History:   Kidney Disease and Medical Hx:  h/o HTN: Yes      ROS:   A comprehensive review of systems was obtained and negative, except as noted in the HPI or PMH.    Active Medical Problems:  Patient Active Problem List   Diagnosis      HX NEPHROLITHIASIS [V13.01]     Thalassemia minor     Esophageal reflux     Family history of prostate cancer     Leg edema     CARDIOVASCULAR SCREENING; LDL GOAL LESS THAN 130     Internal hemorrhoids     Iron deficiency " anemia     First degree AV block     Family history of diabetes mellitus     Scoliosis     Hypopotassemia     Advanced directives, counseling/discussion     HTN (hypertension)     Benign non-nodular prostatic hyperplasia with lower urinary tract symptoms     Chronic low back pain     S/P knee replacement     S/P ankle fusion     Abnormal antinuclear antibody titer     Osteoarthritis     Hypertension     BPH (benign prostatic hyperplasia)     Pseudogout     Chronic pain syndrome     S/P ankle joint replacement     Arthritis of ankle, right     Chronic anemia     Chronic kidney disease, stage 3 (H)     Diastolic dysfunction     Carpal tunnel syndrome     Edema     Nephrolithiasis     Marginal ulcer     Retching     History of Favian-en-Y gastric bypass     Chronic heart failure with preserved ejection fraction (H)     Anemia of chronic renal failure, stage 3b (H)     Continuous opioid dependence (H)     PMH:   Medical record was reviewed and PMH was discussed with patient and noted below.  Past Medical History:   Diagnosis Date     Arthritis of ankle, left 7/24/2020     Back pain     WITH BILATERAL LEG PAIN AND NUMBNESS OF BOTH FEET     Gastro-oesophageal reflux disease     REFLUX     Hypertension      Hypopotassemia      Internal hemorrhoids      Iron deficiency anaemia      Leg edema      Morbid obesity 9/12/2005     Morbid obesity (H)      Osteoarthrosis      Scoliosis      Thalassemia minor      PSH:   Past Surgical History:   Procedure Laterality Date     CATARACT IOL, RT/LT  2008/    Cataract IOL RT/LT     COLONOSCOPY  2009/07    polyps removed repeat 5 yrs, tubular adenoma     COLONOSCOPY  9/29/2011    Procedure:COLONOSCOPY; Colonoscopy with hemorrhoid banding; Surgeon:JEREMY GARCIA; Location:[facility]     COLONOSCOPY N/A 12/19/2017    Procedure: COLONOSCOPY;  colonoscopy, gastroscopy;  Surgeon: Edmar Isaacs MD;  Location: [facility]     DECOMPRESSION LUMBAR MINIMALLY INVASIVE TWO LEVELS  5/2/2012     Procedure:DECOMPRESSION LUMBAR MINIMALLY INVASIVE TWO LEVELS; Surgeon:LOTTIE MITCHELL; [facility]     ESOPHAGOSCOPY, GASTROSCOPY, DUODENOSCOPY (EGD), COMBINED N/A 2/6/2018    Procedure: COMBINED ESOPHAGOSCOPY, GASTROSCOPY, DUODENOSCOPY (EGD);  gastroscopy;  Surgeon: Edmar Isaacs MD;  Location: [facility]     ESOPHAGOSCOPY, GASTROSCOPY, DUODENOSCOPY (EGD), COMBINED N/A 10/18/2021    Procedure: ESOPHAGOGASTRODUODENOSCOPY (EGD);  Surgeon: Anju Galeano MD;  Location: [facility]     FUSION SPINE POSTERIOR MINIMALLY INVASIVE ONE LEVEL  5/2/2012    Procedure:FUSION SPINE POSTERIOR MINIMALLY INVASIVE ONE LEVEL; Minimal Access Spinal Technology Transformaninal Lumbar Interbody Fusion L4-5, Decompression L3-5; Surgeon:LOTTIE MITCHELL; [facility]     HC REMOVAL OF HYDROCELE,TUNICA,UNILAT  2006    bilateral     IR RENAL BIOPSY LEFT  6/13/2022     ORTHOPEDIC SURGERY  2000    Lf knee replacement     ORTHOPEDIC SURGERY  2002, 2010    Rt replacement     ORTHOPEDIC SURGERY  2005    Left ankle fused     RECONSTRUCT ANKLE Right 7/23/2020    Procedure: Ankle RECONSTRUCTIve Arthroplasty;  Surgeon: Luke Powers DPM;  Location: [facility]     SURGICAL HISTORY OF -       Cysto     SURGICAL HISTORY OF -   2002    Percutaneous kidney stone removal     SURGICAL HISTORY OF -       ureteral stent removal     SURGICAL HISTORY OF -   2005    bariatric surgery-Favian-en-Y     SURGICAL HISTORY OF -   2008    carpal tunnel surgery rt wrist       Family Hx:   Family History   Problem Relation Age of Onset     Diabetes Brother      Obesity Brother      Cerebrovascular Disease Paternal Grandfather      Prostate Cancer Father      Cancer Father         bone     Diabetes Father      Heart Disease Mother         CHF     Cerebrovascular Disease Mother      Hypertension Mother      Cancer Mother         tumor in stomach     Cancer - colorectal Mother      Heart Disease Maternal Grandmother         CHF     Diabetes Paternal Grandmother      Breast  Cancer Sister      Genitourinary Problems Son         Kidney stones     Cancer Brother      Cancer - colorectal Brother      Diabetes Brother      Personal Hx:   Social History     Tobacco Use     Smoking status: Former     Packs/day: 0.50     Years: 7.00     Pack years: 3.50     Types: Cigarettes     Quit date: 1962     Years since quittin.8     Smokeless tobacco: Never   Substance Use Topics     Alcohol use: Yes     Comment: one every other day. Rarely       Allergies:  Allergies   Allergen Reactions     Nkda [No Known Drug Allergies]        Medications:  Current Outpatient Medications   Medication Sig     amLODIPine (NORVASC) 5 MG tablet Take 1 tablet (5 mg) by mouth daily     fluticasone (FLONASE) 50 MCG/ACT nasal spray Spray 1 spray into both nostrils daily (Patient not taking: No sig reported)     folic acid 800 MCG tablet Take 1 tablet (800 mcg) by mouth daily     furosemide (LASIX) 40 MG tablet TAKE 2 TABLETS BY MOUTH 2 TIMES DAILY     HCA VITAMIN B12 500 MCG OR TABS 2 tablets daily     MULTIVITAMIN OR one daily     omeprazole (PRILOSEC) 40 MG DR capsule Take 1 capsule (40 mg) by mouth 2 times daily     OVER-THE-COUNTER Elderberry 50mg supplement, one daily     oxyCODONE (ROXICODONE) 5 MG tablet Take 1 tablet (5 mg) by mouth 2 times daily as needed for severe pain This is a 90 day supply     pyridOXINE (VITAMIN B-6) 50 MG tablet Take 1 tablet (50 mg) by mouth 2 times daily     sucralfate (CARAFATE) 1 GM tablet Take 1 tablet (1 g) by mouth 4 times daily Take 30 minutes before meals and at bedtime (Patient taking differently: Take 1 g by mouth 4 times daily Take 30 minutes before meals and at bedtime    doesn't take this consistently)     terazosin (HYTRIN) 5 MG capsule TAKE 1 CAPSULE AT BEDTIME     Turmeric 500 MG CAPS Does not take all the time (Patient not taking: Reported on 2022)     VIACTIV OR With D 1000mg calcium     Vitamin D3 (CHOLECALCIFEROL) 25 mcg (1000 units) tablet Take 1 tablet  (25 mcg) by mouth daily     zinc gluconate 50 MG tablet Take 50 mg by mouth daily     Current Facility-Administered Medications   Medication     ropivacaine (NAROPIN) injection 3 mL     ropivacaine (NAROPIN) injection 3 mL     triamcinolone (KENALOG-40) injection 40 mg     triamcinolone (KENALOG-40) injection 40 mg      Vitals:  There were no vitals taken for this visit.  GENERAL: Healthy, alert and no distress  Skin: skin changes noted to LE from chronic LE edema  Heart: RRR  Lungs: CTA  Extremities: 2+ edema to mid shin bilaterally L>R    LABS:   CMP  Recent Labs   Lab Test 08/29/22  1329 06/13/22  1304 03/24/22  1423 01/26/22  1303 01/21/22  1006 06/16/21  1435 02/08/21  1121 02/01/21  1148 01/25/21  1058    143 143  143 144   < > 143 142 141 142   POTASSIUM 3.6 4.3 3.7 3.7   < > 3.8 3.5 3.7 4.0   CHLORIDE 109 110* 110* 114*   < > 107 107 109 112*   CO2 24 24 26 25   < > 24 30 27 29   ANIONGAP 11 9 7 5   < > 12 5 5 1*   GLC 97 85 150* 128*   < > 80 91 117* 96   BUN 64* 54* 47* 54*   < > 47* 28 23 21   CR 4.22* 3.69* 3.49*  3.49* 4.03*   < > 2.73* 2.26* 2.12* 2.02*   GFRESTIMATED 13* 16* 17* 14*   < > 21* 26* 28* 30*   GFRESTBLACK  --   --   --   --   --  24* 31* 33* 35*   RUTH 6.0* 7.0* 6.9* 7.9*   < > 7.7* 8.0* 8.2* 8.4*    < > = values in this interval not displayed.     Recent Labs   Lab Test 02/08/21  1121 11/10/17  1401 06/11/15  1306   BILITOTAL 0.5 0.5 0.6   ALKPHOS 104 110 100   ALT 21 20 25   AST 14 13 14     CBC  Recent Labs   Lab Test 09/29/22  1241 09/08/22  1241 08/18/22  1321 07/28/22  1403 07/07/22  1323   HGB 9.4* 9.0* 8.9* 8.0* 8.5*   WBC 6.2 6.9  --  5.4 6.0   RBC 5.07 4.75  --  4.32* 4.56   HCT 30.5* 28.6* 29.0* 26.2* 28.0*   MCV 60* 60*  --  61* 61*   MCH 18.5* 18.9*  --  18.5* 18.6*   MCHC 30.8* 31.5  --  30.5* 30.4*   RDW 18.4* 18.6*  --  19.4* 19.5*    195  --  210 189     URINE STUDIES  Recent Labs   Lab Test 03/24/22  1538 01/26/22  1303 06/16/21  1444 11/30/18  1045  09/28/15  1134   COLOR Yellow Yellow Yellow Yellow Yellow   APPEARANCE Clear Slightly Cloudy* Clear Clear Clear   URINEGLC 50* Negative Negative Negative Negative   URINEBILI Negative Negative Negative Negative Small  This is an unconfirmed screening test result. A positive result may be false.  *   URINEKETONE Negative Negative Negative Negative Trace*   SG 1.016 1.016 1.020 1.025 >1.030   UBLD Negative Negative Negative Trace* Negative   URINEPH 5.0 5.0 5.5 6.0 5.5   PROTEIN 100* 100* 100* >=300* 100*   UROBILINOGEN  --   --  1.0 1.0 1.0   NITRITE Negative Negative Negative Negative Negative   LEUKEST Negative Negative Negative Negative Negative   RBCU 0 <1 O - 2 O - 2 O - 2  Urine was tested unconcentrated because <10 ml was received.     WBCU 1 3 0 - 5 0 - 5 O - 2  Urine was tested unconcentrated because <10 ml was received.       Recent Labs   Lab Test 03/24/22  1538 01/26/22  1303   UTPG 1.88* 0.80*     PTH  Recent Labs   Lab Test 08/29/22  1329   PTHI 356*     IRON STUDIES  Recent Labs   Lab Test 09/29/22  1241 07/28/22  1322 05/26/22  1257   IRON 48* 57 33*   * 144* 176*   IRONSAT 32 40 19   TESSIE 323 242 288         Stormy Madrigal PA-C    Visit length 20minutes. An additional 20 minutes were spent on date of service in chart review, documentation, and other activities as noted.

## 2022-10-11 NOTE — PROGRESS NOTES
Dialysis Access Referral     Pt referred for Dialysis Access Surgical Consult.    Referring provider: WAYLON Kelly    Patient is ready for access surgery.    Patient prefers PD.    Patient IS a PD candidate.    Patient was not referred for Transplant.     Request sent to scheduling to schedule with Dr. Owens .     Vein mapping was not ordered, will not need scheduling.     Patient IS aware dialysis access referral has been made and IS expecting appointments to be scheduled.    Neph Tracking has been updated.    IGOR OTERO RN   Dialysis Access Care Coordinator  Phone: [phone]  Please use P_Dialysis_Access_Nurse pool for Epic InBasket communications to ensure timely response.      Neph Tracking Flowsheet Last Filled Values     CKD Education Status Referred    CKD Education Referral Date 06/21/22    CKD Education Type Kidney Smart CKD Basics    Preferred Modality Peritoneal Dialysis    Patient's Referral Dates Auto Populate Patient's Referral Dates    Home Care Referral 7/26/20    Journey Referral 6/24/22    Access Surgeon Referral Status  Referred    Dialysis Access Referral 10/11/22  Graciela    Transplant Status  Not Referred  age

## 2022-10-13 NOTE — PROGRESS NOTES
Stormy Madrigal, Loren Montague, RN  Please let him know calcium slightly improved, potassium is low, kidney function stable. Recommend increasing vit D to 2000 units daily. Start Kcl 10 mEq daily. Recheck bmp Monday.     Stormy Jang    Reviewed labs with patient and spouse. Instructed pt on vitamin D and Kcl prescriptions. Pt verbalized understanding of medication instructions. Pt informed writer that he previously was not taking any Vitamin D. Pt prefers to have labs drawn next Thursday 10/20/22 since he already has a lab and aranesp inj appt that day. Will notify PA.     All questions answered to patient and spouse's satisfaction. Direct phone number provided to pt and spouse if future questions or concerns arouse.     Loren Sparks, RN, BSN  Nephrology RN Care Coordinator  [facility]

## 2022-10-20 NOTE — PROGRESS NOTES
Infusion Nursing Note:  Mann Escobar presents today for Aranesp.    Patient seen by provider today: No   present during visit today: Not Applicable.    Note: N/A.    Intravenous Access:  .    Treatment Conditions:  Lab Results   Component Value Date    HGB 9.4 (L) 10/20/2022    WBC 6.2 10/20/2022    ANEU 3.7 02/02/2021    ANEUTAUTO 4.9 10/20/2022     10/20/2022        Post Infusion Assessment:  Patient tolerated injection without incident.     Discharge Plan:   Patient discharged in stable condition accompanied by: wife.      Mirtha Resendiz RN

## 2022-10-24 NOTE — PROGRESS NOTES
Stormy Madrigal, Loren Montague, RN  Please let him know labs are about the same. Should check again late next week or early the week after.     Thanks,   Stormy     Nephrology Note: Nursing Outreach Encounter    REASON FOR CALL:                                                      REASON FOR CALL: Chronic Disease Management                                           SITUATION/BACKROUND:                                                    Patient is being treated for CKD Stage 5.    Pt reports significant wt gain since 10/20/22.     Patient Journey Status:  Active:   Neph Tracking Flowsheet Last Filled Values     CKD Education Status Referred    CKD Education Referral Date 06/21/22    CKD Education Type Kidney Smart CKD Basics    Preferred Modality Peritoneal Dialysis    Patient's Referral Dates Auto Populate Patient's Referral Dates    Home Care Referral 7/26/20    Journey Referral 6/24/22    Access Surgeon Referral Status  Referred    Dialysis Access Referral 10/11/22  Graciela    Access Surgical Consult 11/29/22  PD consult with Dr. Owens.  Offered pt appt on 11/8, pt preferred 11/29    Transplant Status  Not Referred  age          ASSESSMENT:                                                      Pt reports baseline wt 142 lbs. On 10/21/22, pt reports wt 147lbs. This morning wt down to 145lbs. Pt c/o constipation, has been taking a stool softener daily. Last BM 2 days ago, but was significantly smaller than baseline. Pt also reports decreased urine output and increase LLE edema that worsens throughout the day. Pt denies dyspnea with exertion, cough, fatigue, N/V/D. Confirmed med list; pt is taking medication as prescribed. Wt gain seems to be r/t constipation, pt does not seem to be fluid overloaded. Advised pt to take one dose of dulcolax today and to continue stool softener. Pt in agreement with plan.     Reviewed lab results with pt. Pt next lab appt is 11/10/22. Pt in agreement to have labs  drawn sooner if he starts exhibiting uremic symptoms.     Neph Assessments:  Uremic Symptoms  Fatigue: no  Cold: no  Nausea: no  Vomiting: no  Diarrhea: no  Edema: yes (L leg worse than baseline. worse as day progresses)  Poor appetite: no  Metallic Taste: no  Pruritis: no  Tremor: no  Decreased urine output: yes  Confusion: no    Volume Status  Edema: yes  Location: L leg  Status from previous: worsened  Lightheaded or dizzy: no  Recent falls: no  Oral mucosa: normal    Recent Labs  Lab Results   Component Value Date     10/20/2022     10/10/2022     08/29/2022     06/16/2021     02/08/2021     02/01/2021    Lab Results   Component Value Date    GFRESTIMATED 13 10/20/2022    GFRESTIMATED 12 10/10/2022    GFRESTIMATED 13 08/29/2022    GFRESTIMATED 21 06/16/2021    GFRESTIMATED 26 02/08/2021    GFRESTIMATED 28 02/01/2021        Lab Results   Component Value Date    POTASSIUM 3.6 10/20/2022    POTASSIUM 3.1 10/10/2022    POTASSIUM 3.6 08/29/2022    POTASSIUM 4.3 06/13/2022    POTASSIUM 3.8 06/16/2021    POTASSIUM 3.5 02/08/2021    POTASSIUM 3.7 02/01/2021    Lab Results   Component Value Date    CR 4.28 10/20/2022    CR 4.49 10/10/2022    CR 4.22 08/29/2022    CR 2.73 06/16/2021    CR 2.26 02/08/2021    CR 2.12 02/01/2021      No results found for: MAG Lab Results   Component Value Date    BUN 68.3 10/20/2022    BUN 73 10/10/2022    BUN 64 08/29/2022    BUN 54 06/13/2022    BUN 47 06/16/2021    BUN 28 02/08/2021    BUN 23 02/01/2021          Lab Results   Component Value Date    HGB 9.4 10/20/2022    HGB 10.0 10/10/2022    HGB 9.4 09/29/2022    HGB 8.5 06/16/2021    HGB 8.5 02/02/2021    HGB 8.7 01/20/2021    Lab Results   Component Value Date    A1C 5.2 10/25/2013    A1C 5.5 04/25/2012      Lab Results   Component Value Date    TESSIE 323 09/29/2022    TESSIE 242 07/28/2022    TESSIE 288 05/26/2022    TESSIE 329 06/16/2021    TESSIE 283 01/04/2021    TESSIE 286 10/19/2020       Lab Results    Component Value Date     09/29/2022     07/28/2022     05/26/2022     03/24/2022     06/16/2021     11/10/2017     08/23/2011    Lab Results   Component Value Date    PTHI 356 08/29/2022    PTHI 103 07/20/2009      Lab Results   Component Value Date    IRON 48 09/29/2022    IRON 57 07/28/2022    IRON 33 05/26/2022    IRON 41 06/16/2021    IRON 15 11/10/2017    IRON 18 08/23/2011    Lab Results   Component Value Date    DTOT  07/20/2009     <32  Season, race, dietary intake, and treatment affect the concentration of   25-hydroxy-Vitamin D. Values may decrease during winter months and increase   during summer months. Values less than 30 ug/L may indicate Vitamin D   deficiency.                PLAN:                                                        Follow Up:   Patient to call/[email or web address] message with updates. Will reach out to pt later this week to check in and follow up on sx.     Patient verbalized understanding and will follow up as recommended.    Loren Sparks RN

## 2022-11-10 NOTE — PROGRESS NOTES
"Oncology Rooming Note    November 10, 2022 12:49 PM   Mann Escobar is a 82 year old male who presents for:    Chief Complaint   Patient presents with     Hematology     Thalassemia - Labs provider and infusion     Initial Vitals: BP 97/61 (BP Location: Right arm, Patient Position: Sitting, Cuff Size: Adult Regular)   Pulse 84   Temp 97.8  F (36.6  C) (Tympanic)   Resp 12   Wt 69.4 kg (153 lb)   SpO2 98%   BMI 28.91 kg/m   Estimated body mass index is 28.91 kg/m  as calculated from the following:    Height as of 8/29/22: 1.549 m (5' 1\").    Weight as of this encounter: 69.4 kg (153 lb). Body surface area is 1.73 meters squared.  Extreme Pain (8) Comment: Data Unavailable   No LMP for male patient.  Allergies reviewed: Yes  Medications reviewed: Yes    Medications: Medication refills not needed today.  Pharmacy name entered into [facility]:    [facility]  [facility] - [facility], [place] - [address] [place] [address] PHARMACY #[id number] - [address]    Clinical concerns:  None      Roxie Beasley CMA            "

## 2022-11-10 NOTE — PROGRESS NOTES
Infusion Nursing Note:  Mann Escobar presents today for Aranesp.    Patient seen by provider today: Yes, Dr. Golden; therefore, assessment deferred   present during visit today: Not Applicable.    Note: N/A.    Intravenous Access:  Labs drawn peripherally by .    Treatment Conditions:  Lab Results   Component Value Date    HGB 8.9 (L) 11/10/2022    WBC 6.1 11/10/2022    ANEU 3.7 02/02/2021    ANEUTAUTO 4.8 11/10/2022     11/10/2022      Results reviewed, labs MET treatment parameters, ok to proceed with treatment.    Post Infusion Assessment:  Patient tolerated injection without incident.  Site patent and intact, free from redness, edema or discomfort.  No evidence of extravasations.     Discharge Plan:   Discharge instructions reviewed with: Patient.  Patient and/or family verbalized understanding of discharge instructions and all questions answered.  AVS to patient via [employer].  Patient will return 12/1/2022 for next appointment.   Patient discharged in stable condition accompanied by: self.  Departure Mode: Ambulatory.    Kristi Delgado RN

## 2022-11-10 NOTE — PROGRESS NOTES
The patient is being seen for the following issues:  Encounter Diagnoses   Name Primary?     Anemia of chronic renal failure, stage 3b (H) Yes     Thalassemia minor      Earlier this year, he experienced exacerbation of his chronic anemia due to chronic kidney disease after about peptic ulcer disease related gastrointestinal bleeding.      Subsequently, he was started on Aranesp as his hemoglobin had been running in the low 6 g range.     His red blood cells are microcytic with an MCV around 60 due to thalassemia.      Per previous documentation by Dr. Andrew his hemoglobin was running at baseline in the 10 g range prior to 2013.  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  His MCV was around 60. Dr. Andrew gave him IV iron in the past for iron deficiency. The last time he was iron deficient was at the end of 2017.     His kidney function has deteriorated quite a bit recently so he is scheduled for a kidney biopsy on 5/13.    His only symptoms from his anemia are mild fatigue and cold toxicity.  Currently, he is not experiencing any bleeding in his stool or any dark-colored stools.      PHYSICAL EXAMINATION:    General: Todays' vital signs reviewed in the EMR. He is in no acute distress.  BP 97/61 (BP Location: Right arm, Patient Position: Sitting, Cuff Size: Adult Regular)   Pulse 84   Temp 97.8  F (36.6  C) (Tympanic)   Resp 12   Wt 69.4 kg (153 lb)   SpO2 98%   BMI 28.91 kg/m      Cor: Regular rate and rhythm  Chest: Lungs are clear to auscultation bilaterally anteriorly      ASSESSMENT:    Encounter Diagnoses   Name Primary?     Anemia of chronic renal failure, stage 3b (H) Yes     Thalassemia minor      Nephrology office visit note from October 10, 2022 reviewed.  CKD seems to be progressively worsening and they are contemplating to have a catheter put in to commence peritoneal dialysis.    Your hemoglobin has been stable recently with values between 9 and 10.0 g/DL in  the last 2 months.  MCV remains low at 60-61 due to thalassemia.  White blood cell platelet counts have remained normal.    Given the high risk of RBC alloimmunization in patients with thalassemia I would like to avoid RBC transfusion and continue Aranesp every 3 weeks.    Therefore, we will continue to check a hemoglobin and hematocrit every 3 weeks and administer Aranesp 100 mcg subcutaneously to maintain your hemoglobin.    Your iron levels were normal when checked about a month ago.    My clinic staff will schedule you for your next hematology follow-up visit with Mojgan Mcclelland NP in 6 months.    Continue taking folic acid.

## 2022-11-10 NOTE — PATIENT INSTRUCTIONS
We will continue to check a hemoglobin and hematocrit every 3 weeks and administer Aranesp 100 mcg subcutaneously to maintain your hemoglobin.    Your iron levels were normal when checked about a month ago.    My clinic staff will schedule you for your next hematology follow-up visit with Mojgan Mcclelland NP in 6 months.    Continue taking folic acid.

## 2022-11-10 NOTE — LETTER
"    11/10/2022         RE: Mann Escobar  [address]  [facility] [address]        Dear Colleague,    Thank you for referring your patient, Mann Escobar, to the [facility]. Please see a copy of my visit note below.    Oncology Rooming Note    November 10, 2022 12:49 PM   Mann Escobar is a 82 year old male who presents for:    Chief Complaint   Patient presents with     Hematology     Thalassemia - Labs provider and infusion     Initial Vitals: BP 97/61 (BP Location: Right arm, Patient Position: Sitting, Cuff Size: Adult Regular)   Pulse 84   Temp 97.8  F (36.6  C) (Tympanic)   Resp 12   Wt 69.4 kg (153 lb)   SpO2 98%   BMI 28.91 kg/m   Estimated body mass index is 28.91 kg/m  as calculated from the following:    Height as of 8/29/22: 1.549 m (5' 1\").    Weight as of this encounter: 69.4 kg (153 lb). Body surface area is 1.73 meters squared.  Extreme Pain (8) Comment: Data Unavailable   No LMP for male patient.  Allergies reviewed: Yes  Medications reviewed: Yes    Medications: Medication refills not needed today.  Pharmacy name entered into [facility]:    [facility]  [facility] - [address] [facility] [address] - [place], [place] - [address]    Clinical concerns:  None      Roxie Beasley CMA              The patient is being seen for the following issues:  Encounter Diagnoses   Name Primary?     Anemia of chronic renal failure, stage 3b (H) Yes     Thalassemia minor      Earlier this year, he experienced exacerbation of his chronic anemia due to chronic kidney disease after about peptic ulcer disease related gastrointestinal bleeding.      Subsequently, he was started on Aranesp as his hemoglobin had been running in the low 6 g range.     His red blood cells are microcytic with an MCV around 60 due to thalassemia.      Per previous " documentation by Dr. Andrew his hemoglobin was running at baseline in the 10 g range prior to 2013.  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  His MCV was around 60. Dr. Andrew gave him IV iron in the past for iron deficiency. The last time he was iron deficient was at the end of 2017.     His kidney function has deteriorated quite a bit recently so he is scheduled for a kidney biopsy on 5/13.    His only symptoms from his anemia are mild fatigue and cold toxicity.  Currently, he is not experiencing any bleeding in his stool or any dark-colored stools.      PHYSICAL EXAMINATION:    General: Todays' vital signs reviewed in the EMR. He is in no acute distress.  BP 97/61 (BP Location: Right arm, Patient Position: Sitting, Cuff Size: Adult Regular)   Pulse 84   Temp 97.8  F (36.6  C) (Tympanic)   Resp 12   Wt 69.4 kg (153 lb)   SpO2 98%   BMI 28.91 kg/m      Cor: Regular rate and rhythm  Chest: Lungs are clear to auscultation bilaterally anteriorly      ASSESSMENT:    Encounter Diagnoses   Name Primary?     Anemia of chronic renal failure, stage 3b (H) Yes     Thalassemia minor      Nephrology office visit note from October 10, 2022 reviewed.  CKD seems to be progressively worsening and they are contemplating to have a catheter put in to commence peritoneal dialysis.    Your hemoglobin has been stable recently with values between 9 and 10.0 g/DL in the last 2 months.  MCV remains low at 60-61 due to thalassemia.  White blood cell platelet counts have remained normal.    Given the high risk of RBC alloimmunization in patients with thalassemia I would like to avoid RBC transfusion and continue Aranesp every 3 weeks.    Therefore, we will continue to check a hemoglobin and hematocrit every 3 weeks and administer Aranesp 100 mcg subcutaneously to maintain your hemoglobin.    Your iron levels were normal when checked about a month ago.    My clinic staff will schedule you for your  next hematology follow-up visit with Mojgan Mcclelland NP in 6 months.    Continue taking folic acid.           Again, thank you for allowing me to participate in the care of your patient.        Sincerely,        Boom Golden MD

## 2022-11-11 NOTE — TELEPHONE ENCOUNTER
Received a call through call center from [facility]. She spoke to Mann today and is concerned about his symptoms: 8 pound weight gain this week, less urine output, tired, metallic taste in mouth.     Writer called Mann and spoke to him- the fatigue and metallic taste are not new. He recently had labs completed and crt was close to his numbers from a 3 weeks ago. His weight normally runs around 142 and is now 149 today. His BP yesterday was lower than usual at 97/61, he states he normally runs around 120.  He also says that his urine output has been low. At night he uses a urinal and lately he has only been putting out about 100-200ml overnight but does urine frequently throughout the day. No shortness of breath or need to sleep sitting up. More edema in testicular area, he states. Will route to nephrology providers.    Stormy Madrigal, Mirtha Yousif RN  Caller: Unspecified (Today,  3:12 PM)  Please have him stop amlodipine and increase furosemide, try 80 mg in the morning and 40 mg in the afternoon. Monitor BP and check on him early next week. He has appt with Dr. Owens on 11/29 for PD catheter consult.     Thanks,   Stormy     Spoke to Mann to go over the above recommendations. He will monitor BP and weight. RNCC to follow up with him early next week.

## 2022-11-11 NOTE — TELEPHONE ENCOUNTER
Hello,  I'm with ortho con.  Pt is at .  Pt of Dr. Mohan is looking for a sooner appt for a knee injection.  Dr. Mohan has told pt before to call if pain gets bad and he will work the pt into the schedule.  Can you help?  Thank you.

## 2022-11-14 NOTE — PROGRESS NOTES
"Please have him stop amlodipine and increase furosemide, try 80 mg in the morning and 40 mg in the afternoon. Monitor BP and check on him early next week. He has appt with Dr. Owens on 11/29 for PD catheter consult.     Thanks,   Stormyjazmin Montalvo same 148lbs. 80/40 was on 80/80.     Nephrology Note: Nursing Outreach Encounter    REASON FOR CALL:                                                      REASON FOR CALL: Chronic Disease Management    SITUATION/BACKROUND:                                                    Patient is being treated for CKD Stage 5.    11/11/22 Discontinued amlodipine and increased lasix to 80/40mg r/t 8 pound weight gain over one week, less urine output, tired, metallic taste in mouth    Patient Journey Status:  Active and Active:   Neph Tracking Flowsheet Last Filled Values     CKD Education Status Complete    CKD Education Referral Date 06/21/22    CKD Education Completed Date 09/09/22    CKD Education Type Kidney Smart CKD Basics    Preferred Modality Peritoneal Dialysis    Patient's Referral Dates Auto Populate Patient's Referral Dates    Home Care Referral 7/26/20    Journey Referral 6/24/22    Access Surgeon Referral Status  Referred    Dialysis Access Referral 10/11/22  [facility]    Access Surgical Consult 11/29/22  PD consult with Dr. Owens.  Offered pt appt on 11/8, pt preferred 11/29    Transplant Status  Not Referred  age          ASSESSMENT:                                                      Pt reports that his weight has remained the same at 148lbs. Reports edema in RLE and LLE have improved, but contributes this to elevating legs whenever possible. Pt also sleeps with legs elevated. Pt reports \"pocket of fluid,\" in L flank region that \"has been there for awhile\"     Confirmed med changed with pt. Pt states he was previous on lasix 80/80, prior to having it decreased to 80/40 on 11/11/22. Pt would like to go back to 80/80. Will route to PA to advise.     Pt " "states he is ensuring he is drinking fluids and measuring BP/keeping a log daily. BP at baseline. Pt has not measured BP today yet.     Neph Assessments:  Uremic Symptoms  Fatigue: yes  Cold: no  Nausea: no  Vomiting: no  Diarrhea: no  Edema: yes  Poor appetite: no  Metallic Taste: yes  Pruritis: no  Tremor: no  Decreased urine output: no  Confusion: no    Volume Status  Edema: yes  Location: RLE, LLE, L flank  Description: RLE/LLE have decreased from baseline, pt reports having them elevated whenever he can, \"pocket of fluid\" near L flank  Status from previous: improved  Lightheaded or dizzy: no  Recent falls: no  Oral mucosa: normal    Fluid Intake  Pt on fluid restriction: no  Hydration encouraged: yes  Pt meeting hydration goal: yes  Urine output: baseline    Medications  Nephology medication reconciliation completed: Yes  Any barriers to taking medication(s) as prescribed?  No  Taking over the counter medication(s?) No    Current Outpatient Medications (Antihypertensive, Cardiovascular, Diuretics, Beta blockers, Calcium blockers, Anticoagulants)   Medication Sig     amLODIPine (NORVASC) 5 MG tablet Take 1 tablet (5 mg) by mouth daily     furosemide (LASIX) 40 MG tablet TAKE 2 TABLETS BY MOUTH 2 TIMES DAILY     terazosin (HYTRIN) 5 MG capsule TAKE 1 CAPSULE AT BEDTIME     Current Outpatient Medications (Other)   Medication Sig     fluticasone (FLONASE) 50 MCG/ACT nasal spray Spray 1 spray into both nostrils daily     folic acid 800 MCG tablet Take 1 tablet (800 mcg) by mouth daily     HCA VITAMIN B12 500 MCG OR TABS 2 tablets daily     MULTIVITAMIN OR one daily     omeprazole (PRILOSEC) 40 MG DR capsule Take 1 capsule (40 mg) by mouth 2 times daily     OVER-THE-COUNTER Elderberry 50mg supplement, one daily     oxyCODONE (ROXICODONE) 5 MG tablet Take 1 tablet (5 mg) by mouth 2 times daily as needed for severe pain This is a 90 day supply     potassium chloride ER (KLOR-CON M) 10 MEQ CR tablet Take 1 tablet (10 " mEq) by mouth daily     pyridOXINE (VITAMIN B-6) 50 MG tablet Take 1 tablet (50 mg) by mouth 2 times daily     sucralfate (CARAFATE) 1 GM tablet Take 1 tablet (1 g) by mouth 4 times daily Take 30 minutes before meals and at bedtime (Patient taking differently: Take 1 g by mouth 4 times daily Take 30 minutes before meals and at bedtime    doesn't take this consistently)     Turmeric 500 MG CAPS Does not take all the time     VIACTIV OR With D 1000mg calcium     vitamin D3 (CHOLECALCIFEROL) 50 mcg (2000 units) tablet Take 1 tablet (50 mcg) by mouth daily     zinc gluconate 50 MG tablet Take 50 mg by mouth daily       PLAN:                                                      Confirmed upcoming appointment for PD cath consult on 11/29/22 with Dr. Owens.     Follow Up:   Route to provider to further advise     Patient verbalized understanding and will follow up as recommended.    Loren Sparks RN

## 2022-11-17 NOTE — LETTER
2022         RE: Mann Escobar  [address]  [facility] [address]        Dear Colleague,    Thank you for referring your patient, Mann Escobar, to the [facility] [place]. Please see a copy of my visit note below.    Mann Escobar  :  1940  DOS: 22  MRN: [id number]    Sports Medicine Clinic Visit    PCP: Shayy Ramírez    Mann Escobar is a 81 year old Right hand dominant male who is seen as a self referral presenting with chronic bilateral shoulder pain.    Injury: Gradual onset of pain over the past 6+ years, left>>>right.  Patient notes that right shoulder has been significantly worse over the past 3 - 5 days, he did fall ~ one week ago landing on left side.  Pain located over bilateral deep anterior lateral glenohumeral joint, radiating to lateral upper arms.  Additional Features:  Positive: grinding, weakness and limited AROM bilaterally.  Symptoms are better with Other medications: oxycodone and Rest.  Symptoms are worse with: shoulder flexion/abduction, pushing/pulling self up from seated position, weight bearing through arms, lying on either shoulder.  Other evaluation and/or treatments so far consists of: Ice, Tylenol, Ibuprofen, Other medications: oxycodone and Rest.  Recent imaging completed: No recent imaging completed.  Prior History of related problems: Patient has previously consulted Dr Burrell @ [facility], left shoulder injection last completed , right shoulder last completed in .    Social History: retired     Interim History - 2022  Since last visit on 2022 patient has severe bilateral shoulder pain, left>>>right.  Bilateral shoulder glenohumeral injections and aspirations provided ~ 60 - 70% relief for 2 - 3 weeks.  He notes continued weakness with shoulder flexion/abduction movements.  No new injury in the interim.    Interim History - 2022  Since  last visit on 6/9/2022 patient has moderate-severe bilateral shoulder pain right>>>left.  Right shoulder glenohumeral joint aspiration completed on 6/9/22 provided ~ 80% relief for ~ 2 - 4 weeks.  He notes significant limitation with shoulder flexion/abduction today.  Bilateral shoulder joint appear to effused.  No new injury in the interim.    Interim History - November 17, 2022  Since last visit on 8/16/2022 patient has moderate-severe bilateral shoulder pain, right>>>left.  Bilateral shoulder aspiration and steroid injection completed on 8/16/22 provided good relief for ~ 8 weeks.  No new injury in the interim.    Review of Systems  Musculoskeletal: as above  Remainder of review of systems is negative including constitutional, CV, pulmonary, GI, Skin and Neurologic except as noted in HPI or medical history.    Past Medical History:   Diagnosis Date     Arthritis of ankle, left 7/24/2020     Back pain     WITH BILATERAL LEG PAIN AND NUMBNESS OF BOTH FEET     Gastro-oesophageal reflux disease     REFLUX     Hypertension      Hypopotassemia      Internal hemorrhoids      Iron deficiency anaemia      Leg edema      Morbid obesity 9/12/2005     Morbid obesity (H)      Osteoarthrosis      Scoliosis      Thalassemia minor      Past Surgical History:   Procedure Laterality Date     CATARACT IOL, RT/LT  2008/    Cataract IOL RT/LT     COLONOSCOPY  2009/07    polyps removed repeat 5 yrs, tubular adenoma     COLONOSCOPY  9/29/2011    Procedure:COLONOSCOPY; Colonoscopy with hemorrhoid banding; Surgeon:JEREMY GARCIA; Location:[facility]     COLONOSCOPY N/A 12/19/2017    Procedure: COLONOSCOPY;  colonoscopy, gastroscopy;  Surgeon: Edmar Isaacs MD;  Location: [facility]     DECOMPRESSION LUMBAR MINIMALLY INVASIVE TWO LEVELS  5/2/2012    Procedure:DECOMPRESSION LUMBAR MINIMALLY INVASIVE TWO LEVELS; Surgeon:LOTTIE MITCHELL; Location: OR     ESOPHAGOSCOPY, GASTROSCOPY, DUODENOSCOPY (EGD), COMBINED N/A 2/6/2018    Procedure:  "COMBINED ESOPHAGOSCOPY, GASTROSCOPY, DUODENOSCOPY (EGD);  gastroscopy;  Surgeon: Emdar Isaacs MD;  Location: [facility]     ESOPHAGOSCOPY, GASTROSCOPY, DUODENOSCOPY (EGD), COMBINED N/A 10/18/2021    Procedure: ESOPHAGOGASTRODUODENOSCOPY (EGD);  Surgeon: Anju Galeano MD;  Location: [facility]     FUSION SPINE POSTERIOR MINIMALLY INVASIVE ONE LEVEL  5/2/2012    Procedure:FUSION SPINE POSTERIOR MINIMALLY INVASIVE ONE LEVEL; Minimal Access Spinal Technology Transformaninal Lumbar Interbody Fusion L4-5, Decompression L3-5; Surgeon:LOTTIE MITCHELL; [facility]     HC REMOVAL OF HYDROCELE,TUNICA,UNILAT  2006    bilateral     IR RENAL BIOPSY LEFT  6/13/2022     ORTHOPEDIC SURGERY  2000    Lf knee replacement     ORTHOPEDIC SURGERY  2002, 2010    Rt replacement     ORTHOPEDIC SURGERY  2005    Left ankle fused     RECONSTRUCT ANKLE Right 7/23/2020    Procedure: Ankle RECONSTRUCTIve Arthroplasty;  Surgeon: Luke Powers DPM;  Location: [facility]     SURGICAL HISTORY OF -       Cysto     SURGICAL HISTORY OF -   2002    Percutaneous kidney stone removal     SURGICAL HISTORY OF -       ureteral stent removal     SURGICAL HISTORY OF -   2005    bariatric surgery-Favian-en-Y     SURGICAL HISTORY OF -   2008    carpal tunnel surgery rt wrist     Family History   Problem Relation Age of Onset     Diabetes Brother      Obesity Brother      Cerebrovascular Disease Paternal Grandfather      Prostate Cancer Father      Cancer Father         bone     Diabetes Father      Heart Disease Mother         CHF     Cerebrovascular Disease Mother      Hypertension Mother      Cancer Mother         tumor in stomach     Cancer - colorectal Mother      Heart Disease Maternal Grandmother         CHF     Diabetes Paternal Grandmother      Breast Cancer Sister      Genitourinary Problems Son         Kidney stones     Cancer Brother      Cancer - colorectal Brother      Diabetes Brother        Objective  BP 99/68   Ht 1.549 m (5' 1\")   Wt 69.4 kg " (153 lb)   BMI 28.91 kg/m        General: healthy, alert and in no distress      HEENT: no scleral icterus or conjunctival erythema     Skin: no suspicious lesions or rash. No jaundice.     CV: regular rhythm by palpation, 2+ distal pulses, no pedal edema      Resp: normal respiratory effort without conversational dyspnea     Psych: normal mood and affect      Gait: nonantalgic, modest baseline coordination and balance, using walker    Neuro: normal light touch sensory exam of the extremities. Motor strength as noted below     Bilateral Shoulder exam    ROM:        Limited active and passive ROM with flexion, extension, abduction, internal and external rotation, worse currently on the right again today, palpable, ballotable fluid collections anterior and lateral shoulders    Tender:        Generally about the shoulders    Non Tender:       remainder of shoulder    Strength:        abduction 2/5       internal rotation 4/5       external rotation 2/5       adduction 4/5    Stability testing:       neg (-) anterior glide       neg (-) sulcus sign    Skin:       no visible deformities       well perfused       capillary refill brisk       Palpable fluid collection about the shoulder generally, ballotable    Sensation:        normal sensation over shoulder and upper extremity       Radiology  Recent Results (from the past 744 hour(s))   XR Shoulder 3 View Bilateral    Narrative    SHOULDER THREE VIEWS BILATERAL   4/7/2022 3:53 PM     HISTORY:  Shoulder pain bilaterally.    COMPARISON: Left shoulder radiographs from 9/24/2016. Right shoulder  radiographs from 5/20/2013.      Impression    IMPRESSION:    Right: Old healed right rib fractures again noted. Severe  osteoarthrosis of the glenohumeral joint, progressed. Narrowing of the  subacromial space by the coracoacromial arch configuration at AC joint  hypertrophy and high-riding humeral head again noted. Chronic  bone-on-bone contact between the acromion and humeral  head indicating  chronic full-thickness rotator cuff tear.    Left: Severe osteoarthrosis of the glenohumeral joint, progressed. The  humeral head is high riding and there is remodeling of the superior  aspect of the humeral head and the undersurface of the acromion  indicating full-thickness rotator cuff tear. There is also some  destructive change in the superior aspect of the humeral head and  glenoid which has progressed since the prior study which would raise  the question of [place] shoulder or an inflammatory or infectious  process in the appropriate clinical setting.    JOE MENA MD         SYSTEM ID:  [id number]       Large Joint Injection/Arthocentesis: bilateral glenohumeral    Date/Time: 11/17/2022 10:15 AM  Performed by: Danielito Mohan DO  Authorized by: Danielito Mohan DO     Indications:  Pain, joint swelling and osteoarthritis  Needle Size comment:  21G on right, 18G on left    Guidance: ultrasound    Approach:  Anterolateral  Location:  Shoulder  Laterality:  Bilateral      Site:  Bilateral glenohumeral  Medications (Right):  40 mg triamcinolone 40 MG/ML; 3 mL ropivacaine 5 MG/ML  Aspirate amount (mL):  36  Aspirate:  Serous and yellow  Medications (Left):  40 mg triamcinolone 40 MG/ML; 3 mL ropivacaine 5 MG/ML  Aspirate amount (mL):  42  Aspirate:  Serous and yellow  Outcome:  Tolerated well, no immediate complications  Procedure discussed: discussed risks, benefits, and alternatives    Consent Given by:  Patient  Timeout: timeout called immediately prior to procedure    Prep: patient was prepped and draped in usual sterile fashion     Ultrasound images of procedure were permanently stored.         Assessment:  1. Pain of both shoulder joints    2. Rotator cuff arthropathy of both shoulders    3. Shoulder effusion, unspecified laterality        Plan:  Discussed the assessment with the patient.  Follow up: 2 weeks prn based on clinical progress  Medically complicated, with  "chronic b/l shoulder pain, usually L>R but currently much worse on the right again today  Cannot take NSAIDs due to CKD and other comorbidities  Bilateral shoulder US guided aspiration and CSI today for better pain control  Compressive brace option reviewed, currently using   Activity modification reviewed  Could consider PT in the future based on progress, low expectations for benefit  TSA not realistic due to medical comorbidities, known underlying \"[place] shoulder\"  Expectations and goals of CSI reviewed  Often 2-3 days for steroid effect, and can take up to two weeks for maximum effect  We discussed modified progressive pain-free activity as tolerated  Do not overuse in first two weeks if feeling better due to concern for vulnerability while steroid is working  Supportive care reviewed  All questions were answered today  Contact us with additional questions or concerns  Signs and sx of concern reviewed      Danielito Mohan DO, CAQ  Sports Medicine Physician  [facility]          Disclaimer: This note consists of symbols derived from keyboarding, dictation and/or voice recognition software. As a result, there may be errors in the script that have gone undetected. Please consider this when interpreting information found in this chart.      Again, thank you for allowing me to participate in the care of your patient.        Sincerely,        Danielito Mohan DO    "

## 2022-11-17 NOTE — PROGRESS NOTES
Mann Escobar  :  1940  DOS: 22  MRN: [id number]    Sports Medicine Clinic Visit    PCP: Shayy Ramírez    Mann Escobar is a 81 year old Right hand dominant male who is seen as a self referral presenting with chronic bilateral shoulder pain.    Injury: Gradual onset of pain over the past 6+ years, left>>>right.  Patient notes that right shoulder has been significantly worse over the past 3 - 5 days, he did fall ~ one week ago landing on left side.  Pain located over bilateral deep anterior lateral glenohumeral joint, radiating to lateral upper arms.  Additional Features:  Positive: grinding, weakness and limited AROM bilaterally.  Symptoms are better with Other medications: oxycodone and Rest.  Symptoms are worse with: shoulder flexion/abduction, pushing/pulling self up from seated position, weight bearing through arms, lying on either shoulder.  Other evaluation and/or treatments so far consists of: Ice, Tylenol, Ibuprofen, Other medications: oxycodone and Rest.  Recent imaging completed: No recent imaging completed.  Prior History of related problems: Patient has previously consulted Dr Burrell @ [facility], left shoulder injection last completed , right shoulder last completed in .    Social History: retired     Interim History - 2022  Since last visit on 2022 patient has severe bilateral shoulder pain, left>>>right.  Bilateral shoulder glenohumeral injections and aspirations provided ~ 60 - 70% relief for 2 - 3 weeks.  He notes continued weakness with shoulder flexion/abduction movements.  No new injury in the interim.    Interim History - 2022  Since last visit on 2022 patient has moderate-severe bilateral shoulder pain right>>>left.  Right shoulder glenohumeral joint aspiration completed on 22 provided ~ 80% relief for ~ 2 - 4 weeks.  He notes significant limitation with shoulder flexion/abduction today.  Bilateral shoulder joint  appear to effused.  No new injury in the interim.    Interim History - November 17, 2022  Since last visit on 8/16/2022 patient has moderate-severe bilateral shoulder pain, right>>>left.  Bilateral shoulder aspiration and steroid injection completed on 8/16/22 provided good relief for ~ 8 weeks.  No new injury in the interim.    Review of Systems  Musculoskeletal: as above  Remainder of review of systems is negative including constitutional, CV, pulmonary, GI, Skin and Neurologic except as noted in HPI or medical history.    Past Medical History:   Diagnosis Date     Arthritis of ankle, left 7/24/2020     Back pain     WITH BILATERAL LEG PAIN AND NUMBNESS OF BOTH FEET     Gastro-oesophageal reflux disease     REFLUX     Hypertension      Hypopotassemia      Internal hemorrhoids      Iron deficiency anaemia      Leg edema      Morbid obesity 9/12/2005     Morbid obesity (H)      Osteoarthrosis      Scoliosis      Thalassemia minor      Past Surgical History:   Procedure Laterality Date     CATARACT IOL, RT/LT  2008/    Cataract IOL RT/LT     COLONOSCOPY  2009/07    polyps removed repeat 5 yrs, tubular adenoma     COLONOSCOPY  9/29/2011    Procedure:COLONOSCOPY; Colonoscopy with hemorrhoid banding; Surgeon:JEREMY GARCIA; Location:[facility]     COLONOSCOPY N/A 12/19/2017    Procedure: COLONOSCOPY;  colonoscopy, gastroscopy;  Surgeon: Edmar Isaacs MD;  Location: [facility]     DECOMPRESSION LUMBAR MINIMALLY INVASIVE TWO LEVELS  5/2/2012    Procedure:DECOMPRESSION LUMBAR MINIMALLY INVASIVE TWO LEVELS; Surgeon:LOTTIE MITCHELL; Location: OR     ESOPHAGOSCOPY, GASTROSCOPY, DUODENOSCOPY (EGD), COMBINED N/A 2/6/2018    Procedure: COMBINED ESOPHAGOSCOPY, GASTROSCOPY, DUODENOSCOPY (EGD);  gastroscopy;  Surgeon: Edmar Isaacs MD;  Location: [facility]     ESOPHAGOSCOPY, GASTROSCOPY, DUODENOSCOPY (EGD), COMBINED N/A 10/18/2021    Procedure: ESOPHAGOGASTRODUODENOSCOPY (EGD);  Surgeon: Anju Galeano MD;  Location: [facility]      "FUSION SPINE POSTERIOR MINIMALLY INVASIVE ONE LEVEL  5/2/2012    Procedure:FUSION SPINE POSTERIOR MINIMALLY INVASIVE ONE LEVEL; Minimal Access Spinal Technology Transformaninal Lumbar Interbody Fusion L4-5, Decompression L3-5; Surgeon:LOTTIE MITCHELL; [facility]     HC REMOVAL OF HYDROCELE,TUNICA,UNILAT  2006    bilateral     IR RENAL BIOPSY LEFT  6/13/2022     ORTHOPEDIC SURGERY  2000    Lf knee replacement     ORTHOPEDIC SURGERY  2002, 2010    Rt replacement     ORTHOPEDIC SURGERY  2005    Left ankle fused     RECONSTRUCT ANKLE Right 7/23/2020    Procedure: Ankle RECONSTRUCTIve Arthroplasty;  Surgeon: Luke Powers DPM;  Location: [facility]     SURGICAL HISTORY OF -       Cysto     SURGICAL HISTORY OF -   2002    Percutaneous kidney stone removal     SURGICAL HISTORY OF -       ureteral stent removal     SURGICAL HISTORY OF -   2005    bariatric surgery-Favian-en-Y     SURGICAL HISTORY OF -   2008    carpal tunnel surgery rt wrist     Family History   Problem Relation Age of Onset     Diabetes Brother      Obesity Brother      Cerebrovascular Disease Paternal Grandfather      Prostate Cancer Father      Cancer Father         bone     Diabetes Father      Heart Disease Mother         CHF     Cerebrovascular Disease Mother      Hypertension Mother      Cancer Mother         tumor in stomach     Cancer - colorectal Mother      Heart Disease Maternal Grandmother         CHF     Diabetes Paternal Grandmother      Breast Cancer Sister      Genitourinary Problems Son         Kidney stones     Cancer Brother      Cancer - colorectal Brother      Diabetes Brother        Objective  BP 99/68   Ht 1.549 m (5' 1\")   Wt 69.4 kg (153 lb)   BMI 28.91 kg/m        General: healthy, alert and in no distress      HEENT: no scleral icterus or conjunctival erythema     Skin: no suspicious lesions or rash. No jaundice.     CV: regular rhythm by palpation, 2+ distal pulses, no pedal edema      Resp: normal respiratory effort " without conversational dyspnea     Psych: normal mood and affect      Gait: nonantalgic, modest baseline coordination and balance, using walker    Neuro: normal light touch sensory exam of the extremities. Motor strength as noted below     Bilateral Shoulder exam    ROM:        Limited active and passive ROM with flexion, extension, abduction, internal and external rotation, worse currently on the right again today, palpable, ballotable fluid collections anterior and lateral shoulders    Tender:        Generally about the shoulders    Non Tender:       remainder of shoulder    Strength:        abduction 2/5       internal rotation 4/5       external rotation 2/5       adduction 4/5    Stability testing:       neg (-) anterior glide       neg (-) sulcus sign    Skin:       no visible deformities       well perfused       capillary refill brisk       Palpable fluid collection about the shoulder generally, ballotable    Sensation:        normal sensation over shoulder and upper extremity       Radiology  Recent Results (from the past 744 hour(s))   XR Shoulder 3 View Bilateral    Narrative    SHOULDER THREE VIEWS BILATERAL   4/7/2022 3:53 PM     HISTORY:  Shoulder pain bilaterally.    COMPARISON: Left shoulder radiographs from 9/24/2016. Right shoulder  radiographs from 5/20/2013.      Impression    IMPRESSION:    Right: Old healed right rib fractures again noted. Severe  osteoarthrosis of the glenohumeral joint, progressed. Narrowing of the  subacromial space by the coracoacromial arch configuration at AC joint  hypertrophy and high-riding humeral head again noted. Chronic  bone-on-bone contact between the acromion and humeral head indicating  chronic full-thickness rotator cuff tear.    Left: Severe osteoarthrosis of the glenohumeral joint, progressed. The  humeral head is high riding and there is remodeling of the superior  aspect of the humeral head and the undersurface of the acromion  indicating full-thickness  rotator cuff tear. There is also some  destructive change in the superior aspect of the humeral head and  glenoid which has progressed since the prior study which would raise  the question of [place] shoulder or an inflammatory or infectious  process in the appropriate clinical setting.    JOE MENA MD         SYSTEM ID:  [id number]       Large Joint Injection/Arthocentesis: bilateral glenohumeral    Date/Time: 11/17/2022 10:15 AM  Performed by: Danielito Mohan DO  Authorized by: Danielito Mohan DO     Indications:  Pain, joint swelling and osteoarthritis  Needle Size comment:  21G on right, 18G on left    Guidance: ultrasound    Approach:  Anterolateral  Location:  Shoulder  Laterality:  Bilateral      Site:  Bilateral glenohumeral  Medications (Right):  40 mg triamcinolone 40 MG/ML; 3 mL ropivacaine 5 MG/ML  Aspirate amount (mL):  36  Aspirate:  Serous and yellow  Medications (Left):  40 mg triamcinolone 40 MG/ML; 3 mL ropivacaine 5 MG/ML  Aspirate amount (mL):  42  Aspirate:  Serous and yellow  Outcome:  Tolerated well, no immediate complications  Procedure discussed: discussed risks, benefits, and alternatives    Consent Given by:  Patient  Timeout: timeout called immediately prior to procedure    Prep: patient was prepped and draped in usual sterile fashion     Ultrasound images of procedure were permanently stored.         Assessment:  1. Pain of both shoulder joints    2. Rotator cuff arthropathy of both shoulders    3. Shoulder effusion, unspecified laterality        Plan:  Discussed the assessment with the patient.  Follow up: 2 weeks prn based on clinical progress  Medically complicated, with chronic b/l shoulder pain, usually L>R but currently much worse on the right again today  Cannot take NSAIDs due to CKD and other comorbidities  Bilateral shoulder US guided aspiration and CSI today for better pain control  Compressive brace option reviewed, currently using   Activity  "modification reviewed  Could consider PT in the future based on progress, low expectations for benefit  TSA not realistic due to medical comorbidities, known underlying \"[place] shoulder\"  Expectations and goals of CSI reviewed  Often 2-3 days for steroid effect, and can take up to two weeks for maximum effect  We discussed modified progressive pain-free activity as tolerated  Do not overuse in first two weeks if feeling better due to concern for vulnerability while steroid is working  Supportive care reviewed  All questions were answered today  Contact us with additional questions or concerns  Signs and sx of concern reviewed      Danielito Mohan DO, GABE  Sports Medicine Physician  [facility]          Disclaimer: This note consists of symbols derived from keyboarding, dictation and/or voice recognition software. As a result, there may be errors in the script that have gone undetected. Please consider this when interpreting information found in this chart.  "

## 2022-11-17 NOTE — PROGRESS NOTES
Yesterday's wt: 145.8 lbs.  Today's wt: 147 lbs.  Yesterday's home BP: 118/70  Today's in-clinic BP: 99/68    Pt states sx are at baseline and wt has slightly decreased from 148lbs. Pt states he has had no episodes of HA/lightheadedness/dizziness with hypotension. Pt requested refill on lasix. Addressed in separate encounter.     Will route to WAYLON Kelly to address hypotension.

## 2022-11-21 NOTE — PROGRESS NOTES
Stormy Madrigal PA-C  You; Yoon Alicea MD 3 days ago     ML  Yes, thank you   Yoon Hahn MD; Stormy Madrigal PA-C 3 days ago     KE  Plan moving forward: decrease terazosin to 2.5mg and have renal panel and cystatin c drawn?     Can you confirm?     Loren Sparks, RN, BSN   Nephrology RN Care Coordinator   [facility]      Confirmed terazosin 2mg capsule with WAYLON Kelly.     11/22/22 1317: Informed pt and spouse to decrease terazosin from 5mg to 2mg capsule. New prescription sent to preferred pharmacy. Lab orders placed, lab appt made for 11/29/22 at 1130 after PD cath consult. Pt in agreement with plan moving forward. Will follow up after lab results.     Loren Sparks, RN, BSN  Nephrology RN Care Coordinator  [facility]

## 2022-11-25 NOTE — PROGRESS NOTES
Assessment & Plan     Chronic pain syndrome  Chronic left-sided low back pain with left-sided sciatica  Stable   - oxyCODONE (ROXICODONE) 5 MG tablet; Take 1 tablet (5 mg) by mouth 2 times daily as needed for severe pain (7-10) This is a 90 day supply    Continuous opioid dependence (H)  Stable  - oxyCODONE (ROXICODONE) 5 MG tablet; Take 1 tablet (5 mg) by mouth 2 times daily as needed for severe pain (7-10) This is a 90 day supply    High priority for 2019-nCoV vaccine  - COVID-19,PF,PFIZER BOOSTER BIVALENT 12+Yrs      The risks, benefits and treatment options of prescribed medications or other treatments have been discussed with the patient. The patient verbalized their understanding and should call or follow up if no improvement or if they develop further problems.    GUNJAN [facility]            Marcus Darnell is a 82 year old accompanied by his spouse, presenting for the following health issues:  Recheck Medication (Pain med refill) and Imm/Inj (COVID-19 VACCINE)      HPI     Pain History:  When did you first notice your pain? - Chronic Pain   Have you seen this provider for your pain in the past?   Yes   Where in your body do you have pain? Low back, shoulders, knees- joints.  Are you seeing anyone else for your pain? No    PHQ-9 SCORE 6/16/2021 5/3/2022 11/25/2022   PHQ-9 Total Score 3 5 5       SARAH-7 SCORE 6/16/2021 5/3/2022 11/25/2022   Total Score 1 1 1       PHQ-9 SCORE 6/16/2021 5/3/2022 11/25/2022   PHQ-9 Total Score 3 5 5     SARAH-7 SCORE 6/16/2021 5/3/2022 11/25/2022   Total Score 1 1 1     PEG Score 5/3/2022 8/25/2022 11/25/2022   PEG Total Score 9.33 10 10     Chronic Pain Follow Up:     Analgesia/pain control:    - Recent changes:  no    - Overall control: Tolerable with discomfort    - Current treatments: opioids  Adherence:     - Do you ever take more pain medicine than prescribed? No    - When did you take your last dose of pain medicine?  This  "morning   Adverse effects: No     PDMP Review       Value Time User    State PDMP site checked  Yes 11/25/2022  1:21 PM Shayy Ramírez APRN CNP        Last CSA Agreement:   CSA -- Patient Level:     [Media Unavailable] Controlled Substance Agreement - Opioid - Scan on 5/8/2022  1:19 PM   [Media Unavailable] Controlled Substance Agreement - Opioid - Scan on 6/17/2021  7:30 PM       Last UDS: 8/30/2022            Review of Systems   Constitutional, HEENT, cardiovascular, pulmonary, gi and gu systems are negative, except as otherwise noted.      Objective    /66 (BP Location: Right arm, Patient Position: Sitting, Cuff Size: Adult Regular)   Pulse 76   Temp 97.8  F (36.6  C) (Tympanic)   Resp 16   Ht 1.549 m (5' 0.98\")   Wt 65.8 kg (145 lb)   SpO2 95%   BMI 27.41 kg/m    Body mass index is 27.41 kg/m .  Physical Exam   GENERAL: healthy, alert and no distress  PSYCH: mentation appears normal, affect normal/bright                    "

## 2022-11-29 NOTE — LETTER
11/29/2022          RE: Mann Escobar  [address]  [facility] [address]        Dear Colleague,    Thank you for referring your patient, Mann Escobar, to the [facility]. Please see a copy of my visit note below.    Dialysis Access Service  Consult Note    Referred by Dr. Alicea for placement of peritoneal dialysis access.    HPI: Mr. Escobar is being seen today for placement of peritoneal dialysis access due to Chronic renal failure from nephrolithiasis.  He is right handed. Mr. Escobar is not dialyzing at (Not currently on dialysis).        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     Back pain     WITH BILATERAL LEG PAIN AND NUMBNESS OF BOTH FEET     Gastro-oesophageal reflux disease     REFLUX     Hypertension      Hypopotassemia      Internal hemorrhoids      Iron deficiency anaemia      Leg edema      Morbid obesity 9/12/2005     Morbid obesity (H)      Osteoarthrosis      Scoliosis      Thalassemia minor        Past Surgical History:   Procedure Laterality Date     CATARACT IOL, RT/LT  2008/    Cataract IOL RT/LT     COLONOSCOPY  2009/07    polyps removed repeat 5 yrs, tubular adenoma     COLONOSCOPY  9/29/2011    Procedure:COLONOSCOPY; Colonoscopy with hemorrhoid banding; Surgeon:JEREMY GARCIA; Location:[facility]     COLONOSCOPY N/A 12/19/2017    Procedure: COLONOSCOPY;  colonoscopy,  gastroscopy;  Surgeon: Edmar Isaacs MD;  Location: [facility]     DECOMPRESSION LUMBAR MINIMALLY INVASIVE TWO LEVELS  5/2/2012    Procedure:DECOMPRESSION LUMBAR MINIMALLY INVASIVE TWO LEVELS; Surgeon:LOTTIE MITCHELL; [facility]     ESOPHAGOSCOPY, GASTROSCOPY, DUODENOSCOPY (EGD), COMBINED N/A 2/6/2018    Procedure: COMBINED ESOPHAGOSCOPY, GASTROSCOPY, DUODENOSCOPY (EGD);  gastroscopy;  Surgeon: Edmar Isaacs MD;  Location: [facility]     ESOPHAGOSCOPY, GASTROSCOPY, DUODENOSCOPY (EGD), COMBINED N/A 10/18/2021    Procedure: ESOPHAGOGASTRODUODENOSCOPY (EGD);  Surgeon: Anju Galeano MD;  Location: [facility]     FUSION SPINE POSTERIOR MINIMALLY INVASIVE ONE LEVEL  5/2/2012    Procedure:FUSION SPINE POSTERIOR MINIMALLY INVASIVE ONE LEVEL; Minimal Access Spinal Technology Transformaninal Lumbar Interbody Fusion L4-5, Decompression L3-5; Surgeon:LOTTIE MITCHELL; [facility]     HC REMOVAL OF HYDROCELE,TUNICA,UNILAT  2006    bilateral     IR RENAL BIOPSY LEFT  6/13/2022     ORTHOPEDIC SURGERY  2000    Lf knee replacement     ORTHOPEDIC SURGERY  2002, 2010    Rt replacement     ORTHOPEDIC SURGERY  2005    Left ankle fused     RECONSTRUCT ANKLE Right 7/23/2020    Procedure: Ankle RECONSTRUCTIve Arthroplasty;  Surgeon: Luke Powers DPM;  Location: [facility]     SURGICAL HISTORY OF -       Cysto     SURGICAL HISTORY OF -   2002    Percutaneous kidney stone removal     SURGICAL HISTORY OF -       ureteral stent removal     SURGICAL HISTORY OF -   2005    bariatric surgery-Faivan-en-Y     SURGICAL HISTORY OF -   2008    carpal tunnel surgery rt wrist       Family History   Problem Relation Age of Onset     Diabetes Brother      Obesity Brother      Cerebrovascular Disease Paternal Grandfather      Prostate Cancer Father      Cancer Father         bone     Diabetes Father      Heart Disease Mother         CHF     Cerebrovascular Disease Mother      Hypertension Mother      Cancer Mother         tumor in stomach     Cancer -  colorectal Mother      Heart Disease Maternal Grandmother         CHF     Diabetes Paternal Grandmother      Breast Cancer Sister      Genitourinary Problems Son         Kidney stones     Cancer Brother      Cancer - colorectal Brother      Diabetes Brother        Social History     Tobacco Use     Smoking status: Former     Packs/day: 0.50     Years: 7.00     Pack years: 3.50     Types: Cigarettes     Quit date: 1962     Years since quittin.9     Smokeless tobacco: Never   Substance Use Topics     Alcohol use: Yes     Comment: one every other day. Rarely         ROS: 10 point ROS neg other than the symptoms noted above in the HPI.                 Current Outpatient Medications:      fluticasone (FLONASE) 50 MCG/ACT nasal spray, Spray 1 spray into both nostrils daily, Disp: 16 g, Rfl: 1     folic acid 800 MCG tablet, Take 1 tablet (800 mcg) by mouth daily, Disp: 30 tablet, Rfl: 11     furosemide (LASIX) 40 MG tablet, TAKE 2 TABLETS BY MOUTH 2 TIMES DAILY, Disp: 120 tablet, Rfl: 0     HCA VITAMIN B12 500 MCG OR TABS, 2 tablets daily, Disp: , Rfl:      MULTIVITAMIN OR, one daily, Disp: , Rfl:      omeprazole (PRILOSEC) 40 MG DR capsule, Take 1 capsule (40 mg) by mouth 2 times daily, Disp: 180 capsule, Rfl: 3     OVER-THE-COUNTER, Elderberry 50mg supplement, one daily, Disp: , Rfl:      oxyCODONE (ROXICODONE) 5 MG tablet, Take 1 tablet (5 mg) by mouth 2 times daily as needed for severe pain (7-10) This is a 90 day supply, Disp: 180 tablet, Rfl: 0     potassium chloride ER (KLOR-CON M) 10 MEQ CR tablet, Take 1 tablet (10 mEq) by mouth daily, Disp: 30 tablet, Rfl: 11     pyridOXINE (VITAMIN B-6) 50 MG tablet, Take 1 tablet (50 mg) by mouth 2 times daily, Disp: 60 tablet, Rfl: 11     terazosin (HYTRIN) 2 MG capsule, TAKE 1 CAPSULE AT BEDTIME (Patient taking differently: TAKE 1 CAPSULE AT BEDTIME.), Disp: 30 capsule, Rfl: 11     VIACTIV OR, With D 1000mg calcium, Disp: , Rfl:      vitamin D3 (CHOLECALCIFEROL) 50  mcg (2000 units) tablet, Take 1 tablet (50 mcg) by mouth daily, Disp: 30 tablet, Rfl: 11     zinc gluconate 50 MG tablet, Take 50 mg by mouth daily, Disp: , Rfl:      amLODIPine (NORVASC) 5 MG tablet, Take 1 tablet (5 mg) by mouth daily (Patient not taking: Reported on 11/25/2022), Disp: 30 tablet, Rfl: 11     sucralfate (CARAFATE) 1 GM tablet, Take 1 tablet (1 g) by mouth 4 times daily Take 30 minutes before meals and at bedtime (Patient not taking: Reported on 11/25/2022), Disp: 56 tablet, Rfl: 0     Turmeric 500 MG CAPS, Does not take all the time (Patient not taking: Reported on 11/25/2022), Disp: , Rfl:     Current Facility-Administered Medications:      ropivacaine (NAROPIN) injection 3 mL, 3 mL, , , Repa, Danielito Christopher, DO, 3 mL at 11/17/22 1015     ropivacaine (NAROPIN) injection 3 mL, 3 mL, , , Repa, Danielito Christopher, DO, 3 mL at 11/17/22 1015     triamcinolone (KENALOG-40) injection 40 mg, 40 mg, , , Repa, Danielito Christopher, DO, 40 mg at 11/17/22 1015     triamcinolone (KENALOG-40) injection 40 mg, 40 mg, , , Repa, Danielito Christopher, DO, 40 mg at 11/17/22 1015                  PHYSICAL EXAM:  Pulse:  [84] 84  BP: (97)/(64) 97/64  SpO2:  [93 %] 93 %  Constitutional: Alert and oriented in no acute distress  HEENT: sclera anicteric, MMM, conjunctiva pink  Chest: HD catheter absent.  CV:  NSR  : No CVA tenderness  Abdomen: old incision ascites absent no hernias  Beltline location in relation to umbilicus: just below  Extremities:  No edema  Skin:  No rashes or jaundice  Neuro: normal gait  Psych: normal mood and affect    Infusion Therapy Visit on 11/10/2022   Component Date Value Ref Range Status     Sodium 11/10/2022 143  136 - 145 mmol/L Final     Potassium 11/10/2022 3.2 (L)  3.4 - 5.3 mmol/L Final     Chloride 11/10/2022 106  98 - 107 mmol/L Final     Carbon Dioxide (CO2) 11/10/2022 26  22 - 29 mmol/L Final     Anion Gap 11/10/2022 11  7 - 15 mmol/L Final     Glucose 11/10/2022 132 (H)  70 - 99  mg/dL Final     Urea Nitrogen 11/10/2022 61.7 (H)  8.0 - 23.0 mg/dL Final     Creatinine 11/10/2022 4.03 (H)  0.67 - 1.17 mg/dL Final     GFR Estimate 11/10/2022 14 (L)  >60 mL/min/1.73m2 Final    Effective December 21, 2021 eGFRcr in adults is calculated using the 2021 CKD-EPI creatinine equation which includes age and gender (Presley et al., [facility], DOI: 10.[employer]/IFZMad8978657)     Calcium 11/10/2022 6.3 (L)  8.8 - 10.2 mg/dL Final     Albumin 11/10/2022 3.0 (L)  3.5 - 5.2 g/dL Final     Phosphorus 11/10/2022 4.2  2.5 - 4.5 mg/dL Final     WBC Count 11/10/2022 6.1  4.0 - 11.0 10e3/uL Final     RBC Count 11/10/2022 4.79  4.40 - 5.90 10e6/uL Final     Hemoglobin 11/10/2022 8.9 (L)  13.3 - 17.7 g/dL Final     Hematocrit 11/10/2022 29.4 (L)  40.0 - 53.0 % Final     MCV 11/10/2022 61 (L)  78 - 100 fL Final     MCH 11/10/2022 18.6 (L)  26.5 - 33.0 pg Final     MCHC 11/10/2022 30.3 (L)  31.5 - 36.5 g/dL Final     RDW 11/10/2022 17.8 (H)  10.0 - 15.0 % Final     Platelet Count 11/10/2022 181  150 - 450 10e3/uL Final     % Neutrophils 11/10/2022 79  % Final     % Lymphocytes 11/10/2022 11  % Final     % Monocytes 11/10/2022 8  % Final     % Eosinophils 11/10/2022 1  % Final     % Basophils 11/10/2022 1  % Final     % Immature Granulocytes 11/10/2022 0  % Final     NRBCs per 100 WBC 11/10/2022 0  <1 /100 Final     Absolute Neutrophils 11/10/2022 4.8  1.6 - 8.3 10e3/uL Final     Absolute Lymphocytes 11/10/2022 0.7 (L)  0.8 - 5.3 10e3/uL Final     Absolute Monocytes 11/10/2022 0.5  0.0 - 1.3 10e3/uL Final     Absolute Eosinophils 11/10/2022 0.1  0.0 - 0.7 10e3/uL Final     Absolute Basophils 11/10/2022 0.1  0.0 - 0.2 10e3/uL Final     Absolute Immature Granulocytes 11/10/2022 0.0  <=0.4 10e3/uL Final     Absolute NRBCs 11/10/2022 0.0  10e3/uL Final     Platelet Assessment 11/10/2022 Automated Count Confirmed. Platelet morphology is normal.  Automated Count Confirmed. Platelet morphology is normal. Final     RBC Fragments  11/10/2022 Slight (A)  None Seen Final     RBC Morphology 11/10/2022 Confirmed RBC Indices   Final            Assessment & Plan: Mr. Escobar is a good candidate for peritoneal  dialysis access.  I would recommend laparoscopic PD catheter placement with left sided exit (likely extended as the traditional would be in his beltline, created under General. Dr. Alicea will guide us on timing of placement.  If PD catheter is not able to be placed due to adhesive disease from previous surgery, I will plan to do a left arm vs right arm avf vs graft in the same setting.     The surgical risks and benefits were reviewed and questions were answered. We discussed the day of surgery plan, anesthesia, postop care, risk of infection, bleeding, thrombosis, possible need for intraoperative omentectomy or newly detected hernia repair or obliteration of patent processus vaginalis (if male), drain pain or catheter dysfunction, and possible future need for surgical revision or removal. This was contrasted with morbidity and mortality risk of long-term catheter based hemodialysis access. The patient does wish to proceed with surgery for permanent access creation at this time. The patient was counselled to contact our nurse coordinator, Sherie Garsia RN at [phone] with any questions or concerns.  Thank you for the opportunity to participate in Mr. Escobar's care.        Padma Owens MD FACS  Assistant Professor of Surgery  Director, [facility].

## 2022-11-29 NOTE — TELEPHONE ENCOUNTER
Dialysis Access Assessment:    Patient presents to clinic for PD catheter consult with Dr. Owens. Patient is CKD 5 and is not yet on dialysis. He is followed by Dr. Alicea and Stormy Madrigal PA-C.    Diabetic: No  Has family/ friend(s) support system: Yes; please explain: Patient lives with wife, who will be supporting him with PD. Patient's son is involved with his care and helps with reminders and appointments.  Taking anticoagulants: No  Taking anti rejection medications: No    Peritoneal Dialysis:  History of abdominal surgeries: Yes; please explain: laproscopic gastric bypass in 2000, per patient report.  Pets: Yes; please explain: Patient has a small dog. He is willing to keep the dog out of the room while connecting and disconnecting.  Constipation:  No. Discussed using laxatives and/or stool softeners preventatively.  Aware of contraindication to submerging/ soaking body in water (E.G. swimming, taking baths, hot tubbing):  YES  Aware of storage requirements for PD supplies:  YES  Physically capable to do PD (E.G. lift dialysate bags, make aseptic connections etc.):  YES, wife will assist with lifting bags, etc.  Mentally capable to learn PD: YES    Hemodialysis:   Dominant hand: right  Previous AV fistula/ graft:  No  Currently on dialysis: No    Hemodialysis is the patient's second modality choice, if he is unable to do PD. He is not interested in home hemodialysis. Reviewed the basic process of hemodialysis and the frequency of treatments. Patient is agreeable to a fistula/graft as a back up plan.    Dialysis History    No dialysis history on file.       GFR Estimate   Date Value Ref Range Status   11/29/2022 12 (L) >60 mL/min/1.73m2 Final     Comment:     Effective December 21, 2021 eGFRcr in adults is calculated using the 2021 CKD-EPI creatinine equation which includes age and gender (Presley et al., NEJM, DOI: 10.1056/MNAIvf0075295)   06/16/2021 21 (L) >60 mL/min/[1.73_m2] Final     Comment:      Non  GFR Calc  Starting 12/18/2018, serum creatinine based estimated GFR (eGFR) will be   calculated using the Chronic Kidney Disease Epidemiology Collaboration   (CKD-EPI) equation.       Patient is not ready to have surgery and start dialysis yet. Will follow up with nephrology team regarding timing of surgery. Patient prefers PD as his first modality choice.    Plan for extended PD catheter placement in LUQ, vs left arm vs right arm AVF vs graft, under general anesthesia. Patient will need PAC prior to surgery.  Patient was given contact information for dialysis access team and encouraged to call with any questions.    Francie iHcks RN  Dialysis Access Care Coordinator - Nephrology  Phone: [phone]    Please use P_Dialysis_Access_Nurse ([id number]) pool for Epic InBasket communications to ensure timely response

## 2022-11-29 NOTE — PROGRESS NOTES
Dialysis Access Service  Consult Note    Referred by Dr. Alicea for placement of peritoneal dialysis access.    HPI: Mr. Escobar is being seen today for placement of peritoneal dialysis access due to Chronic renal failure from nephrolithiasis.  He is right handed. Mr. Escobar is not dialyzing at (Not currently on dialysis).        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     Back pain     WITH BILATERAL LEG PAIN AND NUMBNESS OF BOTH FEET     Gastro-oesophageal reflux disease     REFLUX     Hypertension      Hypopotassemia      Internal hemorrhoids      Iron deficiency anaemia      Leg edema      Morbid obesity 9/12/2005     Morbid obesity (H)      Osteoarthrosis      Scoliosis      Thalassemia minor        Past Surgical History:   Procedure Laterality Date     CATARACT IOL, RT/LT  2008/    Cataract IOL RT/LT     COLONOSCOPY  2009/07    polyps removed repeat 5 yrs, tubular adenoma     COLONOSCOPY  9/29/2011    Procedure:COLONOSCOPY; Colonoscopy with hemorrhoid banding; Surgeon:JEREMY GARCIA; Location:[facility]     COLONOSCOPY N/A 12/19/2017    Procedure: COLONOSCOPY;  colonoscopy, gastroscopy;  Surgeon: Edmar Isaacs MD;  Location: [facility]     DECOMPRESSION LUMBAR MINIMALLY INVASIVE TWO LEVELS  5/2/2012    Procedure:DECOMPRESSION LUMBAR MINIMALLY INVASIVE TWO LEVELS; Surgeon:LOTTIE MITCHELL; [facility]     ESOPHAGOSCOPY, GASTROSCOPY, DUODENOSCOPY (EGD), COMBINED  N/A 2/6/2018    Procedure: COMBINED ESOPHAGOSCOPY, GASTROSCOPY, DUODENOSCOPY (EGD);  gastroscopy;  Surgeon: Edmar Isaacs MD;  Location: [facility]     ESOPHAGOSCOPY, GASTROSCOPY, DUODENOSCOPY (EGD), COMBINED N/A 10/18/2021    Procedure: ESOPHAGOGASTRODUODENOSCOPY (EGD);  Surgeon: Anju Galeano MD;  Location: [facility]     FUSION SPINE POSTERIOR MINIMALLY INVASIVE ONE LEVEL  5/2/2012    Procedure:FUSION SPINE POSTERIOR MINIMALLY INVASIVE ONE LEVEL; Minimal Access Spinal Technology Transformaninal Lumbar Interbody Fusion L4-5, Decompression L3-5; Surgeon:LOTTIE MITCHELL; Location:[facility]     HC REMOVAL OF HYDROCELE,TUNICA,UNILAT  2006    bilateral     IR RENAL BIOPSY LEFT  6/13/2022     ORTHOPEDIC SURGERY  2000    Lf knee replacement     ORTHOPEDIC SURGERY  2002, 2010    Rt replacement     ORTHOPEDIC SURGERY  2005    Left ankle fused     RECONSTRUCT ANKLE Right 7/23/2020    Procedure: Ankle RECONSTRUCTIve Arthroplasty;  Surgeon: Luke Powers DPM;  Location: [facility]     SURGICAL HISTORY OF -       Cysto     SURGICAL HISTORY OF -   2002    Percutaneous kidney stone removal     SURGICAL HISTORY OF -       ureteral stent removal     SURGICAL HISTORY OF -   2005    bariatric surgery-Favian-en-Y     SURGICAL HISTORY OF -   2008    carpal tunnel surgery rt wrist       Family History   Problem Relation Age of Onset     Diabetes Brother      Obesity Brother      Cerebrovascular Disease Paternal Grandfather      Prostate Cancer Father      Cancer Father         bone     Diabetes Father      Heart Disease Mother         CHF     Cerebrovascular Disease Mother      Hypertension Mother      Cancer Mother         tumor in stomach     Cancer - colorectal Mother      Heart Disease Maternal Grandmother         CHF     Diabetes Paternal Grandmother      Breast Cancer Sister      Genitourinary Problems Son         Kidney stones     Cancer Brother      Cancer - colorectal Brother      Diabetes Brother        Social History     Tobacco Use      Smoking status: Former     Packs/day: 0.50     Years: 7.00     Pack years: 3.50     Types: Cigarettes     Quit date: 1962     Years since quittin.9     Smokeless tobacco: Never   Substance Use Topics     Alcohol use: Yes     Comment: one every other day. Rarely         ROS: 10 point ROS neg other than the symptoms noted above in the HPI.                 Current Outpatient Medications:      fluticasone (FLONASE) 50 MCG/ACT nasal spray, Spray 1 spray into both nostrils daily, Disp: 16 g, Rfl: 1     folic acid 800 MCG tablet, Take 1 tablet (800 mcg) by mouth daily, Disp: 30 tablet, Rfl: 11     furosemide (LASIX) 40 MG tablet, TAKE 2 TABLETS BY MOUTH 2 TIMES DAILY, Disp: 120 tablet, Rfl: 0     HCA VITAMIN B12 500 MCG OR TABS, 2 tablets daily, Disp: , Rfl:      MULTIVITAMIN OR, one daily, Disp: , Rfl:      omeprazole (PRILOSEC) 40 MG DR capsule, Take 1 capsule (40 mg) by mouth 2 times daily, Disp: 180 capsule, Rfl: 3     OVER-THE-COUNTER, Elderberry 50mg supplement, one daily, Disp: , Rfl:      oxyCODONE (ROXICODONE) 5 MG tablet, Take 1 tablet (5 mg) by mouth 2 times daily as needed for severe pain (7-10) This is a 90 day supply, Disp: 180 tablet, Rfl: 0     potassium chloride ER (KLOR-CON M) 10 MEQ CR tablet, Take 1 tablet (10 mEq) by mouth daily, Disp: 30 tablet, Rfl: 11     pyridOXINE (VITAMIN B-6) 50 MG tablet, Take 1 tablet (50 mg) by mouth 2 times daily, Disp: 60 tablet, Rfl: 11     terazosin (HYTRIN) 2 MG capsule, TAKE 1 CAPSULE AT BEDTIME (Patient taking differently: TAKE 1 CAPSULE AT BEDTIME.), Disp: 30 capsule, Rfl: 11     VIACTIV OR, With D 1000mg calcium, Disp: , Rfl:      vitamin D3 (CHOLECALCIFEROL) 50 mcg (2000 units) tablet, Take 1 tablet (50 mcg) by mouth daily, Disp: 30 tablet, Rfl: 11     zinc gluconate 50 MG tablet, Take 50 mg by mouth daily, Disp: , Rfl:      amLODIPine (NORVASC) 5 MG tablet, Take 1 tablet (5 mg) by mouth daily (Patient not taking: Reported on 2022), Disp: 30  tablet, Rfl: 11     sucralfate (CARAFATE) 1 GM tablet, Take 1 tablet (1 g) by mouth 4 times daily Take 30 minutes before meals and at bedtime (Patient not taking: Reported on 11/25/2022), Disp: 56 tablet, Rfl: 0     Turmeric 500 MG CAPS, Does not take all the time (Patient not taking: Reported on 11/25/2022), Disp: , Rfl:     Current Facility-Administered Medications:      ropivacaine (NAROPIN) injection 3 mL, 3 mL, , , Repa, Danielito Sernaopher, DO, 3 mL at 11/17/22 1015     ropivacaine (NAROPIN) injection 3 mL, 3 mL, , , Repa, Danielito Sernaopher, DO, 3 mL at 11/17/22 1015     triamcinolone (KENALOG-40) injection 40 mg, 40 mg, , , Repa, Danielito Sernaopher, DO, 40 mg at 11/17/22 1015     triamcinolone (KENALOG-40) injection 40 mg, 40 mg, , , Repa, Danielito Sernaopher, DO, 40 mg at 11/17/22 1015                  PHYSICAL EXAM:  Pulse:  [84] 84  BP: (97)/(64) 97/64  SpO2:  [93 %] 93 %  Constitutional: Alert and oriented in no acute distress  HEENT: sclera anicteric, MMM, conjunctiva pink  Chest: HD catheter absent.  CV:  NSR  : No CVA tenderness  Abdomen: old incision ascites absent no hernias  Beltline location in relation to umbilicus: just below  Extremities:  No edema  Skin:  No rashes or jaundice  Neuro: normal gait  Psych: normal mood and affect    Infusion Therapy Visit on 11/10/2022   Component Date Value Ref Range Status     Sodium 11/10/2022 143  136 - 145 mmol/L Final     Potassium 11/10/2022 3.2 (L)  3.4 - 5.3 mmol/L Final     Chloride 11/10/2022 106  98 - 107 mmol/L Final     Carbon Dioxide (CO2) 11/10/2022 26  22 - 29 mmol/L Final     Anion Gap 11/10/2022 11  7 - 15 mmol/L Final     Glucose 11/10/2022 132 (H)  70 - 99 mg/dL Final     Urea Nitrogen 11/10/2022 61.7 (H)  8.0 - 23.0 mg/dL Final     Creatinine 11/10/2022 4.03 (H)  0.67 - 1.17 mg/dL Final     GFR Estimate 11/10/2022 14 (L)  >60 mL/min/1.73m2 Final    Effective December 21, 2021 eGFRcr in adults is calculated using the 2021 CKD-EPI creatinine  equation which includes age and gender (Presley couch al., [facility], DOI: 10.1056/DRVHxc3872156)     Calcium 11/10/2022 6.3 (L)  8.8 - 10.2 mg/dL Final     Albumin 11/10/2022 3.0 (L)  3.5 - 5.2 g/dL Final     Phosphorus 11/10/2022 4.2  2.5 - 4.5 mg/dL Final     WBC Count 11/10/2022 6.1  4.0 - 11.0 10e3/uL Final     RBC Count 11/10/2022 4.79  4.40 - 5.90 10e6/uL Final     Hemoglobin 11/10/2022 8.9 (L)  13.3 - 17.7 g/dL Final     Hematocrit 11/10/2022 29.4 (L)  40.0 - 53.0 % Final     MCV 11/10/2022 61 (L)  78 - 100 fL Final     MCH 11/10/2022 18.6 (L)  26.5 - 33.0 pg Final     MCHC 11/10/2022 30.3 (L)  31.5 - 36.5 g/dL Final     RDW 11/10/2022 17.8 (H)  10.0 - 15.0 % Final     Platelet Count 11/10/2022 181  150 - 450 10e3/uL Final     % Neutrophils 11/10/2022 79  % Final     % Lymphocytes 11/10/2022 11  % Final     % Monocytes 11/10/2022 8  % Final     % Eosinophils 11/10/2022 1  % Final     % Basophils 11/10/2022 1  % Final     % Immature Granulocytes 11/10/2022 0  % Final     NRBCs per 100 WBC 11/10/2022 0  <1 /100 Final     Absolute Neutrophils 11/10/2022 4.8  1.6 - 8.3 10e3/uL Final     Absolute Lymphocytes 11/10/2022 0.7 (L)  0.8 - 5.3 10e3/uL Final     Absolute Monocytes 11/10/2022 0.5  0.0 - 1.3 10e3/uL Final     Absolute Eosinophils 11/10/2022 0.1  0.0 - 0.7 10e3/uL Final     Absolute Basophils 11/10/2022 0.1  0.0 - 0.2 10e3/uL Final     Absolute Immature Granulocytes 11/10/2022 0.0  <=0.4 10e3/uL Final     Absolute NRBCs 11/10/2022 0.0  10e3/uL Final     Platelet Assessment 11/10/2022 Automated Count Confirmed. Platelet morphology is normal.  Automated Count Confirmed. Platelet morphology is normal. Final     RBC Fragments 11/10/2022 Slight (A)  None Seen Final     RBC Morphology 11/10/2022 Confirmed RBC Indices   Final            Assessment & Plan: Mr. Escobar is a good candidate for peritoneal  dialysis access.  I would recommend laparoscopic PD catheter placement with left sided exit (likely extended as the  traditional would be in his beltline, created under General. Dr. Alicea will guide us on timing of placement.  If PD catheter is not able to be placed due to adhesive disease from previous surgery, I will plan to do a left arm vs right arm avf vs graft in the same setting.     The surgical risks and benefits were reviewed and questions were answered. We discussed the day of surgery plan, anesthesia, postop care, risk of infection, bleeding, thrombosis, possible need for intraoperative omentectomy or newly detected hernia repair or obliteration of patent processus vaginalis (if male), drain pain or catheter dysfunction, and possible future need for surgical revision or removal. This was contrasted with morbidity and mortality risk of long-term catheter based hemodialysis access. The patient does wish to proceed with surgery for permanent access creation at this time. The patient was counselled to contact our nurse coordinator, Sherie Garsia RN at [phone] with any questions or concerns.  Thank you for the opportunity to participate in Mr. Escobar's care.        Padma Owens MD FACS  Assistant Professor of Surgery  Director, [facility].

## 2022-12-01 NOTE — PROGRESS NOTES
Infusion Nursing Note:  Mann Escobar presents today for Aranes.    Patient seen by provider today: No   present during visit today: Not Applicable.    Note: N/A.    Intravenous Access:  Labs drawn peripherally by .    Treatment Conditions:  Results reviewed, labs MET treatment parameters, ok to proceed with treatment.  Hgb 9.1    Post Infusion Assessment:  Patient tolerated injection without incident.  Site patent and intact, free from redness, edema or discomfort.  No evidence of extravasations.     Discharge Plan:   Discharge instructions reviewed with: Patient.  Patient and/or family verbalized understanding of discharge instructions and all questions answered.  AVS to patient via [email or web address].  Patient will return 12/22/2022 for next appointment.   Patient discharged in stable condition accompanied by: self and wife.  Departure Mode: Ambulatory.    Kristi Delgado RN

## 2022-12-02 NOTE — PROGRESS NOTES
Nephrology Note: Nursing Outreach Encounter    REASON FOR CALL:                                                      REASON FOR CALL: Chronic Disease Management                                SITUATION/BACKROUND:                                                    Patient is being treated for CKD Stage 5.    From: Yoon Alicea MD   Sent: 11/29/2022   4:52 PM CST   To: Syeda Eastman, RN, Loren Sparks, RN, *     Yes,   Let's start calcitriol, his PTH is fairly high   Start with 0.25mg daily     Thanks    Patient Journey Status:  Active:   Neph Tracking Information 6/21/2022 8/1/2022 10/11/2022 11/14/2022 11/30/2022   CKD Education Status Referred - - Complete -   CKD Education Referral Date 6/21/2022 - - - -   CKD Education Completed Date - - - 9/9/2022 -   CKD Education Type Kidney Smart CKD Basics - - Kidney Smart CKD Basics -   Preferred Modality - - Peritoneal Dialysis - -   Home Care Referral - 7/26/20 7/26/20 7/26/20 -   Journey Referral - 6/24/22 6/24/22 6/24/22 -   Access Surgeon Referral Status  - - Referred - -   Dialysis Access Referral - - 10/11/2022 - -   Access Surgical Consult - - 11/29/2022 - 11/29/2022   Transplant Status  - Not Referred - - -       ASSESSMENT:                                                      Reviewed lab results with pt. Per Dr. Alicea, prescribe calcitriol 0.25mcg every day po. Rx sent to preferred pharmacy.   Discussed PD cath placement with pt. Pt has follow up with Dr. Alicea on 12/05/22, will discuss plan for PD initiation then.       Neph Assessments:  Uremic Symptoms  Fatigue: yes  Cold: no  Nausea: no  Vomiting: no  Diarrhea: no  Edema: yes  Poor appetite: yes  Metallic Taste: yes  Pruritis: no  Tremor: no  Decreased urine output: no  Confusion: no    Volume Status  Edema: yes  Location: RLE, LLE, L flank  Status from previous: stable  Lightheaded or dizzy: no  Recent falls: no  Oral mucosa: normal    Fluid Intake  Pt on fluid restriction: no  Hydration  encouraged: no  Pt meeting hydration goal: yes  Urine output: baseline    Recent Labs  Lab Results   Component Value Date     11/29/2022     11/10/2022     10/20/2022     06/16/2021     02/08/2021     02/01/2021    Lab Results   Component Value Date    GFRESTIMATED 12 11/29/2022    GFRESTIMATED 14 11/10/2022    GFRESTIMATED 13 10/20/2022    GFRESTIMATED 21 06/16/2021    GFRESTIMATED 26 02/08/2021    GFRESTIMATED 28 02/01/2021        Lab Results   Component Value Date    POTASSIUM 3.3 11/29/2022    POTASSIUM 3.2 11/10/2022    POTASSIUM 3.6 10/20/2022    POTASSIUM 3.1 10/10/2022    POTASSIUM 3.6 08/29/2022    POTASSIUM 4.3 06/13/2022    POTASSIUM 3.8 06/16/2021    POTASSIUM 3.5 02/08/2021    POTASSIUM 3.7 02/01/2021    Lab Results   Component Value Date    CR 4.52 11/29/2022    CR 4.03 11/10/2022    CR 4.28 10/20/2022    CR 2.73 06/16/2021    CR 2.26 02/08/2021    CR 2.12 02/01/2021      No results found for: MAG Lab Results   Component Value Date    BUN 61.7 11/29/2022    BUN 61.7 11/10/2022    BUN 68.3 10/20/2022    BUN 73 10/10/2022    BUN 64 08/29/2022    BUN 54 06/13/2022    BUN 47 06/16/2021    BUN 28 02/08/2021    BUN 23 02/01/2021          Lab Results   Component Value Date    HGB 9.1 12/01/2022    HGB 9.5 11/29/2022    HGB 8.9 11/10/2022    HGB 8.5 06/16/2021    HGB 8.5 02/02/2021    HGB 8.7 01/20/2021    Lab Results   Component Value Date    A1C 5.2 10/25/2013    A1C 5.5 04/25/2012      Lab Results   Component Value Date    TESSIE 323 09/29/2022    TESSIE 242 07/28/2022    TESSIE 288 05/26/2022    TESSIE 329 06/16/2021    TESSIE 283 01/04/2021    TESSIE 286 10/19/2020       Lab Results   Component Value Date     09/29/2022     07/28/2022     05/26/2022     03/24/2022     06/16/2021     11/10/2017     08/23/2011    Lab Results   Component Value Date    PTHI 356 08/29/2022    PTHI 103 07/20/2009      Lab Results   Component Value Date    IRON  48 09/29/2022    IRON 57 07/28/2022    IRON 33 05/26/2022    IRON 41 06/16/2021    IRON 15 11/10/2017    IRON 18 08/23/2011    Lab Results   Component Value Date    DTOT  07/20/2009     <32  Season, race, dietary intake, and treatment affect the concentration of   25-hydroxy-Vitamin D. Values may decrease during winter months and increase   during summer months. Values less than 30 ug/L may indicate Vitamin D   deficiency.                PLAN:                                                         Follow Up:   Patient to follow up as scheduled at next apt.    All questions answered to patient's satisfaction.      Patient verbalized understanding and will follow up as recommended.    Loren Sparks RN

## 2022-12-05 NOTE — PATIENT INSTRUCTIONS
Start calcitriol when you get   Take the potassium pill daily  Hold hytrin  Lower furosemide to 2 tablets in morning, one in afternoon, monitor weight, if it goes back up more than 1-2 pounds, restart 2 tablets in afternoon.    Take tums once a day, not with meals.  Stop pyridoxine

## 2022-12-05 NOTE — PROGRESS NOTES
Nephrology Progress Note    Assessment and Plan:  82 year old male with history of CKD, hypertension, gastric bypass , GI bleeding 2016, iron deficiency anemia and Thalassemia minor, who presents for CKD followup. He also has HFpEF and edema/ lymphedema. He has history of multiple orthopedic surgeries. His Scr is increased from 2 to 4 and significant anemia.    # CKD progressively worsening, now Stage 5, eGFR 11-12ml/min:  Scr has worsened especially in the last 2-3 years, previously was near 1 but up to 1.2-1.3 in  and 1.3-1.5 in  and up to 2 since .  His Scr in 2020 was noted up to 2, and had ankle surgery  and Scr remained around 2 since then.    - no NSAIDs in recent years except toradol 2020 postop   - no other significant nephrotoxins, on diuretics for many years - hydrochlorothiazide previously, on loop diuretics in recent years    - discussed lowering diuretic doses as able, he lowered to once a day but creatinine still 3.5 and has swelling   - he is wrapping legs / elevating and monitoring sodium more closely to see if we can lower diuretic dose, however given swelling can get significant and HFpEF, volume control is important.   - given history of gastric bypass, we checked oxalate level- it was elevated, dietary restriction recommended  - low grade proteinuria  - kidney biopsy 0n 22:   Kidney, needle biopsy: Limited biopsy sample showin) Arteriosclerosis, severe.  2) Extensive global       glomerulosclerosis, with extensive tubular atrophy and interstitial fibrosis.  - no evidence of oxalate nephropathy  - he is not feeling well, and has not for a long time. His GFR is 11, and though BUN is not that high, suspect he is uremic given his symptoms.  - will lower BP medications and if not feeling better with a little higher blood pressure, will proceed with PD catheter placement in the next couple of weeks.    # Hypertension: blood pressure ~120s systolic    -current  regimen: amlodipine 5 mg daily, lasix 80/80 mg- will lower to 80/40mg and hold terazosin as BP still <120 typically  - swelling still present but significantly improved off amlodipine    -  goal -135 systolic, and continue monitoring swelling    # Anemia- acute on chronic, due to thalassemia and chronic renal disease, gastric ulcer noted recently:   - Previous Hgb 9.4, multifactorial, due to thalassemia and kidney disease, on EPO, sees hematology also; try to get his hgb up to 10  - Iron studies: adequate.    # Electrolytes:   - Potassium; level: Normal / low normal, not taking potassium supplement, asked him to restart 10mEq, may need to increase to 20mEq but cutting down on furosemide also  - Bicarbonate; level: Normal/ low- continue bicarbonate    # Mineral Bone Disorder:   - Calcium; level low last check, started calcitriol  - Phosphorus; level is: Normal   - Vit D 37,   - continue vit D 1000 units daily     Assessment and plan was discussed with patient and he voiced his understanding and agreement.  >40 minutes spent on day of service in coordination of care and direct patient counseling.      Reason for Visit:  Mann Escobar is an 82 year old male with CKD from? Biopsy showed severe arteriosclerosis and extensive global glomerulosclerosis, with extensive tubular atrophy and interstitial fibrosis, HFpEF, who presents for followup.    HPI:  He is a pleasant male with history of CKD who follows up for progressively worsening renal function. He has had elevated creatinine over the past few years, which has fluctuated. He was noted with HFpEF/ moderate diastolic dysfunction.      He has had significant amount of swelling since his ankle surgery last year, but even going back years, he has dealt with swelling and was even in lymphedema clinic many years ago.    He was given furosemide 20 mg then 40mg which helped with the edema.  He lost now 40+ lbs of weight with furosemide and now takes  "it as needed to maintain his weight and was taking it as needed. He now takes 80 mg BID.   He had a knee replacement in 2000 and has had some edema and then after his ankle surgery in July 2020     His weight was up to 199 lbs from 183 lbs and had gotten down to near 160 lbs. And now between 143-145 lbs on average.   He had gastric bypass in 2000 and was 280 lbs at that point.    He was taking NSAIDS in the past and had GI bleeding and noted with another ulcer last Fall  He was noted with hemoglobin down to 6.1 several months ago. Last check was in June 2021. He denies dark stools or evidence of bleeding  He was started on aranesp by hematology.    He has prostate enlargement and has been prescribed terazosin.  He has continued to take this in case it helps.  He states he has been having normal urination and denies change in appetite.   His Scr up to 4 in January. He has been eating more salt. He has not had a great appetite so eating more of the stuff he finds appetizing. He has been trying to hydrate well.     His weight change is significant over recent months. His oxalate was elevated when we checked it last year. He did start pyridoxine but not sure of usefulness   His ultrasound did show several cysts bilaterally but otherwise unremarkable, with normal sizes though right kidney 10.5 and left 13cm.     He had a kidney biopsy on 6/13 which showed \"severe arteriosclerosis, extensive global       glomerulosclerosis, and extensive tubular atrophy and       interstitial fibrosis. The sample is limited in that the       tissue for immunofluorescence studies did not contain any       glomeruli. There is no evidence of oxalate nephropathy.\"   His SPEP was negative, UPEP had a small monoclonal band in the gamma region with peak of 1.1       His weight went down from 148 to 143-144  He is feeling poorly, may be worse even in recent weeks.   His blood pressure is 115 range, and we discussed lowering BP medications further " to see if that makes him feel better, otherwise, PD catheter surgery in the next couple of weeks. We will reach out to him next bernabe,    Renal History:   Kidney Disease and Medical Hx:  h/o HTN: Yes      ROS:   A comprehensive review of systems was obtained and negative, except as noted in the HPI or PMH.    Active Medical Problems:  Patient Active Problem List   Diagnosis      HX NEPHROLITHIASIS [V13.01]     Thalassemia minor     Esophageal reflux     Family history of prostate cancer     Leg edema     CARDIOVASCULAR SCREENING; LDL GOAL LESS THAN 130     Internal hemorrhoids     Iron deficiency anemia     First degree AV block     Family history of diabetes mellitus     Scoliosis     Hypopotassemia     Advanced directives, counseling/discussion     HTN (hypertension)     Benign non-nodular prostatic hyperplasia with lower urinary tract symptoms     Chronic low back pain     S/P knee replacement     S/P ankle fusion     Abnormal antinuclear antibody titer     Osteoarthritis     Hypertension     BPH (benign prostatic hyperplasia)     Pseudogout     Chronic pain syndrome     S/P ankle joint replacement     Arthritis of ankle, right     Chronic anemia     Chronic kidney disease, stage 3 (H)     Diastolic dysfunction     Carpal tunnel syndrome     Edema     Nephrolithiasis     Marginal ulcer     Retching     History of Favian-en-Y gastric bypass     Chronic heart failure with preserved ejection fraction (H)     Anemia of chronic renal failure, stage 3b (H)     Continuous opioid dependence (H)     PMH:   Medical record was reviewed and PMH was discussed with patient and noted below.  Past Medical History:   Diagnosis Date     Arthritis of ankle, left 7/24/2020     Back pain     WITH BILATERAL LEG PAIN AND NUMBNESS OF BOTH FEET     Gastro-oesophageal reflux disease     REFLUX     Hypertension      Hypopotassemia      Internal hemorrhoids      Iron deficiency anaemia      Leg edema      Morbid obesity 9/12/2005     Morbid  obesity (H)      Osteoarthrosis      Scoliosis      Thalassemia minor      PSH:   Past Surgical History:   Procedure Laterality Date     CATARACT IOL, RT/LT  2008/    Cataract IOL RT/LT     COLONOSCOPY  2009/07    polyps removed repeat 5 yrs, tubular adenoma     COLONOSCOPY  9/29/2011    Procedure:COLONOSCOPY; Colonoscopy with hemorrhoid banding; Surgeon:JEREMY GARCIA; Location:[facility]     COLONOSCOPY N/A 12/19/2017    Procedure: COLONOSCOPY;  colonoscopy, gastroscopy;  Surgeon: Edmar Isaacs MD;  Location: [facility]     DECOMPRESSION LUMBAR MINIMALLY INVASIVE TWO LEVELS  5/2/2012    Procedure:DECOMPRESSION LUMBAR MINIMALLY INVASIVE TWO LEVELS; Surgeon:LOTTIE MITCHELL; [facility]     ESOPHAGOSCOPY, GASTROSCOPY, DUODENOSCOPY (EGD), COMBINED N/A 2/6/2018    Procedure: COMBINED ESOPHAGOSCOPY, GASTROSCOPY, DUODENOSCOPY (EGD);  gastroscopy;  Surgeon: Edmar Isaacs MD;  Location: [facility]     ESOPHAGOSCOPY, GASTROSCOPY, DUODENOSCOPY (EGD), COMBINED N/A 10/18/2021    Procedure: ESOPHAGOGASTRODUODENOSCOPY (EGD);  Surgeon: Anju Galeano MD;  Location: [facility]     FUSION SPINE POSTERIOR MINIMALLY INVASIVE ONE LEVEL  5/2/2012    Procedure:FUSION SPINE POSTERIOR MINIMALLY INVASIVE ONE LEVEL; Minimal Access Spinal Technology Transformaninal Lumbar Interbody Fusion L4-5, Decompression L3-5; Surgeon:LOTTIE MITCHELL; Location:[facility]     HC REMOVAL OF HYDROCELE,TUNICA,UNILAT  2006    bilateral     IR RENAL BIOPSY LEFT  6/13/2022     ORTHOPEDIC SURGERY  2000    Lf knee replacement     ORTHOPEDIC SURGERY  2002, 2010    Rt replacement     ORTHOPEDIC SURGERY  2005    Left ankle fused     RECONSTRUCT ANKLE Right 7/23/2020    Procedure: Ankle RECONSTRUCTIve Arthroplasty;  Surgeon: Luke Powers DPM;  Location: [facility]     SURGICAL HISTORY OF -       Cysto     SURGICAL HISTORY OF -   2002    Percutaneous kidney stone removal     SURGICAL HISTORY OF -       ureteral stent removal     SURGICAL HISTORY OF -   2005    bariatric  surgery-Favian-en-Y     SURGICAL HISTORY OF -       carpal tunnel surgery rt wrist       Family Hx:   Family History   Problem Relation Age of Onset     Diabetes Brother      Obesity Brother      Cerebrovascular Disease Paternal Grandfather      Prostate Cancer Father      Cancer Father         bone     Diabetes Father      Heart Disease Mother         CHF     Cerebrovascular Disease Mother      Hypertension Mother      Cancer Mother         tumor in stomach     Cancer - colorectal Mother      Heart Disease Maternal Grandmother         CHF     Diabetes Paternal Grandmother      Breast Cancer Sister      Genitourinary Problems Son         Kidney stones     Cancer Brother      Cancer - colorectal Brother      Diabetes Brother      Personal Hx:   Social History     Tobacco Use     Smoking status: Former     Packs/day: 0.50     Years: 7.00     Pack years: 3.50     Types: Cigarettes     Quit date: 1962     Years since quittin.9     Smokeless tobacco: Never   Substance Use Topics     Alcohol use: Yes     Comment: one every other day. Rarely       Allergies:  Allergies   Allergen Reactions     Nkda [No Known Drug Allergies]        Medications:  Current Outpatient Medications   Medication Sig     amLODIPine (NORVASC) 5 MG tablet Take 1 tablet (5 mg) by mouth daily (Patient not taking: Reported on 2022)     calcitRIOL (ROCALTROL) 0.25 MCG capsule Take 1 capsule (0.25 mcg) by mouth daily for 360 days     fluticasone (FLONASE) 50 MCG/ACT nasal spray Spray 1 spray into both nostrils daily     folic acid 800 MCG tablet Take 1 tablet (800 mcg) by mouth daily     furosemide (LASIX) 40 MG tablet TAKE 2 TABLETS BY MOUTH 2 TIMES DAILY     HCA VITAMIN B12 500 MCG OR TABS 2 tablets daily     MULTIVITAMIN OR one daily     omeprazole (PRILOSEC) 40 MG DR capsule Take 1 capsule (40 mg) by mouth 2 times daily     OVER-THE-COUNTER Elderberry 50mg supplement, one daily     oxyCODONE (ROXICODONE) 5 MG tablet Take 1 tablet (5  mg) by mouth 2 times daily as needed for severe pain (7-10) This is a 90 day supply     potassium chloride ER (KLOR-CON M) 10 MEQ CR tablet Take 1 tablet (10 mEq) by mouth daily     pyridOXINE (VITAMIN B-6) 50 MG tablet Take 1 tablet (50 mg) by mouth 2 times daily     sucralfate (CARAFATE) 1 GM tablet Take 1 tablet (1 g) by mouth 4 times daily Take 30 minutes before meals and at bedtime (Patient not taking: Reported on 11/25/2022)     terazosin (HYTRIN) 2 MG capsule TAKE 1 CAPSULE AT BEDTIME (Patient taking differently: TAKE 1 CAPSULE AT BEDTIME.)     Turmeric 500 MG CAPS Does not take all the time (Patient not taking: Reported on 11/25/2022)     VIACTIV OR With D 1000mg calcium     vitamin D3 (CHOLECALCIFEROL) 50 mcg (2000 units) tablet Take 1 tablet (50 mcg) by mouth daily     zinc gluconate 50 MG tablet Take 50 mg by mouth daily     Current Facility-Administered Medications   Medication     ropivacaine (NAROPIN) injection 3 mL     ropivacaine (NAROPIN) injection 3 mL     triamcinolone (KENALOG-40) injection 40 mg     triamcinolone (KENALOG-40) injection 40 mg      Vitals:  There were no vitals taken for this visit.  GENERAL: Healthy, alert and no distress  EYES: Eyes grossly normal to inspection.  No discharge or erythema, or obvious scleral/conjunctival abnormalities.  RESP: No audible wheeze, cough, or visible cyanosis.  No visible retractions or increased work of breathing.    SKIN: Visible skin clear. No significant rash, abnormal pigmentation or lesions.  NEURO: Cranial nerves grossly intact.  Mentation and speech appropriate for age.  PSYCH: Mentation appears normal, affect normal/bright, judgement and insight intact, normal speech and appearance well-groomed.      LABS:   CMP  Recent Labs   Lab Test 11/29/22  1229 11/10/22  1241 10/20/22  1236 10/10/22  1316 01/21/22  1006 06/16/21  1435 02/08/21  1121 02/01/21  1148 01/25/21  1058    143 143 142   < > 143 142 141 142   POTASSIUM 3.3* 3.2* 3.6 3.1*    < > 3.8 3.5 3.7 4.0   CHLORIDE 103 106 102 104   < > 107 107 109 112*   CO2 26 26 24 28   < > 24 30 27 29   ANIONGAP 14 11 17* 10   < > 12 5 5 1*   GLC 93 132* 118* 104*   < > 80 91 117* 96   BUN 61.7* 61.7* 68.3* 73*   < > 47* 28 23 21   CR 4.52* 4.03* 4.28* 4.49*   < > 2.73* 2.26* 2.12* 2.02*   GFRESTIMATED 12* 14* 13* 12*   < > 21* 26* 28* 30*   GFRESTBLACK  --   --   --   --   --  24* 31* 33* 35*   RUTH 5.9* 6.3* 6.4* 6.3*   < > 7.7* 8.0* 8.2* 8.4*    < > = values in this interval not displayed.     Recent Labs   Lab Test 02/08/21  1121 11/10/17  1401 06/11/15  1306   BILITOTAL 0.5 0.5 0.6   ALKPHOS 104 110 100   ALT 21 20 25   AST 14 13 14     CBC  Recent Labs   Lab Test 12/01/22  1158 11/29/22  1229 11/10/22  1241 10/20/22  1236 10/10/22  1316 09/29/22  1241   HGB 9.1* 9.5* 8.9* 9.4* 10.0* 9.4*   WBC  --   --  6.1 6.2 6.6 6.2   RBC  --   --  4.79 5.12 5.39 5.07   HCT 30.1* 31.0* 29.4* 31.4* 33.0* 30.5*   MCV  --   --  61* 61* 61* 60*   MCH  --   --  18.6* 18.4* 18.6* 18.5*   MCHC  --   --  30.3* 29.9* 30.3* 30.8*   RDW  --   --  17.8* 17.3* 19.1* 18.4*   PLT  --   --  181 185 255 226     URINE STUDIES  Recent Labs   Lab Test 03/24/22  1538 01/26/22  1303 06/16/21  1444 11/30/18  1045 09/28/15  1134   COLOR Yellow Yellow Yellow Yellow Yellow   APPEARANCE Clear Slightly Cloudy* Clear Clear Clear   URINEGLC 50* Negative Negative Negative Negative   URINEBILI Negative Negative Negative Negative Small  This is an unconfirmed screening test result. A positive result may be false.  *   URINEKETONE Negative Negative Negative Negative Trace*   SG 1.016 1.016 1.020 1.025 >1.030   UBLD Negative Negative Negative Trace* Negative   URINEPH 5.0 5.0 5.5 6.0 5.5   PROTEIN 100* 100* 100* >=300* 100*   UROBILINOGEN  --   --  1.0 1.0 1.0   NITRITE Negative Negative Negative Negative Negative   LEUKEST Negative Negative Negative Negative Negative   RBCU 0 <1 O - 2 O - 2 O - 2  Urine was tested unconcentrated because <10 ml was  received.     WBCU 1 3 0 - 5 0 - 5 O - 2  Urine was tested unconcentrated because <10 ml was received.       Recent Labs   Lab Test 03/24/22  1538 01/26/22  1303   UTPG 1.88* 0.80*     PTH  Recent Labs   Lab Test 08/29/22  1329   PTHI 356*     IRON STUDIES  Recent Labs   Lab Test 09/29/22  1241 07/28/22  1322 05/26/22  1257   IRON 48* 57 33*   * 144* 176*   IRONSAT 32 40 19   TESSIE [phone]       Yoon MD Deanne   of Medicine  Department of Nephrology  [facility]

## 2022-12-05 NOTE — PROGRESS NOTES
Mann is a 82 year old who is being evaluated via a billable video visit.      Video-Visit Details    Video Start Time: 327 pm    Type of service:  Video Visit    Video End Time:359 pm    Originating Location (pt. Location): Home    Distant Location (provider location):  On-site    Platform used for Video Visit: [employer]

## 2022-12-05 NOTE — LETTER
2022       RE: Mann Escobar  [address]  [facility] [address]     Dear Colleague,    Thank you for referring your patient, Mann Escobar, to the [facility] at [facility]. Please see a copy of my visit note below.    Nephrology Progress Note    Assessment and Plan:  82 year old male with history of CKD, hypertension, gastric bypass , GI bleeding 2016, iron deficiency anemia and Thalassemia minor, who presents for CKD followup. He also has HFpEF and edema/ lymphedema. He has history of multiple orthopedic surgeries. His Scr is increased from 2 to 4 and significant anemia.    # CKD progressively worsening, now Stage 5, eGFR 11-12ml/min:  Scr has worsened especially in the last 2-3 years, previously was near 1 but up to 1.2-1.3 in  and 1.3-1.5 in  and up to 2 since .  His Scr in 2020 was noted up to 2, and had ankle surgery  and Scr remained around 2 since then.    - no NSAIDs in recent years except toradol 2020 postop   - no other significant nephrotoxins, on diuretics for many years - hydrochlorothiazide previously, on loop diuretics in recent years    - discussed lowering diuretic doses as able, he lowered to once a day but creatinine still 3.5 and has swelling   - he is wrapping legs / elevating and monitoring sodium more closely to see if we can lower diuretic dose, however given swelling can get significant and HFpEF, volume control is important.   - given history of gastric bypass, we checked oxalate level- it was elevated, dietary restriction recommended  - low grade proteinuria  - kidney biopsy 0n 22:   Kidney, needle biopsy: Limited biopsy sample showin) Arteriosclerosis, severe.  2) Extensive global       glomerulosclerosis, with extensive tubular atrophy and interstitial fibrosis.  - no evidence of oxalate nephropathy  - he is not feeling well, and has not for  a long time. His GFR is 11, and though BUN is not that high, suspect he is uremic given his symptoms.  - will lower BP medications and if not feeling better with a little higher blood pressure, will proceed with PD catheter placement in the next couple of weeks.    # Hypertension: blood pressure ~120s systolic    -current regimen: amlodipine 5 mg daily, lasix 80/80 mg- will lower to 80/40mg and hold terazosin as BP still <120 typically  - swelling still present but significantly improved off amlodipine    -  goal -135 systolic, and continue monitoring swelling    # Anemia- acute on chronic, due to thalassemia and chronic renal disease, gastric ulcer noted recently:   - Previous Hgb 9.4, multifactorial, due to thalassemia and kidney disease, on EPO, sees hematology also; try to get his hgb up to 10  - Iron studies: adequate.    # Electrolytes:   - Potassium; level: Normal / low normal, not taking potassium supplement, asked him to restart 10mEq, may need to increase to 20mEq but cutting down on furosemide also  - Bicarbonate; level: Normal/ low- continue bicarbonate    # Mineral Bone Disorder:   - Calcium; level low last check, started calcitriol  - Phosphorus; level is: Normal   - Vit D 37,   - continue vit D 1000 units daily     Assessment and plan was discussed with patient and he voiced his understanding and agreement.  >40 minutes spent on day of service in coordination of care and direct patient counseling.      Reason for Visit:  Mann Escobar is an 82 year old male with CKD from? Biopsy showed severe arteriosclerosis and extensive global glomerulosclerosis, with extensive tubular atrophy and interstitial fibrosis, HFpEF, who presents for followup.    HPI:  He is a pleasant male with history of CKD who follows up for progressively worsening renal function. He has had elevated creatinine over the past few years, which has fluctuated. He was noted with HFpEF/ moderate diastolic  "dysfunction.      He has had significant amount of swelling since his ankle surgery last year, but even going back years, he has dealt with swelling and was even in lymphedema clinic many years ago.    He was given furosemide 20 mg then 40mg which helped with the edema.  He lost now 40+ lbs of weight with furosemide and now takes it as needed to maintain his weight and was taking it as needed. He now takes 80 mg BID.   He had a knee replacement in 2000 and has had some edema and then after his ankle surgery in July 2020     His weight was up to 199 lbs from 183 lbs and had gotten down to near 160 lbs. And now between 143-145 lbs on average.   He had gastric bypass in 2000 and was 280 lbs at that point.    He was taking NSAIDS in the past and had GI bleeding and noted with another ulcer last Fall  He was noted with hemoglobin down to 6.1 several months ago. Last check was in June 2021. He denies dark stools or evidence of bleeding  He was started on aranesp by hematology.    He has prostate enlargement and has been prescribed terazosin.  He has continued to take this in case it helps.  He states he has been having normal urination and denies change in appetite.   His Scr up to 4 in January. He has been eating more salt. He has not had a great appetite so eating more of the stuff he finds appetizing. He has been trying to hydrate well.     His weight change is significant over recent months. His oxalate was elevated when we checked it last year. He did start pyridoxine but not sure of usefulness   His ultrasound did show several cysts bilaterally but otherwise unremarkable, with normal sizes though right kidney 10.5 and left 13cm.     He had a kidney biopsy on 6/13 which showed \"severe arteriosclerosis, extensive global       glomerulosclerosis, and extensive tubular atrophy and       interstitial fibrosis. The sample is limited in that the       tissue for immunofluorescence studies did not contain any       " "glomeruli. There is no evidence of oxalate nephropathy.\"   His SPEP was negative, UPEP had a small monoclonal band in the gamma region with peak of 1.1       His weight went down from 148 to 143-144  He is feeling poorly, may be worse even in recent weeks.   His blood pressure is 115 range, and we discussed lowering BP medications further to see if that makes him feel better, otherwise, PD catheter surgery in the next couple of weeks. We will reach out to him next bernabe,    Renal History:   Kidney Disease and Medical Hx:  h/o HTN: Yes      ROS:   A comprehensive review of systems was obtained and negative, except as noted in the HPI or PMH.    Active Medical Problems:  Patient Active Problem List   Diagnosis      HX NEPHROLITHIASIS [V13.01]     Thalassemia minor     Esophageal reflux     Family history of prostate cancer     Leg edema     CARDIOVASCULAR SCREENING; LDL GOAL LESS THAN 130     Internal hemorrhoids     Iron deficiency anemia     First degree AV block     Family history of diabetes mellitus     Scoliosis     Hypopotassemia     Advanced directives, counseling/discussion     HTN (hypertension)     Benign non-nodular prostatic hyperplasia with lower urinary tract symptoms     Chronic low back pain     S/P knee replacement     S/P ankle fusion     Abnormal antinuclear antibody titer     Osteoarthritis     Hypertension     BPH (benign prostatic hyperplasia)     Pseudogout     Chronic pain syndrome     S/P ankle joint replacement     Arthritis of ankle, right     Chronic anemia     Chronic kidney disease, stage 3 (H)     Diastolic dysfunction     Carpal tunnel syndrome     Edema     Nephrolithiasis     Marginal ulcer     Retching     History of Favian-en-Y gastric bypass     Chronic heart failure with preserved ejection fraction (H)     Anemia of chronic renal failure, stage 3b (H)     Continuous opioid dependence (H)     PMH:   Medical record was reviewed and PMH was discussed with patient and noted below.  Past " Medical History:   Diagnosis Date     Arthritis of ankle, left 7/24/2020     Back pain     WITH BILATERAL LEG PAIN AND NUMBNESS OF BOTH FEET     Gastro-oesophageal reflux disease     REFLUX     Hypertension      Hypopotassemia      Internal hemorrhoids      Iron deficiency anaemia      Leg edema      Morbid obesity 9/12/2005     Morbid obesity (H)      Osteoarthrosis      Scoliosis      Thalassemia minor      PSH:   Past Surgical History:   Procedure Laterality Date     CATARACT IOL, RT/LT  2008/    Cataract IOL RT/LT     COLONOSCOPY  2009/07    polyps removed repeat 5 yrs, tubular adenoma     COLONOSCOPY  9/29/2011    Procedure:COLONOSCOPY; Colonoscopy with hemorrhoid banding; Surgeon:JEREMY GARCIA; Location:[facility]     COLONOSCOPY N/A 12/19/2017    Procedure: COLONOSCOPY;  colonoscopy, gastroscopy;  Surgeon: Edmar Isaacs MD;  Location: [facility]     DECOMPRESSION LUMBAR MINIMALLY INVASIVE TWO LEVELS  5/2/2012    Procedure:DECOMPRESSION LUMBAR MINIMALLY INVASIVE TWO LEVELS; Surgeon:LOTTIE MITCHELL; [facility]     ESOPHAGOSCOPY, GASTROSCOPY, DUODENOSCOPY (EGD), COMBINED N/A 2/6/2018    Procedure: COMBINED ESOPHAGOSCOPY, GASTROSCOPY, DUODENOSCOPY (EGD);  gastroscopy;  Surgeon: Edmar Isaacs MD;  Location: [facility]     ESOPHAGOSCOPY, GASTROSCOPY, DUODENOSCOPY (EGD), COMBINED N/A 10/18/2021    Procedure: ESOPHAGOGASTRODUODENOSCOPY (EGD);  Surgeon: Anju Galeano MD;  Location: [facility]     FUSION SPINE POSTERIOR MINIMALLY INVASIVE ONE LEVEL  5/2/2012    Procedure:FUSION SPINE POSTERIOR MINIMALLY INVASIVE ONE LEVEL; Minimal Access Spinal Technology Transformaninal Lumbar Interbody Fusion L4-5, Decompression L3-5; Surgeon:LOTTIE MITCHELL; [facility]     HC REMOVAL OF HYDROCELE,TUNICA,UNILAT  2006    bilateral     IR RENAL BIOPSY LEFT  6/13/2022     ORTHOPEDIC SURGERY  2000    Lf knee replacement     ORTHOPEDIC SURGERY  2002, 2010    Rt replacement     ORTHOPEDIC SURGERY  2005    Left ankle fused      RECONSTRUCT ANKLE Right 2020    Procedure: Ankle RECONSTRUCTIve Arthroplasty;  Surgeon: Luke Powers DPM;  Location: [facility]     SURGICAL HISTORY OF -       Cysto     SURGICAL HISTORY OF -       Percutaneous kidney stone removal     SURGICAL HISTORY OF -       ureteral stent removal     SURGICAL HISTORY OF -       bariatric surgery-Favian-en-Y     SURGICAL HISTORY OF -       carpal tunnel surgery rt wrist       Family Hx:   Family History   Problem Relation Age of Onset     Diabetes Brother      Obesity Brother      Cerebrovascular Disease Paternal Grandfather      Prostate Cancer Father      Cancer Father         bone     Diabetes Father      Heart Disease Mother         CHF     Cerebrovascular Disease Mother      Hypertension Mother      Cancer Mother         tumor in stomach     Cancer - colorectal Mother      Heart Disease Maternal Grandmother         CHF     Diabetes Paternal Grandmother      Breast Cancer Sister      Genitourinary Problems Son         Kidney stones     Cancer Brother      Cancer - colorectal Brother      Diabetes Brother      Personal Hx:   Social History     Tobacco Use     Smoking status: Former     Packs/day: 0.50     Years: 7.00     Pack years: 3.50     Types: Cigarettes     Quit date: 1962     Years since quittin.9     Smokeless tobacco: Never   Substance Use Topics     Alcohol use: Yes     Comment: one every other day. Rarely       Allergies:  Allergies   Allergen Reactions     Nkda [No Known Drug Allergies]        Medications:  Current Outpatient Medications   Medication Sig     amLODIPine (NORVASC) 5 MG tablet Take 1 tablet (5 mg) by mouth daily (Patient not taking: Reported on 2022)     calcitRIOL (ROCALTROL) 0.25 MCG capsule Take 1 capsule (0.25 mcg) by mouth daily for 360 days     fluticasone (FLONASE) 50 MCG/ACT nasal spray Spray 1 spray into both nostrils daily     folic acid 800 MCG tablet Take 1 tablet (800 mcg) by mouth daily     furosemide  (LASIX) 40 MG tablet TAKE 2 TABLETS BY MOUTH 2 TIMES DAILY     HCA VITAMIN B12 500 MCG OR TABS 2 tablets daily     MULTIVITAMIN OR one daily     omeprazole (PRILOSEC) 40 MG DR capsule Take 1 capsule (40 mg) by mouth 2 times daily     OVER-THE-COUNTER Elderberry 50mg supplement, one daily     oxyCODONE (ROXICODONE) 5 MG tablet Take 1 tablet (5 mg) by mouth 2 times daily as needed for severe pain (7-10) This is a 90 day supply     potassium chloride ER (KLOR-CON M) 10 MEQ CR tablet Take 1 tablet (10 mEq) by mouth daily     pyridOXINE (VITAMIN B-6) 50 MG tablet Take 1 tablet (50 mg) by mouth 2 times daily     sucralfate (CARAFATE) 1 GM tablet Take 1 tablet (1 g) by mouth 4 times daily Take 30 minutes before meals and at bedtime (Patient not taking: Reported on 11/25/2022)     terazosin (HYTRIN) 2 MG capsule TAKE 1 CAPSULE AT BEDTIME (Patient taking differently: TAKE 1 CAPSULE AT BEDTIME.)     Turmeric 500 MG CAPS Does not take all the time (Patient not taking: Reported on 11/25/2022)     VIACTIV OR With D 1000mg calcium     vitamin D3 (CHOLECALCIFEROL) 50 mcg (2000 units) tablet Take 1 tablet (50 mcg) by mouth daily     zinc gluconate 50 MG tablet Take 50 mg by mouth daily     Current Facility-Administered Medications   Medication     ropivacaine (NAROPIN) injection 3 mL     ropivacaine (NAROPIN) injection 3 mL     triamcinolone (KENALOG-40) injection 40 mg     triamcinolone (KENALOG-40) injection 40 mg      Vitals:  There were no vitals taken for this visit.  GENERAL: Healthy, alert and no distress  EYES: Eyes grossly normal to inspection.  No discharge or erythema, or obvious scleral/conjunctival abnormalities.  RESP: No audible wheeze, cough, or visible cyanosis.  No visible retractions or increased work of breathing.    SKIN: Visible skin clear. No significant rash, abnormal pigmentation or lesions.  NEURO: Cranial nerves grossly intact.  Mentation and speech appropriate for age.  PSYCH: Mentation appears normal,  affect normal/bright, judgement and insight intact, normal speech and appearance well-groomed.      LABS:   CMP  Recent Labs   Lab Test 11/29/22  1229 11/10/22  1241 10/20/22  1236 10/10/22  1316 01/21/22  1006 06/16/21  1435 02/08/21  1121 02/01/21  1148 01/25/21  1058    143 143 142   < > 143 142 141 142   POTASSIUM 3.3* 3.2* 3.6 3.1*   < > 3.8 3.5 3.7 4.0   CHLORIDE 103 106 102 104   < > 107 107 109 112*   CO2 26 26 24 28   < > 24 30 27 29   ANIONGAP 14 11 17* 10   < > 12 5 5 1*   GLC 93 132* 118* 104*   < > 80 91 117* 96   BUN 61.7* 61.7* 68.3* 73*   < > 47* 28 23 21   CR 4.52* 4.03* 4.28* 4.49*   < > 2.73* 2.26* 2.12* 2.02*   GFRESTIMATED 12* 14* 13* 12*   < > 21* 26* 28* 30*   GFRESTBLACK  --   --   --   --   --  24* 31* 33* 35*   RUTH 5.9* 6.3* 6.4* 6.3*   < > 7.7* 8.0* 8.2* 8.4*    < > = values in this interval not displayed.     Recent Labs   Lab Test 02/08/21  1121 11/10/17  1401 06/11/15  1306   BILITOTAL 0.5 0.5 0.6   ALKPHOS 104 110 100   ALT 21 20 25   AST 14 13 14     CBC  Recent Labs   Lab Test 12/01/22  1158 11/29/22  1229 11/10/22  1241 10/20/22  1236 10/10/22  1316 09/29/22  1241   HGB 9.1* 9.5* 8.9* 9.4* 10.0* 9.4*   WBC  --   --  6.1 6.2 6.6 6.2   RBC  --   --  4.79 5.12 5.39 5.07   HCT 30.1* 31.0* 29.4* 31.4* 33.0* 30.5*   MCV  --   --  61* 61* 61* 60*   MCH  --   --  18.6* 18.4* 18.6* 18.5*   MCHC  --   --  30.3* 29.9* 30.3* 30.8*   RDW  --   --  17.8* 17.3* 19.1* 18.4*   PLT  --   --  181 185 255 226     URINE STUDIES  Recent Labs   Lab Test 03/24/22  1538 01/26/22  1303 06/16/21  1444 11/30/18  1045 09/28/15  1134   COLOR Yellow Yellow Yellow Yellow Yellow   APPEARANCE Clear Slightly Cloudy* Clear Clear Clear   URINEGLC 50* Negative Negative Negative Negative   URINEBILI Negative Negative Negative Negative Small  This is an unconfirmed screening test result. A positive result may be false.  *   URINEKETONE Negative Negative Negative Negative Trace*   SG 1.016 1.016 1.020 1.025 >1.030    UBLD Negative Negative Negative Trace* Negative   URINEPH 5.0 5.0 5.5 6.0 5.5   PROTEIN 100* 100* 100* >=300* 100*   UROBILINOGEN  --   --  1.0 1.0 1.0   NITRITE Negative Negative Negative Negative Negative   LEUKEST Negative Negative Negative Negative Negative   RBCU 0 <1 O - 2 O - 2 O - 2  Urine was tested unconcentrated because <10 ml was received.     WBCU 1 3 0 - 5 0 - 5 O - 2  Urine was tested unconcentrated because <10 ml was received.       Recent Labs   Lab Test 03/24/22  1538 01/26/22  1303   UTPG 1.88* 0.80*     PTH  Recent Labs   Lab Test 08/29/22  1329   PTHI 356*     IRON STUDIES  Recent Labs   Lab Test 09/29/22  1241 07/28/22  1322 05/26/22  1257   IRON 48* 57 33*   * 144* 176*   IRONSAT 32 40 19   TESSIE [phone]       Yoon MD Deanne   of Medicine  Department of Nephrology  [facility]

## 2022-12-14 NOTE — PROGRESS NOTES
Patient contacted regarding providing a urine specimen for report of a possible UTI. Appointment made with patient for 2:00 pm 12/14/22 at the [facility].  ASTRID Kahn

## 2022-12-14 NOTE — PROGRESS NOTES
"Per nephrology RNCC, Criss, pt ready to schedule PD catheter surgery in the coming months.    Request sent to schedule surgery within the next 2 months.  Pt had consult with Dr. Owens on 11/29/22.  \"Plan for extended PD catheter placement in LUQ, vs left arm vs right arm AVF vs graft, under general anesthesia. Patient will need PAC prior to surgery.\"    Message sent to Dr. Owens inquiring if pt should have vein mapping prior to surgery or if intraop ultrasound will be sufficient.    Will send request to schedule surgery and order/schedule vein mapping if instructed by Dr. Owens.    Neph Tracking Flowsheet Last Filled Values     CKD Education Status Complete    CKD Education Referral Date 06/21/22    CKD Education Completed Date 09/09/22    CKD Education Type Kidney Smart CKD Basics    Preferred Modality Peritoneal Dialysis    Patient's Referral Dates Auto Populate Patient's Referral Dates    Home Care Referral 7/26/20    Journey Referral 6/24/22    Access Surgeon Referral Status  Referred    Dialysis Access Referral 10/11/22  Graciela    Access Surgical Consult 11/29/22  Plan: PD cath, with fistula as a back up plan - Dr. Owens    Dialysis Access Surgery --  12/14/22 request sent to schedule PD vs AV fistula vs graft surgery within 2 months.  KG    Transplant Status  Not Referred  age        Will continue to follow.    IGOR OTERO RN on 12/14/2022 at 2:01 PM  Dialysis Access Care Coordinator  Phone: [phone]  Please use P_Dialysis_Access_Nurse pool for Epic InBasket communications to ensure timely response.    "

## 2022-12-14 NOTE — PROGRESS NOTES
Patient contacted regarding how he has been feeling with change in medication. Patient states he has been feeling better, not as tired. He states his blood pressure has  He states he's thinking he may want to schedule for PD catheter placement to be placed in the upcoming months. Notifying dialysis access nurses for scheduling.   Patient states he has burning during urination. He thinks he may have a UTI. Patient is willing to go to lab for a urine sample. Routing to Dr Alicea.  Criss RN

## 2022-12-15 NOTE — TELEPHONE ENCOUNTER
Patient contacted regarding his concern for burning sensation during urination. Patient states he is still having a burning sensation with each urination. Urine lab WBC was 0-2, no culture performed. Routing to Dr Ashely Kahn RN

## 2022-12-19 NOTE — CONFIDENTIAL NOTE
Patient contacted regarding Dr Alicea's advise for patient to take cranberry supplement or Azo for patient's burning sensation during urination. No sign of infection per UA.  Patient states his burning sensation during urination has reduced greatly. Patient informed of Dr Alicea's advise and to folloew up with PCP if symptoms persist.   Patient scheduled for follow up visit with SELENA Madrigal PA-C at the [facility] for 1/16/23. Labs to be done at the [facility] hosp 1/12/23. Patient expressed an understanding.  ASTRID Kahn

## 2022-12-22 NOTE — PROGRESS NOTES
Pt access surgery is scheduled for 3/3/23.  Per Dr. Owens, vein mapping ordered and request sent to schedule prior to surgery.    Neph Tracking Flowsheet Last Filled Values     CKD Education Status Complete    CKD Education Referral Date 06/21/22    CKD Education Completed Date 09/09/22    CKD Education Type Kidney Smart CKD Basics    Preferred Modality Peritoneal Dialysis    Patient's Referral Dates Auto Populate Patient's Referral Dates    Home Care Referral 7/26/20    Journey Referral 6/24/22    Vein Mapping/US  --  ordered, requested to schedule prior to access surgery per Dr. Owens.    Access Surgeon Referral Status  Referred    Dialysis Access Referral 10/11/22  Graciela    Access Surgical Consult 11/29/22  Plan: PD cath, with fistula as a back up plan - Dr. Owens    Dialysis Access Surgery 03/03/23  PD vs right vs left arm AV fistula vs graft surgery- Dr. Owens    Transplant Status  Not Referred  age        Will continue to follow.    IGOR OTERO RN on 12/22/2022 at 2:58 PM  Dialysis Access Care Coordinator  Phone: [phone]  Please use P_Dialysis_Access_Nurse pool for Epic InBask communications to ensure timely response.

## 2022-12-23 NOTE — PROGRESS NOTES
Infusion Nursing Note:  Mann Escobar presents today for [facility].    Patient seen by provider today: No   present during visit today: Not Applicable.    Note: N/A.    Intravenous Access:  Labs drawn peripherally by .    Treatment Conditions:  Results reviewed, labs MET treatment parameters, ok to proceed with treatment.  Hgb 8.9    Post Infusion Assessment:  Patient tolerated injection without incident.  Site patent and intact, free from redness, edema or discomfort.  No evidence of extravasations.     Discharge Plan:   Discharge instructions reviewed with: Patient.  Patient and/or family verbalized understanding of discharge instructions and all questions answered.  AVS to patient via [employer].  Patient will return 1/12/2023 for next appointment.   Patient discharged in stable condition accompanied by: self.  Departure Mode: Ambulatory.    Kristi Delgado RN

## 2023-01-01 ENCOUNTER — HOSPITAL ENCOUNTER (EMERGENCY)
Facility: CLINIC | Age: 83
Discharge: HOME | End: 2023-02-08
Attending: FAMILY MEDICINE | Admitting: FAMILY MEDICINE
Payer: COMMERCIAL

## 2023-01-01 ENCOUNTER — OFFICE VISIT (OUTPATIENT)
Dept: ORTHOPEDICS | Facility: CLINIC | Age: 83
End: 2023-01-01
Payer: COMMERCIAL

## 2023-01-01 ENCOUNTER — PATIENT OUTREACH (OUTPATIENT)
Dept: NEPHROLOGY | Facility: CLINIC | Age: 83
End: 2023-01-01
Payer: COMMERCIAL

## 2023-01-01 ENCOUNTER — VIRTUAL VISIT (OUTPATIENT)
Dept: SURGERY | Facility: CLINIC | Age: 83
End: 2023-01-01
Payer: COMMERCIAL

## 2023-01-01 ENCOUNTER — PRE VISIT (OUTPATIENT)
Dept: SURGERY | Facility: CLINIC | Age: 83
End: 2023-01-01

## 2023-01-01 ENCOUNTER — VIRTUAL VISIT (OUTPATIENT)
Dept: VASCULAR SURGERY | Facility: CLINIC | Age: 83
End: 2023-01-01
Attending: FAMILY MEDICINE
Payer: COMMERCIAL

## 2023-01-01 ENCOUNTER — TELEPHONE (OUTPATIENT)
Dept: FAMILY MEDICINE | Facility: CLINIC | Age: 83
End: 2023-01-01
Payer: COMMERCIAL

## 2023-01-01 ENCOUNTER — TELEPHONE (OUTPATIENT)
Dept: NEPHROLOGY | Facility: CLINIC | Age: 83
End: 2023-01-01
Payer: COMMERCIAL

## 2023-01-01 ENCOUNTER — ANESTHESIA EVENT (OUTPATIENT)
Dept: SURGERY | Facility: CLINIC | Age: 83
End: 2023-01-01
Payer: COMMERCIAL

## 2023-01-01 ENCOUNTER — PATIENT OUTREACH (OUTPATIENT)
Dept: NEPHROLOGY | Facility: CLINIC | Age: 83
End: 2023-01-01

## 2023-01-01 ENCOUNTER — APPOINTMENT (OUTPATIENT)
Dept: LAB | Facility: CLINIC | Age: 83
End: 2023-01-01
Payer: COMMERCIAL

## 2023-01-01 ENCOUNTER — INFUSION THERAPY VISIT (OUTPATIENT)
Dept: INFUSION THERAPY | Facility: CLINIC | Age: 83
End: 2023-01-01
Attending: INTERNAL MEDICINE
Payer: COMMERCIAL

## 2023-01-01 ENCOUNTER — ANCILLARY PROCEDURE (OUTPATIENT)
Dept: GENERAL RADIOLOGY | Facility: CLINIC | Age: 83
End: 2023-01-01
Attending: INTERNAL MEDICINE
Payer: COMMERCIAL

## 2023-01-01 ENCOUNTER — DOCUMENTATION ONLY (OUTPATIENT)
Dept: NEPHROLOGY | Facility: CLINIC | Age: 83
End: 2023-01-01

## 2023-01-01 ENCOUNTER — HOSPITAL ENCOUNTER (EMERGENCY)
Facility: CLINIC | Age: 83
Discharge: HOME | End: 2023-04-15
Attending: EMERGENCY MEDICINE | Admitting: EMERGENCY MEDICINE
Payer: COMMERCIAL

## 2023-01-01 ENCOUNTER — TELEPHONE (OUTPATIENT)
Dept: NEPHROLOGY | Facility: CLINIC | Age: 83
End: 2023-01-01

## 2023-01-01 ENCOUNTER — DOCUMENTATION ONLY (OUTPATIENT)
Dept: VASCULAR SURGERY | Facility: CLINIC | Age: 83
End: 2023-01-01

## 2023-01-01 ENCOUNTER — LAB (OUTPATIENT)
Dept: LAB | Facility: CLINIC | Age: 83
End: 2023-01-01
Payer: COMMERCIAL

## 2023-01-01 ENCOUNTER — TELEPHONE (OUTPATIENT)
Dept: TRANSPLANT | Facility: CLINIC | Age: 83
End: 2023-01-01
Payer: COMMERCIAL

## 2023-01-01 ENCOUNTER — HOSPITAL ENCOUNTER (OUTPATIENT)
Dept: ULTRASOUND IMAGING | Facility: CLINIC | Age: 83
Discharge: HOME | End: 2023-02-16
Attending: FAMILY MEDICINE | Admitting: FAMILY MEDICINE
Payer: COMMERCIAL

## 2023-01-01 ENCOUNTER — HOSPITAL ENCOUNTER (OUTPATIENT)
Facility: CLINIC | Age: 83
Discharge: HOME | End: 2023-03-03
Attending: SURGERY | Admitting: SURGERY
Payer: COMMERCIAL

## 2023-01-01 ENCOUNTER — MYC MEDICAL ADVICE (OUTPATIENT)
Dept: SURGERY | Facility: CLINIC | Age: 83
End: 2023-01-01
Payer: COMMERCIAL

## 2023-01-01 ENCOUNTER — PREP FOR PROCEDURE (OUTPATIENT)
Dept: TRANSPLANT | Facility: CLINIC | Age: 83
End: 2023-01-01
Payer: COMMERCIAL

## 2023-01-01 ENCOUNTER — DOCUMENTATION ONLY (OUTPATIENT)
Dept: NEPHROLOGY | Facility: CLINIC | Age: 83
End: 2023-01-01
Payer: COMMERCIAL

## 2023-01-01 ENCOUNTER — HOSPITAL ENCOUNTER (EMERGENCY)
Facility: CLINIC | Age: 83
Discharge: HOME | End: 2023-08-21
Attending: EMERGENCY MEDICINE | Admitting: EMERGENCY MEDICINE
Payer: COMMERCIAL

## 2023-01-01 ENCOUNTER — ANESTHESIA (OUTPATIENT)
Dept: SURGERY | Facility: CLINIC | Age: 83
End: 2023-01-01
Payer: COMMERCIAL

## 2023-01-01 ENCOUNTER — OFFICE VISIT (OUTPATIENT)
Dept: NEPHROLOGY | Facility: CLINIC | Age: 83
End: 2023-01-01
Payer: COMMERCIAL

## 2023-01-01 ENCOUNTER — MEDICAL CORRESPONDENCE (OUTPATIENT)
Dept: HEALTH INFORMATION MANAGEMENT | Facility: CLINIC | Age: 83
End: 2023-01-01
Payer: COMMERCIAL

## 2023-01-01 ENCOUNTER — APPOINTMENT (OUTPATIENT)
Dept: GENERAL RADIOLOGY | Facility: CLINIC | Age: 83
End: 2023-01-01
Attending: EMERGENCY MEDICINE
Payer: COMMERCIAL

## 2023-01-01 ENCOUNTER — OFFICE VISIT (OUTPATIENT)
Dept: FAMILY MEDICINE | Facility: CLINIC | Age: 83
End: 2023-01-01
Payer: COMMERCIAL

## 2023-01-01 ENCOUNTER — TELEPHONE (OUTPATIENT)
Dept: CARDIOLOGY | Facility: CLINIC | Age: 83
End: 2023-01-01

## 2023-01-01 ENCOUNTER — ANESTHESIA EVENT (OUTPATIENT)
Dept: SURGERY | Facility: CLINIC | Age: 83
End: 2023-01-01

## 2023-01-01 ENCOUNTER — HOSPITAL ENCOUNTER (OUTPATIENT)
Dept: CARDIOLOGY | Facility: CLINIC | Age: 83
Discharge: HOME | End: 2023-02-14
Attending: PHYSICIAN ASSISTANT | Admitting: PHYSICIAN ASSISTANT
Payer: COMMERCIAL

## 2023-01-01 ENCOUNTER — VIRTUAL VISIT (OUTPATIENT)
Dept: NEPHROLOGY | Facility: CLINIC | Age: 83
End: 2023-01-01
Attending: PHYSICIAN ASSISTANT
Payer: COMMERCIAL

## 2023-01-01 ENCOUNTER — TELEPHONE (OUTPATIENT)
Dept: ONCOLOGY | Facility: CLINIC | Age: 83
End: 2023-01-01

## 2023-01-01 ENCOUNTER — HEALTH MAINTENANCE LETTER (OUTPATIENT)
Age: 83
End: 2023-01-01

## 2023-01-01 ENCOUNTER — CARE COORDINATION (OUTPATIENT)
Dept: NEPHROLOGY | Facility: CLINIC | Age: 83
End: 2023-01-01
Payer: COMMERCIAL

## 2023-01-01 ENCOUNTER — APPOINTMENT (OUTPATIENT)
Dept: CT IMAGING | Facility: CLINIC | Age: 83
End: 2023-01-01
Attending: FAMILY MEDICINE
Payer: COMMERCIAL

## 2023-01-01 ENCOUNTER — HOSPITAL ENCOUNTER (OUTPATIENT)
Dept: ULTRASOUND IMAGING | Facility: CLINIC | Age: 83
Discharge: HOME | End: 2023-02-13
Attending: NURSE PRACTITIONER | Admitting: NURSE PRACTITIONER
Payer: COMMERCIAL

## 2023-01-01 ENCOUNTER — HOSPITAL ENCOUNTER (OUTPATIENT)
Facility: CLINIC | Age: 83
Discharge: HOME | End: 2023-03-31
Attending: SURGERY | Admitting: SURGERY
Payer: COMMERCIAL

## 2023-01-01 ENCOUNTER — LAB (OUTPATIENT)
Dept: LAB | Facility: CLINIC | Age: 83
End: 2023-01-01
Attending: INTERNAL MEDICINE
Payer: COMMERCIAL

## 2023-01-01 VITALS
TEMPERATURE: 98.2 F | DIASTOLIC BLOOD PRESSURE: 68 MMHG | RESPIRATION RATE: 12 BRPM | OXYGEN SATURATION: 99 % | WEIGHT: 140 LBS | BODY MASS INDEX: 26.43 KG/M2 | HEIGHT: 61 IN | SYSTOLIC BLOOD PRESSURE: 108 MMHG | HEART RATE: 74 BPM

## 2023-01-01 VITALS
BODY MASS INDEX: 26.24 KG/M2 | RESPIRATION RATE: 18 BRPM | SYSTOLIC BLOOD PRESSURE: 110 MMHG | DIASTOLIC BLOOD PRESSURE: 68 MMHG | TEMPERATURE: 98.7 F | HEIGHT: 61 IN | WEIGHT: 139 LBS | HEART RATE: 95 BPM | OXYGEN SATURATION: 95 %

## 2023-01-01 VITALS
RESPIRATION RATE: 12 BRPM | WEIGHT: 148.81 LBS | OXYGEN SATURATION: 96 % | BODY MASS INDEX: 28.12 KG/M2 | TEMPERATURE: 97.3 F | SYSTOLIC BLOOD PRESSURE: 148 MMHG | DIASTOLIC BLOOD PRESSURE: 119 MMHG | HEART RATE: 74 BPM

## 2023-01-01 VITALS
DIASTOLIC BLOOD PRESSURE: 74 MMHG | BODY MASS INDEX: 30.02 KG/M2 | HEIGHT: 61 IN | RESPIRATION RATE: 18 BRPM | HEART RATE: 78 BPM | SYSTOLIC BLOOD PRESSURE: 150 MMHG | WEIGHT: 159 LBS | TEMPERATURE: 98.5 F | OXYGEN SATURATION: 95 %

## 2023-01-01 VITALS
DIASTOLIC BLOOD PRESSURE: 78 MMHG | HEIGHT: 61 IN | WEIGHT: 153.44 LBS | OXYGEN SATURATION: 98 % | BODY MASS INDEX: 28.97 KG/M2 | HEART RATE: 69 BPM | RESPIRATION RATE: 18 BRPM | SYSTOLIC BLOOD PRESSURE: 148 MMHG | TEMPERATURE: 98.1 F

## 2023-01-01 VITALS — HEART RATE: 81 BPM | DIASTOLIC BLOOD PRESSURE: 87 MMHG | SYSTOLIC BLOOD PRESSURE: 157 MMHG | RESPIRATION RATE: 18 BRPM

## 2023-01-01 VITALS
HEART RATE: 78 BPM | BODY MASS INDEX: 27.79 KG/M2 | SYSTOLIC BLOOD PRESSURE: 122 MMHG | WEIGHT: 147 LBS | DIASTOLIC BLOOD PRESSURE: 76 MMHG

## 2023-01-01 VITALS
DIASTOLIC BLOOD PRESSURE: 80 MMHG | OXYGEN SATURATION: 96 % | SYSTOLIC BLOOD PRESSURE: 140 MMHG | TEMPERATURE: 98.1 F | HEART RATE: 79 BPM

## 2023-01-01 VITALS
DIASTOLIC BLOOD PRESSURE: 76 MMHG | HEART RATE: 74 BPM | RESPIRATION RATE: 18 BRPM | SYSTOLIC BLOOD PRESSURE: 139 MMHG | TEMPERATURE: 97.8 F

## 2023-01-01 VITALS
SYSTOLIC BLOOD PRESSURE: 117 MMHG | BODY MASS INDEX: 30.02 KG/M2 | WEIGHT: 159 LBS | DIASTOLIC BLOOD PRESSURE: 63 MMHG | HEIGHT: 61 IN

## 2023-01-01 VITALS
HEART RATE: 90 BPM | SYSTOLIC BLOOD PRESSURE: 118 MMHG | DIASTOLIC BLOOD PRESSURE: 82 MMHG | OXYGEN SATURATION: 98 % | WEIGHT: 149 LBS | RESPIRATION RATE: 18 BRPM | BODY MASS INDEX: 28.15 KG/M2 | TEMPERATURE: 98.1 F

## 2023-01-01 VITALS
HEIGHT: 61 IN | HEART RATE: 82 BPM | BODY MASS INDEX: 28.13 KG/M2 | SYSTOLIC BLOOD PRESSURE: 182 MMHG | TEMPERATURE: 97.9 F | RESPIRATION RATE: 18 BRPM | OXYGEN SATURATION: 95 % | WEIGHT: 149 LBS | DIASTOLIC BLOOD PRESSURE: 96 MMHG

## 2023-01-01 DIAGNOSIS — N18.32 STAGE 3B CHRONIC KIDNEY DISEASE (H): Primary | ICD-10-CM

## 2023-01-01 DIAGNOSIS — I10 HYPERTENSION, ESSENTIAL: ICD-10-CM

## 2023-01-01 DIAGNOSIS — E46 MALNUTRITION DUE TO RENAL DISEASE (H): ICD-10-CM

## 2023-01-01 DIAGNOSIS — G89.29 CHRONIC LEFT-SIDED LOW BACK PAIN WITH LEFT-SIDED SCIATICA: ICD-10-CM

## 2023-01-01 DIAGNOSIS — Z00.00 MEDICARE ANNUAL WELLNESS VISIT, SUBSEQUENT: Primary | ICD-10-CM

## 2023-01-01 DIAGNOSIS — N40.0 BENIGN PROSTATIC HYPERPLASIA, UNSPECIFIED WHETHER LOWER URINARY TRACT SYMPTOMS PRESENT: ICD-10-CM

## 2023-01-01 DIAGNOSIS — N18.5 CKD (CHRONIC KIDNEY DISEASE) STAGE 5, GFR LESS THAN 15 ML/MIN (H): Primary | ICD-10-CM

## 2023-01-01 DIAGNOSIS — I95.0 IDIOPATHIC HYPOTENSION: ICD-10-CM

## 2023-01-01 DIAGNOSIS — M25.419: ICD-10-CM

## 2023-01-01 DIAGNOSIS — I50.32 CHRONIC HEART FAILURE WITH PRESERVED EJECTION FRACTION (H): Primary | ICD-10-CM

## 2023-01-01 DIAGNOSIS — R79.89 HIGH PLASMA OXALATE: ICD-10-CM

## 2023-01-01 DIAGNOSIS — M19.011 OSTEOARTHRITIS OF BILATERAL GLENOHUMERAL JOINTS: ICD-10-CM

## 2023-01-01 DIAGNOSIS — R06.02 SHORTNESS OF BREATH: ICD-10-CM

## 2023-01-01 DIAGNOSIS — T85.611A PERITONEAL DIALYSIS CATHETER DYSFUNCTION, INITIAL ENCOUNTER (H): Primary | ICD-10-CM

## 2023-01-01 DIAGNOSIS — T85.611A PERITONEAL DIALYSIS CATHETER DYSFUNCTION, INITIAL ENCOUNTER (H): ICD-10-CM

## 2023-01-01 DIAGNOSIS — N18.5 CKD (CHRONIC KIDNEY DISEASE) STAGE 5, GFR LESS THAN 15 ML/MIN (H): ICD-10-CM

## 2023-01-01 DIAGNOSIS — F11.20 CONTINUOUS OPIOID DEPENDENCE (H): ICD-10-CM

## 2023-01-01 DIAGNOSIS — T82.9XXA COMPLICATION OF DIALYSIS ACCESS INSERTION: ICD-10-CM

## 2023-01-01 DIAGNOSIS — N18.32 ANEMIA OF CHRONIC RENAL FAILURE, STAGE 3B (H): Primary | ICD-10-CM

## 2023-01-01 DIAGNOSIS — R63.4 WEIGHT LOSS: Primary | ICD-10-CM

## 2023-01-01 DIAGNOSIS — N18.32 ANEMIA OF CHRONIC RENAL FAILURE, STAGE 3B (H): ICD-10-CM

## 2023-01-01 DIAGNOSIS — D56.8 OTHER THALASSEMIA (H): ICD-10-CM

## 2023-01-01 DIAGNOSIS — N28.9 MALNUTRITION DUE TO RENAL DISEASE (H): ICD-10-CM

## 2023-01-01 DIAGNOSIS — R60.9 EDEMA, UNSPECIFIED TYPE: ICD-10-CM

## 2023-01-01 DIAGNOSIS — Z01.818 ENCOUNTER FOR OTHER PREPROCEDURAL EXAMINATION: ICD-10-CM

## 2023-01-01 DIAGNOSIS — N28.1 RENAL CYST: ICD-10-CM

## 2023-01-01 DIAGNOSIS — L97.511 ULCER OF RIGHT FOOT, LIMITED TO BREAKDOWN OF SKIN (H): ICD-10-CM

## 2023-01-01 DIAGNOSIS — E83.39 HYPERPHOSPHATEMIA: ICD-10-CM

## 2023-01-01 DIAGNOSIS — D63.1 ANEMIA OF CHRONIC RENAL FAILURE, STAGE 3B (H): Primary | ICD-10-CM

## 2023-01-01 DIAGNOSIS — Z99.2 PERITONEAL DIALYSIS CATHETER IN PLACE (H): ICD-10-CM

## 2023-01-01 DIAGNOSIS — D50.9 IRON DEFICIENCY ANEMIA, UNSPECIFIED IRON DEFICIENCY ANEMIA TYPE: ICD-10-CM

## 2023-01-01 DIAGNOSIS — N18.32 STAGE 3B CHRONIC KIDNEY DISEASE (H): ICD-10-CM

## 2023-01-01 DIAGNOSIS — Z01.818 PREOP EXAMINATION: ICD-10-CM

## 2023-01-01 DIAGNOSIS — N25.81 SECONDARY HYPERPARATHYROIDISM OF RENAL ORIGIN (H): ICD-10-CM

## 2023-01-01 DIAGNOSIS — R05.1 ACUTE COUGH: Primary | ICD-10-CM

## 2023-01-01 DIAGNOSIS — M12.811 ROTATOR CUFF ARTHROPATHY OF BOTH SHOULDERS: ICD-10-CM

## 2023-01-01 DIAGNOSIS — N18.5 ANEMIA IN STAGE 5 CHRONIC KIDNEY DISEASE, NOT ON CHRONIC DIALYSIS (H): ICD-10-CM

## 2023-01-01 DIAGNOSIS — E83.42 HYPOMAGNESEMIA: ICD-10-CM

## 2023-01-01 DIAGNOSIS — K59.03 DRUG-INDUCED CONSTIPATION: ICD-10-CM

## 2023-01-01 DIAGNOSIS — Z99.2 ESRD ON DIALYSIS (H): Primary | ICD-10-CM

## 2023-01-01 DIAGNOSIS — L03.115 CELLULITIS OF RIGHT LOWER EXTREMITY: ICD-10-CM

## 2023-01-01 DIAGNOSIS — N18.6 END STAGE RENAL DISEASE (H): ICD-10-CM

## 2023-01-01 DIAGNOSIS — N18.4 CKD (CHRONIC KIDNEY DISEASE) STAGE 4, GFR 15-29 ML/MIN (H): ICD-10-CM

## 2023-01-01 DIAGNOSIS — D63.1 ANEMIA OF CHRONIC RENAL FAILURE, STAGE 3B (H): ICD-10-CM

## 2023-01-01 DIAGNOSIS — M54.42 CHRONIC LEFT-SIDED LOW BACK PAIN WITH LEFT-SIDED SCIATICA: ICD-10-CM

## 2023-01-01 DIAGNOSIS — Z87.442 PERSONAL HISTORY OF URINARY CALCULI: Primary | ICD-10-CM

## 2023-01-01 DIAGNOSIS — E87.6 HYPOKALEMIA: ICD-10-CM

## 2023-01-01 DIAGNOSIS — N40.1 BENIGN NON-NODULAR PROSTATIC HYPERPLASIA WITH LOWER URINARY TRACT SYMPTOMS: ICD-10-CM

## 2023-01-01 DIAGNOSIS — R09.89 OTHER SPECIFIED SYMPTOMS AND SIGNS INVOLVING THE CIRCULATORY AND RESPIRATORY SYSTEMS: ICD-10-CM

## 2023-01-01 DIAGNOSIS — Z76.89 ENCOUNTER TO ESTABLISH CARE: Primary | ICD-10-CM

## 2023-01-01 DIAGNOSIS — N18.6 ESRD ON DIALYSIS (H): Primary | ICD-10-CM

## 2023-01-01 DIAGNOSIS — K21.9 GASTROESOPHAGEAL REFLUX DISEASE WITHOUT ESOPHAGITIS: ICD-10-CM

## 2023-01-01 DIAGNOSIS — L81.9 DISCOLORATION OF SKIN OF FOOT: ICD-10-CM

## 2023-01-01 DIAGNOSIS — M12.812 ROTATOR CUFF ARTHROPATHY OF BOTH SHOULDERS: ICD-10-CM

## 2023-01-01 DIAGNOSIS — M19.012 OSTEOARTHRITIS OF BILATERAL GLENOHUMERAL JOINTS: ICD-10-CM

## 2023-01-01 DIAGNOSIS — M25.512 PAIN OF BOTH SHOULDER JOINTS: Primary | ICD-10-CM

## 2023-01-01 DIAGNOSIS — M25.511 PAIN OF BOTH SHOULDER JOINTS: Primary | ICD-10-CM

## 2023-01-01 DIAGNOSIS — N18.6 ESRD (END STAGE RENAL DISEASE) (H): ICD-10-CM

## 2023-01-01 DIAGNOSIS — G25.81 RESTLESS LEGS SYNDROME (RLS): Primary | ICD-10-CM

## 2023-01-01 DIAGNOSIS — D63.1 ANEMIA IN STAGE 5 CHRONIC KIDNEY DISEASE, NOT ON CHRONIC DIALYSIS (H): ICD-10-CM

## 2023-01-01 LAB
ABO/RH(D): NORMAL
ABO/RH(D): NORMAL
ALBUMIN SERPL BCG-MCNC: 2.1 G/DL (ref 3.5–5.2)
ALBUMIN SERPL BCG-MCNC: 2.6 G/DL (ref 3.5–5.2)
ALBUMIN SERPL BCG-MCNC: 3 G/DL (ref 3.5–5.2)
ALBUMIN SERPL BCG-MCNC: 3.1 G/DL (ref 3.5–5.2)
ALBUMIN SERPL BCG-MCNC: 3.2 G/DL (ref 3.5–5.2)
ALBUMIN UR-MCNC: 100 MG/DL
ALP SERPL-CCNC: 103 U/L (ref 40–129)
ALP SERPL-CCNC: 86 U/L (ref 40–129)
ALP SERPL-CCNC: 99 U/L (ref 40–129)
ALT SERPL W P-5'-P-CCNC: 10 U/L (ref 10–50)
ALT SERPL W P-5'-P-CCNC: 16 U/L (ref 0–70)
ALT SERPL W P-5'-P-CCNC: 7 U/L (ref 10–50)
ANION GAP SERPL CALCULATED.3IONS-SCNC: 11 MMOL/L (ref 7–15)
ANION GAP SERPL CALCULATED.3IONS-SCNC: 11 MMOL/L (ref 7–15)
ANION GAP SERPL CALCULATED.3IONS-SCNC: 14 MMOL/L (ref 7–15)
ANION GAP SERPL CALCULATED.3IONS-SCNC: 15 MMOL/L (ref 7–15)
ANION GAP SERPL CALCULATED.3IONS-SCNC: 16 MMOL/L (ref 7–15)
ANION GAP SERPL CALCULATED.3IONS-SCNC: 17 MMOL/L (ref 7–15)
ANTIBODY SCREEN: NEGATIVE
ANTIBODY SCREEN: NEGATIVE
APPEARANCE UR: CLEAR
AST SERPL W P-5'-P-CCNC: 15 U/L (ref 10–50)
AST SERPL W P-5'-P-CCNC: 24 U/L (ref 0–45)
AST SERPL W P-5'-P-CCNC: 24 U/L (ref 10–50)
BASE EXCESS BLDV CALC-SCNC: 3.2 MMOL/L (ref -8.1–1.9)
BASOPHILS # BLD AUTO: 0 10E3/UL (ref 0–0.2)
BASOPHILS # BLD AUTO: 0 10E3/UL (ref 0–0.2)
BASOPHILS # BLD AUTO: 0.1 10E3/UL (ref 0–0.2)
BASOPHILS # BLD AUTO: 0.1 10E3/UL (ref 0–0.2)
BASOPHILS NFR BLD AUTO: 0 %
BASOPHILS NFR BLD AUTO: 1 %
BILIRUB SERPL-MCNC: 0.2 MG/DL
BILIRUB SERPL-MCNC: 0.2 MG/DL
BILIRUB SERPL-MCNC: 0.4 MG/DL
BILIRUB UR QL STRIP: NEGATIVE
BLD PROD TYP BPU: NORMAL
BLOOD COMPONENT TYPE: NORMAL
BUN SERPL-MCNC: 59.7 MG/DL (ref 8–23)
BUN SERPL-MCNC: 61.9 MG/DL (ref 8–23)
BUN SERPL-MCNC: 63 MG/DL (ref 8–23)
BUN SERPL-MCNC: 69 MG/DL (ref 8–23)
BUN SERPL-MCNC: 75 MG/DL (ref 8–23)
BUN SERPL-MCNC: 75.4 MG/DL (ref 8–23)
CA-I BLD-MCNC: 3.7 MG/DL (ref 4.4–5.2)
CALCIUM SERPL-MCNC: 6.2 MG/DL (ref 8.8–10.2)
CALCIUM SERPL-MCNC: 6.3 MG/DL (ref 8.8–10.2)
CALCIUM SERPL-MCNC: 6.5 MG/DL (ref 8.8–10.2)
CALCIUM SERPL-MCNC: 6.9 MG/DL (ref 8.8–10.2)
CALCIUM SERPL-MCNC: 7.5 MG/DL (ref 8.8–10.2)
CALCIUM SERPL-MCNC: 7.7 MG/DL (ref 8.8–10.2)
CHLORIDE SERPL-SCNC: 104 MMOL/L (ref 98–107)
CHLORIDE SERPL-SCNC: 104 MMOL/L (ref 98–107)
CHLORIDE SERPL-SCNC: 105 MMOL/L (ref 98–107)
CHLORIDE SERPL-SCNC: 106 MMOL/L (ref 98–107)
CHLORIDE SERPL-SCNC: 108 MMOL/L (ref 98–107)
CHLORIDE SERPL-SCNC: 98 MMOL/L (ref 98–107)
CODING SYSTEM: NORMAL
COLOR UR AUTO: YELLOW
CREAT SERPL-MCNC: 3.98 MG/DL (ref 0.67–1.17)
CREAT SERPL-MCNC: 4.16 MG/DL (ref 0.67–1.17)
CREAT SERPL-MCNC: 4.22 MG/DL (ref 0.67–1.17)
CREAT SERPL-MCNC: 5.04 MG/DL (ref 0.67–1.17)
CREAT SERPL-MCNC: 5.08 MG/DL (ref 0.67–1.17)
CREAT SERPL-MCNC: 7.01 MG/DL (ref 0.67–1.17)
CROSSMATCH: NORMAL
DEPRECATED HCO3 PLAS-SCNC: 22 MMOL/L (ref 22–29)
DEPRECATED HCO3 PLAS-SCNC: 22 MMOL/L (ref 22–29)
DEPRECATED HCO3 PLAS-SCNC: 24 MMOL/L (ref 22–29)
DEPRECATED HCO3 PLAS-SCNC: 24 MMOL/L (ref 22–29)
DEPRECATED HCO3 PLAS-SCNC: 26 MMOL/L (ref 22–29)
DEPRECATED HCO3 PLAS-SCNC: 27 MMOL/L (ref 22–29)
EOSINOPHIL # BLD AUTO: 0.1 10E3/UL (ref 0–0.7)
EOSINOPHIL NFR BLD AUTO: 1 %
EOSINOPHIL NFR BLD AUTO: 2 %
ERYTHROCYTE [DISTWIDTH] IN BLOOD BY AUTOMATED COUNT: 16.2 % (ref 10–15)
ERYTHROCYTE [DISTWIDTH] IN BLOOD BY AUTOMATED COUNT: 18.4 % (ref 10–15)
ERYTHROCYTE [DISTWIDTH] IN BLOOD BY AUTOMATED COUNT: 18.6 % (ref 10–15)
ERYTHROCYTE [DISTWIDTH] IN BLOOD BY AUTOMATED COUNT: ABNORMAL %
FERRITIN SERPL-MCNC: 474 NG/ML (ref 31–409)
GAMMA INTERFERON BACKGROUND BLD IA-ACNC: 0.04 IU/ML
GFR SERPL CREATININE-BSD FRML MDRD: 11 ML/MIN/1.73M2
GFR SERPL CREATININE-BSD FRML MDRD: 11 ML/MIN/1.73M2
GFR SERPL CREATININE-BSD FRML MDRD: 13 ML/MIN/1.73M2
GFR SERPL CREATININE-BSD FRML MDRD: 14 ML/MIN/1.73M2
GFR SERPL CREATININE-BSD FRML MDRD: 14 ML/MIN/1.73M2
GFR SERPL CREATININE-BSD FRML MDRD: 7 ML/MIN/1.73M2
GLUCOSE BLD-MCNC: 68 MG/DL (ref 70–99)
GLUCOSE SERPL-MCNC: 101 MG/DL (ref 70–99)
GLUCOSE SERPL-MCNC: 101 MG/DL (ref 70–99)
GLUCOSE SERPL-MCNC: 112 MG/DL (ref 70–99)
GLUCOSE SERPL-MCNC: 115 MG/DL (ref 70–99)
GLUCOSE SERPL-MCNC: 76 MG/DL (ref 70–99)
GLUCOSE SERPL-MCNC: 92 MG/DL (ref 70–99)
GLUCOSE UR STRIP-MCNC: NEGATIVE MG/DL
HBV SURFACE AB SERPL IA-ACNC: 0.28 M[IU]/ML
HBV SURFACE AB SERPL IA-ACNC: NONREACTIVE M[IU]/ML
HBV SURFACE AG SERPL QL IA: NONREACTIVE
HCO3 BLDV-SCNC: 29 MMOL/L (ref 21–28)
HCT VFR BLD AUTO: 21.3 % (ref 40–53)
HCT VFR BLD AUTO: 24.2 % (ref 40–53)
HCT VFR BLD AUTO: 28.3 % (ref 40–53)
HCT VFR BLD AUTO: 28.7 % (ref 40–53)
HCT VFR BLD AUTO: 29.9 % (ref 40–53)
HCT VFR BLD AUTO: 30.1 % (ref 40–53)
HCT VFR BLD AUTO: 30.8 % (ref 40–53)
HGB BLD-MCNC: 6.2 G/DL (ref 13.3–17.7)
HGB BLD-MCNC: 6.7 G/DL (ref 13.3–17.7)
HGB BLD-MCNC: 7.2 G/DL (ref 13.3–17.7)
HGB BLD-MCNC: 7.8 G/DL (ref 13.3–17.7)
HGB BLD-MCNC: 8.7 G/DL (ref 13.3–17.7)
HGB BLD-MCNC: 8.9 G/DL (ref 13.3–17.7)
HGB BLD-MCNC: 9.2 G/DL (ref 13.3–17.7)
HGB BLD-MCNC: 9.3 G/DL (ref 13.3–17.7)
HGB BLD-MCNC: 9.5 G/DL (ref 13.3–17.7)
HGB UR QL STRIP: NEGATIVE
HOLD SPECIMEN: NORMAL
IMM GRANULOCYTES # BLD: 0 10E3/UL
IMM GRANULOCYTES # BLD: 0.1 10E3/UL
IMM GRANULOCYTES NFR BLD: 0 %
IMM GRANULOCYTES NFR BLD: 0 %
IMM GRANULOCYTES NFR BLD: 1 %
IMM GRANULOCYTES NFR BLD: 1 %
IRON BINDING CAPACITY (ROCHE): 156 UG/DL (ref 240–430)
IRON SATN MFR SERPL: 8 % (ref 15–46)
IRON SERPL-MCNC: 13 UG/DL (ref 61–157)
ISSUE DATE AND TIME: NORMAL
KETONES UR STRIP-MCNC: 5 MG/DL
LACTATE BLD-SCNC: 0.9 MMOL/L
LEUKOCYTE ESTERASE UR QL STRIP: NEGATIVE
LYMPHOCYTES # BLD AUTO: 0.4 10E3/UL (ref 0.8–5.3)
LYMPHOCYTES # BLD AUTO: 0.8 10E3/UL (ref 0.8–5.3)
LYMPHOCYTES # BLD AUTO: 0.9 10E3/UL (ref 0.8–5.3)
LYMPHOCYTES # BLD AUTO: 1.5 10E3/UL (ref 0.8–5.3)
LYMPHOCYTES NFR BLD AUTO: 12 %
LYMPHOCYTES NFR BLD AUTO: 12 %
LYMPHOCYTES NFR BLD AUTO: 20 %
LYMPHOCYTES NFR BLD AUTO: 3 %
M TB IFN-G BLD-IMP: NEGATIVE
M TB IFN-G CD4+ BCKGRND COR BLD-ACNC: 4.98 IU/ML
MAGNESIUM SERPL-MCNC: 1.6 MG/DL (ref 1.7–2.3)
MCH RBC QN AUTO: 18.4 PG (ref 26.5–33)
MCH RBC QN AUTO: 18.9 PG (ref 26.5–33)
MCH RBC QN AUTO: 19.2 PG (ref 26.5–33)
MCH RBC QN AUTO: 22.4 PG (ref 26.5–33)
MCHC RBC AUTO-ENTMCNC: 29.8 G/DL (ref 31.5–36.5)
MCHC RBC AUTO-ENTMCNC: 29.8 G/DL (ref 31.5–36.5)
MCHC RBC AUTO-ENTMCNC: 31.5 G/DL (ref 31.5–36.5)
MCHC RBC AUTO-ENTMCNC: 33.1 G/DL (ref 31.5–36.5)
MCV RBC AUTO: 58 FL (ref 78–100)
MCV RBC AUTO: 64 FL (ref 78–100)
MCV RBC AUTO: 64 FL (ref 78–100)
MCV RBC AUTO: 68 FL (ref 78–100)
MITOGEN IGNF BCKGRD COR BLD-ACNC: 0.01 IU/ML
MITOGEN IGNF BCKGRD COR BLD-ACNC: 0.04 IU/ML
MONOCYTES # BLD AUTO: 0.5 10E3/UL (ref 0–1.3)
MONOCYTES # BLD AUTO: 0.7 10E3/UL (ref 0–1.3)
MONOCYTES NFR BLD AUTO: 10 %
MONOCYTES NFR BLD AUTO: 6 %
MONOCYTES NFR BLD AUTO: 7 %
MONOCYTES NFR BLD AUTO: 9 %
NEUTROPHILS # BLD AUTO: 10.9 10E3/UL (ref 1.6–8.3)
NEUTROPHILS # BLD AUTO: 5.3 10E3/UL (ref 1.6–8.3)
NEUTROPHILS # BLD AUTO: 5.4 10E3/UL (ref 1.6–8.3)
NEUTROPHILS # BLD AUTO: 5.8 10E3/UL (ref 1.6–8.3)
NEUTROPHILS NFR BLD AUTO: 66 %
NEUTROPHILS NFR BLD AUTO: 76 %
NEUTROPHILS NFR BLD AUTO: 79 %
NEUTROPHILS NFR BLD AUTO: 90 %
NITRATE UR QL: NEGATIVE
NRBC # BLD AUTO: 0 10E3/UL
NRBC # BLD AUTO: 0.1 10E3/UL
NRBC BLD AUTO-RTO: 0 /100
NRBC BLD AUTO-RTO: 0 /100
NRBC BLD AUTO-RTO: 1 /100
NRBC BLD AUTO-RTO: 1 /100
NT-PROBNP SERPL-MCNC: ABNORMAL PG/ML (ref 0–1800)
O2/TOTAL GAS SETTING VFR VENT: 21 %
PCO2 BLDV: 50 MM HG (ref 40–50)
PH BLDV: 7.37 [PH] (ref 7.32–7.43)
PH UR STRIP: 6.5 [PH] (ref 5–7)
PHOSPHATE SERPL-MCNC: 4.4 MG/DL (ref 2.5–4.5)
PHOSPHATE SERPL-MCNC: 5.1 MG/DL (ref 2.5–4.5)
PHOSPHATE SERPL-MCNC: 6 MG/DL (ref 2.5–4.5)
PLATELET # BLD AUTO: 162 10E3/UL (ref 150–450)
PLATELET # BLD AUTO: 267 10E3/UL (ref 150–450)
PLATELET # BLD AUTO: 287 10E3/UL (ref 150–450)
PLATELET # BLD AUTO: 326 10E3/UL (ref 150–450)
PO2 BLDV: 32 MM HG (ref 25–47)
POTASSIUM BLD-SCNC: 3.1 MMOL/L (ref 3.5–5)
POTASSIUM SERPL-SCNC: 3.5 MMOL/L (ref 3.4–5.3)
POTASSIUM SERPL-SCNC: 3.8 MMOL/L (ref 3.4–5.3)
POTASSIUM SERPL-SCNC: 3.8 MMOL/L (ref 3.4–5.3)
POTASSIUM SERPL-SCNC: 3.9 MMOL/L (ref 3.4–5.3)
POTASSIUM SERPL-SCNC: 4 MMOL/L (ref 3.4–5.3)
POTASSIUM SERPL-SCNC: 4 MMOL/L (ref 3.4–5.3)
POTASSIUM SERPL-SCNC: 4.2 MMOL/L (ref 3.4–5.3)
PROT SERPL-MCNC: 4 G/DL (ref 6.4–8.3)
PROT SERPL-MCNC: 4.9 G/DL (ref 6.4–8.3)
PROT SERPL-MCNC: 5.6 G/DL (ref 6.4–8.3)
QUANTIFERON MITOGEN: 5.02 IU/ML
QUANTIFERON NIL TUBE: 0.04 IU/ML
QUANTIFERON TB1 TUBE: 0.08 IU/ML
QUANTIFERON TB2 TUBE: 0.05
RBC # BLD AUTO: 3.65 10E6/UL (ref 4.4–5.9)
RBC # BLD AUTO: 3.8 10E6/UL (ref 4.4–5.9)
RBC # BLD AUTO: 4.25 10E6/UL (ref 4.4–5.9)
RBC # BLD AUTO: 4.64 10E6/UL (ref 4.4–5.9)
RBC URINE: 1 /HPF
SODIUM BLD-SCNC: 145 MMOL/L (ref 133–144)
SODIUM SERPL-SCNC: 137 MMOL/L (ref 136–145)
SODIUM SERPL-SCNC: 143 MMOL/L (ref 136–145)
SODIUM SERPL-SCNC: 144 MMOL/L (ref 136–145)
SODIUM SERPL-SCNC: 144 MMOL/L (ref 136–145)
SP GR UR STRIP: 1.01 (ref 1–1.03)
SPECIMEN EXPIRATION DATE: NORMAL
SPECIMEN EXPIRATION DATE: NORMAL
SQUAMOUS EPITHELIAL: <1 /HPF
TROPONIN T SERPL HS-MCNC: 140 NG/L
TROPONIN T SERPL HS-MCNC: 148 NG/L
UNIT ABO/RH: NORMAL
UNIT NUMBER: NORMAL
UNIT STATUS: NORMAL
UNIT TYPE ISBT: 6200
UROBILINOGEN UR STRIP-MCNC: NORMAL MG/DL
WBC # BLD AUTO: 12.1 10E3/UL (ref 4–11)
WBC # BLD AUTO: 6.7 10E3/UL (ref 4–11)
WBC # BLD AUTO: 7.6 10E3/UL (ref 4–11)
WBC # BLD AUTO: 7.7 10E3/UL (ref 4–11)
WBC URINE: <1 /HPF

## 2023-01-01 PROCEDURE — 93010 ELECTROCARDIOGRAM REPORT: CPT | Mod: 59 | Performed by: EMERGENCY MEDICINE

## 2023-01-01 PROCEDURE — 250N000011 HC RX IP 250 OP 636: Mod: EC | Performed by: INTERNAL MEDICINE

## 2023-01-01 PROCEDURE — 84100 ASSAY OF PHOSPHORUS: CPT | Performed by: EMERGENCY MEDICINE

## 2023-01-01 PROCEDURE — 250N000011 HC RX IP 250 OP 636: Performed by: FAMILY MEDICINE

## 2023-01-01 PROCEDURE — 86923 COMPATIBILITY TEST ELECTRIC: CPT | Performed by: FAMILY MEDICINE

## 2023-01-01 PROCEDURE — 258N000003 HC RX IP 258 OP 636: Performed by: FAMILY MEDICINE

## 2023-01-01 PROCEDURE — 20611 DRAIN/INJ JOINT/BURSA W/US: CPT | Mod: 50 | Performed by: FAMILY MEDICINE

## 2023-01-01 PROCEDURE — 83735 ASSAY OF MAGNESIUM: CPT | Performed by: EMERGENCY MEDICINE

## 2023-01-01 PROCEDURE — 96372 THER/PROPH/DIAG INJ SC/IM: CPT | Performed by: INTERNAL MEDICINE

## 2023-01-01 PROCEDURE — 85018 HEMOGLOBIN: CPT | Performed by: INTERNAL MEDICINE

## 2023-01-01 PROCEDURE — 99215 OFFICE O/P EST HI 40 MIN: CPT | Performed by: PHYSICIAN ASSISTANT

## 2023-01-01 PROCEDURE — 93325 DOPPLER ECHO COLOR FLOW MAPG: CPT | Mod: TC

## 2023-01-01 PROCEDURE — 85018 HEMOGLOBIN: CPT | Performed by: FAMILY MEDICINE

## 2023-01-01 PROCEDURE — 99204 OFFICE O/P NEW MOD 45 MIN: CPT | Mod: VID | Performed by: PHYSICIAN ASSISTANT

## 2023-01-01 PROCEDURE — 370N000017 HC ANESTHESIA TECHNICAL FEE, PER MIN: Performed by: SURGERY

## 2023-01-01 PROCEDURE — 250N000013 HC RX MED GY IP 250 OP 250 PS 637: Performed by: FAMILY MEDICINE

## 2023-01-01 PROCEDURE — 96374 THER/PROPH/DIAG INJ IV PUSH: CPT | Mod: 59 | Performed by: EMERGENCY MEDICINE

## 2023-01-01 PROCEDURE — 84132 ASSAY OF SERUM POTASSIUM: CPT | Performed by: ANESTHESIOLOGY

## 2023-01-01 PROCEDURE — 49325 LAP REVISION PERM IP CATH: CPT | Mod: 78 | Performed by: SURGERY

## 2023-01-01 PROCEDURE — 250N000011 HC RX IP 250 OP 636: Performed by: INTERNAL MEDICINE

## 2023-01-01 PROCEDURE — 99291 CRITICAL CARE FIRST HOUR: CPT | Mod: 25 | Performed by: EMERGENCY MEDICINE

## 2023-01-01 PROCEDURE — 82040 ASSAY OF SERUM ALBUMIN: CPT | Performed by: PHYSICIAN ASSISTANT

## 2023-01-01 PROCEDURE — 36415 COLL VENOUS BLD VENIPUNCTURE: CPT | Performed by: EMERGENCY MEDICINE

## 2023-01-01 PROCEDURE — 86706 HEP B SURFACE ANTIBODY: CPT

## 2023-01-01 PROCEDURE — 250N000011 HC RX IP 250 OP 636: Performed by: STUDENT IN AN ORGANIZED HEALTH CARE EDUCATION/TRAINING PROGRAM

## 2023-01-01 PROCEDURE — 93308 TTE F-UP OR LMTD: CPT | Mod: 26 | Performed by: EMERGENCY MEDICINE

## 2023-01-01 PROCEDURE — 85025 COMPLETE CBC W/AUTO DIFF WBC: CPT | Performed by: EMERGENCY MEDICINE

## 2023-01-01 PROCEDURE — 80048 BASIC METABOLIC PNL TOTAL CA: CPT | Performed by: FAMILY MEDICINE

## 2023-01-01 PROCEDURE — 85014 HEMATOCRIT: CPT | Performed by: INTERNAL MEDICINE

## 2023-01-01 PROCEDURE — 36430 TRANSFUSION BLD/BLD COMPNT: CPT | Performed by: EMERGENCY MEDICINE

## 2023-01-01 PROCEDURE — 93922 UPR/L XTREMITY ART 2 LEVELS: CPT

## 2023-01-01 PROCEDURE — 82728 ASSAY OF FERRITIN: CPT | Performed by: FAMILY MEDICINE

## 2023-01-01 PROCEDURE — 250N000025 HC SEVOFLURANE, PER MIN: Performed by: SURGERY

## 2023-01-01 PROCEDURE — 250N000011 HC RX IP 250 OP 636: Performed by: SURGERY

## 2023-01-01 PROCEDURE — 99285 EMERGENCY DEPT VISIT HI MDM: CPT | Mod: 25 | Performed by: EMERGENCY MEDICINE

## 2023-01-01 PROCEDURE — 87340 HEPATITIS B SURFACE AG IA: CPT

## 2023-01-01 PROCEDURE — 250N000011 HC RX IP 250 OP 636: Performed by: NURSE ANESTHETIST, CERTIFIED REGISTERED

## 2023-01-01 PROCEDURE — P9016 RBC LEUKOCYTES REDUCED: HCPCS | Performed by: EMERGENCY MEDICINE

## 2023-01-01 PROCEDURE — 93325 DOPPLER ECHO COLOR FLOW MAPG: CPT | Mod: 26 | Performed by: INTERNAL MEDICINE

## 2023-01-01 PROCEDURE — 250N000009 HC RX 250: Performed by: EMERGENCY MEDICINE

## 2023-01-01 PROCEDURE — 84484 ASSAY OF TROPONIN QUANT: CPT | Performed by: EMERGENCY MEDICINE

## 2023-01-01 PROCEDURE — 36415 COLL VENOUS BLD VENIPUNCTURE: CPT | Performed by: FAMILY MEDICINE

## 2023-01-01 PROCEDURE — 258N000003 HC RX IP 258 OP 636: Performed by: INTERNAL MEDICINE

## 2023-01-01 PROCEDURE — 99284 EMERGENCY DEPT VISIT MOD MDM: CPT | Performed by: EMERGENCY MEDICINE

## 2023-01-01 PROCEDURE — 272N000001 HC OR GENERAL SUPPLY STERILE: Performed by: SURGERY

## 2023-01-01 PROCEDURE — 250N000013 HC RX MED GY IP 250 OP 250 PS 637

## 2023-01-01 PROCEDURE — 74176 CT ABD & PELVIS W/O CONTRAST: CPT

## 2023-01-01 PROCEDURE — P9016 RBC LEUKOCYTES REDUCED: HCPCS | Performed by: FAMILY MEDICINE

## 2023-01-01 PROCEDURE — 84132 ASSAY OF SERUM POTASSIUM: CPT

## 2023-01-01 PROCEDURE — 86923 COMPATIBILITY TEST ELECTRIC: CPT | Mod: 91 | Performed by: EMERGENCY MEDICINE

## 2023-01-01 PROCEDURE — 93350 STRESS TTE ONLY: CPT | Mod: 26 | Performed by: INTERNAL MEDICINE

## 2023-01-01 PROCEDURE — 82040 ASSAY OF SERUM ALBUMIN: CPT

## 2023-01-01 PROCEDURE — 99284 EMERGENCY DEPT VISIT MOD MDM: CPT | Performed by: FAMILY MEDICINE

## 2023-01-01 PROCEDURE — 96361 HYDRATE IV INFUSION ADD-ON: CPT | Performed by: FAMILY MEDICINE

## 2023-01-01 PROCEDURE — 255N000002 HC RX 255 OP 636: Performed by: INTERNAL MEDICINE

## 2023-01-01 PROCEDURE — G0439 PPPS, SUBSEQ VISIT: HCPCS | Performed by: NURSE PRACTITIONER

## 2023-01-01 PROCEDURE — 99214 OFFICE O/P EST MOD 30 MIN: CPT | Performed by: FAMILY MEDICINE

## 2023-01-01 PROCEDURE — 250N000011 HC RX IP 250 OP 636: Performed by: EMERGENCY MEDICINE

## 2023-01-01 PROCEDURE — 96376 TX/PRO/DX INJ SAME DRUG ADON: CPT | Performed by: EMERGENCY MEDICINE

## 2023-01-01 PROCEDURE — 85025 COMPLETE CBC W/AUTO DIFF WBC: CPT | Performed by: FAMILY MEDICINE

## 2023-01-01 PROCEDURE — C1752 CATH,HEMODIALYSIS,SHORT-TERM: HCPCS | Performed by: SURGERY

## 2023-01-01 PROCEDURE — 272N000002 HC OR SUPPLY OTHER OPNP: Performed by: SURGERY

## 2023-01-01 PROCEDURE — 36415 COLL VENOUS BLD VENIPUNCTURE: CPT

## 2023-01-01 PROCEDURE — 710N000012 HC RECOVERY PHASE 2, PER MINUTE: Performed by: SURGERY

## 2023-01-01 PROCEDURE — 36415 COLL VENOUS BLD VENIPUNCTURE: CPT | Performed by: ANESTHESIOLOGY

## 2023-01-01 PROCEDURE — 74019 RADEX ABDOMEN 2 VIEWS: CPT | Mod: TC | Performed by: RADIOLOGY

## 2023-01-01 PROCEDURE — C8928 TTE W OR W/O FOL W/CON,STRES: HCPCS

## 2023-01-01 PROCEDURE — 80053 COMPREHEN METABOLIC PANEL: CPT | Performed by: FAMILY MEDICINE

## 2023-01-01 PROCEDURE — 80053 COMPREHEN METABOLIC PANEL: CPT | Performed by: EMERGENCY MEDICINE

## 2023-01-01 PROCEDURE — 96360 HYDRATION IV INFUSION INIT: CPT | Performed by: FAMILY MEDICINE

## 2023-01-01 PROCEDURE — 710N000010 HC RECOVERY PHASE 1, LEVEL 2, PER MIN: Performed by: SURGERY

## 2023-01-01 PROCEDURE — 93970 EXTREMITY STUDY: CPT

## 2023-01-01 PROCEDURE — 250N000011 HC RX IP 250 OP 636: Performed by: ANESTHESIOLOGY

## 2023-01-01 PROCEDURE — 999N000141 HC STATISTIC PRE-PROCEDURE NURSING ASSESSMENT: Performed by: SURGERY

## 2023-01-01 PROCEDURE — 250N000009 HC RX 250: Performed by: SURGERY

## 2023-01-01 PROCEDURE — 86850 RBC ANTIBODY SCREEN: CPT | Performed by: FAMILY MEDICINE

## 2023-01-01 PROCEDURE — 83550 IRON BINDING TEST: CPT | Performed by: FAMILY MEDICINE

## 2023-01-01 PROCEDURE — 250N000009 HC RX 250: Performed by: NURSE ANESTHETIST, CERTIFIED REGISTERED

## 2023-01-01 PROCEDURE — 93018 CV STRESS TEST I&R ONLY: CPT | Performed by: INTERNAL MEDICINE

## 2023-01-01 PROCEDURE — 83880 ASSAY OF NATRIURETIC PEPTIDE: CPT | Performed by: EMERGENCY MEDICINE

## 2023-01-01 PROCEDURE — 250N000013 HC RX MED GY IP 250 OP 250 PS 637: Performed by: STUDENT IN AN ORGANIZED HEALTH CARE EDUCATION/TRAINING PROGRAM

## 2023-01-01 PROCEDURE — 51702 INSERT TEMP BLADDER CATH: CPT | Performed by: EMERGENCY MEDICINE

## 2023-01-01 PROCEDURE — 278N000051 HC OR IMPLANT GENERAL: Performed by: SURGERY

## 2023-01-01 PROCEDURE — 99214 OFFICE O/P EST MOD 30 MIN: CPT | Mod: VID | Performed by: PHYSICIAN ASSISTANT

## 2023-01-01 PROCEDURE — 258N000003 HC RX IP 258 OP 636: Performed by: SURGERY

## 2023-01-01 PROCEDURE — 85014 HEMATOCRIT: CPT | Performed by: EMERGENCY MEDICINE

## 2023-01-01 PROCEDURE — 71046 X-RAY EXAM CHEST 2 VIEWS: CPT

## 2023-01-01 PROCEDURE — 258N000003 HC RX IP 258 OP 636: Performed by: NURSE ANESTHETIST, CERTIFIED REGISTERED

## 2023-01-01 PROCEDURE — 99284 EMERGENCY DEPT VISIT MOD MDM: CPT | Mod: 25 | Performed by: FAMILY MEDICINE

## 2023-01-01 PROCEDURE — 250N000009 HC RX 250: Performed by: INTERNAL MEDICINE

## 2023-01-01 PROCEDURE — 93005 ELECTROCARDIOGRAM TRACING: CPT | Mod: 59 | Performed by: EMERGENCY MEDICINE

## 2023-01-01 PROCEDURE — 86850 RBC ANTIBODY SCREEN: CPT | Performed by: EMERGENCY MEDICINE

## 2023-01-01 PROCEDURE — 99214 OFFICE O/P EST MOD 30 MIN: CPT | Mod: 25 | Performed by: NURSE PRACTITIONER

## 2023-01-01 PROCEDURE — 81001 URINALYSIS AUTO W/SCOPE: CPT | Performed by: FAMILY MEDICINE

## 2023-01-01 PROCEDURE — 93321 DOPPLER ECHO F-UP/LMTD STD: CPT | Mod: 26 | Performed by: INTERNAL MEDICINE

## 2023-01-01 PROCEDURE — 93308 TTE F-UP OR LMTD: CPT | Performed by: EMERGENCY MEDICINE

## 2023-01-01 PROCEDURE — 99204 OFFICE O/P NEW MOD 45 MIN: CPT | Mod: VID | Performed by: SURGERY

## 2023-01-01 PROCEDURE — 93016 CV STRESS TEST SUPVJ ONLY: CPT | Performed by: INTERNAL MEDICINE

## 2023-01-01 PROCEDURE — 85004 AUTOMATED DIFF WBC COUNT: CPT | Performed by: EMERGENCY MEDICINE

## 2023-01-01 PROCEDURE — 85014 HEMATOCRIT: CPT

## 2023-01-01 PROCEDURE — 360N000077 HC SURGERY LEVEL 4, PER MIN: Performed by: SURGERY

## 2023-01-01 PROCEDURE — 86481 TB AG RESPONSE T-CELL SUSP: CPT

## 2023-01-01 DEVICE — CATH DIALYSIS PERITONEAL FLEX-NECK CLASSIC 52CM CF-5260: Type: IMPLANTABLE DEVICE | Site: ABDOMEN | Status: FUNCTIONAL

## 2023-01-01 DEVICE — IMPLANTABLE DEVICE
Type: IMPLANTABLE DEVICE | Site: ABDOMEN | Status: NON-FUNCTIONAL
Removed: 2023-03-31

## 2023-01-01 RX ORDER — CEFAZOLIN SODIUM/WATER 2 G/20 ML
2 SYRINGE (ML) INTRAVENOUS SEE ADMIN INSTRUCTIONS
Status: DISCONTINUED | OUTPATIENT
Start: 2023-01-01 | End: 2023-01-01 | Stop reason: HOSPADM

## 2023-01-01 RX ORDER — LACTULOSE 10 G/15ML
20 SOLUTION ORAL ONCE
Status: COMPLETED | OUTPATIENT
Start: 2023-01-01 | End: 2023-01-01

## 2023-01-01 RX ORDER — SENNA AND DOCUSATE SODIUM 50; 8.6 MG/1; MG/1
1 TABLET, FILM COATED ORAL AT BEDTIME
Qty: 30 TABLET | Refills: 1 | Status: SHIPPED | OUTPATIENT
Start: 2023-01-01

## 2023-01-01 RX ORDER — CALCIUM CARBONATE 500 MG/1
1 TABLET, CHEWABLE ORAL 2 TIMES DAILY
COMMUNITY

## 2023-01-01 RX ORDER — MAGNESIUM SULFATE HEPTAHYDRATE 40 MG/ML
2 INJECTION, SOLUTION INTRAVENOUS ONCE
Status: COMPLETED | OUTPATIENT
Start: 2023-01-01 | End: 2023-01-01

## 2023-01-01 RX ORDER — BUMETANIDE 2 MG/1
4 TABLET ORAL 2 TIMES DAILY
Qty: 12 TABLET | Refills: 0 | Status: SHIPPED | OUTPATIENT
Start: 2023-01-01 | End: 2023-01-01

## 2023-01-01 RX ORDER — MIDODRINE HYDROCHLORIDE 5 MG/1
5 TABLET ORAL 2 TIMES DAILY
Qty: 60 TABLET | Refills: 11 | Status: SHIPPED | OUTPATIENT
Start: 2023-01-01

## 2023-01-01 RX ORDER — TERAZOSIN 5 MG/1
CAPSULE ORAL
COMMUNITY
Start: 2023-01-01 | End: 2023-01-01

## 2023-01-01 RX ORDER — SUCRALFATE 1 G/1
1 TABLET ORAL 2 TIMES DAILY PRN
Qty: 90 TABLET | Refills: 1 | Status: SHIPPED | OUTPATIENT
Start: 2023-01-01

## 2023-01-01 RX ORDER — GLYCOPYRROLATE 0.2 MG/ML
INJECTION, SOLUTION INTRAMUSCULAR; INTRAVENOUS PRN
Status: DISCONTINUED | OUTPATIENT
Start: 2023-01-01 | End: 2023-01-01

## 2023-01-01 RX ORDER — LIDOCAINE 40 MG/G
CREAM TOPICAL
Status: DISCONTINUED | OUTPATIENT
Start: 2023-01-01 | End: 2023-01-01 | Stop reason: HOSPADM

## 2023-01-01 RX ORDER — LIDOCAINE HYDROCHLORIDE 20 MG/ML
JELLY TOPICAL
Status: COMPLETED | OUTPATIENT
Start: 2023-01-01 | End: 2023-01-01

## 2023-01-01 RX ORDER — SODIUM CHLORIDE, SODIUM LACTATE, POTASSIUM CHLORIDE, CALCIUM CHLORIDE 600; 310; 30; 20 MG/100ML; MG/100ML; MG/100ML; MG/100ML
INJECTION, SOLUTION INTRAVENOUS CONTINUOUS
Status: DISCONTINUED | OUTPATIENT
Start: 2023-01-01 | End: 2023-01-01 | Stop reason: HOSPADM

## 2023-01-01 RX ORDER — OXYCODONE HYDROCHLORIDE 5 MG/1
5 TABLET ORAL 2 TIMES DAILY PRN
Qty: 180 TABLET | Refills: 0 | Status: SHIPPED | OUTPATIENT
Start: 2023-01-01 | End: 2023-01-01

## 2023-01-01 RX ORDER — TORSEMIDE 20 MG/1
20 TABLET ORAL 2 TIMES DAILY
Qty: 60 TABLET | Refills: 0 | Status: SHIPPED | OUTPATIENT
Start: 2023-01-01 | End: 2023-01-01

## 2023-01-01 RX ORDER — MEPERIDINE HYDROCHLORIDE 25 MG/ML
25 INJECTION INTRAMUSCULAR; INTRAVENOUS; SUBCUTANEOUS EVERY 30 MIN PRN
Status: CANCELLED | OUTPATIENT
Start: 2023-01-01

## 2023-01-01 RX ORDER — DRONABINOL 5 MG/1
5 CAPSULE ORAL
Qty: 60 CAPSULE | Refills: 5 | Status: SHIPPED | OUTPATIENT
Start: 2023-01-01

## 2023-01-01 RX ORDER — ONDANSETRON 4 MG/1
4 TABLET, ORALLY DISINTEGRATING ORAL EVERY 30 MIN PRN
Status: DISCONTINUED | OUTPATIENT
Start: 2023-01-01 | End: 2023-01-01 | Stop reason: HOSPADM

## 2023-01-01 RX ORDER — PROPOFOL 10 MG/ML
INJECTION, EMULSION INTRAVENOUS PRN
Status: DISCONTINUED | OUTPATIENT
Start: 2023-01-01 | End: 2023-01-01

## 2023-01-01 RX ORDER — EPINEPHRINE 1 MG/ML
0.3 INJECTION, SOLUTION, CONCENTRATE INTRAVENOUS EVERY 5 MIN PRN
Status: CANCELLED | OUTPATIENT
Start: 2023-01-01

## 2023-01-01 RX ORDER — DOCUSATE SODIUM 100 MG/1
100 CAPSULE, LIQUID FILLED ORAL 2 TIMES DAILY
Qty: 30 CAPSULE | Refills: 1 | Status: SHIPPED | OUTPATIENT
Start: 2023-01-01

## 2023-01-01 RX ORDER — BUMETANIDE 0.25 MG/ML
4 INJECTION INTRAMUSCULAR; INTRAVENOUS ONCE
Status: COMPLETED | OUTPATIENT
Start: 2023-01-01 | End: 2023-01-01

## 2023-01-01 RX ORDER — ONDANSETRON 4 MG/1
4 TABLET, ORALLY DISINTEGRATING ORAL ONCE
Status: COMPLETED | OUTPATIENT
Start: 2023-01-01 | End: 2023-01-01

## 2023-01-01 RX ORDER — METHYLPREDNISOLONE SODIUM SUCCINATE 125 MG/2ML
125 INJECTION, POWDER, LYOPHILIZED, FOR SOLUTION INTRAMUSCULAR; INTRAVENOUS
Status: CANCELLED
Start: 2023-01-01

## 2023-01-01 RX ORDER — TORSEMIDE 20 MG/1
TABLET ORAL
Qty: 150 TABLET | Refills: 11 | Status: SHIPPED | OUTPATIENT
Start: 2023-01-01 | End: 2023-01-01

## 2023-01-01 RX ORDER — TRAMADOL HYDROCHLORIDE 50 MG/1
50 TABLET ORAL ONCE
Status: COMPLETED | OUTPATIENT
Start: 2023-01-01 | End: 2023-01-01

## 2023-01-01 RX ORDER — DRONABINOL 5 MG/1
5 CAPSULE ORAL
Qty: 60 CAPSULE | Refills: 3 | Status: SHIPPED | OUTPATIENT
Start: 2023-01-01

## 2023-01-01 RX ORDER — NALOXONE HYDROCHLORIDE 0.4 MG/ML
0.2 INJECTION, SOLUTION INTRAMUSCULAR; INTRAVENOUS; SUBCUTANEOUS
Status: CANCELLED | OUTPATIENT
Start: 2023-01-01

## 2023-01-01 RX ORDER — ALBUTEROL SULFATE 90 UG/1
1-2 AEROSOL, METERED RESPIRATORY (INHALATION)
Status: CANCELLED
Start: 2023-01-01

## 2023-01-01 RX ORDER — FENTANYL CITRATE 50 UG/ML
25 INJECTION, SOLUTION INTRAMUSCULAR; INTRAVENOUS EVERY 5 MIN PRN
Status: DISCONTINUED | OUTPATIENT
Start: 2023-01-01 | End: 2023-01-01 | Stop reason: HOSPADM

## 2023-01-01 RX ORDER — CEFAZOLIN SODIUM/WATER 2 G/20 ML
2 SYRINGE (ML) INTRAVENOUS
Status: COMPLETED | OUTPATIENT
Start: 2023-01-01 | End: 2023-01-01

## 2023-01-01 RX ORDER — FENTANYL CITRATE 50 UG/ML
INJECTION, SOLUTION INTRAMUSCULAR; INTRAVENOUS PRN
Status: DISCONTINUED | OUTPATIENT
Start: 2023-01-01 | End: 2023-01-01

## 2023-01-01 RX ORDER — CEFAZOLIN SODIUM/WATER 2 G/20 ML
SYRINGE (ML) INTRAVENOUS PRN
Status: DISCONTINUED | OUTPATIENT
Start: 2023-01-01 | End: 2023-01-01

## 2023-01-01 RX ORDER — ONDANSETRON 2 MG/ML
INJECTION INTRAMUSCULAR; INTRAVENOUS PRN
Status: DISCONTINUED | OUTPATIENT
Start: 2023-01-01 | End: 2023-01-01

## 2023-01-01 RX ORDER — DEXAMETHASONE SODIUM PHOSPHATE 4 MG/ML
INJECTION, SOLUTION INTRA-ARTICULAR; INTRALESIONAL; INTRAMUSCULAR; INTRAVENOUS; SOFT TISSUE PRN
Status: DISCONTINUED | OUTPATIENT
Start: 2023-01-01 | End: 2023-01-01

## 2023-01-01 RX ORDER — SODIUM CHLORIDE, SODIUM LACTATE, POTASSIUM CHLORIDE, CALCIUM CHLORIDE 600; 310; 30; 20 MG/100ML; MG/100ML; MG/100ML; MG/100ML
INJECTION, SOLUTION INTRAVENOUS CONTINUOUS PRN
Status: DISCONTINUED | OUTPATIENT
Start: 2023-01-01 | End: 2023-01-01

## 2023-01-01 RX ORDER — OXYCODONE HYDROCHLORIDE 5 MG/1
TABLET ORAL
Qty: 180 TABLET | Refills: 0 | Status: SHIPPED | OUTPATIENT
Start: 2023-01-01

## 2023-01-01 RX ORDER — POLYETHYLENE GLYCOL 3350 17 G/17G
1 POWDER, FOR SOLUTION ORAL 2 TIMES DAILY
Qty: 850 G | Refills: 11 | Status: SHIPPED | OUTPATIENT
Start: 2023-01-01

## 2023-01-01 RX ORDER — ONDANSETRON 2 MG/ML
4 INJECTION INTRAMUSCULAR; INTRAVENOUS EVERY 30 MIN PRN
Status: DISCONTINUED | OUTPATIENT
Start: 2023-01-01 | End: 2023-01-01 | Stop reason: HOSPADM

## 2023-01-01 RX ORDER — OMEPRAZOLE 40 MG/1
40 CAPSULE, DELAYED RELEASE ORAL 2 TIMES DAILY
Qty: 180 CAPSULE | Refills: 1 | Status: SHIPPED | OUTPATIENT
Start: 2023-01-01

## 2023-01-01 RX ORDER — ALBUTEROL SULFATE 0.83 MG/ML
2.5 SOLUTION RESPIRATORY (INHALATION)
Status: CANCELLED | OUTPATIENT
Start: 2023-01-01

## 2023-01-01 RX ORDER — GABAPENTIN 100 MG/1
100 CAPSULE ORAL
Qty: 30 CAPSULE | Refills: 11 | Status: SHIPPED | OUTPATIENT
Start: 2023-01-01

## 2023-01-01 RX ORDER — DIPHENHYDRAMINE HYDROCHLORIDE 50 MG/ML
50 INJECTION INTRAMUSCULAR; INTRAVENOUS
Status: CANCELLED
Start: 2023-01-01

## 2023-01-01 RX ORDER — HYDROMORPHONE HCL IN WATER/PF 6 MG/30 ML
0.4 PATIENT CONTROLLED ANALGESIA SYRINGE INTRAVENOUS EVERY 5 MIN PRN
Status: DISCONTINUED | OUTPATIENT
Start: 2023-01-01 | End: 2023-01-01 | Stop reason: HOSPADM

## 2023-01-01 RX ORDER — TORSEMIDE 20 MG/1
TABLET ORAL
Qty: 180 TABLET | Refills: 11 | Status: SHIPPED | OUTPATIENT
Start: 2023-01-01 | End: 2023-01-01

## 2023-01-01 RX ORDER — METOPROLOL TARTRATE 1 MG/ML
1-20 INJECTION, SOLUTION INTRAVENOUS
Status: ACTIVE | OUTPATIENT
Start: 2023-01-01 | End: 2023-01-01

## 2023-01-01 RX ORDER — SODIUM CHLORIDE 9 MG/ML
INJECTION, SOLUTION INTRAVENOUS CONTINUOUS PRN
Status: DISCONTINUED | OUTPATIENT
Start: 2023-01-01 | End: 2023-01-01

## 2023-01-01 RX ORDER — HYDROMORPHONE HCL IN WATER/PF 6 MG/30 ML
0.2 PATIENT CONTROLLED ANALGESIA SYRINGE INTRAVENOUS EVERY 5 MIN PRN
Status: DISCONTINUED | OUTPATIENT
Start: 2023-01-01 | End: 2023-01-01 | Stop reason: HOSPADM

## 2023-01-01 RX ORDER — AZITHROMYCIN 250 MG/1
TABLET, FILM COATED ORAL
Qty: 6 TABLET | Refills: 0 | Status: SHIPPED | OUTPATIENT
Start: 2023-01-01 | End: 2023-01-01

## 2023-01-01 RX ORDER — EPHEDRINE SULFATE 50 MG/ML
INJECTION, SOLUTION INTRAMUSCULAR; INTRAVENOUS; SUBCUTANEOUS PRN
Status: DISCONTINUED | OUTPATIENT
Start: 2023-01-01 | End: 2023-01-01

## 2023-01-01 RX ORDER — BUMETANIDE 0.25 MG/ML
4 INJECTION INTRAMUSCULAR; INTRAVENOUS
Status: DISCONTINUED | OUTPATIENT
Start: 2023-01-01 | End: 2023-01-01

## 2023-01-01 RX ORDER — HEPARIN SODIUM 5000 [USP'U]/ML
INJECTION, SOLUTION INTRAVENOUS; SUBCUTANEOUS PRN
Status: DISCONTINUED | OUTPATIENT
Start: 2023-01-01 | End: 2023-01-01 | Stop reason: HOSPADM

## 2023-01-01 RX ORDER — FOLIC ACID 0.8 MG
800 TABLET ORAL DAILY
Qty: 30 TABLET | Refills: 3 | Status: SHIPPED | OUTPATIENT
Start: 2023-01-01 | End: 2023-01-01

## 2023-01-01 RX ORDER — TORSEMIDE 20 MG/1
40 TABLET ORAL 2 TIMES DAILY
Qty: 120 TABLET | Refills: 11 | Status: SHIPPED | OUTPATIENT
Start: 2023-01-01

## 2023-01-01 RX ORDER — TRAMADOL HYDROCHLORIDE 50 MG/1
50 TABLET ORAL EVERY 6 HOURS PRN
Qty: 15 TABLET | Refills: 0 | Status: SHIPPED | OUTPATIENT
Start: 2023-01-01 | End: 2023-01-01

## 2023-01-01 RX ORDER — LIDOCAINE HYDROCHLORIDE 20 MG/ML
INJECTION, SOLUTION INFILTRATION; PERINEURAL PRN
Status: DISCONTINUED | OUTPATIENT
Start: 2023-01-01 | End: 2023-01-01

## 2023-01-01 RX ORDER — ROPIVACAINE HYDROCHLORIDE 5 MG/ML
2 INJECTION, SOLUTION EPIDURAL; INFILTRATION; PERINEURAL
Status: SHIPPED | OUTPATIENT
Start: 2023-01-01

## 2023-01-01 RX ORDER — METOLAZONE 5 MG/1
5 TABLET ORAL DAILY PRN
Qty: 4 TABLET | Refills: 0 | Status: SHIPPED | OUTPATIENT
Start: 2023-01-01 | End: 2023-01-01

## 2023-01-01 RX ORDER — ATROPINE SULFATE 0.4 MG/ML
.2-2 AMPUL (ML) INJECTION
Status: DISCONTINUED | OUTPATIENT
Start: 2023-01-01 | End: 2023-01-01 | Stop reason: HOSPADM

## 2023-01-01 RX ORDER — LACTULOSE 10 G/10G
20 SOLUTION ORAL 2 TIMES DAILY
Qty: 60 PACKET | Refills: 11 | Status: SHIPPED | OUTPATIENT
Start: 2023-01-01 | End: 2023-01-01

## 2023-01-01 RX ORDER — DOBUTAMINE HYDROCHLORIDE 200 MG/100ML
10-50 INJECTION INTRAVENOUS CONTINUOUS
Status: ACTIVE | OUTPATIENT
Start: 2023-01-01 | End: 2023-01-01

## 2023-01-01 RX ORDER — TRIAMCINOLONE ACETONIDE 40 MG/ML
20 INJECTION, SUSPENSION INTRA-ARTICULAR; INTRAMUSCULAR
Status: SHIPPED | OUTPATIENT
Start: 2023-01-01

## 2023-01-01 RX ORDER — OXYCODONE HYDROCHLORIDE 5 MG/1
5 TABLET ORAL ONCE
Status: CANCELLED | OUTPATIENT
Start: 2023-01-01

## 2023-01-01 RX ORDER — FENTANYL CITRATE 50 UG/ML
50 INJECTION, SOLUTION INTRAMUSCULAR; INTRAVENOUS EVERY 5 MIN PRN
Status: DISCONTINUED | OUTPATIENT
Start: 2023-01-01 | End: 2023-01-01 | Stop reason: HOSPADM

## 2023-01-01 RX ORDER — TAMSULOSIN HYDROCHLORIDE 0.4 MG/1
0.4 CAPSULE ORAL DAILY
Qty: 90 CAPSULE | Refills: 3 | Status: SHIPPED | OUTPATIENT
Start: 2023-01-01 | End: 2023-01-01

## 2023-01-01 RX ORDER — CEPHALEXIN 500 MG/1
500 CAPSULE ORAL 2 TIMES DAILY
Qty: 20 CAPSULE | Refills: 0 | Status: SHIPPED | OUTPATIENT
Start: 2023-01-01 | End: 2023-01-01 | Stop reason: DRUGHIGH

## 2023-01-01 RX ORDER — TERAZOSIN 5 MG/1
CAPSULE ORAL
Qty: 90 CAPSULE | Refills: 3 | OUTPATIENT
Start: 2023-01-01

## 2023-01-01 RX ORDER — OXYCODONE HYDROCHLORIDE 5 MG/1
5 TABLET ORAL ONCE
Status: COMPLETED | OUTPATIENT
Start: 2023-01-01 | End: 2023-01-01

## 2023-01-01 RX ORDER — CALCIUM ACETATE 667 MG/1
667 CAPSULE ORAL
COMMUNITY
Start: 2023-01-01

## 2023-01-01 RX ORDER — BUMETANIDE 0.25 MG/ML
4 INJECTION INTRAMUSCULAR; INTRAVENOUS ONCE
Status: DISCONTINUED | OUTPATIENT
Start: 2023-01-01 | End: 2023-01-01

## 2023-01-01 RX ORDER — MUPIROCIN 20 MG/G
OINTMENT TOPICAL DAILY
Qty: 30 G | Refills: 0 | Status: SHIPPED | OUTPATIENT
Start: 2023-01-01

## 2023-01-01 RX ORDER — OXYCODONE HYDROCHLORIDE 5 MG/1
5-10 TABLET ORAL EVERY 4 HOURS PRN
Status: DISCONTINUED | OUTPATIENT
Start: 2023-01-01 | End: 2023-01-01 | Stop reason: HOSPADM

## 2023-01-01 RX ORDER — VIT B COMP NO.3/FOLIC/C/BIOTIN 1 MG-60 MG
1 TABLET ORAL DAILY
COMMUNITY

## 2023-01-01 RX ORDER — LANOLIN ALCOHOL/MO/W.PET/CERES
CREAM (GRAM) TOPICAL
Status: CANCELLED | OUTPATIENT
Start: 2023-01-01

## 2023-01-01 RX ORDER — TORSEMIDE 20 MG/1
TABLET ORAL
Qty: 90 TABLET | Refills: 0 | Status: SHIPPED | OUTPATIENT
Start: 2023-01-01 | End: 2023-01-01

## 2023-01-01 RX ADMIN — SUGAMMADEX 200 MG: 100 INJECTION, SOLUTION INTRAVENOUS at 13:49

## 2023-01-01 RX ADMIN — HYDROMORPHONE HYDROCHLORIDE 0.4 MG: 0.2 INJECTION, SOLUTION INTRAMUSCULAR; INTRAVENOUS; SUBCUTANEOUS at 13:48

## 2023-01-01 RX ADMIN — LIDOCAINE HYDROCHLORIDE 40 MG: 20 INJECTION, SOLUTION INFILTRATION; PERINEURAL at 10:34

## 2023-01-01 RX ADMIN — SODIUM CHLORIDE, POTASSIUM CHLORIDE, SODIUM LACTATE AND CALCIUM CHLORIDE: 600; 310; 30; 20 INJECTION, SOLUTION INTRAVENOUS at 11:56

## 2023-01-01 RX ADMIN — PHENYLEPHRINE HYDROCHLORIDE 50 MCG: 10 INJECTION INTRAVENOUS at 13:47

## 2023-01-01 RX ADMIN — PHENYLEPHRINE HYDROCHLORIDE 100 MCG: 10 INJECTION INTRAVENOUS at 10:53

## 2023-01-01 RX ADMIN — LIDOCAINE HYDROCHLORIDE: 20 JELLY TOPICAL at 20:22

## 2023-01-01 RX ADMIN — PROPOFOL 150 MG: 10 INJECTION, EMULSION INTRAVENOUS at 10:34

## 2023-01-01 RX ADMIN — BUMETANIDE 4 MG: 0.25 INJECTION INTRAMUSCULAR; INTRAVENOUS at 20:33

## 2023-01-01 RX ADMIN — PHENYLEPHRINE HYDROCHLORIDE 100 MCG: 10 INJECTION INTRAVENOUS at 12:00

## 2023-01-01 RX ADMIN — OXYCODONE HYDROCHLORIDE 5 MG: 5 TABLET ORAL at 14:22

## 2023-01-01 RX ADMIN — LIDOCAINE HYDROCHLORIDE 40 MG: 20 INJECTION, SOLUTION INFILTRATION; PERINEURAL at 12:01

## 2023-01-01 RX ADMIN — OXYCODONE HYDROCHLORIDE 5 MG: 5 TABLET ORAL at 18:17

## 2023-01-01 RX ADMIN — HYDROMORPHONE HYDROCHLORIDE 0.2 MG: 0.2 INJECTION, SOLUTION INTRAMUSCULAR; INTRAVENOUS; SUBCUTANEOUS at 14:23

## 2023-01-01 RX ADMIN — PROPOFOL 80 MG: 10 INJECTION, EMULSION INTRAVENOUS at 12:49

## 2023-01-01 RX ADMIN — SODIUM CHLORIDE 500 ML: 9 INJECTION, SOLUTION INTRAVENOUS at 22:00

## 2023-01-01 RX ADMIN — FENTANYL CITRATE 50 MCG: 50 INJECTION, SOLUTION INTRAMUSCULAR; INTRAVENOUS at 12:45

## 2023-01-01 RX ADMIN — PHENYLEPHRINE HYDROCHLORIDE 100 MCG: 10 INJECTION INTRAVENOUS at 10:43

## 2023-01-01 RX ADMIN — FENTANYL CITRATE 25 MCG: 50 INJECTION, SOLUTION INTRAMUSCULAR; INTRAVENOUS at 11:40

## 2023-01-01 RX ADMIN — ONDANSETRON 4 MG: 2 INJECTION INTRAMUSCULAR; INTRAVENOUS at 13:32

## 2023-01-01 RX ADMIN — Medication 5 MG: at 12:45

## 2023-01-01 RX ADMIN — HYDROMORPHONE HYDROCHLORIDE 0.4 MG: 0.2 INJECTION, SOLUTION INTRAMUSCULAR; INTRAVENOUS; SUBCUTANEOUS at 14:09

## 2023-01-01 RX ADMIN — FENTANYL CITRATE 25 MCG: 50 INJECTION, SOLUTION INTRAMUSCULAR; INTRAVENOUS at 12:39

## 2023-01-01 RX ADMIN — PROPOFOL 70 MG: 10 INJECTION, EMULSION INTRAVENOUS at 12:01

## 2023-01-01 RX ADMIN — FENTANYL CITRATE 25 MCG: 50 INJECTION, SOLUTION INTRAMUSCULAR; INTRAVENOUS at 15:44

## 2023-01-01 RX ADMIN — ONDANSETRON 4 MG: 4 TABLET, ORALLY DISINTEGRATING ORAL at 14:03

## 2023-01-01 RX ADMIN — FENTANYL CITRATE 50 MCG: 50 INJECTION, SOLUTION INTRAMUSCULAR; INTRAVENOUS at 13:03

## 2023-01-01 RX ADMIN — FENTANYL CITRATE 25 MCG: 50 INJECTION, SOLUTION INTRAMUSCULAR; INTRAVENOUS at 12:27

## 2023-01-01 RX ADMIN — PHENYLEPHRINE HYDROCHLORIDE 100 MCG: 10 INJECTION INTRAVENOUS at 12:32

## 2023-01-01 RX ADMIN — ONDANSETRON 4 MG: 2 INJECTION INTRAMUSCULAR; INTRAVENOUS at 11:38

## 2023-01-01 RX ADMIN — SODIUM CHLORIDE: 9 INJECTION, SOLUTION INTRAVENOUS at 10:30

## 2023-01-01 RX ADMIN — DARBEPOETIN ALFA 100 MCG: 100 INJECTION, SOLUTION INTRAVENOUS; SUBCUTANEOUS at 13:08

## 2023-01-01 RX ADMIN — FENTANYL CITRATE 25 MCG: 50 INJECTION, SOLUTION INTRAMUSCULAR; INTRAVENOUS at 12:00

## 2023-01-01 RX ADMIN — TRIAMCINOLONE ACETONIDE 20 MG: 40 INJECTION, SUSPENSION INTRA-ARTICULAR; INTRAMUSCULAR at 15:20

## 2023-01-01 RX ADMIN — BUMETANIDE 4 MG: 0.25 INJECTION INTRAMUSCULAR; INTRAVENOUS at 07:58

## 2023-01-01 RX ADMIN — SUGAMMADEX 200 MG: 100 INJECTION, SOLUTION INTRAVENOUS at 12:15

## 2023-01-01 RX ADMIN — PHENYLEPHRINE HYDROCHLORIDE 150 MCG: 10 INJECTION INTRAVENOUS at 12:44

## 2023-01-01 RX ADMIN — Medication 10 MG: at 11:26

## 2023-01-01 RX ADMIN — Medication 20 MG: at 11:00

## 2023-01-01 RX ADMIN — PERFLUTREN 7 ML: 6.52 INJECTION, SUSPENSION INTRAVENOUS at 08:36

## 2023-01-01 RX ADMIN — METOPROLOL TARTRATE 3 MG: 5 INJECTION INTRAVENOUS at 08:36

## 2023-01-01 RX ADMIN — DARBEPOETIN ALFA 100 MCG: 100 INJECTION, SOLUTION INTRAVENOUS; SUBCUTANEOUS at 15:28

## 2023-01-01 RX ADMIN — PHENYLEPHRINE HYDROCHLORIDE 100 MCG: 10 INJECTION INTRAVENOUS at 10:58

## 2023-01-01 RX ADMIN — Medication 2 G: at 10:40

## 2023-01-01 RX ADMIN — DEXAMETHASONE SODIUM PHOSPHATE 6 MG: 4 INJECTION, SOLUTION INTRA-ARTICULAR; INTRALESIONAL; INTRAMUSCULAR; INTRAVENOUS; SOFT TISSUE at 12:14

## 2023-01-01 RX ADMIN — Medication 40 MG: at 10:34

## 2023-01-01 RX ADMIN — FENTANYL CITRATE 25 MCG: 50 INJECTION, SOLUTION INTRAMUSCULAR; INTRAVENOUS at 12:21

## 2023-01-01 RX ADMIN — PHENYLEPHRINE HYDROCHLORIDE 100 MCG: 10 INJECTION INTRAVENOUS at 11:39

## 2023-01-01 RX ADMIN — LACTULOSE 20 G: 20 SOLUTION ORAL at 19:29

## 2023-01-01 RX ADMIN — PHENYLEPHRINE HYDROCHLORIDE 100 MCG: 10 INJECTION INTRAVENOUS at 11:25

## 2023-01-01 RX ADMIN — HYDROMORPHONE HYDROCHLORIDE 0.2 MG: 0.2 INJECTION, SOLUTION INTRAMUSCULAR; INTRAVENOUS; SUBCUTANEOUS at 13:25

## 2023-01-01 RX ADMIN — FENTANYL CITRATE 50 MCG: 50 INJECTION, SOLUTION INTRAMUSCULAR; INTRAVENOUS at 10:34

## 2023-01-01 RX ADMIN — PHENYLEPHRINE HYDROCHLORIDE 100 MCG: 10 INJECTION INTRAVENOUS at 11:19

## 2023-01-01 RX ADMIN — TRAMADOL HYDROCHLORIDE 50 MG: 50 TABLET, COATED ORAL at 15:35

## 2023-01-01 RX ADMIN — GLYCOPYRROLATE 0.2 MG: 0.2 INJECTION, SOLUTION INTRAMUSCULAR; INTRAVENOUS at 10:57

## 2023-01-01 RX ADMIN — Medication 40 MG: at 12:03

## 2023-01-01 RX ADMIN — DEXAMETHASONE SODIUM PHOSPHATE 4 MG: 4 INJECTION, SOLUTION INTRA-ARTICULAR; INTRALESIONAL; INTRAMUSCULAR; INTRAVENOUS; SOFT TISSUE at 10:44

## 2023-01-01 RX ADMIN — DOBUTAMINE 10 MCG/KG/MIN: 12.5 INJECTION, SOLUTION, CONCENTRATE INTRAVENOUS at 08:27

## 2023-01-01 RX ADMIN — ROPIVACAINE HYDROCHLORIDE 2 ML: 5 INJECTION, SOLUTION EPIDURAL; INFILTRATION; PERINEURAL at 15:20

## 2023-01-01 RX ADMIN — Medication 2 G: at 12:15

## 2023-01-01 RX ADMIN — DARBEPOETIN ALFA 100 MCG: 100 INJECTION, SOLUTION INTRAVENOUS; SUBCUTANEOUS at 13:19

## 2023-01-01 RX ADMIN — BUMETANIDE 4 MG: 0.25 INJECTION INTRAMUSCULAR; INTRAVENOUS at 17:48

## 2023-01-01 ASSESSMENT — COPD QUESTIONNAIRES
COPD: 0
COPD: 0

## 2023-01-01 ASSESSMENT — ENCOUNTER SYMPTOMS
ALLERGIC/IMMUNOLOGIC NEGATIVE: 1
HEMATOLOGIC/LYMPHATIC NEGATIVE: 1
ALLERGIC/IMMUNOLOGIC NEGATIVE: 1
RESPIRATORY NEGATIVE: 1
MUSCULOSKELETAL NEGATIVE: 1
ENDOCRINE NEGATIVE: 1
SEIZURES: 0
MUSCULOSKELETAL NEGATIVE: 1
EYES NEGATIVE: 1
CARDIOVASCULAR NEGATIVE: 1
CONSTITUTIONAL NEGATIVE: 1
GASTROINTESTINAL NEGATIVE: 1
WEAKNESS: 1
GASTROINTESTINAL NEGATIVE: 1
PSYCHIATRIC NEGATIVE: 1
CARDIOVASCULAR NEGATIVE: 1
SHORTNESS OF BREATH: 1
SEIZURES: 0
NEUROLOGICAL NEGATIVE: 1

## 2023-01-01 ASSESSMENT — ACTIVITIES OF DAILY LIVING (ADL)
ADLS_ACUITY_SCORE: 35
ADLS_ACUITY_SCORE: 24
ADLS_ACUITY_SCORE: 35
ADLS_ACUITY_SCORE: 24
ADLS_ACUITY_SCORE: 35
ADLS_ACUITY_SCORE: 33
ADLS_ACUITY_SCORE: 33
ADLS_ACUITY_SCORE: 24
ADLS_ACUITY_SCORE: 35
CURRENT_FUNCTION: NEEDS ASSISTANCE
ADLS_ACUITY_SCORE: 35
ADLS_ACUITY_SCORE: 33
ADLS_ACUITY_SCORE: 35
ADLS_ACUITY_SCORE: 24
ADLS_ACUITY_SCORE: 35

## 2023-01-01 ASSESSMENT — PATIENT HEALTH QUESTIONNAIRE - PHQ9
10. IF YOU CHECKED OFF ANY PROBLEMS, HOW DIFFICULT HAVE THESE PROBLEMS MADE IT FOR YOU TO DO YOUR WORK, TAKE CARE OF THINGS AT HOME, OR GET ALONG WITH OTHER PEOPLE: VERY DIFFICULT
SUM OF ALL RESPONSES TO PHQ QUESTIONS 1-9: 12
SUM OF ALL RESPONSES TO PHQ QUESTIONS 1-9: 12

## 2023-01-01 ASSESSMENT — PAIN SCALES - GENERAL
PAINLEVEL: WORST PAIN (10)
PAINLEVEL: WORST PAIN (10)

## 2023-01-01 ASSESSMENT — LIFESTYLE VARIABLES
TOBACCO_USE: 1
TOBACCO_USE: 1

## 2023-01-10 NOTE — TELEPHONE ENCOUNTER
Contacted patient to follow up on patient's previous painful urination concerns. Left vm. Will call again.  ASTRID Kahn

## 2023-01-11 NOTE — TELEPHONE ENCOUNTER
----- Message from Boom Golden MD sent at 1/11/2023  3:44 PM CST -----  Regarding: RE: cont. oral iron  He can stop.  ----- Message -----  From: Maddison Amaya RN  Sent: 1/11/2023   3:42 PM CST  To: Boom Golden MD, Liz Daniel RN  Subject: cont. oral iron                                  Hi Dr. Golden,    Would you like this pt to continue on oral iron? Looks like you mentioned his iron lebels were normal in November 2022, just wanted to be sure he should continue before refilling!    Thanks,  Maddison

## 2023-01-11 NOTE — TELEPHONE ENCOUNTER
Updated pt on recommendation from Dr. Golden. He verbalized understanding.    Maddison Amaya RN on 1/11/2023 at 3:49 PM

## 2023-01-12 NOTE — PROGRESS NOTES
Infusion Nursing Note:  [facility]    Patient seen by provider today: No   present during visit today: Not Applicable.    Note: N/A.    Intravenous Access:  Labs drawn peripherally by .    Treatment Conditions:  Results reviewed, labs MET treatment parameters, ok to proceed with treatment.  Hgb 8.7    Post Infusion Assessment:  Patient tolerated injection without incident.  Site patent and intact, free from redness, edema or discomfort.  No evidence of extravasations.     Discharge Plan:   Discharge instructions reviewed with: Patient.  Patient and/or family verbalized understanding of discharge instructions and all questions answered.  AVS to patient via [employer].  Patient will return 2/2/2023 for next appointment.   Patient discharged in stable condition accompanied by: self.  Departure Mode: Ambulatory.    Kristi Delgado RN

## 2023-01-12 NOTE — PROGRESS NOTES
Nephrology Progress Note  2023    Assessment and Plan:  82 year old male with history of CKD, hypertension, gastric bypass , GI bleeding 2016, iron deficiency anemia and Thalassemia minor, who presents for CKD followup. He also has HFpEF and edema/ lymphedema. He has history of multiple orthopedic surgeries. His Scr is increased from 2 to 4 and significant anemia.    # CKD progressively worsening, now Stage 5, eGFR 11-12ml/min:  Scr has worsened especially in the last 2-3 years, previously was near 1 but up to 1.2-1.3 in  and 1.3-1.5 in  and up to 2 since .  His Scr in 2020 was noted up to 2, and had ankle surgery  and Scr remained around 2 since then.    - no NSAIDs in recent years except toradol 2020 postop   - no other significant nephrotoxins, on diuretics for many years - hydrochlorothiazide previously, on loop diuretics in recent years    - discussed increasing lasix again to 80/80 due to increase in weight from 142 to 147 lbs.    - he is wrapping legs / elevating and monitoring sodium more closely to see if we can lower diuretic dose, however given swelling can get significant and HFpEF, volume control is important.   - given history of gastric bypass, we checked oxalate level- it was elevated, dietary restriction recommended  - low grade proteinuria  - kidney biopsy 0n 22:   Kidney, needle biopsy: Limited biopsy sample showin) Arteriosclerosis, severe.  2) Extensive global       glomerulosclerosis, with extensive tubular atrophy and interstitial fibrosis.  - no evidence of oxalate nephropathy  - he is not feeling well, and has not for a long time. His GFR is 14, which is up from 11, and though BUN is not that high, suspect he is uremic given his symptoms.  - lowered BP medications to see if he feels better with a little higher blood pressure, energy level unchanged  - PD Catheter surgery is planned in March.     # Hypertension: blood pressure ~120s systolic     -current regimen:  lasix 80/40 mg  - swelling still present but significantly improved off amlodipine  - will increase lasix to 80/80 mg again due to weight gain in past week.     -  goal -135 systolic, and continue monitoring swelling    # Anemia- acute on chronic, due to thalassemia and chronic renal disease, gastric ulcer noted recently:   - Previous Hgb 9.4, multifactorial, due to thalassemia and kidney disease, on EPO, sees hematology also; try to get his hgb up to 10  - Iron studies: adequate.    # Electrolytes:   - Potassium; level: Normal / low normal, restarted 10mEq, may need to increase to 20mEq   - Bicarbonate; level: Normal/ low- continue bicarbonate    # Mineral Bone Disorder:   - Corrected calcium 7.7; level low normal after starting calcitriol  - Phosphorus; level is: 5.1, high Normal   - 8/2022 Vit D 37, recent   - continue calcitriol  - check vit D next set of labs     Assessment and plan was discussed with patient and he voiced his understanding and agreement.    Disposition: Labs and follow up in 1 month    Reason for Visit:  Mann Escobar is an 82 year old male with CKD from? Biopsy showed severe arteriosclerosis and extensive global glomerulosclerosis, with extensive tubular atrophy and interstitial fibrosis, HFpEF, who presents for followup.    HPI:  He is a pleasant male with history of CKD who follows up for progressively worsening renal function. He has had elevated creatinine over the past few years, which has fluctuated. He was noted with HFpEF/ moderate diastolic dysfunction.      He has had significant amount of swelling since his ankle surgery last year, but even going back years, he has dealt with swelling and was even in lymphedema clinic many years ago.    He was given furosemide 20 mg then 40mg which helped with the edema.  He lost now 40+ lbs of weight with furosemide. He now takes 80/40 mg BID. recently he increased the afternoon dose to 80 mg due to  "weight gain of 5 lbs.    He had a knee replacement in 2000 and has had some edema and then after his ankle surgery in July 2020     His weight was up to 199 lbs from 183 lbs and had gotten down to near 160 lbs. And now between 143-145 lbs on average.   He had gastric bypass in 2000 and was 280 lbs at that point.    He was taking NSAIDS in the past and had GI bleeding and noted with another ulcer last Fall  He was noted with hemoglobin down to 6.1 several months ago. Last check was in June 2021. He denies dark stools or evidence of bleeding  He was started on aranesp by hematology.    He has prostate enlargement and has been prescribed terazosin.  He has continued to take this in case it helps.  He states he has been having normal urination and denies change in appetite.   His Scr up to 4 in January. He has been eating more salt. He has not had a great appetite so eating more of the stuff he finds appetizing. He has been trying to hydrate well.   Terazosin was stopped recently to help improve BP. He does note less urination at night but continues to have frequency during the day after taking lasix.    His weight change is significant over recent months. His oxalate was elevated when we checked it last year. He did start pyridoxine but not sure of usefulness   His ultrasound did show several cysts bilaterally but otherwise unremarkable, with normal sizes though right kidney 10.5 and left 13cm.     He had a kidney biopsy on 6/13 which showed \"severe arteriosclerosis, extensive global       glomerulosclerosis, and extensive tubular atrophy and       interstitial fibrosis. The sample is limited in that the       tissue for immunofluorescence studies did not contain any       glomeruli. There is no evidence of oxalate nephropathy.\"   His SPEP was negative, UPEP had a small monoclonal band in the gamma region with peak of 1.1       His weight went down from 142 to 147 lbs  He is feeling about the same, weak and low energy. " He has no appetite, denies n/v/d. He is still eating some. His back itches sometimes.    His blood pressure is 120s systolic range.       Renal History:   Kidney Disease and Medical Hx:  h/o HTN: Yes      ROS:   A comprehensive review of systems was obtained and negative, except as noted in the HPI or PMH.    Active Medical Problems:  Patient Active Problem List   Diagnosis      HX NEPHROLITHIASIS [V13.01]     Thalassemia minor     Esophageal reflux     Family history of prostate cancer     Leg edema     CARDIOVASCULAR SCREENING; LDL GOAL LESS THAN 130     Internal hemorrhoids     Iron deficiency anemia     First degree AV block     Family history of diabetes mellitus     Scoliosis     Hypopotassemia     Advanced directives, counseling/discussion     HTN (hypertension)     Benign non-nodular prostatic hyperplasia with lower urinary tract symptoms     Chronic low back pain     S/P knee replacement     S/P ankle fusion     Abnormal antinuclear antibody titer     Osteoarthritis     Hypertension     BPH (benign prostatic hyperplasia)     Pseudogout     Chronic pain syndrome     S/P ankle joint replacement     Arthritis of ankle, right     Chronic anemia     Chronic kidney disease, stage 3 (H)     Diastolic dysfunction     Carpal tunnel syndrome     Edema     Nephrolithiasis     Marginal ulcer     Retching     History of Favian-en-Y gastric bypass     Chronic heart failure with preserved ejection fraction (H)     Anemia of chronic renal failure, stage 3b (H)     Continuous opioid dependence (H)     PMH:   Medical record was reviewed and PMH was discussed with patient and noted below.  Past Medical History:   Diagnosis Date     Arthritis of ankle, left 7/24/2020     Back pain     WITH BILATERAL LEG PAIN AND NUMBNESS OF BOTH FEET     Gastro-oesophageal reflux disease     REFLUX     Hypertension      Hypopotassemia      Internal hemorrhoids      Iron deficiency anaemia      Leg edema      Morbid obesity 9/12/2005     Morbid  obesity (H)      Osteoarthrosis      Scoliosis      Thalassemia minor      PSH:   Past Surgical History:   Procedure Laterality Date     CATARACT IOL, RT/LT  2008/    Cataract IOL RT/LT     COLONOSCOPY  2009/07    polyps removed repeat 5 yrs, tubular adenoma     COLONOSCOPY  9/29/2011    Procedure:COLONOSCOPY; Colonoscopy with hemorrhoid banding; Surgeon:JEREMY GARCIA; Location:[facility]     COLONOSCOPY N/A 12/19/2017    Procedure: COLONOSCOPY;  colonoscopy, gastroscopy;  Surgeon: Edmar Isaacs MD;  Location: [facility]     DECOMPRESSION LUMBAR MINIMALLY INVASIVE TWO LEVELS  5/2/2012    Procedure:DECOMPRESSION LUMBAR MINIMALLY INVASIVE TWO LEVELS; Surgeon:LOTTIE MITCHELL; Location:[facility]     ESOPHAGOSCOPY, GASTROSCOPY, DUODENOSCOPY (EGD), COMBINED N/A 2/6/2018    Procedure: COMBINED ESOPHAGOSCOPY, GASTROSCOPY, DUODENOSCOPY (EGD);  gastroscopy;  Surgeon: Edmar Isaacs MD;  Location: [facility]     ESOPHAGOSCOPY, GASTROSCOPY, DUODENOSCOPY (EGD), COMBINED N/A 10/18/2021    Procedure: ESOPHAGOGASTRODUODENOSCOPY (EGD);  Surgeon: Anju Galeano MD;  Location: [facility]     FUSION SPINE POSTERIOR MINIMALLY INVASIVE ONE LEVEL  5/2/2012    Procedure:FUSION SPINE POSTERIOR MINIMALLY INVASIVE ONE LEVEL; Minimal Access Spinal Technology Transformaninal Lumbar Interbody Fusion L4-5, Decompression L3-5; Surgeon:LOTTIE MITCHELL; Location:[facility]     HC REMOVAL OF HYDROCELE,TUNICA,UNILAT  2006    bilateral     IR RENAL BIOPSY LEFT  6/13/2022     ORTHOPEDIC SURGERY  2000    Lf knee replacement     ORTHOPEDIC SURGERY  2002, 2010    Rt replacement     ORTHOPEDIC SURGERY  2005    Left ankle fused     RECONSTRUCT ANKLE Right 7/23/2020    Procedure: Ankle RECONSTRUCTIve Arthroplasty;  Surgeon: Luke Powers DPM;  Location: [facility]     SURGICAL HISTORY OF -       Cysto     SURGICAL HISTORY OF -   2002    Percutaneous kidney stone removal     SURGICAL HISTORY OF -       ureteral stent removal     SURGICAL HISTORY OF -   2005    bariatric  surgery-Favian-en-Y     SURGICAL HISTORY OF -       carpal tunnel surgery rt wrist       Family Hx:   Family History   Problem Relation Age of Onset     Diabetes Brother      Obesity Brother      Cerebrovascular Disease Paternal Grandfather      Prostate Cancer Father      Cancer Father         bone     Diabetes Father      Heart Disease Mother         CHF     Cerebrovascular Disease Mother      Hypertension Mother      Cancer Mother         tumor in stomach     Cancer - colorectal Mother      Heart Disease Maternal Grandmother         CHF     Diabetes Paternal Grandmother      Breast Cancer Sister      Genitourinary Problems Son         Kidney stones     Cancer Brother      Cancer - colorectal Brother      Diabetes Brother      Personal Hx:   Social History     Tobacco Use     Smoking status: Former     Packs/day: 0.50     Years: 7.00     Pack years: 3.50     Types: Cigarettes     Quit date: 1962     Years since quittin.0     Smokeless tobacco: Never   Substance Use Topics     Alcohol use: Yes     Comment: one every other day. Rarely       Allergies:  Allergies   Allergen Reactions     Nkda [No Known Drug Allergies]        Medications:  Current Outpatient Medications   Medication Sig     calcitRIOL (ROCALTROL) 0.25 MCG capsule Take 1 capsule (0.25 mcg) by mouth daily for 360 days     fluticasone (FLONASE) 50 MCG/ACT nasal spray Spray 1 spray into both nostrils daily     furosemide (LASIX) 40 MG tablet TAKE 2 TABLETS BY MOUTH 2 TIMES DAILY     [facility] VITAMIN B12 500 MCG OR TABS 2 tablets daily     MULTIVITAMIN OR one daily     OVER-THE-COUNTER Elderberry 50mg supplement, one daily     oxyCODONE (ROXICODONE) 5 MG tablet Take 1 tablet (5 mg) by mouth 2 times daily as needed for severe pain (7-10) This is a 90 day supply     potassium chloride ER (KLOR-CON M) 10 MEQ CR tablet Take 1 tablet (10 mEq) by mouth daily     pyridOXINE (VITAMIN B-6) 50 MG tablet Take 1 tablet (50 mg) by mouth 2 times daily     VIACTIV  OR With D 1000mg calcium     vitamin D3 (CHOLECALCIFEROL) 50 mcg (2000 units) tablet Take 1 tablet (50 mcg) by mouth daily     zinc gluconate 50 MG tablet Take 50 mg by mouth daily     Current Facility-Administered Medications   Medication     ropivacaine (NAROPIN) injection 3 mL     ropivacaine (NAROPIN) injection 3 mL     triamcinolone (KENALOG-40) injection 40 mg     triamcinolone (KENALOG-40) injection 40 mg      Vitals:  There were no vitals taken for this visit.  GENERAL: Healthy, alert and no distress  EYES: Eyes grossly normal to inspection.  No discharge or erythema, or obvious scleral/conjunctival abnormalities.  RESP: No audible wheeze, cough, or visible cyanosis.  No visible retractions or increased work of breathing.    SKIN: Visible skin clear. No significant rash, abnormal pigmentation or lesions.  NEURO: Cranial nerves grossly intact.  Mentation and speech appropriate for age.  PSYCH: Mentation appears normal, affect normal/bright, judgement and insight intact, normal speech and appearance well-groomed.      LABS:   CMP  Recent Labs   Lab Test 12/23/22  1255 11/29/22  1229 11/10/22  1241 10/20/22  1236 01/21/22  1006 06/16/21  1435 02/08/21  1121 02/01/21  1148 01/25/21  1058    143 143 143   < > 143 142 141 142   POTASSIUM 2.8* 3.3* 3.2* 3.6   < > 3.8 3.5 3.7 4.0   CHLORIDE 106 103 106 102   < > 107 107 109 112*   CO2 26 26 26 24   < > 24 30 27 29   ANIONGAP 10 14 11 17*   < > 12 5 5 1*   * 93 132* 118*   < > 80 91 117* 96   BUN 75.1* 61.7* 61.7* 68.3*   < > 47* 28 23 21   CR 4.23* 4.52* 4.03* 4.28*   < > 2.73* 2.26* 2.12* 2.02*   GFRESTIMATED 13* 12* 14* 13*   < > 21* 26* 28* 30*   GFRESTBLACK  --   --   --   --   --  24* 31* 33* 35*   RUTH 7.0* 5.9* 6.3* 6.4*   < > 7.7* 8.0* 8.2* 8.4*    < > = values in this interval not displayed.     Recent Labs   Lab Test 02/08/21  1121 11/10/17  1401 06/11/15  1306   BILITOTAL 0.5 0.5 0.6   ALKPHOS 104 110 100   ALT 21 20 25   AST 14 13 14      CBC  Recent Labs   Lab Test 12/23/22  1255 12/14/22  1451 12/01/22  1158 11/29/22  1229 11/10/22  1241 10/20/22  1236 10/10/22  1316 09/29/22  1241   HGB 8.9* 9.1* 9.1* 9.5* 8.9* 9.4* 10.0* 9.4*   WBC  --   --   --   --  6.1 6.2 6.6 6.2   RBC  --   --   --   --  4.79 5.12 5.39 5.07   HCT 30.0* 29.3* 30.1* 31.0* 29.4* 31.4* 33.0* 30.5*   MCV  --   --   --   --  61* 61* 61* 60*   MCH  --   --   --   --  18.6* 18.4* 18.6* 18.5*   MCHC  --   --   --   --  30.3* 29.9* 30.3* 30.8*   RDW  --   --   --   --  17.8* 17.3* 19.1* 18.4*   PLT  --   --   --   --  181 185 255 226     URINE STUDIES  Recent Labs   Lab Test 12/14/22  1424 03/24/22  1538 01/26/22  1303 06/16/21  1444 11/30/18  1045 09/28/15  1134   COLOR Yellow Yellow Yellow Yellow Yellow Yellow   APPEARANCE Clear Clear Slightly Cloudy* Clear Clear Clear   URINEGLC Negative 50* Negative Negative Negative Negative   URINEBILI Negative Negative Negative Negative Negative Small  This is an unconfirmed screening test result. A positive result may be false.  *   URINEKETONE Negative Negative Negative Negative Negative Trace*   SG 1.015 1.016 1.016 1.020 1.025 >1.030   UBLD Trace* Negative Negative Negative Trace* Negative   URINEPH 5.5 5.0 5.0 5.5 6.0 5.5   PROTEIN 30* 100* 100* 100* >=300* 100*   UROBILINOGEN 0.2  --   --  1.0 1.0 1.0   NITRITE Negative Negative Negative Negative Negative Negative   LEUKEST Negative Negative Negative Negative Negative Negative   RBCU 0-2 0 <1 O - 2 O - 2 O - 2  Urine was tested unconcentrated because <10 ml was received.     WBCU 0-5 1 3 0 - 5 0 - 5 O - 2  Urine was tested unconcentrated because <10 ml was received.       Recent Labs   Lab Test 03/24/22  1538 01/26/22  1303   UTPG 1.88* 0.80*     PTH  Recent Labs   Lab Test 12/23/22  1255 08/29/22  1329   PTHI 426* 356*     IRON STUDIES  Recent Labs   Lab Test 09/29/22  1241 07/28/22  1322 05/26/22  1257   IRON 48* 57 33*   * 144* 176*   IRONSAT 32 40 19   TESSIE 323 242 288        Stormy Madrigal PA-C    Visit length 25 minutes. An additional 20 minutes were spent on date of service in chart review, documentation, and other activities as noted.

## 2023-01-16 NOTE — LETTER
2023       RE: Mann Escobar  [address]  [facility] [address]     Dear Colleague,    Thank you for referring your patient, Mann Escobar, to the [facility] at [facility]. Please see a copy of my visit note below.    Nephrology Progress Note  2023    Assessment and Plan:  82 year old male with history of CKD, hypertension, gastric bypass , GI bleeding , iron deficiency anemia and Thalassemia minor, who presents for CKD followup. He also has HFpEF and edema/ lymphedema. He has history of multiple orthopedic surgeries. His Scr is increased from 2 to 4 and significant anemia.    # CKD progressively worsening, now Stage 5, eGFR 11-12ml/min:  Scr has worsened especially in the last 2-3 years, previously was near 1 but up to 1.2-1.3 in  and 1.3-1.5 in  and up to 2 since .  His Scr in 2020 was noted up to 2, and had ankle surgery  and Scr remained around 2 since then.    - no NSAIDs in recent years except toradol 2020 postop   - no other significant nephrotoxins, on diuretics for many years - hydrochlorothiazide previously, on loop diuretics in recent years    - discussed increasing lasix again to 80/80 due to increase in weight from 142 to 147 lbs.    - he is wrapping legs / elevating and monitoring sodium more closely to see if we can lower diuretic dose, however given swelling can get significant and HFpEF, volume control is important.   - given history of gastric bypass, we checked oxalate level- it was elevated, dietary restriction recommended  - low grade proteinuria  - kidney biopsy 0n 22:   Kidney, needle biopsy: Limited biopsy sample showin) Arteriosclerosis, severe.  2) Extensive global       glomerulosclerosis, with extensive tubular atrophy and interstitial fibrosis.  - no evidence of oxalate nephropathy  - he is not feeling well, and has not for a long  time. His GFR is 14, which is up from 11, and though BUN is not that high, suspect he is uremic given his symptoms.  - lowered BP medications to see if he feels better with a little higher blood pressure, energy level unchanged  - PD Catheter surgery is planned in March.     # Hypertension: blood pressure ~120s systolic    -current regimen:  lasix 80/40 mg  - swelling still present but significantly improved off amlodipine  - will increase lasix to 80/80 mg again due to weight gain in past week.     -  goal -135 systolic, and continue monitoring swelling    # Anemia- acute on chronic, due to thalassemia and chronic renal disease, gastric ulcer noted recently:   - Previous Hgb 9.4, multifactorial, due to thalassemia and kidney disease, on EPO, sees hematology also; try to get his hgb up to 10  - Iron studies: adequate.    # Electrolytes:   - Potassium; level: Normal / low normal, restarted 10mEq, may need to increase to 20mEq   - Bicarbonate; level: Normal/ low- continue bicarbonate    # Mineral Bone Disorder:   - Corrected calcium 7.7; level low normal after starting calcitriol  - Phosphorus; level is: 5.1, high Normal   - 8/2022 Vit D 37, recent   - continue calcitriol  - check vit D next set of labs     Assessment and plan was discussed with patient and he voiced his understanding and agreement.    Disposition: Labs and follow up in 1 month    Reason for Visit:  Mann Escobar is an 82 year old male with CKD from? Biopsy showed severe arteriosclerosis and extensive global glomerulosclerosis, with extensive tubular atrophy and interstitial fibrosis, HFpEF, who presents for followup.    HPI:  He is a pleasant male with history of CKD who follows up for progressively worsening renal function. He has had elevated creatinine over the past few years, which has fluctuated. He was noted with HFpEF/ moderate diastolic dysfunction.      He has had significant amount of swelling since his ankle  "surgery last year, but even going back years, he has dealt with swelling and was even in lymphedema clinic many years ago.    He was given furosemide 20 mg then 40mg which helped with the edema.  He lost now 40+ lbs of weight with furosemide. He now takes 80/40 mg BID. recently he increased the afternoon dose to 80 mg due to weight gain of 5 lbs.    He had a knee replacement in 2000 and has had some edema and then after his ankle surgery in July 2020     His weight was up to 199 lbs from 183 lbs and had gotten down to near 160 lbs. And now between 143-145 lbs on average.   He had gastric bypass in 2000 and was 280 lbs at that point.    He was taking NSAIDS in the past and had GI bleeding and noted with another ulcer last Fall  He was noted with hemoglobin down to 6.1 several months ago. Last check was in June 2021. He denies dark stools or evidence of bleeding  He was started on aranesp by hematology.    He has prostate enlargement and has been prescribed terazosin.  He has continued to take this in case it helps.  He states he has been having normal urination and denies change in appetite.   His Scr up to 4 in January. He has been eating more salt. He has not had a great appetite so eating more of the stuff he finds appetizing. He has been trying to hydrate well.   Terazosin was stopped recently to help improve BP. He does note less urination at night but continues to have frequency during the day after taking lasix.    His weight change is significant over recent months. His oxalate was elevated when we checked it last year. He did start pyridoxine but not sure of usefulness   His ultrasound did show several cysts bilaterally but otherwise unremarkable, with normal sizes though right kidney 10.5 and left 13cm.     He had a kidney biopsy on 6/13 which showed \"severe arteriosclerosis, extensive global       glomerulosclerosis, and extensive tubular atrophy and       interstitial fibrosis. The sample is limited in " "that the       tissue for immunofluorescence studies did not contain any       glomeruli. There is no evidence of oxalate nephropathy.\"   His SPEP was negative, UPEP had a small monoclonal band in the gamma region with peak of 1.1       His weight went down from 142 to 147 lbs  He is feeling about the same, weak and low energy. He has no appetite, denies n/v/d. He is still eating some. His back itches sometimes.    His blood pressure is 120s systolic range.       Renal History:   Kidney Disease and Medical Hx:  h/o HTN: Yes      ROS:   A comprehensive review of systems was obtained and negative, except as noted in the HPI or PMH.    Active Medical Problems:  Patient Active Problem List   Diagnosis      HX NEPHROLITHIASIS [V13.01]     Thalassemia minor     Esophageal reflux     Family history of prostate cancer     Leg edema     CARDIOVASCULAR SCREENING; LDL GOAL LESS THAN 130     Internal hemorrhoids     Iron deficiency anemia     First degree AV block     Family history of diabetes mellitus     Scoliosis     Hypopotassemia     Advanced directives, counseling/discussion     HTN (hypertension)     Benign non-nodular prostatic hyperplasia with lower urinary tract symptoms     Chronic low back pain     S/P knee replacement     S/P ankle fusion     Abnormal antinuclear antibody titer     Osteoarthritis     Hypertension     BPH (benign prostatic hyperplasia)     Pseudogout     Chronic pain syndrome     S/P ankle joint replacement     Arthritis of ankle, right     Chronic anemia     Chronic kidney disease, stage 3 (H)     Diastolic dysfunction     Carpal tunnel syndrome     Edema     Nephrolithiasis     Marginal ulcer     Retching     History of Favian-en-Y gastric bypass     Chronic heart failure with preserved ejection fraction (H)     Anemia of chronic renal failure, stage 3b (H)     Continuous opioid dependence (H)     PMH:   Medical record was reviewed and PMH was discussed with patient and noted below.  Past Medical " History:   Diagnosis Date     Arthritis of ankle, left 7/24/2020     Back pain     WITH BILATERAL LEG PAIN AND NUMBNESS OF BOTH FEET     Gastro-oesophageal reflux disease     REFLUX     Hypertension      Hypopotassemia      Internal hemorrhoids      Iron deficiency anaemia      Leg edema      Morbid obesity 9/12/2005     Morbid obesity (H)      Osteoarthrosis      Scoliosis      Thalassemia minor      PSH:   Past Surgical History:   Procedure Laterality Date     CATARACT IOL, RT/LT  2008/    Cataract IOL RT/LT     COLONOSCOPY  2009/07    polyps removed repeat 5 yrs, tubular adenoma     COLONOSCOPY  9/29/2011    Procedure:COLONOSCOPY; Colonoscopy with hemorrhoid banding; Surgeon:JEREMY GARCIA; Location:[facility]     COLONOSCOPY N/A 12/19/2017    Procedure: COLONOSCOPY;  colonoscopy, gastroscopy;  Surgeon: Edmar Isaacs MD;  Location: [facility]     DECOMPRESSION LUMBAR MINIMALLY INVASIVE TWO LEVELS  5/2/2012    Procedure:DECOMPRESSION LUMBAR MINIMALLY INVASIVE TWO LEVELS; Surgeon:LOTTIE MITCHELL; [facility]     ESOPHAGOSCOPY, GASTROSCOPY, DUODENOSCOPY (EGD), COMBINED N/A 2/6/2018    Procedure: COMBINED ESOPHAGOSCOPY, GASTROSCOPY, DUODENOSCOPY (EGD);  gastroscopy;  Surgeon: Edmar Isaacs MD;  Location: [facility]     ESOPHAGOSCOPY, GASTROSCOPY, DUODENOSCOPY (EGD), COMBINED N/A 10/18/2021    Procedure: ESOPHAGOGASTRODUODENOSCOPY (EGD);  Surgeon: Anju Galeano MD;  Location: [facility]     FUSION SPINE POSTERIOR MINIMALLY INVASIVE ONE LEVEL  5/2/2012    Procedure:FUSION SPINE POSTERIOR MINIMALLY INVASIVE ONE LEVEL; Minimal Access Spinal Technology Transformaninal Lumbar Interbody Fusion L4-5, Decompression L3-5; Surgeon:LOTTIE MITCHELL; [facility]     HC REMOVAL OF HYDROCELE,TUNICA,UNILAT  2006    bilateral     IR RENAL BIOPSY LEFT  6/13/2022     ORTHOPEDIC SURGERY  2000    Lf knee replacement     ORTHOPEDIC SURGERY  2002, 2010    Rt replacement     ORTHOPEDIC SURGERY  2005    Left ankle fused     RECONSTRUCT  ANKLE Right 2020    Procedure: Ankle RECONSTRUCTIve Arthroplasty;  Surgeon: Luke Powers DPM;  Location: [facility]     SURGICAL HISTORY OF -       Cysto     SURGICAL HISTORY OF -       Percutaneous kidney stone removal     SURGICAL HISTORY OF -       ureteral stent removal     SURGICAL HISTORY OF -       bariatric surgery-Favian-en-Y     SURGICAL HISTORY OF -       carpal tunnel surgery rt wrist       Family Hx:   Family History   Problem Relation Age of Onset     Diabetes Brother      Obesity Brother      Cerebrovascular Disease Paternal Grandfather      Prostate Cancer Father      Cancer Father         bone     Diabetes Father      Heart Disease Mother         CHF     Cerebrovascular Disease Mother      Hypertension Mother      Cancer Mother         tumor in stomach     Cancer - colorectal Mother      Heart Disease Maternal Grandmother         CHF     Diabetes Paternal Grandmother      Breast Cancer Sister      Genitourinary Problems Son         Kidney stones     Cancer Brother      Cancer - colorectal Brother      Diabetes Brother      Personal Hx:   Social History     Tobacco Use     Smoking status: Former     Packs/day: 0.50     Years: 7.00     Pack years: 3.50     Types: Cigarettes     Quit date: 1962     Years since quittin.0     Smokeless tobacco: Never   Substance Use Topics     Alcohol use: Yes     Comment: one every other day. Rarely       Allergies:  Allergies   Allergen Reactions     Nkda [No Known Drug Allergies]        Medications:  Current Outpatient Medications   Medication Sig     calcitRIOL (ROCALTROL) 0.25 MCG capsule Take 1 capsule (0.25 mcg) by mouth daily for 360 days     fluticasone (FLONASE) 50 MCG/ACT nasal spray Spray 1 spray into both nostrils daily     furosemide (LASIX) 40 MG tablet TAKE 2 TABLETS BY MOUTH 2 TIMES DAILY     HCA VITAMIN B12 500 MCG OR TABS 2 tablets daily     MULTIVITAMIN OR one daily     OVER-THE-COUNTER Elderberry 50mg supplement, one daily      oxyCODONE (ROXICODONE) 5 MG tablet Take 1 tablet (5 mg) by mouth 2 times daily as needed for severe pain (7-10) This is a 90 day supply     potassium chloride ER (KLOR-CON M) 10 MEQ CR tablet Take 1 tablet (10 mEq) by mouth daily     pyridOXINE (VITAMIN B-6) 50 MG tablet Take 1 tablet (50 mg) by mouth 2 times daily     VIACTIV OR With D 1000mg calcium     vitamin D3 (CHOLECALCIFEROL) 50 mcg (2000 units) tablet Take 1 tablet (50 mcg) by mouth daily     zinc gluconate 50 MG tablet Take 50 mg by mouth daily     Current Facility-Administered Medications   Medication     ropivacaine (NAROPIN) injection 3 mL     ropivacaine (NAROPIN) injection 3 mL     triamcinolone (KENALOG-40) injection 40 mg     triamcinolone (KENALOG-40) injection 40 mg      Vitals:  There were no vitals taken for this visit.  GENERAL: Healthy, alert and no distress  EYES: Eyes grossly normal to inspection.  No discharge or erythema, or obvious scleral/conjunctival abnormalities.  RESP: No audible wheeze, cough, or visible cyanosis.  No visible retractions or increased work of breathing.    SKIN: Visible skin clear. No significant rash, abnormal pigmentation or lesions.  NEURO: Cranial nerves grossly intact.  Mentation and speech appropriate for age.  PSYCH: Mentation appears normal, affect normal/bright, judgement and insight intact, normal speech and appearance well-groomed.      LABS:   CMP  Recent Labs   Lab Test 12/23/22  1255 11/29/22  1229 11/10/22  1241 10/20/22  1236 01/21/22  1006 06/16/21  1435 02/08/21  1121 02/01/21  1148 01/25/21  1058    143 143 143   < > 143 142 141 142   POTASSIUM 2.8* 3.3* 3.2* 3.6   < > 3.8 3.5 3.7 4.0   CHLORIDE 106 103 106 102   < > 107 107 109 112*   CO2 26 26 26 24   < > 24 30 27 29   ANIONGAP 10 14 11 17*   < > 12 5 5 1*   * 93 132* 118*   < > 80 91 117* 96   BUN 75.1* 61.7* 61.7* 68.3*   < > 47* 28 23 21   CR 4.23* 4.52* 4.03* 4.28*   < > 2.73* 2.26* 2.12* 2.02*   GFRESTIMATED 13* 12* 14* 13*    < > 21* 26* 28* 30*   GFRESTBLACK  --   --   --   --   --  24* 31* 33* 35*   RUTH 7.0* 5.9* 6.3* 6.4*   < > 7.7* 8.0* 8.2* 8.4*    < > = values in this interval not displayed.     Recent Labs   Lab Test 02/08/21  1121 11/10/17  1401 06/11/15  1306   BILITOTAL 0.5 0.5 0.6   ALKPHOS 104 110 100   ALT 21 20 25   AST 14 13 14     CBC  Recent Labs   Lab Test 12/23/22  1255 12/14/22  1451 12/01/22  1158 11/29/22  1229 11/10/22  1241 10/20/22  1236 10/10/22  1316 09/29/22  1241   HGB 8.9* 9.1* 9.1* 9.5* 8.9* 9.4* 10.0* 9.4*   WBC  --   --   --   --  6.1 6.2 6.6 6.2   RBC  --   --   --   --  4.79 5.12 5.39 5.07   HCT 30.0* 29.3* 30.1* 31.0* 29.4* 31.4* 33.0* 30.5*   MCV  --   --   --   --  61* 61* 61* 60*   MCH  --   --   --   --  18.6* 18.4* 18.6* 18.5*   MCHC  --   --   --   --  30.3* 29.9* 30.3* 30.8*   RDW  --   --   --   --  17.8* 17.3* 19.1* 18.4*   PLT  --   --   --   --  181 185 255 226     URINE STUDIES  Recent Labs   Lab Test 12/14/22  1424 03/24/22  1538 01/26/22  1303 06/16/21  1444 11/30/18  1045 09/28/15  1134   COLOR Yellow Yellow Yellow Yellow Yellow Yellow   APPEARANCE Clear Clear Slightly Cloudy* Clear Clear Clear   URINEGLC Negative 50* Negative Negative Negative Negative   URINEBILI Negative Negative Negative Negative Negative Small  This is an unconfirmed screening test result. A positive result may be false.  *   URINEKETONE Negative Negative Negative Negative Negative Trace*   SG 1.015 1.016 1.016 1.020 1.025 >1.030   UBLD Trace* Negative Negative Negative Trace* Negative   URINEPH 5.5 5.0 5.0 5.5 6.0 5.5   PROTEIN 30* 100* 100* 100* >=300* 100*   UROBILINOGEN 0.2  --   --  1.0 1.0 1.0   NITRITE Negative Negative Negative Negative Negative Negative   LEUKEST Negative Negative Negative Negative Negative Negative   RBCU 0-2 0 <1 O - 2 O - 2 O - 2  Urine was tested unconcentrated because <10 ml was received.     WBCU 0-5 1 3 0 - 5 0 - 5 O - 2  Urine was tested unconcentrated because <10 ml was  received.       Recent Labs   Lab Test 03/24/22  1538 01/26/22  1303   UTPG 1.88* 0.80*     PTH  Recent Labs   Lab Test 12/23/22  1255 08/29/22  1329   PTHI 426* 356*     IRON STUDIES  Recent Labs   Lab Test 09/29/22  1241 07/28/22  1322 05/26/22  1257   IRON 48* 57 33*   * 144* 176*   IRONSAT 32 40 19   TESSIE [phone]       Yonas Madrigal PA-C    Visit length 25 minutes. An additional 20 minutes were spent on date of service in chart review, documentation, and other activities as noted.      Again, thank you for allowing me to participate in the care of your patient.      Sincerely,    YONAS NOONAN PA-C

## 2023-01-16 NOTE — TELEPHONE ENCOUNTER
FUTURE VISIT INFORMATION      SURGERY INFORMATION:    Date: 3/3/2023    [facility]    Surgeon:  Padma Owens MD    Anesthesia Type:  general    Procedure: INSERTION, CATHETER, DIALYSIS, PERITONEAL, LAPAROSCOPIC with left sided exit right arm arteriovenous fistula versus graft surgery    RECORDS REQUESTED FROM:       Primary Care Provider: Shayy Ramírez APRN CNP    Pertinent Medical History: hypertension, first degree av block, hypertension, chronic heart failure    Most recent EKG+ Tracin22    Most recent ECHO: 21

## 2023-01-16 NOTE — PATIENT INSTRUCTIONS
Check blood pressure at home.   Avoid ibuprofen/aleve.   Follow low sodium diet, okay to eat a moderate potassium diet.   Follow up in 4 weeks with Stormy Madrigal.   Call sooner with any questions or concerns.     -Patrice Wooten:  [phone] [place]: A-NANCY   -Selin Mcduffierest:  [phone] [place]: J-R   -[facility]:             [phone]  [place]: S-EKTA   -Hayley Stock  [phone]  [place]: CKD 1-3   -[facility]:  [phone]  Amy Garcia:  [phone]  Sada Powell [phone] [place]    Nurse Name Telephone Patient population   Patrice Wooten [phone] [place] A-NANCY Mcduffierest [phone] [place] J-R   [facility] [phone] [place] S-EKTA Stock [phone]  [place] CKD 1-3   [facility] [phone] Amy Garcia [phone] Sada Powell [phone] [place]

## 2023-01-19 NOTE — TELEPHONE ENCOUNTER
Spoke with patient regarding current weight. Patient states he thinks he has gained a pound or 2 in the last 24 hours. Patient states he did not take afternoon dose of Lasix yesterday but will today. Patient reports weight as of today is 147 lbs. Patient unable to give previous readings as he did not write them down and can't remember. Patient states he was at 142 lbs for a long time then it increased to 144 lbs. RN will mathieu again on Monday 1/23/23 for a new report of weight. Routing to Rohan CONNORS for review.

## 2023-02-02 NOTE — PROGRESS NOTES
Nephrology Note: Nursing Outreach Encounter    REASON FOR CALL:                                                      REASON FOR CALL: No chief complaint on file.      Future cases for Mann Escobar [[id number]]     Case ID Status Date Time Geoffrey Procedure Provider Location    [id number] [facility] 3/3/2023 11:00  INSERTION, CATHETER, DIALYSIS, PERITONEAL, LAPAROSCOPIC with left sided exit Padma Owens MD [[id number]] [facility]                                           SITUATION/BACKROUND:                                                    Patient is being treated for CKD Stage 5.  Primary Care Provider: Shayy Ramírez APRN CNP     Pertinent Medical History: hypertension, first degree av block, hypertension, chronic heart failure     Most recent EKG+ Tracin22     Most recent ECHO: 21    Patient Journey Status:  Active    ASSESSMENT:                                                      Patient reports current weights and BP.     Neph Assessments:  Volume Status  Patient states current BP as of 22 is 124/70.  Patient reports increase in weight. Patient states he was at 142 lbs for many months, then at 144 lbs. Patient reports as of today 149 lbs. Patient takes Lasix 40 tablets, 2 tablets BID. Patient states he is urinating but weight continues to increase. He reports no changes in fluid consumption.         PLAN:general discussion/verbal explanation                                                      Routing Rohan CONNORS for review.  Education material provided and patient was given an opportunity to ask questions.      Follow Up:   Route to provider to further advise .  Spoke with patient regarding change in medicatin. Patient informed to stop Lasix 40 mg BID and start toresimide 20 mg BID per Rohan CONNORS order. Patient expressed an understanding. RN will reach out again on 23 for updated weights and BP's.    Okay, have him stop lasix and try torsemide, starting at 20 mg BID, may need to  increase dose. Please check in with him on Monday to see if there is any improvement. He should continue to check blood pressure and weight daily.     Thanks,   BLANCA Duque RN

## 2023-02-02 NOTE — PROGRESS NOTES
Infusion Nursing Note:  Mann Escobar presents today for Aranesp.    Patient seen by provider today: No   present during visit today: Not Applicable.    Note: N/A.    Intravenous Access:  Labs drawn without difficulty.    Treatment Conditions:  Results reviewed, labs MET treatment parameters, ok to proceed with treatment.  Hgb 9.3.    Post Infusion Assessment:  Patient tolerated infusion without incident.  Site patent and intact, free from redness, edema or discomfort.  No evidence of extravasations.     Discharge Plan:   Discharge instructions reviewed with: Patient.  Patient discharged in stable condition accompanied by: self.  Departure Mode: Ambulatory.      Radha Bedoya RN

## 2023-02-07 NOTE — ED TRIAGE NOTES
Has constipation, tried a few suppositories without success, was going to try a fleet, but was advised not to due to his kidney failure. (Too much potassium)     Triage Assessment     Row Name 02/07/23 4596       Triage Assessment (Adult)    Airway WDL WDL       Cardiac WDL    Cardiac WDL WDL       Cognitive/Neuro/Behavioral WDL    Cognitive/Neuro/Behavioral WDL WDL

## 2023-02-07 NOTE — TELEPHONE ENCOUNTER
Spoke with patient regarding lack of bowel movement for estimated 3-4 days. Patient states he has taken Docusate for 2 days with no results. Patient states he feels bloated, has intestinal cramping, and vomited last night. Patient is questioning the use of a fleet enema. Patient informed he should follow up with PCP. Patient stated he is in pain and will go to urgent care for possible impaction. Informed patient that this RN will let Rohan CONNORS know of situation.  ASTRID Kahn

## 2023-02-08 NOTE — DISCHARGE INSTRUCTIONS
ICD-10-CM    1. Drug-induced constipation  K59.03     use lactulose as directed recheck in clinic.        2. Renal cyst  N28.1     discuss with primary provider            RETURN TO THE EMERGENCY ROOM FOR THE FOLLOWING:    Severely worsened pain, fever greater than 101, repeated vomiting and dehydration, or at anytime for any concern.    FOLLOW UP:    With your primary clinic only as needed.      TREATMENT RECOMMENDATIONS:    Lactulose 20 mg 3 times a day for constipation.    NURSE ADVICE LINE:  [phone] or [phone]

## 2023-02-08 NOTE — ED PROVIDER NOTES
"     Emergency Department Patient Sign-out       Brief HPI:  This is a 82 year old male signed out to me by Dr. Mckeon .  See initial ED Provider note for details of the presentation.     Here with abdominal pain and on oxycodone with chronic constipation.  Last bowel movement 5 to 7 days ago.        Significant Events prior to my assuming care:       Exam:   Patient Vitals for the past 24 hrs:   BP Temp Temp src Pulse Resp SpO2 Height Weight   02/07/23 1359 (!) 143/85 97.9  F (36.6  C) Tympanic 93 16 98 % 1.549 m (5' 1\") 67.6 kg (149 lb)           ED RESULTS:   No results found for this visit on 02/07/23 (from the past 24 hour(s)).    ED MEDICATIONS:   Medications   0.9% sodium chloride BOLUS (has no administration in time range)   ondansetron (ZOFRAN ODT) ODT tab 4 mg (4 mg Oral Given 2/7/23 1403)   lactulose (CHRONULAC) solution 20 g (20 g Oral Given 2/7/23 1929)         Impression:    ICD-10-CM    1. Drug-induced constipation  K59.03           Plan:    Pending studies include CT abd   No relief with enema and lactulose and if negative CT with plan for home on bowel regimen        MD Juan Calderon Scott J, MD  02/07/23 4772    "

## 2023-02-09 NOTE — PROGRESS NOTES
Mann is a 82 year old who is being evaluated via a billable video visit.      How would you like to obtain your AVS? MyChart          Subjective   Mann is a 82 year old  presenting for the following health issues:  Pre-Op Exam (/)      HPI         Review of Systems       Physical Exam     RYLAN Pierce LPN

## 2023-02-09 NOTE — H&P
Pre-Operative H & P     CC:  Preoperative exam to assess for increased cardiopulmonary risk while undergoing surgery and anesthesia.    Date of Encounter: 2/9/2023  Primary Care Physician:  Shayy Ramírez     Reason for visit:   Encounter Diagnoses   Name Primary?     Preop examination      ESRD (end stage renal disease) (H)      Chronic heart failure with preserved ejection fraction (H) Yes       HPI  Mann Escobar is a 82 year old male who presents for pre-operative H & P in preparation for  Procedure Information     Date/Time: 3/3/23     Procedure: INSERTION, CATHETER, DIALYSIS, PERITONEAL, LAPAROSCOPIC with left sided exit, right arm arteriovenous fistula versus graft surgery    Anesthesia type: General    Pre-op diagnosis: End stage renal disease    Location: [facility]    Providers: Dr. Owens          Mr. Escobar is an 82 year old male with PMH signficant for CKD, hypertension, gastric bypass , GI bleeding 2016, iron deficiency anemia and Thalassemia minor, HFpEF and edema/ lymphedema. He has history of multiple orthopedic surgeries. Due to worsening renal function, the above procedure is planned for initiation of dialysis.    He presented to ED 2/7/23 with severe constipation. Please see those notes for further details. CT did not show bowel obstruction.  He has started lactulose and states he had small BM this morning.    History is obtained from the patient and chart review    Hx of abnormal bleeding or anti-platelet use: denies      Past Medical History  Past Medical History:   Diagnosis Date     Arthritis of ankle, left 7/24/2020     Back pain     WITH BILATERAL LEG PAIN AND NUMBNESS OF BOTH FEET     Gastro-oesophageal reflux disease     REFLUX     Hypertension      Hypopotassemia      Internal hemorrhoids      Iron deficiency anaemia      Leg edema      Morbid obesity 9/12/2005     Morbid obesity (H)      Osteoarthrosis       Scoliosis      Thalassemia minor        Past Surgical History  Past Surgical History:   Procedure Laterality Date     CATARACT IOL, RT/LT  2008/    Cataract IOL RT/LT     COLONOSCOPY  2009/07    polyps removed repeat 5 yrs, tubular adenoma     COLONOSCOPY  9/29/2011    Procedure:COLONOSCOPY; Colonoscopy with hemorrhoid banding; Surgeon:JEREMY GARCIA; Location:[facility]     COLONOSCOPY N/A 12/19/2017    Procedure: COLONOSCOPY;  colonoscopy, gastroscopy;  Surgeon: Edmar Isaacs MD;  Location: [facility]     DECOMPRESSION LUMBAR MINIMALLY INVASIVE TWO LEVELS  5/2/2012    Procedure:DECOMPRESSION LUMBAR MINIMALLY INVASIVE TWO LEVELS; Surgeon:LOTTIE MITCHELL; [facility]     ESOPHAGOSCOPY, GASTROSCOPY, DUODENOSCOPY (EGD), COMBINED N/A 2/6/2018    Procedure: COMBINED ESOPHAGOSCOPY, GASTROSCOPY, DUODENOSCOPY (EGD);  gastroscopy;  Surgeon: Edmar Isaacs MD;  Location: [facility]     ESOPHAGOSCOPY, GASTROSCOPY, DUODENOSCOPY (EGD), COMBINED N/A 10/18/2021    Procedure: ESOPHAGOGASTRODUODENOSCOPY (EGD);  Surgeon: Anju Galeano MD;  Location: [facility]     FUSION SPINE POSTERIOR MINIMALLY INVASIVE ONE LEVEL  5/2/2012    Procedure:FUSION SPINE POSTERIOR MINIMALLY INVASIVE ONE LEVEL; Minimal Access Spinal Technology Transformaninal Lumbar Interbody Fusion L4-5, Decompression L3-5; Surgeon:LOTTIE MITCHELL; [facility]     HC REMOVAL OF HYDROCELE,TUNICA,UNILAT  2006    bilateral     IR RENAL BIOPSY LEFT  6/13/2022     ORTHOPEDIC SURGERY  2000    Lf knee replacement     ORTHOPEDIC SURGERY  2002, 2010    Rt replacement     ORTHOPEDIC SURGERY  2005    Left ankle fused     RECONSTRUCT ANKLE Right 7/23/2020    Procedure: Ankle RECONSTRUCTIve Arthroplasty;  Surgeon: Luke Powers DPM;  Location: [facility]     SURGICAL HISTORY OF -       Cysto     SURGICAL HISTORY OF -   2002    Percutaneous kidney stone removal     SURGICAL HISTORY OF -       ureteral stent removal     SURGICAL HISTORY OF -   2005    bariatric surgery-Favian-en-Y      SURGICAL HISTORY OF -   2008    carpal tunnel surgery rt wrist       Prior to Admission Medications  Current Outpatient Medications   Medication Sig Dispense Refill     calcitRIOL (ROCALTROL) 0.25 MCG capsule Take 1 capsule (0.25 mcg) by mouth daily for 360 days (Patient taking differently: Take 0.25 mcg by mouth 2 times daily) 90 capsule 3     calcium carbonate (TUMS) 500 MG chewable tablet Take 1 chew tab by mouth 2 times daily       lactulose (CEPHULAC) 20 GM packet Take 1 packet (20 g) by mouth 3 times daily as needed for constipation 21 each 0     MULTIVITAMIN OR Take by mouth every morning       OVER-THE-COUNTER every morning Elderberry 50mg supplement, one daily       oxyCODONE (ROXICODONE) 5 MG tablet Take 1 tablet (5 mg) by mouth 2 times daily as needed for severe pain (7-10) This is a 90 day supply 180 tablet 0     potassium chloride ER (KLOR-CON M) 10 MEQ CR tablet Take 1 tablet (10 mEq) by mouth daily (Patient taking differently: Take 10 mEq by mouth every morning) 30 tablet 11     torsemide (DEMADEX) 20 MG tablet Take 1 tablet (20 mg) by mouth 2 times daily 60 tablet 0     folic acid 800 MCG tablet Take 1 tablet (800 mcg) by mouth daily (Patient not taking: Reported on 2/9/2023) 30 tablet 3     HCA VITAMIN B12 500 MCG OR TABS 2 tablets daily (Patient not taking: Reported on 2/9/2023)       pyridOXINE (VITAMIN B-6) 50 MG tablet Take 1 tablet (50 mg) by mouth 2 times daily (Patient not taking: Reported on 2/9/2023) 60 tablet 11     VIACTIV OR With D 1000mg calcium (Patient not taking: Reported on 2/9/2023)       vitamin D3 (CHOLECALCIFEROL) 50 mcg (2000 units) tablet Take 1 tablet (50 mcg) by mouth daily (Patient not taking: Reported on 2/9/2023) 30 tablet 11     zinc gluconate 50 MG tablet Take 50 mg by mouth daily (Patient not taking: Reported on 2/9/2023)         Allergies  Allergies   Allergen Reactions     Nkda [No Known Drug Allergies]      Adhesive Tape Rash       Social History  Social History      Socioeconomic History     Marital status:      Spouse name: Not on file     Number of children: Not on file     Years of education: Not on file     Highest education level: Not on file   Occupational History     Not on file   Tobacco Use     Smoking status: Former     Packs/day: 0.50     Years: 7.00     Pack years: 3.50     Types: Cigarettes     Quit date: 1962     Years since quittin.1     Smokeless tobacco: Never   Vaping Use     Vaping Use: Never used   Substance and Sexual Activity     Alcohol use: Not Currently     Comment: none recent     Drug use: No     Sexual activity: Yes     Partners: Female   Other Topics Concern      Service Yes     Blood Transfusions No     Comment: ???     Caffeine Concern No     Occupational Exposure No     Hobby Hazards No     Sleep Concern No     Stress Concern No     Weight Concern No     Special Diet No     Back Care No     Exercise Not Asked     Bike Helmet Not Asked     Seat Belt Yes     Self-Exams No     Parent/sibling w/ CABG, MI or angioplasty before 65F 55M? No   Social History Narrative     Not on file     Social Determinants of Health     Financial Resource Strain: Not on file   Food Insecurity: Not on file   Transportation Needs: Not on file   Physical Activity: Not on file   Stress: Not on file   Social Connections: Not on file   Intimate Partner Violence: Not on file   Housing Stability: Not on file       Family History  Family History   Problem Relation Age of Onset     Heart Disease Mother         CHF     Cerebrovascular Disease Mother      Hypertension Mother      Cancer Mother         tumor in stomach     Cancer - colorectal Mother      Prostate Cancer Father      Cancer Father         bone     Diabetes Father      Breast Cancer Sister      Diabetes Brother      Obesity Brother      Cancer Brother      Cancer - colorectal Brother      Diabetes Brother      Heart Disease Maternal Grandmother         CHF     Diabetes Paternal Grandmother       Cerebrovascular Disease Paternal Grandfather      Genitourinary Problems Son         Kidney stones     Anesthesia Reaction No family hx of      Venous thrombosis No family hx of        Review of Systems  The complete review of systems is negative other than noted in the HPI or here.   Anesthesia Evaluation   Pt has had prior anesthetic.     No history of anesthetic complications       ROS/MED HX  ENT/Pulmonary:     (+) tobacco use, Past use,  (-) asthma, COPD, sleep apnea and recent URI   Neurologic:  - neg neurologic ROS  (-) no seizures and no CVA   Cardiovascular: Comment: Normal BP at home, 120/70 and 119/74 recently      METS/Exercise Tolerance:  Comment: <4   Hematologic: Comments: Thalassemia minor    (+) anemia, history of blood transfusion, no previous transfusion reaction, Known PRBC Anitbodies:No  (-) history of blood clots   Musculoskeletal:       GI/Hepatic: Comment: Recent ED visit for constipation, resolving slowly on lactulose    H/o bleeding ulcer 2016    (+) GERD,     Renal/Genitourinary:     (+) renal disease, type: ESRD, Pt requires dialysis,     Endo:    (-) Type I DM, Type II DM and thyroid disease   Psychiatric/Substance Use:  - neg psychiatric ROS     Infectious Disease:  - neg infectious disease ROS     Malignancy:  - neg malignancy ROS     Other:  - neg other ROS    (+) , H/O Chronic Pain,        Virtual visit -  No vitals were obtained    Physical Exam  Constitutional: Awake, alert, cooperative, no apparent distress, and appears stated age.  HENT: Normocephalic  Respiratory: non labored breathing   Neurologic: Awake, alert, oriented to name, place and time.   Neuropsychiatric: Calm, cooperative. Normal affect.      Prior Labs/Diagnostic Studies   All labs and imaging personally reviewed     Component      Latest Ref Rng & Units 2/7/2023   Sodium      136 - 145 mmol/L 143   Potassium      3.4 - 5.3 mmol/L 3.8   Chloride      98 - 107 mmol/L 106   Carbon Dioxide (CO2)      22 - 29 mmol/L  26   Anion Gap      7 - 15 mmol/L 11   Urea Nitrogen      8.0 - 23.0 mg/dL 61.9 (H)   Creatinine      0.67 - 1.17 mg/dL 4.16 (H)   Calcium      8.8 - 10.2 mg/dL 7.7 (L)   Glucose      70 - 99 mg/dL 101 (H)   GFR Estimate      >60 mL/min/1.73m2 14 (L)     Component      Latest Ref Rng & Units 2/7/2023   WBC      4.0 - 11.0 10e3/uL 6.7   RBC Count      4.40 - 5.90 10e6/uL 4.64   Hemoglobin      13.3 - 17.7 g/dL 8.9 (L)   Hematocrit      40.0 - 53.0 % 29.9 (L)   MCV      78 - 100 fL 64 (L)   MCH      26.5 - 33.0 pg 19.2 (L)   MCHC      31.5 - 36.5 g/dL 29.8 (L)   RDW      10.0 - 15.0 % 18.6 (H)   Platelet Count      150 - 450 10e3/uL 326   % Neutrophils      % 79   % Lymphocytes      % 12   % Monocytes      % 7   % Eosinophils      % 1   % Basophils      % 1   % Immature Granulocytes      % 0   NRBCs per 100 WBC      <1 /100 0   Absolute Neutrophils      1.6 - 8.3 10e3/uL 5.4   Absolute Lymphocytes      0.8 - 5.3 10e3/uL 0.8   Absolute Monocytes      0.0 - 1.3 10e3/uL 0.5   Absolute Eosinophils      0.0 - 0.7 10e3/uL 0.1   Absolute Basophils      0.0 - 0.2 10e3/uL 0.1   Absolute Immature Granulocytes      <=0.4 10e3/uL 0.0   Absolute NRBCs      10e3/uL 0.0       EKG/ stress test - if available please see in ROS above   Echo result w/o MOPS: Interpretation Summary Left ventricular size, global systolic function, and wall motion are normal,estimated LVEF 55%.Grade II or moderate diastolic dysfunction.Right ventricular global function is normal.Trivial to small pericardial effusion present. There are no echocardiographicindications of cardiac tamponade.Dilation of the inferior vena cava is present with abnormal respiratoryvariation in diameter.Mild aortic root dilatation. Max diameter of the visualized portion 4.2 cm.The ascending aorta is Mildly dilated. Max diameter of the visualized portion4.1 cm. This study was compared to a TTE from 7/2/2020. Global LV systolic function ismildly worse, though still within normal  range. There is evidence of diastolicdysfunction on this present study. There is now a trivial to small pericardialeffusion. IVC is now dilated consistent with hypervolemia.      The patient's records and results personally reviewed by this provider.     Outside records reviewed from: Care Everywhere      Assessment      Mann Escobar is a 82 year old male seen as a PAC referral for risk assessment and optimization for anesthesia.    Plan/Recommendations  Pt will be optimized for the proposed procedure.  See below for details on the assessment, risk, and preoperative recommendations    NEUROLOGY  - No history of TIA, CVA or seizure    -Post Op delirium risk factors:  Age and High co-morbid index    ENT  - No current airway concerns.  Will need to be reassessed day of surgery.  Mallampati: Unable to assess  TM: Unable to assess    CARDIAC  - Bps well controlled at home.  No antihypertensives  - denies chest pain, SOB, CHEEK although is quite sendentary.  - echo 1/21/21 with EF 55% and grade II diastolic dysfunction. Aortic root 4.2cm  - torsemide for edema    - elevated RCRI and METS <4. Will obtain dobutamine stress echo    ADDENDUM 2/14/23  Dobtuamine Stress Echo 2/14/23  Interpretation Summary  Dobutamine stress echocardiogram with no inducible ischemia.     Target heart rate achieved. Normal blood pressure response to dobutamine  No angina symptoms during stress test.  No ECG evidence of ischemia at rest or with dobutamine.  Normal segmental and global left ventricular systolic function at baseline,  LVEF 55-60%.  With peak dobutamine, LVEF increased further to 65-70% and LV cavity size  decreased appropriately.  No stress induced regional wall motion abnormalities.     Aortic valve sclerosis with mild aortic root dilatation noted on screening 2D (4.1cm)  and Doppler examination.    - METS (Metabolic Equivalents), <4.  Uses a walker or cane or scooter.  States he is very sedentary.  Balance issues and  "back pain.  Deconditioned.       RCRI-Moderate risk: Class 3  6.6% complication rate            Total Score: 2    RCRI: High Risk Surgery    RCRI: Elevated Creatinine        PULMONARY    AYAKA Low Risk            Total Score: 2    AYAKA: Over 50 ys old    AYAKA: Male      - Denies asthma or inhaler use  - Tobacco History      History   Smoking Status     Former     Packs/day: 0.50     Years: 7.00     Types: Cigarettes     Quit date: 1/1/1962   Smokeless Tobacco     Never       GI  - h/o GI bleed 2016.  Has been on Prilosec in the past but has run out and unable to fill.  He has appointment with primary care 2/15 and will address then.    PONV Medium Risk  Total Score: 2           1 AN PONV: Patient is not a current smoker    1 AN PONV: Intended Post Op Opioids        /RENAL  - Baseline Creatinine  >4    ENDOCRINE    - BMI: Estimated body mass index is 28.15 kg/m  as calculated from the following:    Height as of 2/7/23: 1.549 m (5' 1\").    Weight as of 2/7/23: 67.6 kg (149 lb).  Overweight (BMI 25.0-29.9)  - No history of Diabetes Mellitus    HEME  VTE Low Risk 0.5%            Total Score: 3    VTE: Greater than 59 yrs old    VTE: Male      - No history of abnormal bleeding or antiplatelet use.  - Chronic anemia, also has thalassemia minor.  Receives Aranesp every 3 weeks  - hgb 8.9, 2/7/23    Recommend perioperative use of blood conservation techniques intraoperatively and close monitoring for postoperative bleeding.    A type and screen has not been done and deferred to the team day of surgery    MSK  - reports back pain and general deconditioning  - uses walker or cane      Different anesthesia methods/types have been discussed with the patient, but they are aware that the final plan will be decided by the assigned anesthesia provider on the date of service.    Patient was discussed with Dr. Floyd    The patient is optimized for their procedure.     AVS with information on surgery time/arrival time, meds and NPO " status given by nursing staff. No further diagnostic testing indicated.    Please refer to the physical examination documented by the anesthesiologist in the anesthesia record on the day of surgery.    Video-Visit Details    Type of service:  Video Visit    Provider received verbal consent for a Video Visit from the patient? Yes   Video Call Length: 30 minutes    Originating Location (pt. Location): Home    Distant Location (provider location):  Off-site  Mode of Communication:  Video Conference via [employer]  On the day of service:     Prep time: 12 minutes  Visit time: 30 minutes  Documentation time: 15 minutes  ------------------------------------------  Total time: 57 minutes      Lisa Smalls PA-C  Preoperative Assessment Center  [facility]  Clinic and Surgery Center  Phone: [phone]  Fax: [phone]

## 2023-02-09 NOTE — PATIENT INSTRUCTIONS
Preparing for Your Surgery      Name:  Mann Escobar   MRN:  [id number]   :  1940   Today's Date:  2023       Arriving for surgery:  Surgery date:  3/3/23  Arrival time:  9 am     Surgeries and procedures: Adult patients can have 2 visitors all through the surgery process.     Visiting hours: 8 a.m. to 8:30 p.m.     Hospital: Adult patients and children under age 18 can have 4 visitor at a time     No visitors under the age of 5 are allowed for hospital patients.  Double occupancy rooms: Patients can have only two visitors at a time.     Patients with disabilities: Can have a support person with them (family member, service provider     Or someone well informed about their needs) plus the allowed number of visitors     Patients confirmed or suspected to have symptoms of COVID 19 or flu:     No visitors allowed for adult patients.   Children (under age 18) can have 1 named visitor.     People who are sick or showing symptoms of COVID 19 or flu:    Are not allowed to visit patients--we can only make exceptions in special situations.       Please follow these guidelines for your visit:   Arrive wearing a mask over your mouth and nose; we will give you a medical mask to wear    If you arrive wearing a cloth mask.   Keep it on during your entire visit, even when in patient's room.   If you don't wear a mask we'll ask you to leave.     Clean your hands with alcohol hand . Do this when you arrive at and leave the building and patient room,    And again after you touch your mask or anything in the room.     You can t visit if you have a fever, cough, shortness of breath, muscle aches, headaches, sore throat    Or diarrhea      Stay 6 feet away from others during your visit and between visits     Go directly to and from the room you are visiting.     Stay in the patient s room during your visit. Limit going to other places in the hospital as much as possible     Leave bags and jackets at  home or in the car.     For everyone s health, please don t come and go during your visit. That includes for smoking   during your visit.     Please come to:     [facility] [address]  [address] [place]  [address]    -   parking is available in front of the hospital for those with mobility difficulties.     -  Parking is also available at the Patient Visitor Ramp on [address] and [facility].    -  When entering the hospital, you will be asked some Covid screening questions and directed to Patient Registration. Patient Registration will then direct you to the 3rd floor Surgery Waiting Room. [phone]?     - ?If you are in need of directions, wheelchair or escort please stop at the Information Desk in the lobby.  Inform the information person that you are here for surgery; a wheelchair and escort to [address] will be provided.?     What can I eat or drink?  -  You may eat and drink normally up to 8 hours prior to arrival time. (Until 1 am)  -  You may have clear liquids until 2 hours prior to arrival time. (Until 7 am)    Examples of clear liquids:  Water  Clear broth  Juices (apple, white grape, white cranberry  and cider) without pulp  Noncarbonated, powder based beverages  (lemonade and Ruy-Aid)  Sodas (Sprite, 7-Up, ginger ale and seltzer)  Coffee or tea (without milk or cream)  Gatorade    -  No Alcohol for at least 24 hours before surgery.     Which medicines can I take?  Hold Aspirin for 7 days before surgery.   Hold Multivitamins for 7 days before surgery.  Hold Supplements for 7 days before surgery. (Elderberry)  Hold Ibuprofen (Advil, Motrin) for 1 day before surgery--unless otherwise directed by surgeon.  Hold Naproxen (Aleve) for 4 days before surgery.    -  DO NOT take these medications the day of surgery:  Potassium Chloride ER (KLOR), Calcitriol (Rocaltrol), Lactulose,    -  PLEASE TAKE these medications the day of  surgery:  Torsemide (Demadex), Flonase nasal spray,   Oxycodone (Roxicodone) if needed    How do I prepare myself?  - Please take 2 showers before surgery using Scrubcare or Hibiclens soap.    Use this soap only from the neck to your toes.     Leave the soap on your skin for one minute--then rinse thoroughly.      You may use your own shampoo and conditioner. No other hair products.   - Please remove all jewelry and body piercings.  - No lotions, deodorants or fragrance.  - Bring your ID and insurance card.    -If you have a Deep Brain Stimulator, Spinal Cord Stimulator, or any Neuro Stimulator device---you must bring the remote control to the hospital.      ALL PATIENTS GOING HOME THE SAME DAY OF SURGERY ARE REQUIRED TO HAVE A RESPONSIBLE ADULT TO DRIVE AND BE IN ATTENDANCE WITH THEM FOR 24 HOURS FOLLOWING SURGERY.    Covid testing policy as of 12/06/2022  Your surgeon will notify and schedule you for a COVID test if one is needed before surgery--please direct any questions or COVID symptoms to your surgeon      Questions or Concerns:    - For any questions regarding the day of surgery or your hospital stay, please contact the Pre Admission Nursing Office at [phone].       - If you have health changes between today and your surgery, please call your surgeon.       - For questions after surgery, please call your surgeons office.

## 2023-02-09 NOTE — TELEPHONE ENCOUNTER
[facility] Call Center    Phone Message    May a detailed message be left on voicemail: yes     Reason for Call: Other: Nurse called on Mann's behalf to schedule echo stress test. Please call Mann to schedule stress test. Thank you!     Action Taken: Other: Cardiology    Travel Screening: Not Applicable       Thank you!  [facility]

## 2023-02-10 NOTE — TELEPHONE ENCOUNTER
Followed up with Mann about his stress test on 2/14, [place] at 8:00 am.  Mann stated he'd like to reschedule for a later time in the day.  This RN provided Mann with the imaging scheduling phone number; Mann planned to get himself rescheduled.  Will follow up.  Eri Hoffman RN

## 2023-02-10 NOTE — TELEPHONE ENCOUNTER
[facility] Call Center    Phone Message    May a detailed message be left on voicemail: no     Reason for Call: Other: Please reach out to Mann to assist in scheduling an ECHO DOBUTAMINE STRESS TEST. He has already scheduled in [place] on 2/14/23, however, Pt prefers [place].      Action Taken: Other: [facility]    Travel Screening: Not Applicable

## 2023-02-14 NOTE — PROGRESS NOTES
Pt here for dobutamine stress test.  Test, meds and side effects reviewed with patient.  Achieved target HR at 30 mcg Dobutamine.  Gave a total of 3 mg IV Metoprolol to bring HR back to baseline.  Post monitoring complete and VSS.  Pt escorted out to the gold waiting room.

## 2023-02-15 NOTE — PROGRESS NOTES
Vascular Referral Intake    Specialty: Vascular Surgery    Specific Provider if Necessary:  Donovan/Mickey/Kaelyn    Visit Type: Vascular Surgery    Time Frame: Next Available    Testing/Imaging Needed Before Consult: PCP already ordered LISA's. Please schedule that prior to consult    Appt note: (to be pasted into appt comments, also add where additional information is located, ie, outside images pushed to PACS, in Epic, sent to HIMS, etc):  Consult for discoloration of skin of foot. old purplish left lower foot, as per patient ongoing for quite some time. Dr. Whitehead referring.       Referred by: Dr. Whitehead for discoloration of skin of foot.    (Preferred location: [place])

## 2023-02-15 NOTE — PROGRESS NOTES
"  Assessment & Plan     Encounter to establish care  82-year-old male presented for establishing medical care, noted to have multiple comorbidities including end-stage renal failure.  Patient was in ER recently with constipation, symptoms stable with Metamucil use.  Physical examination remarkable for old purplish left lower foot, as per patient ongoing for quite some time.  Differential discussed in detail including peripheral vascular disease.  Ultrasound LISA and vascular surgery referral placed.  Other medications reviewed and no changes made.  Follow-up in 1 to 2 months or earlier if needed    Gastroesophageal reflux disease without esophagitis  Suppurative-omeprazole refilled  - sucralfate (CARAFATE) 1 GM tablet; Take 1 tablet (1 g) by mouth 2 times daily as needed  - omeprazole (PRILOSEC) 40 MG DR capsule; Take 1 capsule (40 mg) by mouth 2 times daily    Chronic left-sided low back pain with left-sided sciatica  Ongoing chronic low back pain, using oxycodone, denies any medication side effect, prescription refilled and judicious use stressed  - oxyCODONE (ROXICODONE) 5 MG tablet; Take 1 tablet (5 mg) by mouth 2 times daily as needed for severe pain (7-10) This is a 90 day supply    Continuous opioid dependence (H)  - oxyCODONE (ROXICODONE) 5 MG tablet; Take 1 tablet (5 mg) by mouth 2 times daily as needed for severe pain (7-10) This is a 90 day supply    Discoloration of skin of foot  - Vascular Surgery Referral; Future  - US LISA Doppler No Exercise; Future    Other specified symptoms and signs involving the circulatory and respiratory systems  - US LISA Doppler No Exercise; Future      Hebert Whitehead MD  [facility]    Marcus Darnell is a 82 year old accompanied by his spouse, presenting for the following health issues:  Hospital F/U (Would like to establish care with a MD instead of NP, medication refills and also 1/2 of left foot is \"purple\" )      HPI     Patient would like " to establish medical care, was in ER recently with constipation, known to  have end-stage renal failure and will be on renal dialysis in coming weeks.  Patient due for med  refills too.  Experiencing left foot purplish skin discoloration for months, not investigated in the  past.       Review of Systems   Constitutional, HEENT, cardiovascular, pulmonary, GI, , musculoskeletal, neuro, skin, endocrine and psych systems are negative, except as otherwise noted.      Objective    /82 (BP Location: Left arm, Patient Position: Sitting, Cuff Size: Adult Regular)   Pulse 90   Temp 98.1  F (36.7  C) (Tympanic)   Resp 18   Wt 67.6 kg (149 lb)   SpO2 98%   BMI 28.15 kg/m    Body mass index is 28.15 kg/m .  Physical Exam   GENERAL: alert, no distress, frail and elderly  EYES: eyes grossly normal to inspection, PERRL and conjunctivae and sclerae normal  HENT: normal cephalic/atraumatic, nose and mouth without ulcers or lesions, oropharynx clear and oral mucous membranes moist  NECK: no adenopathy, no asymmetry, masses, or scars and thyroid normal to palpation  RESP: lungs clear to auscultation - no rales, rhonchi or wheezes  CV: regular rates and rhythm, normal S1 S2, no S3 or S4 and no murmur, click or rub  ABDOMEN: soft, nontender  MSK/SKIN: purplish skin discoloration involving left lower distal foot, left pedal edema 3+, pedal pulses, cold peripheries  NEURO: No focal neurology   PSYCH: mentation appears normal, affect normal/bright          Please call  [phone] to schedule imaging.     Answers for HPI/ROS submitted by the patient on 2/15/2023  If you checked off any problems, how difficult have these problems made it for you to do your work, take care of things at home, or get along with other people?: Very difficult  PHQ9 TOTAL SCORE: 12

## 2023-02-16 NOTE — PROGRESS NOTES
VASCULAR SURGERY CLINIC CONSULTATION    VASCULAR SURGEON: Tashi Man MD, RPVI     LOCATION: VIDEO VISIT AT [facility]     Mann Escobar   Medical Record #:  [id number]  YOB: 1940  Age:  82 year old     Date of Service: 2/16/2023    PRIMARY CARE PROVIDER: Shayy Ramírez      Reason for visit:  Discoloration of left foot    ASSESSMENT & PLAN:  Mr. Escobar is an 83yo man with CKD5 seen today for discoloration of his left foot. He is otherwise asymptomatic in that extremity and has no history of wound healing difficulty. His ABIs are normal but his left toe pressure is decreased compared to the left. He has an abnormality of the left common iliac artery on noncontrast CT as well. His foot discoloration is likely acrocyanosis and may benefit from compression and elevation. However, given the asymmetry on exam and imaging, will get CTA with bilateral lower extremity runoff to evaluate for aneurysm, arterial stenosis or occlusion. Will get after he initiates dialysis.     HPI:  Mann Escobar is a 82 year old male who was seen today in consultation for discoloration of his left foot. He reports that his foot has been purple for about six months. He has limited sensation in that extremity after back surgery in 2015. He does however feel pain and denies pain in the foot or leg. He can ambulate but is limited due to difficulties with balance. He can use a walker in his home but requires a wheelchair otherwise. He denies a history of trauma or having foot wounds with difficulty healing.    He does not smoke. He is not diabetic. No recent A1c. He is not on aspirin or a statin. Last LDL 42.    REVIEW OF SYSTEMS:    A 12 point ROS was reviewed and is negative except as mentioned in HPI.     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     Back pain     WITH BILATERAL LEG PAIN AND NUMBNESS OF BOTH FEET     Gastro-oesophageal  reflux disease     REFLUX     Hypertension      Hypopotassemia      Internal hemorrhoids      Iron deficiency anaemia      Leg edema      Morbid obesity 9/12/2005     Morbid obesity (H)      Osteoarthrosis      Scoliosis      Thalassemia minor           Past Surgical History:   Procedure Laterality Date     CATARACT IOL, RT/LT  2008/    Cataract IOL RT/LT     COLONOSCOPY  2009/07    polyps removed repeat 5 yrs, tubular adenoma     COLONOSCOPY  9/29/2011    Procedure:COLONOSCOPY; Colonoscopy with hemorrhoid banding; Surgeon:JEREMY GARCIA; Location:[facility]     COLONOSCOPY N/A 12/19/2017    Procedure: COLONOSCOPY;  colonoscopy, gastroscopy;  Surgeon: Edmar Isaacs MD;  Location: [facility]     DECOMPRESSION LUMBAR MINIMALLY INVASIVE TWO LEVELS  5/2/2012    Procedure:DECOMPRESSION LUMBAR MINIMALLY INVASIVE TWO LEVELS; Surgeon:LOTTIE MITCHELL; [facility]     ESOPHAGOSCOPY, GASTROSCOPY, DUODENOSCOPY (EGD), COMBINED N/A 2/6/2018    Procedure: COMBINED ESOPHAGOSCOPY, GASTROSCOPY, DUODENOSCOPY (EGD);  gastroscopy;  Surgeon: Edmar Isaacs MD;  Location: [facility]     ESOPHAGOSCOPY, GASTROSCOPY, DUODENOSCOPY (EGD), COMBINED N/A 10/18/2021    Procedure: ESOPHAGOGASTRODUODENOSCOPY (EGD);  Surgeon: Anju Galeano MD;  Location: [facility]     FUSION SPINE POSTERIOR MINIMALLY INVASIVE ONE LEVEL  5/2/2012    Procedure:FUSION SPINE POSTERIOR MINIMALLY INVASIVE ONE LEVEL; Minimal Access Spinal Technology Transformaninal Lumbar Interbody Fusion L4-5, Decompression L3-5; Surgeon:LOTTIE MITCHELL; Location:[facility]     HC REMOVAL OF HYDROCELE,TUNICA,UNILAT  2006    bilateral     IR RENAL BIOPSY LEFT  6/13/2022     ORTHOPEDIC SURGERY  2000    Lf knee replacement     ORTHOPEDIC SURGERY  2002, 2010    Rt replacement     ORTHOPEDIC SURGERY  2005    Left ankle fused     RECONSTRUCT ANKLE Right 7/23/2020    Procedure: Ankle RECONSTRUCTIve Arthroplasty;  Surgeon: Luke Powers DPM;  Location: [facility]     SURGICAL HISTORY OF -       Cysto      SURGICAL HISTORY OF -   2002    Percutaneous kidney stone removal     SURGICAL HISTORY OF -       ureteral stent removal     SURGICAL HISTORY OF -   2005    bariatric surgery-Favian-en-Y     SURGICAL HISTORY OF -   2008    carpal tunnel surgery rt wrist       ALLERGIES:  Nkda [no known drug allergies] and Adhesive tape    MEDS:    Current Outpatient Medications:      calcitRIOL (ROCALTROL) 0.25 MCG capsule, Take 1 capsule (0.25 mcg) by mouth daily for 360 days (Patient taking differently: Take 0.25 mcg by mouth 2 times daily), Disp: 90 capsule, Rfl: 3     calcium carbonate (TUMS) 500 MG chewable tablet, Take 1 chew tab by mouth 2 times daily, Disp: , Rfl:      MULTIVITAMIN OR, Take by mouth every morning, Disp: , Rfl:      omeprazole (PRILOSEC) 40 MG DR capsule, Take 1 capsule (40 mg) by mouth 2 times daily, Disp: 180 capsule, Rfl: 1     OVER-THE-COUNTER, every morning Elderberry 50mg supplement, one daily, Disp: , Rfl:      [START ON 2/25/2023] oxyCODONE (ROXICODONE) 5 MG tablet, Take 1 tablet (5 mg) by mouth 2 times daily as needed for severe pain (7-10) This is a 90 day supply, Disp: 180 tablet, Rfl: 0     potassium chloride ER (KLOR-CON M) 10 MEQ CR tablet, Take 1 tablet (10 mEq) by mouth daily (Patient taking differently: Take 10 mEq by mouth every morning), Disp: 30 tablet, Rfl: 11     psyllium (METAMUCIL/KONSYL) 58.6 % powder, Take by mouth daily, Disp: , Rfl:      sucralfate (CARAFATE) 1 GM tablet, Take 1 tablet (1 g) by mouth 2 times daily as needed, Disp: 90 tablet, Rfl: 1     torsemide (DEMADEX) 20 MG tablet, Take 1 tablet (20 mg) by mouth 2 times daily, Disp: 60 tablet, Rfl: 0     VIACTIV OR, , Disp: , Rfl:     Current Facility-Administered Medications:      ropivacaine (NAROPIN) injection 3 mL, 3 mL, , , Danielito Mohan, DO, 3 mL at 11/17/22 1015     ropivacaine (NAROPIN) injection 3 mL, 3 mL, , , Danielito Mohan, DO, 3 mL at 11/17/22 1015     triamcinolone (KENALOG-40) injection 40  mg, 40 mg, , , Danielito Mohan DO, 40 mg at 11/17/22 1015     triamcinolone (KENALOG-40) injection 40 mg, 40 mg, , , Danielito Mohan, DO, 40 mg at 11/17/22 1015    SOCIAL HABITS:    History   Smoking Status     Former     Packs/day: 0.50     Years: 7.00     Types: Cigarettes     Quit date: 1/1/1962   Smokeless Tobacco     Never     Social History    Substance and Sexual Activity      Alcohol use: Not Currently        Comment: none recent      History   Drug Use No       FAMILY HISTORY:    Family History   Problem Relation Age of Onset     Heart Disease Mother         CHF     Cerebrovascular Disease Mother      Hypertension Mother      Cancer Mother         tumor in stomach     Cancer - colorectal Mother      Prostate Cancer Father      Cancer Father         bone     Diabetes Father      Breast Cancer Sister      Diabetes Brother      Obesity Brother      Cancer Brother      Cancer - colorectal Brother      Diabetes Brother      Heart Disease Maternal Grandmother         CHF     Diabetes Paternal Grandmother      Cerebrovascular Disease Paternal Grandfather      Genitourinary Problems Son         Kidney stones     Anesthesia Reaction No family hx of      Venous thrombosis No family hx of        PE:  There were no vitals taken for this visit.  Wt Readings from Last 1 Encounters:   02/15/23 67.6 kg (149 lb)     There is no height or weight on file to calculate BMI.    EXAM:  GENERAL: This is a well-developed 82 year old male who appears his stated age  EYES: Grossly normal.  MOUTH: Buccal mucosa normal   CARDIAC: unable to assess  CHEST/LUNG: unlabored on room air  GASTROINTESTINAL: unable to assess  MUSCULOSKELETAL: Grossly normal and both lower extremities are intact.  HEME/LYMPH: bilateral lower extremity edema worse on the left  NEUROLOGIC: awake and alert, no gross deficits  PSYCH: appropriate affect  INTEGUMENT: No open lesions or ulcers, see picture, acrocyanosis of the feet worse on the  left  Pulse Exam: unable to assess              DIAGNOSTIC STUDIES:     Images:  US LISA with PPG wo Exercise    Result Date: 2/16/2023  US LISA WITH PPG W/O EXERCISE BILAT PROCEDURE DATE: 2/16/2023 1:40 PM HISTORY:  Discoloration of skin of foot; Other specified symptoms and signs involving the circulatory and respiratory systems COMPARISON: None. FINDINGS: Pressure measurements in mmHg RIGHT Brachial: 167 Ankle (PT): Noncompressible, Ankle (DP): 216, 1.26 Digit: 237, 1.38 LEFT Brachial: 172 Ankle (PT): 215, 1.25 Ankle (DP): 209, 1.22 Digit: 71, 0.41 WAVEFORMS: Brisk triphasic distal ankle waveforms. Moderately blunted bilateral great digit waveforms. >?0.7 ????????????????????????????????????????????????????Normal 0.5-0.7 ????????????????????????????????????????????????Mild PAD 0.35-0.5 ??????????????????????????????????????????????Moderate PAD <0.35 &?Toe pressure 40mmHG ?????Moderate to Severe PAD <0.35 &?Toe pressure <30mmHG ???Severe PAD MAGALY GODOY MD   SYSTEM ID:  [id number]    IMPRESSION: 1.  Right leg: Right leg LISA is 1.26. Possibly falsely elevated due to arterial wall calcification. Toe brachial index does not indicate significant PAD. 2.  Left leg: Left leg LISA is 1.25.     Possibly falsely elevated due to arterial wall calcification. Toe brachial index suggests moderate PAD. LISA CRITERIA: >1.4 NC 0.95-1.40 Normal 0.90-0.95 Mild 0.50-0.89 Moderate 0.20-0.49 Severe <0.20 Critical TOE BRACHIAL DIAGNOSTIC CRITERIA    US Upper Extremity Venous Mapping Bilateral    Result Date: 2/13/2023  [facility] DATE: 2/13/2023 1:32 PM EXAM: ULTRASOUND VENOUS UPPER EXTREMITY MAPPING STUDY INDICATION: Preprocedural examination TECHNIQUE: Ultrasound examination of the bilateral upper extremity arteries and veins was performed, including gray-scale, color, and Doppler waveform analysis. RIGHT UPPER EXTREMITY VENOUS MEASUREMENTS: Vein diameter (in millimeters): Cephalic vein, proximal upper arm: 1.7 Cephalic vein, mid  upper arm: 1.1 Cephalic vein, distal upper arm: 1.4 Basilic vein, proximal upper arm: 2.5 Basilic vein, and upper arm: 3.2 Basilic vein, distal upper arm: 2.8 LEFT UPPER EXTREMITY VENOUS MEASUREMENTS: Vein diameter (in millimeters): Cephalic vein, proximal upper arm: 1.1 Cephalic vein, mid upper arm: 1.2 Cephalic vein, distal upper arm: 1.2 Basilic vein, proximal upper arm: 3.6 Basilic vein, and upper arm: 3.4 Basilic vein, distal upper arm: 2.3 Basilic vein, proximal forearm: 1.9     IMPRESSION: 1.  Bilateral upper extremity venous mapping, as described. MARISOL BENSON MD   SYSTEM ID:  [id number]    Abd/pelvis CT - no contrast - Stone Protocol    Result Date: 2/7/2023  EXAM: CT ABDOMEN PELVIS W/O CONTRAST LOCATION: [facility] DATE/TIME: 2/7/2023 11:03 PM INDICATION: New constipation, abdominal discomfort, known kidney disease COMPARISON: 06/17/2013 TECHNIQUE: CT scan of the abdomen and pelvis was performed without IV contrast. Multiplanar reformats were obtained. Dose reduction techniques were used. Lack of intravenous contrast significantly limits sensitivity and specificity. CONTRAST: None. FINDINGS: LOWER CHEST: Bibasilar dependent airspace disease, atelectasis versus infiltrate. Trace right and small left pleural effusions. Stable cardiomegaly. HEPATOBILIARY: Incidental hepatic cysts. No biliary dilatation. PANCREAS: Atrophic without ductal dilatation. SPLEEN: Unremarkable ADRENAL GLANDS: Bilateral adrenal hyperplasia KIDNEYS/BLADDER: Bilateral renal low and intermediate attenuation lesions, which may represent acquired cystic/hemorrhagic cystic disease but is nonspecific without contrast. No hydronephrosis or nephroureterolithiasis. Bilateral renal atrophy with cortical scarring. BOWEL: Small bowel loops are fluid-filled but nondilated. Moderately large colonic fecal burden, greatest in the rectum. There is some rectal wall thickening and adjacent inflammatory change. No transition  point to suggest small bowel obstruction.. LYMPH NODES: No significant retroperitoneal adenopathy VASCULATURE: Atherosclerotic calcification without abdominal aortic aneurysm. PELVIC ORGANS: Moderate prostatic hypertrophy with median lobe hypertrophy extending into the bladder base again noted. MUSCULOSKELETAL: New diffuse anasarca. Old right rib fracture. Thoracolumbar scoliosis with postoperative changes in the lower lumbar spine. Multilevel spondylosis.     IMPRESSION: 1.  Moderately large fecal burden with findings suspicious for rectal impaction with or without associated stercoral colitis. 2.  Small pleural effusions with bibasilar atelectasis versus infiltrate. 3.  New anasarca. 4.  Indeterminate bilateral renal lesions could represent hemorrhagic cysts. This would be better assessed with follow-up dedicated renal imaging with and without IV contrast as warranted clinically.       I personally reviewed the images and my interpretation is irregularity of the left common iliac artery with possible dissection on noncontrast CT. ABIs normal with left toe pressure 71 compared to right 237.    LABS:      Sodium   Date Value Ref Range Status   02/07/2023 143 136 - 145 mmol/L Final   02/02/2023 143 136 - 145 mmol/L Final   01/12/2023 144 136 - 145 mmol/L Final   06/16/2021 143 133 - 144 mmol/L Final   02/08/2021 142 133 - 144 mmol/L Final   02/01/2021 141 133 - 144 mmol/L Final     Urea Nitrogen   Date Value Ref Range Status   02/07/2023 61.9 (H) 8.0 - 23.0 mg/dL Final   02/02/2023 63.0 (H) 8.0 - 23.0 mg/dL Final   01/12/2023 69.0 (H) 8.0 - 23.0 mg/dL Final   10/10/2022 73 (H) 7 - 30 mg/dL Final   08/29/2022 64 (H) 7 - 30 mg/dL Final   06/13/2022 54 (H) 7 - 30 mg/dL Final   06/16/2021 47 (H) 7 - 30 mg/dL Final   02/08/2021 28 7 - 30 mg/dL Final   02/01/2021 23 7 - 30 mg/dL Final     Hemoglobin   Date Value Ref Range Status   02/07/2023 8.9 (L) 13.3 - 17.7 g/dL Final   02/02/2023 9.3 (L) 13.3 - 17.7 g/dL Final    01/12/2023 8.7 (L) 13.3 - 17.7 g/dL Final   06/16/2021 8.5 (L) 13.3 - 17.7 g/dL Final   02/02/2021 8.5 (L) 13.3 - 17.7 g/dL Final   01/20/2021 8.7 (L) 13.3 - 17.7 g/dL Final     Platelet Count   Date Value Ref Range Status   02/07/2023 326 150 - 450 10e3/uL Final   11/10/2022 181 150 - 450 10e3/uL Final   10/20/2022 185 150 - 450 10e3/uL Final   06/16/2021 352 150 - 450 10e9/L Final   02/02/2021 183 150 - 450 10e9/L Final   01/20/2021 174 150 - 450 10e9/L Final     INR   Date Value Ref Range Status   06/13/2022 1.26 (H) 0.85 - 1.15 Final   09/07/2010 3.11 (H) 0.86 - 1.14 Final   09/02/2010 4.77 (H) 0.86 - 1.14 Final   08/30/2010 1.63 (H) 0.86 - 1.14 Final       30 minutes spent on this encounter with 17 minutes spent on the video visit itself.    Coral Manuel MD  VASCULAR SURGERY FELLOW

## 2023-02-16 NOTE — PROGRESS NOTES
"[facility]      Type of Visit: Virtual: Allie    Patient is here for a new visit to discuss discoloration of Left Foot.     Vitals - Patient Reported  Weight (Patient Reported): 63.5 kg (140 lb)  Height (Patient Reported): 154.9 cm (5' 1\")  BMI (Based on Pt Reported Ht/Wt): 26.45  Pain Score: Worst Pain (10)  Pain Loc: Mid Back      Questions patient would like addressed today are: Patient verbalized no questions/concerns, this has been communicated to the provider.     Refills are needed: No    How would you like to obtain your AVS? Mail a copy    Cordelia Ellis  "

## 2023-02-16 NOTE — PATIENT INSTRUCTIONS
Understanding Peripheral Arterial Disease       Peripheral arteries deliver oxygen-rich blood to the tissues outside the heart. As you age, your arteries become stiffer and thicker. In addition, risk factors, such as smoking and high cholesterol, can damage the artery lining. This allows a buildup of fat and other materials (plaque) to form within the artery walls. The buildup of plaque narrows the space inside the artery and sometimes blocks blood flow. Peripheral arterial disease (PAD) happens when blood flow through the arteries is reduced because of plaque buildup. It often happens in the legs and feet, but can also happen elsewhere in the body. If this buildup happens in the large artery in the neck (carotid artery), it can lead to stroke.      A healthy artery  An artery is a muscular tube that carries oxygen rich blood and nutrients from the heart to the rest of the body. It has a smooth lining and flexible walls that allow blood to pass freely. When active, muscles need more oxygen. This increases blood flow. Healthy arteries can adapt to meet this need.      A damaged artery  PAD starts when the lining of an artery is damaged. This is often because of risk factors, such as smoking, older age, or diabetes. Plaque then starts to form within the artery wall. At this stage, blood flows normally, so you re not likely to have symptoms.      A narrowed artery  If plaque continues to build up, the space inside the artery narrows. The artery walls become less able to expand. The artery still provides enough blood and oxygen to your muscles during rest. But when you re active, the increased demand for blood can t be met. As a result, your leg may cramp or ache when you walk.      A blocked artery  An artery can become blocked by plaque or by a blood clot lodged in a narrowed section. When this happens, oxygen can t reach the muscle below the blockage. Then you may feel pain when lying down or when you are not active  (rest pain). This type of pain is especially common at night when you re lying flat. In time, the affected tissue can die. This can lead to the loss of a toe or foot.        7415-9452 [employer]. [address] [place] [address], [place] [address]. All rights reserved. This information is not intended as a substitute for professional medical care. Always follow your healthcare professional's instructions.

## 2023-02-16 NOTE — NURSING NOTE
Chief Complaint   Patient presents with     Follow Up       Patient confirms medications and allergies are accurate via patients echeck in completion, and or denies any changes since last reviewed/verified.     Is the patient currently in the state of [place]? YES    Visit mode:VIDEO    If the visit is dropped, the patient can be reconnected by: VIDEO VISIT: Text to cell phone: [phone]    Will anyone else be joining the visit? NO      How would you like to obtain your AVS? MyChart    Are changes needed to the allergy or medication list? NO    Comments or concerns regarding today's visit: none            Cordelia Ellis, Virtual Facilitator

## 2023-02-22 NOTE — PROGRESS NOTES
Is the patient currently in the state of [place]? YES    Visit mode:VIDEO    If the visit is dropped, the patient can be reconnected by: VIDEO VISIT: Text to cell phone: [phone]    Will anyone else be joining the visit? NO      How would you like to obtain your AVS? MyChart    Are changes needed to the allergy or medication list? NO    Reason for visit: Follow Up.     Video-Visit Details    Type of service:  Video Visit    Video Start Time (time video started): 10:04 am    Video End Time (time video stopped): 10:18 am    Originating Location (pt. Location): Home        Distant Location (provider location):  Offsite    Mode of Communication:  Video Conference via [facility]      Nephrology Progress Note  2/23/2023    Assessment and Plan:  82 year old male with history of CKD, hypertension, gastric bypass , GI bleeding 2016, iron deficiency anemia and Thalassemia minor, who presents for CKD followup. He also has HFpEF and edema/ lymphedema. He has history of multiple orthopedic surgeries. His Scr is increased from 2 to 4 and significant anemia.    # CKD progressively worsening, now Stage 5, eGFR 11-12ml/min:  Scr has worsened especially in the last 2-3 years, previously was near 1 but up to 1.2-1.3 in 2017 and 1.3-1.5 in 2019 and up to 2 since 2020.  His Scr in July 2020 was noted up to 2, and had ankle surgery 7/23 and Scr remained around 2 since then.    - no NSAIDs in recent years except toradol 7/23/2020 postop   - no other significant nephrotoxins, on diuretics for many years - hydrochlorothiazide previously, on loop diuretics in recent years    - increased lasix to 80/80 mg due to increase in weight from 142 to 149 lbs.    - he is wrapping legs / elevating and monitoring sodium more closely to see if we can lower diuretic dose, however given swelling can get significant and HFpEF, volume control is important.   - given history of gastric bypass, we checked oxalate level- it was elevated, dietary restriction  recommended  - low grade proteinuria  - kidney biopsy on 22:   Kidney, needle biopsy: Limited biopsy sample showin) Arteriosclerosis, severe.  2) Extensive global glomerulosclerosis, with extensive tubular atrophy and interstitial fibrosis.  - no evidence of oxalate nephropathy  - he is not feeling well, and has not for a long time. His GFR is 14, which is up from 11, and though BUN is not that high, suspect he is uremic given his symptoms.  - lowered BP medications to see if he feels better with a little higher blood pressure, energy level unchanged  - PD Catheter surgery is planned on 3/3/23.  - will start training once able     # Hypertension: blood pressure ~120s systolic    -current regimen:  Torsemide   - swelling still present but significantly improved off amlodipine  - weight has stabilized at 147 lbs. Notes edema at end of day, improves overnight. He voids often but doesn't have much volume.      -  goal -135 systolic, and continue monitoring swelling  - no changes today    # Anemia- acute on chronic, due to thalassemia and chronic renal disease, gastric ulcer noted recently:   - Previous Hgb 9.4, multifactorial, due to thalassemia and kidney disease, on EPO, sees hematology also; try to get his hgb up to 10  - Iron studies: adequate.    # Electrolytes:   - Potassium; level: Normal / low normal, restarted 10mEq, may need to increase to 20mEq   - Bicarbonate; level: Normal- continue bicarbonate    # Mineral Bone Disorder:   - Corrected calcium 7.7; level low normal after starting calcitriol  - Phosphorus; level is: 5.1, high Normal   - 2022 Vit D 37, recent   - continue calcitriol  - check vit D next set of labs     Assessment and plan was discussed with patient and he voiced his understanding and agreement.    Disposition: Labs and follow up on 3/13 as planned    Reason for Visit:  Mann Escobar is an 82 year old male with CKD from? Biopsy showed severe  arteriosclerosis and extensive global glomerulosclerosis, with extensive tubular atrophy and interstitial fibrosis, HFpEF, who presents for followup.    HPI:  He is a pleasant male with history of CKD who follows up for progressively worsening renal function. He has had elevated creatinine over the past few years, which has fluctuated. He was noted with HFpEF/ moderate diastolic dysfunction.      He has had significant amount of swelling since his ankle surgery last year, but even going back years, he has dealt with swelling and was even in lymphedema clinic many years ago.    He was given furosemide 20 mg then 40mg which helped with the edema.  He lost now 40+ lbs of weight with furosemide. Lasix was increased last visit but did not see any improvement so was switched to torsemide 20/20 mg. Weight has been stable at 147 lbs. bs.      His weight was up to 199 lbs from 183 lbs and had gotten down to near 160 lbs. And now between 147 lbs on average.   He had gastric bypass in 2000 and was 280 lbs at that point.    He was taking NSAIDS in the past and had GI bleeding and noted with another ulcer last Fall  He was noted with hemoglobin down to 6.1 several months ago. Last check was in June 2021. He denies dark stools or evidence of bleeding  He was started on aranesp by hematology.    He has prostate enlargement and has been prescribed terazosin.  He has continued to take this in case it helps.  He states he has been having urinary frequency but the volume is low.    His Scr up to 4 in January. He has been eating more salt. He has not had a great appetite so eating more of the stuff he finds appetizing. He has been trying to hydrate well.   Terazosin was stopped recently to help improve BP. He does note less urination at night but continues to have frequency during the day after taking lasix.    His weight change is significant over recent months. His oxalate was elevated when we checked it last year. He did start  "pyridoxine but not sure of usefulness   His ultrasound did show several cysts bilaterally but otherwise unremarkable, with normal sizes though right kidney 10.5 and left 13cm.     He had a kidney biopsy on 6/13 which showed \"severe arteriosclerosis, extensive global       glomerulosclerosis, and extensive tubular atrophy and       interstitial fibrosis. The sample is limited in that the       tissue for immunofluorescence studies did not contain any       glomeruli. There is no evidence of oxalate nephropathy.\"   His SPEP was negative, UPEP had a small monoclonal band in the gamma region with peak of 1.1       His weight went up from 142 to 147 lbs and has been stable since.  He is feeling about the same, fatigue, weak and low energy. He has no appetite but manages to eat. He had one recent episode of vomiting. He was seen in ED for constipation, taking metamucil which seems to be helping. His back itches sometimes.    His blood pressure is 120s systolic range.       Renal History:   Kidney Disease and Medical Hx:  h/o HTN: Yes      ROS:   A comprehensive review of systems was obtained and negative, except as noted in the HPI or PMH.    Active Medical Problems:  Patient Active Problem List   Diagnosis      HX NEPHROLITHIASIS [V13.01]     Thalassemia minor     Esophageal reflux     Family history of prostate cancer     Leg edema     CARDIOVASCULAR SCREENING; LDL GOAL LESS THAN 130     Internal hemorrhoids     Iron deficiency anemia     First degree AV block     Family history of diabetes mellitus     Scoliosis     Hypopotassemia     Advanced directives, counseling/discussion     HTN (hypertension)     Benign non-nodular prostatic hyperplasia with lower urinary tract symptoms     Chronic low back pain     S/P knee replacement     S/P ankle fusion     Abnormal antinuclear antibody titer     Osteoarthritis     Hypertension     BPH (benign prostatic hyperplasia)     Pseudogout     Chronic pain syndrome     S/P ankle " joint replacement     Arthritis of ankle, right     Chronic anemia     Chronic kidney disease, stage 3 (H)     Diastolic dysfunction     Carpal tunnel syndrome     Edema     Nephrolithiasis     Marginal ulcer     Retching     History of Favian-en-Y gastric bypass     Chronic heart failure with preserved ejection fraction (H)     Anemia of chronic renal failure, stage 3b (H)     Continuous opioid dependence (H)     PMH:   Medical record was reviewed and PMH was discussed with patient and noted below.  Past Medical History:   Diagnosis Date     Arthritis of ankle, left 7/24/2020     Back pain     WITH BILATERAL LEG PAIN AND NUMBNESS OF BOTH FEET     Gastro-oesophageal reflux disease     REFLUX     Hypertension      Hypopotassemia      Internal hemorrhoids      Iron deficiency anaemia      Leg edema      Morbid obesity 9/12/2005     Morbid obesity (H)      Osteoarthrosis      Scoliosis      Thalassemia minor      PSH:   Past Surgical History:   Procedure Laterality Date     CATARACT IOL, RT/LT  2008/    Cataract IOL RT/LT     COLONOSCOPY  2009/07    polyps removed repeat 5 yrs, tubular adenoma     COLONOSCOPY  9/29/2011    Procedure:COLONOSCOPY; Colonoscopy with hemorrhoid banding; Surgeon:JEREMY GARCIA; Location:[facility]     COLONOSCOPY N/A 12/19/2017    Procedure: COLONOSCOPY;  colonoscopy, gastroscopy;  Surgeon: Edmar Isaacs MD;  Location: [facility]     DECOMPRESSION LUMBAR MINIMALLY INVASIVE TWO LEVELS  5/2/2012    Procedure:DECOMPRESSION LUMBAR MINIMALLY INVASIVE TWO LEVELS; Surgeon:LOTTIE MITCHELL; [facility]     ESOPHAGOSCOPY, GASTROSCOPY, DUODENOSCOPY (EGD), COMBINED N/A 2/6/2018    Procedure: COMBINED ESOPHAGOSCOPY, GASTROSCOPY, DUODENOSCOPY (EGD);  gastroscopy;  Surgeon: Edmar Isaacs MD;  Location: [facility]     ESOPHAGOSCOPY, GASTROSCOPY, DUODENOSCOPY (EGD), COMBINED N/A 10/18/2021    Procedure: ESOPHAGOGASTRODUODENOSCOPY (EGD);  Surgeon: Anju Galeano MD;  Location: [facility]     FUSION SPINE POSTERIOR  MINIMALLY INVASIVE ONE LEVEL  2012    Procedure:FUSION SPINE POSTERIOR MINIMALLY INVASIVE ONE LEVEL; Minimal Access Spinal Technology Transformaninal Lumbar Interbody Fusion L4-5, Decompression L3-5; Surgeon:LOTTIE MITCHELL; Location:[facility]     HC REMOVAL OF HYDROCELE,TUNICA,UNILAT  2006    bilateral     IR RENAL BIOPSY LEFT  2022     ORTHOPEDIC SURGERY      Lf knee replacement     ORTHOPEDIC SURGERY  ,     Rt replacement     ORTHOPEDIC SURGERY      Left ankle fused     RECONSTRUCT ANKLE Right 2020    Procedure: Ankle RECONSTRUCTIve Arthroplasty;  Surgeon: Luke Powers DPM;  Location: [facility]     SURGICAL HISTORY OF -       Cysto     SURGICAL HISTORY OF -       Percutaneous kidney stone removal     SURGICAL HISTORY OF -       ureteral stent removal     SURGICAL HISTORY OF -       bariatric surgery-Favian-en-Y     SURGICAL HISTORY OF -       carpal tunnel surgery rt wrist       Family Hx:   Family History   Problem Relation Age of Onset     Heart Disease Mother         CHF     Cerebrovascular Disease Mother      Hypertension Mother      Cancer Mother         tumor in stomach     Cancer - colorectal Mother      Prostate Cancer Father      Cancer Father         bone     Diabetes Father      Breast Cancer Sister      Diabetes Brother      Obesity Brother      Cancer Brother      Cancer - colorectal Brother      Diabetes Brother      Heart Disease Maternal Grandmother         CHF     Diabetes Paternal Grandmother      Cerebrovascular Disease Paternal Grandfather      Genitourinary Problems Son         Kidney stones     Anesthesia Reaction No family hx of      Venous thrombosis No family hx of      Personal Hx:   Social History     Tobacco Use     Smoking status: Former     Packs/day: 0.50     Years: 7.00     Pack years: 3.50     Types: Cigarettes     Quit date: 1962     Years since quittin.1     Smokeless tobacco: Never   Substance Use Topics     Alcohol use: Not  Currently     Comment: none recent       Allergies:  Allergies   Allergen Reactions     Nkda [No Known Drug Allergies]      Adhesive Tape Rash       Medications:  Current Outpatient Medications   Medication Sig     calcitRIOL (ROCALTROL) 0.25 MCG capsule Take 1 capsule (0.25 mcg) by mouth daily for 360 days (Patient taking differently: Take 0.25 mcg by mouth 2 times daily)     calcium carbonate (TUMS) 500 MG chewable tablet Take 1 chew tab by mouth 2 times daily     MULTIVITAMIN OR Take by mouth every morning     omeprazole (PRILOSEC) 40 MG DR capsule Take 1 capsule (40 mg) by mouth 2 times daily     OVER-THE-COUNTER every morning Elderberry 50mg supplement, one daily     [START ON 2/25/2023] oxyCODONE (ROXICODONE) 5 MG tablet Take 1 tablet (5 mg) by mouth 2 times daily as needed for severe pain (7-10) This is a 90 day supply     potassium chloride ER (KLOR-CON M) 10 MEQ CR tablet Take 1 tablet (10 mEq) by mouth daily (Patient taking differently: Take 10 mEq by mouth every morning)     psyllium (METAMUCIL/KONSYL) 58.6 % powder Take by mouth daily     sucralfate (CARAFATE) 1 GM tablet Take 1 tablet (1 g) by mouth 2 times daily as needed     torsemide (DEMADEX) 20 MG tablet Take 1 tablet (20 mg) by mouth 2 times daily     VIA[facility]IV OR      Current Facility-Administered Medications   Medication     ropivacaine (NAROPIN) injection 3 mL     ropivacaine (NAROPIN) injection 3 mL     triamcinolone (KENALOG-40) injection 40 mg     triamcinolone (KENALOG-40) injection 40 mg      Vitals:  There were no vitals taken for this visit.  GENERAL: Healthy, alert and no distress  EYES: Eyes grossly normal to inspection.  No discharge or erythema, or obvious scleral/conjunctival abnormalities.  RESP: No audible wheeze, cough, or visible cyanosis.  No visible retractions or increased work of breathing.    SKIN: Visible skin clear. No significant rash, abnormal pigmentation or lesions.  NEURO: Cranial nerves grossly intact.  Mentation and  speech appropriate for age.  PSYCH: Mentation appears normal, affect normal/bright, judgement and insight intact, normal speech and appearance well-groomed.      LABS:   CMP  Recent Labs   Lab Test 02/07/23 2158 02/02/23  1325 01/12/23  1246 12/23/22  1255 01/21/22  1006 06/16/21  1435 02/08/21  1121 02/01/21  1148 01/25/21  1058    143 144 142   < > 143 142 141 142   POTASSIUM 3.8 3.9 3.5 2.8*   < > 3.8 3.5 3.7 4.0   CHLORIDE 106 105 108* 106   < > 107 107 109 112*   CO2 26 27 22 26   < > 24 30 27 29   ANIONGAP 11 11 14 10   < > 12 5 5 1*   * 112* 101* 103*   < > 80 91 117* 96   BUN 61.9* 63.0* 69.0* 75.1*   < > 47* 28 23 21   CR 4.16* 4.22* 3.98* 4.23*   < > 2.73* 2.26* 2.12* 2.02*   GFRESTIMATED 14* 13* 14* 13*   < > 21* 26* 28* 30*   GFRESTBLACK  --   --   --   --   --  24* 31* 33* 35*   RUTH 7.7* 7.5* 6.9* 7.0*   < > 7.7* 8.0* 8.2* 8.4*    < > = values in this interval not displayed.     Recent Labs   Lab Test 02/08/21  1121 11/10/17  1401 06/11/15  1306   BILITOTAL 0.5 0.5 0.6   ALKPHOS 104 110 100   ALT 21 20 25   AST 14 13 14     CBC  Recent Labs   Lab Test 02/07/23 2158 02/02/23  1239 01/12/23  1246 12/23/22  1255 11/29/22  1229 11/10/22  1241 10/20/22  1236 10/10/22  1316   HGB 8.9* 9.3* 8.7* 8.9*   < > 8.9* 9.4* 10.0*   WBC 6.7  --   --   --   --  6.1 6.2 6.6   RBC 4.64  --   --   --   --  4.79 5.12 5.39   HCT 29.9* 30.8* 28.3* 30.0*   < > 29.4* 31.4* 33.0*   MCV 64*  --   --   --   --  61* 61* 61*   MCH 19.2*  --   --   --   --  18.6* 18.4* 18.6*   MCHC 29.8*  --   --   --   --  30.3* 29.9* 30.3*   RDW 18.6*  --   --   --   --  17.8* 17.3* 19.1*     --   --   --   --  181 185 255    < > = values in this interval not displayed.     URINE STUDIES  Recent Labs   Lab Test 02/07/23  2340 12/14/22  1424 03/24/22  1538 01/26/22  1303 06/16/21  1444 11/30/18  1045 09/28/15  1134   COLOR Yellow Yellow Yellow Yellow Yellow Yellow Yellow   APPEARANCE Clear Clear Clear Slightly Cloudy* Clear  Clear Clear   URINEGLC Negative Negative 50* Negative Negative Negative Negative   URINEBILI Negative Negative Negative Negative Negative Negative Small  This is an unconfirmed screening test result. A positive result may be false.  *   URINEKETONE 5* Negative Negative Negative Negative Negative Trace*   SG 1.015 1.015 1.016 1.016 1.020 1.025 >1.030   UBLD Negative Trace* Negative Negative Negative Trace* Negative   URINEPH 6.5 5.5 5.0 5.0 5.5 6.0 5.5   PROTEIN 100* 30* 100* 100* 100* >=300* 100*   UROBILINOGEN  --  0.2  --   --  1.0 1.0 1.0   NITRITE Negative Negative Negative Negative Negative Negative Negative   LEUKEST Negative Negative Negative Negative Negative Negative Negative   RBCU 1 0-2 0 <1 O - 2 O - 2 O - 2  Urine was tested unconcentrated because <10 ml was received.     WBCU <1 0-5 1 3 0 - 5 0 - 5 O - 2  Urine was tested unconcentrated because <10 ml was received.       Recent Labs   Lab Test 03/24/22  1538 01/26/22  1303   [facility] 1.88* 0.80*     PTH  Recent Labs   Lab Test 12/23/22  1255 08/29/22  1329   PTHI 426* 356*     IRON STUDIES  Recent Labs   Lab Test 09/29/22  1241 07/28/22  1322 05/26/22  1257   IRON 48* 57 33*   * 144* 176*   IRONSAT 32 40 19   TESSIE 323 242 288       Stormy Madrigal PA-C    Visit length 14 minutes. An additional 20 minutes were spent on date of service in chart review, documentation, and other activities as noted.

## 2023-02-23 NOTE — LETTER
2/23/2023       RE: Mann Escobar  [address]  [facility] [address]     Dear Colleague,    Thank you for referring your patient, Mann Escobar, to the [facility] NEPHROLOG[facility]IC [place] at [facility]. Please see a copy of my visit note below.    Is the patient currently in the state of [place]? YES    Visit mode:VIDEO    If the visit is dropped, the patient can be reconnected by: VIDEO VISIT: Text to cell phone: [phone]    Will anyone else be joining the visit? NO      How would you like to obtain your AVS? MyChart    Are changes needed to the allergy or medication list? NO    Reason for visit: Follow Up.     Video-Visit Details    Type of service:  Video Visit    Video Start Time (time video started): 10:04 am    Video End Time (time video stopped): 10:18 am    Originating Location (pt. Location): Home        Distant Location (provider location):  Offsite    Mode of Communication:  Video Conference via [facility]      Nephrology Progress Note  2/23/2023    Assessment and Plan:  82 year old male with history of CKD, hypertension, gastric bypass , GI bleeding 2016, iron deficiency anemia and Thalassemia minor, who presents for CKD followup. He also has HFpEF and edema/ lymphedema. He has history of multiple orthopedic surgeries. His Scr is increased from 2 to 4 and significant anemia.    # CKD progressively worsening, now Stage 5, eGFR 11-12ml/min:  Scr has worsened especially in the last 2-3 years, previously was near 1 but up to 1.2-1.3 in 2017 and 1.3-1.5 in 2019 and up to 2 since 2020.  His Scr in July 2020 was noted up to 2, and had ankle surgery 7/23 and Scr remained around 2 since then.    - no NSAIDs in recent years except toradol 7/23/2020 postop   - no other significant nephrotoxins, on diuretics for many years - hydrochlorothiazide previously, on loop diuretics in recent years    - increased lasix to 80/80  mg due to increase in weight from 142 to 149 lbs.    - he is wrapping legs / elevating and monitoring sodium more closely to see if we can lower diuretic dose, however given swelling can get significant and HFpEF, volume control is important.   - given history of gastric bypass, we checked oxalate level- it was elevated, dietary restriction recommended  - low grade proteinuria  - kidney biopsy on 22:   Kidney, needle biopsy: Limited biopsy sample showin) Arteriosclerosis, severe.  2) Extensive global glomerulosclerosis, with extensive tubular atrophy and interstitial fibrosis.  - no evidence of oxalate nephropathy  - he is not feeling well, and has not for a long time. His GFR is 14, which is up from 11, and though BUN is not that high, suspect he is uremic given his symptoms.  - lowered BP medications to see if he feels better with a little higher blood pressure, energy level unchanged  - PD Catheter surgery is planned on 3/3/23.  - will start training once able     # Hypertension: blood pressure ~120s systolic    -current regimen:  Torsemide 20/20  - swelling still present but significantly improved off amlodipine  - weight has stabilized at 147 lbs. Notes edema at end of day, improves overnight. He voids often but doesn't have much volume.      -  goal -135 systolic, and continue monitoring swelling  - no changes today    # Anemia- acute on chronic, due to thalassemia and chronic renal disease, gastric ulcer noted recently:   - Previous Hgb 9.4, multifactorial, due to thalassemia and kidney disease, on EPO, sees hematology also; try to get his hgb up to 10  - Iron studies: adequate.    # Electrolytes:   - Potassium; level: Normal / low normal, restarted 10mEq, may need to increase to 20mEq   - Bicarbonate; level: Normal- continue bicarbonate    # Mineral Bone Disorder:   - Corrected calcium 7.7; level low normal after starting calcitriol  - Phosphorus; level is: 5.1, high Normal   - 2022 Vit D  37, recent   - continue calcitriol  - check vit D next set of labs     Assessment and plan was discussed with patient and he voiced his understanding and agreement.    Disposition: Labs and follow up on 3/13 as planned    Reason for Visit:  Mann Escobar is an 82 year old male with CKD from? Biopsy showed severe arteriosclerosis and extensive global glomerulosclerosis, with extensive tubular atrophy and interstitial fibrosis, HFpEF, who presents for followup.    HPI:  He is a pleasant male with history of CKD who follows up for progressively worsening renal function. He has had elevated creatinine over the past few years, which has fluctuated. He was noted with HFpEF/ moderate diastolic dysfunction.      He has had significant amount of swelling since his ankle surgery last year, but even going back years, he has dealt with swelling and was even in lymphedema clinic many years ago.    He was given furosemide 20 mg then 40mg which helped with the edema.  He lost now 40+ lbs of weight with furosemide. Lasix was increased last visit but did not see any improvement so was switched to torsemide 20/20 mg. Weight has been stable at 147 lbs. bs.      His weight was up to 199 lbs from 183 lbs and had gotten down to near 160 lbs. And now between 147 lbs on average.   He had gastric bypass in 2000 and was 280 lbs at that point.    He was taking NSAIDS in the past and had GI bleeding and noted with another ulcer last Fall  He was noted with hemoglobin down to 6.1 several months ago. Last check was in June 2021. He denies dark stools or evidence of bleeding  He was started on aranesp by hematology.    He has prostate enlargement and has been prescribed terazosin.  He has continued to take this in case it helps.  He states he has been having urinary frequency but the volume is low.    His Scr up to 4 in January. He has been eating more salt. He has not had a great appetite so eating more of the stuff he finds  "appetizing. He has been trying to hydrate well.   Terazosin was stopped recently to help improve BP. He does note less urination at night but continues to have frequency during the day after taking lasix.    His weight change is significant over recent months. His oxalate was elevated when we checked it last year. He did start pyridoxine but not sure of usefulness   His ultrasound did show several cysts bilaterally but otherwise unremarkable, with normal sizes though right kidney 10.5 and left 13cm.     He had a kidney biopsy on 6/13 which showed \"severe arteriosclerosis, extensive global       glomerulosclerosis, and extensive tubular atrophy and       interstitial fibrosis. The sample is limited in that the       tissue for immunofluorescence studies did not contain any       glomeruli. There is no evidence of oxalate nephropathy.\"   His SPEP was negative, UPEP had a small monoclonal band in the gamma region with peak of 1.1       His weight went up from 142 to 147 lbs and has been stable since.  He is feeling about the same, fatigue, weak and low energy. He has no appetite but manages to eat. He had one recent episode of vomiting. He was seen in ED for constipation, taking metamucil which seems to be helping. His back itches sometimes.    His blood pressure is 120s systolic range.       Renal History:   Kidney Disease and Medical Hx:  h/o HTN: Yes      ROS:   A comprehensive review of systems was obtained and negative, except as noted in the HPI or PMH.    Active Medical Problems:  Patient Active Problem List   Diagnosis      HX NEPHROLITHIASIS [V13.01]     Thalassemia minor     Esophageal reflux     Family history of prostate cancer     Leg edema     CARDIOVASCULAR SCREENING; LDL GOAL LESS THAN 130     Internal hemorrhoids     Iron deficiency anemia     First degree AV block     Family history of diabetes mellitus     Scoliosis     Hypopotassemia     Advanced directives, counseling/discussion     HTN " (hypertension)     Benign non-nodular prostatic hyperplasia with lower urinary tract symptoms     Chronic low back pain     S/P knee replacement     S/P ankle fusion     Abnormal antinuclear antibody titer     Osteoarthritis     Hypertension     BPH (benign prostatic hyperplasia)     Pseudogout     Chronic pain syndrome     S/P ankle joint replacement     Arthritis of ankle, right     Chronic anemia     Chronic kidney disease, stage 3 (H)     Diastolic dysfunction     Carpal tunnel syndrome     Edema     Nephrolithiasis     Marginal ulcer     Retching     History of Favian-en-Y gastric bypass     Chronic heart failure with preserved ejection fraction (H)     Anemia of chronic renal failure, stage 3b (H)     Continuous opioid dependence (H)     PMH:   Medical record was reviewed and PMH was discussed with patient and noted below.  Past Medical History:   Diagnosis Date     Arthritis of ankle, left 7/24/2020     Back pain     WITH BILATERAL LEG PAIN AND NUMBNESS OF BOTH FEET     Gastro-oesophageal reflux disease     REFLUX     Hypertension      Hypopotassemia      Internal hemorrhoids      Iron deficiency anaemia      Leg edema      Morbid obesity 9/12/2005     Morbid obesity (H)      Osteoarthrosis      Scoliosis      Thalassemia minor      PSH:   Past Surgical History:   Procedure Laterality Date     CATARACT IOL, RT/LT  2008/    Cataract IOL RT/LT     COLONOSCOPY  2009/07    polyps removed repeat 5 yrs, tubular adenoma     COLONOSCOPY  9/29/2011    Procedure:COLONOSCOPY; Colonoscopy with hemorrhoid banding; Surgeon:JEREMY GARCIA; Location:[facility]     COLONOSCOPY N/A 12/19/2017    Procedure: COLONOSCOPY;  colonoscopy, gastroscopy;  Surgeon: Edmar Isaacs MD;  Location: [facility]     DECOMPRESSION LUMBAR MINIMALLY INVASIVE TWO LEVELS  5/2/2012    Procedure:DECOMPRESSION LUMBAR MINIMALLY INVASIVE TWO LEVELS; Surgeon:LOTTIE MITCHELL; [facility]     ESOPHAGOSCOPY, GASTROSCOPY, DUODENOSCOPY (EGD), COMBINED N/A  2/6/2018    Procedure: COMBINED ESOPHAGOSCOPY, GASTROSCOPY, DUODENOSCOPY (EGD);  gastroscopy;  Surgeon: Edmar Isaacs MD;  Location: [facility]     ESOPHAGOSCOPY, GASTROSCOPY, DUODENOSCOPY (EGD), COMBINED N/A 10/18/2021    Procedure: ESOPHAGOGASTRODUODENOSCOPY (EGD);  Surgeon: Anju Galeano MD;  Location: [facility]     FUSION SPINE POSTERIOR MINIMALLY INVASIVE ONE LEVEL  5/2/2012    Procedure:FUSION SPINE POSTERIOR MINIMALLY INVASIVE ONE LEVEL; Minimal Access Spinal Technology Transformaninal Lumbar Interbody Fusion L4-5, Decompression L3-5; Surgeon:LOTTIE MITCHELL; Location:[facility]     HC REMOVAL OF HYDROCELE,TUNICA,UNILAT  2006    bilateral     IR RENAL BIOPSY LEFT  6/13/2022     ORTHOPEDIC SURGERY  2000    Lf knee replacement     ORTHOPEDIC SURGERY  2002, 2010    Rt replacement     ORTHOPEDIC SURGERY  2005    Left ankle fused     RECONSTRUCT ANKLE Right 7/23/2020    Procedure: Ankle RECONSTRUCTIve Arthroplasty;  Surgeon: Luke Powers DPM;  Location: [facility]     SURGICAL HISTORY OF -       Cysto     SURGICAL HISTORY OF -   2002    Percutaneous kidney stone removal     SURGICAL HISTORY OF -       ureteral stent removal     SURGICAL HISTORY OF -   2005    bariatric surgery-Favian-en-Y     SURGICAL HISTORY OF -   2008    carpal tunnel surgery rt wrist       Family Hx:   Family History   Problem Relation Age of Onset     Heart Disease Mother         CHF     Cerebrovascular Disease Mother      Hypertension Mother      Cancer Mother         tumor in stomach     Cancer - colorectal Mother      Prostate Cancer Father      Cancer Father         bone     Diabetes Father      Breast Cancer Sister      Diabetes Brother      Obesity Brother      Cancer Brother      Cancer - colorectal Brother      Diabetes Brother      Heart Disease Maternal Grandmother         CHF     Diabetes Paternal Grandmother      Cerebrovascular Disease Paternal Grandfather      Genitourinary Problems Son         Kidney stones     Anesthesia Reaction No  family hx of      Venous thrombosis No family hx of      Personal Hx:   Social History     Tobacco Use     Smoking status: Former     Packs/day: 0.50     Years: 7.00     Pack years: 3.50     Types: Cigarettes     Quit date: 1962     Years since quittin.1     Smokeless tobacco: Never   Substance Use Topics     Alcohol use: Not Currently     Comment: none recent       Allergies:  Allergies   Allergen Reactions     Nkda [No Known Drug Allergies]      Adhesive Tape Rash       Medications:  Current Outpatient Medications   Medication Sig     calcitRIOL (ROCALTROL) 0.25 MCG capsule Take 1 capsule (0.25 mcg) by mouth daily for 360 days (Patient taking differently: Take 0.25 mcg by mouth 2 times daily)     calcium carbonate (TUMS) 500 MG chewable tablet Take 1 chew tab by mouth 2 times daily     MULTIVITAMIN OR Take by mouth every morning     omeprazole (PRILOSEC) 40 MG DR capsule Take 1 capsule (40 mg) by mouth 2 times daily     OVER-THE-COUNTER every morning Elderberry 50mg supplement, one daily     [START ON 2023] oxyCODONE (ROXICODONE) 5 MG tablet Take 1 tablet (5 mg) by mouth 2 times daily as needed for severe pain (7-10) This is a 90 day supply     potassium chloride ER (KLOR-CON M) 10 MEQ CR tablet Take 1 tablet (10 mEq) by mouth daily (Patient taking differently: Take 10 mEq by mouth every morning)     psyllium (METAMUCIL/KONSYL) 58.6 % powder Take by mouth daily     sucralfate (CARAFATE) 1 GM tablet Take 1 tablet (1 g) by mouth 2 times daily as needed     torsemide (DEMADEX) 20 MG tablet Take 1 tablet (20 mg) by mouth 2 times daily     VIACTIV OR      Current Facility-Administered Medications   Medication     ropivacaine (NAROPIN) injection 3 mL     ropivacaine (NAROPIN) injection 3 mL     triamcinolone (KENALOG-40) injection 40 mg     triamcinolone (KENALOG-40) injection 40 mg      Vitals:  There were no vitals taken for this visit.  GENERAL: Healthy, alert and no distress  EYES: Eyes grossly normal  to inspection.  No discharge or erythema, or obvious scleral/conjunctival abnormalities.  RESP: No audible wheeze, cough, or visible cyanosis.  No visible retractions or increased work of breathing.    SKIN: Visible skin clear. No significant rash, abnormal pigmentation or lesions.  NEURO: Cranial nerves grossly intact.  Mentation and speech appropriate for age.  PSYCH: Mentation appears normal, affect normal/bright, judgement and insight intact, normal speech and appearance well-groomed.      LABS:   CMP  Recent Labs   Lab Test 02/07/23 2158 02/02/23  1325 01/12/23  1246 12/23/22  1255 01/21/22  1006 06/16/21  1435 02/08/21  1121 02/01/21  1148 01/25/21  1058    143 144 142   < > 143 142 141 142   POTASSIUM 3.8 3.9 3.5 2.8*   < > 3.8 3.5 3.7 4.0   CHLORIDE 106 105 108* 106   < > 107 107 109 112*   CO2 26 27 22 26   < > 24 30 27 29   ANIONGAP 11 11 14 10   < > 12 5 5 1*   * 112* 101* 103*   < > 80 91 117* 96   BUN 61.9* 63.0* 69.0* 75.1*   < > 47* 28 23 21   CR 4.16* 4.22* 3.98* 4.23*   < > 2.73* 2.26* 2.12* 2.02*   GFRESTIMATED 14* 13* 14* 13*   < > 21* 26* 28* 30*   GFRESTBLACK  --   --   --   --   --  24* 31* 33* 35*   RUTH 7.7* 7.5* 6.9* 7.0*   < > 7.7* 8.0* 8.2* 8.4*    < > = values in this interval not displayed.     Recent Labs   Lab Test 02/08/21  1121 11/10/17  1401 06/11/15  1306   BILITOTAL 0.5 0.5 0.6   ALKPHOS 104 110 100   ALT 21 20 25   AST 14 13 14     CBC  Recent Labs   Lab Test 02/07/23 2158 02/02/23  1239 01/12/23  1246 12/23/22  1255 11/29/22  1229 11/10/22  1241 10/20/22  1236 10/10/22  1316   HGB 8.9* 9.3* 8.7* 8.9*   < > 8.9* 9.4* 10.0*   WBC 6.7  --   --   --   --  6.1 6.2 6.6   RBC 4.64  --   --   --   --  4.79 5.12 5.39   HCT 29.9* 30.8* 28.3* 30.0*   < > 29.4* 31.4* 33.0*   MCV 64*  --   --   --   --  61* 61* 61*   MCH 19.2*  --   --   --   --  18.6* 18.4* 18.6*   MCHC 29.8*  --   --   --   --  30.3* 29.9* 30.3*   RDW 18.6*  --   --   --   --  17.8* 17.3* 19.1*     --    --   --   --  181 185 255    < > = values in this interval not displayed.     URINE STUDIES  Recent Labs   Lab Test 02/07/23  2340 12/14/22  1424 03/24/22  1538 01/26/22  1303 06/16/21  1444 11/30/18  1045 09/28/15  1134   COLOR Yellow Yellow Yellow Yellow Yellow Yellow Yellow   APPEARANCE Clear Clear Clear Slightly Cloudy* Clear Clear Clear   URINEGLC Negative Negative 50* Negative Negative Negative Negative   URINEBILI Negative Negative Negative Negative Negative Negative Small  This is an unconfirmed screening test result. A positive result may be false.  *   URINEKETONE 5* Negative Negative Negative Negative Negative Trace*   SG 1.015 1.015 1.016 1.016 1.020 1.025 >1.030   UBLD Negative Trace* Negative Negative Negative Trace* Negative   URINEPH 6.5 5.5 5.0 5.0 5.5 6.0 5.5   PROTEIN 100* 30* 100* 100* 100* >=300* 100*   UROBILINOGEN  --  0.2  --   --  1.0 1.0 1.0   NITRITE Negative Negative Negative Negative Negative Negative Negative   LEUKEST Negative Negative Negative Negative Negative Negative Negative   RBCU 1 0-2 0 <1 O - 2 O - 2 O - 2  Urine was tested unconcentrated because <10 ml was received.     WBCU <1 0-5 1 3 0 - 5 0 - 5 O - 2  Urine was tested unconcentrated because <10 ml was received.       Recent Labs   Lab Test 03/24/22  1538 01/26/22  1303   UTPG 1.88* 0.80*     PTH  Recent Labs   Lab Test 12/23/22  1255 08/29/22  1329   PTHI 426* 356*     IRON STUDIES  Recent Labs   Lab Test 09/29/22  1241 07/28/22  1322 05/26/22  1257   IRON 48* 57 33*   * 144* 176*   IRONSAT 32 40 19   TESSIE [phone]       Yonas Madrigal PA-C    Visit length 14 minutes. An additional 20 minutes were spent on date of service in chart review, documentation, and other activities as noted.      Again, thank you for allowing me to participate in the care of your patient.      Sincerely,    YONAS NOONAN PA-C

## 2023-02-23 NOTE — LETTER
Date:February 23, 2023      Patient was self referred, no letter generated. Do not send.        [facility] Health Information

## 2023-02-23 NOTE — PATIENT INSTRUCTIONS
No medication changes today.   Check blood pressure and weight daily, keep log.   Limit fluids to ~ 1.5 liters per day, continue low sodium diet.   Avoid ibuprofen/aleve.   Follow up as planned on 3/13. Call sooner with any questions or concerns.

## 2023-02-24 NOTE — TELEPHONE ENCOUNTER
"Called patient to review which medications he can take prior to surgery next week. Reviewed recommendations outlined in his PAC appt on 2/9:    \"Which medicines can I take?  Hold Aspirin for 7 days before surgery.     Hold Multivitamins for 7 days before surgery.    Hold Supplements for 7 days before surgery. (Elderberry)  Hold Ibuprofen (Advil, Motrin) for 1 day before surgery--unless otherwise directed by surgeon.  Hold Naproxen (Aleve) for 4 days before surgery.     -  DO NOT take these medications the day of surgery:  Potassium Chloride ER (KLOR), Calcitriol (Rocltrol), Folic Acid, Vitamin B12, Vitamin B-6, Viactiv with Vitamin D, Vitamin D3, Zinc, Lactulose,     -  PLEASE TAKE these medications the day of surgery:  Torsemide (Demadex), Flonase nasal spray,   Oxycodone (Roxicodone) if needed\"    Patient concerned about taking Torsemide and then needing to urinate while driving 50 minutes to the hospital for surgery. Advised bringing the medication with him to the hospital and taking it once he arrives.    Gave patient direct number to dialysis access nurses and encouraged him to reach out if he has any further questions.    Francie Hicks RN  Dialysis Access Care Coordinator  Phone: [phone]  Pool: Dialysis Access Nurse ([id number])  "

## 2023-02-24 NOTE — TELEPHONE ENCOUNTER
[facility] called regarding a tour and education of PD. [facility] nurse is reaching out to patient to schedule a tour and to provide education.  ASTRID Kahn

## 2023-02-28 NOTE — PROGRESS NOTES
Infusion Nursing Note:  Mann Escobar presents today for Aranesp.    Patient seen by provider today: No   present during visit today: Not Applicable.    Note: N/A.    Intravenous Access:  No Intravenous access at this visit  Treatment Conditions:  Results reviewed, labs MET treatment parameters, ok to proceed with treatment.      Post Infusion Assessment:  Patient tolerated injection without incident.       Discharge Plan:   Patient discharged in stable condition accompanied by: self.  Departure Mode: Ambulatory.      Eri Moran RN

## 2023-02-28 NOTE — PROGRESS NOTES
[facility] admitting information sent to [facility] admissions for PD dialysis placement. Patient has PD catheter placement 3/3/23. First dressing change at [facility] dialysis unit scheduled for 3/13/23.   ASTRID Kahn

## 2023-03-02 NOTE — TELEPHONE ENCOUNTER
Return call made to [facility] admissions regarding pending records. Per admission awaiting TB Gold, Heb lab result. [facility] admissions informed lab results were pending and will be sent via fax on Monday 3/6/23. [facility] admissions noted it in patient's chart and is awaiting results.  ASTRID Kahn

## 2023-03-02 NOTE — TELEPHONE ENCOUNTER
[facility] Call Center    Phone Message    May a detailed message be left on voicemail: yes     Reason for Call: [facility] is calling to speak with [facility]. States they are still pending records for patient. Please reach out. Thank you    Action Taken: Message routed to:  [facility] NEPH    Travel Screening: Not Applicable

## 2023-03-03 NOTE — ANESTHESIA POSTPROCEDURE EVALUATION
Patient: Mann Escobar    Procedure: Procedure(s):  INSERTION, CATHETER, DIALYSIS, PERITONEAL, LAPAROSCOPIC with left sided exit       Anesthesia Type:  General    Note:  Disposition: Outpatient   Postop Pain Control: Uneventful            Sign Out: Well controlled pain   PONV: No   Neuro/Psych: Uneventful            Sign Out: Acceptable/Baseline neuro status   Airway/Respiratory: Uneventful            Sign Out: Acceptable/Baseline resp. status   CV/Hemodynamics: Uneventful            Sign Out: Acceptable CV status; No obvious hypovolemia; No obvious fluid overload   Other NRE: NONE   DID A NON-ROUTINE EVENT OCCUR?            Last vitals:  Vitals Value Taken Time   /79 03/03/23 1445   Temp 36.1  C (97  F) 03/03/23 1415   Pulse 71 03/03/23 1454   Resp 32 03/03/23 1454   SpO2 91 % 03/03/23 1454   Vitals shown include unvalidated device data.    Electronically Signed By: Nolberto Freitas MD  March 3, 2023  2:55 PM

## 2023-03-03 NOTE — ANESTHESIA PROCEDURE NOTES
Airway       Patient location during procedure: OR       Procedure Start/Stop Times: 3/3/2023 12:08 PM  Staff -        CRNA: Bia Gupta APRN CRNA       Performed By: CRNA  Consent for Airway        Urgency: elective  Indications and Patient Condition       Indications for airway management: sid-procedural       Induction type:intravenous       Mask difficulty assessment: 1 - vent by mask    Final Airway Details       Final airway type: endotracheal airway       Successful airway: ETT - single  Endotracheal Airway Details        ETT size (mm): 7.5       Cuffed: yes       Successful intubation technique: direct laryngoscopy       DL Blade Type: Marin 2       Grade View of Cords: 1       Adjucts: stylet       Position: Right       Measured from: gums/teeth       Secured at (cm): 22       Bite block used: None    Post intubation assessment        Placement verified by: capnometry, equal breath sounds and chest rise        Number of attempts at approach: 1       Secured with: silk tape       Ease of procedure: easy       Dentition: Intact and Unchanged    Medication(s) Administered   Medication Administration Time: 3/3/2023 12:08 PM

## 2023-03-03 NOTE — OP NOTE
Transplant Surgery  Operative Note  Date: 3/3/2023  Preop Dx:  Ends Stage Renal Disease  Postop Dx: Same  Procedure: Laparoscopic Peritoneal dialysis catheter placement.   Surgeon: Dr. Owens   Fellow: Dr. Lopez   Anesthesia: General.  EBL: 20  Drains: Peritoneal dialysis catheter placement  Specimen: None.  Complications: None.    Findings: Sigmoid colon adhered to the anterior part of the pelvis. No lysis of adhesion performed. Catheter placed deep in the pelvis good flow.     Indication: The patient has Ends Stage Renal Disease is in need of permanent dialysis access.  After discussing the risks and benefits of proceeding, the patient agreed to proceed with surgery and provided informed consent.      Procedure: The patient was brought to the operating room and placed supine on the operating table. After induction of general anesthesia, the abdomen was prepped and draped in the usual fashion. A time-out was performed to ensure the correct patient and procedure. We used the stencil from the kit to johnie the catheter course and exit site. Perioperative antibiotics were given. The peritoneal cavity was accessed with a Veress needle above the umbilicus. The abdomen was insufflated. Two 5mm ports were placed right upper and lower quadrants. The laparoscopic survey did reveal the sigmoid colon adhering to the anterior wall of the pelvis. There was a window on the right of the sigmoid colon deep in the pelvis.  An 8mm incision was made in the left paramedian space based on the stencil's map. Over the Veress needle the STEP expander sheath was loaded and under direct visualization, the Veress needle was walked caudad along the left posterior rectus sheath and steered to the right side of the abdomen. Then we entered the peritoneal cavity approximately 4 cm caudad to the skin incision.  We removed the Veress needle and dilated the sheath with the port, loaded the catheter on a stylet and inserted it through the  port down into the true pelvis to the right of the colon, taking care to maintain correct orientation of the catheter.  At this point, we removed the stylet. Care was taken to make sure that the distal cuff remained outside the peritoneal space.  The abdomen was desufflated. Then we did the extended portion of the catheter. This was tunneled in the subcutaneous tissue and exited the upper left quadrant. We connected the end of the catheter to a liter of saline and allowed it to run in. It ran in easily. We placed the bag on the floor and by gravity drainage the fluid drained easily for a near complete evacuation of non-bloody saline.     Ports were removed.  The skin incisions were closed with Monocryl and Dermabond and the sterile extension set and minicap was placed on the end of the catheter.  A sterile occlusive dressing was applied over the catheter exit site.  All needle and sponge counts were correct x 2.  The patient was awakened from anesthesia and transported to the recovery room, having tolerated the procedure well.  There were no apparent complications.   Dr. Owens was present for the critical portions of the procedure.

## 2023-03-03 NOTE — DISCHARGE INSTRUCTIONS
[facility]  Same-Day Surgery   Adult Discharge Orders & Instructions     For 24 hours after surgery    Get plenty of rest.  A responsible adult must stay with you for at least 24 hours after you leave the hospital.   Do not drive or use heavy equipment.  If you have weakness or tingling, don't drive or use heavy equipment until this feeling goes away.  Do not drink alcohol.  Avoid strenuous or risky activities.  Ask for help when climbing stairs.   You may feel lightheaded.  IF so, sit for a few minutes before standing.  Have someone help you get up.   If you have nausea (feel sick to your stomach): Drink only clear liquids such as apple juice, ginger ale, broth or 7-Up.  Rest may also help.  Be sure to drink enough fluids.  Move to a regular diet as you feel able.  You may have a slight fever. Call the doctor if your fever is over 100 F (37.7 C) (taken under the tongue) or lasts longer than 24 hours.  You may have a dry mouth, a sore throat, muscle aches or trouble sleeping.  These should go away after 24 hours.  Do not make important or legal decisions.   Call your doctor for any of the followin.  Signs of infection (fever, growing tenderness at the surgery site, a large amount of drainage or bleeding, severe pain, foul-smelling drainage, redness, swelling).    2. It has been over 8 to 10 hours since surgery and you are still not able to urinate (pass water).    3.  Headache for over 24 hours.    4.  Numbness, tingling or weakness the day after surgery (if you had spinal anesthesia).  To contact a doctor, call Dr. Owens at the Transplant Clinic at [phone]  or:    '   [phone] and ask for the resident on call for   Transplant (answered 24 hours a day)  '   Emergency Department:    [facility]: [phone]       (TTY for hearing impaired: [phone])    [facility]: [phone]       (TTY for hearing impaired: [phone])

## 2023-03-03 NOTE — ANESTHESIA CARE TRANSFER NOTE
Patient: Mann Escobar    Procedure: Procedure(s):  INSERTION, CATHETER, DIALYSIS, PERITONEAL, LAPAROSCOPIC with left sided exit       Diagnosis: ESRD on dialysis (H) [N18.6, Z99.2]  Diagnosis Additional Information: No value filed.    Anesthesia Type:   General     Note:    Oropharynx: oropharynx clear of all foreign objects and spontaneously breathing  Level of Consciousness: drowsy  Oxygen Supplementation: face mask  Level of Supplemental Oxygen (L/min / FiO2): 3  Independent Airway: airway patency satisfactory and stable  Dentition: dentition unchanged  Vital Signs Stable: post-procedure vital signs reviewed and stable  Report to RN Given: handoff report given  Patient transferred to: PACU    Handoff Report: Identifed the Patient, Identified the Reponsible Provider, Reviewed the pertinent medical history, Discussed the surgical course, Reviewed Intra-OP anesthesia mangement and issues during anesthesia, Set expectations for post-procedure period and Allowed opportunity for questions and acknowledgement of understanding      Vitals:  Vitals Value Taken Time   /65 03/03/23 1407   Temp     Pulse 68 03/03/23 1411   Resp 0 03/03/23 1411   SpO2 100 % 03/03/23 1411   Vitals shown include unvalidated device data.    Electronically Signed By: GUNJAN Gilbert CRNA  March 3, 2023  2:13 PM

## 2023-03-03 NOTE — OR NURSING
Upon removing tape from IV tubing patient developed a skin tear on right forearm. Small mepilex applied. Discussed with anesthesia

## 2023-03-03 NOTE — ANESTHESIA PREPROCEDURE EVALUATION
Pre-Operative H & P     CC:  Preoperative exam to assess for increased cardiopulmonary risk while undergoing surgery and anesthesia.    Date of Encounter: 2/9/2023  Primary Care Physician:  Shayy Ramírez     Reason for visit:   No diagnosis found.    HPI  Mann Escobar is a 82 year old male who presents for pre-operative H & P in preparation for  Procedure Information     Date/Time: 3/3/23     Procedure: INSERTION, CATHETER, DIALYSIS, PERITONEAL, LAPAROSCOPIC with left sided exit, right arm arteriovenous fistula versus graft surgery    Anesthesia type: General    Pre-op diagnosis: End stage renal disease    Location: [facility]    Providers: Dr. Owens          Mr. Escobar is an 82 year old male with PMH signficant for CKD, hypertension, gastric bypass , GI bleeding 2016, iron deficiency anemia and Thalassemia minor, HFpEF and edema/ lymphedema. He has history of multiple orthopedic surgeries. Due to worsening renal function, the above procedure is planned for initiation of dialysis.    He presented to ED 2/7/23 with severe constipation. Please see those notes for further details. CT did not show bowel obstruction.  He has started lactulose and states he had small BM this morning.    History is obtained from the patient and chart review    Hx of abnormal bleeding or anti-platelet use: denies      Past Medical History  Past Medical History:   Diagnosis Date     Arthritis of ankle, left 7/24/2020     Back pain     WITH BILATERAL LEG PAIN AND NUMBNESS OF BOTH FEET     Gastro-oesophageal reflux disease     REFLUX     Hypertension      Hypopotassemia      Internal hemorrhoids      Iron deficiency anaemia      Leg edema      Morbid obesity 9/12/2005     Morbid obesity (H)      Osteoarthrosis      Scoliosis      Thalassemia minor        Past Surgical History  Past Surgical History:   Procedure Laterality Date     CATARACT IOL, RT/LT  2008/     Cataract IOL RT/LT     COLONOSCOPY  2009/07    polyps removed repeat 5 yrs, tubular adenoma     COLONOSCOPY  9/29/2011    Procedure:COLONOSCOPY; Colonoscopy with hemorrhoid banding; Surgeon:JEREMY GARCIA; Location:[facility]     COLONOSCOPY N/A 12/19/2017    Procedure: COLONOSCOPY;  colonoscopy, gastroscopy;  Surgeon: Edmar Isaacs MD;  Location: [facility]     DECOMPRESSION LUMBAR MINIMALLY INVASIVE TWO LEVELS  5/2/2012    Procedure:DECOMPRESSION LUMBAR MINIMALLY INVASIVE TWO LEVELS; Surgeon:LOTTIE MITCHELL; [facility]     ESOPHAGOSCOPY, GASTROSCOPY, DUODENOSCOPY (EGD), COMBINED N/A 2/6/2018    Procedure: COMBINED ESOPHAGOSCOPY, GASTROSCOPY, DUODENOSCOPY (EGD);  gastroscopy;  Surgeon: Edmar Isaacs MD;  Location: [facility]     ESOPHAGOSCOPY, GASTROSCOPY, DUODENOSCOPY (EGD), COMBINED N/A 10/18/2021    Procedure: ESOPHAGOGASTRODUODENOSCOPY (EGD);  Surgeon: Anju Galeano MD;  Location: [facility]     FUSION SPINE POSTERIOR MINIMALLY INVASIVE ONE LEVEL  5/2/2012    Procedure:FUSION SPINE POSTERIOR MINIMALLY INVASIVE ONE LEVEL; Minimal Access Spinal Technology Transformaninal Lumbar Interbody Fusion L4-5, Decompression L3-5; Surgeon:LOTTIE MITCHELL; Location:[facility]     HC REMOVAL OF HYDROCELE,TUNICA,UNILAT  2006    bilateral     IR RENAL BIOPSY LEFT  6/13/2022     ORTHOPEDIC SURGERY  2000    Lf knee replacement     ORTHOPEDIC SURGERY  2002, 2010    Rt replacement     ORTHOPEDIC SURGERY  2005    Left ankle fused     RECONSTRUCT ANKLE Right 7/23/2020    Procedure: Ankle RECONSTRUCTIve Arthroplasty;  Surgeon: Luke Powers DPM;  Location: [facility]     SURGICAL HISTORY OF -       Cysto     SURGICAL HISTORY OF -   2002    Percutaneous kidney stone removal     SURGICAL HISTORY OF -       ureteral stent removal     SURGICAL HISTORY OF -   2005    bariatric surgery-Favian-en-Y     SURGICAL HISTORY OF -   2008    carpal tunnel surgery rt wrist       Prior to Admission Medications  No current outpatient medications on file.        Allergies  Allergies   Allergen Reactions     Nkda [No Known Drug Allergies]      Adhesive Tape Rash       Social History  Social History     Socioeconomic History     Marital status:      Spouse name: Not on file     Number of children: Not on file     Years of education: Not on file     Highest education level: Not on file   Occupational History     Not on file   Tobacco Use     Smoking status: Former     Packs/day: 0.50     Years: 7.00     Pack years: 3.50     Types: Cigarettes     Quit date: 1962     Years since quittin.2     Smokeless tobacco: Never   Vaping Use     Vaping Use: Never used   Substance and Sexual Activity     Alcohol use: Not Currently     Comment: none recent     Drug use: No     Sexual activity: Yes     Partners: Female   Other Topics Concern      Service Yes     Blood Transfusions No     Comment: ???     Caffeine Concern No     Occupational Exposure No     Hobby Hazards No     Sleep Concern No     Stress Concern No     Weight Concern No     Special Diet No     Back Care No     Exercise Not Asked     Bike Helmet Not Asked     Seat Belt Yes     Self-Exams No     Parent/sibling w/ CABG, MI or angioplasty before 65F 55M? No   Social History Narrative     Not on file     Social Determinants of Health     Financial Resource Strain: Not on file   Food Insecurity: Not on file   Transportation Needs: Not on file   Physical Activity: Not on file   Stress: Not on file   Social Connections: Not on file   Intimate Partner Violence: Not on file   Housing Stability: Not on file       Family History  Family History   Problem Relation Age of Onset     Heart Disease Mother         CHF     Cerebrovascular Disease Mother      Hypertension Mother      Cancer Mother         tumor in stomach     Cancer - colorectal Mother      Prostate Cancer Father      Cancer Father         bone     Diabetes Father      Breast Cancer Sister      Diabetes Brother      Obesity Brother      Cancer  Brother      Cancer - colorectal Brother      Diabetes Brother      Heart Disease Maternal Grandmother         CHF     Diabetes Paternal Grandmother      Cerebrovascular Disease Paternal Grandfather      Genitourinary Problems Son         Kidney stones     Anesthesia Reaction No family hx of      Venous thrombosis No family hx of        Review of Systems  The complete review of systems is negative other than noted in the HPI or here.   Anesthesia Evaluation   Pt has had prior anesthetic.     No history of anesthetic complications       ROS/MED HX  ENT/Pulmonary:     (+) tobacco use, Past use,  (-) asthma, COPD, sleep apnea and recent URI   Neurologic:  - neg neurologic ROS  (-) no seizures and no CVA   Cardiovascular: Comment: Normal BP at home, 120/70 and 119/74 recently      METS/Exercise Tolerance:  Comment: <4   Hematologic: Comments: Thalassemia minor    (+) anemia, history of blood transfusion, no previous transfusion reaction, Known PRBC Anitbodies:No  (-) history of blood clots   Musculoskeletal:       GI/Hepatic: Comment: Recent ED visit for constipation, resolving slowly on lactulose    H/o bleeding ulcer 2016    (+) GERD,     Renal/Genitourinary:     (+) renal disease, type: ESRD, Pt requires dialysis,     Endo:    (-) Type I DM, Type II DM and thyroid disease   Psychiatric/Substance Use:  - neg psychiatric ROS     Infectious Disease:  - neg infectious disease ROS     Malignancy:  - neg malignancy ROS     Other:  - neg other ROS    (+) , H/O Chronic Pain,        Virtual visit -  No vitals were obtained    Physical Exam  Constitutional: Awake, alert, cooperative, no apparent distress, and appears stated age.  HENT: Normocephalic  Respiratory: non labored breathing   Neurologic: Awake, alert, oriented to name, place and time.   Neuropsychiatric: Calm, cooperative. Normal affect.      Prior Labs/Diagnostic Studies   All labs and imaging personally reviewed     Component      Latest Ref Rng & Units 2/7/2023    Sodium      136 - 145 mmol/L 143   Potassium      3.4 - 5.3 mmol/L 3.8   Chloride      98 - 107 mmol/L 106   Carbon Dioxide (CO2)      22 - 29 mmol/L 26   Anion Gap      7 - 15 mmol/L 11   Urea Nitrogen      8.0 - 23.0 mg/dL 61.9 (H)   Creatinine      0.67 - 1.17 mg/dL 4.16 (H)   Calcium      8.8 - 10.2 mg/dL 7.7 (L)   Glucose      70 - 99 mg/dL 101 (H)   GFR Estimate      >60 mL/min/1.73m2 14 (L)     Component      Latest Ref Rng & Units 2/7/2023   WBC      4.0 - 11.0 10e3/uL 6.7   RBC Count      4.40 - 5.90 10e6/uL 4.64   Hemoglobin      13.3 - 17.7 g/dL 8.9 (L)   Hematocrit      40.0 - 53.0 % 29.9 (L)   MCV      78 - 100 fL 64 (L)   MCH      26.5 - 33.0 pg 19.2 (L)   MCHC      31.5 - 36.5 g/dL 29.8 (L)   RDW      10.0 - 15.0 % 18.6 (H)   Platelet Count      150 - 450 10e3/uL 326   % Neutrophils      % 79   % Lymphocytes      % 12   % Monocytes      % 7   % Eosinophils      % 1   % Basophils      % 1   % Immature Granulocytes      % 0   NRBCs per 100 WBC      <1 /100 0   Absolute Neutrophils      1.6 - 8.3 10e3/uL 5.4   Absolute Lymphocytes      0.8 - 5.3 10e3/uL 0.8   Absolute Monocytes      0.0 - 1.3 10e3/uL 0.5   Absolute Eosinophils      0.0 - 0.7 10e3/uL 0.1   Absolute Basophils      0.0 - 0.2 10e3/uL 0.1   Absolute Immature Granulocytes      <=0.4 10e3/uL 0.0   Absolute NRBCs      10e3/uL 0.0       EKG/ stress test - if available please see in ROS above   Echo result w/o MOPS: Interpretation Summary Left ventricular size, global systolic function, and wall motion are normal,estimated LVEF 55%.Grade II or moderate diastolic dysfunction.Right ventricular global function is normal.Trivial to small pericardial effusion present. There are no echocardiographicindications of cardiac tamponade.Dilation of the inferior vena cava is present with abnormal respiratoryvariation in diameter.Mild aortic root dilatation. Max diameter of the visualized portion 4.2 cm.The ascending aorta is Mildly dilated. Max diameter of  the visualized portion4.1 cm. This study was compared to a TTE from 7/2/2020. Global LV systolic function ismildly worse, though still within normal range. There is evidence of diastolicdysfunction on this present study. There is now a trivial to small pericardialeffusion. IVC is now dilated consistent with hypervolemia.      The patient's records and results personally reviewed by this provider.     Outside records reviewed from: Care Everywhere      Assessment      Mann Escobar is a 82 year old male seen as a PAC referral for risk assessment and optimization for anesthesia.    Plan/Recommendations  Pt will be optimized for the proposed procedure.  See below for details on the assessment, risk, and preoperative recommendations    NEUROLOGY  - No history of TIA, CVA or seizure    -Post Op delirium risk factors:  Age and High co-morbid index    ENT  - No current airway concerns.  Will need to be reassessed day of surgery.  Mallampati: Unable to assess  TM: Unable to assess    CARDIAC  - Bps well controlled at home.  No antihypertensives  - denies chest pain, SOB, CHEEK although is quite sendentary.  - echo 1/21/21 with EF 55% and grade II diastolic dysfunction. Aortic root 4.2cm  - torsemide for edema    - elevated RCRI and METS <4. Will obtain dobutamine stress echo    ADDENDUM 2/14/23  Dobtuamine Stress Echo 2/14/23  Interpretation Summary  Dobutamine stress echocardiogram with no inducible ischemia.     Target heart rate achieved. Normal blood pressure response to dobutamine  No angina symptoms during stress test.  No ECG evidence of ischemia at rest or with dobutamine.  Normal segmental and global left ventricular systolic function at baseline,  LVEF 55-60%.  With peak dobutamine, LVEF increased further to 65-70% and LV cavity size  decreased appropriately.  No stress induced regional wall motion abnormalities.     Aortic valve sclerosis with mild aortic root dilatation noted on screening 2D  "(4.1cm)  and Doppler examination.    - METS (Metabolic Equivalents), <4.  Uses a walker or cane or scooter.  States he is very sedentary.  Balance issues and back pain.  Deconditioned.       RCRI-Moderate risk: Class 3  6.6% complication rate            Total Score: 2    RCRI: High Risk Surgery    RCRI: Elevated Creatinine        PULMONARY    AYAKA Low Risk            Total Score: 2    AYAKA: Over 50 ys old    AYAKA: Male      - Denies asthma or inhaler use  - Tobacco History      History   Smoking Status     Former     Packs/day: 0.50     Years: 7.00     Types: Cigarettes     Quit date: 1/1/1962   Smokeless Tobacco     Never       GI  - h/o GI bleed 2016.  Has been on Prilosec in the past but has run out and unable to fill.  He has appointment with primary care 2/15 and will address then.    PONV Medium Risk  Total Score: 2           1 AN PONV: Patient is not a current smoker    1 AN PONV: Intended Post Op Opioids        /RENAL  - Baseline Creatinine  >4    ENDOCRINE    - BMI: Estimated body mass index is 28.12 kg/m  as calculated from the following:    Height as of 2/7/23: 1.549 m (5' 1\").    Weight as of an earlier encounter on 3/3/23: 67.5 kg (148 lb 13 oz).  Overweight (BMI 25.0-29.9)  - No history of Diabetes Mellitus    HEME  VTE Low Risk 0.5%            Total Score: 3    VTE: Greater than 59 yrs old    VTE: Male      - No history of abnormal bleeding or antiplatelet use.  - Chronic anemia, also has thalassemia minor.  Receives Aranesp every 3 weeks  - hgb 8.9, 2/7/23    Recommend perioperative use of blood conservation techniques intraoperatively and close monitoring for postoperative bleeding.    A type and screen has not been done and deferred to the team day of surgery    MSK  - reports back pain and general deconditioning  - uses walker or cane      Different anesthesia methods/types have been discussed with the patient, but they are aware that the final plan will be decided by the assigned anesthesia " provider on the date of service.    Patient was discussed with Dr. Floyd    The patient is optimized for their procedure.     AVS with information on surgery time/arrival time, meds and NPO status given by nursing staff. No further diagnostic testing indicated.    Please refer to the physical examination documented by the anesthesiologist in the anesthesia record on the day of surgery.    Video-Visit Details    Type of service:  Video Visit    Provider received verbal consent for a Video Visit from the patient? Yes   Video Call Length: 30 minutes    Originating Location (pt. Location): Home    Distant Location (provider location):  Off-site  Mode of Communication:  Video Conference via Pint Please  On the day of service:     Prep time: 12 minutes  Visit time: 30 minutes  Documentation time: 15 minutes  ------------------------------------------  Total time: 57 minutes      Lisa Smalls PA-C  Preoperative Assessment Center  [facility]  [facility]  Phone: [phone]  Fax: [phone]    Physical Exam    Airway        Mallampati: III   TM distance: > 3 FB   Neck ROM: limited   Mouth opening: > 3 cm    Respiratory Devices and Support         Dental       (+) Modest Abnormalities - crowns, retainers, 1 or 2 missing teeth      Cardiovascular             Pulmonary           breath sounds clear to auscultation             Anesthesia Plan    ASA Status:  3   NPO Status:  NPO Appropriate    Anesthesia Type: General.     - Airway: ETT   Induction: Intravenous.   Maintenance: Balanced.   Techniques and Equipment:     - Lines/Monitors: 2nd IV     Consents    Anesthesia Plan(s) and associated risks, benefits, and realistic alternatives discussed. Questions answered and patient/representative(s) expressed understanding.    - Discussed:     - Discussed with:  Patient      - Extended Intubation/Ventilatory Support Discussed: Yes.      - Patient is DNR/DNI Status: No    Use of blood products  discussed: Yes.     - Discussed with: Patient.     - Consented: consented to blood products            Reason for refusal: other.     Postoperative Care    Pain management: Multi-modal analgesia.   PONV prophylaxis: Ondansetron (or other 5HT-3)     Comments:

## 2023-03-06 NOTE — TELEPHONE ENCOUNTER
Health Call Center    Phone Message    May a detailed message be left on voicemail: yes     Reason for Call: Other: . rupinder with [facility] is calling- she is looking to speak to Criss peters, she received a referral for Mann, but Rupinder is needing to know if pt is currently in the hospital, or is needing outpatient dialysis services- please call Rupinder peters as she does have a schedule ready to release to the pt, thank you    Action Taken: Message routed to:  Other: neph    Travel Screening: Not Applicable

## 2023-03-06 NOTE — TELEPHONE ENCOUNTER
Diane with [facility] contacted regarding a message to return her call at [phone]. Diane informed that patient's labs have been faxed over and has been discharge to home after PD catheter placement on 3/3/23.   ASTRID Kahn

## 2023-03-06 NOTE — PROGRESS NOTES
Hep B antigen, Hep B antibody and Quant Gold faxed to Patricio barajas at [phone].  ASTRID Kahn

## 2023-03-09 NOTE — TELEPHONE ENCOUNTER
[facility] Call Center    Phone Message    May a detailed message be left on voicemail: yes     Reason for Call: Symptoms or Concerns     If patient has red-flag symptoms, warm transfer to triage line    Current symptom or concern: 5lbs+ of fluid weight gain with lower extremity swelling since dialysis catheter placement 3/3.    Symptoms have been present for:  6 day(s)    Has patient previously been seen for this? No    By Stormy Madrigal    Date: 2/23    Are there any new or worsening symptoms? Yes: new      Action Taken: Message routed to:  [facility]    Travel Screening: Not Applicable

## 2023-03-09 NOTE — TELEPHONE ENCOUNTER
Lisseht with [facility] health is calling- states she is returning a call, please reach out to her, thank you

## 2023-03-09 NOTE — TELEPHONE ENCOUNTER
"Spoke to Mann. He stated that he was 145 lbs, then 150 in the hosptial and now at home he is 155 lbs. He notes swelling to his LE and feet. He has been more \"laid up\" since PD cath placement. Denies any abdominal distention. His appetite is not great though he is eating. He does have fatigue.     He checked a BP during our call. 144/76, 141/70 HR 70. He denies any light headedness or dizziness. Denies SOB. He is taking Torsemide 20 mg BID. He is not taking potassium at this time.     He is set up for a catheter flush on 3/13 at [facility]. Per chart notes.     Advised that I would review with Stormy Madrigal to see if his diuretic can be adjusted.   "

## 2023-03-10 NOTE — PROGRESS NOTES
Spoke with PD Nurse from [facility]. Pt scheduled to have PD flush 03/13/23 and will start training one week after initial flush. Nephrology team informed.    Neph Tracking Flowsheet Last Filled Values     CKD Education Status Complete    CKD Education Referral Date 06/21/22    CKD Education Completed Date 09/09/22    CKD Education Type Kidney Smart CKD Basics    Preferred Modality Peritoneal Dialysis    Final Modality Hemodialysis    Patient's Referral Dates Auto Populate Patient's Referral Dates    Home Care Referral 7/26/20    Journey Referral 6/24/22    Vein Mapping/US Order 12/22/22    Vein Mapping/US  02/13/23    Access Surgeon Referral Status  Referred    Dialysis Access Referral 10/11/22  Graciela    Access Surgical Consult 11/29/22  Plan: PD cath, with fistula as a back up plan - Dr. Owens    Diaylsis Access Type PD    Dialysis Access Surgery 03/03/23    Dialysis Access Surgeon Dr. Owens    Transplant Status  Not Referred  age    Initiation of Dialysis Outpatient  first PD flush: 03/13/23        Loren Sparks, RN, BSN  Nephrology RN Care Coordinator  [facility]

## 2023-03-10 NOTE — TELEPHONE ENCOUNTER
Stormy Madrigal, Anabelle Gu, RN  Caller: Unspecified (Yesterday, 11:03 AM)  Please have increase torsemide in the morning to 40 mg and continue 20 mg in the evening for now. Check on him Monday or Tuesday to see how it's going.     Laine Burrows     03/10/23 1430: Instructed pt to take torsemide 2 tablets (40mg) in the morning and 1 tablet (20mg) in the evening by mouth. Pt verbalized understanding and requested refill. Refill sent to preferred pharmacy. Confirmed PD flush appt at [facility] on 03/13/23. Will update nephrology team and have RNCC reach out to pt for follow up on Monday 03/13/23 or Tuesday 03/14/23.    Loren Sparks, RN, BSN  Nephrology RN Care Coordinator  [facility]

## 2023-03-13 NOTE — TELEPHONE ENCOUNTER
Spoke to patient after medication increase made last Friday, 3/10/23. He stated that he did not notice any improvement in edema after increase is diuretic. He is up 157 lbs.    He had a successful dressing change and flush at the dialysis center today. He met with Dr. Alicea. He states that she provided him with orders to increase his Torsemide for the next 3 days. He plans to take 60 mg in the AM and 20 mg in the afternoon tomorrow, 60 mg/20 mg the following day and 60 mg/20 mg. He got home late today (after 3) therefore he took 20 mg this afternoon. Flomax was also prescribed.    He is set up for PD training next Monday, 3/20.    Advised that the clinic will touch base with him again Thursday of this week to assess his progress.

## 2023-03-15 NOTE — PROGRESS NOTES
Called [facility] and spoke with PD RN s/p dressing change on flush on 3/13/23.  Pt had PD catheter placed by Dr. Owens on 3/3/23.    Neph Tracking Flowsheet Last Filled Values     CKD Education Status Complete    CKD Education Referral Date 06/21/22    CKD Education Completed Date 09/09/22    CKD Education Type Kidney Smart CKD Basics    Preferred Modality Peritoneal Dialysis    Final Modality Hemodialysis    Patient's Referral Dates Auto Populate Patient's Referral Dates    Home Care Referral 7/26/20    Journey Referral 6/24/22    Vein Mapping/US Order 12/22/22    Vein Mapping/US  02/13/23    Access Surgeon Referral Status  Referred    Dialysis Access Referral 10/11/22  Graciela    Access Surgical Consult 11/29/22  Plan: PD cath, with fistula as a back up plan - Dr. Owens    Diaylsis Access Type PD    Dialysis Access Surgery 03/03/23    Dialysis Access Surgeon Dr. Owens    Dialysis Access Maturation 03/20/23    Transplant Status  Not Referred  age    Initiation of Dialysis Outpatient  first PD flush: 03/13/23        Dialysis History      Start End Type Center Comments    3/20/2023  Peritoneal [address] (ESRD) PD dressing change and flush 3/13/23              Dressing change and flush went well, plan to start PD training on Monday.  Per PD RN, exit site is healing well with no ssx of infection.  No indication for follow up with surgeon.    Called pt to cancel follow up appointment with Dr. Owens scheduled for 3/21/23.  Pt reports everything went well with PD RN on Monday and he is scheduled to start PD training on 3/20/23.  Pt agrees with cancelling follow up appointment.    Pt had Dialysis Access Care Coordinator phone number for any questions or concerns in the future ([phone]).    Will remove from care team.    IGOR OTERO, RN on 3/15/2023 at 1:35 PM  Dialysis Access Care Coordinator  Phone: [phone]  Pool: P_Dialysis_Access_Nurse

## 2023-03-15 NOTE — TELEPHONE ENCOUNTER
Patient requesting a tiering exception to pay a lower price for his oxycodone co-pay. BC/BS needs to know what meds he has tried and failed in order to get this tier. This information was faxed from BC/BS as well on 03/14/23 at 7:30 PM.   Jaclyn COLBERT RN

## 2023-03-16 NOTE — TELEPHONE ENCOUNTER
Spoke with Girish from [facility] [place] wanting to review previous medications tried and failed in the past for pain management as they need to prior authorize his oxycodone.    Reviewed past and current medication regimen, all questions answered.    Julie Behrendt RN

## 2023-03-16 NOTE — TELEPHONE ENCOUNTER
Patient contacted regarding his report of excessive fluid gain. Patient spoke with Dr Alicea 3/10 regarding fluid gain and made changes to medication. Flomax 0.4 mg daily started and torsemide increased to 40 mg in the a.m. and 20 in the afternoon.   Patient reports over the last 3 days his weight has decreased from 157 lbs to 155 lbs. This morning 3/16/23, patient reports weight at 153 lbs. Patient states swelling is going down and he is feeling better. Routing to Dr Alicea and Rohan Kahn RN

## 2023-03-29 NOTE — TELEPHONE ENCOUNTER
Discussed patient's PD catheter issues and x-ray results with Dr. Owens. PD catheter is out of position. PD nurse reported patient is taking many bowel meds and is having many soft stools, to the point where he is having accidents. Per Dr. Owens, patient needs a PD catheter revision surgery urgently. Request sent to schedule surgery urgently/ASAP.    Called patient and notified him that he will need PD catheter revision surgery, hopefully this week. Patient verbalized understanding. Will follow up tomorrow.    Francie Hicks RN  Dialysis Access Care Coordinator  Phone: [phone]  Pool: Dialysis Access Nurse ([id number])

## 2023-03-29 NOTE — TELEPHONE ENCOUNTER
PD nurse reports patient is almost done with training but has started having difficulty with draining this week. Throughout training, patient has not been draining 100% of fill volume but was a negligible amount. Yesterday and the day before, the PD nurse could easily push fluid in but could not drain out. Patient reported abdominal cramping when attempting to drain. Patient reported having lots of soft bowel movements.    Dr. Alicea ordered an abdominal x-ray to assess position of PD catheter. Patient had this completed today. Will follow up with Dr. Owens for recommendations.    Francie Hicks, RN  Dialysis Access Care Coordinator  Phone: [phone]  Pool: Dialysis Access Nurse ([id number])

## 2023-03-30 NOTE — TELEPHONE ENCOUNTER
"OR time booked for tomorrow at 3:45pm for patient's PD catheter revision. Called patient and notified him of surgery and to arrive to the hospital by 1:45pm. Advised him to be NPO tomorrow. Patient is not on any blood thinners.    Discussed what surgery would entail and what the surgeon will be doing to correct the catheter position. Advised that the tunnel and exit site will remain in the same place, but the catheter will be repositioned \"on the inside\". Patient verbalized understanding.    Francie Hicks RN  Dialysis Access Care Coordinator  Phone: [phone]  Pool: Dialysis Access Nurse ([id number])  "

## 2023-03-30 NOTE — TELEPHONE ENCOUNTER
OR time moved to 3:15pm tomorrow, however, OR may have an opening tomorrow morning if there are no urgent cases. OR would call patient early in the morning if they have an opening.    Called patient to discuss this plan. He will expect a phone call around 7:00 am tomorrow and be ready to drive to the hospital. Patient lives about 1 hour away. If he does not get a call, he will plan to arrive to the hospital by 1:00pm as previously planned.  Reinforced that patient should be NPO after midnight tonight.    Francie Hicks RN  Dialysis Access Care Coordinator  Phone: [phone]  Pool: Dialysis Access Nurse ([id number])

## 2023-03-31 NOTE — ANESTHESIA POSTPROCEDURE EVALUATION
Patient: Mann Escobar    Procedure: Procedure(s):  Laparoscopic Peritoneal Dialysis catheter replacement       Anesthesia Type:  General    Note:  Disposition: Outpatient   Postop Pain Control: Uneventful            Sign Out: Well controlled pain   PONV: No   Neuro/Psych: Uneventful            Sign Out: Acceptable/Baseline neuro status   Airway/Respiratory: Uneventful            Sign Out: Acceptable/Baseline resp. status   CV/Hemodynamics: Uneventful            Sign Out: Acceptable CV status; No obvious hypovolemia; No obvious fluid overload   Other NRE: NONE   DID A NON-ROUTINE EVENT OCCUR? No           Last vitals:  Vitals Value Taken Time   /65 03/31/23 1310   Temp 36.9  C (98.4  F) 03/31/23 1230   Pulse 82 03/31/23 1316   Resp 18 03/31/23 1245   SpO2 95 % 03/31/23 1316   Vitals shown include unvalidated device data.    Electronically Signed By: Gurpreet Fajardo MD  March 31, 2023  1:17 PM

## 2023-03-31 NOTE — BRIEF OP NOTE
[facility]    Brief Operative Note    Pre-operative diagnosis: ESRD on dialysis (H) [N18.6, Z99.2]  Complication of dialysis access insertion [T82.9XXA]  Post-operative diagnosis Same as pre-operative diagnosis    Procedure: Procedure(s):  Laparoscopic Peritoneal Dialysis catheter revision  Surgeon: Surgeon(s) and Role:     * Padma Owens MD - Primary     * Michael Lopez MD - Fellow - Assisting  Anesthesia: General   Estimated Blood Loss: 5 mL from 3/31/2023 10:24 AM to 3/31/2023 12:26 PM      Drains: None  Specimens: * No specimens in log *  Findings:   previous catheter with internal cuff below the peritoneal with omental adhesion; previous catheter was removed; a new catheter without extension was placed in the pelvis .  Complications: None.  Implants:   Implant Name Type Inv. Item Serial No.  Lot No. LRB No. Used Action   CATH DIALYSIS PERITONEAL FLEX-NECK CLASSIC 52CM CF-5260 - [id number] Catheter CATH DIALYSIS PERITONEAL FLEX-NECK CLASSIC 52CM CF-5260  [employer] [id number] N/A 1 Implanted   Flex-Neck ExxTended Adult Peritoneal Dialysis Catheter 3 Cuffs    [employer] [id number] Left 1 Explanted

## 2023-03-31 NOTE — H&P
[facility] History and Physical    Mann Escobar MRN# [id number]   Age: 82 year old YOB: 1940     Date of Admission:  3/31/2023    Primary care provider: No Ref-Primary, Physician          Assessment and Plan:   Assessment:  82 years old with ESRD with PD. His PD is dysfunctional.     Plan:  - He is here for PD catheter revision laparoscopically        Chief Complaint:   Mr. Escobar is being seen today for placement of peritoneal dialysis access due to Chronic renal failure from nephrolithiasis.  He is right handed.   He got a PD catheter placed 3/3. It has some dysfunction.        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/2020     Back pain     WITH BILATERAL LEG PAIN AND NUMBNESS OF BOTH FEET     Gastro-oesophageal reflux disease     REFLUX     Hypertension      Hypopotassemia      Internal hemorrhoids      Iron deficiency anaemia      Leg edema      Morbid obesity 9/12/2005     Morbid obesity (H)      Osteoarthrosis      Scoliosis      Thalassemia minor             Past Surgical History:     Past Surgical History:   Procedure Laterality Date     CATARACT IOL, RT/LT  2008/    Cataract IOL RT/LT     COLONOSCOPY  2009/07    polyps removed repeat 5 yrs, tubular adenoma     COLONOSCOPY  9/29/2011    Procedure:COLONOSCOPY; Colonoscopy with hemorrhoid banding; Surgeon:JEREMY GARCIA; Location:[facility]     COLONOSCOPY N/A 12/19/2017    Procedure: COLONOSCOPY;  colonoscopy, gastroscopy;  Surgeon: Edmar Isaacs MD;  Location: [facility]     DECOMPRESSION LUMBAR MINIMALLY INVASIVE TWO LEVELS  5/2/2012    Procedure:DECOMPRESSION LUMBAR MINIMALLY INVASIVE TWO LEVELS; Surgeon:LOTTIE MITCHELL; Location:[facility]     ESOPHAGOSCOPY, GASTROSCOPY, DUODENOSCOPY (EGD), COMBINED N/A 2/6/2018    Procedure: COMBINED ESOPHAGOSCOPY, GASTROSCOPY, DUODENOSCOPY (EGD);  gastroscopy;  Surgeon: Edmar Isaacs MD;  Location: [facility]      ESOPHAGOSCOPY, GASTROSCOPY, DUODENOSCOPY (EGD), COMBINED N/A 10/18/2021    Procedure: ESOPHAGOGASTRODUODENOSCOPY (EGD);  Surgeon: Anju Galeano MD;  Location: [facility]     FUSION SPINE POSTERIOR MINIMALLY INVASIVE ONE LEVEL  5/2/2012    Procedure:FUSION SPINE POSTERIOR MINIMALLY INVASIVE ONE LEVEL; Minimal Access Spinal Technology Transformaninal Lumbar Interbody Fusion L4-5, Decompression L3-5; Surgeon:LOTTIE MITCHELL; Location:[facility]     HC REMOVAL OF HYDROCELE,TUNICA,UNILAT  2006    bilateral     IR RENAL BIOPSY LEFT  6/13/2022     LAPAROSCOPIC INSERTION CATHETER PERITONEAL DIALYSIS N/A 3/3/2023    Procedure: INSERTION, CATHETER, DIALYSIS, PERITONEAL, LAPAROSCOPIC with left sided exit;  Surgeon: Padma Owens MD;  [facility]     ORTHOPEDIC SURGERY  2000    Lf knee replacement     ORTHOPEDIC SURGERY  2002, 2010    Rt replacement     ORTHOPEDIC SURGERY  2005    Left ankle fused     RECONSTRUCT ANKLE Right 7/23/2020    Procedure: Ankle RECONSTRUCTIve Arthroplasty;  Surgeon: Luke Powers DPM;  Location: [facility]     SURGICAL HISTORY OF -       Cysto     SURGICAL HISTORY OF -   2002    Percutaneous kidney stone removal     SURGICAL HISTORY OF -       ureteral stent removal     SURGICAL HISTORY OF -   2005    bariatric surgery-Favian-en-Y     SURGICAL HISTORY OF -   2008    carpal tunnel surgery rt wrist                Physical Exam:     Gen: sitting in bed comfortably   CV: regular rate   Pulm: normal respiratory effort   Abd: soft, non-distended, PD catheter in place    Michael Alisa Baez MD

## 2023-03-31 NOTE — OR NURSING
Traumatic insertion of espana catheter with blood noted in tubing. Dr Owens made aware in OR and assisted with flushing catheter. [place] catheter removed at end of procedure.

## 2023-03-31 NOTE — DISCHARGE INSTRUCTIONS
[facility]  Same-Day Surgery   Adult Discharge Orders & Instructions     For 24 hours after surgery    Get plenty of rest.  A responsible adult must stay with you for at least 24 hours after you leave the hospital.   Do not drive or use heavy equipment.  If you have weakness or tingling, don't drive or use heavy equipment until this feeling goes away.  Do not drink alcohol.  Avoid strenuous or risky activities.  Ask for help when climbing stairs.   You may feel lightheaded.  IF so, sit for a few minutes before standing.  Have someone help you get up.   If you have nausea (feel sick to your stomach): Drink only clear liquids such as apple juice, ginger ale, broth or 7-Up.  Rest may also help.  Be sure to drink enough fluids.  Move to a regular diet as you feel able.  You may have a slight fever. Call the doctor if your fever is over 100 F (37.7 C) (taken under the tongue) or lasts longer than 24 hours.  You may have a dry mouth, a sore throat, muscle aches or trouble sleeping.  These should go away after 24 hours.  Do not make important or legal decisions.   Call your doctor for any of the followin.  Signs of infection (fever, growing tenderness at the surgery site, a large amount of drainage or bleeding, severe pain, foul-smelling drainage, redness, swelling).    2. It has been over 8 to 10 hours since surgery and you are still not able to urinate (pass water).    3.  Headache for over 24 hours.      To contact a doctor, call Dr. Owens at the Transplant Clinic at [phone]  or:    '   [phone] and ask for the resident on call for Transplant Resident (answered 24 hours a day)  '   Emergency Department:    [facility]: [phone]       (TTY for hearing impaired: [phone])

## 2023-03-31 NOTE — PROGRESS NOTES
Pt s/p PD catheter replacement surgery with Dr. Owens today.  Plan was for PD catheter revision, however, the decision was made for a replacement after discovering the PD catheter cuff no longer sitting in the tunnel, but inside the peritoneal cavity and wrapped in omentum.  PD catheter coil was found to be out of the pelvic cavity and wrapped in small bowel.  PD catheter was replaced with a new tunnel and exit site.  Decision was made to NOT use an extended catheter in order to create a new tunnel with different exit site on the lower left abdomen.    Lap sites were sutured closed.  PD exit site dressing to remain sterile until healed, at least 2 weeks, with weekly dressing changes and flushing.  Pt ok for urgent start PD if deemed necessary by nephrology.  D/t pt age, will likely require longer healing time and therefore higher risk of leaking with urgent start PD.    Pt educated on importance of bowel regimen and avoiding constipation.  Pt to discharge home from PACU.    Called PD RNPhoebe, and gave hand off.  PD RN to arrange weekly flushing and sterile dressing changes until exit site is healed followed by PD training.    Will continue to follow as needed.    IGOR OTERO, RN on 3/31/2023 at 2:54 PM  Dialysis Access Care Coordinator  Phone: [phone]  Pool: P_Dialysis_Access_Nurse      Neph Tracking Flowsheet Last Filled Values     CKD Education Status Complete    CKD Education Referral Date 06/21/22    CKD Education Completed Date 09/09/22    CKD Education Type Kidney Smart CKD Basics    Preferred Modality Peritoneal Dialysis    Final Modality Peritoneal Dialysis    Patient's Referral Dates Auto Populate Patient's Referral Dates    Home Care Referral 7/26/20    Journey Referral 6/24/22    Vein Mapping/US Order 12/22/22    Vein Mapping/US  02/13/23    Access Surgeon Referral Status  Referred    Dialysis Access Referral 10/11/22  [facility]a    Access Surgical Consult 11/29/22  Plan: PD cath,  with fistula as a back up plan - Dr. Owens    Diaylsis Access Type PD    Dialysis Access Surgery 03/31/23   Initial PD cath placed 3/3/23; replaced during revision surgery d/t cuff being inside the peritoneal cavity    Dialysis Access Surgeon Dr. Owens    Dialysis Access Maturation 03/20/23    Transplant Status  Not Referred  age    Initiation of Dialysis Outpatient  first PD flush: 03/13/23        Dialysis History      Start End Type Center Comments    3/20/2023  Peritoneal [address] ([facility]RD) PD dressing change and flush 3/13/23

## 2023-03-31 NOTE — ANESTHESIA PROCEDURE NOTES
Airway       Patient location during procedure: OR       Procedure Start/Stop Times: 3/31/2023 10:36 AM  Staff -        CRNA: Gwen Silva APRN CRNA       Performed By: CRNA  Consent for Airway        Urgency: elective  Indications and Patient Condition       Indications for airway management: sid-procedural       Induction type:intravenous       Mask difficulty assessment: 1 - vent by mask    Final Airway Details       Final airway type: endotracheal airway       Successful airway: ETT - single  Endotracheal Airway Details        ETT size (mm): 8.0       Cuffed: yes       Successful intubation technique: direct laryngoscopy       DL Blade Type: Marin 2       Grade View of Cords: 1       Adjucts: stylet       Position: Right       Measured from: gums/teeth       Secured at (cm): 23       Bite block used: None    Post intubation assessment        Placement verified by: capnometry, equal breath sounds and chest rise        Number of attempts at approach: 1       Secured with: pink tape       Ease of procedure: easy       Dentition: Intact and Unchanged    Medication(s) Administered   Medication Administration Time: 3/31/2023 10:36 AM

## 2023-03-31 NOTE — OR NURSING
Pt arrived to PACU.  PACU Rn assumed care of pt @ 1230.    Pt arrived able to state name and deny pain or nausea at present time.  Lungs equal and clear bilat; tolerating face mask.  Abdominal incision approximated,dry and intact.  Dressing to PD cath site also c/d/i.   --  Additional assessment as per flowsheet.

## 2023-03-31 NOTE — ANESTHESIA PREPROCEDURE EVALUATION
Anesthesia Pre-Procedure Evaluation    Patient: Mann Escobar   MRN: [id number] : 1940        Procedure : Procedure(s):  Laparoscopic Peritoneal Dialysis catheter revision          Past Medical History:   Diagnosis Date     Arthritis of ankle, left 2020     Back pain     WITH BILATERAL LEG PAIN AND NUMBNESS OF BOTH FEET     Gastro-oesophageal reflux disease     REFLUX     Hypertension      Hypopotassemia      Internal hemorrhoids      Iron deficiency anaemia      Leg edema      Morbid obesity 2005     Morbid obesity (H)      Osteoarthrosis      Scoliosis      Thalassemia minor       Past Surgical History:   Procedure Laterality Date     CATARACT IOL, RT/LT  /    Cataract IOL RT/LT     COLONOSCOPY      polyps removed repeat 5 yrs, tubular adenoma     COLONOSCOPY  2011    Procedure:COLONOSCOPY; Colonoscopy with hemorrhoid banding; Surgeon:JEREMY GARCIA; Location:[facility]     COLONOSCOPY N/A 2017    Procedure: COLONOSCOPY;  colonoscopy, gastroscopy;  Surgeon: Edmar Isaacs MD;  Location: [facility]     DECOMPRESSION LUMBAR MINIMALLY INVASIVE TWO LEVELS  2012    Procedure:DECOMPRESSION LUMBAR MINIMALLY INVASIVE TWO LEVELS; Surgeon:LOTTIE MITCHELL; [facility]     ESOPHAGOSCOPY, GASTROSCOPY, DUODENOSCOPY (EGD), COMBINED N/A 2018    Procedure: COMBINED ESOPHAGOSCOPY, GASTROSCOPY, DUODENOSCOPY (EGD);  gastroscopy;  Surgeon: Edmar Isaacs MD;  Location: [facility]     ESOPHAGOSCOPY, GASTROSCOPY, DUODENOSCOPY (EGD), COMBINED N/A 10/18/2021    Procedure: ESOPHAGOGASTRODUODENOSCOPY (EGD);  Surgeon: Anju Galeano MD;  Location: [facility]     FUSION SPINE POSTERIOR MINIMALLY INVASIVE ONE LEVEL  2012    Procedure:FUSION SPINE POSTERIOR MINIMALLY INVASIVE ONE LEVEL; Minimal Access [employer] Transformaninal Lumbar Interbody Fusion L4-5, Decompression L3-5; Surgeon:LOTTIE MITCHELL; Location:[facility]     HC REMOVAL OF HYDROCELE,TUNICA,UNILAT       bilateral     IR RENAL BIOPSY LEFT  2022     LAPAROSCOPIC INSERTION CATHETER PERITONEAL DIALYSIS N/A 3/3/2023    Procedure: INSERTION, CATHETER, DIALYSIS, PERITONEAL, LAPAROSCOPIC with left sided exit;  Surgeon: Padma Owens MD;  Location: [facility]     ORTHOPEDIC SURGERY      Lf knee replacement     ORTHOPEDIC SURGERY  ,     Rt replacement     ORTHOPEDIC SURGERY      Left ankle fused     RECONSTRUCT ANKLE Right 2020    Procedure: Ankle RECONSTRUCTIve Arthroplasty;  Surgeon: Luke Powers DPM;  Location: [facility]     SURGICAL HISTORY OF -       Cysto     SURGICAL HISTORY OF -       Percutaneous kidney stone removal     SURGICAL HISTORY OF -       ureteral stent removal     SURGICAL HISTORY OF -       bariatric surgery-Favian-en-Y     SURGICAL HISTORY OF -       carpal tunnel surgery rt wrist      Allergies   Allergen Reactions     Nkda [No Known Drug Allergies]      Adhesive Tape Rash      Social History     Tobacco Use     Smoking status: Former     Packs/day: 0.50     Years: 7.00     Pack years: 3.50     Types: Cigarettes     Quit date: 1962     Years since quittin.2     Smokeless tobacco: Never   Substance Use Topics     Alcohol use: Not Currently     Comment: none recent      Wt Readings from Last 1 Encounters:   23 69.6 kg (153 lb 7 oz)        Anesthesia Evaluation            ROS/MED HX  ENT/Pulmonary:       Neurologic:     (+) peripheral neuropathy,     Cardiovascular:     (+) hypertension-----    METS/Exercise Tolerance:     Hematologic:       Musculoskeletal:       GI/Hepatic:     (+) GERD, Asymptomatic on medication,     Renal/Genitourinary:     (+) renal disease, type: ESRD, Pt requires dialysis,     Endo:       Psychiatric/Substance Use:       Infectious Disease:       Malignancy:       Other:            Physical Exam    Airway        Mallampati: II   TM distance: > 3 FB   Neck ROM: full   Mouth opening: > 3 cm    Respiratory Devices and  Support         Dental       (+) Minor Abnormalities - some fillings, tiny chips      Cardiovascular          Rhythm and rate: regular and normal     Pulmonary           breath sounds clear to auscultation           OUTSIDE LABS:  CBC:   Lab Results   Component Value Date    WBC 6.7 02/07/2023    WBC 6.1 11/10/2022    HGB 9.2 (L) 02/28/2023    HGB 8.9 (L) 02/07/2023    HCT 30.1 (L) 02/28/2023    HCT 29.9 (L) 02/07/2023     02/07/2023     11/10/2022     BMP:   Lab Results   Component Value Date     02/07/2023     02/02/2023    POTASSIUM 3.8 03/03/2023    POTASSIUM 3.8 02/07/2023    CHLORIDE 106 02/07/2023    CHLORIDE 105 02/02/2023    CO2 26 02/07/2023    CO2 27 02/02/2023    BUN 61.9 (H) 02/07/2023    BUN 63.0 (H) 02/02/2023    CR 4.16 (H) 02/07/2023    CR 4.22 (H) 02/02/2023     (H) 02/07/2023     (H) 02/02/2023     COAGS:   Lab Results   Component Value Date    INR 1.26 (H) 06/13/2022     POC:   Lab Results   Component Value Date    BGM 91 07/23/2020     HEPATIC:   Lab Results   Component Value Date    ALBUMIN 3.1 (L) 02/02/2023    PROTTOTAL 6.1 (L) 02/08/2021    ALT 21 02/08/2021    AST 14 02/08/2021    ALKPHOS 104 02/08/2021    BILITOTAL 0.5 02/08/2021     OTHER:   Lab Results   Component Value Date    A1C 5.2 10/25/2013    RUTH 7.7 (L) 02/07/2023    PHOS 4.4 02/02/2023    TSH 0.75 01/20/2021    CRP 5.8 02/02/2021    SED 29 (H) 02/02/2021       Anesthesia Plan    ASA Status:  3   NPO Status:  NPO Appropriate    Anesthesia Type: General.     - Airway: ETT   Induction: Intravenous, Propofol.   Maintenance: Balanced.        Consents    Anesthesia Plan(s) and associated risks, benefits, and realistic alternatives discussed. Questions answered and patient/representative(s) expressed understanding.    - Discussed:     - Discussed with:  Patient      - Extended Intubation/Ventilatory Support Discussed: No.      - Patient is DNR/DNI Status: No    Use of blood products discussed: No  .     Postoperative Care    Pain management: IV analgesics, Oral pain medications.   PONV prophylaxis: Ondansetron (or other 5HT-3)     Comments:           H&P reviewed: Unable to attach H&P to encounter due to EHR limitations. H&P Update: appropriate H&P reviewed, patient examined. No interval changes since H&P (within 30 days).         Fletcher Rodríguez MD

## 2023-03-31 NOTE — ANESTHESIA CARE TRANSFER NOTE
Patient: Mann Escobar    Procedure: Procedure(s):  Laparoscopic Peritoneal Dialysis catheter revision       Diagnosis: ESRD on dialysis (H) [N18.6, Z99.2]  Complication of dialysis access insertion [T82.9XXA]  Diagnosis Additional Information: No value filed.    Anesthesia Type:   General     Note:    Oropharynx: oropharynx clear of all foreign objects and spontaneously breathing  Level of Consciousness: awake  Oxygen Supplementation: face mask  Level of Supplemental Oxygen (L/min / FiO2): 6  Independent Airway: airway patency satisfactory and stable  Dentition: dentition unchanged  Vital Signs Stable: post-procedure vital signs reviewed and stable  Report to RN Given: handoff report given  Patient transferred to: PACU    Handoff Report: Identifed the Patient, Identified the Reponsible Provider, Reviewed the pertinent medical history, Discussed the surgical course, Reviewed Intra-OP anesthesia mangement and issues during anesthesia, Set expectations for post-procedure period and Allowed opportunity for questions and acknowledgement of understanding      Vitals:  Vitals Value Taken Time   /74    Temp     Pulse 81 03/31/23 1229   Resp     SpO2 100 % 03/31/23 1229   Vitals shown include unvalidated device data.    Electronically Signed By: GUNJAN Martin CRNA  March 31, 2023  12:30 PM

## 2023-03-31 NOTE — OR NURSING
Pt required O2 1L and encouraged to CDB and use IS. Resp status improved.   Pain is adequately controlled prior to discharge to home.

## 2023-04-03 NOTE — TELEPHONE ENCOUNTER
Pt has procedure with Dr Owens on Fri  She talked about getting an enema but not a fleets  Needs advise  No stools since Thurs last week  Also to get a CT of foot and they want to use a dye  What is he not to receive with kidney disease for contrasts

## 2023-04-04 NOTE — TELEPHONE ENCOUNTER
Discussed pts questions with SOT fellow, Dr. Lopez, who advised pt to do a tap water enema and miralax BID.  Pt should also be taking senna every day as prescribed from initial PD catheter placement.  If pt needs a refill, per Dr. Lopez, pt can purchase OTC or writer can send a refill.    Neph Tracking Flowsheet Last Filled Values     CKD Education Status Complete    CKD Education Referral Date 06/21/22    CKD Education Completed Date 09/09/22    CKD Education Type Kidney Smart CKD Basics    Preferred Modality Peritoneal Dialysis    Final Modality Peritoneal Dialysis    Patient's Referral Dates Auto Populate Patient's Referral Dates    Home Care Referral 7/26/20    Journey Referral 6/24/22    Vein Mapping/US Order 12/22/22    Vein Mapping/US  02/13/23    Access Surgeon Referral Status  Referred    Dialysis Access Referral 10/11/22  [facility]    Access Surgical Consult 11/29/22  Plan: PD cath, with fistula as a back up plan - Dr. Owens    Diaylsis Access Type PD    Dialysis Access Surgery 03/31/23   Initial PD cath placed 3/3/23; replaced during revision surgery d/t cuff being inside the peritoneal cavity    Dialysis Access Surgeon Dr. Owens    Dialysis Access Maturation --  initially mature 3/20/23, replaced 3/31/23 d/t malfunction.    Transplant Status  Not Referred  age    Initiation of Dialysis Outpatient  first PD flush: 03/13/23        Dialysis History      Start End Type Center Comments      Peritoneal [address] (ESRD) PD cath replaced 3/31/23; dressing change and flush scheduled for 4/10/23.  Initial PD dressing change and flush 3/13/23              Called pt to discuss.    Pt reports a lot of pain in the groin area.  Pt reports having BM yesterday.  Taking stool softeners, metamucil, enulose and prune juice.  Reports the BM was soft, almost loose.  Pt has been able to control the pain with tylenol for the most part, but did take a pain med this morning.  Pt also reporting  shoulder pain.    Per Dr. Lopez, instructed pt to do tap water enema (buy at the drug store) to get everything cleared out, and then resume with his normal bowel regimen.  Pt verbalized understanding and is in agreement.  Pt had writer on speaker phone and pt spouse verbalized understanding as well.    Discussed that the should pain sounds like it is from residual gas that was left in the peritoneal cavity from surgery, and will reabsorb by the body over the next few days.  Pt verbalized understanding.    Pt understands the importance of clearing the bowels and maintaining a regular bowel regimen for regular, soft bowel movements.    Pt has an appointment with the PD nurse on Monday, 4/10/23 for a dressing change and flush.  Pt reports he will also be seeing his new nephrologist at that appointment.  Encouraged pt to discuss contrast dye for his upcoming CT in May with the nephrologist.  Pt verbalized understanding.    Gave pt Dialysis Access Care Coordinator phone number to call with future questions or concerns.    Will continue to follow.    IGOR OTERO RN on 4/4/2023 at 10:39 AM  Dialysis Access Care Coordinator  Phone: [phone]  Pool: P_Dialysis_Access_Nurse

## 2023-04-06 NOTE — OP NOTE
Transplant Surgery  Operative Note  Date: 3/31/2023  Preop Dx:  Ends Stage Renal Disease  Postop Dx: Same  Procedure: Laparoscopic Peritoneal dialysis catheter replacement    Surgeon: Dr. Owens   Fellow: Dr. Lopez   Anesthesia: General.  EBL: 20  Drains: Peritoneal dialysis catheter placement  Specimen: None.  Complications: None.    Findings:   1. Previous catheter's internal cuff was in the abdomen. Previous catheter and the extension were removed. The new catheter placed in the same skin incision with a separate tunnel and exit site.    Catheter in good position with good flow.     Indication: The patient has Ends Stage Renal Disease is in need of permanent dialysis access.  After discussing the risks and benefits of proceeding, the patient agreed to proceed with surgery and provided informed consent.      Procedure: The patient was brought to the operating room and placed supine on the operating table. After induction of general anesthesia, the abdomen was prepped and draped in the usual fashion. A time-out was performed to ensure the correct patient and procedure. Perioperative antibiotics were given. The abdomen was insufflated with Veress needle above the umbilicus. Two 5mm ports were placed in the right upper and lower quadrants. The internal cuff was below the peritoneum and wrapped with omentum. This was lysed with electrocautery. A decision was made to remove the catheter and replace with a new one. The previous catheter and its extension were removed. The previous incision was used for the new catheter. Over the Veress needle the STEP expander sheath was loaded and under direct visualization, the Veress needle was walked caudad along the left posterior rectus sheath. Then we entered the peritoneal cavity approximately 4 cm caudad to the skin incision.  We removed the Veress needle and dilated the sheath with the port, loaded the catheter on a stylet and inserted it through the port down into  the true pelvis, taking care to maintain correct orientation of the catheter.  At this point, we removed the stylet. Care was taken to make sure that the distal cuff remained outside the peritoneal space.  The abdomen was desufflated. We connected the end of the catheter to a liter of saline and allowed it to run in. It ran in easily. We placed the bag on the floor and by gravity drainage the fluid drained easily for a near complete evacuation of non-bloody saline.     Ports were removed.  The skin incisions were closed with Monocryl and Dermabond and the sterile extension set and minicap was placed on the end of the catheter.  A sterile occlusive dressing was applied over the catheter exit site.  All needle and sponge counts were correct x 2.  The patient was awakened from anesthesia and transported to the recovery room, having tolerated the procedure well.  There were no apparent complications.   Dr. Owens was present for the critical portions of the procedure.    Physician Attestation   I was present for the key portions of the procedure and I was immediately available for the entire procedure between opening and closing.    Padma Owens MD, MD  Date of Service (when I saw the patient): 3/31/2023  The transplant fellow, Michael Lopez, was present and assisted with all aspects of the operation described above from opening to closing.  There was no suitable surgical resident available to assist in this procedure.

## 2023-04-12 NOTE — TELEPHONE ENCOUNTER
Pt called asking for refills for amlodipine.    He explains that he changed insurance so that he could stay with [facility].    He now has [facility].  Also, pharmacy change to [facility].    For amlodipine, pt says that he needs a new prescription sent to [facility], [phone].  Needs new because [facility] has never filled this for pt before.      
RN sees current amlodipine Rx sent by PCP to local pharmacy on 11/24/20 for a year worth of medication.  RN resent remaining refills to new preferred pharmacy, [facility].  Patient notified.    Tatum Garza RN  [facility]    
No

## 2023-04-14 NOTE — ED TRIAGE NOTES
"Pt reports worsening sob and chest pain when breathing that started today. Pt reports \"I think I'm full of fluid.\" Pt reports weight gain over the past few weeks. Pt reports he is taking lasix and does have some CHF. Pt reports he has not been voiding as much. Pt was suppose to start on peritoneal dialysis but had to have the tubing replaced because it was not in the right spot       Triage Assessment     Row Name 04/14/23 5114       Respiratory WDL    Respiratory WDL X;rhythm/pattern    Rhythm/Pattern, Respiratory dyspnea on exertion;shortness of breath       Cardiac WDL    Cardiac WDL X;chest pain  intermittent when breathing              "

## 2023-04-14 NOTE — ED PROVIDER NOTES
History     Chief Complaint   Patient presents with     Chest Pain     /80; HR 87 NSR; 98-99% on 2L. Has not had dialysis for several weeks, waiting for stomach stents to heal before starting home dialysis. NO IV. Patient refusing IV.     HPI  Mann Escobar is a 82 year old male who presents for evaluation with report of chest discomfort and shortness of breath.  Patient reports increased fluid retention that he has not been voiding well.  He was supposed to start peritoneal dialysis but has not had this set up yet.    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  Patient is also known to have chronic opiate dependence.    Patient's prescribed medications were reviewed.    On examination patient arrived with his wife Marisa at the bedside.  They live in [place].  He was concerned about increasing fluid retention with substernal chest pain and shortness of breath.  Patient reports that he had been set up for peritoneal dialysis until his catheter kinked.  He had this exchanged at [place] last week.  Patient reports he was in the process of reestablishing his peritoneal dialysis routine with in-home nursing who was supposed to help provide support on Tuesday- 4/18/23.  Patient reports that he has been compliant with his torsemide which he takes 60 mg in the morning and 40 mg in the afternoon.  He is also on calcitriol 0.25 mcg daily Flomax 0.4 mg daily Enid-Zach 1 tablet/day use oxycodone 5 mg twice a day.  He reports his urine volume is decreased and has had increasing lower extremity swelling.  He has some orthopnea.  Because of decreased urine output no active peritoneal dialysis for at least a week who presents for further care.  He reports no abdominal pain but  some bloating and has had no fever    Allergies:  Allergies   Allergen Reactions     Nkda [No Known Drug Allergies]      Adhesive Tape Rash       Problem List:    Patient Active Problem List    Diagnosis Date Noted     Anemia of chronic renal failure, stage 3b (H) 01/26/2022     Priority: High     HTN (hypertension) 10/08/2012     Priority: High     Chronic low back pain 10/08/2012     Priority: High     Had back surgery in 5/2012         Leg edema 08/18/2010     Priority: High     Continuous opioid dependence (H) 08/25/2022     Priority: Medium     Chronic heart failure with preserved ejection fraction (H) 01/21/2022     Priority: Medium     Marginal ulcer 12/30/2021     Priority: Medium     Retching 12/30/2021     Priority: Medium     History of Favian-en-Y gastric bypass 12/30/2021     Priority: Medium     Carpal tunnel syndrome 05/03/2021     Priority: Medium     Edema 05/03/2021     Priority: Medium     Nephrolithiasis 05/03/2021     Priority: Medium     Dec 10, 2003 Entered By: ANGELIA STEVENS Comment: s/p nephrolithostomy       Diastolic dysfunction 02/01/2021     Priority: Medium     Chronic kidney disease, stage 3 (H) 01/05/2021     Priority: Medium     Arthritis of ankle, right 07/24/2020     Priority: Medium     Chronic anemia 07/24/2020     Priority: Medium     S/P ankle joint replacement 07/23/2020     Priority: Medium     Chronic pain syndrome 02/06/2017     Priority: Medium     Patient is followed by GUNJAN Vinson CNP for ongoing prescription of pain medication.  All refills should only be approved by this provider, or covering partner.    Medication(s): oxycodone.   Maximum quantity per month: 60  Clinic visit frequency required: Q 3 months     Controlled substance agreement:  Encounter-Level CSA:     There are no encounter-level csa.        Pain Clinic evaluation in the past:     Last [facility] website verification: 6/16/2021           Pseudogout 09/26/2016     Priority: Medium     BPH (benign  prostatic hyperplasia) 06/11/2015     Priority: Medium     Hypertension      Priority: Medium     Abnormal antinuclear antibody titer 05/09/2014     Priority: Medium     Osteoarthritis 05/09/2014     Priority: Medium     Advanced directives, counseling/discussion 10/08/2012     Priority: Medium     Discussed advance care planning with patient; however, patient declined at this time. 10/8/2012          Benign non-nodular prostatic hyperplasia with lower urinary tract symptoms 10/08/2012     Priority: Medium     S/P knee replacement 10/08/2012     Priority: Medium     Both sides         S/P ankle fusion 10/08/2012     Priority: Medium     Left side         First degree AV block 04/25/2012     Priority: Medium     Family history of diabetes mellitus 04/25/2012     Priority: Medium     Scoliosis 04/25/2012     Priority: Medium     Hypopotassemia 04/25/2012     Priority: Medium     Internal hemorrhoids 08/23/2011     Priority: Medium     Iron deficiency anemia 08/23/2011     Priority: Medium     CARDIOVASCULAR SCREENING; LDL GOAL LESS THAN 130 10/31/2010     Priority: Medium     Family history of prostate cancer 09/02/2009     Priority: Medium     FATHER       Esophageal reflux 05/25/2006     Priority: Medium     Thalassemia minor      Priority: Medium     thalassemia minor (chronic low hgb)        HX NEPHROLITHIASIS [V13.01] 09/12/2005     Priority: Medium        Past Medical History:    Past Medical History:   Diagnosis Date     Arthritis of ankle, left 7/24/2020     Back pain      Gastro-oesophageal reflux disease      Hypertension      Hypopotassemia      Internal hemorrhoids      Iron deficiency anaemia      Leg edema      Morbid obesity 9/12/2005     Morbid obesity (H)      Osteoarthrosis      Scoliosis      Thalassemia minor        Past Surgical History:    Past Surgical History:   Procedure Laterality Date     CATARACT IOL, RT/LT  2008/    Cataract IOL RT/LT     COLONOSCOPY  2009/07    polyps removed repeat 5  yrs, tubular adenoma     COLONOSCOPY  9/29/2011    Procedure:COLONOSCOPY; Colonoscopy with hemorrhoid banding; Surgeon:JEREMY GARCIA; Location:[facility]     COLONOSCOPY N/A 12/19/2017    Procedure: COLONOSCOPY;  colonoscopy, gastroscopy;  Surgeon: Edmar Isaacs MD;  Location: [facility]     DECOMPRESSION LUMBAR MINIMALLY INVASIVE TWO LEVELS  5/2/2012    Procedure:DECOMPRESSION LUMBAR MINIMALLY INVASIVE TWO LEVELS; Surgeon:LOTTIE MITCHELL; [facility]     ESOPHAGOSCOPY, GASTROSCOPY, DUODENOSCOPY (EGD), COMBINED N/A 2/6/2018    Procedure: COMBINED ESOPHAGOSCOPY, GASTROSCOPY, DUODENOSCOPY (EGD);  gastroscopy;  Surgeon: Edmar Isaacs MD;  Location: [facility]     ESOPHAGOSCOPY, GASTROSCOPY, DUODENOSCOPY (EGD), COMBINED N/A 10/18/2021    Procedure: ESOPHAGOGASTRODUODENOSCOPY (EGD);  Surgeon: Anju Galeano MD;  Location: [facility]     FUSION SPINE POSTERIOR MINIMALLY INVASIVE ONE LEVEL  5/2/2012    Procedure:FUSION SPINE POSTERIOR MINIMALLY INVASIVE ONE LEVEL; Minimal Access Spinal Technology Transformaninal Lumbar Interbody Fusion L4-5, Decompression L3-5; Surgeon:LOTTIE MITCHELL; [facility]     HC REMOVAL OF HYDROCELE,TUNICA,UNILAT  2006    bilateral     IR RENAL BIOPSY LEFT  6/13/2022     LAPAROSCOPIC INSERTION CATHETER PERITONEAL DIALYSIS N/A 3/3/2023    Procedure: INSERTION, CATHETER, DIALYSIS, PERITONEAL, LAPAROSCOPIC with left sided exit;  Surgeon: Padma Owens MD;  [facility]     ORTHOPEDIC SURGERY  2000    Lf knee replacement     ORTHOPEDIC SURGERY  2002, 2010    Rt replacement     ORTHOPEDIC SURGERY  2005    Left ankle fused     RECONSTRUCT ANKLE Right 7/23/2020    Procedure: Ankle RECONSTRUCTIve Arthroplasty;  Surgeon: Luke Powers DPM;  [facility]: [facility]     REPOSITION, CATHETER, DIALYSIS, PERITONEAL, LAPAROSCOPIC N/A 3/31/2023    Procedure: Laparoscopic Peritoneal Dialysis catheter replacement;  Surgeon: Padma Owens MD;  [facility]     SURGICAL HISTORY OF -        Cysto     SURGICAL HISTORY OF -       Percutaneous kidney stone removal     SURGICAL HISTORY OF -       ureteral stent removal     SURGICAL HISTORY OF -       bariatric surgery-Favian-en-Y     SURGICAL HISTORY OF -       carpal tunnel surgery rt wrist       Family History:    Family History   Problem Relation Age of Onset     Heart Disease Mother         CHF     Cerebrovascular Disease Mother      Hypertension Mother      Cancer Mother         tumor in stomach     Cancer - colorectal Mother      Prostate Cancer Father      Cancer Father         bone     Diabetes Father      Breast Cancer Sister      Diabetes Brother      Obesity Brother      Cancer Brother      Cancer - colorectal Brother      Diabetes Brother      Heart Disease Maternal Grandmother         CHF     Diabetes Paternal Grandmother      Cerebrovascular Disease Paternal Grandfather      Genitourinary Problems Son         Kidney stones     Anesthesia Reaction No family hx of      Venous thrombosis No family hx of        Social History:  Marital Status:   [2]  Social History     Tobacco Use     Smoking status: Former     Packs/day: 0.50     Years: 7.00     Pack years: 3.50     Types: Cigarettes     Quit date: 1962     Years since quittin.3     Smokeless tobacco: Never   Vaping Use     Vaping status: Never Used   Substance Use Topics     Alcohol use: Not Currently     Comment: none recent     Drug use: No        Medications:    azithromycin (ZITHROMAX) 250 MG tablet  calcium acetate (PHOSLO) 667 MG CAPS capsule  calcium carbonate (TUMS) 500 MG chewable tablet  docusate sodium (COLACE) 100 MG capsule  MULTIVITAMIN OR  multivitamin RENAL (RENAVITE RX/NEPHROVITE) 1 MG tablet  omeprazole (PRILOSEC) 40 MG DR capsule  OVER-THE-COUNTER  oxyCODONE (ROXICODONE) 5 MG tablet  polyethylene glycol (MIRALAX) 17 GM/Dose powder  tamsulosin (FLOMAX) 0.4 MG capsule  torsemide (DEMADEX) 20 MG tablet  amoxicillin-clavulanate (AUGMENTIN) 875-125 MG  "tablet  glycerin (LAXATIVE) 1.2 g suppository  SENNA-docusate sodium (SENNA S) 8.6-50 MG tablet  sucralfate (CARAFATE) 1 GM tablet          Review of Systems   Constitutional:        Increased weight gain with lower extremity swelling abdominal bloating.  I have not had my peritoneal dialysis for about a week   HENT: Negative.    Respiratory: Positive for shortness of breath.    Cardiovascular: Negative.    Gastrointestinal: Negative.    Genitourinary: Negative.    Musculoskeletal: Negative.    Allergic/Immunologic: Negative.    Neurological: Negative.    Hematological: Negative.    Psychiatric/Behavioral: Negative.    All other systems reviewed and are negative.      Physical Exam   BP: (!) 150/81  Pulse: 88  Temp: 99  F (37.2  C)  Resp: 20  Height: 154.9 cm (5' 1\")  Weight: 72.1 kg (159 lb)  SpO2: 94 %      Physical Exam  Constitutional:       General: He is not in acute distress.     Appearance: He is not ill-appearing, toxic-appearing or diaphoretic.   Eyes:      Extraocular Movements: Extraocular movements intact.      Pupils: Pupils are equal, round, and reactive to light.   Cardiovascular:      Rate and Rhythm: Normal rate and regular rhythm.   Skin:     Capillary Refill: Capillary refill takes less than 2 seconds.      Coloration: Skin is not cyanotic or pale.      Findings: No ecchymosis, erythema or rash.      Nails: There is no clubbing.   Neurological:      General: No focal deficit present.      Mental Status: He is alert and oriented to person, place, and time.   Psychiatric:         Mood and Affect: Mood normal. Mood is not anxious.         Behavior: Behavior normal. Behavior is not agitated.         ED Course                 Procedures    Results for orders placed during the hospital encounter of 04/14/23    POC US ECHO LIMITED    Impression  Limited Bedside ED Cardiac Ultrasound:    PROCEDURE: PERFORMED BY: Dr. Jabier Gómez MD  INDICATIONS/SYMPTOM:  Shortness of Breath  PROBE: Cardiac " phased array probe  BODY LOCATION: Chest  FINDINGS:  The ultrasound was performed utilizing the parasternal long axis views.  Cardiac contractility:  Present  Gross estimation of cardiac kinesis: hyperkinetic  Pericardial Effusion:  None  RV:LV ratio: RV > LV  IVC:  Diameter:  IVC diameter expiration (IVCe) not measured  IVC diameter inspiration (IVCi) not measured,  Collapsibility:  IVC collapses < 50% with inspiration  INTERPRETATION:    Chamber size and motion were grossly normal with LV > RV, normal cardiac kinesis.  No pericardial effusion was found.  IVC visualized and findings indicate unable to estimate.  IMAGE DOCUMENTATION: Images were archived to PACs system.            EKG Interpretation:      Interpreted by Jabier Gómez MD  Time reviewed: 1946  Symptoms at time of EKG: Shortness of breath  Rhythm: normal sinus   Rate: Normal  Axis: Normal  Ectopy: none  Conduction: 1st degree AV block  ST Segments/ T Waves: Non-specific ST-T wave changes  Q Waves: nonspecific  Comparison to prior: When compared with EKG dated 7/20/2020-no acute change.  PAC    Clinical Impression: no acute changes      Critical Care time:  was 60 minutes for this patient excluding procedures.               ED medications:  Medications   bumetanide (BUMEX) injection 4 mg (4 mg Intravenous $Given 4/14/23 2033)   lidocaine (XYLOCAINE) 2 % external gel ( Topical $Given 4/14/23 2022)       ED Vitals;  Vitals:    04/14/23 2137 04/14/23 2157 04/14/23 2227 04/14/23 2257   BP: (!) 155/79 (!) 148/100 (!) 151/74 133/82   Pulse:       Resp:       Temp:       TempSrc:       SpO2: 96% 99% 99% 99%   Weight:       Height:         ED labs and imaging:  Results for orders placed or performed during the hospital encounter of 04/14/23   POC US ECHO LIMITED     Status: None    Impression     Limited Bedside ED Cardiac Ultrasound:    PROCEDURE: PERFORMED BY: Dr. Jabier Gómez MD  INDICATIONS/SYMPTOM:  Shortness of Breath  PROBE: Cardiac  phased array probe  BODY LOCATION: Chest  FINDINGS:   The ultrasound was performed utilizing the parasternal long axis views.  Cardiac contractility:  Present  Gross estimation of cardiac kinesis: hyperkinetic  Pericardial Effusion:  None  RV:LV ratio: RV > LV  IVC:    Diameter:  IVC diameter expiration (IVCe) not measured                                                  IVC diameter inspiration (IVCi) not measured,                                                     Collapsibility:  IVC collapses < 50% with inspiration  INTERPRETATION:    Chamber size and motion were grossly normal with LV > RV, normal cardiac kinesis.  No pericardial effusion was found.  IVC visualized and findings indicate unable to estimate.  IMAGE DOCUMENTATION: Images were archived to PACs system.       Chest XR,  PA & LAT     Status: None    Narrative    EXAM: XR CHEST 2 VIEWS  LOCATION: [facility]  DATE/TIME: 4/14/2023 9:04 PM CDT    INDICATION: Shortness of breath.  Anasarca.  History of end stage renal disease on peritoneal dialysis.  Evaluate for pulmonary edema vs other acute cardiopulmonary process  COMPARISON: CT 10/28/2017      Impression    IMPRESSION: Increased retrocardiac airspace opacities, which could represent pneumonia or atelectasis. Probable tiny left pleural effusion as well. No pneumothorax.     Normal cardiomediastinal silhouette.     Advanced degenerative changes in the right shoulder.   [facility] Draw     Status: None    Narrative    The following orders were created for panel order [facility] Draw.  Procedure                               Abnormality         Status                     ---------                               -----------         ------                     Extra Blue Top Tube[[id number]]                              Final result               Extra Red Top Tube[[id number]]                               Final result               Extra Green Top (Lithium...[[id number]]                                                  Extra Purple Top Tube[[id number]]                                                         Please view results for these tests on the individual orders.   Troponin T, High Sensitivity     Status: Abnormal   Result Value Ref Range    Troponin T, High Sensitivity 148 (HH) <=22 ng/L   Comprehensive metabolic panel     Status: Abnormal   Result Value Ref Range    Sodium 144 136 - 145 mmol/L    Potassium 4.0 3.4 - 5.3 mmol/L    Chloride 104 98 - 107 mmol/L    Carbon Dioxide (CO2) 24 22 - 29 mmol/L    Anion Gap 16 (H) 7 - 15 mmol/L    Urea Nitrogen 75.0 (H) 8.0 - 23.0 mg/dL    Creatinine 5.08 (H) 0.67 - 1.17 mg/dL    Calcium 6.5 (L) 8.8 - 10.2 mg/dL    Glucose 115 (H) 70 - 99 mg/dL    Alkaline Phosphatase 99 40 - 129 U/L    AST 24 10 - 50 U/L    ALT 10 10 - 50 U/L    Protein Total 5.6 (L) 6.4 - 8.3 g/dL    Albumin 3.2 (L) 3.5 - 5.2 g/dL    Bilirubin Total 0.2 <=1.2 mg/dL    GFR Estimate 11 (L) >60 mL/min/1.73m2   Extra Blue Top Tube     Status: None   Result Value Ref Range    Hold Specimen JIC    Extra Red Top Tube     Status: None   Result Value Ref Range    Hold Specimen JIC    CBC with platelets and differential     Status: Abnormal   Result Value Ref Range    WBC Count 12.1 (H) 4.0 - 11.0 10e3/uL    RBC Count 3.80 (L) 4.40 - 5.90 10e6/uL    Hemoglobin 7.2 (L) 13.3 - 17.7 g/dL    Hematocrit 24.2 (L) 40.0 - 53.0 %    MCV 64 (L) 78 - 100 fL    MCH 18.9 (L) 26.5 - 33.0 pg    MCHC 29.8 (L) 31.5 - 36.5 g/dL    RDW 16.2 (H) 10.0 - 15.0 %    Platelet Count 267 150 - 450 10e3/uL    % Neutrophils 90 %    % Lymphocytes 3 %    % Monocytes 6 %    % Eosinophils 1 %    % Basophils 0 %    % Immature Granulocytes 0 %    NRBCs per 100 WBC 0 <1 /100    Absolute Neutrophils 10.9 (H) 1.6 - 8.3 10e3/uL    Absolute Lymphocytes 0.4 (L) 0.8 - 5.3 10e3/uL    Absolute Monocytes 0.7 0.0 - 1.3 10e3/uL    Absolute Eosinophils 0.1 0.0 - 0.7 10e3/uL    Absolute Basophils 0.0 0.0 - 0.2 10e3/uL    Absolute Immature  Granulocytes 0.1 <=0.4 10e3/uL    Absolute NRBCs 0.0 10e3/uL   NT pro BNP     Status: Abnormal   Result Value Ref Range    N terminal Pro BNP Inpatient 24,117 (H) 0 - 1,800 pg/mL   Troponin T, High Sensitivity     Status: Abnormal   Result Value Ref Range    Troponin T, High Sensitivity 140 (HH) <=22 ng/L   CBC with Platelets & Differential     Status: Abnormal    Narrative    The following orders were created for panel order CBC with Platelets & Differential.  Procedure                               Abnormality         Status                     ---------                               -----------         ------                     CBC with platelets and d...[[id number]]  Abnormal            Final result                 Please view results for these tests on the individual orders.         Assessments & Plan (with Medical Decision Making)   Assessement Summary and Clinical impression: 82-year-old male who presented with concern about anasarca with report of chest pain and shortness of breath who is in the process of establishing care for peritoneal dialysis.  Patient has multiple comorbidities including known history of HFpEF, and a history of hypertension, GERD, chronic low back pain chronic opiate therapy use and history of Favian-en-Y.  Patient arrived hypertensive intake blood pressure 150/81.  He was afebrile 94% on room air.  Patient had some nausea hypoxia during his ED course and was placed on supplemental oxygen.  After discussion with nephrology plan was to trial more aggressive diuresis in hopes that he can be seen urgently by his nephrology care team for an outpatient follow-up in the next 3 to 5 days.      At shift end patient is boarding in the ED with plan to monitor urine output and response to IV Bumex and if there is good diuresis he could potentially be discharged home with oral Bumex pending close follow-up with his nephrology care team.    ED course and Plan:  Reviewed the medical record.  Patient  had dobutamine stress echocardiogram with contrast on 2/9/2023-with no inducible ischemia.  EF was estimated to be 55 to 60%.  See further details outlined in medical record.  EKG in the department revealed normal sinus rhythm first-degree AV block and occasional PAC.  Patient has a known history of first-degree AV block.  Nonspecific T wave change.  When compared with EKG from 7/20/2020 no acute change.    Work-up revealed a BNP of 20 4117, creatinine today is 5.08, potassium is normal.  White count is 12.1 hemoglobin 7.2.  X-ray chest revealed retrocardiac    RSV which represent pneumonia discussed with the probable left pleural effusion.  Per radiology. See further details in radiology report.    Spoke with Dr. SHAISTA Jackson- nephrology on-call the [facility] at 7.54pm who reviewed history and exam. We discussion admission for hemodialysis.  Unfortunately there is no beds within the health system in the [facility] patient does not be transferred to [place] for further destination.  We discussed that patient was somewhat stable and that it was reasonable to trial therapy as an outpatient and that Dr. Jackson would notify his treating nephrologist and the care team to expedite his outpatient follow-up.  He advise given a dose of Bumex 4 mg x 1 and discharging home with Bumex 4mg p.o. twice daily over the next 2 weeks.    After dose of IV Bumex 4 mg x 1 patient made about 200 mils of urine.  He agreed to have a Chen catheter in place to help with monitoring his urine output and to reduce his fall risk due to his concern about trying to get up to urinate with weakness.  [place] was no bed available for inpatient admission at [facility] within the health system.  Patient did not want to look outside of the health system and in our transfer from [place].    At shift end at 1am patient signed out to Dr. RYLAN Gómez with plan to monitor his urine output overnight for further  response to IV diuresis in hopes that he can be discharged in the a.m. with plan to take Bumex 4 mg twice daily pending follow-up with his nephrology care team as advised after initial consultation    Disclaimer: This note consists of symbols derived from keyboarding, dictation and/or voice recognition software. As a result, there may be errors in the script that have gone undetected. Please consider this when interpreting information found in this chart.  I have reviewed the nursing notes.    I have reviewed the findings, diagnosis, plan and need for follow up with the patient.           Medical Decision Making  The patient's presentation was of moderate complexity (an acute illness with systemic symptoms).    The patient's evaluation involved:  ordering and/or review of 2 test(s) in this encounter (diagnostic imaging and labs)    The patient's management necessitated high risk (a decision regarding hospitalization).        New Prescriptions    No medications on file       Final diagnoses:   Shortness of breath   Peritoneal dialysis catheter in place (H) - Recent catheter change.  Last run a week prior   CKD (chronic kidney disease) stage 5, GFR less than 15 ml/min (H)       4/14/2023   [facility] EMERGENCY DEPT     Jabier Gómez MD  04/15/23 0107

## 2023-04-15 NOTE — DISCHARGE INSTRUCTIONS
.    ICD-10-CM    1. Shortness of breath  R06.02       2. Peritoneal dialysis catheter in place (H)  Z99.2     Recent catheter change.  Last run a week prior. Stay on Bumex 4 mg orally twice daily until dialysis scheduled on monday,. You can take metolazone 5 mg orally up to once daily as needed for shortness of breath      3. CKD (chronic kidney disease) stage 5, GFR less than 15 ml/min (H)  N18.5       4. Iron deficiency anemia, unspecified iron deficiency anemia type  D50.9     take ferrous sulfate 324 mg orally twice daily.  observe for any blood loss in your stool.  recheck in clinic - repeat hemoglobin at time of dialysis

## 2023-04-15 NOTE — PROGRESS NOTES
Medication Scribe Admission Medication History    Admission medication history is complete. The information provided in this note is only as accurate as the sources available at the time of the update.    Medication reconciliation/reorder completed by provider prior to medication history? No    Information Source(s): Patient via in-person    Pertinent Information:     Changes made to PTA medication list:    Added: [place]-Vit RX    Deleted: Lactulose    Changed: None    Medication Affordability:  Not including over the counter (OTC) medications, was there a time in the past 12 months when you did not take your medications as prescribed because of cost?: No    Allergies reviewed with patient and updates made in EHR: yes    Medication History Completed By: Hyacinth Marin 4/14/2023 8:41 PM    Current Facility-Administered Medications for the 4/14/23 encounter (Hospital Encounter)   Medication     ropivacaine (NAROPIN) injection 3 mL     ropivacaine (NAROPIN) injection 3 mL     triamcinolone (KENALOG-40) injection 40 mg     triamcinolone (KENALOG-40) injection 40 mg     PTA Med List   Medication Sig Last Dose     azithromycin (ZITHROMAX) 250 MG tablet Take 2 tablets (500 mg) by mouth daily for 1 day, THEN 1 tablet (250 mg) daily for 4 days. 4/14/2023 at am     calcium acetate (PHOSLO) 667 MG CAPS capsule Take 667 mg by mouth 3 times daily (with meals) 4/14/2023 at am     calcium carbonate (TUMS) 500 MG chewable tablet Take 1 chew tab by mouth 2 times daily Unknown     docusate sodium (COLACE) 100 MG capsule Take 1 capsule (100 mg) by mouth 2 times daily (Patient taking differently: Take 100 mg by mouth daily) 4/14/2023     MULTIVITAMIN OR Take by mouth every morning Past Month     multivitamin RENAL (RENAVITE RX/NEPHROVITE) 1 MG tablet Take 1 tablet by mouth daily 4/14/2023 at am     omeprazole (PRILOSEC) 40 MG DR capsule Take 1 capsule (40 mg) by mouth 2 times daily 4/14/2023 at am     OVER-THE-COUNTER every morning  Elderberry 50mg supplement, one daily Unknown     oxyCODONE (ROXICODONE) 5 MG tablet Take 1 tablet (5 mg) by mouth 2 times daily as needed for severe pain (7-10) This is a 90 day supply 4/14/2023     polyethylene glycol (MIRALAX) 17 GM/Dose powder Take 17 g (1 capful.) by mouth 2 times daily (Patient taking differently: Take 1 capful. by mouth daily) 4/14/2023 at am     tamsulosin (FLOMAX) 0.4 MG capsule Take 1 capsule (0.4 mg) by mouth daily 4/14/2023 at am     torsemide (DEMADEX) 20 MG tablet Ttake 60mg (three tablets) in the morning , take 40-60 mg in afternoon. (Patient taking differently: Ttake 60mg (three tablets) in the morning and 60mg in the afternoon) 4/14/2023 at am

## 2023-04-15 NOTE — ED NOTES
Pt states doesn't wear O2 at home normally, pt has been on 2L tonight, pt placed on RA to trial. Pt states he feels somewhat better since yesterday. Medication given. Breakfast tray ordered.

## 2023-04-15 NOTE — ED PROVIDER NOTES
"     Emergency Department Patient Sign-out       Brief HPI:  This is a 82 year old male signed out to me by Dr. Gómez .  See initial ED Provider note for details of the presentation.     peritoneal dialysis malfunctioning - no dialysis in one week  awaiting transfer      Significant Events prior to my assuming care:       Exam:   Patient Vitals for the past 24 hrs:   BP Temp Temp src Pulse Resp SpO2 Height Weight   04/15/23 0533 126/69 -- -- 72 -- 98 % -- --   04/15/23 0505 100/57 -- -- -- -- 99 % -- --   04/15/23 0435 106/62 -- -- -- -- 99 % -- --   04/15/23 0405 104/62 -- -- -- -- 99 % -- --   04/15/23 0335 106/62 -- -- -- -- 96 % -- --   04/15/23 0305 123/63 -- -- -- -- 98 % -- --   04/15/23 0235 96/68 -- -- -- -- 98 % -- --   04/15/23 0157 117/58 -- -- -- -- 98 % -- --   04/15/23 0135 106/58 -- -- -- -- 97 % -- --   04/15/23 0056 116/65 -- -- -- -- 99 % -- --   04/15/23 0026 118/63 -- -- -- -- 100 % -- --   04/15/23 0001 114/64 -- -- 77 -- 99 % -- --   04/14/23 2335 131/64 -- -- -- -- 100 % -- --   04/14/23 2257 133/82 -- -- -- -- 99 % -- --   04/14/23 2227 (!) 151/74 -- -- -- -- 99 % -- --   04/14/23 2157 (!) 148/100 -- -- -- -- 99 % -- --   04/14/23 2137 (!) 155/79 -- -- -- -- 96 % -- --   04/14/23 2107 (!) 140/80 -- -- -- -- -- -- --   04/14/23 2052 (!) 144/89 -- -- -- -- -- -- --   04/14/23 2037 -- -- -- 80 20 99 % -- --   04/14/23 2029 (!) 154/100 -- -- 82 25 97 % -- --   04/14/23 2014 (!) 158/79 -- -- 88 20 100 % -- --   04/14/23 1959 (!) 152/76 -- -- 79 20 100 % -- --   04/14/23 1944 (!) 141/77 -- -- 77 19 100 % -- --   04/14/23 1929 (!) 153/78 -- -- 80 21 98 % -- --   04/14/23 1914 (!) 140/75 -- -- 77 19 100 % -- --   04/14/23 1857 -- -- -- 80 23 (!) 88 % -- --   04/14/23 1817 (!) 150/81 99  F (37.2  C) Oral 88 20 94 % 1.549 m (5' 1\") 72.1 kg (159 lb)           ED RESULTS:   Results for orders placed or performed during the hospital encounter of 04/14/23 (from the past 24 hour(s))   [place] Draw     " Status: None    Collection Time: 04/14/23  6:30 PM    Narrative    The following orders were created for panel order [place] Draw.  Procedure                               Abnormality         Status                     ---------                               -----------         ------                     Extra Blue Top Tube[[id number]]                              Final result               Extra Red Top Tube[[id number]]                               Final result               Extra Green Top (Lithium...[[id number]]                                                 Extra Purple Top Tube[[id number]]                                                         Please view results for these tests on the individual orders.   CBC with Platelets & Differential     Status: Abnormal    Collection Time: 04/14/23  6:30 PM    Narrative    The following orders were created for panel order CBC with Platelets & Differential.  Procedure                               Abnormality         Status                     ---------                               -----------         ------                     CBC with platelets and d...[[id number]]  Abnormal            Final result                 Please view results for these tests on the individual orders.   Troponin T, High Sensitivity     Status: Abnormal    Collection Time: 04/14/23  6:30 PM   Result Value Ref Range    Troponin T, High Sensitivity 148 (HH) <=22 ng/L   Comprehensive metabolic panel     Status: Abnormal    Collection Time: 04/14/23  6:30 PM   Result Value Ref Range    Sodium 144 136 - 145 mmol/L    Potassium 4.0 3.4 - 5.3 mmol/L    Chloride 104 98 - 107 mmol/L    Carbon Dioxide (CO2) 24 22 - 29 mmol/L    Anion Gap 16 (H) 7 - 15 mmol/L    Urea Nitrogen 75.0 (H) 8.0 - 23.0 mg/dL    Creatinine 5.08 (H) 0.67 - 1.17 mg/dL    Calcium 6.5 (L) 8.8 - 10.2 mg/dL    Glucose 115 (H) 70 - 99 mg/dL    Alkaline Phosphatase 99 40 - 129 U/L    AST 24 10 - 50 U/L    ALT 10 10 - 50 U/L    Protein Total  5.6 (L) 6.4 - 8.3 g/dL    Albumin 3.2 (L) 3.5 - 5.2 g/dL    Bilirubin Total 0.2 <=1.2 mg/dL    GFR Estimate 11 (L) >60 mL/min/1.73m2   Extra Blue Top Tube     Status: None    Collection Time: 04/14/23  6:30 PM   Result Value Ref Range    Hold Specimen [facility]    Extra Red Top Tube     Status: None    Collection Time: 04/14/23  6:30 PM   Result Value Ref Range    Hold Specimen [facility]    CBC with platelets and differential     Status: Abnormal    Collection Time: 04/14/23  6:30 PM   Result Value Ref Range    WBC Count 12.1 (H) 4.0 - 11.0 10e3/uL    RBC Count 3.80 (L) 4.40 - 5.90 10e6/uL    Hemoglobin 7.2 (L) 13.3 - 17.7 g/dL    Hematocrit 24.2 (L) 40.0 - 53.0 %    MCV 64 (L) 78 - 100 fL    MCH 18.9 (L) 26.5 - 33.0 pg    MCHC 29.8 (L) 31.5 - 36.5 g/dL    RDW 16.2 (H) 10.0 - 15.0 %    Platelet Count 267 150 - 450 10e3/uL    % Neutrophils 90 %    % Lymphocytes 3 %    % Monocytes 6 %    % Eosinophils 1 %    % Basophils 0 %    % Immature Granulocytes 0 %    NRBCs per 100 WBC 0 <1 /100    Absolute Neutrophils 10.9 (H) 1.6 - 8.3 10e3/uL    Absolute Lymphocytes 0.4 (L) 0.8 - 5.3 10e3/uL    Absolute Monocytes 0.7 0.0 - 1.3 10e3/uL    Absolute Eosinophils 0.1 0.0 - 0.7 10e3/uL    Absolute Basophils 0.0 0.0 - 0.2 10e3/uL    Absolute Immature Granulocytes 0.1 <=0.4 10e3/uL    Absolute NRBCs 0.0 10e3/uL   NT pro BNP     Status: Abnormal    Collection Time: 04/14/23  6:30 PM   Result Value Ref Range    N terminal Pro BNP Inpatient 24,117 (H) 0 - 1,800 pg/mL   POC US ECHO LIMITED     Status: None    Collection Time: 04/14/23  6:49 PM    Impression     Limited Bedside ED Cardiac Ultrasound:    PROCEDURE: PERFORMED BY: Dr. Jabier Gómez MD  INDICATIONS/SYMPTOM:  Shortness of Breath  PROBE: Cardiac phased array probe  BODY LOCATION: Chest  FINDINGS:   The ultrasound was performed utilizing the parasternal long axis views.  Cardiac contractility:  Present  Gross estimation of cardiac kinesis: hyperkinetic  Pericardial Effusion:   None  RV:LV ratio: RV > LV  IVC:    Diameter:  IVC diameter expiration (IVCe) not measured                                                  IVC diameter inspiration (IVCi) not measured,                                                     Collapsibility:  IVC collapses < 50% with inspiration  INTERPRETATION:    Chamber size and motion were grossly normal with LV > RV, normal cardiac kinesis.  No pericardial effusion was found.  IVC visualized and findings indicate unable to estimate.  IMAGE DOCUMENTATION: Images were archived to PACs system.       Troponin T, High Sensitivity     Status: Abnormal    Collection Time: 04/14/23  8:32 PM   Result Value Ref Range    Troponin T, High Sensitivity 140 (HH) <=22 ng/L   Chest XR,  PA & LAT     Status: None    Collection Time: 04/14/23  9:04 PM    Narrative    EXAM: XR CHEST 2 VIEWS  LOCATION: [facility]  DATE/TIME: 4/14/2023 9:04 PM CDT    INDICATION: Shortness of breath.  Anasarca.  History of end stage renal disease on peritoneal dialysis.  Evaluate for pulmonary edema vs other acute cardiopulmonary process  COMPARISON: CT 10/28/2017      Impression    IMPRESSION: Increased retrocardiac airspace opacities, which could represent pneumonia or atelectasis. Probable tiny left pleural effusion as well. No pneumothorax.     Normal cardiomediastinal silhouette.     Advanced degenerative changes in the right shoulder.   Comprehensive metabolic panel     Status: Abnormal    Collection Time: 04/15/23  6:58 AM   Result Value Ref Range    Sodium 143 136 - 145 mmol/L    Potassium 4.0 3.4 - 5.3 mmol/L    Chloride 104 98 - 107 mmol/L    Carbon Dioxide (CO2) 22 22 - 29 mmol/L    Anion Gap 17 (H) 7 - 15 mmol/L    Urea Nitrogen 75.4 (H) 8.0 - 23.0 mg/dL    Creatinine 5.04 (H) 0.67 - 1.17 mg/dL    Calcium 6.2 (L) 8.8 - 10.2 mg/dL    Glucose 76 70 - 99 mg/dL    Alkaline Phosphatase 86 40 - 129 U/L    AST 15 10 - 50 U/L    ALT 7 (L) 10 - 50 U/L    Protein Total 4.9  (L) 6.4 - 8.3 g/dL    Albumin 2.6 (L) 3.5 - 5.2 g/dL    Bilirubin Total 0.2 <=1.2 mg/dL    GFR Estimate 11 (L) >60 mL/min/1.73m2   Extra Tube     Status: None    Collection Time: 04/15/23  6:58 AM    Narrative    The following orders were created for panel order Extra Tube.  Procedure                               Abnormality         Status                     ---------                               -----------         ------                     Extra Purple Top Tube[[id number]]                            Final result                 Please view results for these tests on the individual orders.   Extra Purple Top Tube     Status: None    Collection Time: 04/15/23  6:58 AM   Result Value Ref Range    Hold Specimen [facility]    Hemaglobin     Status: Abnormal    Collection Time: 04/15/23  6:58 AM   Result Value Ref Range    Hemoglobin 6.2 (LL) 13.3 - 17.7 g/dL   ABO/Rh type and screen     Status: None    Collection Time: 04/15/23  6:58 AM    Narrative    The following orders were created for panel order ABO/Rh type and screen.  Procedure                               Abnormality         Status                     ---------                               -----------         ------                     Adult Type and Screen[[id number]]                            Edited Result - FINAL        Please view results for these tests on the individual orders.   Adult Type and Screen     Status: None    Collection Time: 04/15/23  6:58 AM   Result Value Ref Range    ABO/RH(D) A POS     Antibody Screen Negative Negative    SPECIMEN EXPIRATION DATE [id number]    Ferritin     Status: Abnormal    Collection Time: 04/15/23  6:58 AM   Result Value Ref Range    Ferritin 474 (H) 31 - 409 ng/mL   Iron and iron binding capacity     Status: Abnormal    Collection Time: 04/15/23  6:58 AM   Result Value Ref Range    Iron 13 (L) 61 - 157 ug/dL    Iron Binding Capacity 156 (L) 240 - 430 ug/dL    Iron Sat Index 8 (L) 15 - 46 %   Prepare red blood  cells (unit)     Status: None (Preliminary result)    Collection Time: 04/15/23  9:00 AM   Result Value Ref Range    Blood Component Type Red Blood Cells     Product Code [id number]     Unit Status Issued     Unit Number [id number]     CROSSMATCH Compatible     CODING SYSTEM [id number]     ISSUE DATE AND TIME [id number]     UNIT ABO/RH A+     UNIT TYPE ISBT 6200        ED MEDICATIONS:   Medications   bumetanide (BUMEX) injection 4 mg (has no administration in time range)   bumetanide (BUMEX) injection 4 mg (4 mg Intravenous $Given 4/14/23 2033)   lidocaine (XYLOCAINE) 2 % external gel ( Topical $Given 4/14/23 2022)         Impression:    ICD-10-CM    1. Shortness of breath  R06.02       2. Peritoneal dialysis catheter in place (H)  Z99.2     Recent catheter change.  Last run a week prior. Stay on Bumex 4 mg orally twice daily until dialysis scheduled on monday,. You can take metolazone 5 mg orally up to once daily as needed for shortness of breath      3. CKD (chronic kidney disease) stage 5, GFR less than 15 ml/min (H)  N18.5       4. Iron deficiency anemia, unspecified iron deficiency anemia type  D50.9     take ferrous sulfate 324 mg orally twice daily.  observe for any blood loss in your stool.  recheck in clinic - repeat hemoglobin at time of dialysis          Plan:    Pending studies include transfer.        Reviewed patient's labs from this morning.  Unchanged from yesterday.  Normal potassium.  We will plan discharge home likely.  He has had at least 250 cc to 300 cc out every few hours with the Bumex.  We will continue 4 mg oral Bumex twice daily until dialysis planned on Monday.  He does have an appointment at [facility] at that time.  His peritoneal dialysis device has since been replaced last week.    Spoke with Dr. Alicea  in nephrology.  she knows the patient well.  she recommended bumex with prn metolazone until monday dialysis.    rechecked Hgb at her reocmmendation and it is 6.2.  discussed with  patient transfusion and he agrees.  gave bumex after.  transfusion was delayed due to presence of minor antibodies.    signed out to Dr. Torres.      MD Juan Calderon Scott J, MD  04/15/23 0722       Danielito Martinez MD  04/15/23 3483

## 2023-04-16 NOTE — ED PROVIDER NOTES
Patient was signed out to me stable and awaiting transfusion of pRBC before plans for discharge.  He reportedly tolerated transfusion well and without complication.  Subsequently spoke with patient before formal discharge and he maintains that he is still feeling well, no dizziness or signs of active bleeding.  He is ready to return home and plans on follow-up with nephrology at scheduled appointment Monday as planned.  He is in agreement discharge plan including strict return instructions for bleeding or developing concerns.         Andreas Torres,   04/15/23 1939

## 2023-04-21 NOTE — TELEPHONE ENCOUNTER
Nephrology Note: Nursing Outreach Encounter    REASON FOR CALL:                                                      REASON FOR CALL: Clinic Care Coordination - Post Hospital    Outreach and directing patient to reach other to Dialysis nurses for nephrology care.                                       SITUATION/BACKROUND:                                                    Patient is being treated for CKD Stage 5.    Patient in the [facility] 4/14/23 for chest and fluid retention. Post ED visit patient started PD at [facility] in [place].     Patient Journey Status:  Completed    ASSESSMENT:                                                          Neph Assessments:  Fluid Intake         PLAN:                                                      Education:  Method:  general discussion/verbal explanation  Discussed habit change regarding disease management/lifestyle changes Dialysis  These interventions were used: Importance of maintenance Confidence of care   Education material provided and patient was given an opportunity to ask questions.      Follow Up:   With [facility] dialysis     Patient verbalized understanding and will follow up as recommended.    BLANCA ESCOBEDO RN

## 2023-04-27 NOTE — PROGRESS NOTES
Mann Escobar  :  1940  DOS: 23  MRN: [id number]    Sports Medicine Clinic Visit    PCP: Shayy Ramírez    Mann Escobar is a 81 year old Right hand dominant male who is seen in follow up presenting with chronic bilateral shoulder pain.    Interim History - 2023  Since last visit on 2022 patient has moderate-severe right shoulder pain with joint effusions over the past 2 - 3 months.  Bilateral shoulder glenohumeral injection & aspiration completed on 22 provided relief for ~ 4 - 6 weeks.  Patient has been delaying follow up due to recent transition to dialysis for his kidney issues.  No new injury in the interim.    Review of Systems  Musculoskeletal: as above  Remainder of review of systems is negative including constitutional, CV, pulmonary, GI, Skin and Neurologic except as noted in HPI or medical history.    Past Medical History:   Diagnosis Date     Arthritis of ankle, left 2020     Back pain     WITH BILATERAL LEG PAIN AND NUMBNESS OF BOTH FEET     Gastro-oesophageal reflux disease     REFLUX     Hypertension      Hypopotassemia      Internal hemorrhoids      Iron deficiency anaemia      Leg edema      Morbid obesity 2005     Morbid obesity (H)      Osteoarthrosis      Scoliosis      Thalassemia minor      Past Surgical History:   Procedure Laterality Date     CATARACT IOL, RT/LT  /    Cataract IOL RT/LT     COLONOSCOPY      polyps removed repeat 5 yrs, tubular adenoma     COLONOSCOPY  2011    Procedure:COLONOSCOPY; Colonoscopy with hemorrhoid banding; Surgeon:JEREMY GARCIA; Location:[facility]     COLONOSCOPY N/A 2017    Procedure: COLONOSCOPY;  colonoscopy, gastroscopy;  Surgeon: Edmar Isaacs MD;  Location: [facility]     DECOMPRESSION LUMBAR MINIMALLY INVASIVE TWO LEVELS  2012    Procedure:DECOMPRESSION LUMBAR MINIMALLY INVASIVE TWO LEVELS; Surgeon:LOTTIE MITCHELL; [facility]     ESOPHAGOSCOPY,  GASTROSCOPY, DUODENOSCOPY (EGD), COMBINED N/A 2/6/2018    Procedure: COMBINED ESOPHAGOSCOPY, GASTROSCOPY, DUODENOSCOPY (EGD);  gastroscopy;  Surgeon: Edmar Isaacs MD;  Location: [facility]     ESOPHAGOSCOPY, GASTROSCOPY, DUODENOSCOPY (EGD), COMBINED N/A 10/18/2021    Procedure: ESOPHAGOGASTRODUODENOSCOPY (EGD);  Surgeon: Anju Galeano MD;  Location: [facility]     FUSION SPINE POSTERIOR MINIMALLY INVASIVE ONE LEVEL  5/2/2012    Procedure:FUSION SPINE POSTERIOR MINIMALLY INVASIVE ONE LEVEL; Minimal Access Spinal Technology Transformaninal Lumbar Interbody Fusion L4-5, Decompression L3-5; Surgeon:LOTTIE MITCHELL; [facility]     HC REMOVAL OF HYDROCELE,TUNICA,UNILAT  2006    bilateral     IR RENAL BIOPSY LEFT  6/13/2022     LAPAROSCOPIC INSERTION CATHETER PERITONEAL DIALYSIS N/A 3/3/2023    Procedure: INSERTION, CATHETER, DIALYSIS, PERITONEAL, LAPAROSCOPIC with left sided exit;  Surgeon: Padma Owens MD;  [facility]     ORTHOPEDIC SURGERY  2000    Lf knee replacement     ORTHOPEDIC SURGERY  2002, 2010    Rt replacement     ORTHOPEDIC SURGERY  2005    Left ankle fused     RECONSTRUCT ANKLE Right 7/23/2020    Procedure: Ankle RECONSTRUCTIve Arthroplasty;  Surgeon: Luke Powers DPM;  [facility]     REPOSITION, CATHETER, DIALYSIS, PERITONEAL, LAPAROSCOPIC N/A 3/31/2023    Procedure: Laparoscopic Peritoneal Dialysis catheter replacement;  Surgeon: Padma Owens MD;  [facility]     SURGICAL HISTORY OF -       Cysto     SURGICAL HISTORY OF -   2002    Percutaneous kidney stone removal     SURGICAL HISTORY OF -       ureteral stent removal     SURGICAL HISTORY OF -   2005    bariatric surgery-Favian-en-Y     SURGICAL HISTORY OF -   2008    carpal tunnel surgery rt wrist     Family History   Problem Relation Age of Onset     Heart Disease Mother         CHF     Cerebrovascular Disease Mother      Hypertension Mother      Cancer Mother         tumor in stomach     Cancer - colorectal  "Mother      Prostate Cancer Father      Cancer Father         bone     Diabetes Father      Breast Cancer Sister      Diabetes Brother      Obesity Brother      Cancer Brother      Cancer - colorectal Brother      Diabetes Brother      Heart Disease Maternal Grandmother         CHF     Diabetes Paternal Grandmother      Cerebrovascular Disease Paternal Grandfather      Genitourinary Problems Son         Kidney stones     Anesthesia Reaction No family hx of      Venous thrombosis No family hx of        Objective  /63   Ht 1.549 m (5' 1\")   Wt 72.1 kg (159 lb)   BMI 30.04 kg/m        General: healthy, alert and in no distress      HEENT: no scleral icterus or conjunctival erythema     Skin: no suspicious lesions or rash. No jaundice.     CV: regular rhythm by palpation, 2+ distal pulses, no pedal edema      Resp: normal respiratory effort without conversational dyspnea     Psych: normal mood and affect      Gait: nonantalgic, modest baseline coordination and balance, using walker    Neuro: normal light touch sensory exam of the extremities. Motor strength as noted below     Bilateral Shoulder exam    ROM:        Limited active and passive ROM with flexion, extension, abduction, internal and external rotation        palpable, ballotable fluid collections anterior and lateral shoulders    Tender:        Generally about the shoulders    Non Tender:       remainder of shoulder    Strength:        abduction 2/5       internal rotation 4/5       external rotation 2/5       adduction 4/5    Stability testing:       neg (-) anterior glide       neg (-) sulcus sign    Skin:       no visible deformities       well perfused       capillary refill brisk       Palpable fluid collection about the shoulder generally, ballotable    Sensation:        normal sensation over shoulder and upper extremity       Radiology  Recent Results (from the past 744 hour(s))   XR Shoulder 3 View Bilateral    Narrative    SHOULDER THREE VIEWS " BILATERAL   4/7/2022 3:53 PM     HISTORY:  Shoulder pain bilaterally.    COMPARISON: Left shoulder radiographs from 9/24/2016. Right shoulder  radiographs from 5/20/2013.      Impression    IMPRESSION:    Right: Old healed right rib fractures again noted. Severe  osteoarthrosis of the glenohumeral joint, progressed. Narrowing of the  subacromial space by the coracoacromial arch configuration at AC joint  hypertrophy and high-riding humeral head again noted. Chronic  bone-on-bone contact between the acromion and humeral head indicating  chronic full-thickness rotator cuff tear.    Left: Severe osteoarthrosis of the glenohumeral joint, progressed. The  humeral head is high riding and there is remodeling of the superior  aspect of the humeral head and the undersurface of the acromion  indicating full-thickness rotator cuff tear. There is also some  destructive change in the superior aspect of the humeral head and  glenoid which has progressed since the prior study which would raise  the question of [place] shoulder or an inflammatory or infectious  process in the appropriate clinical setting.    JOE MENA MD         SYSTEM ID:  [id number]       Large Joint Injection/Arthocentesis: bilateral glenohumeral    Date/Time: 4/27/2023 3:20 PM    Performed by: Danielito Mohan DO  Authorized by: Danielito Mohan DO    Indications:  Pain  Needle Size:  18 G  Guidance: ultrasound    Approach:  Anterior  Location:  Shoulder  Laterality:  Bilateral      Site:  Bilateral glenohumeral  Medications (Right):  20 mg triamcinolone 40 MG/ML  Aspirate amount (mL):  30  Aspirate:  Serous and blood-tinged  Medications (Left):  20 mg triamcinolone 40 MG/ML; 2 mL ropivacaine 5 MG/ML  Aspirate amount (mL):  30  Aspirate:  Serous and blood-tinged  Outcome:  Tolerated well, no immediate complications  Procedure discussed: discussed risks, benefits, and alternatives    Consent Given by:  Patient  Timeout: timeout called  "immediately prior to procedure    Prep: patient was prepped and draped in usual sterile fashion     Ultrasound images of procedure were permanently stored.         Assessment:  1. Pain of both shoulder joints    2. Rotator cuff arthropathy of both shoulders    3. Shoulder effusion, unspecified laterality    4. Osteoarthritis of bilateral glenohumeral joints        Plan:  Discussed the assessment with the patient.  Follow up: prn based on clinical progress  Medically complicated, with chronic b/l shoulder pain  Cannot take NSAIDs due to CKD and other comorbidities  Bilateral shoulder US guided aspiration and CSI today for better pain control  Compressive brace option reviewed, currently using   Activity modification reviewed  Could consider PT in the future based on progress, low expectations for benefit  TSA not realistic due to medical comorbidities, known underlying \"[place] shoulder\"  Expectations and goals of CSI reviewed  Often 2-3 days for steroid effect, and can take up to two weeks for maximum effect  We discussed modified progressive pain-free activity as tolerated  Do not overuse in first two weeks if feeling better due to concern for vulnerability while steroid is working  Supportive care reviewed  All questions were answered today  Contact us with additional questions or concerns  Signs and sx of concern reviewed      Danielito Mohan DO, GABE  Sports Medicine Physician  [facility]          Disclaimer: This note consists of symbols derived from keyboarding, dictation and/or voice recognition software. As a result, there may be errors in the script that have gone undetected. Please consider this when interpreting information found in this chart.  "

## 2023-04-27 NOTE — LETTER
2023         RE: Mann Escobar  [address]  [facility] [address]        Dear Colleague,    Thank you for referring your patient, Mann Escobar, to the [facility] [place]. Please see a copy of my visit note below.    Mann Escobar  :  1940  DOS: 23  MRN: [id number]    Sports Medicine Clinic Visit    PCP: Shayy Ramírez    Mann Escobar is a 81 year old Right hand dominant male who is seen in follow up presenting with chronic bilateral shoulder pain.    Interim History - 2023  Since last visit on 2022 patient has moderate-severe right shoulder pain with joint effusions over the past 2 - 3 months.  Bilateral shoulder glenohumeral injection & aspiration completed on 22 provided relief for ~ 4 - 6 weeks.  Patient has been delaying follow up due to recent transition to dialysis for his kidney issues.  No new injury in the interim.    Review of Systems  Musculoskeletal: as above  Remainder of review of systems is negative including constitutional, CV, pulmonary, GI, Skin and Neurologic except as noted in HPI or medical history.    Past Medical History:   Diagnosis Date     Arthritis of ankle, left 2020     Back pain     WITH BILATERAL LEG PAIN AND NUMBNESS OF BOTH FEET     Gastro-oesophageal reflux disease     REFLUX     Hypertension      Hypopotassemia      Internal hemorrhoids      Iron deficiency anaemia      Leg edema      Morbid obesity 2005     Morbid obesity (H)      Osteoarthrosis      Scoliosis      Thalassemia minor      Past Surgical History:   Procedure Laterality Date     CATARACT IOL, RT/LT  /    Cataract IOL RT/LT     COLONOSCOPY      polyps removed repeat 5 yrs, tubular adenoma     COLONOSCOPY  2011    Procedure:COLONOSCOPY; Colonoscopy with hemorrhoid banding; Surgeon:JEREMY GARCIA; Location[facility]     COLONOSCOPY N/A 2017     Procedure: COLONOSCOPY;  colonoscopy, gastroscopy;  Surgeon: Edmar Isaacs MD;  [facility]: [facility]     DECOMPRESSION LUMBAR MINIMALLY INVASIVE TWO LEVELS  5/2/2012    Procedure:DECOMPRESSION LUMBAR MINIMALLY INVASIVE TWO LEVELS; Surgeon:LOTTIE MITCHELL; [facility]     ESOPHAGOSCOPY, GASTROSCOPY, DUODENOSCOPY (EGD), COMBINED N/A 2/6/2018    Procedure: COMBINED ESOPHAGOSCOPY, GASTROSCOPY, DUODENOSCOPY (EGD);  gastroscopy;  Surgeon: Edmar Isaacs MD;  [facility]     ESOPHAGOSCOPY, GASTROSCOPY, DUODENOSCOPY (EGD), COMBINED N/A 10/18/2021    Procedure: ESOPHAGOGASTRODUODENOSCOPY (EGD);  Surgeon: Anju Galeano MD;  Location: [facility]     FUSION SPINE POSTERIOR MINIMALLY INVASIVE ONE LEVEL  5/2/2012    Procedure:FUSION SPINE POSTERIOR MINIMALLY INVASIVE ONE LEVEL; Minimal Access Spinal Technology Transformaninal Lumbar Interbody Fusion L4-5, Decompression L3-5; Surgeon:LOTTIE MITCHELL; [facility]     HC REMOVAL OF HYDROCELE,TUNICA,UNILAT  2006    bilateral     IR RENAL BIOPSY LEFT  6/13/2022     LAPAROSCOPIC INSERTION CATHETER PERITONEAL DIALYSIS N/A 3/3/2023    Procedure: INSERTION, CATHETER, DIALYSIS, PERITONEAL, LAPAROSCOPIC with left sided exit;  Surgeon: Padma Owens MD;  [facility]     ORTHOPEDIC SURGERY  2000    Lf knee replacement     ORTHOPEDIC SURGERY  2002, 2010    Rt replacement     ORTHOPEDIC SURGERY  2005    Left ankle fused     RECONSTRUCT ANKLE Right 7/23/2020    Procedure: Ankle RECONSTRUCTIve Arthroplasty;  Surgeon: Luke Powers DPM;  [facility]     REPOSITION, CATHETER, DIALYSIS, PERITONEAL, LAPAROSCOPIC N/A 3/31/2023    Procedure: Laparoscopic Peritoneal Dialysis catheter replacement;  Surgeon: Padma Owens MD;  [facility]     SURGICAL HISTORY OF -       Cysto     SURGICAL HISTORY OF -   2002    Percutaneous kidney stone removal     SURGICAL HISTORY OF -       ureteral stent removal     SURGICAL HISTORY OF -   2005    bariatric  "surgery-Favian-en-Y     SURGICAL HISTORY OF -   2008    carpal tunnel surgery rt wrist     Family History   Problem Relation Age of Onset     Heart Disease Mother         CHF     Cerebrovascular Disease Mother      Hypertension Mother      Cancer Mother         tumor in stomach     Cancer - colorectal Mother      Prostate Cancer Father      Cancer Father         bone     Diabetes Father      Breast Cancer Sister      Diabetes Brother      Obesity Brother      Cancer Brother      Cancer - colorectal Brother      Diabetes Brother      Heart Disease Maternal Grandmother         CHF     Diabetes Paternal Grandmother      Cerebrovascular Disease Paternal Grandfather      Genitourinary Problems Son         Kidney stones     Anesthesia Reaction No family hx of      Venous thrombosis No family hx of        Objective  /63   Ht 1.549 m (5' 1\")   Wt 72.1 kg (159 lb)   BMI 30.04 kg/m        General: healthy, alert and in no distress      HEENT: no scleral icterus or conjunctival erythema     Skin: no suspicious lesions or rash. No jaundice.     CV: regular rhythm by palpation, 2+ distal pulses, no pedal edema      Resp: normal respiratory effort without conversational dyspnea     Psych: normal mood and affect      Gait: nonantalgic, modest baseline coordination and balance, using walker    Neuro: normal light touch sensory exam of the extremities. Motor strength as noted below     Bilateral Shoulder exam    ROM:        Limited active and passive ROM with flexion, extension, abduction, internal and external rotation        palpable, ballotable fluid collections anterior and lateral shoulders    Tender:        Generally about the shoulders    Non Tender:       remainder of shoulder    Strength:        abduction 2/5       internal rotation 4/5       external rotation 2/5       adduction 4/5    Stability testing:       neg (-) anterior glide       neg (-) sulcus sign    Skin:       no visible deformities       well " perfused       capillary refill brisk       Palpable fluid collection about the shoulder generally, ballotable    Sensation:        normal sensation over shoulder and upper extremity       Radiology  Recent Results (from the past 744 hour(s))   XR Shoulder 3 View Bilateral    Narrative    SHOULDER THREE VIEWS BILATERAL   4/7/2022 3:53 PM     HISTORY:  Shoulder pain bilaterally.    COMPARISON: Left shoulder radiographs from 9/24/2016. Right shoulder  radiographs from 5/20/2013.      Impression    IMPRESSION:    Right: Old healed right rib fractures again noted. Severe  osteoarthrosis of the glenohumeral joint, progressed. Narrowing of the  subacromial space by the coracoacromial arch configuration at AC joint  hypertrophy and high-riding humeral head again noted. Chronic  bone-on-bone contact between the acromion and humeral head indicating  chronic full-thickness rotator cuff tear.    Left: Severe osteoarthrosis of the glenohumeral joint, progressed. The  humeral head is high riding and there is remodeling of the superior  aspect of the humeral head and the undersurface of the acromion  indicating full-thickness rotator cuff tear. There is also some  destructive change in the superior aspect of the humeral head and  glenoid which has progressed since the prior study which would raise  the question of [place] shoulder or an inflammatory or infectious  process in the appropriate clinical setting.    JOE MENA MD         SYSTEM ID:  [id number]       Large Joint Injection/Arthocentesis: bilateral glenohumeral    Date/Time: 4/27/2023 3:20 PM    Performed by: Danielito Mohan DO  Authorized by: Danielito Mohan DO    Indications:  Pain  Needle Size:  18 G  Guidance: ultrasound    Approach:  Anterior  Location:  Shoulder  Laterality:  Bilateral      Site:  Bilateral glenohumeral  Medications (Right):  20 mg triamcinolone 40 MG/ML  Aspirate amount (mL):  30  Aspirate:  Serous and  "blood-tinged  Medications (Left):  20 mg triamcinolone 40 MG/ML; 2 mL ropivacaine 5 MG/ML  Aspirate amount (mL):  30  Aspirate:  Serous and blood-tinged  Outcome:  Tolerated well, no immediate complications  Procedure discussed: discussed risks, benefits, and alternatives    Consent Given by:  Patient  Timeout: timeout called immediately prior to procedure    Prep: patient was prepped and draped in usual sterile fashion     Ultrasound images of procedure were permanently stored.         Assessment:  1. Pain of both shoulder joints    2. Rotator cuff arthropathy of both shoulders    3. Shoulder effusion, unspecified laterality    4. Osteoarthritis of bilateral glenohumeral joints        Plan:  Discussed the assessment with the patient.  Follow up: prn based on clinical progress  Medically complicated, with chronic b/l shoulder pain  Cannot take NSAIDs due to CKD and other comorbidities  Bilateral shoulder US guided aspiration and CSI today for better pain control  Compressive brace option reviewed, currently using   Activity modification reviewed  Could consider PT in the future based on progress, low expectations for benefit  TSA not realistic due to medical comorbidities, known underlying \"[place] shoulder\"  Expectations and goals of CSI reviewed  Often 2-3 days for steroid effect, and can take up to two weeks for maximum effect  We discussed modified progressive pain-free activity as tolerated  Do not overuse in first two weeks if feeling better due to concern for vulnerability while steroid is working  Supportive care reviewed  All questions were answered today  Contact us with additional questions or concerns  Signs and sx of concern reviewed      Danielito Mohan DO, GABE  Sports Medicine Physician  [facility]          Disclaimer: This note consists of symbols derived from keyboarding, dictation and/or voice recognition software. As a result, there may be errors in the script " that have gone undetected. Please consider this when interpreting information found in this chart.      Again, thank you for allowing me to participate in the care of your patient.        Sincerely,        Danielito Mohan, DO

## 2023-07-10 NOTE — PROGRESS NOTES
Medication order for dronabinol (MARINOL) 5 MG capsule   Faxed to patient's pharmacy at [phone] per Dr Alicea.  Criss, RN Care Coordinator  Nephrology

## 2023-08-09 NOTE — PROGRESS NOTES
Neph Tracking Flowsheet Last Filled Values       CKD Education Status Complete    CKD Education Referral Date 06/21/22    CKD Education Completed Date 09/09/22    CKD Education Type Kidney Smart CKD Basics    Preferred Modality Peritoneal Dialysis    Final Modality Peritoneal Dialysis    Patient's Referral Dates Auto Populate Patient's Referral Dates    Home Care Referral 7/26/20    Journey Referral 6/24/22    Vein Mapping/US Order 12/22/22    Vein Mapping/US  02/13/23    Access Surgeon Referral Status  Referred    Dialysis Access Referral --  GFR 12 at time of referral    Access Surgical Consult 11/29/22  Plan: PD cath, with fistula as a back up plan - Dr. Owens    Diaylsis Access Type PD    Dialysis Access Surgery 03/31/23   Initial PD cath placed 3/3/23; replaced during revision surgery d/t cuff being inside the peritoneal cavity    Dialysis Access Surgeon Dr. Owens    Dialysis Access Maturation --  initially mature 3/20/23, replaced 3/31/23 d/t malfunction.    Transplant Status  Not Referred  age    Initiation of Dialysis Outpatient  first PD flush: 03/13/23          Loren Sparks, RN, BSN  Nephrology Supervisor  [facility]

## 2023-08-20 NOTE — ED PROVIDER NOTES
History     Chief Complaint   Patient presents with    Abnormal Labs     Low Hemoglobin 7.0     HPI  Mann Escobar is a 82 year old male who presents with concern about anemia.  Hemoglobin 6.7 on arrival.    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  Patient also has a history of peripheral edema and hypertension.  Patient is supposed to be on peritoneal dialysis    On examination patient reports he typically does peritoneal dialysis from 10 PM to 6 AM.  Typical 8-hour runs per day.  He goes to [facility] twice a month where he does his peritoneal dialysis and receives an intramuscular injection likely EPO.  Patient reports his last visit at [facility] was 6 days earlier.  Patient reports he was called and told to present to the emergency department due to his low hemoglobin although the exact numbers not known.  Records show that his hemoglobin was 9.2 in February 7 0.2 in April and 6.2 on April 15 before received transfusion.  He reports he is also noted some low blood pressure readings at home.  Patient reports no fever or chills.  He reports no abdominal pain.  He does report that he has had some gagging when he tries to drink fluids.  Patient reports he has had no melena hematochezia no hemoptysis or hematemesis.    Allergies:  Allergies   Allergen Reactions    Nkda [No Known Drug Allergy]     Adhesive Tape Rash       Problem List:    Patient Active Problem List    Diagnosis Date Noted    Anemia of chronic renal failure, stage 3b (H) 01/26/2022     Priority: High    HTN (hypertension) 10/08/2012     Priority: High    Chronic low back pain 10/08/2012     Priority: High     Had back surgery in 5/2012        Leg edema 08/18/2010     Priority: High    Continuous opioid dependence (H) 08/25/2022     Priority: Medium    Chronic heart failure with preserved ejection  fraction (H) 01/21/2022     Priority: Medium    Marginal ulcer 12/30/2021     Priority: Medium    Retching 12/30/2021     Priority: Medium    History of Favian-en-Y gastric bypass 12/30/2021     Priority: Medium    Carpal tunnel syndrome 05/03/2021     Priority: Medium    Edema 05/03/2021     Priority: Medium    Nephrolithiasis 05/03/2021     Priority: Medium     Dec 10, 2003 Entered By: ANGELIA STEVENS Comment: s/p nephrolithostomy      Diastolic dysfunction 02/01/2021     Priority: Medium    Chronic kidney disease, stage 3 (H) 01/05/2021     Priority: Medium    Arthritis of ankle, right 07/24/2020     Priority: Medium    Chronic anemia 07/24/2020     Priority: Medium    S/P ankle joint replacement 07/23/2020     Priority: Medium    Chronic pain syndrome 02/06/2017     Priority: Medium     Patient is followed by GUNJAN Vinson CNP for ongoing prescription of pain medication.  All refills should only be approved by this provider, or covering partner.    Medication(s): oxycodone.   Maximum quantity per month: 60  Clinic visit frequency required: Q 3 months     Controlled substance agreement:  Encounter-Level CSA:     There are no encounter-level csa.        Pain Clinic evaluation in the past:     Last [facility] website verification: 6/16/2021          Pseudogout 09/26/2016     Priority: Medium    BPH (benign prostatic hyperplasia) 06/11/2015     Priority: Medium    Hypertension      Priority: Medium    Abnormal antinuclear antibody titer 05/09/2014     Priority: Medium    Osteoarthritis 05/09/2014     Priority: Medium    Advanced directives, counseling/discussion 10/08/2012     Priority: Medium     Discussed advance care planning with patient; however, patient declined at this time. 10/8/2012         Benign non-nodular prostatic hyperplasia with lower urinary tract symptoms 10/08/2012     Priority: Medium    S/P knee replacement 10/08/2012     Priority: Medium     Both sides        S/P ankle fusion 10/08/2012      Priority: Medium     Left side        First degree AV block 04/25/2012     Priority: Medium    Family history of diabetes mellitus 04/25/2012     Priority: Medium    Scoliosis 04/25/2012     Priority: Medium    Hypopotassemia 04/25/2012     Priority: Medium    Internal hemorrhoids 08/23/2011     Priority: Medium    Iron deficiency anemia 08/23/2011     Priority: Medium    CARDIOVASCULAR SCREENING; LDL GOAL LESS THAN 130 10/31/2010     Priority: Medium    Family history of prostate cancer 09/02/2009     Priority: Medium     FATHER      Esophageal reflux 05/25/2006     Priority: Medium    Thalassemia minor      Priority: Medium     thalassemia minor (chronic low hgb)       HX NEPHROLITHIASIS [V13.01] 09/12/2005     Priority: Medium        Past Medical History:    Past Medical History:   Diagnosis Date    Arthritis of ankle, left 7/24/2020    Back pain     Gastro-oesophageal reflux disease     Hypertension     Hypopotassemia     Internal hemorrhoids     Iron deficiency anaemia     Leg edema     Morbid obesity 9/12/2005    Morbid obesity (H)     Osteoarthrosis     Scoliosis     Thalassemia minor        Past Surgical History:    Past Surgical History:   Procedure Laterality Date    CATARACT IOL, RT/LT  2008/    Cataract IOL RT/LT    COLONOSCOPY  2009/07    polyps removed repeat 5 yrs, tubular adenoma    COLONOSCOPY  9/29/2011    Procedure:COLONOSCOPY; Colonoscopy with hemorrhoid banding; Surgeon:JEREMY GARCIA; Location:[facility]    COLONOSCOPY N/A 12/19/2017    Procedure: COLONOSCOPY;  colonoscopy, gastroscopy;  Surgeon: Edmar Isaacs MD;  Location: [facility]    DECOMPRESSION LUMBAR MINIMALLY INVASIVE TWO LEVELS  5/2/2012    Procedure:DECOMPRESSION LUMBAR MINIMALLY INVASIVE TWO LEVELS; Surgeon:LOTTIE MITCHELL; Location:[facility]    ESOPHAGOSCOPY, GASTROSCOPY, DUODENOSCOPY (EGD), COMBINED N/A 2/6/2018    Procedure: COMBINED ESOPHAGOSCOPY, GASTROSCOPY, DUODENOSCOPY (EGD);  gastroscopy;  Surgeon: Edmar Isaacs MD;   Location: [facility]    ESOPHAGOSCOPY, GASTROSCOPY, DUODENOSCOPY (EGD), COMBINED N/A 10/18/2021    Procedure: ESOPHAGOGASTRODUODENOSCOPY (EGD);  Surgeon: Anju Galeano MD;  Location: [facility]    FUSION SPINE POSTERIOR MINIMALLY INVASIVE ONE LEVEL  5/2/2012    Procedure:FUSION SPINE POSTERIOR MINIMALLY INVASIVE ONE LEVEL; Minimal Access Spinal Technology Transformaninal Lumbar Interbody Fusion L4-5, Decompression L3-5; Surgeon:LOTTIE MITCHELL; Location:[facility]    HC REMOVAL OF HYDROCELE,TUNICA,UNILAT  2006    bilateral    IR RENAL BIOPSY LEFT  6/13/2022    LAPAROSCOPIC INSERTION CATHETER PERITONEAL DIALYSIS N/A 3/3/2023    Procedure: INSERTION, CATHETER, DIALYSIS, PERITONEAL, LAPAROSCOPIC with left sided exit;  Surgeon: Padma Owens MD;  Location: [facility]    ORTHOPEDIC SURGERY  2000    Lf knee replacement    ORTHOPEDIC SURGERY  2002, 2010    Rt replacement    ORTHOPEDIC SURGERY  2005    Left ankle fused    RECONSTRUCT ANKLE Right 7/23/2020    Procedure: Ankle RECONSTRUCTIve Arthroplasty;  Surgeon: Luke Powers DPM;  Location: [facility]    REPOSITION, CATHETER, DIALYSIS, PERITONEAL, LAPAROSCOPIC N/A 3/31/2023    Procedure: Laparoscopic Peritoneal Dialysis catheter replacement;  Surgeon: Padma Owens MD;  Location: [facility]    SURGICAL HISTORY OF -       Cysto    SURGICAL HISTORY OF -   2002    Percutaneous kidney stone removal    SURGICAL HISTORY OF -       ureteral stent removal    SURGICAL HISTORY OF -   2005    bariatric surgery-Favian-en-Y    SURGICAL HISTORY OF -   2008    carpal tunnel surgery rt wrist       Family History:    Family History   Problem Relation Age of Onset    Heart Disease Mother         CHF    Cerebrovascular Disease Mother     Hypertension Mother     Cancer Mother         tumor in stomach    Cancer - colorectal Mother     Prostate Cancer Father     Cancer Father         bone    Diabetes Father     Breast Cancer Sister     Diabetes Brother     Obesity Brother     Cancer Brother      "Cancer - colorectal Brother     Diabetes Brother     Heart Disease Maternal Grandmother         CHF    Diabetes Paternal Grandmother     Cerebrovascular Disease Paternal Grandfather     Genitourinary Problems Son         Kidney stones    Anesthesia Reaction No family hx of     Venous thrombosis No family hx of        Social History:  Marital Status:   [2]  Social History     Tobacco Use    Smoking status: Former     Packs/day: 0.50     Years: 7.00     Pack years: 3.50     Types: Cigarettes     Quit date: 1962     Years since quittin.6    Smokeless tobacco: Never   Vaping Use    Vaping Use: Never used   Substance Use Topics    Alcohol use: Not Currently     Comment: none recent    Drug use: No        Medications:    amoxicillin-clavulanate (AUGMENTIN) 875-125 MG tablet  calcium acetate (PHOSLO) 667 MG CAPS capsule  calcium carbonate (TUMS) 500 MG chewable tablet  docusate sodium (COLACE) 100 MG capsule  dronabinol (MARINOL) 5 MG capsule  gabapentin (NEURONTIN) 100 MG capsule  glycerin (LAXATIVE) 1.2 g suppository  MULTIVITAMIN OR  multivitamin RENAL (RENAVITE RX/NEPHROVITE) 1 MG tablet  omeprazole (PRILOSEC) 40 MG DR capsule  OVER-THE-COUNTER  oxyCODONE (ROXICODONE) 5 MG tablet  polyethylene glycol (MIRALAX) 17 GM/Dose powder  SENNA-docusate sodium (SENNA S) 8.6-50 MG tablet  sucralfate (CARAFATE) 1 GM tablet  tamsulosin (FLOMAX) 0.4 MG capsule  torsemide (DEMADEX) 20 MG tablet          Review of Systems   Constitutional: Negative.    HENT: Negative.     Eyes: Negative.    Respiratory: Negative.     Cardiovascular: Negative.    Gastrointestinal: Negative.    Endocrine: Negative.    Genitourinary: Negative.    Musculoskeletal: Negative.    Skin:  Positive for pallor.   Allergic/Immunologic: Negative.    Neurological:  Positive for weakness.   All other systems reviewed and are negative.      Physical Exam   BP: 90/69  Pulse: 79  Temp: 98.2  F (36.8  C)  Resp: 16  Height: 154.9 cm (5' 1\")  Weight: 63.5 " kg (140 lb)  SpO2: 97 %      Physical Exam  HENT:      Head: Normocephalic and atraumatic.   Skin:     Capillary Refill: Capillary refill takes less than 2 seconds.   Neurological:      General: No focal deficit present.      Mental Status: He is alert and oriented to person, place, and time.      Cranial Nerves: No cranial nerve deficit.      Sensory: No sensory deficit.      Motor: No weakness.      Coordination: Coordination normal.      Deep Tendon Reflexes: Reflexes normal.   Psychiatric:         Mood and Affect: Mood normal.         Behavior: Behavior normal.         Thought Content: Thought content normal.         Judgment: Judgment normal.         ED Course                 Procedures              Critical Care time:  none             ED medications: PRBC- 2 Units  Medications   magnesium sulfate 2 g in 50 mL sterile water intermittent infusion (2 g Intravenous Not Given 8/20/23 2131)          ED Vitals:  Vitals:    08/20/23 2200 08/20/23 2201 08/20/23 2230 08/20/23 2300   BP: (!) 89/65 (!) 89/65 103/57 102/71   Pulse: 75 85 78 84   Resp: 14 14 17 14   Temp:  98.2  F (36.8  C)     TempSrc:  Oral     SpO2:       Weight:       Height:         Vitals:    08/20/23 2200 08/20/23 2201 08/20/23 2230 08/20/23 2300   BP: (!) 89/65 (!) 89/65 103/57 102/71   Pulse: 75 85 78 84   Resp: 14 14 17 14   Temp:  98.2  F (36.8  C)     TempSrc:  Oral     SpO2:       Weight:       Height:          Vitals:    08/20/23 2201 08/20/23 2230 08/20/23 2300 08/20/23 2336   BP: (!) 89/65 103/57 102/71 115/61   Pulse: 85 78 84 78   Resp: 14 17 14 13   Temp: 98.2  F (36.8  C)      TempSrc: Oral      SpO2:       Weight:       Height:          ED labs and imaging:  Results for orders placed or performed during the hospital encounter of 08/20/23 (from the past 24 hour(s))   [place] Draw *Canceled*    Narrative    The following orders were created for panel order [place] Draw.  Procedure                               Abnormality         Status                      ---------                               -----------         ------                       Please view results for these tests on the individual orders.   CBC with platelets differential    Narrative    The following orders were created for panel order CBC with platelets differential.  Procedure                               Abnormality         Status                     ---------                               -----------         ------                     CBC with platelets and d...[[id number]]  Abnormal            Final result                 Please view results for these tests on the individual orders.   Comprehensive metabolic panel   Result Value Ref Range    Sodium 137 136 - 145 mmol/L    Potassium 4.2 3.4 - 5.3 mmol/L    Chloride 98 98 - 107 mmol/L    Carbon Dioxide (CO2) 24 22 - 29 mmol/L    Anion Gap 15 7 - 15 mmol/L    Urea Nitrogen 59.7 (H) 8.0 - 23.0 mg/dL    Creatinine 7.01 (H) 0.67 - 1.17 mg/dL    Calcium 6.3 (L) 8.8 - 10.2 mg/dL    Glucose 92 70 - 99 mg/dL    Alkaline Phosphatase 103 40 - 129 U/L    AST 24 0 - 45 U/L    ALT 16 0 - 70 U/L    Protein Total 4.0 (L) 6.4 - 8.3 g/dL    Albumin 2.1 (L) 3.5 - 5.2 g/dL    Bilirubin Total 0.4 <=1.2 mg/dL    GFR Estimate 7 (L) >60 mL/min/1.73m2   CBC with platelets and differential   Result Value Ref Range    WBC Count 7.6 4.0 - 11.0 10e3/uL    RBC Count 3.65 (L) 4.40 - 5.90 10e6/uL    Hemoglobin 6.7 (LL) 13.3 - 17.7 g/dL    Hematocrit 21.3 (L) 40.0 - 53.0 %    MCV 58 (L) 78 - 100 fL    MCH 18.4 (L) 26.5 - 33.0 pg    MCHC 31.5 31.5 - 36.5 g/dL    RDW 18.4 (H) 10.0 - 15.0 %    Platelet Count 287 150 - 450 10e3/uL    % Neutrophils 76 %    % Lymphocytes 12 %    % Monocytes 9 %    % Eosinophils 1 %    % Basophils 1 %    % Immature Granulocytes 1 %    NRBCs per 100 WBC 1 (H) <1 /100    Absolute Neutrophils 5.8 1.6 - 8.3 10e3/uL    Absolute Lymphocytes 0.9 0.8 - 5.3 10e3/uL    Absolute Monocytes 0.7 0.0 - 1.3 10e3/uL    Absolute Eosinophils 0.1 0.0 -  0.7 10e3/uL    Absolute Basophils 0.0 0.0 - 0.2 10e3/uL    Absolute Immature Granulocytes 0.0 <=0.4 10e3/uL    Absolute NRBCs 0.0 10e3/uL   Magnesium   Result Value Ref Range    Magnesium 1.6 (L) 1.7 - 2.3 mg/dL   Phosphorus   Result Value Ref Range    Phosphorus 6.0 (H) 2.5 - 4.5 mg/dL   ABO/Rh type and screen    Narrative    The following orders were created for panel order ABO/Rh type and screen.  Procedure                               Abnormality         Status                     ---------                               -----------         ------                     Adult Type and Screen[[id number]]                            Final result                 Please view results for these tests on the individual orders.   Adult Type and Screen   Result Value Ref Range    ABO/RH(D) A POS     Antibody Screen Negative Negative    SPECIMEN EXPIRATION DATE [id number]    Prepare red blood cells (unit)   Result Value Ref Range    Blood Component Type Red Blood Cells     Product Code [id number]     Unit Status Issued     Unit Number [id number]     CROSSMATCH Compatible     CODING SYSTEM [id number]     ISSUE DATE AND TIME [id number]     UNIT ABO/RH A+     UNIT TYPE ISBT 6200    Prepare red blood cells (unit)   Result Value Ref Range    Blood Component Type Red Blood Cells     Product Code [id number]     Unit Status Issued     Unit Number [id number]     CROSSMATCH Compatible     CODING SYSTEM [id number]     ISSUE DATE AND TIME [id number]     UNIT ABO/RH A+     UNIT TYPE ISBT 6200          Assessments & Plan (with Medical Decision Making)   Assessment Summary and Clinical Impression: 82-year-old male who presented with acute on chronic anemia.  He has a known history of iron deficiency anemia, peritoneal dialysis-reestablish, history of Favian-en-Y, chronic pain syndrome and chronic opioid therapy.  Patient also has a history of thalassemia, chronic kidney disease stage III.  Patient had required blood transfusion about 4  months prior.  His last hemoglobin was about 4 months ago at 6.7.  He reports he was called from his dialysis center and advised to present to the emergency department for care.  He did note some low blood pressure readings at home and that he gags when he drinks fluids but no abdominal pain no melena hematochezia.  After reviewing his care and transfusion course 4 months earlier and discussion with nephrology he was consented for 2 units of packed red blood cells. At shift end patient was completing transfusion of PRBC with plan for outpatient follow-up.    ED course and Plan:  Reviewed the medical record.  Blood work on arrival revealed hemoglobin of 6.7.  Last hemoglobin trend has been between 9.2-7.2 in the last 4 months.  Patient was last transfused about 4 months ago.  During his transfusion he had received Bumex.  During his course of care on 4/14/2023 there was delay in receiving blood products due to minor autoantibodies.     To ensure guidance with transfusion parameters I spoke with nephrology. Patient has been by Dr. Alicae.  I spoke with Dr. Xiao- nephrology at 4.44pm as patient is on torsemide 40mg twice daily.  Dr. Reyes she felt that Bumex was not required or diuretics between transfusion given patient will resume peritoneal dialysis at home today at 10pm   Patient reporting some symptoms of feeling gaggy electrolytes were checked and her magnesium and phosphorus.  Patient's magnesium was 1.6 and phosphorus was 6.0.  Electrolytes could not be repleted due to IV access issues.  After transfusion 2 units of PRBCs patient's repeat hemoglobin    Spoke with Dr. Bruce-nephrology on-call at the [facility] at 11.20pm to review if there is any adjustments are required for his peritoneal dialysis given his hyperphosphatemia and hypomagnesemia.  No changes required.    At shift end at 12am patient awaiting completion of his transfusion with plan of discharge to complete his peritoneal dialysis at home  and follow-up with his care team. Post transfusion CBC obtained to document new baseline and to have additional follow-up if needed.  Patient and spouse notified of plan of care.      Disclaimer: This note consists of symbols derived from keyboarding, dictation and/or voice recognition software. As a result, there may be errors in the script that have gone undetected. Please consider this when interpreting information found in this chart.   I have reviewed the nursing notes.    I have reviewed the findings, diagnosis, plan and need for follow up with the patient.           Medical Decision Making  The patient's presentation was of moderate complexity (an acute illness with systemic symptoms).    The patient's evaluation involved:  ordering and/or review of 2 test(s) in this encounter (diagnostic imaging and labs)    The patient's management necessitated moderate risk (prescription drug management including medications given in the ED).        New Prescriptions    No medications on file       Final diagnoses:   Iron deficiency anemia, unspecified iron deficiency anemia type - Acute on chronic   Hypomagnesemia   Hyperphosphatemia   Peritoneal dialysis catheter in place (H)       8/20/2023   [facility] EMERGENCY DEPT       Jabier Gómez MD  08/20/23 [id number]

## 2023-08-21 NOTE — DISCHARGE INSTRUCTIONS
1) During your evaluation today your electrolytes were noted to be a bit abnormal with a low magnesium and high phosphorus.  With your peritoneal dialysis it is important to follow-up with your nephrology care team to ensure that there is no adjustments required with your reagents used for peritoneal dialysis.  Your abnormal electrolytes were reviewed with nephrology on duty at [facility] who report no immediate changes needed    2) After receiving 2 units of blood today or repeat CBC is reassuring as your hemoglobin is now 9.5.

## 2023-08-21 NOTE — ED NOTES
Writer cause approx. 2 cm skin tear on pts R forearm while removing Tegaderm. Area cleansed with NS, bacitracin and Telfa applied.

## 2023-08-21 NOTE — ED NOTES
Two Rns attempted a second PIV and were unsuccessful, pt declines another attempt and does not want to wait to get Mag infusion. MD notified.

## 2023-09-06 NOTE — TELEPHONE ENCOUNTER
Reason for Call:  Other appointment    Detailed comments: pt is a dialysis pt and needs to be seen for sores on feet. Pt wanting to see [facility] peters.    Phone Number Patient can be reached at: Cell number on file:    Telephone Information:   Mobile [phone]  - wife       Best Time: any    Can we leave a detailed message on this number? YES    Call taken on 9/6/2023 at 1:33 PM by Areli Frias

## 2023-09-07 NOTE — PROGRESS NOTES
"  Assessment & Plan   (Z00.00) Medicare annual wellness visit, subsequent  (primary encounter diagnosis)  Comment:   Plan:     Ulcer of right foot, limited to breakdown of skin (H)  Recommend daily dressing changes and follow-up with wound care .  - Wound Care Referral; Future  - mupirocin (BACTROBAN) 2 % external ointment; Apply topically daily    Anemia of chronic renal failure, stage 3b (H)      Cellulitis of right lower extremity  - cephALEXin (KEFLEX) 500 MG capsule; Take 1 capsule (500 mg) by mouth 2 times daily for 10 days       BMI:   Estimated body mass index is 26.26 kg/m  as calculated from the following:    Height as of this encounter: 1.549 m (5' 1\").    Weight as of this encounter: 63 kg (139 lb).   Weight management plan: Discussed healthy diet and exercise guidelines    See Patient Instructions    GUNJAN Peña CNP  [facility]    Marcus Darnell is a 82 year old, presenting for the following health issues:  Foot Problems        9/7/2023     9:22 AM   Additional Questions   Roomed by Petty HUFFMAN     Concern - Foot Sores  Onset: awhile   Description: Says that the one on the great left toe has been there awhile. The one on the outside of his right foot is more recently probably a few weeks. Has [facility] that comes out and recommended that they get in asap   Intensity: moderate  Progression of Symptoms:  worsening and same  Accompanying Signs & Symptoms: redness and swelling   Previous history of similar problem: na   Precipitating factors:        Worsened by: na   Alleviating factors:        Improved by: na   Therapies tried and outcome: wound cream       Annual Wellness Visit      Patient has been advised of split billing requirements and indicates understanding: Yes     Are you in the first 12 months of your Medicare Part B coverage?  No    Physical Health:  In general, how would you rate your overall physical health? poor  Outside of work, how many " "days during the week do you exercise?1 day/week  Outside of work, approximately how many minutes a day do you exercise?not applicable  If you drink alcohol do you typically have >3 drinks per day or >7 drinks per week? No  Do you usually eat at least 4 servings of fruit and vegetables a day, include whole grains & fiber and avoid regularly eating high fat or \"junk\" foods? Yes  Do you have any problems taking medications regularly? No  Do you have any side effects from medications? none  Needs assistance for the following daily activities: transportation, shopping, preparing meals, housework, bathing, laundry, and taking medicine  Which of the following safety concerns are present in your home?  none identified   Hearing impairment: Yes,  has hearing aids   In the past 6 months, have you been bothered by leaking of urine? no    Mental Health:  In general, how would you rate your overall mental or emotional health? good  PHQ-2 Score:      Do you feel safe in your environment? Yes    Have you ever done Advance Care Planning? (For example, a Health Directive, POLST, or a discussion with a medical provider or your loved ones about your wishes)? No, advance care planning information given to patient to review.  Patient plans to discuss their wishes with loved ones or provider.      Fall risk:  Fallen 2 or more times in the past year?: Yes  Any fall with injury in the past year?: Yes    Cognitive Screenin) Repeat 3 items (Leader, Season, Table)    2) Clock draw: NORMAL  3) 3 item recall: Recalls 3 objects  Results: 3 items recalled: COGNITIVE IMPAIRMENT LESS LIKELY    Mini-CogTM Copyright LACEY Coronado. Licensed by the author for use in [facility]; reprinted with permission (zi@.[facility]). All rights reserved.      Do you have sleep apnea, excessive snoring or daytime drowsiness? : yes    Social History     Tobacco Use    Smoking status: Former     Packs/day: 0.50     Years: 7.00     Pack years: 3.50     " Types: Cigarettes     Quit date: 1962     Years since quittin.7    Smokeless tobacco: Never   Substance Use Topics    Alcohol use: Not Currently     Comment: none recent              No data to display                   No data to display              Do you have a current opioid prescription? Yes   How severe is your pain on a scale from 1-10? 10/10           Do you use any other controlled substances or medications that are not prescribed by a provider? None      Current providers sharing in care for this patient include:   Patient Care Team:  Hebert Whitehead MD as PCP - General (Family Medicine)  Shayy Ramírez APRN CNP as Assigned PCP  Yoon Alicea MD as MD (Nephrology)  Anson Thomas MD as MD (Family Medicine)  Sandor Britton DO as MD (Gastroenterology)  Boom Golden MD as Assigned Cancer Care Provider  Danielito Mohan DO as Assigned Musculoskeletal Provider  Padma Owens MD as Assigned Surgical Provider  Shayy Ramírez APRN CNP as Assigned Pain Medication Provider  Stormy Madrigal PA-C as Assigned Nephrology Provider    The following health maintenance items are reviewed in Epic and correct as of today:  Health Maintenance   Topic Date Due    HF ACTION PLAN  Never done    HEPATITIS A IMMUNIZATION (1 of 2 - Risk 2-dose series) Never done    HEPATITIS B IMMUNIZATION (1 of 3 - Risk Dialysis 4-dose series) Never done    LIPID  2023    MICROALBUMIN  2023    COVID-19 Vaccine (6 - Pfizer series) 2023    MEDICARE ANNUAL WELLNESS VISIT  2023    TREATMENT AGREEMENT FOR CHRONIC PAIN MANAGEMENT  2023    URINE DRUG SCREEN  2023    INFLUENZA VACCINE (1) 2023    SARAH ASSESSMENT  2023    ANNUAL REVIEW OF HM ORDERS  02/15/2024    PHQ-9  02/15/2024    BMP  2024    ALT  2024    CBC  2024    HEMOGLOBIN  2024    FALL RISK ASSESSMENT  2024    ADVANCE CARE PLANNING  2027     "COLORECTAL CANCER SCREENING  12/19/2027    DTAP/TDAP/TD IMMUNIZATION (6 - Td or Tdap) 08/04/2031    PARATHYROID  Completed    PHOSPHORUS  Completed    TSH W/FREE T4 REFLEX  Completed    PHQ-2 (once per calendar year)  Completed    Pneumococcal Vaccine: 65+ Years  Completed    URINALYSIS  Completed    ALK PHOS  Completed    ZOSTER IMMUNIZATION  Completed    IPV IMMUNIZATION  Aged Out    HPV IMMUNIZATION  Aged Out    MENINGITIS IMMUNIZATION  Aged Out       Patient has been advised of split billing requirements and indicates understanding: Yes    Appropriate preventive services were discussed with this patient, including applicable screening as appropriate for fall prevention, nutrition, physical activity, Tobacco-use cessation, weight loss and cognition.  Checklist reviewing preventive services available has been given to the patient.      Review of Systems   Constitutional, HEENT, cardiovascular, pulmonary, gi and gu systems are negative, except as otherwise noted.      Objective    /68 (BP Location: Right arm, Patient Position: Sitting, Cuff Size: Adult Large)   Pulse 95   Temp 98.7  F (37.1  C) (Tympanic)   Resp 18   Ht 1.549 m (5' 1\")   Wt 63 kg (139 lb)   SpO2 95%   BMI 26.26 kg/m    Body mass index is 26.26 kg/m .  Physical Exam   GENERAL: healthy, alert and no distress  EYES: Eyes grossly normal to inspection, PERRL and conjunctivae and sclerae normal  RESP: lungs clear to auscultation - no rales, rhonchi or wheezes  CV: regular rate and rhythm, normal S1 S2, no S3 or S4, no murmur, click or rub, no peripheral edema and peripheral pulses strong  SKIN: 1 cm ulcer to right side of foot,  3 inch by 1 inch redness to lower right,calf, 1 mm sore to left greater toe    PSYCH: mentation appears normal, affect normal/bright              "

## 2023-09-28 ENCOUNTER — MEDICAL CORRESPONDENCE (OUTPATIENT)
Dept: HEALTH INFORMATION MANAGEMENT | Facility: CLINIC | Age: 83
End: 2023-09-28
Payer: COMMERCIAL
